# Patient Record
Sex: FEMALE | Race: WHITE | NOT HISPANIC OR LATINO | URBAN - METROPOLITAN AREA
[De-identification: names, ages, dates, MRNs, and addresses within clinical notes are randomized per-mention and may not be internally consistent; named-entity substitution may affect disease eponyms.]

---

## 2017-11-09 PROBLEM — Z00.00 ENCOUNTER FOR PREVENTIVE HEALTH EXAMINATION: Status: ACTIVE | Noted: 2017-11-09

## 2020-02-12 ENCOUNTER — INPATIENT (INPATIENT)
Facility: HOSPITAL | Age: 65
LOS: 5 days | Discharge: NURSING, REHAB OR LONG-TERM CARE | DRG: 871 | End: 2020-02-18
Attending: INTERNAL MEDICINE | Admitting: INTERNAL MEDICINE
Payer: MEDICARE

## 2020-02-12 VITALS
HEIGHT: 65 IN | TEMPERATURE: 100 F | SYSTOLIC BLOOD PRESSURE: 150 MMHG | DIASTOLIC BLOOD PRESSURE: 100 MMHG | HEART RATE: 125 BPM | RESPIRATION RATE: 22 BRPM | WEIGHT: 149.91 LBS | OXYGEN SATURATION: 100 %

## 2020-02-12 DIAGNOSIS — L03.90 CELLULITIS, UNSPECIFIED: ICD-10-CM

## 2020-02-12 LAB
ALBUMIN SERPL ELPH-MCNC: 3.1 G/DL — LOW (ref 3.3–5)
ALP SERPL-CCNC: 82 U/L — SIGNIFICANT CHANGE UP (ref 40–120)
ALT FLD-CCNC: 45 U/L — SIGNIFICANT CHANGE UP (ref 12–78)
ANION GAP SERPL CALC-SCNC: 9 MMOL/L — SIGNIFICANT CHANGE UP (ref 5–17)
APPEARANCE UR: CLEAR — SIGNIFICANT CHANGE UP
APTT BLD: 28.3 SEC — SIGNIFICANT CHANGE UP (ref 27.5–36.3)
AST SERPL-CCNC: 50 U/L — HIGH (ref 15–37)
BASOPHILS # BLD AUTO: 0.03 K/UL — SIGNIFICANT CHANGE UP (ref 0–0.2)
BASOPHILS NFR BLD AUTO: 0.3 % — SIGNIFICANT CHANGE UP (ref 0–2)
BILIRUB SERPL-MCNC: 0.4 MG/DL — SIGNIFICANT CHANGE UP (ref 0.2–1.2)
BILIRUB UR-MCNC: NEGATIVE — SIGNIFICANT CHANGE UP
BUN SERPL-MCNC: 12 MG/DL — SIGNIFICANT CHANGE UP (ref 7–23)
CALCIUM SERPL-MCNC: 8.9 MG/DL — SIGNIFICANT CHANGE UP (ref 8.5–10.1)
CHLORIDE SERPL-SCNC: 105 MMOL/L — SIGNIFICANT CHANGE UP (ref 96–108)
CO2 SERPL-SCNC: 27 MMOL/L — SIGNIFICANT CHANGE UP (ref 22–31)
COLOR SPEC: YELLOW — SIGNIFICANT CHANGE UP
CREAT SERPL-MCNC: 0.86 MG/DL — SIGNIFICANT CHANGE UP (ref 0.5–1.3)
DIFF PNL FLD: ABNORMAL
EOSINOPHIL # BLD AUTO: 0.01 K/UL — SIGNIFICANT CHANGE UP (ref 0–0.5)
EOSINOPHIL NFR BLD AUTO: 0.1 % — SIGNIFICANT CHANGE UP (ref 0–6)
GLUCOSE SERPL-MCNC: 204 MG/DL — HIGH (ref 70–99)
GLUCOSE UR QL: NEGATIVE MG/DL — SIGNIFICANT CHANGE UP
HCT VFR BLD CALC: 43.8 % — SIGNIFICANT CHANGE UP (ref 34.5–45)
HGB BLD-MCNC: 13.9 G/DL — SIGNIFICANT CHANGE UP (ref 11.5–15.5)
IMM GRANULOCYTES NFR BLD AUTO: 0.8 % — SIGNIFICANT CHANGE UP (ref 0–1.5)
INR BLD: 1.12 RATIO — SIGNIFICANT CHANGE UP (ref 0.88–1.16)
KETONES UR-MCNC: ABNORMAL
LACTATE SERPL-SCNC: 4.4 MMOL/L — CRITICAL HIGH (ref 0.7–2)
LEUKOCYTE ESTERASE UR-ACNC: ABNORMAL
LYMPHOCYTES # BLD AUTO: 0.56 K/UL — LOW (ref 1–3.3)
LYMPHOCYTES # BLD AUTO: 4.8 % — LOW (ref 13–44)
MCHC RBC-ENTMCNC: 31.4 PG — SIGNIFICANT CHANGE UP (ref 27–34)
MCHC RBC-ENTMCNC: 31.7 GM/DL — LOW (ref 32–36)
MCV RBC AUTO: 99.1 FL — SIGNIFICANT CHANGE UP (ref 80–100)
MONOCYTES # BLD AUTO: 1.06 K/UL — HIGH (ref 0–0.9)
MONOCYTES NFR BLD AUTO: 9 % — SIGNIFICANT CHANGE UP (ref 2–14)
NEUTROPHILS # BLD AUTO: 9.98 K/UL — HIGH (ref 1.8–7.4)
NEUTROPHILS NFR BLD AUTO: 85 % — HIGH (ref 43–77)
NITRITE UR-MCNC: NEGATIVE — SIGNIFICANT CHANGE UP
PH UR: 5 — SIGNIFICANT CHANGE UP (ref 5–8)
PLATELET # BLD AUTO: 161 K/UL — SIGNIFICANT CHANGE UP (ref 150–400)
POTASSIUM SERPL-MCNC: 3.9 MMOL/L — SIGNIFICANT CHANGE UP (ref 3.5–5.3)
POTASSIUM SERPL-SCNC: 3.9 MMOL/L — SIGNIFICANT CHANGE UP (ref 3.5–5.3)
PROT SERPL-MCNC: 7.1 GM/DL — SIGNIFICANT CHANGE UP (ref 6–8.3)
PROT UR-MCNC: 15 MG/DL
PROTHROM AB SERPL-ACNC: 12.5 SEC — SIGNIFICANT CHANGE UP (ref 10–12.9)
RAPID RVP RESULT: SIGNIFICANT CHANGE UP
RBC # BLD: 4.42 M/UL — SIGNIFICANT CHANGE UP (ref 3.8–5.2)
RBC # FLD: 13.7 % — SIGNIFICANT CHANGE UP (ref 10.3–14.5)
SODIUM SERPL-SCNC: 141 MMOL/L — SIGNIFICANT CHANGE UP (ref 135–145)
SP GR SPEC: 1.02 — SIGNIFICANT CHANGE UP (ref 1.01–1.02)
UROBILINOGEN FLD QL: NEGATIVE MG/DL — SIGNIFICANT CHANGE UP
WBC # BLD: 11.73 K/UL — HIGH (ref 3.8–10.5)
WBC # FLD AUTO: 11.73 K/UL — HIGH (ref 3.8–10.5)

## 2020-02-12 PROCEDURE — 73590 X-RAY EXAM OF LOWER LEG: CPT | Mod: LT

## 2020-02-12 PROCEDURE — 82962 GLUCOSE BLOOD TEST: CPT

## 2020-02-12 PROCEDURE — 70450 CT HEAD/BRAIN W/O DYE: CPT

## 2020-02-12 PROCEDURE — 82803 BLOOD GASES ANY COMBINATION: CPT

## 2020-02-12 PROCEDURE — 74018 RADEX ABDOMEN 1 VIEW: CPT | Mod: 26

## 2020-02-12 PROCEDURE — 80053 COMPREHEN METABOLIC PANEL: CPT

## 2020-02-12 PROCEDURE — 70250 X-RAY EXAM OF SKULL: CPT | Mod: 26

## 2020-02-12 PROCEDURE — 83036 HEMOGLOBIN GLYCOSYLATED A1C: CPT

## 2020-02-12 PROCEDURE — 82550 ASSAY OF CK (CPK): CPT

## 2020-02-12 PROCEDURE — 70250 X-RAY EXAM OF SKULL: CPT

## 2020-02-12 PROCEDURE — 93971 EXTREMITY STUDY: CPT | Mod: LT

## 2020-02-12 PROCEDURE — 36415 COLL VENOUS BLD VENIPUNCTURE: CPT

## 2020-02-12 PROCEDURE — 89051 BODY FLUID CELL COUNT: CPT

## 2020-02-12 PROCEDURE — 99223 1ST HOSP IP/OBS HIGH 75: CPT | Mod: GC

## 2020-02-12 PROCEDURE — 85027 COMPLETE CBC AUTOMATED: CPT

## 2020-02-12 PROCEDURE — 83605 ASSAY OF LACTIC ACID: CPT

## 2020-02-12 PROCEDURE — 85025 COMPLETE CBC W/AUTO DIFF WBC: CPT

## 2020-02-12 PROCEDURE — 76700 US EXAM ABDOM COMPLETE: CPT

## 2020-02-12 PROCEDURE — 82140 ASSAY OF AMMONIA: CPT

## 2020-02-12 PROCEDURE — 80164 ASSAY DIPROPYLACETIC ACD TOT: CPT

## 2020-02-12 PROCEDURE — 71045 X-RAY EXAM CHEST 1 VIEW: CPT | Mod: 26,77

## 2020-02-12 PROCEDURE — 93926 LOWER EXTREMITY STUDY: CPT | Mod: LT

## 2020-02-12 PROCEDURE — 73700 CT LOWER EXTREMITY W/O DYE: CPT | Mod: LT

## 2020-02-12 PROCEDURE — 83735 ASSAY OF MAGNESIUM: CPT

## 2020-02-12 PROCEDURE — 80074 ACUTE HEPATITIS PANEL: CPT

## 2020-02-12 PROCEDURE — 80048 BASIC METABOLIC PNL TOTAL CA: CPT

## 2020-02-12 PROCEDURE — 93010 ELECTROCARDIOGRAM REPORT: CPT

## 2020-02-12 PROCEDURE — 71045 X-RAY EXAM CHEST 1 VIEW: CPT | Mod: 26

## 2020-02-12 PROCEDURE — 84157 ASSAY OF PROTEIN OTHER: CPT

## 2020-02-12 PROCEDURE — 71045 X-RAY EXAM CHEST 1 VIEW: CPT

## 2020-02-12 PROCEDURE — 87070 CULTURE OTHR SPECIMN AEROBIC: CPT

## 2020-02-12 PROCEDURE — 36600 WITHDRAWAL OF ARTERIAL BLOOD: CPT

## 2020-02-12 PROCEDURE — 86403 PARTICLE AGGLUT ANTBDY SCRN: CPT

## 2020-02-12 PROCEDURE — 82945 GLUCOSE OTHER FLUID: CPT

## 2020-02-12 PROCEDURE — 87483 CNS DNA AMP PROBE TYPE 12-25: CPT

## 2020-02-12 PROCEDURE — ZZZZZ: CPT

## 2020-02-12 PROCEDURE — 74018 RADEX ABDOMEN 1 VIEW: CPT

## 2020-02-12 PROCEDURE — 80202 ASSAY OF VANCOMYCIN: CPT

## 2020-02-12 PROCEDURE — 84100 ASSAY OF PHOSPHORUS: CPT

## 2020-02-12 PROCEDURE — 86803 HEPATITIS C AB TEST: CPT

## 2020-02-12 RX ORDER — VANCOMYCIN HCL 1 G
1000 VIAL (EA) INTRAVENOUS ONCE
Refills: 0 | Status: COMPLETED | OUTPATIENT
Start: 2020-02-12 | End: 2020-02-12

## 2020-02-12 RX ORDER — ACETAMINOPHEN 500 MG
650 TABLET ORAL ONCE
Refills: 0 | Status: COMPLETED | OUTPATIENT
Start: 2020-02-12 | End: 2020-02-12

## 2020-02-12 RX ORDER — CEFTRIAXONE 500 MG/1
1000 INJECTION, POWDER, FOR SOLUTION INTRAMUSCULAR; INTRAVENOUS ONCE
Refills: 0 | Status: COMPLETED | OUTPATIENT
Start: 2020-02-12 | End: 2020-02-12

## 2020-02-12 RX ORDER — SODIUM CHLORIDE 9 MG/ML
2100 INJECTION, SOLUTION INTRAVENOUS ONCE
Refills: 0 | Status: COMPLETED | OUTPATIENT
Start: 2020-02-12 | End: 2020-02-12

## 2020-02-12 RX ADMIN — CEFTRIAXONE 1000 MILLIGRAM(S): 500 INJECTION, POWDER, FOR SOLUTION INTRAMUSCULAR; INTRAVENOUS at 21:05

## 2020-02-12 RX ADMIN — Medication 650 MILLIGRAM(S): at 21:05

## 2020-02-12 RX ADMIN — Medication 250 MILLIGRAM(S): at 21:05

## 2020-02-12 RX ADMIN — SODIUM CHLORIDE 2100 MILLILITER(S): 9 INJECTION, SOLUTION INTRAVENOUS at 19:45

## 2020-02-12 RX ADMIN — Medication 650 MILLIGRAM(S): at 19:49

## 2020-02-12 RX ADMIN — Medication 1000 MILLIGRAM(S): at 22:05

## 2020-02-12 RX ADMIN — SODIUM CHLORIDE 2100 MILLILITER(S): 9 INJECTION, SOLUTION INTRAVENOUS at 20:30

## 2020-02-12 NOTE — ED PROVIDER NOTE - OBJECTIVE STATEMENT
63 y/o female with a PMHx of TBI, Epilepsy, HTN presents to the ED c/o fevers. Pt denies pain, N/V. Pt on Tamiflu. Per note, pt shaking uncontrollably. PCP: Pop. Pt is a poor historian due to hx of TBI, all hx taken from NH note.

## 2020-02-12 NOTE — H&P ADULT - NSICDXPASTMEDICALHX_GEN_ALL_CORE_FT
PAST MEDICAL HISTORY:  Dementia     Diabetes mellitus     Epilepsy     Hypertension     Normal pressure hydrocephalus     TBI (traumatic brain injury)

## 2020-02-12 NOTE — ED PROVIDER NOTE - NS_ ATTENDINGSCRIBEDETAILS _ED_A_ED_FT
I, Steve Jorge MD,  performed the initial face to face bedside interview with this patient regarding history of present illness, review of symptoms and relevant past medical, social and family history.  I completed an independent physical examination.    The history, relevant review of systems, past medical and surgical history, medical decision making, and physical examination was documented by the scribe in my presence and I attest to the accuracy of the documentation.

## 2020-02-12 NOTE — H&P ADULT - ATTENDING COMMENTS
65 y/o F PMHx as noted above admitted for further evaluation and management of sepsis secondary to complicated left lower extremity cellulitis.     #Sepsis secondary to Left Lower extremity Cellulitis  ~Admit to SICU  ~as noted above patient met sepsis criteria on admission  ~patient's LA as noted above; cont. aggressive IV hydration; will repeat LA accordingly  ~CCM consultation pending  ~will escalate abx therapy as above with Meropenem 1000mg IV stat then q8hrs, Clindamycin 600mg IVPB stat then q8hrs, Vancomycin 1000mg IVPB q24hrs   ~f/u PAN C+S  ~f/u w/ ID consultation in the am  ~cont. anti-pyretics  ~f/u LLE XR   ~f/u w/ Surgery  ~f/u X-ray  Shunt series   ~f/u B/L LE arterial/venous duplex    #Influenza Post Exposure Prophylaxis  ~cont. management as outlined above    #Diabetes Mellitus type 2  ~cont. ISS Protocol  ~cont. BGM qAC/HS    #Hypertension   ~cont. to monitor as outlined above    #Mood Disorder/ RODY  ~cont. Valproic Acid as outlined above  ~cont. Clonazepam 0.25mg po q12hrs    #Major Depressive Disorder  ~cont. Paroxetine     #VTE ppx  ~cont. LMWH sq

## 2020-02-12 NOTE — H&P ADULT - NSHPREVIEWOFSYSTEMS_GEN_ALL_CORE
Unable to adequately assess due to baseline mental status, however denies dizziness, nausea, vomiting, abdominal pain, leg pain or weakness.

## 2020-02-12 NOTE — ED ADULT NURSE NOTE - NSIMPLEMENTINTERV_GEN_ALL_ED
Implemented All Fall Risk Interventions:  [place] to call system. Call bell, personal items and telephone within reach. Instruct patient to call for assistance. Room bathroom lighting operational. Non-slip footwear when patient is off stretcher. Physically safe environment: no spills, clutter or unnecessary equipment. Stretcher in lowest position, wheels locked, appropriate side rails in place. Provide visual cue, wrist band, yellow gown, etc. Monitor gait and stability. Monitor for mental status changes and reorient to person, place, and time. Review medications for side effects contributing to fall risk. Reinforce activity limits and safety measures with patient and family.

## 2020-02-12 NOTE — H&P ADULT - NSHPPHYSICALEXAM_GEN_ALL_CORE
Vital Signs Last 24 Hrs  T(C): 38.5 (12 Feb 2020 21:30), Max: 39.4 (12 Feb 2020 19:11)  T(F): 101.3 (12 Feb 2020 21:30), Max: 103 (12 Feb 2020 19:11)  HR: 97 (12 Feb 2020 23:26) (97 - 125)  BP: 103/56 (12 Feb 2020 23:26) (97/50 - 150/100)  RR: 18 (12 Feb 2020 23:26) (18 - 22)  SpO2: 95% (12 Feb 2020 23:26) (94% - 100%)    PHYSICAL EXAM:  GENERAL: NAD, lying in bed comfortably  HEAD:  Atraumatic, Normocephalic  EYES: PERRLA, conjunctiva and sclera clear  ENT: Moist mucous membranes  NECK: Supple, No JVD  CHEST/LUNG: Clear to auscultation bilaterally; No rales, rhonchi, wheezing, or rubs. Unlabored respirations  HEART: Regular rate and rhythm; No murmurs, rubs, or gallops  ABDOMEN: Bowel sounds present; Soft, Nontender, Nondistended  EXTREMITIES:  2+ Peripheral Pulses, b/l pedal edema, LLE: erythema to anterior led extending above mid 1/3, open circular wound to anterior Left leg, no oozing noted, mild erythema to posterior leg, non tender  NERVOUS SYSTEM:  Alert, awake, answers yes and no to questions, follows commands readily  MSK: FROM all 4 extremities, full and equal strength Vital Signs Last 24 Hrs  T(C): 38.5 (12 Feb 2020 21:30), Max: 39.4 (12 Feb 2020 19:11)  T(F): 101.3 (12 Feb 2020 21:30), Max: 103 (12 Feb 2020 19:11)  HR: 97 (12 Feb 2020 23:26) (97 - 125)  BP: 103/56 (12 Feb 2020 23:26) (97/50 - 150/100)  RR: 18 (12 Feb 2020 23:26) (18 - 22)  SpO2: 95% (12 Feb 2020 23:26) (94% - 100%)    PHYSICAL EXAM:  GENERAL: NAD, lying in bed comfortably  HEAD:  Atraumatic, Normocephalic  EYES: PERRLA, conjunctiva and sclera clear  ENT: Moist mucous membranes  NECK: Supple, No JVD  CHEST/LUNG: Clear to auscultation bilaterally; No rales, rhonchi, wheezing, or rubs. Unlabored respirations  HEART: Regular rate and rhythm; No murmurs, rubs, or gallops, capillary refill < 2seconds   ABDOMEN: Bowel sounds present; Soft, Nontender, Nondistended  EXTREMITIES:  2+ Peripheral Pulses, b/l pedal edema, LLE: erythema to anterior led extending above mid 1/3, open circular wound to anterior Left leg, no oozing noted, mild erythema to posterior leg, non tender  NERVOUS SYSTEM:  Alert, awake, answers yes and no to questions, follows commands readily  MSK: FROM all 4 extremities, full and equal strength Vital Signs Last 24 Hrs  T(C): 38.5 (12 Feb 2020 21:30), Max: 39.4 (12 Feb 2020 19:11)  T(F): 101.3 (12 Feb 2020 21:30), Max: 103 (12 Feb 2020 19:11)  HR: 97 (12 Feb 2020 23:26) (97 - 125)  BP: 103/56 (12 Feb 2020 23:26) (97/50 - 150/100)  RR: 18 (12 Feb 2020 23:26) (18 - 22)  SpO2: 95% (12 Feb 2020 23:26) (94% - 100%)    PHYSICAL EXAM:  GENERAL: NAD, lying in bed comfortably  HEAD:  Atraumatic, Normocephalic  EYES: R pupil > Left, R pupil slow reacting to light, L pupil reactive, conjunctiva and sclera clear  ENT: Moist mucous membranes  NECK: Supple, No JVD  CHEST/LUNG: Clear to auscultation bilaterally; No rales, rhonchi, wheezing, or rubs. Unlabored respirations  HEART: Regular rate and rhythm; No murmurs, rubs, or gallops, capillary refill < 2seconds   ABDOMEN: Bowel sounds present; Soft, Nontender, Nondistended  EXTREMITIES:  2+ Peripheral Pulses, b/l pedal edema, LLE: erythema, warmth to anterior leg - mid 1/3, open circular wound to anterior Left leg, no oozing noted, mild erythema to posterior leg, non tender, erythema to RLE - distal 1/3, warm to touch   NERVOUS SYSTEM:  Alert, awake, answers yes and no to questions, follows commands readily  MSK: FROM all 4 extremities, full and equal strength

## 2020-02-12 NOTE — H&P ADULT - HISTORY OF PRESENT ILLNESS
63 y/o female, resident of [facility], with PMHx significant for Dementia with behavioral disturbance, Mood disorder, RODY, Normal pressure Hydrocephalus with  shunt, Hypertension, Diabetes Mellitus and dementia presented to the ED today from Nursing facility with fever, "shaking" and noted changes in baseline mental status. Patient presently on tamiful, which was started on 2/11/2020. Patient denies pain, headache, dizziness, nausea, vomiting, abdominal pain, leg pain or weakness. Patient is a poor historian.

## 2020-02-12 NOTE — H&P ADULT - ASSESSMENT
65 y/o female, resident of [facility], with PMHx significant for Dementia with behavioral disturbance, Mood disorder, RODY, Normal pressure Hydrocephalus with  shunt, Hypertension, Diabetes Mellitus and dementia 65 y/o female, resident of [facility], with PMHx significant for Dementia with behavioral disturbance, depression, Mood disorder, RODY, Normal pressure Hydrocephalus with  shunt, Hypertension, Diabetes Mellitus and dementia admitted for Sepsis secondary to Left lower extremity cellulitis.     Sepsis secondary to Left Lower extremity Cellulitis  -3/4 SIRS criteria met on admission to ED, Tmax 103, HR: 125/Min, RR: 22/min   -Lactate 4.4 -> 5.1 after Bolus 30cc/kg (2100mls)   -WBC: 11.73  -s/p Ceftriaxone 1g, Vancomycin 1g x 1 in ED   -Additional IV LR Bolus 500mls Administered  -Start Meropenem 1000mg IV stat then q8hrs, clindamycin 600mg IV stat then q8hrs, Continue Vancomycin 1000mg q24hrs   -Tylenol 650mg po q6hrs PRN fever   -F/u blood and urine Cultures  -F/u LLE X-ray - r/o Osteomyelitis   -F/u X-ray Shunt series   -LLE Arterial Duplex US Reviewed  -CXR reviewed, RVP (-)   -Trend lactate, CBC, BMP  -F/u ID Consult     Influenza Post Exposure Prophylaxis  -Continue Tamiflu 75mg po daily x 12 days  -Started on 2/11/2020  -Pt lives in Nursing facility    Diabetes Mellitus  -Hypoglycemia Protocol  -Low dose ISS,   -BGM before meals and bedtime   -Diet: Consistent Carbs w/ Evening Snack.  -Follow up HbA1c    Hypertension   -BP goal of < 150/90 (age > 60)  -monitor BP    Mood Disorder/ RODY  -Continue Valproic Acid 500mg po daily and 750mg po qhs   -Continue Clonazepam 0.25mg po q12hrs, 9:00am & 7:00pm    Major Depressive Disorder  -Continue Paroxetine 30mg po daily     VTE Prophylaxis  -Lovenox 40mg SubQ q24hrs 63 y/o female, resident of [facility], with PMHx significant for Dementia with behavioral disturbance, depression, Mood disorder, RODY, Normal pressure Hydrocephalus with  shunt, Hypertension, Diabetes Mellitus and dementia admitted for Sepsis secondary to Left lower extremity cellulitis.     Sepsis secondary to Left Lower extremity Cellulitis  -3/4 SIRS criteria met on admission to ED, Tmax 103, HR: 125/Min, RR: 22/min   -Lactate 4.4 -> 5.1 after Bolus 30cc/kg (2100mls)   -WBC: 11.73  -s/p Ceftriaxone 1g, Vancomycin 1g x 1 in ED   -Additional IV LR Bolus 500mls Administered  -Start Meropenem 1000mg IV stat then q8hrs, clindamycin 600mg IV stat then q8hrs, Continue Vancomycin 1000mg q24hrs   -Tylenol 650mg po q6hrs PRN fever   -F/u blood and urine Cultures  -F/u LLE X-ray - r/o Osteomyelitis   -F/u X-ray Shunt series   -LLE Arterial Duplex US Reviewed  -CXR reviewed, RVP (-)   -Trend lactate, CBC, BMP  -F/u ID Consult     Influenza Post Exposure Prophylaxis  -Continue Tamiflu 75mg po daily x 12 days  -Started on 2/11/2020  -Pt lives in Nursing facility  -RVP negative     Diabetes Mellitus  -Hypoglycemia Protocol  -Low dose ISS,   -BGM before meals and bedtime   -Diet: Consistent Carbs w/ Evening Snack.  -Follow up HbA1c    Hypertension   -BP goal of < 150/90 (age > 60)  -monitor BP    Mood Disorder/ RODY  -Continue Valproic Acid 500mg po daily and 750mg po qhs   -Continue Clonazepam 0.25mg po q12hrs, 9:00am & 7:00pm    Major Depressive Disorder  -Continue Paroxetine 30mg po daily     VTE Prophylaxis  -Lovenox 40mg SubQ q24hrs 63 y/o female, resident of [facility], with PMHx significant for Dementia with behavioral disturbance, depression, Mood disorder, RODY, Normal pressure Hydrocephalus with  shunt, Hypertension, Diabetes Mellitus and dementia admitted for Sepsis secondary to Left lower extremity cellulitis.     Sepsis secondary to Left Lower extremity Cellulitis  -Admit to SICU  -3/4 SIRS criteria met on admission to ED, Tmax 103, HR: 125/Min, RR: 22/min   -Lactate 4.4 -> 5.1 after Bolus 30cc/kg (2100mls)   -WBC: 11.73  -s/p Ceftriaxone 1g, Vancomycin 1g x 1 in ED   -Additional IV LR Bolus 500mls Administered  -Start Meropenem 1000mg IV stat then q8hrs, clindamycin 600mg IV stat then q8hrs, Continue Vancomycin 1000mg q24hrs   -Tylenol 650mg po q6hrs PRN fever   -F/u blood and urine Cultures  -F/u LLE X-ray - r/o Osteomyelitis   -F/u X-ray Shunt series   -LLE Arterial Duplex US Reviewed  -CXR reviewed, RVP (-)   -Trend lactate, CBC, BMP  -F/u ID Consult     Influenza Post Exposure Prophylaxis  -Continue Tamiflu 75mg po daily x 12 days  -Started on 2/11/2020  -Pt lives in Nursing facility  -RVP negative     Diabetes Mellitus  -Hypoglycemia Protocol  -Low dose ISS,   -BGM before meals and bedtime   -Diet: Consistent Carbs w/ Evening Snack.  -Follow up HbA1c    Hypertension   -BP goal of < 150/90 (age > 60)  -monitor BP    Mood Disorder/ RODY  -Continue Valproic Acid 500mg po daily and 750mg po qhs   -Continue Clonazepam 0.25mg po q12hrs, 9:00am & 7:00pm    Major Depressive Disorder  -Continue Paroxetine 30mg po daily     VTE Prophylaxis  -Lovenox 40mg SubQ q24hrs 65 y/o female, resident of [facility], with PMHx significant for Dementia with behavioral disturbance, depression, Mood disorder, RODY, Normal pressure Hydrocephalus with  shunt, Hypertension, Diabetes Mellitus and dementia admitted for Sepsis secondary to Left lower extremity cellulitis.     Sepsis secondary to B/L Lower extremity Cellulitis  -Admit to SICU  -3/4 SIRS criteria met on admission to ED, Tmax 103, HR: 125/Min, RR: 22/min   -Lactate 4.4 -> 5.1 after Bolus 30cc/kg (2100mls)   -WBC: 11.73  -s/p Ceftriaxone 1g, Vancomycin 1g x 1 in ED   -Additional IV LR Bolus 500mls Administered  -Start Meropenem 1000mg IV stat then q8hrs, clindamycin 600mg IV stat then q8hrs, Continue Vancomycin 1000mg q24hrs   -Tylenol 650mg po q6hrs PRN fever   -F/u blood and urine Cultures  -F/u LLE X-ray - r/o Osteomyelitis   -F/u X-ray Shunt series   -LLE Arterial Duplex US Reviewed  -F/U CPK  -CXR reviewed, RVP (-)   -Trend lactate, CBC, BMP  -F/u ID Consult     Influenza Post Exposure Prophylaxis  -Continue Tamiflu 75mg po daily x 12 days  -Started on 2/11/2020  -Pt lives in Nursing facility  -RVP negative     Diabetes Mellitus  -Hypoglycemia Protocol  -Low dose ISS,   -BGM before meals and bedtime   -Diet: Consistent Carbs w/ Evening Snack.  -Follow up HbA1c    Hypertension   -BP goal of < 150/90 (age > 60)  -monitor BP    Mood Disorder/ RODY  -Continue Valproic Acid 500mg po daily and 750mg po qhs   -Continue Clonazepam 0.25mg po q12hrs, 9:00am & 7:00pm    Major Depressive Disorder  -Continue Paroxetine 30mg po daily     VTE Prophylaxis  -Lovenox 40mg SubQ q24hrs 65 y/o female, resident of [facility], with PMHx significant for Dementia with behavioral disturbance, depression, Mood disorder, RODY, Normal pressure Hydrocephalus with  shunt, Hypertension, Diabetes Mellitus and dementia admitted for Sepsis secondary to lower extremity cellulitis.     Sepsis secondary to B/L Lower extremity Cellulitis  -Admit to SICU  -3/4 SIRS criteria met on admission to ED, Tmax 103, HR: 125/Min, RR: 22/min   -Lactate 4.4 -> 5.1 after Bolus 30cc/kg (2100mls)   -WBC: 11.73  -s/p Ceftriaxone 1g, Vancomycin 1g x 1 in ED   -Additional IV LR Bolus 500mls Administered  -Start Meropenem 1000mg IV stat then q8hrs, clindamycin 600mg IV stat then q8hrs, Continue Vancomycin 1000mg q24hrs   -Tylenol 650mg po q6hrs PRN fever   -F/u blood and urine Cultures  -F/u LLE X-ray - r/o Osteomyelitis   -F/u X-ray Shunt series   -LLE Arterial Duplex US Reviewed  -F/U CPK  -CXR reviewed, RVP (-)   -Trend lactate, CBC, BMP  -F/u ID Consult     Influenza Post Exposure Prophylaxis  -Continue Tamiflu 75mg po daily x 12 days  -Started on 2/11/2020  -Pt lives in Nursing facility  -RVP negative     Diabetes Mellitus  -Hypoglycemia Protocol  -Low dose ISS,   -BGM before meals and bedtime   -Diet: Consistent Carbs w/ Evening Snack.  -Follow up HbA1c    Hypertension   -BP goal of < 150/90 (age > 60)  -monitor BP    Mood Disorder/ RODY  -Continue Valproic Acid 500mg po daily and 750mg po qhs   -Continue Clonazepam 0.25mg po q12hrs, 9:00am & 7:00pm    Major Depressive Disorder  -Continue Paroxetine 30mg po daily     VTE Prophylaxis  -Lovenox 40mg SubQ q24hrs

## 2020-02-12 NOTE — PHARMACOTHERAPY INTERVENTION NOTE - COMMENTS
Med rec is complete. Checked [facility]. Patient is from [facility], [facility]. Medication list was updated based on the list provided by NH.

## 2020-02-13 LAB
ADD ON TEST-SPECIMEN IN LAB: SIGNIFICANT CHANGE UP
ADD ON TEST-SPECIMEN IN LAB: SIGNIFICANT CHANGE UP
ALBUMIN SERPL ELPH-MCNC: 2.3 G/DL — LOW (ref 3.3–5)
ALBUMIN SERPL ELPH-MCNC: 2.6 G/DL — LOW (ref 3.3–5)
ALP SERPL-CCNC: 63 U/L — SIGNIFICANT CHANGE UP (ref 40–120)
ALP SERPL-CCNC: 69 U/L — SIGNIFICANT CHANGE UP (ref 40–120)
ALT FLD-CCNC: 40 U/L — SIGNIFICANT CHANGE UP (ref 12–78)
ALT FLD-CCNC: 66 U/L — SIGNIFICANT CHANGE UP (ref 12–78)
ANION GAP SERPL CALC-SCNC: 8 MMOL/L — SIGNIFICANT CHANGE UP (ref 5–17)
ANION GAP SERPL CALC-SCNC: 8 MMOL/L — SIGNIFICANT CHANGE UP (ref 5–17)
AST SERPL-CCNC: 172 U/L — HIGH (ref 15–37)
AST SERPL-CCNC: 60 U/L — HIGH (ref 15–37)
BASE EXCESS BLDA CALC-SCNC: -1.1 MMOL/L — SIGNIFICANT CHANGE UP (ref -2–2)
BASOPHILS # BLD AUTO: 0.02 K/UL — SIGNIFICANT CHANGE UP (ref 0–0.2)
BASOPHILS NFR BLD AUTO: 0.2 % — SIGNIFICANT CHANGE UP (ref 0–2)
BILIRUB SERPL-MCNC: 0.4 MG/DL — SIGNIFICANT CHANGE UP (ref 0.2–1.2)
BILIRUB SERPL-MCNC: 0.4 MG/DL — SIGNIFICANT CHANGE UP (ref 0.2–1.2)
BLOOD GAS COMMENTS ARTERIAL: SIGNIFICANT CHANGE UP
BUN SERPL-MCNC: 10 MG/DL — SIGNIFICANT CHANGE UP (ref 7–23)
BUN SERPL-MCNC: 8 MG/DL — SIGNIFICANT CHANGE UP (ref 7–23)
CALCIUM SERPL-MCNC: 8.1 MG/DL — LOW (ref 8.5–10.1)
CALCIUM SERPL-MCNC: 8.2 MG/DL — LOW (ref 8.5–10.1)
CHLORIDE SERPL-SCNC: 107 MMOL/L — SIGNIFICANT CHANGE UP (ref 96–108)
CHLORIDE SERPL-SCNC: 109 MMOL/L — HIGH (ref 96–108)
CK SERPL-CCNC: 2046 U/L — HIGH (ref 26–192)
CO2 SERPL-SCNC: 22 MMOL/L — SIGNIFICANT CHANGE UP (ref 22–31)
CO2 SERPL-SCNC: 24 MMOL/L — SIGNIFICANT CHANGE UP (ref 22–31)
CREAT SERPL-MCNC: 0.71 MG/DL — SIGNIFICANT CHANGE UP (ref 0.5–1.3)
CREAT SERPL-MCNC: 0.75 MG/DL — SIGNIFICANT CHANGE UP (ref 0.5–1.3)
CRYPTOC AG CSF-ACNC: NEGATIVE — SIGNIFICANT CHANGE UP
CSF PCR RESULT: SIGNIFICANT CHANGE UP
EOSINOPHIL # BLD AUTO: 0 K/UL — SIGNIFICANT CHANGE UP (ref 0–0.5)
EOSINOPHIL NFR BLD AUTO: 0 % — SIGNIFICANT CHANGE UP (ref 0–6)
GAS PNL BLDA: SIGNIFICANT CHANGE UP
GLUCOSE CSF-MCNC: 83 MG/DL — HIGH (ref 40–70)
GLUCOSE SERPL-MCNC: 140 MG/DL — HIGH (ref 70–99)
GLUCOSE SERPL-MCNC: 194 MG/DL — HIGH (ref 70–99)
GRAM STN FLD: SIGNIFICANT CHANGE UP
HCO3 BLDA-SCNC: 21 MMOL/L — SIGNIFICANT CHANGE UP (ref 21–29)
HCT VFR BLD CALC: 40 % — SIGNIFICANT CHANGE UP (ref 34.5–45)
HCT VFR BLD CALC: 40 % — SIGNIFICANT CHANGE UP (ref 34.5–45)
HCV AB S/CO SERPL IA: 0.28 S/CO — SIGNIFICANT CHANGE UP (ref 0–0.99)
HCV AB SERPL-IMP: SIGNIFICANT CHANGE UP
HGB BLD-MCNC: 12.5 G/DL — SIGNIFICANT CHANGE UP (ref 11.5–15.5)
HGB BLD-MCNC: 12.9 G/DL — SIGNIFICANT CHANGE UP (ref 11.5–15.5)
IMM GRANULOCYTES NFR BLD AUTO: 0.9 % — SIGNIFICANT CHANGE UP (ref 0–1.5)
LACTATE SERPL-SCNC: 2.7 MMOL/L — HIGH (ref 0.7–2)
LACTATE SERPL-SCNC: 2.9 MMOL/L — HIGH (ref 0.7–2)
LACTATE SERPL-SCNC: 4.9 MMOL/L — CRITICAL HIGH (ref 0.7–2)
LYMPHOCYTES # BLD AUTO: 0.56 K/UL — LOW (ref 1–3.3)
LYMPHOCYTES # BLD AUTO: 5.5 % — LOW (ref 13–44)
MCHC RBC-ENTMCNC: 30.9 PG — SIGNIFICANT CHANGE UP (ref 27–34)
MCHC RBC-ENTMCNC: 31.3 GM/DL — LOW (ref 32–36)
MCHC RBC-ENTMCNC: 31.3 PG — SIGNIFICANT CHANGE UP (ref 27–34)
MCHC RBC-ENTMCNC: 32.3 GM/DL — SIGNIFICANT CHANGE UP (ref 32–36)
MCV RBC AUTO: 97.1 FL — SIGNIFICANT CHANGE UP (ref 80–100)
MCV RBC AUTO: 99 FL — SIGNIFICANT CHANGE UP (ref 80–100)
MONOCYTES # BLD AUTO: 0.59 K/UL — SIGNIFICANT CHANGE UP (ref 0–0.9)
MONOCYTES NFR BLD AUTO: 5.8 % — SIGNIFICANT CHANGE UP (ref 2–14)
NEUTROPHILS # BLD AUTO: 8.95 K/UL — HIGH (ref 1.8–7.4)
NEUTROPHILS NFR BLD AUTO: 87.6 % — HIGH (ref 43–77)
PCO2 BLDA: 30 MMHG — LOW (ref 32–46)
PH BLDA: 7.47 — HIGH (ref 7.35–7.45)
PLATELET # BLD AUTO: 142 K/UL — LOW (ref 150–400)
PLATELET # BLD AUTO: 143 K/UL — LOW (ref 150–400)
PO2 BLDA: 76 MMHG — SIGNIFICANT CHANGE UP (ref 74–108)
POTASSIUM SERPL-MCNC: 3.5 MMOL/L — SIGNIFICANT CHANGE UP (ref 3.5–5.3)
POTASSIUM SERPL-MCNC: 3.6 MMOL/L — SIGNIFICANT CHANGE UP (ref 3.5–5.3)
POTASSIUM SERPL-SCNC: 3.5 MMOL/L — SIGNIFICANT CHANGE UP (ref 3.5–5.3)
POTASSIUM SERPL-SCNC: 3.6 MMOL/L — SIGNIFICANT CHANGE UP (ref 3.5–5.3)
PROT CSF-MCNC: 189 MG/DL — HIGH (ref 15–45)
PROT SERPL-MCNC: 5.9 GM/DL — LOW (ref 6–8.3)
PROT SERPL-MCNC: 6.1 GM/DL — SIGNIFICANT CHANGE UP (ref 6–8.3)
RBC # BLD: 4.04 M/UL — SIGNIFICANT CHANGE UP (ref 3.8–5.2)
RBC # BLD: 4.12 M/UL — SIGNIFICANT CHANGE UP (ref 3.8–5.2)
RBC # FLD: 14 % — SIGNIFICANT CHANGE UP (ref 10.3–14.5)
RBC # FLD: 14.1 % — SIGNIFICANT CHANGE UP (ref 10.3–14.5)
SAO2 % BLDA: 96 % — SIGNIFICANT CHANGE UP (ref 92–96)
SODIUM SERPL-SCNC: 137 MMOL/L — SIGNIFICANT CHANGE UP (ref 135–145)
SODIUM SERPL-SCNC: 141 MMOL/L — SIGNIFICANT CHANGE UP (ref 135–145)
SPECIMEN SOURCE: SIGNIFICANT CHANGE UP
WBC # BLD: 10.21 K/UL — SIGNIFICANT CHANGE UP (ref 3.8–10.5)
WBC # BLD: 8.25 K/UL — SIGNIFICANT CHANGE UP (ref 3.8–10.5)
WBC # FLD AUTO: 10.21 K/UL — SIGNIFICANT CHANGE UP (ref 3.8–10.5)
WBC # FLD AUTO: 8.25 K/UL — SIGNIFICANT CHANGE UP (ref 3.8–10.5)

## 2020-02-13 PROCEDURE — 73700 CT LOWER EXTREMITY W/O DYE: CPT | Mod: 26,LT

## 2020-02-13 PROCEDURE — 70450 CT HEAD/BRAIN W/O DYE: CPT | Mod: 26

## 2020-02-13 PROCEDURE — 99253 IP/OBS CNSLTJ NEW/EST LOW 45: CPT

## 2020-02-13 PROCEDURE — 99221 1ST HOSP IP/OBS SF/LOW 40: CPT

## 2020-02-13 PROCEDURE — 93926 LOWER EXTREMITY STUDY: CPT | Mod: 26,LT

## 2020-02-13 PROCEDURE — 73590 X-RAY EXAM OF LOWER LEG: CPT | Mod: 26,LT

## 2020-02-13 PROCEDURE — 93971 EXTREMITY STUDY: CPT | Mod: 26,LT

## 2020-02-13 PROCEDURE — 99232 SBSQ HOSP IP/OBS MODERATE 35: CPT

## 2020-02-13 RX ORDER — MEROPENEM 1 G/30ML
1000 INJECTION INTRAVENOUS ONCE
Refills: 0 | Status: COMPLETED | OUTPATIENT
Start: 2020-02-13 | End: 2020-02-13

## 2020-02-13 RX ORDER — SODIUM CHLORIDE 9 MG/ML
1000 INJECTION, SOLUTION INTRAVENOUS ONCE
Refills: 0 | Status: COMPLETED | OUTPATIENT
Start: 2020-02-13 | End: 2020-02-13

## 2020-02-13 RX ORDER — ENOXAPARIN SODIUM 100 MG/ML
40 INJECTION SUBCUTANEOUS DAILY
Refills: 0 | Status: DISCONTINUED | OUTPATIENT
Start: 2020-02-13 | End: 2020-02-18

## 2020-02-13 RX ORDER — SODIUM CHLORIDE 9 MG/ML
1000 INJECTION, SOLUTION INTRAVENOUS
Refills: 0 | Status: DISCONTINUED | OUTPATIENT
Start: 2020-02-13 | End: 2020-02-18

## 2020-02-13 RX ORDER — MEROPENEM 1 G/30ML
1000 INJECTION INTRAVENOUS EVERY 8 HOURS
Refills: 0 | Status: DISCONTINUED | OUTPATIENT
Start: 2020-02-13 | End: 2020-02-13

## 2020-02-13 RX ORDER — VANCOMYCIN HCL 1 G
1000 VIAL (EA) INTRAVENOUS ONCE
Refills: 0 | Status: COMPLETED | OUTPATIENT
Start: 2020-02-13 | End: 2020-02-13

## 2020-02-13 RX ORDER — SODIUM CHLORIDE 9 MG/ML
1000 INJECTION, SOLUTION INTRAVENOUS ONCE
Refills: 0 | Status: DISCONTINUED | OUTPATIENT
Start: 2020-02-13 | End: 2020-02-13

## 2020-02-13 RX ORDER — VALPROIC ACID (AS SODIUM SALT) 250 MG/5ML
500 SOLUTION, ORAL ORAL
Refills: 0 | Status: DISCONTINUED | OUTPATIENT
Start: 2020-02-13 | End: 2020-02-18

## 2020-02-13 RX ORDER — VANCOMYCIN HCL 1 G
1000 VIAL (EA) INTRAVENOUS EVERY 12 HOURS
Refills: 0 | Status: DISCONTINUED | OUTPATIENT
Start: 2020-02-13 | End: 2020-02-18

## 2020-02-13 RX ORDER — VALPROIC ACID (AS SODIUM SALT) 250 MG/5ML
750 SOLUTION, ORAL ORAL
Refills: 0 | Status: DISCONTINUED | OUTPATIENT
Start: 2020-02-13 | End: 2020-02-18

## 2020-02-13 RX ORDER — DEXTROSE 50 % IN WATER 50 %
12.5 SYRINGE (ML) INTRAVENOUS ONCE
Refills: 0 | Status: DISCONTINUED | OUTPATIENT
Start: 2020-02-13 | End: 2020-02-18

## 2020-02-13 RX ORDER — DEXTROSE 50 % IN WATER 50 %
25 SYRINGE (ML) INTRAVENOUS ONCE
Refills: 0 | Status: DISCONTINUED | OUTPATIENT
Start: 2020-02-13 | End: 2020-02-18

## 2020-02-13 RX ORDER — CEFTRIAXONE 500 MG/1
2000 INJECTION, POWDER, FOR SOLUTION INTRAMUSCULAR; INTRAVENOUS EVERY 12 HOURS
Refills: 0 | Status: DISCONTINUED | OUTPATIENT
Start: 2020-02-13 | End: 2020-02-14

## 2020-02-13 RX ORDER — DEXTROSE 50 % IN WATER 50 %
15 SYRINGE (ML) INTRAVENOUS ONCE
Refills: 0 | Status: DISCONTINUED | OUTPATIENT
Start: 2020-02-13 | End: 2020-02-18

## 2020-02-13 RX ORDER — ACETAMINOPHEN 500 MG
650 TABLET ORAL EVERY 6 HOURS
Refills: 0 | Status: DISCONTINUED | OUTPATIENT
Start: 2020-02-13 | End: 2020-02-18

## 2020-02-13 RX ORDER — CLONAZEPAM 1 MG
0.25 TABLET ORAL
Refills: 0 | Status: DISCONTINUED | OUTPATIENT
Start: 2020-02-13 | End: 2020-02-18

## 2020-02-13 RX ORDER — IBUPROFEN 200 MG
600 TABLET ORAL ONCE
Refills: 0 | Status: COMPLETED | OUTPATIENT
Start: 2020-02-13 | End: 2020-02-13

## 2020-02-13 RX ORDER — SODIUM CHLORIDE 9 MG/ML
1000 INJECTION INTRAMUSCULAR; INTRAVENOUS; SUBCUTANEOUS
Refills: 0 | Status: DISCONTINUED | OUTPATIENT
Start: 2020-02-13 | End: 2020-02-15

## 2020-02-13 RX ORDER — SODIUM CHLORIDE 9 MG/ML
500 INJECTION, SOLUTION INTRAVENOUS ONCE
Refills: 0 | Status: COMPLETED | OUTPATIENT
Start: 2020-02-13 | End: 2020-02-13

## 2020-02-13 RX ORDER — PANTOPRAZOLE SODIUM 20 MG/1
40 TABLET, DELAYED RELEASE ORAL
Refills: 0 | Status: DISCONTINUED | OUTPATIENT
Start: 2020-02-13 | End: 2020-02-18

## 2020-02-13 RX ORDER — MEROPENEM 1 G/30ML
INJECTION INTRAVENOUS
Refills: 0 | Status: DISCONTINUED | OUTPATIENT
Start: 2020-02-13 | End: 2020-02-13

## 2020-02-13 RX ORDER — INSULIN LISPRO 100/ML
VIAL (ML) SUBCUTANEOUS
Refills: 0 | Status: DISCONTINUED | OUTPATIENT
Start: 2020-02-13 | End: 2020-02-18

## 2020-02-13 RX ORDER — GLUCAGON INJECTION, SOLUTION 0.5 MG/.1ML
1 INJECTION, SOLUTION SUBCUTANEOUS ONCE
Refills: 0 | Status: DISCONTINUED | OUTPATIENT
Start: 2020-02-13 | End: 2020-02-18

## 2020-02-13 RX ORDER — INSULIN LISPRO 100/ML
VIAL (ML) SUBCUTANEOUS AT BEDTIME
Refills: 0 | Status: DISCONTINUED | OUTPATIENT
Start: 2020-02-13 | End: 2020-02-18

## 2020-02-13 RX ADMIN — Medication 0.25 MILLIGRAM(S): at 08:48

## 2020-02-13 RX ADMIN — Medication 750 MILLIGRAM(S): at 18:15

## 2020-02-13 RX ADMIN — Medication 250 MILLIGRAM(S): at 18:15

## 2020-02-13 RX ADMIN — Medication 650 MILLIGRAM(S): at 20:39

## 2020-02-13 RX ADMIN — Medication 600 MILLIGRAM(S): at 18:15

## 2020-02-13 RX ADMIN — SODIUM CHLORIDE 75 MILLILITER(S): 9 INJECTION INTRAMUSCULAR; INTRAVENOUS; SUBCUTANEOUS at 11:14

## 2020-02-13 RX ADMIN — Medication 500 MILLIGRAM(S): at 06:30

## 2020-02-13 RX ADMIN — SODIUM CHLORIDE 500 MILLILITER(S): 9 INJECTION, SOLUTION INTRAVENOUS at 00:56

## 2020-02-13 RX ADMIN — Medication 600 MILLIGRAM(S): at 19:06

## 2020-02-13 RX ADMIN — SODIUM CHLORIDE 500 MILLILITER(S): 9 INJECTION, SOLUTION INTRAVENOUS at 05:31

## 2020-02-13 RX ADMIN — Medication 0.25 MILLIGRAM(S): at 18:15

## 2020-02-13 RX ADMIN — Medication 650 MILLIGRAM(S): at 14:42

## 2020-02-13 RX ADMIN — Medication 30 MILLIGRAM(S): at 11:13

## 2020-02-13 RX ADMIN — Medication 650 MILLIGRAM(S): at 19:52

## 2020-02-13 RX ADMIN — Medication 650 MILLIGRAM(S): at 05:30

## 2020-02-13 RX ADMIN — Medication 650 MILLIGRAM(S): at 04:37

## 2020-02-13 RX ADMIN — Medication 100 MILLIGRAM(S): at 08:40

## 2020-02-13 RX ADMIN — Medication 75 MILLIGRAM(S): at 11:13

## 2020-02-13 RX ADMIN — Medication 100 MILLIGRAM(S): at 02:58

## 2020-02-13 RX ADMIN — Medication 650 MILLIGRAM(S): at 16:00

## 2020-02-13 RX ADMIN — Medication 650 MILLIGRAM(S): at 09:00

## 2020-02-13 RX ADMIN — Medication 650 MILLIGRAM(S): at 10:00

## 2020-02-13 RX ADMIN — ENOXAPARIN SODIUM 40 MILLIGRAM(S): 100 INJECTION SUBCUTANEOUS at 11:13

## 2020-02-13 RX ADMIN — MEROPENEM 100 MILLIGRAM(S): 1 INJECTION INTRAVENOUS at 09:03

## 2020-02-13 RX ADMIN — MEROPENEM 100 MILLIGRAM(S): 1 INJECTION INTRAVENOUS at 03:46

## 2020-02-13 RX ADMIN — Medication 1: at 18:25

## 2020-02-13 RX ADMIN — CEFTRIAXONE 2000 MILLIGRAM(S): 500 INJECTION, POWDER, FOR SOLUTION INTRAMUSCULAR; INTRAVENOUS at 11:15

## 2020-02-13 NOTE — CONSULT NOTE ADULT - SUBJECTIVE AND OBJECTIVE BOX
65 yo female pmhx normal pressure hydrocephalus s/p  shunt, TBI, Dementia with behavioral disturbances, mood disorder, RODY, HTN, DM, Epilepsy transferred from [facility] with fever, shaking and change in baseline mental status.  Patient diagnosed with sepsis in setting of LLE cellulitis and found to have lactic acidosis.  Patient initially under-weighed at ~65kg and was given 30cc/kg crystalloid resuscitation with lactate uptrend 4.6 -->5.1.  Patient's new weight of ~90 kg entered and patient given additional 500cc and repeat lactate pulled ~30 minutes later with lactate 5.1 --> 4.9 prompting ICU consult for lactemia.  Patient seen and examined, repeat labs returned which now reveals uptrend in ck from ~83 to >800.  Patient remains febrile.  All other labs grossly unremarkable. Xray of lower extremity appears without air in soft tissue or surrounding bone. Patient denies any pain, discomfort.       PAST MEDICAL & SURGICAL HISTORY:  Normal pressure hydrocephalus  Hypertension  Diabetes mellitus  Dementia  Epilepsy  TBI (traumatic brain injury)  No significant past surgical history      Allergies  latex (Unknown)  Originally Entered as [Severe Hives] reaction to [Pineapple] (Unknown)  penicillins (Unknown)      FAMILY HISTORY:  No pertinent family history in first degree relatives      Social History:   From facility       Review of Systems:  Patient without complaints, unreliable       Vitals During Exam:   HR: 80s  BP: 130s/80s  RR: 15  sPO2: 99% on RA    Physical Examination:    General: Pleasant female, lying in bed, appears comfortable     HEENT: Right temporal sunken in, Right pupil 5mm minimally reactive, Left pupil 5mm brisk and reactive, several teeth missing. Oral mucosa dry    PULM: Symmetrical thorax expansion upon respiration. Clear to auscultation bilaterally, no significant sputum production appreciated    CVS: Regular rate and rhythm, no murmurs, rubs, or gallops appreciated    ABD: Soft, nondistended, nontender, normoactive bowel sounds, no masses appreciated    EXT: +edema over bilateral lower extremities, swelling/erythema/warmth LLE>RLE. +1mm x 1mm puncture wound with what appears to be pus over LLE mid shin, unable to express anything from site.  Both legs marked with black pen. Both lower extremities nontender with full range of motion.     SKIN: Warm and well perfused    NEURO: Alert, confused (?baseline), interactive, follows commands      Medications:  clindamycin IVPB      clindamycin IVPB 600 milliGRAM(s) IV Intermittent every 8 hours  meropenem  IVPB      meropenem  IVPB 1000 milliGRAM(s) IV Intermittent every 8 hours  oseltamivir 75 milliGRAM(s) Oral daily  vancomycin  IVPB 1000 milliGRAM(s) IV Intermittent once  acetaminophen   Tablet .. 650 milliGRAM(s) Oral every 6 hours PRN  clonazePAM  Tablet 0.25 milliGRAM(s) Oral <User Schedule>  PARoxetine 30 milliGRAM(s) Oral daily  valproic acid 500 milliGRAM(s) Oral <User Schedule>  valproic acid 750 milliGRAM(s) Oral <User Schedule>  enoxaparin Injectable 40 milliGRAM(s) SubCutaneous daily  dextrose 40% Gel 15 Gram(s) Oral once PRN  dextrose 50% Injectable 12.5 Gram(s) IV Push once  dextrose 50% Injectable 25 Gram(s) IV Push once  dextrose 50% Injectable 25 Gram(s) IV Push once  glucagon  Injectable 1 milliGRAM(s) IntraMuscular once PRN  insulin lispro (HumaLOG) corrective regimen sliding scale   SubCutaneous three times a day before meals  insulin lispro (HumaLOG) corrective regimen sliding scale   SubCutaneous at bedtime  dextrose 5%. 1000 milliLiter(s) IV Continuous <Continuous>  lactated ringers Bolus 1000 milliLiter(s) IV Bolus once      ICU Vital Signs Last 24 Hrs  T(C): 40.2 (2020 02:39), Max: 40.2 (2020 02:39)  T(F): 104.4 (2020 02:39), Max: 104.4 (2020 02:39)  HR: 100 (2020 04:00) (95 - 125)  BP: 109/70 (2020 04:00) (97/50 - 150/100)  BP(mean): 73 (2020 04:00) (73 - 88)  ABP: --  ABP(mean): --  RR: 26 (2020 04:00) (16 - 28)  SpO2: 92% (2020 04:00) (92% - 100%)    Vital Signs Last 24 Hrs  T(C): 40.2 (2020 02:39), Max: 40.2 (2020 02:39)  T(F): 104.4 (2020 02:39), Max: 104.4 (2020 02:39)  HR: 100 (2020 04:00) (95 - 125)  BP: 109/70 (2020 04:00) (97/50 - 150/100)  BP(mean): 73 (2020 04:00) (73 - 88)  RR: 26 (2020 04:00) (16 - 28)  SpO2: 92% (2020 04:00) (92% - 100%)      I&O's Detail    2020 07:01  -  2020 05:15  --------------------------------------------------------  IN:    IV PiggyBack: 100 mL    Lactated Ringers IV Bolus: 500 mL  Total IN: 600 mL    OUT:  Total OUT: 0 mL  Total NET: 600 mL      LABS:                        12.5   10.21 )-----------( 142      ( 2020 03:27 )             40.0     02-13    141  |  109<H>  |  10  ----------------------------<  140<H>  3.5   |  24  |  0.71    Ca    8.2<L>      2020 03:27    TPro  6.1  /  Alb  2.6<L>  /  TBili  0.4  /  DBili  x   /  AST  60<H>  /  ALT  40  /  AlkPhos  63  02-13      CARDIAC MARKERS ( 2020 03:27 )  x     / x     / 954 U/L / x     / x      CARDIAC MARKERS ( 2020 19:35 )  x     / x     / 83 U/L / x     / x          PT/INR - ( 2020 19:35 )   PT: 12.5 sec;   INR: 1.12 ratio    PTT - ( 2020 19:35 )  PTT:28.3 sec      Urinalysis Basic - ( 2020 19:35 )  Color: Yellow / Appearance: Clear / S.020 / pH: x  Gluc: x / Ketone: Moderate  / Bili: Negative / Urobili: Negative mg/dL   Blood: x / Protein: 15 mg/dL / Nitrite: Negative   Leuk Esterase: Trace / RBC: 3-5 /HPF / WBC 6-10   Sq Epi: x / Non Sq Epi: Moderate / Bacteria: Few      CULTURES:  Pending. RVP negative      RADIOLOGY:   < from: US Duplex Arterial Lower Ext Ltd, Left (20 @ 00:43) >  ******PRELIMINARY REPORT******    ******PRELIMINARY REPORT******          EXAM:  US DPLX LWR EXT ARTS LTD LT                            PROCEDURE DATE:  2020    ******PRELIMINARY REPORT******    ******PRELIMINARY REPORT******          INTERPRETATION:  [facility] RADIOLOGIST PRELIMINARY REPORT    PROCEDURE INFORMATION:   Exam: US Duplex Left Lower extremity arteries or arterial bypass grafts   Exam date and time: 2020 12:06 AM   Age: 64 years old   Clinical indication: Edema, localized;Lower extremity, left     TECHNIQUE:   Imaging protocol: Left Real-time duplex scan of the arteries or arterial bypass   grafts of the left lower extremity with 2-D gray scale, color Doppler flow and   spectral waveform analysis.     COMPARISON:   [facility] prior studies available.     FINDINGS:   Peak systolic velocity measurements provided in cm/s.  Left common femoral artery: 204 cm/s, triphasic waveform.  Left superficial femoral artery: Proximal 162 cm/s, triphasic waveform. Mid 89   cm/s,triphasic waveform. Distal 123 cm/s, triphasic waveform.  Left popliteal artery: 105 cm/s, triphasic waveform.  Left calf/foot arteries: Posterior tibial artery 66 cm/s, high resistance   monophasic waveform. Peroneal artery is not identified and maybe occluded.   Anterior tibial artery 42 cm/s, high resistance monophasic waveform. Dorsalis   pedis artery 62 cm/s, biphasic waveform.    IMPRESSION:   Peroneal artery is not identified and may be occluded.  No other arterial occlusion is identified.  No tardus parvus monophasic waveforms to indicate significant inflow disease.    ******PRELIMINARY REPORT******    ******PRELIMINARY REPORT******          JEREMY LOAIZA M.D.;[facility] RADIOLOGIST    < end of copied text >    < from: US Duplex Venous Lower Ext Ltd, Left (20 @ 00:37) >  EXAM:  US DPLX LWR EXT VEINS LTD LT                          PROCEDURE DATE:  2020      INTERPRETATION:  CLINICAL INFORMATION: Left lower extremity swelling/cellulitis.    COMPARISON: None available.    TECHNIQUE: Duplex sonography of the LEFT LOWER extremity veins with color and spectral Doppler, with and without compression.      FINDINGS:    There is normal compressibility of the left common femoral, femoral and popliteal veins.     Doppler examination shows normal spontaneous and phasic flow.    No calf vein thrombosis is detected.    IMPRESSION:     No evidence of left lower extremity deep venous thrombosis.    FANG YANES M.D., ATTENDING RADIOLOGIST  This document has been electronically signed. 2020  3:59AM    < end of copied text >      SUPPLEMENTAL O2: RA  LINES: Peripheral   IVF: LR  ALEMAN: N  PPx: Lovenox  CONTACT: N

## 2020-02-13 NOTE — CONSULT NOTE ADULT - REASON FOR ADMISSION
Fever, change from baseline mental status

## 2020-02-13 NOTE — CONSULT NOTE ADULT - ASSESSMENT
63 yo female presenting with bilateral lower extremity cellulitis , vascular surgery consulted to rule out compartment syndrome.     - no clinical signs suggestive of compartment syndrome  - ct / mri of the left lower extremity to rule out nec fasc   - continue abx  - active resuscitation   - monitor urine output   - monitor bilateral lower extremity cellulitis   - follow up cx  - abx per id recs.   - vascular surgery to follow     discussed with

## 2020-02-13 NOTE — PROGRESS NOTE ADULT - SUBJECTIVE AND OBJECTIVE BOX
65 y/o female, resident of [facility], with PMHx significant for Dementia with behavioral disturbance, Mood disorder, RODY, Normal pressure Hydrocephalus with  shunt, Hypertension, Diabetes Mellitus and dementia presented to the ED today from Nursing facility with fever, "shaking" and noted changes in baseline mental status. Patient presently on Tamiflu which was started on 2/11/2020. Patient denies pain, headache, dizziness, nausea, vomiting, abdominal pain, leg pain or weakness. Patient is a poor historian.     2/13: Patient evaluated at baseline. Transferred to ICU for severe sepsis likely 2/2 to LLE cellulitis in the setting of elevated lactate. At baseline patient is a poor historian. But appears comfortable and pleasant w/ no complaints.     REVIEW OF SYSTEMS:  unable to obtain due to underlying dementia and behavioral disorder    MEDICATIONS  (STANDING):  clindamycin IVPB      clindamycin IVPB 600 milliGRAM(s) IV Intermittent every 8 hours  clonazePAM  Tablet 0.25 milliGRAM(s) Oral <User Schedule>  dextrose 5%. 1000 milliLiter(s) (50 mL/Hr) IV Continuous <Continuous>  dextrose 50% Injectable 12.5 Gram(s) IV Push once  dextrose 50% Injectable 25 Gram(s) IV Push once  dextrose 50% Injectable 25 Gram(s) IV Push once  enoxaparin Injectable 40 milliGRAM(s) SubCutaneous daily  insulin lispro (HumaLOG) corrective regimen sliding scale   SubCutaneous three times a day before meals  insulin lispro (HumaLOG) corrective regimen sliding scale   SubCutaneous at bedtime  meropenem  IVPB      meropenem  IVPB 1000 milliGRAM(s) IV Intermittent every 8 hours  oseltamivir 75 milliGRAM(s) Oral daily  PARoxetine 30 milliGRAM(s) Oral daily  sodium chloride 0.9%. 1000 milliLiter(s) (75 mL/Hr) IV Continuous <Continuous>  valproic acid 500 milliGRAM(s) Oral <User Schedule>  valproic acid 750 milliGRAM(s) Oral <User Schedule>    MEDICATIONS  (PRN):  acetaminophen   Tablet .. 650 milliGRAM(s) Oral every 6 hours PRN Temp greater or equal to 38C (100.4F)  dextrose 40% Gel 15 Gram(s) Oral once PRN Blood Glucose LESS THAN 70 milliGRAM(s)/deciliter  glucagon  Injectable 1 milliGRAM(s) IntraMuscular once PRN Glucose LESS THAN 70 milligrams/deciliter    Vital Signs (24 Hrs):  T(C): 38.8 (02-13-20 @ 10:00), Max: 40.2 (02-13-20 @ 02:39)  HR: 94 (02-13-20 @ 10:00) (89 - 125)  BP: 97/51 (02-13-20 @ 10:00) (97/50 - 150/100)  RR: 28 (02-13-20 @ 10:00) (13 - 38)  SpO2: 94% (02-13-20 @ 10:00) (92% - 100%)  Wt(kg): --  Daily Height in cm: 165.1 (12 Feb 2020 18:40)    Daily     I&O's Summary    12 Feb 2020 07:01  -  13 Feb 2020 07:00  --------------------------------------------------------  IN: 1600 mL / OUT: 0 mL / NET: 1600 mL    13 Feb 2020 07:01  -  13 Feb 2020 10:13  --------------------------------------------------------  IN: 100 mL / OUT: 0 mL / NET: 100 mL    PHYSICAL EXAM:  GENERAL: NAD, lying in bed comfortably  HEAD:  Atraumatic, Normocephalic  EYES: EOMI, conjunctiva and sclera clear  ENT: Moist mucous membranes  NECK: Supple, No JVD  CHEST/LUNG: Clear to auscultation bilaterally; No rales, rhonchi, wheezing, or rubs. Unlabored respirations  HEART: S1/S2+; Regular rate and rhythm; No murmurs, rubs, or gallops  ABDOMEN: Bowel sounds present; Soft, Nontender  NERVOUS SYSTEM:  Alert & Oriented, speech clear. No deficits; + tremor  MSK: FROM all 4 extremities, full and equal strength  SKIN: b/l LE redness and warmth L >R    LABS:                        12.5   10.21 )-----------( 142      ( 13 Feb 2020 03:27 )             40.0     13 Feb 2020 03:27    141    |  109    |  10     ----------------------------<  140    3.5     |  24     |  0.71     Ca    8.2        13 Feb 2020 03:27    TPro  6.1    /  Alb  2.6    /  TBili  0.4    /  DBili  x      /  AST  60     /  ALT  40     /  AlkPhos  63     13 Feb 2020 03:27    PT/INR - ( 12 Feb 2020 19:35 )   PT: 12.5 sec;   INR: 1.12 ratio         PTT - ( 12 Feb 2020 19:35 )  PTT:28.3 sec    Lactate, Blood: 2.7: Elevated lactate. Consider ordering follow-up lactate to trend. mmol/L (02.13.20 @ 08:29)    Creatine Kinase, Serum: 954 U/L (02.13.20 @ 03:27)    Urinalysis (02.12.20 @ 19:35)    Glucose Qualitative, Urine: Negative mg/dL    Blood, Urine: Trace    pH Urine: 5.0    Color: Yellow    Urine Appearance: Clear    Bilirubin: Negative    Ketone - Urine: Moderate    Specific Gravity: 1.020    Protein, Urine: 15 mg/dL    Urobilinogen: Negative mg/dL    Nitrite: Negative    Leukocyte Esterase Concentration: Trace    < from: US Duplex Arterial Lower Ext Ltd, Left (02.13.20 @ 00:43) >  IMPRESSION:   Peroneal artery is not identified and may be occluded.  No other arterial occlusion is identified.  No tardus parvus monophasic waveforms to indicate significant inflow disease.  Probably moderate degree of peripheral arterial disease involving the anterior and posterior tibialarteries. If needed CT angiography can be obtained for further evaluation.    < end of copied text >    < from: US Duplex Venous Lower Ext Ltd, Left (02.13.20 @ 00:37) >    IMPRESSION:     No evidence of left lower extremity deep venous thrombosis.    < end of copied text >    < from: Xray Chest 1 View-PORTABLE IMMEDIATE (02.12.20 @ 20:12) >    IMPRESSION: Clear lungs.    < end of copied text > 65 y/o female, resident of [facility], with PMHx significant for Dementia with behavioral disturbance, Mood disorder, RODY, Normal pressure Hydrocephalus with  shunt, Hypertension, Diabetes Mellitus and dementia presented to the ED today from Nursing facility with fever, "shaking" and noted changes in baseline mental status. Patient presently on Tamiflu which was started on 2/11/2020. Patient denies pain, headache, dizziness, nausea, vomiting, abdominal pain, leg pain or weakness. Patient is a poor historian.     2/13: Patient evaluated at baseline. Transferred to ICU for severe sepsis likely 2/2 b/l LE cellulitis in the setting of elevated lactate. At baseline patient is a poor historian. But appears comfortable and pleasant w/ no complaints.     REVIEW OF SYSTEMS:  unable to obtain due to underlying dementia and behavioral disorder    MEDICATIONS  (STANDING):  cefTRIAXone Injectable. 2000 milliGRAM(s) IV Push every 12 hours  clonazePAM  Tablet 0.25 milliGRAM(s) Oral <User Schedule>  dextrose 5%. 1000 milliLiter(s) (50 mL/Hr) IV Continuous <Continuous>  dextrose 50% Injectable 12.5 Gram(s) IV Push once  dextrose 50% Injectable 25 Gram(s) IV Push once  dextrose 50% Injectable 25 Gram(s) IV Push once  enoxaparin Injectable 40 milliGRAM(s) SubCutaneous daily  insulin lispro (HumaLOG) corrective regimen sliding scale   SubCutaneous three times a day before meals  insulin lispro (HumaLOG) corrective regimen sliding scale   SubCutaneous at bedtime  oseltamivir 75 milliGRAM(s) Oral daily  PARoxetine 30 milliGRAM(s) Oral daily  sodium chloride 0.9%. 1000 milliLiter(s) (75 mL/Hr) IV Continuous <Continuous>  valproic acid 500 milliGRAM(s) Oral <User Schedule>  valproic acid 750 milliGRAM(s) Oral <User Schedule>  vancomycin  IVPB 1000 milliGRAM(s) IV Intermittent every 12 hours    MEDICATIONS  (PRN):  acetaminophen   Tablet .. 650 milliGRAM(s) Oral every 6 hours PRN Temp greater or equal to 38C (100.4F)  dextrose 40% Gel 15 Gram(s) Oral once PRN Blood Glucose LESS THAN 70 milliGRAM(s)/deciliter  glucagon  Injectable 1 milliGRAM(s) IntraMuscular once PRN Glucose LESS THAN 70 milligrams/deciliter      Vital Signs (24 Hrs):  T(C): 38.8 (02-13-20 @ 10:00), Max: 40.2 (02-13-20 @ 02:39)  HR: 94 (02-13-20 @ 10:00) (89 - 125)  BP: 97/51 (02-13-20 @ 10:00) (97/50 - 150/100)  RR: 28 (02-13-20 @ 10:00) (13 - 38)  SpO2: 94% (02-13-20 @ 10:00) (92% - 100%)  Wt(kg): --  Daily Height in cm: 165.1 (12 Feb 2020 18:40)    Daily     I&O's Summary    12 Feb 2020 07:01  -  13 Feb 2020 07:00  --------------------------------------------------------  IN: 1600 mL / OUT: 0 mL / NET: 1600 mL    13 Feb 2020 07:01  -  13 Feb 2020 10:13  --------------------------------------------------------  IN: 100 mL / OUT: 0 mL / NET: 100 mL    PHYSICAL EXAM:  GENERAL: NAD, lying in bed comfortably  HEAD:  Atraumatic, Normocephalic  EYES: EOMI, conjunctiva and sclera clear  ENT: Moist mucous membranes  NECK: Supple, No JVD  CHEST/LUNG: Clear to auscultation bilaterally; No rales, rhonchi, wheezing, or rubs. Unlabored respirations  HEART: S1/S2+; Regular rate and rhythm; No murmurs, rubs, or gallops  ABDOMEN: Bowel sounds present; Soft, Nontender  NERVOUS SYSTEM:  Alert & Oriented, speech clear. No deficits; + tremor  MSK: FROM all 4 extremities, full and equal strength  SKIN: b/l LE redness and warmth L >R    LABS:                        12.5   10.21 )-----------( 142      ( 13 Feb 2020 03:27 )             40.0     13 Feb 2020 03:27    141    |  109    |  10     ----------------------------<  140    3.5     |  24     |  0.71     Ca    8.2        13 Feb 2020 03:27    TPro  6.1    /  Alb  2.6    /  TBili  0.4    /  DBili  x      /  AST  60     /  ALT  40     /  AlkPhos  63     13 Feb 2020 03:27    PT/INR - ( 12 Feb 2020 19:35 )   PT: 12.5 sec;   INR: 1.12 ratio         PTT - ( 12 Feb 2020 19:35 )  PTT:28.3 sec    Lactate, Blood: 2.7: Elevated lactate. Consider ordering follow-up lactate to trend. mmol/L (02.13.20 @ 08:29)    Creatine Kinase, Serum: 954 U/L (02.13.20 @ 03:27)    Urinalysis (02.12.20 @ 19:35)    Glucose Qualitative, Urine: Negative mg/dL    Blood, Urine: Trace    pH Urine: 5.0    Color: Yellow    Urine Appearance: Clear    Bilirubin: Negative    Ketone - Urine: Moderate    Specific Gravity: 1.020    Protein, Urine: 15 mg/dL    Urobilinogen: Negative mg/dL    Nitrite: Negative    Leukocyte Esterase Concentration: Trace    < from: US Duplex Arterial Lower Ext Ltd, Left (02.13.20 @ 00:43) >  IMPRESSION:   Peroneal artery is not identified and may be occluded.  No other arterial occlusion is identified.  No tardus parvus monophasic waveforms to indicate significant inflow disease.  Probably moderate degree of peripheral arterial disease involving the anterior and posterior tibialarteries. If needed CT angiography can be obtained for further evaluation.    < end of copied text >    < from: US Duplex Venous Lower Ext Ltd, Left (02.13.20 @ 00:37) >    IMPRESSION:     No evidence of left lower extremity deep venous thrombosis.    < end of copied text >    < from: Xray Chest 1 View-PORTABLE IMMEDIATE (02.12.20 @ 20:12) >    IMPRESSION: Clear lungs.    < end of copied text > 65 y/o female, resident of [facility], with PMHx significant for Dementia with behavioral disturbance, Mood disorder, RODY, Normal pressure Hydrocephalus with  shunt, Hypertension, Diabetes Mellitus and dementia presented to the ED today from Nursing facility with fever, "shaking" and noted changes in baseline mental status. Patient presently on Tamiflu which was started on 2/11/2020. Patient denies pain, headache, dizziness, nausea, vomiting, abdominal pain, leg pain or weakness. Patient is a poor historian.     2/13: Patient evaluated at bedside. Overnight admitted to ICU for severe sepsis likely 2/2 b/l LE cellulitis in the setting of elevated lactate. At baseline patient is a poor historian. But appears comfortable and pleasant w/ no complaints.     REVIEW OF SYSTEMS:  unable to obtain due to underlying dementia and behavioral disorder    MEDICATIONS  (STANDING):  cefTRIAXone Injectable. 2000 milliGRAM(s) IV Push every 12 hours  clonazePAM  Tablet 0.25 milliGRAM(s) Oral <User Schedule>  dextrose 5%. 1000 milliLiter(s) (50 mL/Hr) IV Continuous <Continuous>  dextrose 50% Injectable 12.5 Gram(s) IV Push once  dextrose 50% Injectable 25 Gram(s) IV Push once  dextrose 50% Injectable 25 Gram(s) IV Push once  enoxaparin Injectable 40 milliGRAM(s) SubCutaneous daily  insulin lispro (HumaLOG) corrective regimen sliding scale   SubCutaneous three times a day before meals  insulin lispro (HumaLOG) corrective regimen sliding scale   SubCutaneous at bedtime  oseltamivir 75 milliGRAM(s) Oral daily  PARoxetine 30 milliGRAM(s) Oral daily  sodium chloride 0.9%. 1000 milliLiter(s) (75 mL/Hr) IV Continuous <Continuous>  valproic acid 500 milliGRAM(s) Oral <User Schedule>  valproic acid 750 milliGRAM(s) Oral <User Schedule>  vancomycin  IVPB 1000 milliGRAM(s) IV Intermittent every 12 hours    MEDICATIONS  (PRN):  acetaminophen   Tablet .. 650 milliGRAM(s) Oral every 6 hours PRN Temp greater or equal to 38C (100.4F)  dextrose 40% Gel 15 Gram(s) Oral once PRN Blood Glucose LESS THAN 70 milliGRAM(s)/deciliter  glucagon  Injectable 1 milliGRAM(s) IntraMuscular once PRN Glucose LESS THAN 70 milligrams/deciliter      Vital Signs (24 Hrs):  T(C): 38.8 (02-13-20 @ 10:00), Max: 40.2 (02-13-20 @ 02:39)  HR: 94 (02-13-20 @ 10:00) (89 - 125)  BP: 97/51 (02-13-20 @ 10:00) (97/50 - 150/100)  RR: 28 (02-13-20 @ 10:00) (13 - 38)  SpO2: 94% (02-13-20 @ 10:00) (92% - 100%)  Wt(kg): --  Daily Height in cm: 165.1 (12 Feb 2020 18:40)    Daily     I&O's Summary    12 Feb 2020 07:01  -  13 Feb 2020 07:00  --------------------------------------------------------  IN: 1600 mL / OUT: 0 mL / NET: 1600 mL    13 Feb 2020 07:01  -  13 Feb 2020 10:13  --------------------------------------------------------  IN: 100 mL / OUT: 0 mL / NET: 100 mL    PHYSICAL EXAM:  GENERAL: NAD, lying in bed comfortably  HEAD:  Atraumatic, Normocephalic  EYES: EOMI, conjunctiva and sclera clear  ENT: Moist mucous membranes  NECK: Supple, No JVD  CHEST/LUNG: Clear to auscultation bilaterally; No rales, rhonchi, wheezing, or rubs. Unlabored respirations  HEART: S1/S2+; Regular rate and rhythm; No murmurs, rubs, or gallops  ABDOMEN: Bowel sounds present; Soft, Nontender  NERVOUS SYSTEM:  Alert & Oriented, speech clear. No deficits; + tremor  MSK: FROM all 4 extremities, full and equal strength  SKIN: b/l LE redness and warmth L >R w/ background chronic venous stasis     LABS:                        12.5   10.21 )-----------( 142      ( 13 Feb 2020 03:27 )             40.0     13 Feb 2020 03:27    141    |  109    |  10     ----------------------------<  140    3.5     |  24     |  0.71     Ca    8.2        13 Feb 2020 03:27    TPro  6.1    /  Alb  2.6    /  TBili  0.4    /  DBili  x      /  AST  60     /  ALT  40     /  AlkPhos  63     13 Feb 2020 03:27    PT/INR - ( 12 Feb 2020 19:35 )   PT: 12.5 sec;   INR: 1.12 ratio         PTT - ( 12 Feb 2020 19:35 )  PTT:28.3 sec    Lactate, Blood: 2.7: Elevated lactate. Consider ordering follow-up lactate to trend. mmol/L (02.13.20 @ 08:29)    Creatine Kinase, Serum: 954 U/L (02.13.20 @ 03:27)    Urinalysis (02.12.20 @ 19:35)    Glucose Qualitative, Urine: Negative mg/dL    Blood, Urine: Trace    pH Urine: 5.0    Color: Yellow    Urine Appearance: Clear    Bilirubin: Negative    Ketone - Urine: Moderate    Specific Gravity: 1.020    Protein, Urine: 15 mg/dL    Urobilinogen: Negative mg/dL    Nitrite: Negative    Leukocyte Esterase Concentration: Trace    < from: US Duplex Arterial Lower Ext Ltd, Left (02.13.20 @ 00:43) >  IMPRESSION:   Peroneal artery is not identified and may be occluded.  No other arterial occlusion is identified.  No tardus parvus monophasic waveforms to indicate significant inflow disease.  Probably moderate degree of peripheral arterial disease involving the anterior and posterior tibialarteries. If needed CT angiography can be obtained for further evaluation.    < end of copied text >    < from: US Duplex Venous Lower Ext Ltd, Left (02.13.20 @ 00:37) >    IMPRESSION:     No evidence of left lower extremity deep venous thrombosis.    < end of copied text >    < from: Xray Chest 1 View-PORTABLE IMMEDIATE (02.12.20 @ 20:12) >    IMPRESSION: Clear lungs.    < end of copied text > 65 y/o female, resident of [facility], with PMHx significant for Dementia with behavioral disturbance, Mood disorder, RODY, Normal pressure Hydrocephalus with  shunt, Hypertension, Diabetes Mellitus and dementia presented to the ED today from Nursing facility with fever, "shaking" and noted changes in baseline mental status. Patient presently on Tamiflu which was started on 2/11/2020. Patient denies pain, headache, dizziness, nausea, vomiting, abdominal pain, leg pain or weakness. Patient is a poor historian.     2/13: Patient evaluated at bedside. Overnight admitted to ICU for severe sepsis likely 2/2 b/l LE cellulitis in the setting of elevated lactate. At baseline patient is a poor historian. But appears comfortable and pleasant w/ no complaints.     REVIEW OF SYSTEMS:  unable to obtain due to underlying dementia and behavioral disorder    MEDICATIONS  (STANDING):  cefTRIAXone Injectable. 2000 milliGRAM(s) IV Push every 12 hours  clonazePAM  Tablet 0.25 milliGRAM(s) Oral <User Schedule>  dextrose 5%. 1000 milliLiter(s) (50 mL/Hr) IV Continuous <Continuous>  dextrose 50% Injectable 12.5 Gram(s) IV Push once  dextrose 50% Injectable 25 Gram(s) IV Push once  dextrose 50% Injectable 25 Gram(s) IV Push once  enoxaparin Injectable 40 milliGRAM(s) SubCutaneous daily  insulin lispro (HumaLOG) corrective regimen sliding scale   SubCutaneous three times a day before meals  insulin lispro (HumaLOG) corrective regimen sliding scale   SubCutaneous at bedtime  oseltamivir 75 milliGRAM(s) Oral daily  PARoxetine 30 milliGRAM(s) Oral daily  sodium chloride 0.9%. 1000 milliLiter(s) (75 mL/Hr) IV Continuous <Continuous>  valproic acid 500 milliGRAM(s) Oral <User Schedule>  valproic acid 750 milliGRAM(s) Oral <User Schedule>  vancomycin  IVPB 1000 milliGRAM(s) IV Intermittent every 12 hours    MEDICATIONS  (PRN):  acetaminophen   Tablet .. 650 milliGRAM(s) Oral every 6 hours PRN Temp greater or equal to 38C (100.4F)  dextrose 40% Gel 15 Gram(s) Oral once PRN Blood Glucose LESS THAN 70 milliGRAM(s)/deciliter  glucagon  Injectable 1 milliGRAM(s) IntraMuscular once PRN Glucose LESS THAN 70 milligrams/deciliter      Vital Signs (24 Hrs):  T(C): 38.8 (02-13-20 @ 10:00), Max: 40.2 (02-13-20 @ 02:39)  HR: 94 (02-13-20 @ 10:00) (89 - 125)  BP: 97/51 (02-13-20 @ 10:00) (97/50 - 150/100)  RR: 28 (02-13-20 @ 10:00) (13 - 38)  SpO2: 94% (02-13-20 @ 10:00) (92% - 100%)  Wt(kg): --  Daily Height in cm: 165.1 (12 Feb 2020 18:40)    Daily     I&O's Summary    12 Feb 2020 07:01  -  13 Feb 2020 07:00  --------------------------------------------------------  IN: 1600 mL / OUT: 0 mL / NET: 1600 mL    13 Feb 2020 07:01  -  13 Feb 2020 10:13  --------------------------------------------------------  IN: 100 mL / OUT: 0 mL / NET: 100 mL    PHYSICAL EXAM:  GENERAL: NAD, lying in bed comfortably  HEAD:  Atraumatic, Normocephalic  EYES: EOMI, conjunctiva and sclera clear  ENT: Moist mucous membranes  NECK: Supple, No JVD  CHEST/LUNG: Clear to auscultation bilaterally; No rales, rhonchi, wheezing, or rubs. Unlabored respirations  HEART: S1/S2+; Regular rate and rhythm; No murmurs, rubs, or gallops  ABDOMEN: Bowel sounds present; Soft, Nontender  NERVOUS SYSTEM:  Alert & Oriented, speech clear. No deficits; + tremor  MSK: FROM all 4 extremities, full and equal strength  SKIN: b/l LE redness and warmth L >R w/ background chronic venous stasis     LABS:                        12.5   10.21 )-----------( 142      ( 13 Feb 2020 03:27 )             40.0     13 Feb 2020 03:27    141    |  109    |  10     ----------------------------<  140    3.5     |  24     |  0.71     Ca    8.2        13 Feb 2020 03:27    TPro  6.1    /  Alb  2.6    /  TBili  0.4    /  DBili  x      /  AST  60     /  ALT  40     /  AlkPhos  63     13 Feb 2020 03:27    PT/INR - ( 12 Feb 2020 19:35 )   PT: 12.5 sec;   INR: 1.12 ratio         PTT - ( 12 Feb 2020 19:35 )  PTT:28.3 sec    Lactate, Blood: 2.7: Elevated lactate. Consider ordering follow-up lactate to trend. mmol/L (02.13.20 @ 08:29)    Creatine Kinase, Serum: 954 U/L (02.13.20 @ 03:27)    Urinalysis (02.12.20 @ 19:35)    Glucose Qualitative, Urine: Negative mg/dL    Blood, Urine: Trace    pH Urine: 5.0    Color: Yellow    Urine Appearance: Clear    Bilirubin: Negative    Ketone - Urine: Moderate    Specific Gravity: 1.020    Protein, Urine: 15 mg/dL    Urobilinogen: Negative mg/dL    Nitrite: Negative    Leukocyte Esterase Concentration: Trace    IMAGING:    < from: US Duplex Arterial Lower Ext Ltd, Left (02.13.20 @ 00:43) >  IMPRESSION:   Peroneal artery is not identified and may be occluded.  No other arterial occlusion is identified.  No tardus parvus monophasic waveforms to indicate significant inflow disease.  Probably moderate degree of peripheral arterial disease involving the anterior and posterior tibialarteries. If needed CT angiography can be obtained for further evaluation.    < end of copied text >    < from: US Duplex Venous Lower Ext Ltd, Left (02.13.20 @ 00:37) >    IMPRESSION:     No evidence of left lower extremity deep venous thrombosis.    < end of copied text >    < from: Xray Chest 1 View-PORTABLE IMMEDIATE (02.12.20 @ 20:12) >    IMPRESSION: Clear lungs.    < end of copied text >

## 2020-02-13 NOTE — PROCEDURE NOTE - SUPERVISORY STATEMENT
Pt identified with 2 patient identifies, Left sided shunt cleaned with betadine and surrounds with sterile towels, a butterfly 24 ifeanyi needle was used to removed 15cc of clear CSF fluid, needle removed, small amount of pressure placed on the skin, no bleeding, valved depressed easily and refilled quickly both before and after procedure. Pt tolerated the procedure well, denied headache, nausea, or photophobia after the procedure

## 2020-02-13 NOTE — CHART NOTE - NSCHARTNOTEFT_GEN_A_CORE
Vital Signs Last 24 Hrs  T(C): 38.1 (12 Feb 2020 23:51), Max: 39.4 (12 Feb 2020 19:11)  T(F): 100.6 (12 Feb 2020 23:51), Max: 103 (12 Feb 2020 19:11)  HR: 98 (12 Feb 2020 23:51) (97 - 125)  BP: 116/58 (12 Feb 2020 23:51) (97/50 - 150/100)  RR: 18 (12 Feb 2020 23:51) (18 - 22)  SpO2: 92% (12 Feb 2020 23:51) (92% - 100%)    Physical Exam  GENERAL: NAD, lying in bed comfortably  CHEST/LUNG: Clear to auscultation bilaterally; No rales, rhonchi, wheezing, or rubs. Unlabored respirations  HEART: Regular rate and rhythm; No murmurs, Capillary refill 3 seconds   ABDOMEN: Bowel sounds present; Soft, Nontender, Nondistended  EXTREMITIES:  2+ Peripheral Pulses, b/l pedal edema, LLE: erythema to anterior led extending above mid 1/3, open circular wound to anterior Left leg,   NERVOUS SYSTEM:  Alert, awake, follows commands readily      A/P  Sepsis 2/2 Left Lower Extremity Cellulitis  -Repeat Lactate 3hrs after 30,l/cc bolus increased to 5.1 from 4.4  -additional Bolus Lactated Ringers 500mls administered  -Admit to SICU  -V/s q2hrs   -F/u Repeat Lactate   -Monitor closely 65 y/o female, resident of [facility], with PMHx significant for Dementia with behavioral disturbance, depression, Mood disorder, RODY, Normal pressure Hydrocephalus with  shunt, Hypertension, Diabetes Mellitus and dementia admitted for Sepsis secondary to Left lower extremity cellulitis.     Vital Signs Last 24 Hrs  T(C): 38.1 (12 Feb 2020 23:51), Max: 39.4 (12 Feb 2020 19:11)  T(F): 100.6 (12 Feb 2020 23:51), Max: 103 (12 Feb 2020 19:11)  HR: 98 (12 Feb 2020 23:51) (97 - 125)  BP: 116/58 (12 Feb 2020 23:51) (97/50 - 150/100)  RR: 18 (12 Feb 2020 23:51) (18 - 22)  SpO2: 92% (12 Feb 2020 23:51) (92% - 100%)    Physical Exam  GENERAL: NAD, lying in bed comfortably  CHEST/LUNG: Clear to auscultation bilaterally; No rales, rhonchi, wheezing, or rubs. Unlabored respirations  HEART: Regular rate and rhythm; No murmurs, Capillary refill 3 seconds   ABDOMEN: Bowel sounds present; Soft, Nontender, Nondistended  EXTREMITIES:  2+ Peripheral Pulses, b/l pedal edema, LLE: erythema to anterior led extending above mid 1/3, open circular wound to anterior Left leg,   NERVOUS SYSTEM:  Alert, awake, follows commands readily      A/P  Sepsis 2/2 Left Lower Extremity Cellulitis  -Repeat Lactate 3hrs after 30,l/cc bolus increased to 5.1 from 4.4  -additional Bolus Lactated Ringers 500mls administered  -Admit to SICU  -V/s q2hrs   -F/u Repeat Lactate   -Monitor closely

## 2020-02-13 NOTE — CONSULT NOTE ADULT - ASSESSMENT
63 yo female pmhx normal pressure hydrocephalus s/p  shunt, TBI, Dementia with behavioral disturbances, mood disorder, RODY, HTN, DM, Epilepsy with     1. Lactemia   2. Elevated CK  3. Sepsis 2/2 LLE Cellulitis   4. R/o osteomyelitis  5. R/o compartment syndrome    Persistent lactemia with elevated CK  -unsure of etiology, concern for LLE cellulitis with possible underlying osteomyelitis  -xray of LLE appears free of air in subcutaneous tissue or destruction of bone cortex, suggest further imaging.  -site demarcated with black pen  -?penetrating wound: appears without drainage  -with elevated CK and lactate in setting of puncture wound and LLE cellulitis and swelling there could be concern for possible compartment syndrome.  Patient denies pain or discomfort and has full range of motion. LLE dopplers grossly negative. Suggest further imaging such as ct or mri for further evaluation  -Type A lactic acidosis unlikely cause of lactemia in setting of patient persistently normotensive; suggest TTE and trending cardiac enzymes  -No indications of Type B or D lactic acidosis causes, not on Biguanide therapy, no indication of DKA, patient not an alcoholic, no iv epi, not on nebs unsure of underlying malignancy, hiv infection or mitochondrial dysfunction.   -suggest aggressive crystalloid resuscitation trending lactate level, ck, renal function and suggest further imaging.   -can consider surgical consult for possible compartment syndrome      Cellulitis & UTI   -On broad spectrum abx  -suggest ID consult   -cultures pending     Above discussed with Dr. Olson and Dr. Israel. 65 yo female pmhx normal pressure hydrocephalus s/p  shunt, TBI, Dementia with behavioral disturbances, mood disorder, RODY, HTN, DM, Epilepsy with     1. Lactemia   2. Elevated CK  3. Sepsis 2/2 LLE Cellulitis   4. R/o osteomyelitis  5. R/o compartment syndrome    Persistent lactemia with elevated CK  -unsure of etiology, concern for LLE cellulitis with possible underlying osteomyelitis  -xray of LLE appears free of air in subcutaneous tissue or destruction of bone cortex, suggest further imaging.  -site demarcated with black pen  -?penetrating wound: appears without drainage  -with elevated CK and lactate in setting of puncture wound and LLE cellulitis and swelling there could be concern for possible compartment syndrome.  Patient denies pain or discomfort and has full range of motion. LLE dopplers grossly negative. Suggest further imaging such as ct or mri for further evaluation  -Type A lactic acidosis unlikely cause of lactemia in setting of patient persistently normotensive; suggest TTE and trending cardiac enzymes  -No indications of Type B or D lactic acidosis causes, not on Biguanide therapy, no indication of DKA, patient not an alcoholic, no iv epi, not on nebs unsure of underlying malignancy, hiv infection or mitochondrial dysfunction.   -suggest aggressive crystalloid resuscitation trending lactate level, ck, renal function and suggest further imaging.   -can consider surgical consult for possible compartment syndrome    -shaking/tremor/rhythmic movement of upper extremities RUE >LUE, ?focal seizures.  Consider neuro consult, head ct and eeg.    Cellulitis & UTI   -On broad spectrum abx  -suggest ID consult   -cultures pending     Above discussed with Dr. Olson and Dr. Israel.

## 2020-02-13 NOTE — PROVIDER CONTACT NOTE (CRITICAL VALUE NOTIFICATION) - ACTION/TREATMENT ORDERED:
MD is aware of lactate level. Continue mantainence fluids. No bolus at this time
See MD note
Patient receiving fluids
no further orders

## 2020-02-13 NOTE — PROGRESS NOTE ADULT - ASSESSMENT
63 y/o female, resident of [facility], with PMHx significant for Dementia with behavioral disturbance, depression, Mood disorder, RODY, Normal pressure Hydrocephalus with  shunt, Hypertension, Diabetes Mellitus and dementia admitted for severe sepsis in the setting of elevated lactate likely 2/2 lower extremity cellulitis.     Severe sepsis in the setting of elevated lactate  - currently under ICU care  - lactate 4.4 -> 5.1 -> 2.7   - likely 2/2 to B/L Lower extremity Cellulitis  - s/p 3.6 L of IVF  - will start maintenance fluids at 75cc/hr  - will monitor BP for now currently no need for pressors  - leukocytosis resolved  - s/p ceftriaxone, vancomycin, meropenum and clindamycin  - will continue ceftriaxone and vancomycin per ID reccomendations  - f/u blood and urine cultures  - f/u LLE X-ray - r/o Osteomyelitis   - f/u X-ray Shunt series   - LLE arterial Duplex US Reviewed  - LLE venous duplex negative  - vascular surgery eval appreciated: CT/MRI of LLE to r/o necrotizing fasciitis; no signs of compartment syndrome  - CXR reviewed, RVP (-)   - ID consult appreciated   - neurosurgery consulted of  shunt tap    Influenza Post Exposure Prophylaxis  - continue tamiflu x 12 days  - started on 2/11/2020    Diabetes Mellitus  - hypoglycemia Protocol  - low dose ISS,   - f/u HbA1c    Mood Disorder/ RODY / Depression  - continue valproic acid   - valproic acid level  - continue clonazepam   - continue paroxetine     DVT ppx  - lovenox     D/w Dr. Thurman 65 y/o female, resident of [facility], with PMHx significant for Dementia with behavioral disturbance, depression, Mood disorder, RODY, Normal pressure Hydrocephalus with  shunt, Hypertension, Diabetes Mellitus and dementia admitted for severe sepsis in the setting of elevated lactate likely 2/2 lower extremity cellulitis.     Severe sepsis in the setting of elevated lactate  - currently under ICU care  - lactate 4.4 -> 5.1 -> 2.7   - likely 2/2 to B/L Lower extremity Cellulitis  - s/p 3.6 L of IVF  - will start maintenance fluids at 75cc/hr  - will monitor BP for now currently no need for pressors  - leukocytosis resolved  - s/p ceftriaxone, vancomycin, meropenum and clindamycin  - will continue ceftriaxone and vancomycin per ID reccomendations  - f/u blood and urine cultures  - f/u LLE X-ray - r/o Osteomyelitis   - f/u X-ray Shunt series   - LLE arterial Duplex US Reviewed  - LLE venous duplex negative  -    - vascular surgery eval appreciated: CT/MRI of LLE to r/o necrotizing fasciitis; no signs of compartment syndrome  - CXR reviewed, RVP (-)   - ID consult appreciated   - neurosurgery consulted of  shunt tap    Influenza Post Exposure Prophylaxis  - continue tamiflu x 12 days  - started on 2/11/2020    Diabetes Mellitus  - hypoglycemia Protocol  - low dose ISS,   - f/u HbA1c    Mood Disorder/ RODY / Depression  - continue valproic acid   - valproic acid level  - continue clonazepam   - continue paroxetine     DVT ppx  - lovenox     D/w Dr. Thurman 65 y/o female, resident of [facility], with PMHx significant for Dementia with behavioral disturbance, depression, Mood disorder, RODY, Normal pressure Hydrocephalus with  shunt, Hypertension, Diabetes Mellitus and dementia admitted for severe sepsis in the setting of elevated lactate likely 2/2 lower extremity cellulitis.     Severe sepsis in the setting of elevated lactate  - currently under ICU care  - lactate 4.4 -> 5.1 -> 2.7   - likely 2/2 to B/L Lower extremity Cellulitis  - s/p 3.6 L of IVF  - will start maintenance fluids at 75cc/hr  - will monitor BP for now currently no need for pressors  - leukocytosis resolved  - s/p ceftriaxone, vancomycin, meropenum and clindamycin  - will continue ceftriaxone and vancomycin per ID reccomendations  - f/u blood and urine cultures  - f/u LLE X-ray - r/o Osteomyelitis   - f/u X-ray Shunt series   - LLE arterial Duplex US Reviewed  - LLE venous duplex negative  -    - vascular surgery eval appreciated: CT/MRI of LLE to r/o necrotizing fasciitis; no signs of compartment syndrome  - CXR reviewed, RVP (-)   - ID consult appreciated   - neurosurgery consulted of  shunt tap    Influenza Post Exposure Prophylaxis  - continue tamiflu x 12 days  - started on 2/11/2020    Diabetes Mellitus  - hypoglycemia Protocol  - low dose ISS  - f/u HbA1c    Mood Disorder/ RODY / Depression  - continue valproic acid   - valproic acid level  - continue clonazepam   - continue paroxetine     DVT ppx  - lovenox     D/w Dr. Thurman 63 y/o female, resident of [facility], with PMHx significant for Dementia with behavioral disturbance, depression, Mood disorder, RODY, Normal pressure Hydrocephalus with  shunt, Hypertension, Diabetes Mellitus and dementia admitted for severe sepsis in the setting of elevated lactate likely 2/2 lower extremity cellulitis.     Severe sepsis in the setting of elevated lactate  - currently under ICU care  - lactate 4.4 -> 5.1 -> 2.7   - likely 2/2 to B/L Lower extremity Cellulitis  - s/p 3.6 L of IVF  - currently hypotensive, will start maintenance fluids at 75cc/hr  - will monitor BP, currently no need for pressors  - leukocytosis resolved  - s/p ceftriaxone, vancomycin, meropenum and clindamycin  - will continue ceftriaxone and vancomycin per ID recommendations  - f/u blood and urine cultures  - f/u LLE X-ray - r/o Osteomyelitis   - f/u X-ray Shunt series   - LLE arterial Duplex US Reviewed  - LLE venous duplex negative  -    - vascular surgery eval appreciated: CT/MRI of LLE to r/o necrotizing fasciitis; no signs of compartment syndrome  - CXR reviewed, RVP (-)   - ID consult appreciated   - neurosurgery consulted of  shunt tap    Metabolic encephalopathy   - 2/2 severe sepsis as above  - improving   - continue current management     Influenza Post Exposure Prophylaxis  - continue tamiflu x 12 days  - started on 2/11/2020    Diabetes Mellitus  - hypoglycemia Protocol  - low dose ISS  - f/u HbA1c    Mood Disorder/ RODY / Depression  - continue valproic acid   - valproic acid level  - continue clonazepam   - continue paroxetine     DVT ppx  - lovenox     D/w Dr. Thurman 63 y/o female, resident of [facility], with PMHx significant for Dementia with behavioral disturbance, depression, Mood disorder, RODY, Normal pressure Hydrocephalus with  shunt, Hypertension, Diabetes Mellitus and dementia admitted for severe sepsis in the setting of elevated lactate likely 2/2 lower extremity cellulitis.     Severe sepsis in the setting of elevated lactate  - currently under ICU care  - lactate 4.4 -> 5.1 -> 2.7   - likely 2/2 to B/L Lower extremity Cellulitis  - s/p 3.6 L of IVF  - currently hypotensive, will start maintenance fluids at 75cc/hr  - will monitor BP, currently no need for pressors  - leukocytosis resolved  - s/p ceftriaxone, vancomycin, meropenum and clindamycin  - will continue ceftriaxone and vancomycin per ID recommendations  - f/u blood and urine cultures  - f/u LLE X-ray - r/o Osteomyelitis   - f/u X-ray Shunt series   - LLE arterial Duplex US Reviewed  - LLE venous duplex negative  -    - vascular surgery eval appreciated: CT/MRI of LLE to r/o necrotizing fasciitis; no signs of compartment syndrome  - CXR reviewed, RVP (-)   - ID consult appreciated   - neurosurgery consulted for possible  shunt tap    Metabolic encephalopathy   - 2/2 severe sepsis as above  - improving   - continue current management     Influenza Post Exposure Prophylaxis  - continue tamiflu x 12 days  - started on 2/11/2020    Diabetes Mellitus  - hypoglycemia Protocol  - low dose ISS  - f/u HbA1c    Mood Disorder/ RODY / Depression  - continue valproic acid   - valproic acid level  - continue clonazepam   - continue paroxetine     DVT ppx  - lovenox     D/w Dr. Thurman 63 y/o female, resident of [facility], with PMHx significant for Dementia with behavioral disturbance, depression, Mood disorder, RODY, Normal pressure Hydrocephalus with  shunt, Hypertension, Diabetes Mellitus and dementia admitted for severe sepsis in the setting of elevated lactate likely 2/2 lower extremity cellulitis.     Severe sepsis in the setting of elevated lactate  - currently under ICU care  - lactate 4.4 -> 5.1 -> 2.7   - likely 2/2 B/L Lower extremity Cellulitis  - s/p 3.6 L of IVF  - currently hypotensive, will start maintenance fluids at 75cc/hr  - will monitor BP, currently no need for pressors  - leukocytosis resolved  - s/p ceftriaxone, vancomycin, meropenum and clindamycin  - will continue ceftriaxone and vancomycin per ID recommendations  - f/u blood and urine cultures  - f/u LLE X-ray - r/o Osteomyelitis   - f/u X-ray Shunt series   - LLE arterial Duplex US Reviewed  - LLE venous duplex negative  -    - vascular surgery eval appreciated: CT/MRI of LLE to r/o necrotizing fasciitis; no signs of compartment syndrome  - CXR reviewed, RVP (-)   - ID consult appreciated   - neurosurgery consulted for possible  shunt tap    Metabolic encephalopathy   - 2/2 severe sepsis as above  - improving   - continue current management     Influenza Post Exposure Prophylaxis  - continue tamiflu x 12 days  - started on 2/11/2020    Diabetes Mellitus  - hypoglycemia Protocol  - low dose ISS  - f/u HbA1c    Mood Disorder/ RODY / Depression  - continue valproic acid   - valproic acid level  - continue clonazepam   - continue paroxetine     DVT ppx  - lovenox     D/w Dr. Thurman 63 y/o female, resident of [facility], with PMHx significant for Dementia with behavioral disturbance, depression, Mood disorder, RODY, Normal pressure Hydrocephalus with  shunt, Hypertension, Diabetes Mellitus and dementia admitted for severe sepsis in the setting of elevated lactate likely 2/2 lower extremity cellulitis.     Severe sepsis in the setting of elevated lactate  - currently under ICU care  - lactate 4.4 -> 5.1 -> 2.7   - likely 2/2 B/L Lower extremity Cellulitis  - s/p 3.6 L of IVF  - currently hypotensive, will start maintenance fluids at 75cc/hr  - will monitor BP, currently no need for pressors  - leukocytosis resolved  - s/p ceftriaxone, vancomycin, meropenum and clindamycin  - will continue ceftriaxone and vancomycin per ID recommendations  - f/u blood and urine cultures  - f/u LLE X-ray - r/o Osteomyelitis   - f/u X-ray Shunt series   - LLE arterial Duplex US Reviewed  - LLE venous duplex negative  -    - vascular surgery eval appreciated: CT/MRI of LLE to r/o necrotizing fasciitis; no signs of compartment syndrome  - CXR reviewed, RVP (-)   - ID consult appreciated   - neurosurgery consulted for possible  shunt tap    Metabolic encephalopathy   - 2/2 severe sepsis as above  - improving   - continue current management     Influenza Post Exposure Prophylaxis  - continue tamiflu x 12 days  - started on 2/11/2020    Diabetes Mellitus  - hypoglycemia Protocol  - low dose ISS  - f/u HbA1c    Mood Disorder/ RODY / Depression  - continue valproic acid   - valproic acid level  - continue clonazepam   - continue paroxetine     DVT ppx  - lovenox     Advance directives  - Spoke with  who HCP Wil Santamaria   - FULL CODE    D/w Dr. Thurman 65 y/o female, resident of [facility], with PMHx significant for Dementia with behavioral disturbance, depression, Mood disorder, RODY, Normal pressure Hydrocephalus with  shunt, Hypertension, Diabetes Mellitus and dementia admitted for severe sepsis in the setting of elevated lactate likely 2/2 lower extremity cellulitis.     Severe sepsis in the setting of elevated lactate  - currently under ICU care  - lactate 4.4 -> 5.1 -> 2.7   - likely 2/2 B/L Lower extremity Cellulitis  - s/p 3.6 L of IVF  - currently hypotensive, will start maintenance fluids at 75cc/hr  - will monitor BP, currently no need for pressors  - leukocytosis resolved  - s/p ceftriaxone, vancomycin, meropenum and clindamycin  - will continue ceftriaxone and vancomycin per ID recommendations  - f/u blood and urine cultures  - f/u LLE X-ray - r/o Osteomyelitis   - f/u X-ray Shunt series   - LLE arterial Duplex US Reviewed  - LLE venous duplex negative  -    - vascular surgery eval appreciated: CT/MRI of LLE to r/o necrotizing fasciitis; no signs of compartment syndrome  - CXR reviewed, RVP (-)   - ID consult appreciated   - neurosurgery consulted for possible  shunt tap    Metabolic encephalopathy   - 2/2 severe sepsis as above  - improving   - continue current management     Influenza Post Exposure Prophylaxis  - continue tamiflu x 12 days  - started on 2/11/2020    Diabetes Mellitus  - hypoglycemia Protocol  - low dose ISS  - f/u HbA1c    Mood Disorder/ RODY / Depression  - continue valproic acid   - valproic acid level  - continue clonazepam   - continue paroxetine     DVT ppx  - lovenox     Advance directives  - Spoke with  who is HCP Wil Santamaria   - FULL CODE    D/w Dr. Thurman 63 y/o female, resident of [facility], with PMHx significant for Dementia with behavioral disturbance, depression, Mood disorder, RODY, Normal pressure Hydrocephalus with  shunt, Hypertension, Diabetes Mellitus and dementia admitted for severe sepsis in the setting of elevated lactate likely 2/2 lower extremity cellulitis.     Severe sepsis in the setting of elevated lactate  - currently under ICU care  - lactate 4.4 -> 5.1 -> 2.7   - likely 2/2 B/L Lower extremity Cellulitis  - s/p 3.6 L of IVF  - currently hypotensive, will start maintenance fluids at 75cc/hr  - will monitor BP, currently no need for pressors  - leukocytosis resolved  - s/p ceftriaxone, vancomycin, meropenum and clindamycin  - will continue ceftriaxone and vancomycin per ID recommendations  - f/u blood and urine cultures  - f/u LLE X-ray - r/o Osteomyelitis   - f/u X-ray Shunt series   - LLE arterial Duplex US Reviewed  - LLE venous duplex negative  -    - vascular surgery eval appreciated: CT/MRI of LLE to r/o necrotizing fasciitis; no signs of compartment syndrome  - CXR reviewed, RVP (-)   - ID consult appreciated   - neurosurgery consulted for possible  shunt tap    Metabolic encephalopathy   - 2/2 severe sepsis as above  - improving   - continue current management     Influenza Post Exposure Prophylaxis  - continue tamiflu x 12 days  - started on 2/11/2020    Diabetes Mellitus  - hypoglycemia Protocol  - low dose ISS  - f/u HbA1c    Mood Disorder/ RODY / Depression  - continue valproic acid   - valproic acid level  - continue clonazepam   - continue paroxetine     DVT ppx  - lovenox     Advance directives  - DNR/DNI    D/w Dr. Thurman 63 y/o female, resident of [facility], with PMHx significant for Dementia with behavioral disturbance, depression, Mood disorder, RODY, Normal pressure Hydrocephalus with  shunt, Hypertension, Diabetes Mellitus and dementia admitted for severe sepsis in the setting of elevated lactate likely 2/2 lower extremity cellulitis.     Severe sepsis in the setting of elevated lactate  - currently under ICU care  - lactate 4.4 -> 5.1 -> 2.7   - likely 2/2 B/L Lower extremity Cellulitis  - s/p 3.6 L of IVF  - currently hypotensive, will start maintenance fluids at 75cc/hr  - will monitor BP, currently no need for pressors  - leukocytosis resolved  - s/p ceftriaxone, vancomycin, meropenum and clindamycin  - will continue ceftriaxone and vancomycin per ID recommendations  - f/u blood and urine cultures  - f/u LLE X-ray - r/o Osteomyelitis   - f/u X-ray Shunt series   - LLE arterial Duplex US Reviewed  - LLE venous duplex negative  -    - vascular surgery eval appreciated: CT/MRI of LLE to r/o necrotizing fasciitis; no signs of compartment syndrome  - CXR reviewed, RVP (-)   - ID consult appreciated   - neurosurgery consulted for possible  shunt tap    Metabolic encephalopathy   - 2/2 severe sepsis as above  - improving   - continue current management     Influenza Post Exposure Prophylaxis  - continue tamiflu x 12 days  - started on 2/11/2020    Diabetes Mellitus  - hypoglycemia Protocol  - low dose ISS  - f/u HbA1c    Mood Disorder/ RODY / Depression  - continue valproic acid   - valproic acid level  - continue clonazepam   - continue paroxetine     DVT ppx  - lovenox     Code status  - DNR/DNI    D/w Dr. Thurman

## 2020-02-13 NOTE — PROVIDER CONTACT NOTE (OTHER) - SITUATION
64 F, admitted with sepsis 2/2 LE cellulitis, found to have LE discomfort, elevated CPK, and lactate concerning for posisble compartment syndrome

## 2020-02-13 NOTE — CONSULT NOTE ADULT - ATTENDING COMMENTS
Patient known to , evaluated with Neurosurgery PA, past history of craniotomy for head trauma, shunt placement, Shunt infection (2011), Revision of wound dehiscence (2012), Now admitted for fever, possible cellulitis left leg. Shunt tap performed to rule out meningitis and possible shunt infection-results pending, CT Brain pending. On exam confused, alert, converses and otherwise at baseline performance.

## 2020-02-13 NOTE — CONSULT NOTE ADULT - SUBJECTIVE AND OBJECTIVE BOX
Patient is a 64y old  Female who presents with a chief complaint of Fever, change from baseline mental status    HPI:  63 y/o female with h/o NPH s/p  shunt, prior  shunt infection with GNR, dementia with behavioral disturbance, obesity, Hypertension, Diabetes Mellitus, pedal edema with chronic stasis LE dermatitis was admitted on  for fever and chills. She is a resident at [facility] [place] where he was noted with high fever and shaking chills for 1-2 hours associated with increased confusion and probable HA. Patient is a poor historian due to her baseline confusion. In ER she received vancomycin IV, meropenem and clindamycin.     PMH: as above  PSH: as above  Meds: per reconciliation sheet, noted below  MEDICATIONS  (STANDING):  cefTRIAXone Injectable. 2000 milliGRAM(s) IV Push every 12 hours  clonazePAM  Tablet 0.25 milliGRAM(s) Oral <User Schedule>  dextrose 5%. 1000 milliLiter(s) (50 mL/Hr) IV Continuous <Continuous>  dextrose 50% Injectable 12.5 Gram(s) IV Push once  dextrose 50% Injectable 25 Gram(s) IV Push once  dextrose 50% Injectable 25 Gram(s) IV Push once  enoxaparin Injectable 40 milliGRAM(s) SubCutaneous daily  insulin lispro (HumaLOG) corrective regimen sliding scale   SubCutaneous three times a day before meals  insulin lispro (HumaLOG) corrective regimen sliding scale   SubCutaneous at bedtime  oseltamivir 75 milliGRAM(s) Oral daily  PARoxetine 30 milliGRAM(s) Oral daily  sodium chloride 0.9%. 1000 milliLiter(s) (75 mL/Hr) IV Continuous <Continuous>  valproic acid 500 milliGRAM(s) Oral <User Schedule>  valproic acid 750 milliGRAM(s) Oral <User Schedule>  vancomycin  IVPB 1000 milliGRAM(s) IV Intermittent every 12 hours    MEDICATIONS  (PRN):  acetaminophen   Tablet .. 650 milliGRAM(s) Oral every 6 hours PRN Temp greater or equal to 38C (100.4F)  dextrose 40% Gel 15 Gram(s) Oral once PRN Blood Glucose LESS THAN 70 milliGRAM(s)/deciliter  glucagon  Injectable 1 milliGRAM(s) IntraMuscular once PRN Glucose LESS THAN 70 milligrams/deciliter    Allergies    latex (Unknown)  Originally Entered as [Severe Hives] reaction to [Pineapple] (Unknown)  penicillins (Unknown)    Intolerances      Social: no smoking, no alcohol, no illegal drugs; no recent travel, no exposure to TB  FAMILY HISTORY:  No pertinent family history in first degree relatives    no history of premature cardiovascular disease in first degree relatives    ROS: the patient denies is confused; denies pain     Vital Signs Last 24 Hrs  T(C): 38.8 (2020 10:00), Max: 40.2 (2020 02:39)  T(F): 101.9 (2020 10:00), Max: 104.4 (2020 02:39)  HR: 94 (2020 10:00) (89 - 125)  BP: 97/51 (2020 10:00) (97/50 - 150/100)  BP(mean): 62 (2020 10:00) (59 - 90)  RR: 28 (2020 10:00) (13 - 38)  SpO2: 94% (2020 10:00) (92% - 100%)  Daily Height in cm: 165.1 (2020 18:40)    Daily     PE:    Constitutional:  No acute distress  HEENT: NC/AT, EOMI, PERRLA, conjunctivae clear; ears and nose atraumatic; pharynx benign  Neck: supple; thyroid not palpable  Back: no tenderness  Respiratory: respiratory effort normal; decreased BS at bases  Cardiovascular: S1S2 regular, no murmurs  Abdomen: soft, not tender, not distended, positive BS; no liver or spleen organomegaly  Genitourinary: no suprapubic tenderness  Lymphatic: no LN palpable  Musculoskeletal: no muscle tenderness, no joint swelling or tenderness  Extremities: 2+ pedal edema  LLE mild erythema, edema and warmth; small anterior shin dry ulcer  Neurological/ Psychiatric: confused, judgement and insight impaired; moving all extremities  Skin: no rashes; no palpable lesions    Labs: all available labs reviewed                        .5   10.21 )-----------( 142      ( 2020 03:27 )             40.0     02-13    141  |  109<H>  |  10  ----------------------------<  140<H>  3.5   |  24  |  0.71    Ca    8.2<L>      2020 03:27    TPro  6.1  /  Alb  2.6<L>  /  TBili  0.4  /  DBili  x   /  AST  60<H>  /  ALT  40  /  AlkPhos  63  02-13     LIVER FUNCTIONS - ( 2020 03:27 )  Alb: 2.6 g/dL / Pro: 6.1 gm/dL / ALK PHOS: 63 U/L / ALT: 40 U/L / AST: 60 U/L / GGT: x           Urinalysis Basic - ( 2020 19:35 )    Color: Yellow / Appearance: Clear / S.020 / pH: x  Gluc: x / Ketone: Moderate  / Bili: Negative / Urobili: Negative mg/dL   Blood: x / Protein: 15 mg/dL / Nitrite: Negative   Leuk Esterase: Trace / RBC: 3-5 /HPF / WBC 6-10   Sq Epi: x / Non Sq Epi: Moderate / Bacteria: Few      Radiology: all available radiological tests reviewed    < from: Xray Chest 1 View-PORTABLE IMMEDIATE (20 @ 20:12) >  Clear lungs.    < end of copied text >      Advanced directives addressed: full resuscitation

## 2020-02-13 NOTE — PROGRESS NOTE ADULT - SUBJECTIVE AND OBJECTIVE BOX
Patient seen in the ICu for a rising Lactate    HPI:  65 y/o female, resident of [facility], with PMHx significant for Dementia with behavioral disturbance, Mood disorder, RODY, Normal pressure Hydrocephalus with  shunt, Hypertension, Diabetes Mellitus and dementia presented to the ED today from Nursing facility with fever, "shaking" and noted changes in baseline mental status. She was seen by Dr. Tenorio in the NH and he sent her in for a work up.  After admission she developed a rising Lactate.      : Patient lactate is improving and is more awake.  According to Dr. Tenorio she is approaching her baseline.        PAST MEDICAL & SURGICAL HISTORY:  Normal pressure hydrocephalus  Hypertension  Diabetes mellitus  Dementia  Epilepsy  TBI (traumatic brain injury)  No significant past surgical history      FAMILY HISTORY:  No pertinent family history in first degree relatives      Social Hx:    Allergies    latex (Unknown)  Originally Entered as [Severe Hives] reaction to [Pineapple] (Unknown)  penicillins (Unknown)    Intolerances        Height (cm): 165.1 ( @ 18:40)  Weight (kg): 90.2 ( @ 02:39)  BMI (kg/m2): 33.1 ( @ 02:39)    ICU Vital Signs Last 24 Hrs  T(C): 38.3 (2020 12:00), Max: 40.2 (2020 02:39)  T(F): 101 (2020 12:00), Max: 104.4 (2020 02:39)  HR: 93 (2020 12:00) (89 - 125)  BP: 90/43 (2020 12:00) (90/43 - 150/100)  BP(mean): 56 (2020 12:00) (56 - 90)  ABP: --  ABP(mean): --  RR: 23 (2020 12:00) (13 - 38)  SpO2: 92% (2020 12:00) (92% - 100%)          I&O's Summary    2020 07:01  -  2020 07:00  --------------------------------------------------------  IN: 1600 mL / OUT: 0 mL / NET: 1600 mL    2020 07:01  -  2020 12:24  --------------------------------------------------------  IN: 100 mL / OUT: 300 mL / NET: -200 mL                              12.5   10.21 )-----------( 142      ( 2020 03:27 )             40.0       02-13    141  |  109<H>  |  10  ----------------------------<  140<H>  3.5   |  24  |  0.71    Ca    8.2<L>      2020 03:27    TPro  6.1  /  Alb  2.6<L>  /  TBili  0.4  /  DBili  x   /  AST  60<H>  /  ALT  40  /  AlkPhos  63  02-13      CARDIAC MARKERS ( 2020 11:16 )  x     / x     / 2046 U/L / x     / x      CARDIAC MARKERS ( 2020 03:27 )  x     / x     / 954 U/L / x     / x      CARDIAC MARKERS ( 2020 19:35 )  x     / x     / 83 U/L / x     / x            ABG - ( 2020 05:46 )  pH, Arterial: 7.47  pH, Blood: x     /  pCO2: 30    /  pO2: 76    / HCO3: 21    / Base Excess: -1.1  /  SaO2: 96                  Urinalysis Basic - ( 2020 19:35 )    Color: Yellow / Appearance: Clear / S.020 / pH: x  Gluc: x / Ketone: Moderate  / Bili: Negative / Urobili: Negative mg/dL   Blood: x / Protein: 15 mg/dL / Nitrite: Negative   Leuk Esterase: Trace / RBC: 3-5 /HPF / WBC 6-10   Sq Epi: x / Non Sq Epi: Moderate / Bacteria: Few        MEDICATIONS  (STANDING):  cefTRIAXone Injectable. 2000 milliGRAM(s) IV Push every 12 hours  clonazePAM  Tablet 0.25 milliGRAM(s) Oral <User Schedule>  dextrose 5%. 1000 milliLiter(s) (50 mL/Hr) IV Continuous <Continuous>  dextrose 50% Injectable 12.5 Gram(s) IV Push once  dextrose 50% Injectable 25 Gram(s) IV Push once  dextrose 50% Injectable 25 Gram(s) IV Push once  enoxaparin Injectable 40 milliGRAM(s) SubCutaneous daily  insulin lispro (HumaLOG) corrective regimen sliding scale   SubCutaneous three times a day before meals  insulin lispro (HumaLOG) corrective regimen sliding scale   SubCutaneous at bedtime  oseltamivir 75 milliGRAM(s) Oral daily  PARoxetine 30 milliGRAM(s) Oral daily  sodium chloride 0.9%. 1000 milliLiter(s) (75 mL/Hr) IV Continuous <Continuous>  valproic acid 500 milliGRAM(s) Oral <User Schedule>  valproic acid 750 milliGRAM(s) Oral <User Schedule>  vancomycin  IVPB 1000 milliGRAM(s) IV Intermittent every 12 hours    MEDICATIONS  (PRN):  acetaminophen   Tablet .. 650 milliGRAM(s) Oral every 6 hours PRN Temp greater or equal to 38C (100.4F)  dextrose 40% Gel 15 Gram(s) Oral once PRN Blood Glucose LESS THAN 70 milliGRAM(s)/deciliter  glucagon  Injectable 1 milliGRAM(s) IntraMuscular once PRN Glucose LESS THAN 70 milligrams/deciliter      DVT Prophylaxis: Lovenox     Advanced Directives:  Discussed with:    Visit Information:    ** Time is exclusive of billed procedures and/or teaching and/or routine family updates.

## 2020-02-13 NOTE — CONSULT NOTE ADULT - SUBJECTIVE AND OBJECTIVE BOX
Vascular surgery was consulted to evaluate 67F for rule compartment syndrome.       63 yo female pmhx normal pressure hydrocephalus s/p  shunt, TBI, Dementia with behavioral disturbances, mood disorder, RODY, HTN, DM, Epilepsy transferred from [facility] with fever, shaking and change in baseline mental status.  Patient diagnosed with sepsis in setting of LLE cellulitis and found to have lactic acidosis.  Patient initially under-weighed at ~65kg and was given 30cc/kg crystalloid resuscitation with lactate uptrend 4.6 -->5.1.  Patient's new weight of ~90 kg entered and patient given additional 500cc and repeat lactate pulled ~30 minutes later with lactate 5.1 --> 4.9 prompting ICU consult for lactemia.  Patient seen and examined, repeat labs returned which now reveals uptrend in ck from ~83 to >800.  Patient remains febrile.  All other labs grossly unremarkable. Patient denies any pain, discomfort.       PAST MEDICAL & SURGICAL HISTORY:  Normal pressure hydrocephalus  Hypertension  Diabetes mellitus  Dementia  Epilepsy  TBI (traumatic brain injury)  No significant past surgical history      Allergies  latex (Unknown)  Originally Entered as [Severe Hives] reaction to [Pineapple] (Unknown)  penicillins (Unknown)      FAMILY HISTORY:  No pertinent family history in first degree relatives      Social History:   From facility       Review of Systems:  Patient without complaints, unreliable       Vitals During Exam:   HR: 80s  BP: 130s/80s  RR: 15  sPO2: 99% on RA    Physical Examination:    General: Pleasant female, lying in bed, appears comfortable     HEENT: Right temporal sunken in, Right pupil 5mm minimally reactive, Left pupil 5mm brisk and reactive, several teeth missing. Oral mucosa dry    PULM: Symmetrical thorax expansion upon respiration. Clear to auscultation bilaterally, no significant sputum production appreciated    CVS: Regular rate and rhythm, no murmurs, rubs, or gallops appreciated    ABD: Soft, nondistended, nontender, normoactive bowel sounds, no masses appreciated    EXT: +edema over bilateral lower extremities, swelling/erythema/warmth LLE>RLE. +1mm x 1mm puncture wound with what appears to be pus over LLE mid shin, unable to express anything from site. cellulitis marked with black pen. Both lower extremities nontender with full range of motion.     SKIN: Warm and well perfused    NEURO: Alert, confused (?baseline), interactive, follows commands      Medications:  clindamycin IVPB      clindamycin IVPB 600 milliGRAM(s) IV Intermittent every 8 hours  meropenem  IVPB      meropenem  IVPB 1000 milliGRAM(s) IV Intermittent every 8 hours  oseltamivir 75 milliGRAM(s) Oral daily  vancomycin  IVPB 1000 milliGRAM(s) IV Intermittent once  acetaminophen   Tablet .. 650 milliGRAM(s) Oral every 6 hours PRN  clonazePAM  Tablet 0.25 milliGRAM(s) Oral <User Schedule>  PARoxetine 30 milliGRAM(s) Oral daily  valproic acid 500 milliGRAM(s) Oral <User Schedule>  valproic acid 750 milliGRAM(s) Oral <User Schedule>  enoxaparin Injectable 40 milliGRAM(s) SubCutaneous daily  dextrose 40% Gel 15 Gram(s) Oral once PRN  dextrose 50% Injectable 12.5 Gram(s) IV Push once  dextrose 50% Injectable 25 Gram(s) IV Push once  dextrose 50% Injectable 25 Gram(s) IV Push once  glucagon  Injectable 1 milliGRAM(s) IntraMuscular once PRN  insulin lispro (HumaLOG) corrective regimen sliding scale   SubCutaneous three times a day before meals  insulin lispro (HumaLOG) corrective regimen sliding scale   SubCutaneous at bedtime  dextrose 5%. 1000 milliLiter(s) IV Continuous <Continuous>  lactated ringers Bolus 1000 milliLiter(s) IV Bolus once                            12.5   10.21 )-----------( 142      ( 13 Feb 2020 03:27 )             40.0     02-13    141  |  109<H>  |  10  ----------------------------<  140<H>  3.5   |  24  |  0.71    Ca    8.2<L>      13 Feb 2020 03:27    TPro  6.1  /  Alb  2.6<L>  /  TBili  0.4  /  DBili  x   /  AST  60<H>  /  ALT  40  /  AlkPhos  63  02-13

## 2020-02-13 NOTE — CONSULT NOTE ADULT - ASSESSMENT
Patient is a 65 y/o female, resident of [facility], with PMHx significant for Dementia with behavioral disturbance, Mood disorder, RODY, Normal pressure Hydrocephalus with  shunt, Hypertension, Diabetes Mellitus and dementia presented to the ED today from Nursing facility with fever, "shaking" and noted changes in baseline mental status. Patient presently on Tamiflu which was started on 2/11/2020. Patient denies pain, headache, dizziness, nausea, vomiting, abdominal pain, leg pain or weakness.   Patient is a poor historian. (12 Feb 2020 23:14)    Neurosurgery called for consult as pt has a  Shunt and presents with mental status changes. She has  LLE cellulitis superimposed on B/l chronic LE stasis dermatitis. Pt has a history of CSF infection in the past.  Shunt was placed by Dr. Solitario, Stratta set at 1.0    No overt signs of meningitis, pt continues to have fever and elevated lactate and has a history of CSF infection in the past.      Shunt tapped in a sterile manner, 15cc sent to the lab in 4 separate tubes.   Cell count, Culture, glucose, protein, PCR, and crypto     Would recommend CT of the head     Discussed with Dr. Dumont before knowing it was Dr. Solitario's patient, also discussed with Dr. Solitario

## 2020-02-13 NOTE — PROGRESS NOTE ADULT - SUBJECTIVE AND OBJECTIVE BOX
HPI: 65 y/o female, resident of [facility], with PMHx significant for Dementia with behavioral disturbance, Mood disorder, RODY, Normal pressure Hydrocephalus with  shunt, Hypertension, Diabetes Mellitus and dementia presented to the ED today from Nursing facility with fever, "shaking" and noted changes in baseline mental status. Patient presently on tamiful, which was started on 2/11/2020. Patient denies pain, headache, dizziness, nausea, vomiting, abdominal pain, leg pain or weakness. Patient is a poor historian.     SUBJECTIVE: Pt seen and evaluated at bedside. Currently under ICU care for sepsis, currently receiving IVF.     MEDICATIONS  (STANDING):  cefTRIAXone Injectable. 2000 milliGRAM(s) IV Push every 12 hours  clonazePAM  Tablet 0.25 milliGRAM(s) Oral <User Schedule>  dextrose 5%. 1000 milliLiter(s) (50 mL/Hr) IV Continuous <Continuous>  dextrose 50% Injectable 12.5 Gram(s) IV Push once  dextrose 50% Injectable 25 Gram(s) IV Push once  dextrose 50% Injectable 25 Gram(s) IV Push once  enoxaparin Injectable 40 milliGRAM(s) SubCutaneous daily  insulin lispro (HumaLOG) corrective regimen sliding scale   SubCutaneous three times a day before meals  insulin lispro (HumaLOG) corrective regimen sliding scale   SubCutaneous at bedtime  oseltamivir 75 milliGRAM(s) Oral daily  PARoxetine 30 milliGRAM(s) Oral daily  sodium chloride 0.9%. 1000 milliLiter(s) (75 mL/Hr) IV Continuous <Continuous>  valproic acid 500 milliGRAM(s) Oral <User Schedule>  valproic acid 750 milliGRAM(s) Oral <User Schedule>  vancomycin  IVPB 1000 milliGRAM(s) IV Intermittent every 12 hours    MEDICATIONS  (PRN):  acetaminophen   Tablet .. 650 milliGRAM(s) Oral every 6 hours PRN Temp greater or equal to 38C (100.4F)  dextrose 40% Gel 15 Gram(s) Oral once PRN Blood Glucose LESS THAN 70 milliGRAM(s)/deciliter  glucagon  Injectable 1 milliGRAM(s) IntraMuscular once PRN Glucose LESS THAN 70 milligrams/deciliter      Vital Signs (24 Hrs):  T(C): 38.8 (02-13-20 @ 10:00), Max: 40.2 (02-13-20 @ 02:39)  HR: 94 (02-13-20 @ 10:00) (89 - 125)  BP: 97/51 (02-13-20 @ 10:00) (97/50 - 150/100)  RR: 28 (02-13-20 @ 10:00) (13 - 38)  SpO2: 94% (02-13-20 @ 10:00) (92% - 100%)  Daily Height in cm: 165.1 (12 Feb 2020 18:40)    Daily     I&O's Summary    12 Feb 2020 07:01  -  13 Feb 2020 07:00  --------------------------------------------------------  IN: 1600 mL / OUT: 0 mL / NET: 1600 mL    13 Feb 2020 07:01  -  13 Feb 2020 10:13  --------------------------------------------------------  IN: 100 mL / OUT: 0 mL / NET: 100 mL      Vitals  T(F): 99.8 (02-13-20 @ 21:54), Max: 104.4 (02-13-20 @ 02:39)  HR: 88 (02-13-20 @ 21:54) (88 - 118)  BP: 109/61 (02-13-20 @ 21:54) (90/43 - 141/91)  RR: 18 (02-13-20 @ 21:54) (13 - 38)  SpO2: 95% (02-13-20 @ 21:54) (92% - 100%)    Physical Exam   Gen: NAD, comfortable  HENT: atraumatic head and ears, no gross abnormalities of ears, mucous membranes moist, no oral lesions, neck supple without masses/goiter/lymphadenopathy  CV: s1/s2, no M/R/G  Pulm: nl respiratory effort, CTAB, no wheezes/crackles/rhonchi  Abd: normoactive bowel sounds in all 4 quadrants, soft, nontender, nondistended, no rebound, no guarding, no masses  Extremities: no pedal edema, pedal pulses palpable, hypertropic skin changes noted over the anterior aspect of b/l lower extremities, likely due to venous stasis, +erythema and induration noted over the left lower extremity with ?ulceration over the anterior aspect, +faint distal pulses, +no discharge noted from the wound, no skin breaks noted   Neuro: A&Ox1, +resting tremor noted of the RUE, negative Brudzinski and Kernig's           LABS: All Labs Reviewed:               CHIEF COMPLAINT:    SUBJECTIVE:     REVIEW OF SYSTEMS:  CONSTITUTIONAL: No weakness, fevers or chills  EYES/ENT: No visual changes;  No vertigo or throat pain   NECK: No pain or stiffness  RESPIRATORY: No cough, wheezing, hemoptysis; No shortness of breath  CARDIOVASCULAR: No chest pain or palpitations  GASTROINTESTINAL: No abdominal or epigastric pain. No nausea, vomiting, or hematemesis; No diarrhea or constipation. No melena or hematochezia.  GENITOURINARY: No dysuria, frequency or hematuria  NEUROLOGICAL: No numbness or weakness  SKIN: No itching, burning, rashes, or lesions   All other review of systems is negative unless indicated above    Vital Signs Last 24 Hrs  T(C): 37.7 (13 Feb 2020 21:54), Max: 40.2 (13 Feb 2020 02:39)  T(F): 99.8 (13 Feb 2020 21:54), Max: 104.4 (13 Feb 2020 02:39)  HR: 88 (13 Feb 2020 21:54) (88 - 118)  BP: 109/61 (13 Feb 2020 21:54) (90/43 - 141/91)  BP(mean): 100 (13 Feb 2020 16:50) (56 - 100)  RR: 18 (13 Feb 2020 21:54) (13 - 38)  SpO2: 95% (13 Feb 2020 21:54) (92% - 100%)    I&O's Summary    12 Feb 2020 07:01  -  13 Feb 2020 07:00  --------------------------------------------------------  IN: 1600 mL / OUT: 0 mL / NET: 1600 mL    13 Feb 2020 07:01  -  13 Feb 2020 23:19  --------------------------------------------------------  IN: 350 mL / OUT: 850 mL / NET: -500 mL        CAPILLARY BLOOD GLUCOSE      POCT Blood Glucose.: 145 mg/dL (13 Feb 2020 21:57)  POCT Blood Glucose.: 179 mg/dL (13 Feb 2020 18:21)  POCT Blood Glucose.: 159 mg/dL (13 Feb 2020 16:59)  POCT Blood Glucose.: 134 mg/dL (13 Feb 2020 11:18)  POCT Blood Glucose.: 120 mg/dL (13 Feb 2020 07:49)      PHYSICAL EXAM:  Constitutional: NAD, awake and alert, well-developed  HEENT: PERR, EOMI, Normal Hearing, MMM  Neck: Soft and supple, No LAD, No JVD  Respiratory: Breath sounds are clear bilaterally, No wheezing, rales or rhonchi  Cardiovascular: S1 and S2, regular rate and rhythm, no Murmurs, gallops or rubs  Gastrointestinal: Bowel Sounds present, soft, nontender, nondistended, no guarding, no rebound  Extremities: No peripheral edema  Vascular: 2+ peripheral pulses  Neurological: A/O x 3, no focal deficits  Musculoskeletal: 5/5 strength b/l upper and lower extremities  Skin: No rashes    MEDICATIONS:  MEDICATIONS  (STANDING):  cefTRIAXone Injectable. 2000 milliGRAM(s) IV Push every 12 hours  clonazePAM  Tablet 0.25 milliGRAM(s) Oral <User Schedule>  dextrose 5%. 1000 milliLiter(s) (50 mL/Hr) IV Continuous <Continuous>  dextrose 50% Injectable 12.5 Gram(s) IV Push once  dextrose 50% Injectable 25 Gram(s) IV Push once  dextrose 50% Injectable 25 Gram(s) IV Push once  enoxaparin Injectable 40 milliGRAM(s) SubCutaneous daily  insulin lispro (HumaLOG) corrective regimen sliding scale   SubCutaneous three times a day before meals  insulin lispro (HumaLOG) corrective regimen sliding scale   SubCutaneous at bedtime  oseltamivir 75 milliGRAM(s) Oral daily  PARoxetine 30 milliGRAM(s) Oral daily  sodium chloride 0.9%. 1000 milliLiter(s) (75 mL/Hr) IV Continuous <Continuous>  valproic acid 500 milliGRAM(s) Oral <User Schedule>  valproic acid 750 milliGRAM(s) Oral <User Schedule>  vancomycin  IVPB 1000 milliGRAM(s) IV Intermittent every 12 hours        LABS: All Labs Reviewed:             12.5   10.21 )-----------( 142      ( 13 Feb 2020 03:27 )             40.0     13 Feb 2020 03:27    141    |  109    |  10     ----------------------------<  140    3.5     |  24     |  0.71     Ca    8.2        13 Feb 2020 03:27    TPro  6.1    /  Alb  2.6    /  TBili  0.4    /  DBili  x      /  AST  60     /  ALT  40     /  AlkPhos  63     13 Feb 2020 03:27    PT/INR - ( 12 Feb 2020 19:35 )   PT: 12.5 sec;   INR: 1.12 ratio         PTT - ( 12 Feb 2020 19:35 )  PTT:28.3 sec    Lactate, Blood: 2.7: Elevated lactate. Consider ordering follow-up lactate to trend. mmol/L (02.13.20 @ 08:29)    Creatine Kinase, Serum: 954 U/L (02.13.20 @ 03:27)    Urinalysis (02.12.20 @ 19:35)    Glucose Qualitative, Urine: Negative mg/dL    Blood, Urine: Trace    pH Urine: 5.0    Color: Yellow    Urine Appearance: Clear    Bilirubin: Negative    Ketone - Urine: Moderate    Specific Gravity: 1.020    Protein, Urine: 15 mg/dL    Urobilinogen: Negative mg/dL    Nitrite: Negative    Leukocyte Esterase Concentration: Trace    IMAGING:    < from: US Duplex Arterial Lower Ext Ltd, Left (02.13.20 @ 00:43) >  IMPRESSION:   Peroneal artery is not identified and may be occluded.  No other arterial occlusion is identified.  No tardus parvus monophasic waveforms to indicate significant inflow disease.  Probably moderate degree of peripheral arterial disease involving the anterior and posterior tibialarteries. If needed CT angiography can be obtained for further evaluation.    < end of copied text >    < from: US Duplex Venous Lower Ext Ltd, Left (02.13.20 @ 00:37) >    IMPRESSION:     No evidence of left lower extremity deep venous thrombosis.    < end of copied text >    < from: Xray Chest 1 View-PORTABLE IMMEDIATE (02.12.20 @ 20:12) >    IMPRESSION: Clear lungs.    < end of copied text >            DVT PPX:    ADVANCED DIRECTIVE:    DISPOSITION:

## 2020-02-13 NOTE — CONSULT NOTE ADULT - ASSESSMENT
63 y/o female with h/o NPH s/p  shunt, prior  shunt infection with GNR, dementia with behavioral disturbance, obesity, Hypertension, Diabetes Mellitus, pedal edema with chronic stasis LE dermatitis was admitted on 2/12 for fever and chills. [place] [facility] [place] with high fever and shaking chills for 1-2 hours associated with increased confusion and probable HA. Patient is a poor historian due to her baseline confusion. In ER she received vancomycin IV, meropenem and clindamycin.     1. Febrile syndrome. Chills. Hypotension. Probable sepsis. Encephalopathy. Possible  shunt infection. LLE cellulitis superimposed on B/l chronic LE stasis dermatitis.   -obtain BC x 2   -neurosurgical evaluation for  shunt aspiration and CSF fluid analysis  -start vancomycin 1 gm IV q12h and ceftriaxone 2 gm IV q12h  -reason for abx use and side effects reviewed with patient's ; monitor BMP and vancomycin trough levels   -old chart reviewed to assess prior cultures  -ICU care  -monitor temps  -f/u CBC  -supportive care  2. Other issues:   -care per medicine 65 y/o female with h/o NPH s/p  shunt, prior  shunt infection with GNR, dementia with behavioral disturbance, obesity, Hypertension, Diabetes Mellitus, pedal edema with chronic stasis LE dermatitis was admitted on 2/12 for fever and chills. She is a resident at [facility] [place] where he was noted with high fever and shaking chills for 1-2 hours associated with increased confusion and probable HA. Patient is a poor historian due to her baseline confusion. In ER she received vancomycin IV, meropenem and clindamycin.     1. Febrile syndrome. Chills. Hypotension. Probable sepsis. Encephalopathy. Possible  shunt infection. LLE cellulitis superimposed on B/l chronic LE stasis dermatitis.   -obtain BC x 2   -neurosurgical evaluation for  shunt aspiration and CSF fluid analysis  -start vancomycin 1 gm IV q12h and ceftriaxone 2 gm IV q12h  -reason for abx use and side effects reviewed with patient's ; monitor BMP and vancomycin trough levels   -old chart reviewed to assess prior cultures  -ICU care  -elevate legs  -local wound care  -droplet isolation for now  -monitor temps  -f/u CBC  -supportive care  2. Other issues:   -care per medicine

## 2020-02-13 NOTE — PROGRESS NOTE ADULT - ASSESSMENT
A/P: 81 female admitted with encephalopathy and cellulitis  Lactate acidosis    Plan:  Patient lactate is improving.  Patient had not been properly fluid resuscitated and the repeat lactate was drawn too soon thats why there was not a significant improvement.  The third lactate is clearly reflective of fluid ressucitation  Always hemodynamically stable  S/P VPS tap--CSF clear  MS improving according to dr. betancourt  patents encephalopathy was from sepsis and has improved  CTH PNS as per NS  For CT LLE to assess the cellulitis    Confirmed with the patients  that she is a DNR/DNI as her out of hospital documents indicated    Patient can go to a reg floor  Called into the Hospitalist at 12:15PM

## 2020-02-13 NOTE — CONSULT NOTE ADULT - SUBJECTIVE AND OBJECTIVE BOX
Patient is a 64y old  Female who presents with a chief complaint of Fever, change from baseline mental status (13 Feb 2020 10:31)    HPI: Patient is a 65 y/o female, resident of [facility], with PMHx significant for Dementia with behavioral disturbance, Mood disorder, RODY, Normal pressure Hydrocephalus with  shunt, Hypertension, Diabetes Mellitus and dementia presented to the ED today from Nursing facility with fever, "shaking" and noted changes in baseline mental status. Patient presently on Tamiflu which was started on 2/11/2020. Patient denies pain, headache, dizziness, nausea, vomiting, abdominal pain, leg pain or weakness.   Patient is a poor historian. (12 Feb 2020 23:14)    Neurosurgery called for consult as pt has a  Shunt and presents with mental status changes. She has  LLE cellulitis superimposed on B/l chronic LE stasis dermatitis. Pt has a history of CSF infection in the past.  Shunt was placed by Dr. Solitario, [place] set at 1.0.  Pt seen and examined, awake, alert, oriented to self only, as per nursing she has improved since admission and appears to be closer to her mental baseline.  Pt denies neck pain and is able to freely move her head and neck, she denies photophobia and does not complain about the lights in the room being on, she denies nausea and vomiting and was asking for her lunch. Pt is able to move all extremities antigravity.  She was started on antibiotics upon admission in the ED.     PAST MEDICAL & SURGICAL HISTORY:  Normal pressure hydrocephalus  Hypertension  Diabetes mellitus  Dementia  Epilepsy  TBI (traumatic brain injury)  s/p LEFT  SHUNT     FAMILY HISTORY:  No pertinent family history in first degree relatives     Social Hx:  Nonsmoker, no drug or alcohol use    MEDICATIONS  (STANDING):  cefTRIAXone Injectable. 2000 milliGRAM(s) IV Push every 12 hours  clonazePAM  Tablet 0.25 milliGRAM(s) Oral <User Schedule>  enoxaparin Injectable 40 milliGRAM(s) SubCutaneous daily  insulin lispro (HumaLOG) corrective regimen sliding scale   SubCutaneous three times a day before meals  insulin lispro (HumaLOG) corrective regimen sliding scale   SubCutaneous at bedtime  oseltamivir 75 milliGRAM(s) Oral daily  PARoxetine 30 milliGRAM(s) Oral daily  sodium chloride 0.9%. 1000 milliLiter(s) (75 mL/Hr) IV Continuous <Continuous>  valproic acid 500 milliGRAM(s) Oral <User Schedule>  valproic acid 750 milliGRAM(s) Oral <User Schedule>  vancomycin  IVPB 1000 milliGRAM(s) IV Intermittent every 12 hours     Allergies  latex (Unknown)  Originally Entered as [Severe Hives] reaction to [Pineapple] (Unknown)  penicillins (Unknown)    ROS: Pertinent positives in HPI, all other ROS were reviewed and are negative.      Vital Signs Last 24 Hrs  T(C): 38.8 (13 Feb 2020 10:00), Max: 40.2 (13 Feb 2020 02:39)  T(F): 101.9 (13 Feb 2020 10:00), Max: 104.4 (13 Feb 2020 02:39)  HR: 94 (13 Feb 2020 10:00) (89 - 125)  BP: 97/51 (13 Feb 2020 10:00) (97/50 - 150/100)  BP(mean): 62 (13 Feb 2020 10:00) (59 - 90)  RR: 28 (13 Feb 2020 10:00) (13 - 38)  SpO2: 94% (13 Feb 2020 10:00) (92% - 100%)    PHYSICAL EXAM:  Constitutional: awake and alert, oriented to self only   HEENT: PERRLA, EOMI, right pupil 4mm left pupil 2mm, right temporal sunken, left  Shunt depresses and fills easily   Neck: Supple, no pain with movement, no limited range of motion   Respiratory: Breath sounds are clear bilaterally  Cardiovascular: S1 and S2, regular rhythm  Gastrointestinal: soft, nontender  Extremities: +edema over bilateral lower extremities, swelling/erythema/warmth LLE>RLE. +1mm x 1mm puncture wound with what appears to be pus over LLE mid shin  Musculoskeletal: no joint swelling/tenderness, no abnormal movements  Skin: warm and dry     Neurological exam:  HF: awake, alert, oriented to self only, pleasant demeanor, follows commands, speech intact, no dysarthria or aphasia   CN: PEARRL, R>L, EOMI, VFF, facial sensation normal, no NLFD, tongue midline, Palate moves equally, SCM equal bilaterally  Motor: moves all extremities antigravity   Sens: Intact to light touch  Reflexes: Symmetric and normal  Coord:  No FNFA, dysmetria, RAGINI intact   Gait/Balance: not tested     Labs:                        12.5   10.21 )-----------( 142      ( 13 Feb 2020 03:27 )             40.0     02-13    141  |  109<H>  |  10  ----------------------------<  140<H>  3.5   |  24  |  0.71    Ca    8.2<L>      13 Feb 2020 03:27    TPro  6.1  /  Alb  2.6<L>  /  TBili  0.4  /  DBili  x   /  AST  60<H>  /  ALT  40  /  AlkPhos  63  02-13    02-13 [id number] 7.7    PT/INR - ( 12 Feb 2020 19:35 )   PT: 12.5 sec;   INR: 1.12 ratio       PTT - ( 12 Feb 2020 19:35 )  PTT:28.3 sec

## 2020-02-13 NOTE — PROGRESS NOTE ADULT - ASSESSMENT
65 y/o female, resident of [facility], with PMHx significant for Dementia with behavioral disturbance, depression, Mood disorder, RODY, Normal pressure Hydrocephalus with  shunt, Hypertension, Diabetes Mellitus and dementia admitted for severe sepsis in the setting of elevated lactate likely 2/2 lower extremity cellulitis.     Severe sepsis in the setting of elevated lactate  -s/p IVF  -trend lactate   -currently on IV abx: Ceftriaxone and Vancomycin   -hemodynamically stable   -urine, blood cultures pending   -f/u LLE X-ray - r/o Osteomyelitis   -f/u X-ray Shunt series   -LLE arterial Duplex US Reviewed  -LLE venous duplex negative  -elevated CPK  -vascular surgery eval appreciated: CT/MRI of LLE to r/o necrotizing fasciitis; no signs of compartment syndrome  -CXR reviewed, RVP (-)   -ID consult appreciated   -neurosurgery consult  -currently under ICU management      Influenza Post Exposure Prophylaxis  - continue tamiflu x 12 days  - started on 2/11/2020  -currently under ICU management     Diabetes Mellitus  -hypoglycemia Protocol  -low dose ISS  -f/u HbA1c    Mood Disorder/ RODY / Depression  -continue valproic acid   -valproic acid level  -continue clonazepam   -continue paroxetine     DVT ppx  -lovenox     Code status  -DNR/DNI

## 2020-02-13 NOTE — PATIENT PROFILE ADULT - NSPRESCRALCSIXMORE_GEN_A_NUR
hx as per pt and PA, 32yo female with non traumatic hand pain, no tingling or weakness  exam:+tenderness to 1st MCP, neurovasc intact  plan: XR, ortho follow up  agree with assessment and plan of PA see above

## 2020-02-14 LAB
AMMONIA BLD-MCNC: <10 UMOL/L — LOW (ref 11–32)
ANION GAP SERPL CALC-SCNC: 5 MMOL/L — SIGNIFICANT CHANGE UP (ref 5–17)
BUN SERPL-MCNC: 6 MG/DL — LOW (ref 7–23)
CALCIUM SERPL-MCNC: 8.3 MG/DL — LOW (ref 8.5–10.1)
CHLORIDE SERPL-SCNC: 112 MMOL/L — HIGH (ref 96–108)
CK SERPL-CCNC: 1562 U/L — HIGH (ref 26–192)
CK SERPL-CCNC: 1982 U/L — HIGH (ref 26–192)
CK SERPL-CCNC: 2006 U/L — HIGH (ref 26–192)
CO2 SERPL-SCNC: 26 MMOL/L — SIGNIFICANT CHANGE UP (ref 22–31)
CREAT SERPL-MCNC: 0.61 MG/DL — SIGNIFICANT CHANGE UP (ref 0.5–1.3)
CULTURE RESULTS: NO GROWTH — SIGNIFICANT CHANGE UP
GLUCOSE SERPL-MCNC: 190 MG/DL — HIGH (ref 70–99)
HAV IGM SER-ACNC: SIGNIFICANT CHANGE UP
HBV CORE IGM SER-ACNC: SIGNIFICANT CHANGE UP
HBV SURFACE AG SER-ACNC: SIGNIFICANT CHANGE UP
HCT VFR BLD CALC: 39.8 % — SIGNIFICANT CHANGE UP (ref 34.5–45)
HCV AB S/CO SERPL IA: 0.27 S/CO — SIGNIFICANT CHANGE UP (ref 0–0.99)
HCV AB SERPL-IMP: SIGNIFICANT CHANGE UP
HGB BLD-MCNC: 13 G/DL — SIGNIFICANT CHANGE UP (ref 11.5–15.5)
MAGNESIUM SERPL-MCNC: 2 MG/DL — SIGNIFICANT CHANGE UP (ref 1.6–2.6)
MCHC RBC-ENTMCNC: 31.8 PG — SIGNIFICANT CHANGE UP (ref 27–34)
MCHC RBC-ENTMCNC: 32.7 GM/DL — SIGNIFICANT CHANGE UP (ref 32–36)
MCV RBC AUTO: 97.3 FL — SIGNIFICANT CHANGE UP (ref 80–100)
PHOSPHATE SERPL-MCNC: 2.2 MG/DL — LOW (ref 2.5–4.5)
PLATELET # BLD AUTO: 121 K/UL — LOW (ref 150–400)
POTASSIUM SERPL-MCNC: 3.6 MMOL/L — SIGNIFICANT CHANGE UP (ref 3.5–5.3)
POTASSIUM SERPL-SCNC: 3.6 MMOL/L — SIGNIFICANT CHANGE UP (ref 3.5–5.3)
RBC # BLD: 4.09 M/UL — SIGNIFICANT CHANGE UP (ref 3.8–5.2)
RBC # FLD: 14.4 % — SIGNIFICANT CHANGE UP (ref 10.3–14.5)
SODIUM SERPL-SCNC: 143 MMOL/L — SIGNIFICANT CHANGE UP (ref 135–145)
SPECIMEN SOURCE: SIGNIFICANT CHANGE UP
VALPROATE SERPL-MCNC: 40 UG/ML — LOW (ref 50–100)
WBC # BLD: 5.32 K/UL — SIGNIFICANT CHANGE UP (ref 3.8–10.5)
WBC # FLD AUTO: 5.32 K/UL — SIGNIFICANT CHANGE UP (ref 3.8–10.5)

## 2020-02-14 PROCEDURE — 99232 SBSQ HOSP IP/OBS MODERATE 35: CPT

## 2020-02-14 PROCEDURE — 99233 SBSQ HOSP IP/OBS HIGH 50: CPT | Mod: GC

## 2020-02-14 PROCEDURE — 76700 US EXAM ABDOM COMPLETE: CPT | Mod: 26

## 2020-02-14 RX ORDER — CEFTRIAXONE 500 MG/1
2000 INJECTION, POWDER, FOR SOLUTION INTRAMUSCULAR; INTRAVENOUS EVERY 24 HOURS
Refills: 0 | Status: DISCONTINUED | OUTPATIENT
Start: 2020-02-14 | End: 2020-02-17

## 2020-02-14 RX ADMIN — Medication 0.25 MILLIGRAM(S): at 09:07

## 2020-02-14 RX ADMIN — Medication 650 MILLIGRAM(S): at 09:30

## 2020-02-14 RX ADMIN — SODIUM CHLORIDE 75 MILLILITER(S): 9 INJECTION INTRAMUSCULAR; INTRAVENOUS; SUBCUTANEOUS at 03:00

## 2020-02-14 RX ADMIN — Medication 650 MILLIGRAM(S): at 23:43

## 2020-02-14 RX ADMIN — ENOXAPARIN SODIUM 40 MILLIGRAM(S): 100 INJECTION SUBCUTANEOUS at 12:29

## 2020-02-14 RX ADMIN — PANTOPRAZOLE SODIUM 40 MILLIGRAM(S): 20 TABLET, DELAYED RELEASE ORAL at 06:53

## 2020-02-14 RX ADMIN — SODIUM CHLORIDE 75 MILLILITER(S): 9 INJECTION INTRAMUSCULAR; INTRAVENOUS; SUBCUTANEOUS at 21:47

## 2020-02-14 RX ADMIN — Medication 750 MILLIGRAM(S): at 18:52

## 2020-02-14 RX ADMIN — Medication 500 MILLIGRAM(S): at 06:53

## 2020-02-14 RX ADMIN — Medication 650 MILLIGRAM(S): at 22:20

## 2020-02-14 RX ADMIN — Medication 250 MILLIGRAM(S): at 16:58

## 2020-02-14 RX ADMIN — Medication 650 MILLIGRAM(S): at 09:08

## 2020-02-14 RX ADMIN — Medication 1: at 08:05

## 2020-02-14 RX ADMIN — Medication 250 MILLIGRAM(S): at 05:28

## 2020-02-14 RX ADMIN — Medication 0.25 MILLIGRAM(S): at 18:52

## 2020-02-14 RX ADMIN — Medication 30 MILLIGRAM(S): at 12:22

## 2020-02-14 RX ADMIN — CEFTRIAXONE 2000 MILLIGRAM(S): 500 INJECTION, POWDER, FOR SOLUTION INTRAMUSCULAR; INTRAVENOUS at 00:25

## 2020-02-14 RX ADMIN — Medication 75 MILLIGRAM(S): at 12:22

## 2020-02-14 NOTE — PROGRESS NOTE ADULT - ASSESSMENT
Patient is a 63 y/o female, resident of [facility], with PMHx significant for Dementia with behavioral disturbance, Mood disorder, RODY, Normal pressure Hydrocephalus with  shunt, Hypertension, Diabetes Mellitus and dementia presented to the ED today from Nursing facility with fever, "shaking" and noted changes in baseline mental status. Patient presently on Tamiflu which was started on 2/11/2020. Patient denies pain, headache, dizziness, nausea, vomiting, abdominal pain, leg pain or weakness. Patient is a poor historian.     - Neurosurgery called for consult as pt has a  Shunt and presents with mental status changes. She has  LLE cellulitis superimposed on B/l chronic LE stasis dermatitis. Pt has a history of CSF infection in the past.  Shunt was placed by Roc Hopper set at 1.0  - Continue IV Rocephin and Vanco as per ID  - CSF culture no growth to date  - CSF PCR negative  - Doubt meningitis, patient appears to be back to her baseline. Afebrile  - Will follow up CSF culture tomorrow    Discussed with Dr. Solitario

## 2020-02-14 NOTE — PROGRESS NOTE ADULT - SUBJECTIVE AND OBJECTIVE BOX
Date of service: 20 @ 09:41    Lying in bed in NAD  Weak looking  Fever improving    ROS: limited; denies pain    MEDICATIONS  (STANDING):  cefTRIAXone Injectable. 2000 milliGRAM(s) IV Push every 24 hours  clonazePAM  Tablet 0.25 milliGRAM(s) Oral <User Schedule>  dextrose 5%. 1000 milliLiter(s) (50 mL/Hr) IV Continuous <Continuous>  dextrose 50% Injectable 12.5 Gram(s) IV Push once  dextrose 50% Injectable 25 Gram(s) IV Push once  dextrose 50% Injectable 25 Gram(s) IV Push once  enoxaparin Injectable 40 milliGRAM(s) SubCutaneous daily  insulin lispro (HumaLOG) corrective regimen sliding scale   SubCutaneous three times a day before meals  insulin lispro (HumaLOG) corrective regimen sliding scale   SubCutaneous at bedtime  oseltamivir 75 milliGRAM(s) Oral daily  pantoprazole    Tablet 40 milliGRAM(s) Oral before breakfast  PARoxetine 30 milliGRAM(s) Oral daily  sodium chloride 0.9%. 1000 milliLiter(s) (75 mL/Hr) IV Continuous <Continuous>  valproic acid 500 milliGRAM(s) Oral <User Schedule>  valproic acid 750 milliGRAM(s) Oral <User Schedule>  vancomycin  IVPB 1000 milliGRAM(s) IV Intermittent every 12 hours      Vital Signs Last 24 Hrs  T(C): 37.3 (2020 09:10), Max: 40.1 (2020 16:50)  T(F): 99.1 (2020 09:10), Max: 104.2 (2020 16:50)  HR: 73 (2020 04:45) (68 - 110)  BP: 104/62 (2020 04:45) (90/43 - 141/91)  BP(mean): 100 (2020 16:50) (56 - 100)  RR: 16 (2020 04:45) (16 - 28)  SpO2: 96% (2020 04:45) (92% - 100%)    Physical Exam:      Constitutional:  No acute distress  HEENT: NC/AT, EOMI, PERRLA, conjunctivae clear  Neck: supple; thyroid not palpable  Back: no tenderness  Respiratory: respiratory effort normal; decreased BS at bases  Cardiovascular: S1S2 regular, no murmurs  Abdomen: soft, not tender, not distended, positive BS  Genitourinary: no suprapubic tenderness  Lymphatic: no LN palpable  Musculoskeletal: no muscle tenderness, no joint swelling or tenderness  Extremities: 2+ pedal edema  LLE mild erythema, edema and warmth; small anterior shin dry ulcer  Neurological/ Psychiatric: confused, moving all extremities  Skin: no rashes; no palpable lesions    Labs: reviewed                        13.0   5.32  )-----------( 121      ( 2020 08:31 )             39.8     02-14    143  |  112<H>  |  6<L>  ----------------------------<  190<H>  3.6   |  26  |  0.61    Ca    8.3<L>      2020 08:31  Phos  2.2     02-14  Mg     2.0     02-14    TPro  5.9<L>  /  Alb  2.3<L>  /  TBili  0.4  /  DBili  x   /  AST  172<H>  /  ALT  66  /  AlkPhos  69  02-13                          12.5   10.21 )-----------( 142      ( 2020 03:27 )             40.0     02-13    141  |  109<H>  |  10  ----------------------------<  140<H>  3.5   |  24  |  0.71    Ca    8.2<L>      2020 03:27    TPro  6.1  /  Alb  2.6<L>  /  TBili  0.4  /  DBili  x   /  AST  60<H>  /  ALT  40  /  AlkPhos  63  02-13     LIVER FUNCTIONS - ( 2020 03:27 )  Alb: 2.6 g/dL / Pro: 6.1 gm/dL / ALK PHOS: 63 U/L / ALT: 40 U/L / AST: 60 U/L / GGT: x           Urinalysis Basic - ( 2020 19:35 )    Color: Yellow / Appearance: Clear / S.020 / pH: x  Gluc: x / Ketone: Moderate  / Bili: Negative / Urobili: Negative mg/dL   Blood: x / Protein: 15 mg/dL / Nitrite: Negative   Leuk Esterase: Trace / RBC: 3-5 /HPF / WBC 6-10   Sq Epi: x / Non Sq Epi: Moderate / Bacteria: Few      Culture - CSF with Gram Stain (collected 2020 09:30)  Source: .CSF CSF  Gram Stain (2020 17:04):    polymorphonuclear leukocytes seen    No organisms seen    by cytocentrifuge    Culture - Urine (collected 2020 19:35)  Source: .Urine None  Final Report (2020 04:32):    No growth    Culture - Blood (collected 2020 19:35)  Source: .Blood None  Preliminary Report (2020 03:02):    No growth to date.    Culture - Blood (collected 2020 19:35)  Source: .Blood None  Preliminary Report (2020 03:02):    No growth to date.    CSF PCR Panel (20 @ 09:30)    CSF PCR Result: NotDetec: The meningitis/encephalitis (ME) panel (CSFPCR) is a PCR based assay that  screens for: Escherichia coli; Haemophilus influenzae; Listeria  monocytogenes; Neisseria meningitidis; Streptococcus agalactiae;  Streptococcus pneumoniae; CMV; Enterovirus; HSV-1; HSV-2; Human  herpesvirus 6; Parechovirus; VZV and Cryptococcus. Result should be  interpreted in context of clinical presentation, imaging and other lab  tests. Positive predictive value may be lower in patients with normal CSF  chemistry and cell count.    Radiology: all available radiological tests reviewed    < from: Xray Chest 1 View-PORTABLE IMMEDIATE (20 @ 20:12) >  Clear lungs.    < end of copied text >      Advanced directives addressed: full resuscitation

## 2020-02-14 NOTE — PROGRESS NOTE ADULT - SUBJECTIVE AND OBJECTIVE BOX
HPI:  Patient is a 63 y/o female, resident of [facility], with PMHx significant for Dementia with behavioral disturbance, Mood disorder, RODY, Normal pressure Hydrocephalus with  shunt, Hypertension, Diabetes Mellitus and dementia presented to the ED today from Nursing facility with fever, "shaking" and noted changes in baseline mental status. Patient presently on Tamiflu which was started on 2/11/2020. Patient denies pain, headache, dizziness, nausea, vomiting, abdominal pain, leg pain or weakness.   Patient is a poor historian. (12 Feb 2020 23:14)    Neurosurgery called for consult as pt has a  Shunt and presents with mental status changes. She has  LLE cellulitis superimposed on B/l chronic LE stasis dermatitis. Pt has a history of CSF infection in the past.  Shunt was placed by Roc Hopper set at 1.0.  Pt seen and examined, awake, alert, oriented to self only, as per nursing she has improved since admission and appears to be closer to her mental baseline.  Pt denies neck pain and is able to freely move her head and neck, she denies photophobia and does not complain about the lights in the room being on, she denies nausea and vomiting and was asking for her lunch. Pt is able to move all extremities antigravity.  She was started on antibiotics upon admission in the ED.     2/14- Patient seen and examined on floor, on 1:1. She offers no complaints, denies headache, neck stiffness, nausea/ vomiting. Afebrile today.    Vital Signs Last 24 Hrs  T(C): 37.3 (14 Feb 2020 12:00), Max: 40.1 (13 Feb 2020 16:50)  T(F): 99.1 (14 Feb 2020 12:00), Max: 104.2 (13 Feb 2020 16:50)  HR: 85 (14 Feb 2020 11:18) (68 - 110)  BP: 131/54 (14 Feb 2020 11:18) (93/63 - 141/91)  BP(mean): 100 (13 Feb 2020 16:50) (100 - 100)  RR: 17 (14 Feb 2020 11:18) (16 - 24)  SpO2: 97% (14 Feb 2020 11:18) (95% - 100%)    MEDICATIONS  (STANDING):  cefTRIAXone Injectable. 2000 milliGRAM(s) IV Push every 24 hours  clonazePAM  Tablet 0.25 milliGRAM(s) Oral <User Schedule>  dextrose 5%. 1000 milliLiter(s) (50 mL/Hr) IV Continuous <Continuous>  dextrose 50% Injectable 12.5 Gram(s) IV Push once  dextrose 50% Injectable 25 Gram(s) IV Push once  dextrose 50% Injectable 25 Gram(s) IV Push once  enoxaparin Injectable 40 milliGRAM(s) SubCutaneous daily  insulin lispro (HumaLOG) corrective regimen sliding scale   SubCutaneous three times a day before meals  insulin lispro (HumaLOG) corrective regimen sliding scale   SubCutaneous at bedtime  oseltamivir 75 milliGRAM(s) Oral daily  pantoprazole    Tablet 40 milliGRAM(s) Oral before breakfast  PARoxetine 30 milliGRAM(s) Oral daily  sodium chloride 0.9%. 1000 milliLiter(s) (75 mL/Hr) IV Continuous <Continuous>  valproic acid 500 milliGRAM(s) Oral <User Schedule>  valproic acid 750 milliGRAM(s) Oral <User Schedule>  vancomycin  IVPB 1000 milliGRAM(s) IV Intermittent every 12 hours    MEDICATIONS  (PRN):  acetaminophen   Tablet .. 650 milliGRAM(s) Oral every 6 hours PRN Temp greater or equal to 38C (100.4F)  dextrose 40% Gel 15 Gram(s) Oral once PRN Blood Glucose LESS THAN 70 milliGRAM(s)/deciliter  glucagon  Injectable 1 milliGRAM(s) IntraMuscular once PRN Glucose LESS THAN 70 milligrams/deciliter      PHYSICAL EXAM:  Constitutional: awake and alert, oriented to self only   HEENT: PERRLA, EOMI, right pupil 4mm left pupil 2mm, right temporal sunken, left  Shunt depresses and fills easily   Neck: Supple, no pain with movement, no limited range of motion   Respiratory: Breath sounds are clear bilaterally  Cardiovascular: S1 and S2, regular rhythm  Gastrointestinal: soft, nontender  Extremities: +edema over bilateral lower extremities, swelling/erythema/warmth LLE>RLE  Musculoskeletal: no joint swelling/tenderness, no abnormal movements  Skin: warm and dry     Neurological exam:  HF: awake, alert, oriented to self only, pleasant demeanor, follows commands, speech intact, no dysarthria or aphasia   CN: PEARRL, R>L, EOMI, VFF, facial sensation normal, no NLFD, tongue midline, Palate moves equally, SCM equal bilaterally  Motor: moves all extremities 5/5, tremor noted left upper extremity with movement  Sens: Intact to light touch  Reflexes: Symmetric and normal  Coord:  No FNFA, dysmetria, RAGINI intact   Gait/Balance: not tested           LABS:                         13.0   5.32  )-----------( 121      ( 14 Feb 2020 08:31 )             39.8     02-14    143  |  112<H>  |  6<L>  ----------------------------<  190<H>  3.6   |  26  |  0.61    Ca    8.3<L>      14 Feb 2020 08:31  Phos  2.2     02-14  Mg     2.0     02-14    TPro  5.9<L>  /  Alb  2.3<L>  /  TBili  0.4  /  DBili  x   /  AST  172<H>  /  ALT  66  /  AlkPhos  69  02-13    LIVER FUNCTIONS - ( 13 Feb 2020 20:13 )  Alb: 2.3 g/dL / Pro: 5.9 gm/dL / ALK PHOS: 69 U/L / ALT: 66 U/L / AST: 172 U/L / GGT: x             02-13 [id number] 7.7      Cerebrospinal Fluid Cell Count-1 (02.13.20 @ 09:30)    Total Nucleated Cell Count, CSF: <1    CSF Color: No Color    CSF Appearance: Clear    CSF Segmented Neutrophils: Not Done    Culture - CSF with Gram Stain . (02.13.20 @ 09:30)  polymorphonuclear leukocytes seen  No organisms seen  by cytocentrifuge    Specimen Source: .CSF CSF    Culture Results: No growth    Protein, CSF: 189 mg/dL (02.13.20 @ 09:30)    Glucose, CSF: 83 mg/dL (02.13.20 @ 09:30)

## 2020-02-14 NOTE — PROGRESS NOTE ADULT - SUBJECTIVE AND OBJECTIVE BOX
Patient transferred to floor bed.  Neurologically stable.  0 WBC in CSF, cultures pending.  CSF infection unlikely.

## 2020-02-14 NOTE — PROGRESS NOTE ADULT - ASSESSMENT
63 y/o female, resident of [facility], with PMHx significant for Dementia with behavioral disturbance, depression, Mood disorder, RODY, Normal pressure Hydrocephalus with  shunt, Hypertension, Diabetes Mellitus and dementia admitted for severe sepsis in the setting of elevated lactate likely 2/2 lower extremity cellulitis.     Severe sepsis in the setting of elevated lactate  -s/p IVF  -trend lactate   -currently on IV abx: Ceftriaxone and Vancomycin   -hemodynamically stable   -urine, blood cultures: NGTD  -CSF findings reviewed  -CSF culture: NGTD   -f/u LLE X-ray - r/o Osteomyelitis   -f/u X-ray Shunt series: reviewed  -LLE arterial Duplex US Reviewed  -LLE venous duplex negative  -CT LE: reviewed   -elevated CPK, trending down  -Lactate trending down  -vascular surgery eval appreciated: CT/MRI of LLE to r/o necrotizing fasciitis; no signs of compartment syndrome  -CXR reviewed, RVP (-)   -ID consult appreciated   -neurosurgery consult and recommendations noted   -currently under ICU management      Influenza Post Exposure Prophylaxis  - continue tamiflu x 11 days  - started on 2/11/2020  -currently under ICU management     Diabetes Mellitus  -hypoglycemia Protocol  -low dose ISS  -A1c: 7..7    Mood Disorder/ RODY / Depression  -continue valproic acid   -continue clonazepam   -continue paroxetine     #Elevated LFTs  -acute hepatic panel negative   -US abdomen: negative     #Thrombocytosis   -trend     DVT ppx  -lovenox     Code status  -DNR/DNI

## 2020-02-14 NOTE — PROGRESS NOTE ADULT - SUBJECTIVE AND OBJECTIVE BOX
HPI: 63 y/o female, resident of [facility], with PMHx significant for Dementia with behavioral disturbance, Mood disorder, RODY, Normal pressure Hydrocephalus with  shunt, Hypertension, Diabetes Mellitus and dementia presented to the ED today from Nursing facility with fever, "shaking" and noted changes in baseline mental status. Patient presently on tamiful, which was started on 2/11/2020. Patient denies pain, headache, dizziness, nausea, vomiting, abdominal pain, leg pain or weakness. Patient is a poor historian.     SUBJECTIVE: Pt seen and evaluated at bedside. Downgraded from ICU yesterday afternoon. Febrile overnight, overnight labs reviewed, CPK trending down. No other events.      MEDICATIONS  (STANDING):  cefTRIAXone Injectable. 2000 milliGRAM(s) IV Push every 12 hours  clonazePAM  Tablet 0.25 milliGRAM(s) Oral <User Schedule>  dextrose 5%. 1000 milliLiter(s) (50 mL/Hr) IV Continuous <Continuous>  dextrose 50% Injectable 12.5 Gram(s) IV Push once  dextrose 50% Injectable 25 Gram(s) IV Push once  dextrose 50% Injectable 25 Gram(s) IV Push once  enoxaparin Injectable 40 milliGRAM(s) SubCutaneous daily  insulin lispro (HumaLOG) corrective regimen sliding scale   SubCutaneous three times a day before meals  insulin lispro (HumaLOG) corrective regimen sliding scale   SubCutaneous at bedtime  oseltamivir 75 milliGRAM(s) Oral daily  PARoxetine 30 milliGRAM(s) Oral daily  sodium chloride 0.9%. 1000 milliLiter(s) (75 mL/Hr) IV Continuous <Continuous>  valproic acid 500 milliGRAM(s) Oral <User Schedule>  valproic acid 750 milliGRAM(s) Oral <User Schedule>  vancomycin  IVPB 1000 milliGRAM(s) IV Intermittent every 12 hours    MEDICATIONS  (PRN):  acetaminophen   Tablet .. 650 milliGRAM(s) Oral every 6 hours PRN Temp greater or equal to 38C (100.4F)  dextrose 40% Gel 15 Gram(s) Oral once PRN Blood Glucose LESS THAN 70 milliGRAM(s)/deciliter  glucagon  Injectable 1 milliGRAM(s) IntraMuscular once PRN Glucose LESS THAN 70 milligrams/deciliter      Vital Signs (24 Hrs):  T(C): 38.8 (02-13-20 @ 10:00), Max: 40.2 (02-13-20 @ 02:39)  HR: 94 (02-13-20 @ 10:00) (89 - 125)  BP: 97/51 (02-13-20 @ 10:00) (97/50 - 150/100)  RR: 28 (02-13-20 @ 10:00) (13 - 38)  SpO2: 94% (02-13-20 @ 10:00) (92% - 100%)  Daily Height in cm: 165.1 (12 Feb 2020 18:40)    Daily     I&O's Summary    12 Feb 2020 07:01  -  13 Feb 2020 07:00  --------------------------------------------------------  IN: 1600 mL / OUT: 0 mL / NET: 1600 mL    13 Feb 2020 07:01  -  13 Feb 2020 10:13  --------------------------------------------------------  IN: 100 mL / OUT: 0 mL / NET: 100 mL      Vitals  T(F): 99.8 (02-13-20 @ 21:54), Max: 104.4 (02-13-20 @ 02:39)  HR: 88 (02-13-20 @ 21:54) (88 - 118)  BP: 109/61 (02-13-20 @ 21:54) (90/43 - 141/91)  RR: 18 (02-13-20 @ 21:54) (13 - 38)  SpO2: 95% (02-13-20 @ 21:54) (92% - 100%)    Physical Exam   Gen: NAD, comfortable  HENT: atraumatic head and ears, no gross abnormalities of ears, mucous membranes moist, no oral lesions, neck supple without masses/goiter/lymphadenopathy  CV: s1/s2, no M/R/G  Pulm: nl respiratory effort, CTAB, no wheezes/crackles/rhonchi  Abd: normoactive bowel sounds in all 4 quadrants, soft, nontender, nondistended, no rebound, no guarding, no masses  Extremities: no pedal edema, pedal pulses palpable, hypertropic skin changes noted over the anterior aspect of b/l lower extremities, likely due to venous stasis, +erythema and induration noted over the left lower extremity with ?ulceration over the anterior aspect, +faint distal pulses, +no discharge noted from the wound, no skin breaks noted (iimproving)   Neuro: A&Ox1, +resting tremor noted of the RUE, negative Brudzinski and Lata's           LABS: All Labs Reviewed:                        13.0   5.32  )-----------( 121      ( 14 Feb 2020 08:31 )             39.8   02-14    143  |  112<H>  |  6<L>  ----------------------------<  190<H>  3.6   |  26  |  0.61    Ca    8.3<L>      14 Feb 2020 08:31  Phos  2.2     02-14  Mg     2.0     02-14    TPro  5.9<L>  /  Alb  2.3<L>  /  TBili  0.4  /  DBili  x   /  AST  172<H>  /  ALT  66  /  AlkPhos  69  02-13                       I&O's Summary    12 Feb 2020 07:01  -  13 Feb 2020 07:00  --------------------------------------------------------  IN: 1600 mL / OUT: 0 mL / NET: 1600 mL    13 Feb 2020 07:01  -  13 Feb 2020 23:19  --------------------------------------------------------  IN: 350 mL / OUT: 850 mL / NET: -500 mL        CAPILLARY BLOOD GLUCOSE      POCT Blood Glucose.: 145 mg/dL (13 Feb 2020 21:57)  POCT Blood Glucose.: 179 mg/dL (13 Feb 2020 18:21)  POCT Blood Glucose.: 159 mg/dL (13 Feb 2020 16:59)  POCT Blood Glucose.: 134 mg/dL (13 Feb 2020 11:18)  POCT Blood Glucose.: 120 mg/dL (13 Feb 2020 07:49)      PHYSICAL EXAM:  Constitutional: NAD, awake and alert, well-developed  HEENT: PERR, EOMI, Normal Hearing, MMM  Neck: Soft and supple, No LAD, No JVD  Respiratory: Breath sounds are clear bilaterally, No wheezing, rales or rhonchi  Cardiovascular: S1 and S2, regular rate and rhythm, no Murmurs, gallops or rubs  Gastrointestinal: Bowel Sounds present, soft, nontender, nondistended, no guarding, no rebound  Extremities: No peripheral edema  Vascular: 2+ peripheral pulses  Neurological: A/O x 3, no focal deficits  Musculoskeletal: 5/5 strength b/l upper and lower extremities  Skin: No rashes    MEDICATIONS:  MEDICATIONS  (STANDING):  cefTRIAXone Injectable. 2000 milliGRAM(s) IV Push every 12 hours  clonazePAM  Tablet 0.25 milliGRAM(s) Oral <User Schedule>  dextrose 5%. 1000 milliLiter(s) (50 mL/Hr) IV Continuous <Continuous>  dextrose 50% Injectable 12.5 Gram(s) IV Push once  dextrose 50% Injectable 25 Gram(s) IV Push once  dextrose 50% Injectable 25 Gram(s) IV Push once  enoxaparin Injectable 40 milliGRAM(s) SubCutaneous daily  insulin lispro (HumaLOG) corrective regimen sliding scale   SubCutaneous three times a day before meals  insulin lispro (HumaLOG) corrective regimen sliding scale   SubCutaneous at bedtime  oseltamivir 75 milliGRAM(s) Oral daily  PARoxetine 30 milliGRAM(s) Oral daily  sodium chloride 0.9%. 1000 milliLiter(s) (75 mL/Hr) IV Continuous <Continuous>  valproic acid 500 milliGRAM(s) Oral <User Schedule>  valproic acid 750 milliGRAM(s) Oral <User Schedule>  vancomycin  IVPB 1000 milliGRAM(s) IV Intermittent every 12 hours        LABS: All Labs Reviewed:             12.5   10.21 )-----------( 142      ( 13 Feb 2020 03:27 )             40.0     13 Feb 2020 03:27    141    |  109    |  10     ----------------------------<  140    3.5     |  24     |  0.71     Ca    8.2        13 Feb 2020 03:27    TPro  6.1    /  Alb  2.6    /  TBili  0.4    /  DBili  x      /  AST  60     /  ALT  40     /  AlkPhos  63     13 Feb 2020 03:27    PT/INR - ( 12 Feb 2020 19:35 )   PT: 12.5 sec;   INR: 1.12 ratio         PTT - ( 12 Feb 2020 19:35 )  PTT:28.3 sec    Lactate, Blood: 2.7: Elevated lactate. Consider ordering follow-up lactate to trend. mmol/L (02.13.20 @ 08:29)    Creatine Kinase, Serum: 954 U/L (02.13.20 @ 03:27)    Urinalysis (02.12.20 @ 19:35)    Glucose Qualitative, Urine: Negative mg/dL    Blood, Urine: Trace    pH Urine: 5.0    Color: Yellow    Urine Appearance: Clear    Bilirubin: Negative    Ketone - Urine: Moderate    Specific Gravity: 1.020    Protein, Urine: 15 mg/dL    Urobilinogen: Negative mg/dL    Nitrite: Negative    Leukocyte Esterase Concentration: Trace    IMAGING:    < from: US Duplex Arterial Lower Ext Ltd, Left (02.13.20 @ 00:43) >  IMPRESSION:   Peroneal artery is not identified and may be occluded.  No other arterial occlusion is identified.  No tardus parvus monophasic waveforms to indicate significant inflow disease.  Probably moderate degree of peripheral arterial disease involving the anterior and posterior tibialarteries. If needed CT angiography can be obtained for further evaluation.    < end of copied text >    < from: US Duplex Venous Lower Ext Ltd, Left (02.13.20 @ 00:37) >    IMPRESSION:     No evidence of left lower extremity deep venous thrombosis.    < end of copied text >    < from: Xray Chest 1 View-PORTABLE IMMEDIATE (02.12.20 @ 20:12) >    IMPRESSION: Clear lungs.    < from: CT Lower Extremity No Cont, Left (02.13.20 @ 12:43) >  EXAM:  CT LWR EXT LT                            PROCEDURE DATE:  02/13/2020          INTERPRETATION:  CT LOWER EXTREMITY LEFT dated 2/13/2020 12:43 PM     INDICATION: Left lower extremity pain and swelling    COMPARISON: Tibia and fibula radiographs of earlier the same date    TECHNIQUE: CT imaging of the left lower extremity was performed. The data was reformatted in the axial, coronal, and sagittal planes.     FINDINGS:    OSSEOUS STRUCTURES: There is no fracture. A moderate osteochondral defect is appreciated at the medial talar dome measuring 0.8 x 0.8 cm. There is moderate medial knee joint space narrowing.  SYNOVIUM/ JOINT FLUID: No knee joint effusion. No ankle joint effusion.  TENDONS: The tendons about the ankle are preserved.  MUSCLES: Normal muscle bulk. No intramuscular hematoma.  NEUROVASCULAR STRUCTURES: Preserved  SUBCUTANEOUS SOFT TISSUES: Mild soft tissue edema about the calf. Focal skin defect along the anterior margin the mid calf measuring 0.8 cm (series 4, image 114). There is skin thickening adjacent to the skin wound. Confluent edema deep to the skin wound. Mild edema tracking along the anterior tibial myofascial plane. No drainable fluid collection.      IMPRESSION:    1.  Anterior mid calf skin defect with adjacent edema and skin thickening concerning for cellulitis. No drainable fluid collection.  2.  No CT evidence for osteomyelitis.  3.  Medial talar dome osteochondral defect. Degenerative changes of the knee.    < end of copied text >              DVT PPX:    ADVANCED DIRECTIVE:    DISPOSITION:

## 2020-02-14 NOTE — PROVIDER CONTACT NOTE (OTHER) - ACTION/TREATMENT ORDERED:
Sent email to [facility]
Called office, spoke with Barbara.
No warming blanket at this time. Continue to monitor rectal temp, call if temp is 95's
Spoke with Nyasia VILLAFANA.

## 2020-02-14 NOTE — PROGRESS NOTE ADULT - ASSESSMENT
63 y/o female with h/o NPH s/p  shunt, prior  shunt infection with GNR, dementia with behavioral disturbance, obesity, Hypertension, Diabetes Mellitus, pedal edema with chronic stasis LE dermatitis was admitted on 2/12 for fever and chills. She is a resident at [facility] where he was noted with high fever and shaking chills for 1-2 hours associated with increased confusion and probable HA. Patient is a poor historian due to her baseline confusion. In ER she received vancomycin IV, meropenem and clindamycin.     1. Febrile syndrome improving. Probable sepsis. Doubt  shunt infection. LLE cellulitis superimposed on B/l chronic LE stasis dermatitis.   -encephalopath yis improving  -BC x 2 are negative to date   -CSF fluid analysis shows no cells; doubt meningitis  -on vancomycin 1 gm IV q12h and ceftriaxone 2 gm IV q12h # 2  -tolerating abx well so far; no side effects noted  -obtain vancomycin trough level   -continue abx coverage  -elevate legs  -local wound care  -monitor temps  -f/u CBC  -supportive care  2. Other issues:   -care per medicine

## 2020-02-15 LAB
ANION GAP SERPL CALC-SCNC: 5 MMOL/L — SIGNIFICANT CHANGE UP (ref 5–17)
BUN SERPL-MCNC: 4 MG/DL — LOW (ref 7–23)
CALCIUM SERPL-MCNC: 8.2 MG/DL — LOW (ref 8.5–10.1)
CHLORIDE SERPL-SCNC: 112 MMOL/L — HIGH (ref 96–108)
CK SERPL-CCNC: 911 U/L — HIGH (ref 26–192)
CO2 SERPL-SCNC: 25 MMOL/L — SIGNIFICANT CHANGE UP (ref 22–31)
CREAT SERPL-MCNC: 0.55 MG/DL — SIGNIFICANT CHANGE UP (ref 0.5–1.3)
GLUCOSE SERPL-MCNC: 142 MG/DL — HIGH (ref 70–99)
HCT VFR BLD CALC: 36.5 % — SIGNIFICANT CHANGE UP (ref 34.5–45)
HGB BLD-MCNC: 11.9 G/DL — SIGNIFICANT CHANGE UP (ref 11.5–15.5)
MCHC RBC-ENTMCNC: 31.6 PG — SIGNIFICANT CHANGE UP (ref 27–34)
MCHC RBC-ENTMCNC: 32.6 GM/DL — SIGNIFICANT CHANGE UP (ref 32–36)
MCV RBC AUTO: 97.1 FL — SIGNIFICANT CHANGE UP (ref 80–100)
PLATELET # BLD AUTO: 123 K/UL — LOW (ref 150–400)
POTASSIUM SERPL-MCNC: 3.4 MMOL/L — LOW (ref 3.5–5.3)
POTASSIUM SERPL-SCNC: 3.4 MMOL/L — LOW (ref 3.5–5.3)
RBC # BLD: 3.76 M/UL — LOW (ref 3.8–5.2)
RBC # FLD: 14.5 % — SIGNIFICANT CHANGE UP (ref 10.3–14.5)
SODIUM SERPL-SCNC: 142 MMOL/L — SIGNIFICANT CHANGE UP (ref 135–145)
VANCOMYCIN TROUGH SERPL-MCNC: 7.9 UG/ML — LOW (ref 10–20)
WBC # BLD: 6.09 K/UL — SIGNIFICANT CHANGE UP (ref 3.8–10.5)
WBC # FLD AUTO: 6.09 K/UL — SIGNIFICANT CHANGE UP (ref 3.8–10.5)

## 2020-02-15 PROCEDURE — 99232 SBSQ HOSP IP/OBS MODERATE 35: CPT | Mod: GC

## 2020-02-15 PROCEDURE — 99232 SBSQ HOSP IP/OBS MODERATE 35: CPT

## 2020-02-15 RX ADMIN — CEFTRIAXONE 2000 MILLIGRAM(S): 500 INJECTION, POWDER, FOR SOLUTION INTRAMUSCULAR; INTRAVENOUS at 00:04

## 2020-02-15 RX ADMIN — Medication 750 MILLIGRAM(S): at 17:39

## 2020-02-15 RX ADMIN — PANTOPRAZOLE SODIUM 40 MILLIGRAM(S): 20 TABLET, DELAYED RELEASE ORAL at 05:49

## 2020-02-15 RX ADMIN — Medication 250 MILLIGRAM(S): at 05:49

## 2020-02-15 RX ADMIN — Medication 0.25 MILLIGRAM(S): at 08:44

## 2020-02-15 RX ADMIN — Medication 75 MILLIGRAM(S): at 11:58

## 2020-02-15 RX ADMIN — Medication 1: at 16:32

## 2020-02-15 RX ADMIN — Medication 0.25 MILLIGRAM(S): at 17:38

## 2020-02-15 RX ADMIN — ENOXAPARIN SODIUM 40 MILLIGRAM(S): 100 INJECTION SUBCUTANEOUS at 11:58

## 2020-02-15 RX ADMIN — Medication 30 MILLIGRAM(S): at 11:58

## 2020-02-15 RX ADMIN — Medication 1: at 11:58

## 2020-02-15 RX ADMIN — Medication 250 MILLIGRAM(S): at 17:39

## 2020-02-15 RX ADMIN — Medication 500 MILLIGRAM(S): at 05:48

## 2020-02-15 NOTE — PROGRESS NOTE ADULT - ASSESSMENT
63 y/o female, resident of [facility], with PMHx significant for Dementia with behavioral disturbance, depression, Mood disorder, RODY, Normal pressure Hydrocephalus with  shunt, Hypertension, Diabetes Mellitus and dementia admitted for severe sepsis in the setting of elevated lactate likely 2/2 lower extremity cellulitis.     Severe sepsis in the setting of elevated lactate  -s/p IVF  -trend lactate   -currently on IV abx: Ceftriaxone and Vancomycin   -hemodynamically stable   -urine, blood cultures: NGTD  -CSF findings reviewed  -CSF culture: NGTD   -f/u LLE X-ray - r/o Osteomyelitis   -f/u X-ray Shunt series: reviewed  -LLE arterial Duplex US Reviewed  -LLE venous duplex negative  -CT LE: reviewed   -elevated CPK, trending down  -Lactate trending down  -vascular surgery eval appreciated: CT/MRI of LLE to r/o necrotizing fasciitis; no signs of compartment syndrome  -CXR reviewed, RVP (-)   -ID consult appreciated   -neurosurgery consult and recommendations noted   -currently under ICU management      Influenza Post Exposure Prophylaxis  - continue tamiflu x 11 days  - started on 2/11/2020  -currently under ICU management     Diabetes Mellitus  -hypoglycemia Protocol  -low dose ISS  -A1c: 7..7    Mood Disorder/ RODY / Depression  -continue valproic acid   -continue clonazepam   -continue paroxetine     #Elevated LFTs  -acute hepatic panel negative   -US abdomen: negative     #Thrombocytosis   -trend     DVT ppx  -lovenox     Code status  -DNR/DNI 63 y/o female, resident of [facility], with PMHx significant for Dementia with behavioral disturbance, depression, Mood disorder, RODY, Normal pressure Hydrocephalus with  shunt, Hypertension, Diabetes Mellitus and dementia admitted for severe sepsis in the setting of elevated lactate likely 2/2 lower extremity cellulitis.     Severe sepsis in the setting of elevated lactate  -s/p IVF  -trend lactate   -currently on IV abx: Ceftriaxone and Vancomycin   -hemodynamically stable   -urine, blood cultures: NGTD  -CSF findings reviewed  -CSF culture: NGTD   -f/u LLE X-ray - r/o Osteomyelitis   -f/u X-ray Shunt series: reviewed  -LLE arterial Duplex US Reviewed  -LLE venous duplex negative  -CT LE: reviewed   -elevated CPK, trending down  -Lactate trending down  -vascular surgery eval appreciated: CT/MRI of LLE to r/o necrotizing fasciitis; no signs of compartment syndrome  -CXR reviewed, RVP (-)   -ID consult appreciated   -neurosurgery consult and recommendations noted   t      Influenza Post Exposure Prophylaxis  - continue tamiflu x 10 days  - started on 2/11/2020      Diabetes Mellitus  -hypoglycemia Protocol  -low dose ISS  -A1c: 7..7    Mood Disorder/ RODY / Depression  -continue valproic acid   -continue clonazepam   -continue paroxetine     #Elevated LFTs  -acute hepatic panel negative   -US abdomen: negative     #Thrombocytosis   -trend     DVT ppx  -lovenox     Code status  -DNR/DNI

## 2020-02-15 NOTE — PROGRESS NOTE ADULT - ASSESSMENT
Patient is a 63 y/o female, resident of [facility], with PMHx significant for Dementia with behavioral disturbance, Mood disorder, RODY, Normal pressure Hydrocephalus with  shunt, Hypertension, Diabetes Mellitus and dementia presented to the ED today from Nursing facility with fever, "shaking" and noted changes in baseline mental status. Patient presently on Tamiflu which was started on 2/11/2020. Patient denies pain, headache, dizziness, nausea, vomiting, abdominal pain, leg pain or weakness. Patient is a poor historian.     - Neurosurgery called for consult as pt has a  Shunt and presents with mental status changes. She has  LLE cellulitis superimposed on B/l chronic LE stasis dermatitis. Pt has a history of CSF infection in the past.  Shunt was placed by Roc Hopper set at 1.0  - Continue IV Rocephin and Vanco as per ID  - CSF culture no growth to date  - CSF PCR negative  - Doubt meningitis, patient appears to be back to her baseline. Afebrile  - Neurosurg sign-off, please reconsult if any neurological change.     Discussed with Dr. Solitario

## 2020-02-15 NOTE — PROGRESS NOTE ADULT - SUBJECTIVE AND OBJECTIVE BOX
HPI: 65 y/o female, resident of [facility], with PMHx significant for Dementia with behavioral disturbance, Mood disorder, RODY, Normal pressure Hydrocephalus with  shunt, Hypertension, Diabetes Mellitus and dementia presented to the ED today from Nursing facility with fever, "shaking" and noted changes in baseline mental status. Patient presently on tamiful, which was started on 2/11/2020. Patient denies pain, headache, dizziness, nausea, vomiting, abdominal pain, leg pain or weakness. Patient is a poor historian.     SUBJECTIVE: Pt seen and evaluated at bedside. Downgraded from ICU yesterday afternoon. Febrile overnight, overnight labs reviewed, CPK trending down. No other events.      MEDICATIONS  (STANDING):  cefTRIAXone Injectable. 2000 milliGRAM(s) IV Push every 12 hours  clonazePAM  Tablet 0.25 milliGRAM(s) Oral <User Schedule>  dextrose 5%. 1000 milliLiter(s) (50 mL/Hr) IV Continuous <Continuous>  dextrose 50% Injectable 12.5 Gram(s) IV Push once  dextrose 50% Injectable 25 Gram(s) IV Push once  dextrose 50% Injectable 25 Gram(s) IV Push once  enoxaparin Injectable 40 milliGRAM(s) SubCutaneous daily  insulin lispro (HumaLOG) corrective regimen sliding scale   SubCutaneous three times a day before meals  insulin lispro (HumaLOG) corrective regimen sliding scale   SubCutaneous at bedtime  oseltamivir 75 milliGRAM(s) Oral daily  PARoxetine 30 milliGRAM(s) Oral daily  sodium chloride 0.9%. 1000 milliLiter(s) (75 mL/Hr) IV Continuous <Continuous>  valproic acid 500 milliGRAM(s) Oral <User Schedule>  valproic acid 750 milliGRAM(s) Oral <User Schedule>  vancomycin  IVPB 1000 milliGRAM(s) IV Intermittent every 12 hours    MEDICATIONS  (PRN):  acetaminophen   Tablet .. 650 milliGRAM(s) Oral every 6 hours PRN Temp greater or equal to 38C (100.4F)  dextrose 40% Gel 15 Gram(s) Oral once PRN Blood Glucose LESS THAN 70 milliGRAM(s)/deciliter  glucagon  Injectable 1 milliGRAM(s) IntraMuscular once PRN Glucose LESS THAN 70 milligrams/deciliter      Vital Signs (24 Hrs):  T(C): 38.8 (02-13-20 @ 10:00), Max: 40.2 (02-13-20 @ 02:39)  HR: 94 (02-13-20 @ 10:00) (89 - 125)  BP: 97/51 (02-13-20 @ 10:00) (97/50 - 150/100)  RR: 28 (02-13-20 @ 10:00) (13 - 38)  SpO2: 94% (02-13-20 @ 10:00) (92% - 100%)  Daily Height in cm: 165.1 (12 Feb 2020 18:40)    Daily     I&O's Summary    12 Feb 2020 07:01  -  13 Feb 2020 07:00  --------------------------------------------------------  IN: 1600 mL / OUT: 0 mL / NET: 1600 mL    13 Feb 2020 07:01  -  13 Feb 2020 10:13  --------------------------------------------------------  IN: 100 mL / OUT: 0 mL / NET: 100 mL      Vitals  T(F): 99.8 (02-13-20 @ 21:54), Max: 104.4 (02-13-20 @ 02:39)  HR: 88 (02-13-20 @ 21:54) (88 - 118)  BP: 109/61 (02-13-20 @ 21:54) (90/43 - 141/91)  RR: 18 (02-13-20 @ 21:54) (13 - 38)  SpO2: 95% (02-13-20 @ 21:54) (92% - 100%)    Physical Exam   Gen: NAD, comfortable  HENT: atraumatic head and ears, no gross abnormalities of ears, mucous membranes moist, no oral lesions, neck supple without masses/goiter/lymphadenopathy  CV: s1/s2, no M/R/G  Pulm: nl respiratory effort, CTAB, no wheezes/crackles/rhonchi  Abd: normoactive bowel sounds in all 4 quadrants, soft, nontender, nondistended, no rebound, no guarding, no masses  Extremities: no pedal edema, pedal pulses palpable, hypertropic skin changes noted over the anterior aspect of b/l lower extremities, likely due to venous stasis, +erythema and induration noted over the left lower extremity with ?ulceration over the anterior aspect, +faint distal pulses, +no discharge noted from the wound, no skin breaks noted (iimproving)   Neuro: A&Ox1, +resting tremor noted of the RUE, negative Brudzinski and Lata's           LABS: All Labs Reviewed:                        13.0   5.32  )-----------( 121      ( 14 Feb 2020 08:31 )             39.8   02-14    143  |  112<H>  |  6<L>  ----------------------------<  190<H>  3.6   |  26  |  0.61    Ca    8.3<L>      14 Feb 2020 08:31  Phos  2.2     02-14  Mg     2.0     02-14    TPro  5.9<L>  /  Alb  2.3<L>  /  TBili  0.4  /  DBili  x   /  AST  172<H>  /  ALT  66  /  AlkPhos  69  02-13                       I&O's Summary    12 Feb 2020 07:01  -  13 Feb 2020 07:00  --------------------------------------------------------  IN: 1600 mL / OUT: 0 mL / NET: 1600 mL    13 Feb 2020 07:01  -  13 Feb 2020 23:19  --------------------------------------------------------  IN: 350 mL / OUT: 850 mL / NET: -500 mL        CAPILLARY BLOOD GLUCOSE      POCT Blood Glucose.: 145 mg/dL (13 Feb 2020 21:57)  POCT Blood Glucose.: 179 mg/dL (13 Feb 2020 18:21)  POCT Blood Glucose.: 159 mg/dL (13 Feb 2020 16:59)  POCT Blood Glucose.: 134 mg/dL (13 Feb 2020 11:18)  POCT Blood Glucose.: 120 mg/dL (13 Feb 2020 07:49)      PHYSICAL EXAM:  Constitutional: NAD, awake and alert, well-developed  HEENT: PERR, EOMI, Normal Hearing, MMM  Neck: Soft and supple, No LAD, No JVD  Respiratory: Breath sounds are clear bilaterally, No wheezing, rales or rhonchi  Cardiovascular: S1 and S2, regular rate and rhythm, no Murmurs, gallops or rubs  Gastrointestinal: Bowel Sounds present, soft, nontender, nondistended, no guarding, no rebound  Extremities: No peripheral edema  Vascular: 2+ peripheral pulses  Neurological: A/O x 3, no focal deficits  Musculoskeletal: 5/5 strength b/l upper and lower extremities  Skin: No rashes    MEDICATIONS:  MEDICATIONS  (STANDING):  cefTRIAXone Injectable. 2000 milliGRAM(s) IV Push every 12 hours  clonazePAM  Tablet 0.25 milliGRAM(s) Oral <User Schedule>  dextrose 5%. 1000 milliLiter(s) (50 mL/Hr) IV Continuous <Continuous>  dextrose 50% Injectable 12.5 Gram(s) IV Push once  dextrose 50% Injectable 25 Gram(s) IV Push once  dextrose 50% Injectable 25 Gram(s) IV Push once  enoxaparin Injectable 40 milliGRAM(s) SubCutaneous daily  insulin lispro (HumaLOG) corrective regimen sliding scale   SubCutaneous three times a day before meals  insulin lispro (HumaLOG) corrective regimen sliding scale   SubCutaneous at bedtime  oseltamivir 75 milliGRAM(s) Oral daily  PARoxetine 30 milliGRAM(s) Oral daily  sodium chloride 0.9%. 1000 milliLiter(s) (75 mL/Hr) IV Continuous <Continuous>  valproic acid 500 milliGRAM(s) Oral <User Schedule>  valproic acid 750 milliGRAM(s) Oral <User Schedule>  vancomycin  IVPB 1000 milliGRAM(s) IV Intermittent every 12 hours        LABS: All Labs Reviewed:             12.5   10.21 )-----------( 142      ( 13 Feb 2020 03:27 )             40.0     13 Feb 2020 03:27    141    |  109    |  10     ----------------------------<  140    3.5     |  24     |  0.71     Ca    8.2        13 Feb 2020 03:27    TPro  6.1    /  Alb  2.6    /  TBili  0.4    /  DBili  x      /  AST  60     /  ALT  40     /  AlkPhos  63     13 Feb 2020 03:27    PT/INR - ( 12 Feb 2020 19:35 )   PT: 12.5 sec;   INR: 1.12 ratio         PTT - ( 12 Feb 2020 19:35 )  PTT:28.3 sec    Lactate, Blood: 2.7: Elevated lactate. Consider ordering follow-up lactate to trend. mmol/L (02.13.20 @ 08:29)    Creatine Kinase, Serum: 954 U/L (02.13.20 @ 03:27)    Urinalysis (02.12.20 @ 19:35)    Glucose Qualitative, Urine: Negative mg/dL    Blood, Urine: Trace    pH Urine: 5.0    Color: Yellow    Urine Appearance: Clear    Bilirubin: Negative    Ketone - Urine: Moderate    Specific Gravity: 1.020    Protein, Urine: 15 mg/dL    Urobilinogen: Negative mg/dL    Nitrite: Negative    Leukocyte Esterase Concentration: Trace    IMAGING:    < from: US Duplex Arterial Lower Ext Ltd, Left (02.13.20 @ 00:43) >  IMPRESSION:   Peroneal artery is not identified and may be occluded.  No other arterial occlusion is identified.  No tardus parvus monophasic waveforms to indicate significant inflow disease.  Probably moderate degree of peripheral arterial disease involving the anterior and posterior tibialarteries. If needed CT angiography can be obtained for further evaluation.    < end of copied text >    < from: US Duplex Venous Lower Ext Ltd, Left (02.13.20 @ 00:37) >    IMPRESSION:     No evidence of left lower extremity deep venous thrombosis.    < end of copied text >    < from: Xray Chest 1 View-PORTABLE IMMEDIATE (02.12.20 @ 20:12) >    IMPRESSION: Clear lungs.    < from: CT Lower Extremity No Cont, Left (02.13.20 @ 12:43) >  EXAM:  CT LWR EXT LT                            PROCEDURE DATE:  02/13/2020          INTERPRETATION:  CT LOWER EXTREMITY LEFT dated 2/13/2020 12:43 PM     INDICATION: Left lower extremity pain and swelling    COMPARISON: Tibia and fibula radiographs of earlier the same date    TECHNIQUE: CT imaging of the left lower extremity was performed. The data was reformatted in the axial, coronal, and sagittal planes.     FINDINGS:    OSSEOUS STRUCTURES: There is no fracture. A moderate osteochondral defect is appreciated at the medial talar dome measuring 0.8 x 0.8 cm. There is moderate medial knee joint space narrowing.  SYNOVIUM/ JOINT FLUID: No knee joint effusion. No ankle joint effusion.  TENDONS: The tendons about the ankle are preserved.  MUSCLES: Normal muscle bulk. No intramuscular hematoma.  NEUROVASCULAR STRUCTURES: Preserved  SUBCUTANEOUS SOFT TISSUES: Mild soft tissue edema about the calf. Focal skin defect along the anterior margin the mid calf measuring 0.8 cm (series 4, image 114). There is skin thickening adjacent to the skin wound. Confluent edema deep to the skin wound. Mild edema tracking along the anterior tibial myofascial plane. No drainable fluid collection.      IMPRESSION:    1.  Anterior mid calf skin defect with adjacent edema and skin thickening concerning for cellulitis. No drainable fluid collection.  2.  No CT evidence for osteomyelitis.  3.  Medial talar dome osteochondral defect. Degenerative changes of the knee.    < end of copied text >              DVT PPX:    ADVANCED DIRECTIVE:    DISPOSITION: HPI: 65 y/o female, resident of [facility], with PMHx significant for Dementia with behavioral disturbance, Mood disorder, RODY, Normal pressure Hydrocephalus with  shunt, Hypertension, Diabetes Mellitus and dementia presented to the ED today from Nursing facility with fever, "shaking" and noted changes in baseline mental status. Patient presently on tamiful, which was started on 2/11/2020. Patient denies pain, headache, dizziness, nausea, vomiting, abdominal pain, leg pain or weakness. Patient is a poor historian.     SUBJECTIVE: Pt seen and evaluated at bedside.  Mild fever overnight otherwise CPK trending down. No other events.      MEDICATIONS  (STANDING):  cefTRIAXone Injectable. 2000 milliGRAM(s) IV Push every 12 hours  clonazePAM  Tablet 0.25 milliGRAM(s) Oral <User Schedule>  dextrose 5%. 1000 milliLiter(s) (50 mL/Hr) IV Continuous <Continuous>  dextrose 50% Injectable 12.5 Gram(s) IV Push once  dextrose 50% Injectable 25 Gram(s) IV Push once  dextrose 50% Injectable 25 Gram(s) IV Push once  enoxaparin Injectable 40 milliGRAM(s) SubCutaneous daily  insulin lispro (HumaLOG) corrective regimen sliding scale   SubCutaneous three times a day before meals  insulin lispro (HumaLOG) corrective regimen sliding scale   SubCutaneous at bedtime  oseltamivir 75 milliGRAM(s) Oral daily  PARoxetine 30 milliGRAM(s) Oral daily  sodium chloride 0.9%. 1000 milliLiter(s) (75 mL/Hr) IV Continuous <Continuous>  valproic acid 500 milliGRAM(s) Oral <User Schedule>  valproic acid 750 milliGRAM(s) Oral <User Schedule>  vancomycin  IVPB 1000 milliGRAM(s) IV Intermittent every 12 hours    MEDICATIONS  (PRN):  acetaminophen   Tablet .. 650 milliGRAM(s) Oral every 6 hours PRN Temp greater or equal to 38C (100.4F)  dextrose 40% Gel 15 Gram(s) Oral once PRN Blood Glucose LESS THAN 70 milliGRAM(s)/deciliter  glucagon  Injectable 1 milliGRAM(s) IntraMuscular once PRN Glucose LESS THAN 70 milligrams/deciliter      Vital Signs Last 24 Hrs  T(C): 37.3 (15 Feb 2020 17:16), Max: 37.4 (14 Feb 2020 23:59)  T(F): 99.2 (15 Feb 2020 17:16), Max: 99.3 (14 Feb 2020 23:59)  HR: 86 (15 Feb 2020 17:16) (78 - 86)  BP: 116/69 (15 Feb 2020 17:16) (109/54 - 119/54  RR: 18 (15 Feb 2020 17:16) (16 - 18)  SpO2: 96% (15 Feb 2020 17:16) (95% - 96%)    I&O's Summary    12 Feb 2020 07:01  -  13 Feb 2020 07:00  --------------------------------------------------------  IN: 1600 mL / OUT: 0 mL / NET: 1600 mL    13 Feb 2020 07:01  -  13 Feb 2020 10:13  --------------------------------------------------------  IN: 100 mL / OUT: 0 mL / NET: 100 mL      Vitals  T(F): 99.8 (02-13-20 @ 21:54), Max: 104.4 (02-13-20 @ 02:39)  HR: 88 (02-13-20 @ 21:54) (88 - 118)  BP: 109/61 (02-13-20 @ 21:54) (90/43 - 141/91)  RR: 18 (02-13-20 @ 21:54) (13 - 38)  SpO2: 95% (02-13-20 @ 21:54) (92% - 100%)    Physical Exam   Gen: NAD, comfortable  HENT: atraumatic head and ears, no gross abnormalities of ears, mucous membranes moist, no oral lesions, neck supple without masses/goiter/lymphadenopathy  CV: s1/s2, no M/R/G  Pulm: nl respiratory effort, CTAB, no wheezes/crackles/rhonchi  Abd: normoactive bowel sounds in all 4 quadrants, soft, nontender, nondistended, no rebound, no guarding, no masses  Extremities: no pedal edema, pedal pulses palpable, hypertropic skin changes noted over the anterior aspect of b/l lower extremities, likely due to venous stasis, +erythema and induration noted over the left lower extremity with ?ulceration over the anterior aspect, +faint distal pulses, +no discharge noted from the wound, no skin breaks noted (iimproving)   Neuro: A&Ox1, +resting tremor noted of the RUE, negative Brudzinski and Lata's           LABS: All Labs Reviewed:                        13.0   5.32  )-----------( 121      ( 14 Feb 2020 08:31 )             39.8   02-14    143  |  112<H>  |  6<L>  ----------------------------<  190<H>  3.6   |  26  |  0.61    Ca    8.3<L>      14 Feb 2020 08:31  Phos  2.2     02-14  Mg     2.0     02-14    TPro  5.9<L>  /  Alb  2.3<L>  /  TBili  0.4  /  DBili  x   /  AST  172<H>  /  ALT  66  /  AlkPhos  69  02-13                       I&O's Summary    12 Feb 2020 07:01  -  13 Feb 2020 07:00  --------------------------------------------------------  IN: 1600 mL / OUT: 0 mL / NET: 1600 mL    13 Feb 2020 07:01  -  13 Feb 2020 23:19  --------------------------------------------------------  IN: 350 mL / OUT: 850 mL / NET: -500 mL        CAPILLARY BLOOD GLUCOSE      POCT Blood Glucose.: 145 mg/dL (13 Feb 2020 21:57)  POCT Blood Glucose.: 179 mg/dL (13 Feb 2020 18:21)  POCT Blood Glucose.: 159 mg/dL (13 Feb 2020 16:59)  POCT Blood Glucose.: 134 mg/dL (13 Feb 2020 11:18)  POCT Blood Glucose.: 120 mg/dL (13 Feb 2020 07:49)      PHYSICAL EXAM:  Constitutional: NAD, awake and alert, well-developed  HEENT: PERR, EOMI, Normal Hearing, MMM  Neck: Soft and supple, No LAD, No JVD  Respiratory: Breath sounds are clear bilaterally, No wheezing, rales or rhonchi  Cardiovascular: S1 and S2, regular rate and rhythm, no Murmurs, gallops or rubs  Gastrointestinal: Bowel Sounds present, soft, nontender, nondistended, no guarding, no rebound  Extremities: No peripheral edema  Vascular: 2+ peripheral pulses  Neurological: A/O x 3, no focal deficits  Musculoskeletal: 5/5 strength b/l upper and lower extremities  Skin: No rashes    MEDICATIONS:  MEDICATIONS  (STANDING):  cefTRIAXone Injectable. 2000 milliGRAM(s) IV Push every 12 hours  clonazePAM  Tablet 0.25 milliGRAM(s) Oral <User Schedule>  dextrose 5%. 1000 milliLiter(s) (50 mL/Hr) IV Continuous <Continuous>  dextrose 50% Injectable 12.5 Gram(s) IV Push once  dextrose 50% Injectable 25 Gram(s) IV Push once  dextrose 50% Injectable 25 Gram(s) IV Push once  enoxaparin Injectable 40 milliGRAM(s) SubCutaneous daily  insulin lispro (HumaLOG) corrective regimen sliding scale   SubCutaneous three times a day before meals  insulin lispro (HumaLOG) corrective regimen sliding scale   SubCutaneous at bedtime  oseltamivir 75 milliGRAM(s) Oral daily  PARoxetine 30 milliGRAM(s) Oral daily  sodium chloride 0.9%. 1000 milliLiter(s) (75 mL/Hr) IV Continuous <Continuous>  valproic acid 500 milliGRAM(s) Oral <User Schedule>  valproic acid 750 milliGRAM(s) Oral <User Schedule>  vancomycin  IVPB 1000 milliGRAM(s) IV Intermittent every 12 hours        LABS: All Labs Reviewed:             12.5   10.21 )-----------( 142      ( 13 Feb 2020 03:27 )             40.0     13 Feb 2020 03:27    141    |  109    |  10     ----------------------------<  140    3.5     |  24     |  0.71     Ca    8.2        13 Feb 2020 03:27    TPro  6.1    /  Alb  2.6    /  TBili  0.4    /  DBili  x      /  AST  60     /  ALT  40     /  AlkPhos  63     13 Feb 2020 03:27    PT/INR - ( 12 Feb 2020 19:35 )   PT: 12.5 sec;   INR: 1.12 ratio         PTT - ( 12 Feb 2020 19:35 )  PTT:28.3 sec    Lactate, Blood: 2.7: Elevated lactate. Consider ordering follow-up lactate to trend. mmol/L (02.13.20 @ 08:29)    Creatine Kinase, Serum: 954 U/L (02.13.20 @ 03:27)    Urinalysis (02.12.20 @ 19:35)    Glucose Qualitative, Urine: Negative mg/dL    Blood, Urine: Trace    pH Urine: 5.0    Color: Yellow    Urine Appearance: Clear    Bilirubin: Negative    Ketone - Urine: Moderate    Specific Gravity: 1.020    Protein, Urine: 15 mg/dL    Urobilinogen: Negative mg/dL    Nitrite: Negative    Leukocyte Esterase Concentration: Trace    IMAGING:    < from: US Duplex Arterial Lower Ext Ltd, Left (02.13.20 @ 00:43) >  IMPRESSION:   Peroneal artery is not identified and may be occluded.  No other arterial occlusion is identified.  No tardus parvus monophasic waveforms to indicate significant inflow disease.  Probably moderate degree of peripheral arterial disease involving the anterior and posterior tibialarteries. If needed CT angiography can be obtained for further evaluation.    < end of copied text >    < from: US Duplex Venous Lower Ext Ltd, Left (02.13.20 @ 00:37) >    IMPRESSION:     No evidence of left lower extremity deep venous thrombosis.    < end of copied text >    < from: Xray Chest 1 View-PORTABLE IMMEDIATE (02.12.20 @ 20:12) >    IMPRESSION: Clear lungs.    < from: CT Lower Extremity No Cont, Left (02.13.20 @ 12:43) >  EXAM:  CT LWR EXT LT                            PROCEDURE DATE:  02/13/2020          INTERPRETATION:  CT LOWER EXTREMITY LEFT dated 2/13/2020 12:43 PM     INDICATION: Left lower extremity pain and swelling    COMPARISON: Tibia and fibula radiographs of earlier the same date    TECHNIQUE: CT imaging of the left lower extremity was performed. The data was reformatted in the axial, coronal, and sagittal planes.     FINDINGS:    OSSEOUS STRUCTURES: There is no fracture. A moderate osteochondral defect is appreciated at the medial talar dome measuring 0.8 x 0.8 cm. There is moderate medial knee joint space narrowing.  SYNOVIUM/ JOINT FLUID: No knee joint effusion. No ankle joint effusion.  TENDONS: The tendons about the ankle are preserved.  MUSCLES: Normal muscle bulk. No intramuscular hematoma.  NEUROVASCULAR STRUCTURES: Preserved  SUBCUTANEOUS SOFT TISSUES: Mild soft tissue edema about the calf. Focal skin defect along the anterior margin the mid calf measuring 0.8 cm (series 4, image 114). There is skin thickening adjacent to the skin wound. Confluent edema deep to the skin wound. Mild edema tracking along the anterior tibial myofascial plane. No drainable fluid collection.      IMPRESSION:    1.  Anterior mid calf skin defect with adjacent edema and skin thickening concerning for cellulitis. No drainable fluid collection.  2.  No CT evidence for osteomyelitis.  3.  Medial talar dome osteochondral defect. Degenerative changes of the knee.    < end of copied text >              DVT PPX:    ADVANCED DIRECTIVE:    DISPOSITION:

## 2020-02-15 NOTE — PROGRESS NOTE ADULT - SUBJECTIVE AND OBJECTIVE BOX
63 y/o female, resident of [facility], with PMHx significant for Dementia with behavioral disturbance, Mood disorder, RODY, Normal pressure Hydrocephalus with  shunt, Hypertension, Diabetes Mellitus and dementia presented to the ED today from Nursing facility with fever, "shaking" and noted changes in baseline mental status. Patient presented on tamiful, which was started on 2/11/2020. Patient denies pain, headache, dizziness, nausea, vomiting, abdominal pain, leg pain or weakness. Patient is a poor historian    Neurosurgery called for consult as pt has a  Shunt and presents with mental status changes. She has  LLE cellulitis superimposed on B/l chronic LE stasis dermatitis. Pt has a history of CSF infection in the past.  Shunt was placed by Roc Hopper set at 1.0.  Pt seen and examined, awake, alert, oriented to self only, as per nursing she has improved since admission and appears to be closer to her mental baseline.  Pt denies neck pain and is able to freely move her head and neck, she denies photophobia and does not complain about the lights in the room being on, she denies nausea and vomiting and was asking for her lunch. Pt is able to move all extremities antigravity.  She was started on antibiotics upon admission in the ED.     2/14- Patient seen and examined on floor, on 1:1. She offers no complaints, denies headache, neck stiffness, nausea/ vomiting. Afebrile today.  2/15 - Patient seen and examined, afebrile, vitals stable. baseline mentation. Denies headache nausea, CP, SOB    acetaminophen   Tablet .. 650 milliGRAM(s) Oral every 6 hours PRN  cefTRIAXone Injectable. 2000 milliGRAM(s) IV Push every 24 hours  clonazePAM  Tablet 0.25 milliGRAM(s) Oral <User Schedule>  dextrose 40% Gel 15 Gram(s) Oral once PRN  dextrose 5%. 1000 milliLiter(s) IV Continuous <Continuous>  dextrose 50% Injectable 12.5 Gram(s) IV Push once  dextrose 50% Injectable 25 Gram(s) IV Push once  dextrose 50% Injectable 25 Gram(s) IV Push once  enoxaparin Injectable 40 milliGRAM(s) SubCutaneous daily  glucagon  Injectable 1 milliGRAM(s) IntraMuscular once PRN  insulin lispro (HumaLOG) corrective regimen sliding scale   SubCutaneous three times a day before meals  insulin lispro (HumaLOG) corrective regimen sliding scale   SubCutaneous at bedtime  oseltamivir 75 milliGRAM(s) Oral daily  pantoprazole    Tablet 40 milliGRAM(s) Oral before breakfast  PARoxetine 30 milliGRAM(s) Oral daily  valproic acid 500 milliGRAM(s) Oral <User Schedule>  valproic acid 750 milliGRAM(s) Oral <User Schedule>  vancomycin  IVPB 1000 milliGRAM(s) IV Intermittent every 12 hours      Vital Signs Last 24 Hrs  T(C): 37.3 (15 Feb 2020 11:35), Max: 38 (14 Feb 2020 21:32)  T(F): 99.2 (15 Feb 2020 11:35), Max: 100.4 (14 Feb 2020 21:32)  HR: 78 (15 Feb 2020 11:35) (78 - 101)  BP: 109/54 (15 Feb 2020 11:35) (109/54 - 143/78)  BP(mean): --  RR: 18 (15 Feb 2020 11:35) (16 - 18)  SpO2: 96% (15 Feb 2020 11:35) (95% - 97%)                          11.9   6.09  )-----------( 123      ( 15 Feb 2020 08:37 )             36.5       02-15    142  |  112<H>  |  4<L>  ----------------------------<  142<H>  3.4<L>   |  25  |  0.55    Ca    8.2<L>      15 Feb 2020 08:37  Phos  2.2     02-14  Mg     2.0     02-14    TPro  5.9<L>  /  Alb  2.3<L>  /  TBili  0.4  /  DBili  x   /  AST  172<H>  /  ALT  66  /  AlkPhos  69  02-13    Cerebrospinal Fluid Cell Count-1 (02.13.20 @ 09:30)    Total Nucleated Cell Count, CSF: <1    CSF Color: No Color    CSF Appearance: Clear    CSF Segmented Neutrophils: Not Done    Culture - CSF with Gram Stain . (02.13.20 @ 09:30)    Gram Stain:   polymorphonuclear leukocytes seen  No organisms seen  by cytocentrifuge    Specimen Source: .CSF CSF    Culture Results:   No growth    CSF PCR Panel (02.13.20 @ 09:30)    CSF PCR Result: NotDetec: The meningitis/encephalitis (ME) panel (CSFPCR) is a PCR based assay that  screens for: Escherichia coli; Haemophilus influenzae; Listeria  monocytogenes; Neisseria meningitidis; Streptococcus agalactiae;  Streptococcus pneumoniae; CMV; Enterovirus; HSV-1; HSV-2; Human  herpesvirus 6; Parechovirus; VZV and Cryptococcus. Result should be  interpreted in context of clinical presentation, imaging and other lab  tests. Positive predictive value may be lower in patients with normal CSF  chemistry and cell count.      PHYSICAL EXAM:  Constitutional: awake and alert, oriented to self only   HEENT: PERRLA, EOMI, right pupil 4mm left pupil 2mm, right temporal sunken, left  Shunt depresses and fills easily   Neck: Supple, no pain with movement, no limited range of motion   Respiratory: Breath sounds are clear bilaterally  Cardiovascular: S1 and S2, regular rhythm  Gastrointestinal: soft, nontender  Extremities: +edema over bilateral lower extremities, swelling/erythema/warmth LLE>RLE  Musculoskeletal: no joint swelling/tenderness, no abnormal movements  Skin: warm and dry     Neurological exam:  HF: AOx1 (self) follows commands, speech intact, no dysarthria or aphasia   CN: PEARRL, R>L, EOMI, VFF, facial sensation normal, no NLFD, tongue midline, Palate moves equally, SCM equal bilaterally  Motor: moves all extremities 5/5, tremor noted left upper extremity with movement  Sens: Intact to light touch  Coord: No dysmetria  Gait/Balance: not tested

## 2020-02-16 LAB
ANION GAP SERPL CALC-SCNC: 7 MMOL/L — SIGNIFICANT CHANGE UP (ref 5–17)
BUN SERPL-MCNC: 5 MG/DL — LOW (ref 7–23)
CALCIUM SERPL-MCNC: 8.3 MG/DL — LOW (ref 8.5–10.1)
CHLORIDE SERPL-SCNC: 111 MMOL/L — HIGH (ref 96–108)
CK SERPL-CCNC: 311 U/L — HIGH (ref 26–192)
CO2 SERPL-SCNC: 24 MMOL/L — SIGNIFICANT CHANGE UP (ref 22–31)
CREAT SERPL-MCNC: 0.58 MG/DL — SIGNIFICANT CHANGE UP (ref 0.5–1.3)
GLUCOSE SERPL-MCNC: 152 MG/DL — HIGH (ref 70–99)
HCT VFR BLD CALC: 37 % — SIGNIFICANT CHANGE UP (ref 34.5–45)
HGB BLD-MCNC: 12.2 G/DL — SIGNIFICANT CHANGE UP (ref 11.5–15.5)
MCHC RBC-ENTMCNC: 32 PG — SIGNIFICANT CHANGE UP (ref 27–34)
MCHC RBC-ENTMCNC: 33 GM/DL — SIGNIFICANT CHANGE UP (ref 32–36)
MCV RBC AUTO: 97.1 FL — SIGNIFICANT CHANGE UP (ref 80–100)
PLATELET # BLD AUTO: 152 K/UL — SIGNIFICANT CHANGE UP (ref 150–400)
POTASSIUM SERPL-MCNC: 3.7 MMOL/L — SIGNIFICANT CHANGE UP (ref 3.5–5.3)
POTASSIUM SERPL-SCNC: 3.7 MMOL/L — SIGNIFICANT CHANGE UP (ref 3.5–5.3)
RBC # BLD: 3.81 M/UL — SIGNIFICANT CHANGE UP (ref 3.8–5.2)
RBC # FLD: 14.5 % — SIGNIFICANT CHANGE UP (ref 10.3–14.5)
SODIUM SERPL-SCNC: 142 MMOL/L — SIGNIFICANT CHANGE UP (ref 135–145)
WBC # BLD: 6.1 K/UL — SIGNIFICANT CHANGE UP (ref 3.8–10.5)
WBC # FLD AUTO: 6.1 K/UL — SIGNIFICANT CHANGE UP (ref 3.8–10.5)

## 2020-02-16 PROCEDURE — 99232 SBSQ HOSP IP/OBS MODERATE 35: CPT | Mod: GC

## 2020-02-16 RX ORDER — POTASSIUM CHLORIDE 20 MEQ
40 PACKET (EA) ORAL ONCE
Refills: 0 | Status: COMPLETED | OUTPATIENT
Start: 2020-02-16 | End: 2020-02-16

## 2020-02-16 RX ADMIN — Medication 250 MILLIGRAM(S): at 05:20

## 2020-02-16 RX ADMIN — Medication 40 MILLIEQUIVALENT(S): at 05:24

## 2020-02-16 RX ADMIN — Medication 500 MILLIGRAM(S): at 05:20

## 2020-02-16 RX ADMIN — Medication 750 MILLIGRAM(S): at 17:00

## 2020-02-16 RX ADMIN — Medication 0.25 MILLIGRAM(S): at 17:01

## 2020-02-16 RX ADMIN — Medication 1: at 11:23

## 2020-02-16 RX ADMIN — PANTOPRAZOLE SODIUM 40 MILLIGRAM(S): 20 TABLET, DELAYED RELEASE ORAL at 05:20

## 2020-02-16 RX ADMIN — Medication 30 MILLIGRAM(S): at 11:05

## 2020-02-16 RX ADMIN — Medication 75 MILLIGRAM(S): at 11:06

## 2020-02-16 RX ADMIN — ENOXAPARIN SODIUM 40 MILLIGRAM(S): 100 INJECTION SUBCUTANEOUS at 11:05

## 2020-02-16 RX ADMIN — CEFTRIAXONE 2000 MILLIGRAM(S): 500 INJECTION, POWDER, FOR SOLUTION INTRAMUSCULAR; INTRAVENOUS at 00:30

## 2020-02-16 RX ADMIN — Medication 0.25 MILLIGRAM(S): at 08:32

## 2020-02-16 RX ADMIN — Medication 250 MILLIGRAM(S): at 17:01

## 2020-02-16 NOTE — PROGRESS NOTE ADULT - SUBJECTIVE AND OBJECTIVE BOX
HPI: 65 y/o female, resident of [facility], with PMHx significant for Dementia with behavioral disturbance, Mood disorder, RODY, Normal pressure Hydrocephalus with  shunt, Hypertension, Diabetes Mellitus and dementia presented to the ED today from Nursing facility with fever, "shaking" and noted changes in baseline mental status. Patient presently on tamiful, which was started on 2/11/2020. Patient denies pain, headache, dizziness, nausea, vomiting, abdominal pain, leg pain or weakness. Patient is a poor historian.     SUBJECTIVE: Pt seen and evaluated at bedside.  Afebrile, no acute events overnight, patient with erythematous LLE resolving, patient without any pain.    MEDICATIONS  (STANDING):  cefTRIAXone Injectable. 2000 milliGRAM(s) IV Push every 24 hours  clonazePAM  Tablet 0.25 milliGRAM(s) Oral <User Schedule>  dextrose 5%. 1000 milliLiter(s) (50 mL/Hr) IV Continuous <Continuous>  dextrose 50% Injectable 12.5 Gram(s) IV Push once  dextrose 50% Injectable 25 Gram(s) IV Push once  dextrose 50% Injectable 25 Gram(s) IV Push once  enoxaparin Injectable 40 milliGRAM(s) SubCutaneous daily  insulin lispro (HumaLOG) corrective regimen sliding scale   SubCutaneous three times a day before meals  insulin lispro (HumaLOG) corrective regimen sliding scale   SubCutaneous at bedtime  oseltamivir 75 milliGRAM(s) Oral daily  pantoprazole    Tablet 40 milliGRAM(s) Oral before breakfast  PARoxetine 30 milliGRAM(s) Oral daily  valproic acid 500 milliGRAM(s) Oral <User Schedule>  valproic acid 750 milliGRAM(s) Oral <User Schedule>  vancomycin  IVPB 1000 milliGRAM(s) IV Intermittent every 12 hours    MEDICATIONS  (PRN):  acetaminophen   Tablet .. 650 milliGRAM(s) Oral every 6 hours PRN Temp greater or equal to 38C (100.4F)  dextrose 40% Gel 15 Gram(s) Oral once PRN Blood Glucose LESS THAN 70 milliGRAM(s)/deciliter  glucagon  Injectable 1 milliGRAM(s) IntraMuscular once PRN Glucose LESS THAN 70 milligrams/deciliter    Vital Signs Last 24 Hrs  T(C): 36.7 (16 Feb 2020 11:19), Max: 37.3 (15 Feb 2020 17:16)  T(F): 98.1 (16 Feb 2020 11:19), Max: 99.2 (15 Feb 2020 17:16)  HR: 76 (16 Feb 2020 11:19) (76 - 86)  BP: 121/62 (16 Feb 2020 11:19) (116/69 - 123/60)  BP(mean): --  RR: 18 (16 Feb 2020 11:19) (18 - 18)  SpO2: 97% (16 Feb 2020 11:19) (95% - 97%)    Physical Exam   Gen: NAD, comfortable  HENT: atraumatic head and ears, no gross abnormalities of ears, mucous membranes moist, no oral lesions, neck supple without masses/goiter/lymphadenopathy  CV: s1/s2, no M/R/G  Pulm: nl respiratory effort, CTAB, no wheezes/crackles/rhonchi  Abd: normoactive bowel sounds in all 4 quadrants, soft, nontender, nondistended, no rebound, no guarding, no masses  Extremities: no pedal edema, pedal pulses palpable, hypertropic skin changes noted over the anterior aspect of b/l lower extremities, likely due to venous stasis, +erythema and induration noted over the left lower extremity (improving) with ulceration over the anterior aspect granulation and scabbing indicating healing, +faint distal pulses, +no discharge noted from the wound,  Neuro: A&Ox1, +resting tremor noted of the RUE, negative Brudzinski and Kernig's       MEDICATIONS  (STANDING):  cefTRIAXone Injectable. 2000 milliGRAM(s) IV Push every 24 hours  clonazePAM  Tablet 0.25 milliGRAM(s) Oral <User Schedule>  dextrose 5%. 1000 milliLiter(s) (50 mL/Hr) IV Continuous <Continuous>  dextrose 50% Injectable 12.5 Gram(s) IV Push once  dextrose 50% Injectable 25 Gram(s) IV Push once  dextrose 50% Injectable 25 Gram(s) IV Push once  enoxaparin Injectable 40 milliGRAM(s) SubCutaneous daily  insulin lispro (HumaLOG) corrective regimen sliding scale   SubCutaneous three times a day before meals  insulin lispro (HumaLOG) corrective regimen sliding scale   SubCutaneous at bedtime  oseltamivir 75 milliGRAM(s) Oral daily  pantoprazole    Tablet 40 milliGRAM(s) Oral before breakfast  PARoxetine 30 milliGRAM(s) Oral daily  valproic acid 500 milliGRAM(s) Oral <User Schedule>  valproic acid 750 milliGRAM(s) Oral <User Schedule>  vancomycin  IVPB 1000 milliGRAM(s) IV Intermittent every 12 hours    MEDICATIONS  (PRN):  acetaminophen   Tablet .. 650 milliGRAM(s) Oral every 6 hours PRN Temp greater or equal to 38C (100.4F)  dextrose 40% Gel 15 Gram(s) Oral once PRN Blood Glucose LESS THAN 70 milliGRAM(s)/deciliter  glucagon  Injectable 1 milliGRAM(s) IntraMuscular once PRN Glucose LESS THAN 70 milligrams/deciliter    LABS: All Labs Reviewed:                                12.2   6.10  )-----------( 152      ( 16 Feb 2020 08:09 )             37.0   02-16    142  |  111<H>  |  5<L>  ----------------------------<  152<H>  3.7   |  24  |  0.58    Ca    8.3<L>      16 Feb 2020 08:09      CAPILLARY BLOOD GLUCOSE  POCT Blood Glucose.: 162 mg/dL (02.16.20 @ 11:21)  POCT Blood Glucose.: 143 mg/dL (02.16.20 @ 08:08)  POCT Blood Glucose.: 193 mg/dL (02.15.20 @ 21:28)  POCT Blood Glucose.: 156 mg/dL (02.15.20 @ 16:17)  POCT Blood Glucose.: 174: Notified RN mg/dL (02.15.20 @ 11:53)  POCT Blood Glucose.: 133 mg/dL (02.15.20 @ 08:35)  POCT Blood Glucose.: 145 mg/dL (13 Feb 2020 21:57)  POCT Blood Glucose.: 179 mg/dL (13 Feb 2020 18:21)  POCT Blood Glucose.: 159 mg/dL (13 Feb 2020 16:59)  POCT Blood Glucose.: 134 mg/dL (13 Feb 2020 11:18)  POCT Blood Glucose.: 120 mg/dL (13 Feb 2020 07:49)      PT/INR - ( 12 Feb 2020 19:35 )   PT: 12.5 sec;   INR: 1.12 ratio       PTT - ( 12 Feb 2020 19:35 )  PTT:28.3 sec    Lactate, Blood: 2.7: Elevated lactate. Consider ordering follow-up lactate to trend. mmol/L (02.13.20 @ 08:29)    Creatine Kinase, Serum: 954 U/L (02.13.20 @ 03:27)    Urinalysis (02.12.20 @ 19:35)    Glucose Qualitative, Urine: Negative mg/dL    Blood, Urine: Trace    pH Urine: 5.0    Color: Yellow    Urine Appearance: Clear    Bilirubin: Negative    Ketone - Urine: Moderate    Specific Gravity: 1.020    Protein, Urine: 15 mg/dL    Urobilinogen: Negative mg/dL    Nitrite: Negative    Leukocyte Esterase Concentration: Trace    IMAGING:    US Duplex Arterial Lower Ext Ltd, Left (02.13.20 @ 00:43) >  IMPRESSION:   Peroneal artery is not identified and may be occluded.  No other arterial occlusion is identified.  No tardus parvus monophasic waveforms to indicate significant inflow disease.  Probably moderate degree of peripheral arterial disease involving the anterior and posterior tibialarteries. If needed CT angiography can be obtained for further evaluation.      US Duplex Venous Lower Ext Ltd, Left (02.13.20 @ 00:37) >  IMPRESSION:   No evidence of left lower extremity deep venous thrombosis.        Xray Chest  IMPRESSION: Clear lungs.        CT Lower Extremity No Cont, Left (02.13.20 @ 12:43) >  IMPRESSION:  1.  Anterior mid calf skin defect with adjacent edema and skin thickening concerning for cellulitis. No drainable fluid collection.  2.  No CT evidence for osteomyelitis.  3.  Medial talar dome osteochondral defect. Degenerative changes of the knee.

## 2020-02-16 NOTE — PROGRESS NOTE ADULT - ASSESSMENT
65 y/o female, resident of [facility], with PMHx significant for Dementia with behavioral disturbance, depression, Mood disorder, RODY, Normal pressure Hydrocephalus with  shunt, Hypertension, Diabetes Mellitus and dementia admitted for severe sepsis in the setting of elevated lactate likely 2/2 lower extremity cellulitis.     Severe sepsis in the setting of elevated lactate and LLE Cellulitis  RESOLVED  - Ceftriaxone 2g IV QD day 3 (consider lowering to 1g?)  - Vancomycin 1g IV QD day 4  -hemodynamically stable   -urine, blood cultures: NGTD  -CSF culture: NGTD   -f/u LLE X-ray - as above, no evidence of Osteomyelitis   -vascular surgery eval appreciated: CT/MRI of LLE to r/o necrotizing fasciitis; no signs of compartment syndrome  -CXR neg, RVP (-)   -ID consult appreciated, continue abx  -neurosurgery consult and recommendations noted       Influenza Post Exposure Prophylaxis  - continue tamiflu x 10 days  - started on 2/11/2020      Diabetes Mellitus  -hypoglycemia Protocol  -low dose ISS  -A1c: 7..7    Mood Disorder/ RODY / Depression  -continue valproic acid   -continue clonazepam   -continue paroxetine     #Elevated LFTs  -acute hepatic panel negative   -US abdomen: negative     #Thrombocytosis   -trend     DVT ppx  -lovenox     Code status  -DNR/DNI

## 2020-02-17 PROCEDURE — 99233 SBSQ HOSP IP/OBS HIGH 50: CPT | Mod: GC

## 2020-02-17 RX ORDER — CEFTRIAXONE 500 MG/1
1000 INJECTION, POWDER, FOR SOLUTION INTRAMUSCULAR; INTRAVENOUS EVERY 24 HOURS
Refills: 0 | Status: DISCONTINUED | OUTPATIENT
Start: 2020-02-17 | End: 2020-02-18

## 2020-02-17 RX ADMIN — Medication 750 MILLIGRAM(S): at 19:06

## 2020-02-17 RX ADMIN — Medication 250 MILLIGRAM(S): at 05:54

## 2020-02-17 RX ADMIN — Medication 500 MILLIGRAM(S): at 05:55

## 2020-02-17 RX ADMIN — PANTOPRAZOLE SODIUM 40 MILLIGRAM(S): 20 TABLET, DELAYED RELEASE ORAL at 05:55

## 2020-02-17 RX ADMIN — CEFTRIAXONE 1000 MILLIGRAM(S): 500 INJECTION, POWDER, FOR SOLUTION INTRAMUSCULAR; INTRAVENOUS at 20:45

## 2020-02-17 RX ADMIN — ENOXAPARIN SODIUM 40 MILLIGRAM(S): 100 INJECTION SUBCUTANEOUS at 11:43

## 2020-02-17 RX ADMIN — Medication 75 MILLIGRAM(S): at 11:43

## 2020-02-17 RX ADMIN — CEFTRIAXONE 2000 MILLIGRAM(S): 500 INJECTION, POWDER, FOR SOLUTION INTRAMUSCULAR; INTRAVENOUS at 00:17

## 2020-02-17 RX ADMIN — Medication 0.25 MILLIGRAM(S): at 09:23

## 2020-02-17 RX ADMIN — Medication 30 MILLIGRAM(S): at 11:43

## 2020-02-17 RX ADMIN — Medication 250 MILLIGRAM(S): at 17:10

## 2020-02-17 RX ADMIN — Medication 0.25 MILLIGRAM(S): at 19:08

## 2020-02-17 NOTE — PROGRESS NOTE ADULT - SUBJECTIVE AND OBJECTIVE BOX
HPI: 63 y/o female, resident of [facility], with PMHx significant for Dementia with behavioral disturbance, Mood disorder, RODY, Normal pressure Hydrocephalus with  shunt, Hypertension, Diabetes Mellitus and dementia presented to the ED today from Nursing facility with fever, "shaking" and noted changes in baseline mental status. Patient presently on tamiful, which was started on 2/11/2020. Patient denies pain, headache, dizziness, nausea, vomiting, abdominal pain, leg pain or weakness. Patient is a poor historian.     SUBJECTIVE: Pt seen and evaluated at bedside.  Afebrile, no acute events overnight, patient with erythematous LLE resolving, patient without any pain.    Review of Symptoms  unable to obtain due to patients Hx of dementia     Vital Signs Last 24 Hrs  T(C): 37.3 (17 Feb 2020 05:34), Max: 37.3 (16 Feb 2020 18:03)  T(F): 99.2 (17 Feb 2020 05:34), Max: 99.2 (17 Feb 2020 05:34)  HR: 63 (17 Feb 2020 05:34) (62 - 76)  BP: 137/69 (17 Feb 2020 05:34) (121/62 - 153/68)  RR: 18 (17 Feb 2020 05:34) (18 - 18)  SpO2: 94% (17 Feb 2020 05:34) (94% - 98%)    Physical Exam   Gen: NAD, comfortable  HENT: atraumatic head and ears, no gross abnormalities of ears, mucous membranes moist, no oral lesions, neck supple without masses/goiter/lymphadenopathy  CV: s1/s2, no M/R/G  Pulm: nl respiratory effort, CTAB, no wheezes/crackles/rhonchi  Abd: normoactive bowel sounds in all 4 quadrants, soft, nontender, nondistended, no rebound, no guarding, no masses  Extremities: no pedal edema, pedal pulses palpable, hypertropic skin changes noted over the anterior aspect of b/l lower extremities, likely due to venous stasis, +erythema and induration noted over the left lower extremity (improving) with ulceration over the anterior aspect granulation and scabbing indicating healing, +faint distal pulses, +no discharge noted from the wound,  Neuro: A&Ox1, +resting tremor noted of the RUE, negative Brudzinski and Kernig's     MEDICATIONS  (STANDING):  cefTRIAXone Injectable. 2000 milliGRAM(s) IV Push every 24 hours  clonazePAM  Tablet 0.25 milliGRAM(s) Oral <User Schedule>  dextrose 5%. 1000 milliLiter(s) (50 mL/Hr) IV Continuous <Continuous>  dextrose 50% Injectable 12.5 Gram(s) IV Push once  dextrose 50% Injectable 25 Gram(s) IV Push once  dextrose 50% Injectable 25 Gram(s) IV Push once  enoxaparin Injectable 40 milliGRAM(s) SubCutaneous daily  insulin lispro (HumaLOG) corrective regimen sliding scale   SubCutaneous three times a day before meals  insulin lispro (HumaLOG) corrective regimen sliding scale   SubCutaneous at bedtime  oseltamivir 75 milliGRAM(s) Oral daily  pantoprazole    Tablet 40 milliGRAM(s) Oral before breakfast  PARoxetine 30 milliGRAM(s) Oral daily  valproic acid 500 milliGRAM(s) Oral <User Schedule>  valproic acid 750 milliGRAM(s) Oral <User Schedule>  vancomycin  IVPB 1000 milliGRAM(s) IV Intermittent every 12 hours    MEDICATIONS  (PRN):  acetaminophen   Tablet .. 650 milliGRAM(s) Oral every 6 hours PRN Temp greater or equal to 38C (100.4F)  dextrose 40% Gel 15 Gram(s) Oral once PRN Blood Glucose LESS THAN 70 milliGRAM(s)/deciliter  glucagon  Injectable 1 milliGRAM(s) IntraMuscular once PRN Glucose LESS THAN 70 milligrams/deciliter      LABS: All Labs Reviewed:                        12.2   6.10  )-----------( 152      ( 16 Feb 2020 08:09 )             37.0   02-16    142  |  111<H>  |  5<L>  ----------------------------<  152<H>  3.7   |  24  |  0.58    Ca    8.3<L>      16 Feb 2020 08:09        PT/INR - ( 12 Feb 2020 19:35 )   PT: 12.5 sec;   INR: 1.12 ratio       PTT - ( 12 Feb 2020 19:35 )  PTT:28.3 sec    Lactate, Blood: 2.7: Elevated lactate. Consider ordering follow-up lactate to trend. mmol/L (02.13.20 @ 08:29)    Creatine Kinase, Serum: 954 U/L (02.13.20 @ 03:27)    CAPILLARY BLOOD GLUCOSE      POCT Blood Glucose.: 147 mg/dL (17 Feb 2020 08:12)  POCT Blood Glucose.: 149 mg/dL (16 Feb 2020 20:50)  POCT Blood Glucose.: 133 mg/dL (16 Feb 2020 16:39)  POCT Blood Glucose.: 162 mg/dL (16 Feb 2020 11:21)        Urinalysis (02.12.20 @ 19:35)    Glucose Qualitative, Urine: Negative mg/dL    Blood, Urine: Trace    pH Urine: 5.0    Color: Yellow    Urine Appearance: Clear    Bilirubin: Negative    Ketone - Urine: Moderate    Specific Gravity: 1.020    Protein, Urine: 15 mg/dL    Urobilinogen: Negative mg/dL    Nitrite: Negative    Leukocyte Esterase Concentration: Trace    IMAGING:    US Duplex Arterial Lower Ext Ltd, Left (02.13.20 @ 00:43) >  IMPRESSION:   Peroneal artery is not identified and may be occluded.  No other arterial occlusion is identified.  No tardus parvus monophasic waveforms to indicate significant inflow disease.  Probably moderate degree of peripheral arterial disease involving the anterior and posterior tibialarteries. If needed CT angiography can be obtained for further evaluation.      US Duplex Venous Lower Ext Ltd, Left (02.13.20 @ 00:37) >  IMPRESSION:   No evidence of left lower extremity deep venous thrombosis.        Xray Chest  IMPRESSION: Clear lungs.        CT Lower Extremity No Cont, Left (02.13.20 @ 12:43) >  IMPRESSION:  1.  Anterior mid calf skin defect with adjacent edema and skin thickening concerning for cellulitis. No drainable fluid collection.  2.  No CT evidence for osteomyelitis.  3.  Medial talar dome osteochondral defect. Degenerative changes of the knee.

## 2020-02-17 NOTE — PROGRESS NOTE ADULT - ASSESSMENT
63 y/o female with h/o NPH s/p  shunt, prior  shunt infection with GNR, dementia with behavioral disturbance, obesity, Hypertension, Diabetes Mellitus, pedal edema with chronic stasis LE dermatitis was admitted on 2/12 for fever and chills. She is a resident at [facility] where he was noted with high fever and shaking chills for 1-2 hours associated with increased confusion and probable HA. Patient is a poor historian due to her baseline confusion. In ER she received vancomycin IV, meropenem and clindamycin.     1. Febrile syndrome improving. Probable sepsis. Doubt  shunt infection. LLE cellulitis superimposed on B/l chronic LE stasis dermatitis.   -encephalopathy is improving  -BC x 2 are negative to date   -CSF fluid analysis shows no cells; doubt meningitis  -on vancomycin 1 gm IV q12h and ceftriaxone change to [id number] # 5  -tolerating abx well so far; no side effects noted  -obtain vancomycin trough level   -continue abx coverage  -switch to po doxy complete 7-10 day course  -elevate legs  -local wound care  -monitor temps  -f/u CBC  -supportive care  2. Other issues:   -care per medicine

## 2020-02-17 NOTE — PROGRESS NOTE ADULT - SUBJECTIVE AND OBJECTIVE BOX
Date of service: 02-17-20 @ 12:54    Pt seen and examined  Lying in bed in NAD  Temps down, feeling better    ROS: limited; denies pain    MEDICATIONS  (STANDING):  cefTRIAXone Injectable. 1000 milliGRAM(s) IV Push every 24 hours  clonazePAM  Tablet 0.25 milliGRAM(s) Oral <User Schedule>  dextrose 5%. 1000 milliLiter(s) (50 mL/Hr) IV Continuous <Continuous>  dextrose 50% Injectable 12.5 Gram(s) IV Push once  dextrose 50% Injectable 25 Gram(s) IV Push once  dextrose 50% Injectable 25 Gram(s) IV Push once  enoxaparin Injectable 40 milliGRAM(s) SubCutaneous daily  insulin lispro (HumaLOG) corrective regimen sliding scale   SubCutaneous three times a day before meals  insulin lispro (HumaLOG) corrective regimen sliding scale   SubCutaneous at bedtime  oseltamivir 75 milliGRAM(s) Oral daily  pantoprazole    Tablet 40 milliGRAM(s) Oral before breakfast  PARoxetine 30 milliGRAM(s) Oral daily  valproic acid 500 milliGRAM(s) Oral <User Schedule>  valproic acid 750 milliGRAM(s) Oral <User Schedule>  vancomycin  IVPB 1000 milliGRAM(s) IV Intermittent every 12 hours    Vital Signs Last 24 Hrs  T(C): 37.1 (17 Feb 2020 11:19), Max: 37.3 (16 Feb 2020 18:03)  T(F): 98.8 (17 Feb 2020 11:19), Max: 99.2 (17 Feb 2020 05:34)  HR: 75 (17 Feb 2020 11:19) (62 - 75)  BP: 156/71 (17 Feb 2020 11:19) (137/69 - 156/71)  BP(mean): --  RR: 18 (17 Feb 2020 11:19) (18 - 18)  SpO2: 98% (17 Feb 2020 11:19) (94% - 98%)      Physical Exam:  Constitutional:  No acute distress  HEENT: NC/AT, EOMI, PERRLA, conjunctivae clear  Neck: supple; thyroid not palpable  Back: no tenderness  Respiratory: respiratory effort normal; decreased BS at bases  Cardiovascular: S1S2 regular, no murmurs  Abdomen: soft, not tender, not distended, positive BS  Genitourinary: no suprapubic tenderness  Lymphatic: no LN palpable  Musculoskeletal: no muscle tenderness, no joint swelling or tenderness  Extremities: 2+ pedal edema  LLE mild erythema, edema and warmth; small anterior shin dry ulcer  Neurological/ Psychiatric: confused, moving all extremities  Skin: no rashes; no palpable lesions    Labs: reviewed                                   12.2   6.10  )-----------( 152      ( 16 Feb 2020 08:09 )             37.0     02-16    142  |  111<H>  |  5<L>  ----------------------------<  152<H>  3.7   |  24  |  0.58    Ca    8.3<L>      16 Feb 2020 08:09      Culture - CSF with Gram Stain (collected 13 Feb 2020 09:30)  Source: .CSF CSF  Gram Stain (13 Feb 2020 17:04):    polymorphonuclear leukocytes seen    No organisms seen    by cytocentrifuge    Culture - Urine (collected 12 Feb 2020 19:35)  Source: .Urine None  Final Report (14 Feb 2020 04:32):    No growth    Culture - Blood (collected 12 Feb 2020 19:35)  Source: .Blood None  Preliminary Report (14 Feb 2020 03:02):    No growth to date.    Culture - Blood (collected 12 Feb 2020 19:35)  Source: .Blood None  Preliminary Report (14 Feb 2020 03:02):    No growth to date.    CSF PCR Panel (02.13.20 @ 09:30)    CSF PCR Result: NotDetec: The meningitis/encephalitis (ME) panel (CSFPCR) is a PCR based assay that  screens for: Escherichia coli; Haemophilus influenzae; Listeria  monocytogenes; Neisseria meningitidis; Streptococcus agalactiae;  Streptococcus pneumoniae; CMV; Enterovirus; HSV-1; HSV-2; Human  herpesvirus 6; Parechovirus; VZV and Cryptococcus. Result should be  interpreted in context of clinical presentation, imaging and other lab  tests. Positive predictive value may be lower in patients with normal CSF  chemistry and cell count.    Radiology: all available radiological tests reviewed    < from: Xray Chest 1 View-PORTABLE IMMEDIATE (02.12.20 @ 20:12) >  Clear lungs.    < end of copied text >      Advanced directives addressed: full resuscitation

## 2020-02-17 NOTE — PROGRESS NOTE ADULT - ASSESSMENT
63 y/o female, resident of [facility], with PMHx significant for Dementia with behavioral disturbance, depression, Mood disorder, RODY, Normal pressure Hydrocephalus with  shunt, Hypertension, Diabetes Mellitus and dementia admitted for severe sepsis in the setting of elevated lactate likely 2/2 lower extremity cellulitis.     Severe sepsis in the setting of elevated lactate and LLE Cellulitis  RESOLVED  - Ceftriaxone 2g IV QD day 3 (consider lowering to 1g?)  - Vancomycin 1g IV QD day 5  -hemodynamically stable   -urine, blood cultures: NGTD  -CSF culture: NGTD   -f/u LLE X-ray - as above, no evidence of Osteomyelitis   -vascular surgery eval appreciated: CT/MRI of LLE to r/o necrotizing fasciitis; no signs of compartment syndrome  -CXR neg, RVP (-)   -ID consult appreciated, continue abx  -neurosurgery consult and recommendations noted       Influenza Post Exposure Prophylaxis  - continue tamiflu x 9 days  - started on 2/11/2020      Diabetes Mellitus  -hypoglycemia Protocol  -low dose ISS  -A1c: 7..7    Mood Disorder/ RODY / Depression  -continue valproic acid   -continue clonazepam   -continue paroxetine     #Elevated LFTs  -acute hepatic panel negative   -US abdomen: negative     #Thrombocytosis   -trend     DVT ppx  -lovenox     Code status  -DNR/DNI 63 y/o female, resident of [facility], with PMHx significant for Dementia with behavioral disturbance, depression, Mood disorder, RODY, Normal pressure Hydrocephalus with  shunt, Hypertension, Diabetes Mellitus and dementia admitted for severe sepsis in the setting of elevated lactate likely 2/2 lower extremity cellulitis.     Severe sepsis in the setting of elevated lactate and LLE Cellulitis  RESOLVED  -Ceftriaxone 2g IV QD day 5 (consider lowering to 1g?)  -Vancomycin 1g IV QD day 5  -will discharge on Doxycycline   -hemodynamically stable   -urine, blood cultures: NGTD  -CSF culture: NGTD   -f/u LLE X-ray - as above, no evidence of Osteomyelitis   -vascular surgery eval appreciated: CT/MRI of LLE to r/o necrotizing fasciitis; no signs of compartment syndrome  -CXR neg, RVP (-)   -ID consult appreciated, continue abx  -neurosurgery consult and recommendations noted       Influenza Post Exposure Prophylaxis  - continue tamiflu x 9 days  - started on 2/11/2020      Diabetes Mellitus  -hypoglycemia Protocol  -low dose ISS  -A1c: 7..7    Mood Disorder/ RODY / Depression  -continue valproic acid   -continue clonazepam   -continue paroxetine     #Elevated LFTs  -acute hepatic panel negative   -US abdomen: negative     #Thrombocytosis   -trend     DVT ppx  -lovenox     Code status  -DNR/DNI    #Dispo planning tomorrow, discussed with CAMILO. Plan to discharged on Doxy to complete 10 day course

## 2020-02-17 NOTE — PROGRESS NOTE ADULT - ATTENDING COMMENTS
Patient seen and examined with Family Medicine Residents Drs. Edward Kayserian and Diamante Gee the Family Medicine Teaching Service.  Agree with history, physical, labs and plan which were reviewed in detail after a face to face encounter with the patient.
Patient seen and examined with Family Medicine Residents Drs. Edward Kayserian, Jesus Gomez and Diamante Gee the Family Medicine Teaching Service.  Agree with history, physical, labs and plan which were reviewed in detail after a face to face encounter with the patient.
Patient seen and examined with Family Medicine Residents Pastora Vickers and Jesus Gomez on the Family Medicine Teaching Service.  Agree with history, physical, labs and plan which were reviewed in detail after a face to face encounter with the patient.

## 2020-02-18 ENCOUNTER — TRANSCRIPTION ENCOUNTER (OUTPATIENT)
Age: 65
End: 2020-02-18

## 2020-02-18 VITALS
TEMPERATURE: 97 F | OXYGEN SATURATION: 97 % | HEART RATE: 70 BPM | SYSTOLIC BLOOD PRESSURE: 141 MMHG | DIASTOLIC BLOOD PRESSURE: 63 MMHG | RESPIRATION RATE: 18 BRPM

## 2020-02-18 LAB
CULTURE RESULTS: SIGNIFICANT CHANGE UP
CULTURE RESULTS: SIGNIFICANT CHANGE UP
SPECIMEN SOURCE: SIGNIFICANT CHANGE UP
SPECIMEN SOURCE: SIGNIFICANT CHANGE UP

## 2020-02-18 PROCEDURE — 99239 HOSP IP/OBS DSCHRG MGMT >30: CPT

## 2020-02-18 RX ADMIN — ENOXAPARIN SODIUM 40 MILLIGRAM(S): 100 INJECTION SUBCUTANEOUS at 11:30

## 2020-02-18 RX ADMIN — Medication 30 MILLIGRAM(S): at 11:30

## 2020-02-18 RX ADMIN — Medication 0.25 MILLIGRAM(S): at 08:55

## 2020-02-18 RX ADMIN — PANTOPRAZOLE SODIUM 40 MILLIGRAM(S): 20 TABLET, DELAYED RELEASE ORAL at 06:15

## 2020-02-18 RX ADMIN — Medication 250 MILLIGRAM(S): at 06:15

## 2020-02-18 RX ADMIN — Medication 75 MILLIGRAM(S): at 11:29

## 2020-02-18 RX ADMIN — Medication 500 MILLIGRAM(S): at 06:15

## 2020-02-18 NOTE — DISCHARGE NOTE PROVIDER - NSDCCPCAREPLAN_GEN_ALL_CORE_FT
PRINCIPAL DISCHARGE DIAGNOSIS  Diagnosis: Cellulitis  Assessment and Plan of Treatment: You were diagnosed with infection of the skin of the left lower leg. You were treated with with IV antiobiotics during your hospital stay and now given prescription for oral antibiotics Doxycycline which you will take as precribed.   You should avoid going on in sun. You should further follow-up with your PCP soon after discharge.   If you develop any high fever, chills, notice redness or discharge from your site of infection, you should seek inmediate medical attention.

## 2020-02-18 NOTE — PROGRESS NOTE ADULT - REASON FOR ADMISSION
Fever, change from baseline mental status

## 2020-02-18 NOTE — DISCHARGE NOTE PROVIDER - CARE PROVIDER_API CALL
Kaylyn Orozco  [address] [place] [address] Edita Robertson, [place] [address]  Phone: [phone]  Fax: (   )    -  Follow Up Time:

## 2020-02-18 NOTE — DISCHARGE NOTE PROVIDER - HOSPITAL COURSE
65 y/o female, resident of [facility], with PMHx significant for Dementia with behavioral disturbance, Mood disorder, RODY, Normal pressure Hydrocephalus with  shunt, Hypertension, Diabetes Mellitus and dementia presented to the ED today from Nursing facility with fever, "shaking" and noted changes in baseline mental status. Patient was admitted to [facility] for left lower extremity cellulitis. Patient was initially monitored in ICU as patient presented with high fever , elevated CPK and elevated lactate levels but was never initiated on vasopressors. Patient was started on broad spectrum antibiotics. Patient symptoms improved and was downgraded. Patient has Hx of contamination of  shunt. CSF fluid was obtained from the  shunt and was analyzed and was negative for any pathology. During the course of hospitalization, patient's syptoms improved. At this time, patient is medically optimized to be discharged on doxycycline.         Vital Signs Last 24 Hrs    T(C): 36.9 (18 Feb 2020 05:30), Max: 37.4 (17 Feb 2020 16:44)    T(F): 98.4 (18 Feb 2020 05:30), Max: 99.4 (17 Feb 2020 16:44)    HR: 61 (18 Feb 2020 05:30) (61 - 75)    BP: 132/57 (18 Feb 2020 05:30) (132/57 - 156/71)    RR: 18 (18 Feb 2020 05:30) (18 - 19)    SpO2: 96% (18 Feb 2020 05:30) (96% - 98%)            Physical Exam     Gen: NAD, comfortable    HEENT: atraumatic head and ears, no gross abnormalities of ears, mucous membranes moist, no oral lesions, neck supple without masses/goiter/lymphadenopathy    CV: s1/s2, no M/R/G    Pulm: nl respiratory effort, CTAB, no wheezes/crackles/rhonchi    Abd: normoactive bowel sounds in all 4 quadrants, soft, nontender, nondistended, no rebound, no guarding, no masses    Extremities: no pedal edema, pedal pulses palpable, hypertropic skin changes noted over the anterior aspect of b/l lower extremities, likely due to venous stasis, +erythema and induration noted over the left lower extremity (improving) with ulceration over the anterior aspect granulation and scabbing indicating healing, +faint distal pulses, +no discharge noted from the wound,    Neuro: A&Ox1, +resting tremor noted of the RUE, negative Esauki and Lata's 63 y/o female, resident of [facility], with PMHx significant for Dementia with behavioral disturbance, Mood disorder, RODY, Normal pressure Hydrocephalus with  shunt, Hypertension, Diabetes Mellitus and dementia presented to the ED today from Nursing facility with fever, "shaking" and noted changes in baseline mental status. Patient was admitted to [facility] for left lower extremity cellulitis. Patient was initially monitored in ICU as patient presented with high fever , elevated CPK and elevated lactate levels but was never initiated on vasopressors. Patient was started on broad spectrum antibiotics and was downgraded the same day. CT of the leg was suggestive of cellulitis, there was no evidence of necrotizing fascitis or CT evidence of osteomyelitis. Patient has  had history of contamination of  shunt. CSF fluid was obtained from the  shunt and was analyzed and was negative for any pathology. During the course of hospitalization, patient's syptoms improved. At this time, patient is medically optimized to be discharged on doxycycline.         Vital Signs Last 24 Hrs    T(C): 36.9 (18 Feb 2020 05:30), Max: 37.4 (17 Feb 2020 16:44)    T(F): 98.4 (18 Feb 2020 05:30), Max: 99.4 (17 Feb 2020 16:44)    HR: 61 (18 Feb 2020 05:30) (61 - 75)    BP: 132/57 (18 Feb 2020 05:30) (132/57 - 156/71)    RR: 18 (18 Feb 2020 05:30) (18 - 19)    SpO2: 96% (18 Feb 2020 05:30) (96% - 98%)            Physical Exam     Gen: NAD, comfortable    HEENT: atraumatic head and ears, no gross abnormalities of ears, mucous membranes moist, no oral lesions, neck supple without masses/goiter/lymphadenopathy    CV: s1/s2, no M/R/G    Pulm: nl respiratory effort, CTAB, no wheezes/crackles/rhonchi    Abd: normoactive bowel sounds in all 4 quadrants, soft, nontender, nondistended, no rebound, no guarding, no masses    Extremities: no pedal edema, pedal pulses palpable, hypertropic skin changes noted over the anterior aspect of b/l lower extremities, likely due to venous stasis, +erythema and induration noted over the left lower extremity (improving) with ulceration over the anterior aspect granulation and scabbing indicating healing, +faint distal pulses, +no discharge noted from the wound,    Neuro: A&Ox1, +resting tremor noted of the RUE, negative Brudzinski and Kernig's         < from: CT Head No Cont (02.13.20 @ 12:43) >        IMPRESSION:   unchanged LEFT frontal shunt catheter with tip in body of LEFT lateral ventricle. Moderate periventricular white matter ischemia is noted with gliosis and encephalomalacia in the BILATERAL frontal and RIGHT temporal lobes as well as the BILATERAL parietal lobes. Slight decrease in the size of the BILATERAL ventricles.        < from: CT Lower Extremity No Cont, Left (02.13.20 @ 12:43) >        IMPRESSION:        1.  Anterior mid calf skin defect with adjacent edema and skin thickening concerning for cellulitis. No drainable fluid collection.    2.  No CT evidence for osteomyelitis.    3.  Medial talar dome osteochondral defect. Degenerative changes of the knee.        < from: US Abdomen Complete (02.14.20 @ 14:12) >        IMPRESSION:         Heterogeneous, fatty liver.        < end of copied text >        < from: US Duplex Arterial Lower Ext Ltd, Left (02.13.20 @ 00:43) >        IMPRESSION:     Peroneal artery is not identified and may be occluded.    No other arterial occlusion is identified.    No tardus parvus monophasic waveforms to indicate significant inflow disease.    Probably moderate degree of peripheral arterial disease involving the anterior and posterior tibialarteries. If needed CT angiography can be obtained for further evaluation.        < end of copied text >        < from: US Duplex Venous Lower Ext Ltd, Left (02.13.20 @ 00:37) >        IMPRESSION:         No evidence of left lower extremity deep venous thrombosis.        < end of copied text >

## 2020-02-18 NOTE — DISCHARGE NOTE PROVIDER - NSDCCAREPROVSEEN_GEN_ALL_CORE_FT
Lynn, Topher Gomez, Jesus Thurman, Shaquille Martinez, Oumar Lynn, Topher Gomez, Pulkit Kayserian, Edward

## 2020-02-18 NOTE — PROGRESS NOTE ADULT - ASSESSMENT
63 y/o female with h/o NPH s/p  shunt, prior  shunt infection with GNR, dementia with behavioral disturbance, obesity, Hypertension, Diabetes Mellitus, pedal edema with chronic stasis LE dermatitis was admitted on 2/12 for fever and chills. She is a [place] [facility] [place] was noted with high fever and shaking chills for 1-2 hours associated with increased confusion and probable HA. Patient is a poor historian due to her baseline confusion. In ER she received vancomycin IV, meropenem and clindamycin.     1. Febrile syndrome improving. Probable sepsis. Doubt  shunt infection. LLE cellulitis superimposed on B/l chronic LE stasis dermatitis.   -encephalopathy is improving, cellulitis resolving   -BC x 2 are negative to date   -CSF fluid analysis shows no cells; doubt meningitis  -s/p vancomycin 1 gm IV q12h and ceftriaxone 6atn80l # 5  -tolerating abx well so far; no side effects noted  -continue abx coverage  -switch to po doxy complete 7-10 day course  -elevate legs  -local wound care  -monitor temps  -f/u CBC  -supportive care  2. Other issues:   -care per medicine

## 2020-02-18 NOTE — PROGRESS NOTE ADULT - SUBJECTIVE AND OBJECTIVE BOX
HPI: 63 y/o female, resident of [facility], with PMHx significant for Dementia with behavioral disturbance, Mood disorder, RODY, Normal pressure Hydrocephalus with  shunt, Hypertension, Diabetes Mellitus and dementia presented to the ED today from Nursing facility with fever, "shaking" and noted changes in baseline mental status. Patient presently on tamiful, which was started on 2/11/2020. Patient denies pain, headache, dizziness, nausea, vomiting, abdominal pain, leg pain or weakness. Patient is a poor historian.     SUBJECTIVE: Pt seen and evaluated at bedside.  Afebrile, no acute events overnight, patient with erythematous LLE resolving, patient without any pain. DC to [facility] today.    Review of Symptoms  unable to obtain due to patients Hx of dementia     Vital Signs Last 24 Hrs  T(C): 36.9 (18 Feb 2020 05:30), Max: 37.4 (17 Feb 2020 16:44)  T(F): 98.4 (18 Feb 2020 05:30), Max: 99.4 (17 Feb 2020 16:44)  HR: 61 (18 Feb 2020 05:30) (61 - 75)  BP: 132/57 (18 Feb 2020 05:30) (132/57 - 156/71)  RR: 18 (18 Feb 2020 05:30) (18 - 19)  SpO2: 96% (18 Feb 2020 05:30) (96% - 98%)      Physical Exam   Gen: NAD, comfortable  HEENT: atraumatic head and ears, no gross abnormalities of ears, mucous membranes moist, no oral lesions, neck supple without masses/goiter/lymphadenopathy  CV: s1/s2, no M/R/G  Pulm: nl respiratory effort, CTAB, no wheezes/crackles/rhonchi  Abd: normoactive bowel sounds in all 4 quadrants, soft, nontender, nondistended, no rebound, no guarding, no masses  Extremities: no pedal edema, pedal pulses palpable, hypertropic skin changes noted over the anterior aspect of b/l lower extremities, likely due to venous stasis, +erythema and induration noted over the left lower extremity (improving) with ulceration over the anterior aspect granulation and scabbing indicating healing, +faint distal pulses, +no discharge noted from the wound,  Neuro: A&Ox1, +resting tremor noted of the RUE, negative Brudzinski and Lata's     MEDICATIONS  (STANDING):  clonazePAM  Tablet 0.25 milliGRAM(s) Oral <User Schedule>  dextrose 5%. 1000 milliLiter(s) (50 mL/Hr) IV Continuous <Continuous>  dextrose 50% Injectable 12.5 Gram(s) IV Push once  dextrose 50% Injectable 25 Gram(s) IV Push once  dextrose 50% Injectable 25 Gram(s) IV Push once  doxycycline hyclate Capsule 100 milliGRAM(s) Oral every 12 hours  enoxaparin Injectable 40 milliGRAM(s) SubCutaneous daily  insulin lispro (HumaLOG) corrective regimen sliding scale   SubCutaneous three times a day before meals  insulin lispro (HumaLOG) corrective regimen sliding scale   SubCutaneous at bedtime  oseltamivir 75 milliGRAM(s) Oral daily  pantoprazole    Tablet 40 milliGRAM(s) Oral before breakfast  PARoxetine 30 milliGRAM(s) Oral daily  valproic acid 500 milliGRAM(s) Oral <User Schedule>  valproic acid 750 milliGRAM(s) Oral <User Schedule>  vancomycin  IVPB 1000 milliGRAM(s) IV Intermittent every 12 hours    MEDICATIONS  (PRN):  acetaminophen   Tablet .. 650 milliGRAM(s) Oral every 6 hours PRN Temp greater or equal to 38C (100.4F)  dextrose 40% Gel 15 Gram(s) Oral once PRN Blood Glucose LESS THAN 70 milliGRAM(s)/deciliter  glucagon  Injectable 1 milliGRAM(s) IntraMuscular once PRN Glucose LESS THAN 70 milligrams/deciliter      LABS: All Labs Reviewed:                        12.2   6.10  )-----------( 152      ( 16 Feb 2020 08:09 )             37.0   02-16    142  |  111<H>  |  5<L>  ----------------------------<  152<H>  3.7   |  24  |  0.58    Ca    8.3<L>      16 Feb 2020 08:09        PT/INR - ( 12 Feb 2020 19:35 )   PT: 12.5 sec;   INR: 1.12 ratio       PTT - ( 12 Feb 2020 19:35 )  PTT:28.3 sec    Lactate, Blood: 2.7: Elevated lactate. Consider ordering follow-up lactate to trend. mmol/L (02.13.20 @ 08:29)    Creatine Kinase, Serum: 954 U/L (02.13.20 @ 03:27)    CAPILLARY BLOOD GLUCOSE      POCT Blood Glucose.: 147 mg/dL (17 Feb 2020 08:12)  POCT Blood Glucose.: 149 mg/dL (16 Feb 2020 20:50)  POCT Blood Glucose.: 133 mg/dL (16 Feb 2020 16:39)  POCT Blood Glucose.: 162 mg/dL (16 Feb 2020 11:21)        Urinalysis (02.12.20 @ 19:35)    Glucose Qualitative, Urine: Negative mg/dL    Blood, Urine: Trace    pH Urine: 5.0    Color: Yellow    Urine Appearance: Clear    Bilirubin: Negative    Ketone - Urine: Moderate    Specific Gravity: 1.020    Protein, Urine: 15 mg/dL    Urobilinogen: Negative mg/dL    Nitrite: Negative    Leukocyte Esterase Concentration: Trace    IMAGING:    US Duplex Arterial Lower Ext Ltd, Left (02.13.20 @ 00:43) >  IMPRESSION:   Peroneal artery is not identified and may be occluded.  No other arterial occlusion is identified.  No tardus parvus monophasic waveforms to indicate significant inflow disease.  Probably moderate degree of peripheral arterial disease involving the anterior and posterior tibialarteries. If needed CT angiography can be obtained for further evaluation.      US Duplex Venous Lower Ext Ltd, Left (02.13.20 @ 00:37) >  IMPRESSION:   No evidence of left lower extremity deep venous thrombosis.        Xray Chest  IMPRESSION: Clear lungs.        CT Lower Extremity No Cont, Left (02.13.20 @ 12:43) >  IMPRESSION:  1.  Anterior mid calf skin defect with adjacent edema and skin thickening concerning for cellulitis. No drainable fluid collection.  2.  No CT evidence for osteomyelitis.  3.  Medial talar dome osteochondral defect. Degenerative changes of the knee.

## 2020-02-18 NOTE — PROGRESS NOTE ADULT - ASSESSMENT
65 y/o female, resident of [facility], with PMHx significant for Dementia with behavioral disturbance, depression, Mood disorder, RODY, Normal pressure Hydrocephalus with  shunt, Hypertension, Diabetes Mellitus and dementia admitted for severe sepsis in the setting of elevated lactate likely 2/2 lower extremity cellulitis.     Severe sepsis in the setting of elevated lactate and LLE Cellulitis  RESOLVED  -Ceftriaxone 2g IV QD d/c'd  -Vancomycin 1g IV QD d/c'd  -will discharge on Doxycycline today for 7-10 day course   -hemodynamically stable   -urine, blood cultures: NGTD  -CSF culture: NGTD   -f/u LLE X-ray - as above, no evidence of Osteomyelitis   -vascular surgery eval appreciated: CT/MRI of LLE to r/o necrotizing fasciitis; no signs of compartment syndrome  -CXR neg, RVP (-)   -ID consult appreciated, switch to PO doxy  -neurosurgery consult and recommendations noted       Influenza Post Exposure Prophylaxis  - continue tamiflu x 9 days  - started on 2/11/2020      Diabetes Mellitus  -hypoglycemia Protocol  -low dose ISS  -A1c: 7..7    Mood Disorder/ RODY / Depression  -continue valproic acid   -continue clonazepam   -continue paroxetine     #Elevated LFTs  -acute hepatic panel negative   -US abdomen: negative     #Thrombocytosis   -trend     DVT ppx  -lovenox     Code status  -DNR/DNI    #Dispo planning tomorrow, discussed with CAMILO. Plan to discharged on Doxy to complete 10 day course

## 2020-02-18 NOTE — DISCHARGE NOTE NURSING/CASE MANAGEMENT/SOCIAL WORK - PATIENT PORTAL LINK FT
You can access the FollowMyHealth Patient Portal offered by [facility] by registering at the following website: [email or web address]. By joining [email or web address]’s FollowMyHealth portal, you will also be able to view your health information using other applications (apps) compatible with our system.

## 2020-02-18 NOTE — DISCHARGE NOTE PROVIDER - NSDCMRMEDTOKEN_GEN_ALL_CORE_FT
Anbesol 10% mucous membrane liquid: 1 application mucous membrane 2 times a day, As Needed for tooth pain  Anti-Dandruff 0.5% topical shampoo: Apply topically to hair when showering once a day on Mondays and Thursday at 15:30-23:00  Aquaphor topical ointment: Apply topically to affected area once a day (at bedtime) BUE &amp; BLE  bisacodyl 10 mg rectal suppository: 1 suppository(ies) rectal once a day, As Needed for constipation  clonazePAM 0.25 mg oral tablet: 1 tab(s) orally 2 times a day at 9 am and 7pm  docusate sodium 100 mg oral capsule: 1 cap(s) orally 2 times a day  doxycycline monohydrate 100 mg oral capsule: 1 cap(s) orally every 12 hours  Fleet Enema 7 g-19 g rectal enema: 133 milliliter(s) rectal once, As Needed  **administer 07:30 am-15:30 if BM post MOM/Dulcolax  furosemide 20 mg oral tablet: 1 tab(s) orally once a day  Milk of Magnesia 8% oral suspension: 30 milliliter(s) orally once a day (at bedtime), As Needed if no BM in 3 days  NovoLIN R 100 units/mL injectable solution: unit(s) subcutaneous 4 times a day; sliding scale before meals and at bedtime  oseltamivir 75 mg oral capsule: 1 cap(s) orally once a day  PARoxetine 30 mg oral tablet: 1 tab(s) orally once a day  potassium chloride 20 mEq oral tablet, extended release: 1 tab(s) orally 2 times a day  Senna 8.6 mg oral tablet: 2 tab(s) orally once a day (at bedtime)  Tab-A-Bebeto oral tablet: 1 tab(s) orally once a day  Tylenol 325 mg oral tablet: 2 tab(s) orally every 6 hours, As Needed  valproic acid 250 mg oral capsule: 2 cap(s) orally once a day  valproic acid 250 mg oral capsule: 3 cap(s) [750 mg] orally once a day in the evening

## 2020-02-18 NOTE — DISCHARGE NOTE PROVIDER - PROVIDER TOKENS
FREE:[LAST:[Ernesto],FIRST:[Kaylyn],PHONE:[phone]],FAX:[(   [phone]    -],ADDRESS:[[address] [place] [address] [place] [id number]]]

## 2020-02-18 NOTE — PROGRESS NOTE ADULT - SUBJECTIVE AND OBJECTIVE BOX
Date of service: 02-18-20 @ 08:47    Pt seen and examined  Feels better  Temps down  No o/n events     ROS: limited; denies pain    MEDICATIONS  (STANDING):  clonazePAM  Tablet 0.25 milliGRAM(s) Oral <User Schedule>  dextrose 5%. 1000 milliLiter(s) (50 mL/Hr) IV Continuous <Continuous>  dextrose 50% Injectable 12.5 Gram(s) IV Push once  dextrose 50% Injectable 25 Gram(s) IV Push once  dextrose 50% Injectable 25 Gram(s) IV Push once  doxycycline hyclate Capsule 100 milliGRAM(s) Oral every 12 hours  enoxaparin Injectable 40 milliGRAM(s) SubCutaneous daily  insulin lispro (HumaLOG) corrective regimen sliding scale   SubCutaneous three times a day before meals  insulin lispro (HumaLOG) corrective regimen sliding scale   SubCutaneous at bedtime  oseltamivir 75 milliGRAM(s) Oral daily  pantoprazole    Tablet 40 milliGRAM(s) Oral before breakfast  PARoxetine 30 milliGRAM(s) Oral daily  valproic acid 500 milliGRAM(s) Oral <User Schedule>  valproic acid 750 milliGRAM(s) Oral <User Schedule>  vancomycin  IVPB 1000 milliGRAM(s) IV Intermittent every 12 hours    Vital Signs Last 24 Hrs  T(C): 36.9 (18 Feb 2020 05:30), Max: 37.4 (17 Feb 2020 16:44)  T(F): 98.4 (18 Feb 2020 05:30), Max: 99.4 (17 Feb 2020 16:44)  HR: 61 (18 Feb 2020 05:30) (61 - 75)  BP: 132/57 (18 Feb 2020 05:30) (132/57 - 156/71)  BP(mean): --  RR: 18 (18 Feb 2020 05:30) (18 - 19)  SpO2: 96% (18 Feb 2020 05:30) (96% - 98%)      Physical Exam:  Constitutional:  No acute distress  HEENT: NC/AT, EOMI, PERRLA, conjunctivae clear  Neck: supple; thyroid not palpable  Back: no tenderness  Respiratory: respiratory effort normal; decreased BS at bases  Cardiovascular: S1S2 regular, no murmurs  Abdomen: soft, not tender, not distended, positive BS  Genitourinary: no suprapubic tenderness  Lymphatic: no LN palpable  Musculoskeletal: no muscle tenderness, no joint swelling or tenderness  Extremities: 2+ pedal edema  LLE resolving erythema, edema and warmth; small anterior shin dry ulcer  Neurological/ Psychiatric: confused, moving all extremities  Skin: no rashes; no palpable lesions    Labs: reviewed             Culture - CSF with Gram Stain (collected 13 Feb 2020 09:30)  Source: .CSF CSF  Gram Stain (13 Feb 2020 17:04):    polymorphonuclear leukocytes seen    No organisms seen    by cytocentrifuge    Culture - Urine (collected 12 Feb 2020 19:35)  Source: .Urine None  Final Report (14 Feb 2020 04:32):    No growth    Culture - Blood (collected 12 Feb 2020 19:35)  Source: .Blood None  Preliminary Report (14 Feb 2020 03:02):    No growth to date.    Culture - Blood (collected 12 Feb 2020 19:35)  Source: .Blood None  Preliminary Report (14 Feb 2020 03:02):    No growth to date.    CSF PCR Panel (02.13.20 @ 09:30)    CSF PCR Result: NotDetec: The meningitis/encephalitis (ME) panel (CSFPCR) is a PCR based assay that  screens for: Escherichia coli; Haemophilus influenzae; Listeria  monocytogenes; Neisseria meningitidis; Streptococcus agalactiae;  Streptococcus pneumoniae; CMV; Enterovirus; HSV-1; HSV-2; Human  herpesvirus 6; Parechovirus; VZV and Cryptococcus. Result should be  interpreted in context of clinical presentation, imaging and other lab  tests. Positive predictive value may be lower in patients with normal CSF  chemistry and cell count.    Radiology: all available radiological tests reviewed    < from: Xray Chest 1 View-PORTABLE IMMEDIATE (02.12.20 @ 20:12) >  Clear lungs.    < end of copied text >      Advanced directives addressed: full resuscitation

## 2020-02-22 DIAGNOSIS — G93.41 METABOLIC ENCEPHALOPATHY: ICD-10-CM

## 2020-02-22 DIAGNOSIS — R65.20 SEVERE SEPSIS WITHOUT SEPTIC SHOCK: ICD-10-CM

## 2020-02-22 DIAGNOSIS — Z91.040 LATEX ALLERGY STATUS: ICD-10-CM

## 2020-02-22 DIAGNOSIS — L03.115 CELLULITIS OF RIGHT LOWER LIMB: ICD-10-CM

## 2020-02-22 DIAGNOSIS — N39.0 URINARY TRACT INFECTION, SITE NOT SPECIFIED: ICD-10-CM

## 2020-02-22 DIAGNOSIS — E11.9 TYPE 2 DIABETES MELLITUS WITHOUT COMPLICATIONS: ICD-10-CM

## 2020-02-22 DIAGNOSIS — F39 UNSPECIFIED MOOD [AFFECTIVE] DISORDER: ICD-10-CM

## 2020-02-22 DIAGNOSIS — D47.3 ESSENTIAL (HEMORRHAGIC) THROMBOCYTHEMIA: ICD-10-CM

## 2020-02-22 DIAGNOSIS — Z66 DO NOT RESUSCITATE: ICD-10-CM

## 2020-02-22 DIAGNOSIS — J11.1 INFLUENZA DUE TO UNIDENTIFIED INFLUENZA VIRUS WITH OTHER RESPIRATORY MANIFESTATIONS: ICD-10-CM

## 2020-02-22 DIAGNOSIS — I87.2 VENOUS INSUFFICIENCY (CHRONIC) (PERIPHERAL): ICD-10-CM

## 2020-02-22 DIAGNOSIS — Z87.820 PERSONAL HISTORY OF TRAUMATIC BRAIN INJURY: ICD-10-CM

## 2020-02-22 DIAGNOSIS — F03.91 UNSPECIFIED DEMENTIA WITH BEHAVIORAL DISTURBANCE: ICD-10-CM

## 2020-02-22 DIAGNOSIS — E87.2 ACIDOSIS: ICD-10-CM

## 2020-02-22 DIAGNOSIS — G40.909 EPILEPSY, UNSPECIFIED, NOT INTRACTABLE, WITHOUT STATUS EPILEPTICUS: ICD-10-CM

## 2020-02-22 DIAGNOSIS — I10 ESSENTIAL (PRIMARY) HYPERTENSION: ICD-10-CM

## 2020-02-22 DIAGNOSIS — A41.9 SEPSIS, UNSPECIFIED ORGANISM: ICD-10-CM

## 2020-02-22 DIAGNOSIS — L03.116 CELLULITIS OF LEFT LOWER LIMB: ICD-10-CM

## 2020-02-22 DIAGNOSIS — E66.9 OBESITY, UNSPECIFIED: ICD-10-CM

## 2020-02-22 DIAGNOSIS — Z88.0 ALLERGY STATUS TO PENICILLIN: ICD-10-CM

## 2020-02-28 LAB
CULTURE RESULTS: NO GROWTH — SIGNIFICANT CHANGE UP
SPECIMEN SOURCE: SIGNIFICANT CHANGE UP

## 2020-05-20 ENCOUNTER — EMERGENCY (EMERGENCY)
Facility: HOSPITAL | Age: 65
LOS: 0 days | Discharge: HOME | End: 2020-05-20
Attending: STUDENT IN AN ORGANIZED HEALTH CARE EDUCATION/TRAINING PROGRAM
Payer: MEDICARE

## 2020-05-20 VITALS
HEART RATE: 81 BPM | RESPIRATION RATE: 16 BRPM | HEIGHT: 62 IN | SYSTOLIC BLOOD PRESSURE: 104 MMHG | DIASTOLIC BLOOD PRESSURE: 54 MMHG | TEMPERATURE: 98 F | WEIGHT: 134.92 LBS | OXYGEN SATURATION: 97 %

## 2020-05-20 VITALS
HEART RATE: 72 BPM | RESPIRATION RATE: 16 BRPM | OXYGEN SATURATION: 95 % | SYSTOLIC BLOOD PRESSURE: 109 MMHG | DIASTOLIC BLOOD PRESSURE: 62 MMHG

## 2020-05-20 DIAGNOSIS — Z86.19 PERSONAL HISTORY OF OTHER INFECTIOUS AND PARASITIC DISEASES: ICD-10-CM

## 2020-05-20 DIAGNOSIS — Z11.59 ENCOUNTER FOR SCREENING FOR OTHER VIRAL DISEASES: ICD-10-CM
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
## 2020-05-20 DIAGNOSIS — I10 ESSENTIAL (PRIMARY) HYPERTENSION: ICD-10-CM

## 2020-05-20 DIAGNOSIS — Z79.4 LONG TERM (CURRENT) USE OF INSULIN: ICD-10-CM

## 2020-05-20 DIAGNOSIS — Z98.2 PRESENCE OF CEREBROSPINAL FLUID DRAINAGE DEVICE: ICD-10-CM

## 2020-05-20 DIAGNOSIS — D64.9 ANEMIA, UNSPECIFIED: ICD-10-CM

## 2020-05-20 DIAGNOSIS — Z91.040 LATEX ALLERGY STATUS: ICD-10-CM

## 2020-05-20 DIAGNOSIS — Z23 ENCOUNTER FOR IMMUNIZATION: ICD-10-CM

## 2020-05-20 DIAGNOSIS — G91.2 (IDIOPATHIC) NORMAL PRESSURE HYDROCEPHALUS: ICD-10-CM

## 2020-05-20 DIAGNOSIS — E11.9 TYPE 2 DIABETES MELLITUS WITHOUT COMPLICATIONS: ICD-10-CM

## 2020-05-20 DIAGNOSIS — S09.93XA UNSPECIFIED INJURY OF FACE, INITIAL ENCOUNTER: ICD-10-CM

## 2020-05-20 DIAGNOSIS — Z87.820 PERSONAL HISTORY OF TRAUMATIC BRAIN INJURY: ICD-10-CM

## 2020-05-20 DIAGNOSIS — F03.90 UNSPECIFIED DEMENTIA WITHOUT BEHAVIORAL DISTURBANCE: ICD-10-CM

## 2020-05-20 DIAGNOSIS — Z88.0 ALLERGY STATUS TO PENICILLIN: ICD-10-CM

## 2020-05-20 DIAGNOSIS — S01.111A LACERATION WITHOUT FOREIGN BODY OF RIGHT EYELID AND PERIOCULAR AREA, INITIAL ENCOUNTER: ICD-10-CM

## 2020-05-20 DIAGNOSIS — G40.909 EPILEPSY, UNSPECIFIED, NOT INTRACTABLE, WITHOUT STATUS EPILEPTICUS: ICD-10-CM

## 2020-05-20 PROBLEM — S06.9X9A UNSPECIFIED INTRACRANIAL INJURY WITH LOSS OF CONSCIOUSNESS OF UNSPECIFIED DURATION, INITIAL ENCOUNTER: Chronic | Status: ACTIVE | Noted: 2020-02-12

## 2020-05-20 LAB
ANION GAP SERPL CALC-SCNC: 5 MMOL/L — SIGNIFICANT CHANGE UP (ref 5–17)
BASOPHILS # BLD AUTO: 0.03 K/UL — SIGNIFICANT CHANGE UP (ref 0–0.2)
BASOPHILS NFR BLD AUTO: 0.3 % — SIGNIFICANT CHANGE UP (ref 0–2)
BUN SERPL-MCNC: 13 MG/DL — SIGNIFICANT CHANGE UP (ref 7–23)
CALCIUM SERPL-MCNC: 8.5 MG/DL — SIGNIFICANT CHANGE UP (ref 8.5–10.1)
CHLORIDE SERPL-SCNC: 106 MMOL/L — SIGNIFICANT CHANGE UP (ref 96–108)
CO2 SERPL-SCNC: 31 MMOL/L — SIGNIFICANT CHANGE UP (ref 22–31)
CREAT SERPL-MCNC: 0.59 MG/DL — SIGNIFICANT CHANGE UP (ref 0.5–1.3)
EOSINOPHIL # BLD AUTO: 0.1 K/UL — SIGNIFICANT CHANGE UP (ref 0–0.5)
EOSINOPHIL NFR BLD AUTO: 0.9 % — SIGNIFICANT CHANGE UP (ref 0–6)
GLUCOSE SERPL-MCNC: 126 MG/DL — HIGH (ref 70–99)
HCT VFR BLD CALC: 35.5 % — SIGNIFICANT CHANGE UP (ref 34.5–45)
HGB BLD-MCNC: 10.8 G/DL — LOW (ref 11.5–15.5)
IMM GRANULOCYTES NFR BLD AUTO: 1.6 % — HIGH (ref 0–1.5)
LYMPHOCYTES # BLD AUTO: 1.93 K/UL — SIGNIFICANT CHANGE UP (ref 1–3.3)
LYMPHOCYTES # BLD AUTO: 17.2 % — SIGNIFICANT CHANGE UP (ref 13–44)
MCHC RBC-ENTMCNC: 29.8 PG — SIGNIFICANT CHANGE UP (ref 27–34)
MCHC RBC-ENTMCNC: 30.4 GM/DL — LOW (ref 32–36)
MCV RBC AUTO: 97.8 FL — SIGNIFICANT CHANGE UP (ref 80–100)
MONOCYTES # BLD AUTO: 1.26 K/UL — HIGH (ref 0–0.9)
MONOCYTES NFR BLD AUTO: 11.2 % — SIGNIFICANT CHANGE UP (ref 2–14)
NEUTROPHILS # BLD AUTO: 7.71 K/UL — HIGH (ref 1.8–7.4)
NEUTROPHILS NFR BLD AUTO: 68.8 % — SIGNIFICANT CHANGE UP (ref 43–77)
PLATELET # BLD AUTO: 353 K/UL — SIGNIFICANT CHANGE UP (ref 150–400)
POTASSIUM SERPL-MCNC: 3.6 MMOL/L — SIGNIFICANT CHANGE UP (ref 3.5–5.3)
POTASSIUM SERPL-SCNC: 3.6 MMOL/L — SIGNIFICANT CHANGE UP (ref 3.5–5.3)
RBC # BLD: 3.63 M/UL — LOW (ref 3.8–5.2)
RBC # FLD: 14.6 % — HIGH (ref 10.3–14.5)
SARS-COV-2 RNA SPEC QL NAA+PROBE: SIGNIFICANT CHANGE UP
SODIUM SERPL-SCNC: 142 MMOL/L — SIGNIFICANT CHANGE UP (ref 135–145)
WBC # BLD: 11.21 K/UL — HIGH (ref 3.8–10.5)
WBC # FLD AUTO: 11.21 K/UL — HIGH (ref 3.8–10.5)

## 2020-05-20 PROCEDURE — G0378: CPT

## 2020-05-20 PROCEDURE — 12011 RPR F/E/E/N/L/M 2.5 CM/<: CPT

## 2020-05-20 PROCEDURE — 90471 IMMUNIZATION ADMIN: CPT

## 2020-05-20 PROCEDURE — 71045 X-RAY EXAM CHEST 1 VIEW: CPT

## 2020-05-20 PROCEDURE — 70450 CT HEAD/BRAIN W/O DYE: CPT

## 2020-05-20 PROCEDURE — 93005 ELECTROCARDIOGRAM TRACING: CPT | Mod: XU

## 2020-05-20 PROCEDURE — 90715 TDAP VACCINE 7 YRS/> IM: CPT

## 2020-05-20 PROCEDURE — 71045 X-RAY EXAM CHEST 1 VIEW: CPT | Mod: 26

## 2020-05-20 PROCEDURE — 99213 OFFICE O/P EST LOW 20 MIN: CPT

## 2020-05-20 PROCEDURE — 99285 EMERGENCY DEPT VISIT HI MDM: CPT | Mod: 25

## 2020-05-20 PROCEDURE — 36415 COLL VENOUS BLD VENIPUNCTURE: CPT

## 2020-05-20 PROCEDURE — 87635 SARS-COV-2 COVID-19 AMP PRB: CPT

## 2020-05-20 PROCEDURE — 93010 ELECTROCARDIOGRAM REPORT: CPT

## 2020-05-20 PROCEDURE — 99285 EMERGENCY DEPT VISIT HI MDM: CPT

## 2020-05-20 PROCEDURE — 96372 THER/PROPH/DIAG INJ SC/IM: CPT

## 2020-05-20 PROCEDURE — 85025 COMPLETE CBC W/AUTO DIFF WBC: CPT

## 2020-05-20 PROCEDURE — 70450 CT HEAD/BRAIN W/O DYE: CPT | Mod: 26

## 2020-05-20 PROCEDURE — 80048 BASIC METABOLIC PNL TOTAL CA: CPT

## 2020-05-20 RX ORDER — SODIUM CHLORIDE 9 MG/ML
3 INJECTION INTRAMUSCULAR; INTRAVENOUS; SUBCUTANEOUS ONCE
Refills: 0 | Status: COMPLETED | OUTPATIENT
Start: 2020-05-20 | End: 2020-05-20

## 2020-05-20 RX ORDER — TETANUS TOXOID, REDUCED DIPHTHERIA TOXOID AND ACELLULAR PERTUSSIS VACCINE, ADSORBED 5; 2.5; 8; 8; 2.5 [IU]/.5ML; [IU]/.5ML; UG/.5ML; UG/.5ML; UG/.5ML
0.5 SUSPENSION INTRAMUSCULAR ONCE
Refills: 0 | Status: COMPLETED | OUTPATIENT
Start: 2020-05-20 | End: 2020-05-20

## 2020-05-20 RX ADMIN — SODIUM CHLORIDE 3 MILLILITER(S): 9 INJECTION INTRAMUSCULAR; INTRAVENOUS; SUBCUTANEOUS at 13:05

## 2020-05-20 RX ADMIN — TETANUS TOXOID, REDUCED DIPHTHERIA TOXOID AND ACELLULAR PERTUSSIS VACCINE, ADSORBED 0.5 MILLILITER(S): 5; 2.5; 8; 8; 2.5 SUSPENSION INTRAMUSCULAR at 12:35

## 2020-05-20 NOTE — ED PROVIDER NOTE - PROGRESS NOTE DETAILS
Kwan DO: 66 yo female s/p fall at nursing home; hx of dementia; unwitnessed; patient offers no complaints; found to have right eyelid laceration; HEENT: head: NCAT, eyes: PERRLA, eomi; nose: clear; Mouth: clear; heart: RRR, lungs: CTA, Abdomen: s/nt/nd; MSK: moves all extremities; Neuro: CN: II- XII grossly intact; Skin: 2cm horizontal laceration above right eyebrow; A/P: 66 yo female s/p fall; ekg, labs, ct ; wound care; re-eval CT head with no acute findings, CXR with NAD, labs with mild anemia which appear to be baseline, will admit to obs for further evaluation before return to nursing facility  Rosemary Domínguez PA-C CT head with no acute findings, CXR with NAD, labs with mild anemia which appear to be baseline, place in obs pending covid swab at this time. As pt's COVID test is negative pt is stable for d/c back to nursing facility per Dr. Kwan Domínguez PA-C

## 2020-05-20 NOTE — ED PROVIDER NOTE - PMH
Dementia    Diabetes mellitus    Epilepsy    Hypertension    Normal pressure hydrocephalus    TBI (traumatic brain injury)

## 2020-05-20 NOTE — ED PROVIDER NOTE - SKIN, MLM
Skin normal color for race, warm, dry and intact. No evidence of rash. +2cm laceration above R eyelid, no active bleeding

## 2020-05-20 NOTE — ED PROVIDER NOTE - ATTENDING CONTRIBUTION TO CARE
I, Sveta Bowens DO,  performed the initial face to face bedside interview with this patient regarding history of present illness, review of symptoms and relevant past medical, social and family history.  I completed an independent physical examination.  I was the initial provider who evaluated this patient. I have signed out the follow up of any pending tests (i.e. labs, radiological studies) to the ACP.  I have communicated the patient’s plan of care and disposition with the ACP.

## 2020-05-20 NOTE — ED ADULT TRIAGE NOTE - CHIEF COMPLAINT QUOTE
Pt. to the ED BIBA from NH C/O Right Eyelid Laceration S/P Unwitnessed fall- Unknown LOC - Negative aspirin and blood thinners - GSC 14 Hx. of Dementia, Diabetes and HTN

## 2020-05-20 NOTE — ED PROVIDER NOTE - CARE PLAN
Principal Discharge DX:	Fall, initial encounter  Secondary Diagnosis:	Injury of head, initial encounter  Secondary Diagnosis:	Facial laceration, initial encounter Principal Discharge DX:	Injury of head, initial encounter  Secondary Diagnosis:	Facial laceration, initial encounter  Secondary Diagnosis:	Fall, initial encounter

## 2020-05-20 NOTE — ED PROVIDER NOTE - CLINICAL SUMMARY MEDICAL DECISION MAKING FREE TEXT BOX
Pt s/p unwitnessed fall at nursing home, will obtain CT head, labs, lac repair and admit to obs for COVID swab Pt s/p unwitnessed fall at nursing home, will obtain CT head, labs, lac repair and place in obs for COVID swab

## 2020-05-20 NOTE — ED PROVIDER NOTE - PATIENT PORTAL LINK FT
You can access the FollowMyHealth Patient Portal offered by [facility] by registering at the following website: [email or web address]. By joining [facility]’s FollowMyHealth portal, you will also be able to view your health information using other applications (apps) compatible with our system.

## 2020-05-20 NOTE — CONSULT NOTE ADULT - SUBJECTIVE AND OBJECTIVE BOX
Patient is a 65y old  Female who presents with a chief complaint of fall    HPI: 66 yo F with PMHx HTN, DM, dementia, BIBA from nursing home c/o R eyelid laceration s/p unwitnessed fall, unknown LOC, per med list pt is not on ASA or blood thinners.  Pt known to Dr. Solitario, she has an extensive neurosurgical history. Has  shunt with h/o CSF infection in the past.  Family called Dr. Solitario directly when th patient was transferred to  from nursing home.   Pt seen and examined in the ED, lethargic but arousable,  shunt depresses and fills easily, CT head negative for any acute blood and ventricle size is stable   Pt is awaiting COVID test results before she can be transferred back to nursing home     PAST MEDICAL & SURGICAL HISTORY:  Normal pressure hydrocephalus  Hypertension  Diabetes mellitus  Dementia  Epilepsy  TBI (traumatic brain injury)  s/p LEFT  SHUNT     FAMILY HISTORY:  No pertinent family history in first degree relatives    Social Hx:  Nonsmoker, no drug or alcohol use    MEDICATIONS  (STANDING):  sodium chloride 0.9% lock flush 3 milliLiter(s) IV Push once    Allergies  latex (Unknown)  Originally Entered as [Severe Hives] reaction to [Pineapple] (Unknown)  penicillins (Unknown)    ROS: unable to assess     Vital Signs Last 24 Hrs  T(C): 36.7 (20 May 2020 10:49), Max: 36.7 (20 May 2020 10:49)  T(F): 98.1 (20 May 2020 10:49), Max: 98.1 (20 May 2020 10:49)  HR: 81 (20 May 2020 10:49) (81 - 81)  BP: 104/54 (20 May 2020 10:49) (104/54 - 104/54)  RR: 16 (20 May 2020 10:49) (16 - 16)  SpO2: 97% (20 May 2020 10:49) (97% - 97%)    Neurological exam:  HF: lethargic but arousable, oriented to self only, minimal verbal output, followed simple commands   CN: PEARRL, R>L, EOMI, VFF, facial sensation normal, no NLFD, tongue midline, Palate moves equally, SCM equal bilaterally  Motor: moves all extremities antigravity, 4+ pitting edema b/l lower ext up to the knee   Right sided shunt depresses and fills easily  laceration above right eye     Labs:                        10.8   11.21 )-----------( 353      ( 20 May 2020 11:44 )             35.5     05-20    142  |  106  |  13  ----------------------------<  126<H>  3.6   |  31  |  0.59    Ca    8.5      20 May 2020 11:44    RADIOLOGY:  < from: CT Head No Cont (05.20.20 @ 11:20) >  FINDINGS:     Patient is status post right frontotemporal craniotomy. There is a left frontal approach ventricular catheter with tip in the left frontal horn, unchanged. Ventricular size is unchanged. Focal areas of encephalomalacia/gliosis within the bilateral anterior frontal lobes, right lateral frontoparietal and temporal lobes, and left posterior parietal lobe is unchanged.   There is no CT evidence for acute cerebral cortical infarct. There is no evidence of hemorrhage. There is periventricular and subcortical white matter hypoattenuation,  most compatible with chronic microvascular ischemic changes.   No mass effect is found in the brain.  There is no midline shift or herniation pattern.    The visualized portions of the paranasal sinuses and mastoid air cells are clear.      IMPRESSION:     No evidence of acute intracranial abnormality.  No evidence of hemorrhage.    Patient is status post right frontotemporal craniotomy. There is a left frontal approach ventricular catheter with tip in the left frontal horn, unchanged. Ventricular size is unchanged. Focal areas of encephalomalacia/gliosis within the bilateral anterior frontal lobes, right lateral frontoparietal andtemporal lobes, and left posterior parietal lobe is unchanged.

## 2020-05-20 NOTE — ED PROVIDER NOTE - OBJECTIVE STATEMENT
66 yo F with PMHx HTN, DM, dementia, BIBA from nursing home c/o R eyelid laceration s/p unwitnessed fall, unknown LOC, per med list pt is not on ASA or blood thinners.

## 2020-05-20 NOTE — CONSULT NOTE ADULT - ASSESSMENT
64 yo F with PMHx HTN, DM, dementia, BIBA from nursing home c/o R eyelid laceration s/p unwitnessed fall, unknown LOC, per med list pt is not on ASA or blood thinners.  Pt known to Dr. Solitario, she has an extensive neurosurgical history. Has  shunt with h/o CSF infection in the past.  Family called Dr. Solitario directly when th patient was transferred to  from nursing home.   Pt seen and examined in the ED, lethargic but arousable,  shunt depresses and fills easily, CT head negative for any acute blood and ventricle size is stable   Pt is awaiting COVID test results before she can be transferred back to nursing home     No acute neurosurgical intervention  CT head reviewed with Dr. Solitario   Pt is awaiting COVID test results before she can be transferred back to nursing [place]

## 2021-10-22 ENCOUNTER — RESULT REVIEW (OUTPATIENT)
Age: 66
End: 2021-10-22

## 2021-11-03 ENCOUNTER — INPATIENT (INPATIENT)
Facility: HOSPITAL | Age: 66
LOS: 15 days | Discharge: ANOTHER HOSPITAL | End: 2021-11-19
Attending: INTERNAL MEDICINE | Admitting: INTERNAL MEDICINE
Payer: MEDICARE

## 2021-11-03 VITALS
TEMPERATURE: 98 F | RESPIRATION RATE: 20 BRPM | SYSTOLIC BLOOD PRESSURE: 122 MMHG | DIASTOLIC BLOOD PRESSURE: 66 MMHG | OXYGEN SATURATION: 100 % | HEART RATE: 88 BPM

## 2021-11-03 DIAGNOSIS — F03.91 UNSPECIFIED DEMENTIA WITH BEHAVIORAL DISTURBANCE: ICD-10-CM

## 2021-11-03 LAB
ALBUMIN SERPL ELPH-MCNC: 4.1 G/DL — SIGNIFICANT CHANGE UP (ref 3.3–5)
ALP SERPL-CCNC: 63 U/L — SIGNIFICANT CHANGE UP (ref 40–120)
ALT FLD-CCNC: 14 U/L — SIGNIFICANT CHANGE UP (ref 4–33)
ANION GAP SERPL CALC-SCNC: 12 MMOL/L — SIGNIFICANT CHANGE UP (ref 7–14)
APPEARANCE UR: CLEAR — SIGNIFICANT CHANGE UP
AST SERPL-CCNC: 33 U/L — HIGH (ref 4–32)
BACTERIA # UR AUTO: NEGATIVE — SIGNIFICANT CHANGE UP
BASOPHILS # BLD AUTO: 0.05 K/UL — SIGNIFICANT CHANGE UP (ref 0–0.2)
BASOPHILS NFR BLD AUTO: 0.8 % — SIGNIFICANT CHANGE UP (ref 0–2)
BILIRUB SERPL-MCNC: 0.2 MG/DL — SIGNIFICANT CHANGE UP (ref 0.2–1.2)
BILIRUB UR-MCNC: NEGATIVE — SIGNIFICANT CHANGE UP
BUN SERPL-MCNC: 17 MG/DL — SIGNIFICANT CHANGE UP (ref 7–23)
CALCIUM SERPL-MCNC: 9.8 MG/DL — SIGNIFICANT CHANGE UP (ref 8.4–10.5)
CHLORIDE SERPL-SCNC: 109 MMOL/L — HIGH (ref 98–107)
CO2 SERPL-SCNC: 25 MMOL/L — SIGNIFICANT CHANGE UP (ref 22–31)
COLOR SPEC: YELLOW — SIGNIFICANT CHANGE UP
CREAT SERPL-MCNC: 0.54 MG/DL — SIGNIFICANT CHANGE UP (ref 0.5–1.3)
DIFF PNL FLD: NEGATIVE — SIGNIFICANT CHANGE UP
EOSINOPHIL # BLD AUTO: 0.12 K/UL — SIGNIFICANT CHANGE UP (ref 0–0.5)
EOSINOPHIL NFR BLD AUTO: 1.9 % — SIGNIFICANT CHANGE UP (ref 0–6)
EPI CELLS # UR: 1 /HPF — SIGNIFICANT CHANGE UP (ref 0–5)
GLUCOSE SERPL-MCNC: 178 MG/DL — HIGH (ref 70–99)
GLUCOSE UR QL: NEGATIVE — SIGNIFICANT CHANGE UP
HCT VFR BLD CALC: 43.2 % — SIGNIFICANT CHANGE UP (ref 34.5–45)
HGB BLD-MCNC: 13.7 G/DL — SIGNIFICANT CHANGE UP (ref 11.5–15.5)
HYALINE CASTS # UR AUTO: 1 /LPF — SIGNIFICANT CHANGE UP (ref 0–7)
IANC: 3.85 K/UL — SIGNIFICANT CHANGE UP (ref 1.5–8.5)
IMM GRANULOCYTES NFR BLD AUTO: 0.8 % — SIGNIFICANT CHANGE UP (ref 0–1.5)
KETONES UR-MCNC: ABNORMAL
LEUKOCYTE ESTERASE UR-ACNC: NEGATIVE — SIGNIFICANT CHANGE UP
LYMPHOCYTES # BLD AUTO: 1.54 K/UL — SIGNIFICANT CHANGE UP (ref 1–3.3)
LYMPHOCYTES # BLD AUTO: 24.7 % — SIGNIFICANT CHANGE UP (ref 13–44)
MCHC RBC-ENTMCNC: 30.7 PG — SIGNIFICANT CHANGE UP (ref 27–34)
MCHC RBC-ENTMCNC: 31.7 GM/DL — LOW (ref 32–36)
MCV RBC AUTO: 96.9 FL — SIGNIFICANT CHANGE UP (ref 80–100)
MONOCYTES # BLD AUTO: 0.63 K/UL — SIGNIFICANT CHANGE UP (ref 0–0.9)
MONOCYTES NFR BLD AUTO: 10.1 % — SIGNIFICANT CHANGE UP (ref 2–14)
NEUTROPHILS # BLD AUTO: 3.85 K/UL — SIGNIFICANT CHANGE UP (ref 1.8–7.4)
NEUTROPHILS NFR BLD AUTO: 61.7 % — SIGNIFICANT CHANGE UP (ref 43–77)
NITRITE UR-MCNC: NEGATIVE — SIGNIFICANT CHANGE UP
NRBC # BLD: 0 /100 WBCS — SIGNIFICANT CHANGE UP
NRBC # FLD: 0 K/UL — SIGNIFICANT CHANGE UP
PCP SPEC-MCNC: SIGNIFICANT CHANGE UP
PH UR: 7 — SIGNIFICANT CHANGE UP (ref 5–8)
PLATELET # BLD AUTO: 243 K/UL — SIGNIFICANT CHANGE UP (ref 150–400)
POTASSIUM SERPL-MCNC: 4.4 MMOL/L — SIGNIFICANT CHANGE UP (ref 3.5–5.3)
POTASSIUM SERPL-SCNC: 4.4 MMOL/L — SIGNIFICANT CHANGE UP (ref 3.5–5.3)
PROT SERPL-MCNC: 7.4 G/DL — SIGNIFICANT CHANGE UP (ref 6–8.3)
PROT UR-MCNC: ABNORMAL
RBC # BLD: 4.46 M/UL — SIGNIFICANT CHANGE UP (ref 3.8–5.2)
RBC # FLD: 15.9 % — HIGH (ref 10.3–14.5)
RBC CASTS # UR COMP ASSIST: 2 /HPF — SIGNIFICANT CHANGE UP (ref 0–4)
SARS-COV-2 RNA SPEC QL NAA+PROBE: SIGNIFICANT CHANGE UP
SODIUM SERPL-SCNC: 146 MMOL/L — HIGH (ref 135–145)
SP GR SPEC: 1.03 — SIGNIFICANT CHANGE UP (ref 1–1.05)
TOXICOLOGY SCREEN, DRUGS OF ABUSE, SERUM RESULT: SIGNIFICANT CHANGE UP
UROBILINOGEN FLD QL: SIGNIFICANT CHANGE UP
WBC # BLD: 6.24 K/UL — SIGNIFICANT CHANGE UP (ref 3.8–10.5)
WBC # FLD AUTO: 6.24 K/UL — SIGNIFICANT CHANGE UP (ref 3.8–10.5)
WBC UR QL: 4 /HPF — SIGNIFICANT CHANGE UP (ref 0–5)

## 2021-11-03 PROCEDURE — 70450 CT HEAD/BRAIN W/O DYE: CPT | Mod: 26,MA

## 2021-11-03 PROCEDURE — 93010 ELECTROCARDIOGRAM REPORT: CPT

## 2021-11-03 PROCEDURE — 99285 EMERGENCY DEPT VISIT HI MDM: CPT | Mod: 25,GC

## 2021-11-03 RX ORDER — HALOPERIDOL DECANOATE 100 MG/ML
2.5 INJECTION INTRAMUSCULAR ONCE
Refills: 0 | Status: COMPLETED | OUTPATIENT
Start: 2021-11-03 | End: 2021-11-03

## 2021-11-03 RX ADMIN — HALOPERIDOL DECANOATE 2.5 MILLIGRAM(S): 100 INJECTION INTRAMUSCULAR at 12:13

## 2021-11-03 RX ADMIN — Medication 2 MILLIGRAM(S): at 14:42

## 2021-11-03 NOTE — ED PROVIDER NOTE - PHYSICAL EXAMINATION
Gen: NAD, AOx1, trying to climb out of bed, talkative   Head: NCAT  HEENT: Pupils unequal 4mm right dn 3mm left, oral mucosa moist, normal conjunctiva, deformity of right cranium with surgical scars.   shunt on left.    Lung: CTAB, no respiratory distress  CV: rrr, no murmurs, Normal perfusion  Abd: soft, NTND  MSK: No edema, no visible deformities  Neuro: No focal neurologic deficits  Skin: scattered excoriations  Psych: agitated, talkative
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
## 2021-11-03 NOTE — ED PROVIDER NOTE - NSICDXPASTMEDICALHX_GEN_ALL_CORE_FT
PAST MEDICAL HISTORY:  Dementia without behavioral disturbance     Hypertension     Mood disorder     Normal pressure hydrocephalus     Type 2 diabetes mellitus

## 2021-11-03 NOTE — ED ADULT TRIAGE NOTE - HEART RATE (BEATS/MIN)
Detail Level: Zone Initiate Treatment: Trianex 0.05% ointment up to qid \\nHydroxyzine 25mg  1-2 pills every 6hrs prn 88 Continue Regimen: Clob 0.05% Cr qd-bid to thickened lesions

## 2021-11-03 NOTE — ED PROVIDER NOTE - NSICDXFAMILYHX_GEN_ALL_CORE_FT
FAMILY HISTORY:  Father  Still living? Unknown  No pertinent family history, Age at diagnosis: Age Unknown    Mother  Still living? Unknown  No pertinent family history, Age at diagnosis: Age Unknown    Sibling  Still living? Unknown  No pertinent family history, Age at diagnosis: Age Unknown    Child  Still living? Unknown  No pertinent family history, Age at diagnosis: Age Unknown    Grandparent  Still living? Unknown  No pertinent family history, Age at diagnosis: Age Unknown    Aunt  Still living? Unknown  No pertinent family history, Age at diagnosis: Age Unknown
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
## 2021-11-03 NOTE — ED ADULT TRIAGE NOTE - CHIEF COMPLAINT QUOTE
Pt sent from NH for psych evaluation. Pt has hx Dementia and TBI. Pt has been increasingly verbally and physically aggressive.

## 2021-11-04 DIAGNOSIS — E11.9 TYPE 2 DIABETES MELLITUS WITHOUT COMPLICATIONS: ICD-10-CM

## 2021-11-04 DIAGNOSIS — Z29.9 ENCOUNTER FOR PROPHYLACTIC MEASURES, UNSPECIFIED: ICD-10-CM

## 2021-11-04 DIAGNOSIS — F03.91 UNSPECIFIED DEMENTIA WITH BEHAVIORAL DISTURBANCE: ICD-10-CM

## 2021-11-04 DIAGNOSIS — R60.0 LOCALIZED EDEMA: ICD-10-CM

## 2021-11-04 DIAGNOSIS — R21 RASH AND OTHER NONSPECIFIC SKIN ERUPTION: ICD-10-CM

## 2021-11-04 DIAGNOSIS — F39 UNSPECIFIED MOOD [AFFECTIVE] DISORDER: ICD-10-CM

## 2021-11-04 DIAGNOSIS — I10 ESSENTIAL (PRIMARY) HYPERTENSION: ICD-10-CM

## 2021-11-04 DIAGNOSIS — Z98.2 PRESENCE OF CEREBROSPINAL FLUID DRAINAGE DEVICE: Chronic | ICD-10-CM

## 2021-11-04 DIAGNOSIS — G91.2 (IDIOPATHIC) NORMAL PRESSURE HYDROCEPHALUS: ICD-10-CM

## 2021-11-04 DIAGNOSIS — F41.1 GENERALIZED ANXIETY DISORDER: ICD-10-CM

## 2021-11-04 LAB
A1C WITH ESTIMATED AVERAGE GLUCOSE RESULT: 5.4 % — SIGNIFICANT CHANGE UP (ref 4–5.6)
ALBUMIN SERPL ELPH-MCNC: 4 G/DL — SIGNIFICANT CHANGE UP (ref 3.3–5)
ALP SERPL-CCNC: 58 U/L — SIGNIFICANT CHANGE UP (ref 40–120)
ALT FLD-CCNC: 10 U/L — SIGNIFICANT CHANGE UP (ref 4–33)
ANION GAP SERPL CALC-SCNC: 12 MMOL/L — SIGNIFICANT CHANGE UP (ref 7–14)
AST SERPL-CCNC: 17 U/L — SIGNIFICANT CHANGE UP (ref 4–32)
BASOPHILS # BLD AUTO: 0.05 K/UL — SIGNIFICANT CHANGE UP (ref 0–0.2)
BASOPHILS NFR BLD AUTO: 0.7 % — SIGNIFICANT CHANGE UP (ref 0–2)
BILIRUB SERPL-MCNC: <0.2 MG/DL — SIGNIFICANT CHANGE UP (ref 0.2–1.2)
BUN SERPL-MCNC: 18 MG/DL — SIGNIFICANT CHANGE UP (ref 7–23)
CALCIUM SERPL-MCNC: 9.5 MG/DL — SIGNIFICANT CHANGE UP (ref 8.4–10.5)
CHLORIDE SERPL-SCNC: 111 MMOL/L — HIGH (ref 98–107)
CO2 SERPL-SCNC: 24 MMOL/L — SIGNIFICANT CHANGE UP (ref 22–31)
COVID-19 SPIKE DOMAIN AB INTERP: POSITIVE
COVID-19 SPIKE DOMAIN ANTIBODY RESULT: >250 U/ML — HIGH
CREAT SERPL-MCNC: 0.57 MG/DL — SIGNIFICANT CHANGE UP (ref 0.5–1.3)
CULTURE RESULTS: NO GROWTH — SIGNIFICANT CHANGE UP
EOSINOPHIL # BLD AUTO: 0.1 K/UL — SIGNIFICANT CHANGE UP (ref 0–0.5)
EOSINOPHIL NFR BLD AUTO: 1.5 % — SIGNIFICANT CHANGE UP (ref 0–6)
ESTIMATED AVERAGE GLUCOSE: 108 — SIGNIFICANT CHANGE UP
GLUCOSE BLDC GLUCOMTR-MCNC: 116 MG/DL — HIGH (ref 70–99)
GLUCOSE BLDC GLUCOMTR-MCNC: 116 MG/DL — HIGH (ref 70–99)
GLUCOSE BLDC GLUCOMTR-MCNC: 126 MG/DL — HIGH (ref 70–99)
GLUCOSE BLDC GLUCOMTR-MCNC: 156 MG/DL — HIGH (ref 70–99)
GLUCOSE BLDC GLUCOMTR-MCNC: 92 MG/DL — SIGNIFICANT CHANGE UP (ref 70–99)
GLUCOSE SERPL-MCNC: 108 MG/DL — HIGH (ref 70–99)
HCT VFR BLD CALC: 41.8 % — SIGNIFICANT CHANGE UP (ref 34.5–45)
HGB BLD-MCNC: 12.8 G/DL — SIGNIFICANT CHANGE UP (ref 11.5–15.5)
IANC: 3.69 K/UL — SIGNIFICANT CHANGE UP (ref 1.5–8.5)
IMM GRANULOCYTES NFR BLD AUTO: 0.4 % — SIGNIFICANT CHANGE UP (ref 0–1.5)
LYMPHOCYTES # BLD AUTO: 2.11 K/UL — SIGNIFICANT CHANGE UP (ref 1–3.3)
LYMPHOCYTES # BLD AUTO: 31.5 % — SIGNIFICANT CHANGE UP (ref 13–44)
MAGNESIUM SERPL-MCNC: 2.3 MG/DL — SIGNIFICANT CHANGE UP (ref 1.6–2.6)
MCHC RBC-ENTMCNC: 30.3 PG — SIGNIFICANT CHANGE UP (ref 27–34)
MCHC RBC-ENTMCNC: 30.6 GM/DL — LOW (ref 32–36)
MCV RBC AUTO: 98.8 FL — SIGNIFICANT CHANGE UP (ref 80–100)
MONOCYTES # BLD AUTO: 0.71 K/UL — SIGNIFICANT CHANGE UP (ref 0–0.9)
MONOCYTES NFR BLD AUTO: 10.6 % — SIGNIFICANT CHANGE UP (ref 2–14)
NEUTROPHILS # BLD AUTO: 3.69 K/UL — SIGNIFICANT CHANGE UP (ref 1.8–7.4)
NEUTROPHILS NFR BLD AUTO: 55.3 % — SIGNIFICANT CHANGE UP (ref 43–77)
NRBC # BLD: 0 /100 WBCS — SIGNIFICANT CHANGE UP
NRBC # FLD: 0 K/UL — SIGNIFICANT CHANGE UP
PHOSPHATE SERPL-MCNC: 3.6 MG/DL — SIGNIFICANT CHANGE UP (ref 2.5–4.5)
PLATELET # BLD AUTO: 224 K/UL — SIGNIFICANT CHANGE UP (ref 150–400)
POTASSIUM SERPL-MCNC: 3.8 MMOL/L — SIGNIFICANT CHANGE UP (ref 3.5–5.3)
POTASSIUM SERPL-SCNC: 3.8 MMOL/L — SIGNIFICANT CHANGE UP (ref 3.5–5.3)
PROT SERPL-MCNC: 7.1 G/DL — SIGNIFICANT CHANGE UP (ref 6–8.3)
RBC # BLD: 4.23 M/UL — SIGNIFICANT CHANGE UP (ref 3.8–5.2)
RBC # FLD: 15.8 % — HIGH (ref 10.3–14.5)
SARS-COV-2 IGG+IGM SERPL QL IA: >250 U/ML — HIGH
SARS-COV-2 IGG+IGM SERPL QL IA: POSITIVE
SODIUM SERPL-SCNC: 147 MMOL/L — HIGH (ref 135–145)
SPECIMEN SOURCE: SIGNIFICANT CHANGE UP
WBC # BLD: 6.69 K/UL — SIGNIFICANT CHANGE UP (ref 3.8–10.5)
WBC # FLD AUTO: 6.69 K/UL — SIGNIFICANT CHANGE UP (ref 3.8–10.5)

## 2021-11-04 PROCEDURE — 12345: CPT | Mod: NC

## 2021-11-04 PROCEDURE — 99223 1ST HOSP IP/OBS HIGH 75: CPT

## 2021-11-04 RX ORDER — OLANZAPINE 15 MG/1
2.5 TABLET, FILM COATED ORAL EVERY 6 HOURS
Refills: 0 | Status: DISCONTINUED | OUTPATIENT
Start: 2021-11-04 | End: 2021-11-17

## 2021-11-04 RX ORDER — INSULIN LISPRO 100/ML
VIAL (ML) SUBCUTANEOUS
Refills: 0 | Status: DISCONTINUED | OUTPATIENT
Start: 2021-11-04 | End: 2021-11-05

## 2021-11-04 RX ORDER — DEXTROSE 50 % IN WATER 50 %
15 SYRINGE (ML) INTRAVENOUS ONCE
Refills: 0 | Status: DISCONTINUED | OUTPATIENT
Start: 2021-11-04 | End: 2021-11-05

## 2021-11-04 RX ORDER — DEXTROSE 50 % IN WATER 50 %
12.5 SYRINGE (ML) INTRAVENOUS ONCE
Refills: 0 | Status: DISCONTINUED | OUTPATIENT
Start: 2021-11-04 | End: 2021-11-05

## 2021-11-04 RX ORDER — CLONAZEPAM 1 MG
0.5 TABLET ORAL
Refills: 0 | Status: DISCONTINUED | OUTPATIENT
Start: 2021-11-04 | End: 2021-11-11

## 2021-11-04 RX ORDER — DIVALPROEX SODIUM 500 MG/1
500 TABLET, DELAYED RELEASE ORAL DAILY
Refills: 0 | Status: DISCONTINUED | OUTPATIENT
Start: 2021-11-04 | End: 2021-11-05

## 2021-11-04 RX ORDER — LANOLIN ALCOHOL/MO/W.PET/CERES
3 CREAM (GRAM) TOPICAL
Refills: 0 | Status: DISCONTINUED | OUTPATIENT
Start: 2021-11-04 | End: 2021-11-19

## 2021-11-04 RX ORDER — DEXTROSE 50 % IN WATER 50 %
25 SYRINGE (ML) INTRAVENOUS ONCE
Refills: 0 | Status: DISCONTINUED | OUTPATIENT
Start: 2021-11-04 | End: 2021-11-05

## 2021-11-04 RX ORDER — VALPROIC ACID (AS SODIUM SALT) 250 MG/5ML
625 SOLUTION, ORAL ORAL
Qty: 0 | Refills: 0 | DISCHARGE

## 2021-11-04 RX ORDER — SODIUM CHLORIDE 9 MG/ML
1000 INJECTION, SOLUTION INTRAVENOUS ONCE
Refills: 0 | Status: COMPLETED | OUTPATIENT
Start: 2021-11-04 | End: 2021-11-04

## 2021-11-04 RX ORDER — VALPROIC ACID (AS SODIUM SALT) 250 MG/5ML
1 SOLUTION, ORAL ORAL
Qty: 0 | Refills: 0 | DISCHARGE

## 2021-11-04 RX ORDER — ACETAMINOPHEN 500 MG
650 TABLET ORAL EVERY 6 HOURS
Refills: 0 | Status: DISCONTINUED | OUTPATIENT
Start: 2021-11-04 | End: 2021-11-19

## 2021-11-04 RX ORDER — LANOLIN ALCOHOL/MO/W.PET/CERES
3 CREAM (GRAM) TOPICAL AT BEDTIME
Refills: 0 | Status: DISCONTINUED | OUTPATIENT
Start: 2021-11-04 | End: 2021-11-04

## 2021-11-04 RX ORDER — INSULIN LISPRO 100/ML
VIAL (ML) SUBCUTANEOUS AT BEDTIME
Refills: 0 | Status: DISCONTINUED | OUTPATIENT
Start: 2021-11-04 | End: 2021-11-05

## 2021-11-04 RX ORDER — DIVALPROEX SODIUM 500 MG/1
500 TABLET, DELAYED RELEASE ORAL AT BEDTIME
Refills: 0 | Status: DISCONTINUED | OUTPATIENT
Start: 2021-11-04 | End: 2021-11-05

## 2021-11-04 RX ORDER — ENOXAPARIN SODIUM 100 MG/ML
40 INJECTION SUBCUTANEOUS DAILY
Refills: 0 | Status: DISCONTINUED | OUTPATIENT
Start: 2021-11-04 | End: 2021-11-19

## 2021-11-04 RX ORDER — DIVALPROEX SODIUM 500 MG/1
125 TABLET, DELAYED RELEASE ORAL AT BEDTIME
Refills: 0 | Status: DISCONTINUED | OUTPATIENT
Start: 2021-11-04 | End: 2021-11-05

## 2021-11-04 RX ORDER — ONDANSETRON 8 MG/1
4 TABLET, FILM COATED ORAL EVERY 8 HOURS
Refills: 0 | Status: DISCONTINUED | OUTPATIENT
Start: 2021-11-04 | End: 2021-11-19

## 2021-11-04 RX ORDER — METOPROLOL TARTRATE 50 MG
25 TABLET ORAL
Refills: 0 | Status: DISCONTINUED | OUTPATIENT
Start: 2021-11-04 | End: 2021-11-19

## 2021-11-04 RX ORDER — HALOPERIDOL DECANOATE 100 MG/ML
5 INJECTION INTRAMUSCULAR EVERY 12 HOURS
Refills: 0 | Status: DISCONTINUED | OUTPATIENT
Start: 2021-11-04 | End: 2021-11-04

## 2021-11-04 RX ORDER — GLUCAGON INJECTION, SOLUTION 0.5 MG/.1ML
1 INJECTION, SOLUTION SUBCUTANEOUS ONCE
Refills: 0 | Status: DISCONTINUED | OUTPATIENT
Start: 2021-11-04 | End: 2021-11-05

## 2021-11-04 RX ORDER — FUROSEMIDE 40 MG
20 TABLET ORAL DAILY
Refills: 0 | Status: DISCONTINUED | OUTPATIENT
Start: 2021-11-04 | End: 2021-11-19

## 2021-11-04 RX ADMIN — Medication 25 MILLIGRAM(S): at 06:05

## 2021-11-04 RX ADMIN — DIVALPROEX SODIUM 500 MILLIGRAM(S): 500 TABLET, DELAYED RELEASE ORAL at 22:46

## 2021-11-04 RX ADMIN — DIVALPROEX SODIUM 125 MILLIGRAM(S): 500 TABLET, DELAYED RELEASE ORAL at 22:46

## 2021-11-04 RX ADMIN — SODIUM CHLORIDE 1000 MILLILITER(S): 9 INJECTION, SOLUTION INTRAVENOUS at 08:40

## 2021-11-04 RX ADMIN — Medication 1 APPLICATION(S): at 22:46

## 2021-11-04 RX ADMIN — Medication 20 MILLIGRAM(S): at 06:05

## 2021-11-04 RX ADMIN — DIVALPROEX SODIUM 500 MILLIGRAM(S): 500 TABLET, DELAYED RELEASE ORAL at 12:26

## 2021-11-04 RX ADMIN — ENOXAPARIN SODIUM 40 MILLIGRAM(S): 100 INJECTION SUBCUTANEOUS at 12:26

## 2021-11-04 RX ADMIN — Medication 3 MILLIGRAM(S): at 22:46

## 2021-11-04 RX ADMIN — Medication 0.5 MILLIGRAM(S): at 06:05

## 2021-11-04 RX ADMIN — Medication 1: at 16:48

## 2021-11-04 RX ADMIN — Medication 1 APPLICATION(S): at 06:34

## 2021-11-04 RX ADMIN — Medication 25 MILLIGRAM(S): at 17:48

## 2021-11-04 RX ADMIN — OLANZAPINE 2.5 MILLIGRAM(S): 15 TABLET, FILM COATED ORAL at 17:24

## 2021-11-04 NOTE — H&P ADULT - NSHPLABSRESULTS_GEN_ALL_CORE
LABS:                         13.7   6.24  )-----------( 243      ( 2021 12:03 )             43.2     11    146<H>  |  109<H>  |  17  ----------------------------<  178<H>  4.4   |  25  |  0.54    Ca    9.8      2021 12:03    TPro  7.4  /  Alb  4.1  /  TBili  0.2  /  DBili  x   /  AST  33<H>  /  ALT  14  /  AlkPhos  63        Urinalysis Basic - ( 2021 18:47 )    Color: Yellow / Appearance: Clear / S.029 / pH: x  Gluc: x / Ketone: Trace  / Bili: Negative / Urobili: <2 mg/dL   Blood: x / Protein: Trace / Nitrite: Negative   Leuk Esterase: Negative / RBC: 2 /HPF / WBC 4 /HPF   Sq Epi: x / Non Sq Epi: 1 /HPF / Bacteria: Negative            MICROBIOLOGY    IMAGING    < from: CT Head No Cont (21 @ 13:10) >    IMPRESSION:    No acute intracranial hemorrhage or brain edema.    Encephalomalacia and gliosis involving the bilateral anterior frontal and left temporal lobes. Gliotic right parietal shunt tract.    Ex vacuo dilatation of the bilateral frontal horns. Otherwise, no hydrocephalus.    < end of copied text >    < from: Xray Shunt Series (21 @ 13:33) >    FINDINGS:  There is a  shunt coursing out of a left-sided gianna hole.    The  shunt tip is identified in the region of the left lateral ventricle and courses within the soft tissues of the  neck, left hemithorax, and finally into the abdomen. The distal end is seen in the lower right hemiabdomen. There is no discontinuity or kinks.    IMPRESSION: Normal shunt series.    < end of copied text >        CARDIOLOGY  EKG: NSR, rate 85, QTc LABS:                         13.7   6.24  )-----------( 243      ( 2021 12:03 )             43.2     11    146<H>  |  109<H>  |  17  ----------------------------<  178<H>  4.4   |  25  |  0.54    Ca    9.8      2021 12:03    TPro  7.4  /  Alb  4.1  /  TBili  0.2  /  DBili  x   /  AST  33<H>  /  ALT  14  /  AlkPhos  63        Urinalysis Basic - ( 2021 18:47 )    Color: Yellow / Appearance: Clear / S.029 / pH: x  Gluc: x / Ketone: Trace  / Bili: Negative / Urobili: <2 mg/dL   Blood: x / Protein: Trace / Nitrite: Negative   Leuk Esterase: Negative / RBC: 2 /HPF / WBC 4 /HPF   Sq Epi: x / Non Sq Epi: 1 /HPF / Bacteria: Negative            MICROBIOLOGY    IMAGING    < from: CT Head No Cont (21 @ 13:10) >    IMPRESSION:    No acute intracranial hemorrhage or brain edema.    Encephalomalacia and gliosis involving the bilateral anterior frontal and left temporal lobes. Gliotic right parietal shunt tract.    Ex vacuo dilatation of the bilateral frontal horns. Otherwise, no hydrocephalus.    < end of copied text >    < from: Xray Shunt Series (21 @ 13:33) >    FINDINGS:  There is a  shunt coursing out of a left-sided gianna hole.    The  shunt tip is identified in the region of the left lateral ventricle and courses within the soft tissues of the  neck, left hemithorax, and finally into the abdomen. The distal end is seen in the lower right hemiabdomen. There is no discontinuity or kinks.    IMPRESSION: Normal shunt series.    < end of copied text >        CARDIOLOGY  EKG: NSR, rate 85, QTc  449

## 2021-11-04 NOTE — PROGRESS NOTE ADULT - SUBJECTIVE AND OBJECTIVE BOX
[facility] Division of Hospital Medicine  Guillermina Gomez MD  Pager: [phone]      Patient is a 66y old  Female who presents with a chief complaint of Dementia with behavioral disturbance (2021 02:14)      SUBJECTIVE / OVERNIGHT EVENTS:    MEDICATIONS  (STANDING):  clonazePAM  Tablet 0.5 milliGRAM(s) Oral two times a day  dextrose 40% Gel 15 Gram(s) Oral once  dextrose 50% Injectable 25 Gram(s) IV Push once  dextrose 50% Injectable 12.5 Gram(s) IV Push once  dextrose 50% Injectable 25 Gram(s) IV Push once  diVALproex  milliGRAM(s) Oral at bedtime  diVALproex  milliGRAM(s) Oral at bedtime  diVALproex  milliGRAM(s) Oral daily  enoxaparin Injectable 40 milliGRAM(s) SubCutaneous daily  furosemide    Tablet 20 milliGRAM(s) Oral daily  glucagon  Injectable 1 milliGRAM(s) IntraMuscular once  insulin lispro (ADMELOG) corrective regimen sliding scale   SubCutaneous three times a day before meals  insulin lispro (ADMELOG) corrective regimen sliding scale   SubCutaneous at bedtime  metoprolol tartrate 25 milliGRAM(s) Oral two times a day  triamcinolone 0.1% Cream 1 Application(s) Topical every 12 hours    MEDICATIONS  (PRN):  acetaminophen     Tablet .. 650 milliGRAM(s) Oral every 6 hours PRN Temp greater or equal to 38C (100.4F), Mild Pain (1 - 3)  aluminum hydroxide/magnesium hydroxide/simethicone Suspension 30 milliLiter(s) Oral every 4 hours PRN Dyspepsia  haloperidol    Injectable 5 milliGRAM(s) IntraMuscular every 12 hours PRN agitation  melatonin 3 milliGRAM(s) Oral at bedtime PRN Insomnia  ondansetron Injectable 4 milliGRAM(s) IV Push every 8 hours PRN Nausea and/or Vomiting      CAPILLARY BLOOD GLUCOSE      POCT Blood Glucose.: 126 mg/dL (2021 08:25)  POCT Blood Glucose.: 116 mg/dL (2021 02:27)    I&O's Summary      PHYSICAL EXAM:  Vital Signs Last 24 Hrs  T(C): 36.7 (2021 06:04), Max: 36.8 (2021 15:01)  T(F): 98 (2021 06:04), Max: 98.3 (2021 01:50)  HR: 71 (2021 06:04) (66 - 88)  BP: 136/71 (2021 06:04) (106/54 - 140/77)  BP(mean): --  RR: 18 (2021 06:04) (16 - 20)  SpO2: 100% (2021 06:04) (98% - 100%)      LABS:                        12.8   6.69  )-----------( 224      ( 2021 06:58 )             41.8     11-04    147<H>  |  111<H>  |  18  ----------------------------<  108<H>  3.8   |  24  |  0.57    Ca    9.5      2021 06:58  Phos  3.6     11-04  Mg     2.30     11-04    TPro  7.1  /  Alb  4.0  /  TBili  <0.2  /  DBili  x   /  AST  17  /  ALT  10  /  AlkPhos  58  11-04          Urinalysis Basic - ( 2021 18:47 )    Color: Yellow / Appearance: Clear / S.029 / pH: x  Gluc: x / Ketone: Trace  / Bili: Negative / Urobili: <2 mg/dL   Blood: x / Protein: Trace / Nitrite: Negative   Leuk Esterase: Negative / RBC: 2 /HPF / WBC 4 /HPF   Sq Epi: x / Non Sq Epi: 1 /HPF / Bacteria: Negative          RADIOLOGY & ADDITIONAL TESTS:  Results Reviewed:   Imaging Personally Reviewed:  Electrocardiogram Personally Reviewed:    COORDINATION OF CARE:  Care Discussed with Consultants/Other Providers [Y/N]:  Prior or Outpatient Records Reviewed [Y/N]:   [facility] Division of Hospital Medicine  Guillermina Gomez MD  Pager: [phone]      Patient is a 66y old  Female who presents with a chief complaint of Dementia with behavioral disturbance (2021 02:14)      SUBJECTIVE / OVERNIGHT EVENTS: patient extremely confused, able to tell me her name but aside from that confused. States she is in [employer], will not respond when asked about date. States her feet are fish.     MEDICATIONS  (STANDING):  clonazePAM  Tablet 0.5 milliGRAM(s) Oral two times a day  dextrose 40% Gel 15 Gram(s) Oral once  dextrose 50% Injectable 25 Gram(s) IV Push once  dextrose 50% Injectable 12.5 Gram(s) IV Push once  dextrose 50% Injectable 25 Gram(s) IV Push once  diVALproex  milliGRAM(s) Oral at bedtime  diVALproex  milliGRAM(s) Oral at bedtime  diVALproex  milliGRAM(s) Oral daily  enoxaparin Injectable 40 milliGRAM(s) SubCutaneous daily  furosemide    Tablet 20 milliGRAM(s) Oral daily  glucagon  Injectable 1 milliGRAM(s) IntraMuscular once  insulin lispro (ADMELOG) corrective regimen sliding scale   SubCutaneous three times a day before meals  insulin lispro (ADMELOG) corrective regimen sliding scale   SubCutaneous at bedtime  metoprolol tartrate 25 milliGRAM(s) Oral two times a day  triamcinolone 0.1% Cream 1 Application(s) Topical every 12 hours    MEDICATIONS  (PRN):  acetaminophen     Tablet .. 650 milliGRAM(s) Oral every 6 hours PRN Temp greater or equal to 38C (100.4F), Mild Pain (1 - 3)  aluminum hydroxide/magnesium hydroxide/simethicone Suspension 30 milliLiter(s) Oral every 4 hours PRN Dyspepsia  haloperidol    Injectable 5 milliGRAM(s) IntraMuscular every 12 hours PRN agitation  melatonin 3 milliGRAM(s) Oral at bedtime PRN Insomnia  ondansetron Injectable 4 milliGRAM(s) IV Push every 8 hours PRN Nausea and/or Vomiting      CAPILLARY BLOOD GLUCOSE      POCT Blood Glucose.: 126 mg/dL (2021 08:25)  POCT Blood Glucose.: 116 mg/dL (2021 02:27)    I&O's Summary      PHYSICAL EXAM:  Vital Signs Last 24 Hrs  T(C): 36.7 (2021 06:04), Max: 36.8 (2021 15:01)  T(F): 98 (2021 06:04), Max: 98.3 (2021 01:50)  HR: 71 (2021 06:04) (66 - 88)  BP: 136/71 (2021 06:04) (106/54 - 140/77)  BP(mean): --  RR: 18 (2021 06:04) (16 - 20)  SpO2: 100% (2021 06:04) (98% - 100%)    CONSTITUTIONAL: NAD   ENMT: Moist oral mucosa  RESPIRATORY: Normal respiratory effort; lungs are clear to auscultation bilaterally  CARDIOVASCULAR:  S1/S2; No lower extremity edema  ABDOMEN: Nontender to palpation, normoactive bowel sounds, no rebound/guarding  MUSCULOSKELETAL: moving all ext   PSYCH: A&Ox1; childish affect; not responding to questions appropriately    SKIN: maculopapular rash with excoriations over bilateral arms, back and upper thighs, appears in healing stages w/ scabbing     LABS:                        12.8   6.69  )-----------( 224      ( 2021 06:58 )             41.8     11-04    147<H>  |  111<H>  |  18  ----------------------------<  108<H>  3.8   |  24  |  0.57    Ca    9.5      2021 06:58  Phos  3.6     11-04  Mg     2.30     11-04    TPro  7.1  /  Alb  4.0  /  TBili  <0.2  /  DBili  x   /  AST  17  /  ALT  10  /  AlkPhos  58  11-04          Urinalysis Basic - ( 2021 18:47 )    Color: Yellow / Appearance: Clear / S.029 / pH: x  Gluc: x / Ketone: Trace  / Bili: Negative / Urobili: <2 mg/dL   Blood: x / Protein: Trace / Nitrite: Negative   Leuk Esterase: Negative / RBC: 2 /HPF / WBC 4 /HPF   Sq Epi: x / Non Sq Epi: 1 /HPF / Bacteria: Negative          RADIOLOGY & ADDITIONAL TESTS:  Results Reviewed:   Imaging Personally Reviewed:  Electrocardiogram Personally Reviewed:    COORDINATION OF CARE:  Care Discussed with Consultants/Other Providers [Y/N]:  Prior or Outpatient Records Reviewed [Y/N]:

## 2021-11-04 NOTE — BH CONSULTATION LIAISON ASSESSMENT NOTE - NSBHCHARTREVIEWVS_PSY_A_CORE FT
Vital Signs Last 24 Hrs  T(C): 36.7 (04 Nov 2021 06:04), Max: 36.8 (03 Nov 2021 15:01)  T(F): 98 (04 Nov 2021 06:04), Max: 98.3 (04 Nov 2021 01:50)  HR: 71 (04 Nov 2021 06:04) (66 - 88)  BP: 136/71 (04 Nov 2021 06:04) (106/54 - 140/77)  BP(mean): --  RR: 18 (04 Nov 2021 06:04) (16 - 20)  SpO2: 100% (04 Nov 2021 06:04) (98% - 100%)
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
## 2021-11-04 NOTE — H&P ADULT - PROBLEM SELECTOR PLAN 5
DVT:   Diet: CC w/ evening snack  Dispo: Nursing Home, pending PT -Hypoglycemia Protocol  -Low dose ISS,  -BGM before meals and bedtime  -Diet: Consistent Carbs w/ Evening Snack.  -Follow up HbA1c - Low dose ISS  - BGM before meals and bedtime  - Follow up HbA1c  - Will order dysphagia screen prior to restarting diet

## 2021-11-04 NOTE — H&P ADULT - PROBLEM SELECTOR PLAN 8
DVT: Lovenox  Diet: CC w/ evening snack  Dispo: Nursing Home, pending PT DVT: Lovenox  Diet: Bedside dysphagia Screen, then CC w/ evening snack  Dispo: Nursing Home, pending PT

## 2021-11-04 NOTE — PROGRESS NOTE ADULT - PROBLEM SELECTOR PLAN 2
- Patient with history of NPH, s/p  shunt placement. Per EMR, remote history of  shunt infection.  - ED: Xray Shunt series w/ normal shunt, CTH: Ex vacuo dilatation of the bilateral frontal horns. Otherwise, no hydrocephalus. Gliotic R. parietal shunt tract  - continue to monitor - Patient with history of NPH, s/p  shunt placement. Per EMR, remote history of  shunt infection.  - ED: Xray Shunt series w/ normal shunt, CTH: Ex vacuo dilatation of the bilateral frontal horns. Otherwise, no hydrocephalus. Gliotic R. parietal shunt tract

## 2021-11-04 NOTE — PROGRESS NOTE ADULT - PROBLEM SELECTOR PLAN 6
- Patient with hx of HTN, in ED /66  - BP goal of < 150/90 (age > 60)  - monitor BP  - c/w metoprolol tartrate 25mg BID BP acceptable   - monitor BP  - c/w metoprolol tartrate 25mg BID

## 2021-11-04 NOTE — BH CONSULTATION LIAISON ASSESSMENT NOTE - RISK ASSESSMENT
chronic risks== tbi, impulsive, dementia, requires supervision  has few acute risks== lability in mood, impulsivity, agitation, frequent ed visits  protective factors=== lives in a locked dementia unit where she gets supervision

## 2021-11-04 NOTE — BH CONSULTATION LIAISON ASSESSMENT NOTE - OTHER PAST PSYCHIATRIC HISTORY (INCLUDE DETAILS REGARDING ONSET, COURSE OF ILLNESS, INPATIENT/OUTPATIENT TREATMENT)
1. Have you been to the ER, urgent care clinic since your last visit? Hospitalized since your last visit? Yes Reason for visit: [address] UTI/ feeling strange    2. Have you seen or consulted any other health care providers outside of the [address] [place] [address] since your last visit? Include any pap smears or colon screening.  No    Results for orders placed or performed in visit on 07/20/18   AMB POC URINALYSIS DIP STICK MANUAL W/O MICRO   Result Value Ref Range    Color (UA POC) Yellow     Clarity (UA POC) Clear     Glucose (UA POC) Negative Negative    Bilirubin (UA POC) Negative Negative    Ketones (UA POC) Negative Negative    Specific gravity (UA POC) 1.025 1.001 - 1.035    Blood (UA POC) Trace Negative    pH (UA POC) 6.5 4.6 - 8.0    Protein (UA POC) Trace Negative    Urobilinogen (UA POC) normal 0.2 - 1    Nitrites (UA POC) Negative Negative    Leukocyte esterase (UA POC) 4+ Negative Unable to asses   see hpi
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
## 2021-11-04 NOTE — H&P ADULT - NSHPPHYSICALEXAM_GEN_ALL_CORE
ICU Vital Signs Last 24 Hrs  T(C): 36.8 (04 Nov 2021 01:50), Max: 36.8 (03 Nov 2021 15:01)  T(F): 98.3 (04 Nov 2021 01:50), Max: 98.3 (04 Nov 2021 01:50)  HR: 76 (04 Nov 2021 01:50) (66 - 88)  BP: 106/54 (04 Nov 2021 01:50) (106/54 - 140/77)  BP(mean): --  ABP: --  ABP(mean): --  RR: 16 (04 Nov 2021 01:50) (16 - 20)  SpO2: 100% (04 Nov 2021 01:50) (98% - 100%)    PHYSICAL EXAM:  GENERAL: NAD, well-groomed, well-developed, AOx1  HEAD:  Atraumatic, Normocephalic  EYES: R pupil > Left, R pupil slow reacting to light, L pupil reactive, EOMI, conjunctiva and sclera clear  ENMT: No tonsillar erythema, exudates, or enlargement; Moist mucous membranes  NECK: Supple, No JVD, Normal thyroid  HEART: Regular rate and rhythm; No murmurs, rubs, or gallops  RESPIRATORY: CTA B/L, No W/R/R  ABDOMEN: Soft, Nontender, Nondistended; Bowel sounds present  NEUROLOGY: A&Ox3, nonfocal, moving all extremities  EXTREMITIES:  2+ Peripheral Pulses, No clubbing, cyanosis, or edema  SKIN: warm, dry, normal color, no rash or abnormal lesions  Psych: pleasantly demented, not currently aggressive, stable mood ICU Vital Signs Last 24 Hrs  T(C): 36.8 (04 Nov 2021 01:50), Max: 36.8 (03 Nov 2021 15:01)  T(F): 98.3 (04 Nov 2021 01:50), Max: 98.3 (04 Nov 2021 01:50)  HR: 76 (04 Nov 2021 01:50) (66 - 88)  BP: 106/54 (04 Nov 2021 01:50) (106/54 - 140/77)  BP(mean): --  ABP: --  ABP(mean): --  RR: 16 (04 Nov 2021 01:50) (16 - 20)  SpO2: 100% (04 Nov 2021 01:50) (98% - 100%)    PHYSICAL EXAM:  GENERAL: NAD, well-groomed, well-developed, AOx1  HEAD:  Atraumatic, Normocephalic  EYES: R pupil > Left, R pupil slow reacting to light, L pupil reactive, EOMI, conjunctiva and sclera clear  ENMT: No tonsillar erythema, exudates, or enlargement; Moist mucous membranes  NECK: Supple, No JVD, Normal thyroid  HEART: Regular rate and rhythm; No murmurs, rubs, or gallops  RESPIRATORY: CTA B/L, No W/R/R  ABDOMEN: Soft, Nontender, Nondistended; Bowel sounds present  NEUROLOGY: A&Ox3, nonfocal, moving all extremities  EXTREMITIES:  2+ Peripheral Pulses, No clubbing, cyanosis, or edema  SKIN: pruritic, macular rash coalescing into dark red patches on trunk, bilateral proximal extremities, blanchable, poorly demarcated, non-painful, +scattered papules on upper chest, dry,   Psych: pleasantly demented, not currently aggressive, stable mood ICU Vital Signs Last 24 Hrs  T(C): 36.8 (04 Nov 2021 01:50), Max: 36.8 (03 Nov 2021 15:01)  T(F): 98.3 (04 Nov 2021 01:50), Max: 98.3 (04 Nov 2021 01:50)  HR: 76 (04 Nov 2021 01:50) (66 - 88)  BP: 106/54 (04 Nov 2021 01:50) (106/54 - 140/77)  BP(mean): --  ABP: --  ABP(mean): --  RR: 16 (04 Nov 2021 01:50) (16 - 20)  SpO2: 100% (04 Nov 2021 01:50) (98% - 100%)    PHYSICAL EXAM:  GENERAL: NAD, well-groomed, well-developed, AOx1  HEAD:  Atraumatic, Normocephalic  EYES: R pupil > Left, R pupil slow reacting to light, L pupil reactive, EOMI, conjunctiva and sclera clear  ENMT: No tonsillar erythema, exudates, or enlargement; Moist mucous membranes  NECK: Supple, No JVD, Normal thyroid  HEART: Regular rate and rhythm; No murmurs, rubs, or gallops  RESPIRATORY: CTA B/L, No W/R/R  ABDOMEN: Soft, Nontender, Nondistended; Bowel sounds present  NEUROLOGY: A&Ox3, nonfocal, moving all extremities, mild resting tremor b/l upper extremity  EXTREMITIES:  +1 pitting edema, No clubbing, cyanosis, or edema  SKIN: pruritic, macular rash coalescing into dark red patches on trunk, bilateral proximal extremities, blanchable, poorly demarcated, non-painful, +scattered papules on upper chest, dry,   Psych: pleasantly demented, not currently aggressive, stable mood

## 2021-11-04 NOTE — PROGRESS NOTE ADULT - PROBLEM SELECTOR PLAN 5
- Low dose ISS  - BGM before meals and bedtime  - Follow up HbA1c  - Will order dysphagia screen prior to restarting diet - Low dose ISS  - BGM before meals and bedtime  - HbA1c 5.4

## 2021-11-04 NOTE — BH CONSULTATION LIAISON ASSESSMENT NOTE - CASE SUMMARY
Met with the patient along with fellow Dr Dickerson, impression and plan discussed and agreed upon. Above note was reviewed and edited as needed. Agree with plan as above

## 2021-11-04 NOTE — PHYSICAL THERAPY INITIAL EVALUATION ADULT - PATIENT PROFILE REVIEW, REHAB EVAL
PT orders received: ambulate with assistance. Consult with RN Gris AUGUST, pt may participate in PT evaluation./yes

## 2021-11-04 NOTE — PROGRESS NOTE ADULT - PROBLEM SELECTOR PLAN 8
DVT: Lovenox  Diet: Bedside dysphagia Screen, then CC w/ evening snack  Dispo: Nursing Home, pending PT    Discussed with ACP Buffy DVT: Lovenox  Dispo: TBD     Discussed with ACP Buffy

## 2021-11-04 NOTE — PROGRESS NOTE ADULT - PROBLEM SELECTOR PLAN 3
- Per nursing home, patient recently discharged from OSH 2-3 days ago with new pruritic, macular rash coalescing into dark red patches on trunk + bilateral extremities, in addition to scattered papules on upper chest. Patient was discharged on benadryl without improvement.  - Of note, patient with allergies to latex, rubber, penicillin per EMR, however unknown reactions  - Differential includes drug reaction (recommend confirming if medications at OSH recent hospitalization), allergic reaction  - start triamcinolone 0.1 % cream  - d/c Benadryl 50mg QID  - Recommend Dermatology evaluation in AM - Per nursing home, patient recently discharged from OSH 2-3 days ago with new pruritic, macular rash coalescing into dark red patches on trunk + bilateral extremities, in addition to scattered papules on upper chest. Patient was discharged on benadryl without improvement.  - Of note, patient with allergies to latex, rubber, penicillin per EMR, however unknown reactions  - Differential includes drug reaction vs. allergic reaction  - start triamcinolone 0.1 % cream  - d/c Benadryl 50mg QID  - Will consider Dermatology evaluation if no improvement, however appears to be in healing stages currently

## 2021-11-04 NOTE — H&P ADULT - PROBLEM SELECTOR PLAN 6
- Patient with hx of HTN, in ED /66  - BP goal of < 150/90 (age > 60)  - monitor BP  - c/w metoprolol tartrate 25mg BID

## 2021-11-04 NOTE — PHYSICAL THERAPY INITIAL EVALUATION ADULT - ADDITIONAL COMMENTS
Patient is a poor historian. Please refer to Care Coordination Assessment for history/PLOF.    Patient was left sitting in chair, all lines/tubes intact and call bell within reach, RN aware

## 2021-11-04 NOTE — H&P ADULT - PROBLEM SELECTOR PLAN 3
- Per nursing home, patient recently discharged from OSH 2-3 days ago with new pruritic, macular rash colescing into dark red patches on trunk + bilateral extremities, in addition to scattered papules on upper chest. Patient was discharged on benadryl without improvement.  - Of note, patient with allergies to latex, rubber, penicillin per EMR, however unknown reactions  - Differential includes drug reaction (recommend confirming if medications at OSH recent hospitalization), allergic reaction  - start triamcinolone 0.1 % cream  - d/c Benadryl 50mg QID - Per nursing home, patient recently discharged from OSH 2-3 days ago with new pruritic, macular rash coalescing into dark red patches on trunk + bilateral extremities, in addition to scattered papules on upper chest. Patient was discharged on benadryl without improvement.  - Of note, patient with allergies to latex, rubber, penicillin per EMR, however unknown reactions  - Differential includes drug reaction (recommend confirming if medications at OSH recent hospitalization), allergic reaction  - start triamcinolone 0.1 % cream  - d/c Benadryl 50mg QID  - Recommend Dermatology evaluation in AM

## 2021-11-04 NOTE — PROGRESS NOTE ADULT - PROBLEM SELECTOR PLAN 4
- Patient with unspecified mood disorder, possibly Bipolar per EMR  - c/w depakote 500 qam + 625 qhs  - c/w Clonazepam 0.5mg po BID  - patient previously on Seroquel 50 qd for MDD, however discontinued likely 2/2 to QTc 09/21. Lack of anti-depressant may be contributing to mood changes - Patient with unspecified mood disorder  - c/w depakote 500 qam + 625 qhs  - c/w Clonazepam 0.5mg po BID  - patient previously on Seroquel 50 qd for MDD, however discontinued likely 2/2 to QTc 09/21.  - Psych following
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
## 2021-11-04 NOTE — H&P ADULT - PROBLEM SELECTOR PLAN 2
- Patient with history of NPH, s/p  shunt placement. Per EMR, remote history of  shunt infection.  - ED: Xray Shunt series w/ normal shunt, CTH: Ex vacuo dilatation of the bilateral frontal horns. Otherwise, no hydrocephalus. Gliotic R. parietal shunt tract  - continue to monitor
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
## 2021-11-04 NOTE — H&P ADULT - NSHPSOCIALHISTORY_GEN_ALL_CORE
Alcohol Use: denies  Tobacco Use: denies  Illicit Drug Use: denies  Recent Travel: denies  Sick Contacts: denies  Occupation: retired  Living situation: nursing home
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
## 2021-11-04 NOTE — H&P ADULT - PROBLEM SELECTOR PLAN 4
- Patient with hx of HTN, in ED /66  - BP goal of < 150/90 (age > 60)  - monitor BP  - c/w - Patient with unspecified mood disorder, possibly Bipolar per EMR  - c/w Valproic Acid 500 qam + 625 qhs  - c/w Clonazepam 0.5mg po BID  - patient previously on Seroquel 50 qd for MDD, however discontinued 09/21 - Patient with unspecified mood disorder, possibly Bipolar per EMR  - c/w Valproic Acid 500 qam + 625 qhs  - c/w Clonazepam 0.5mg po BID  - patient previously on Seroquel 50 qd for MDD, however discontinued likely 2/2 to QTc 09/21. Lack of anti-depressant may be contributing to mood changes - Patient with unspecified mood disorder, possibly Bipolar per EMR  - c/w depakote 500 qam + 625 qhs  - c/w Clonazepam 0.5mg po BID  - patient previously on Seroquel 50 qd for MDD, however discontinued likely 2/2 to QTc 09/21. Lack of anti-depressant may be contributing to mood changes

## 2021-11-04 NOTE — BH CONSULTATION LIAISON ASSESSMENT NOTE - NSBHADMITCOUNSEL_PSY_A_CORE
instructions for management, treatment and follow up/risk factor reduction/client/family/caregiver education
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
## 2021-11-04 NOTE — BH CONSULTATION LIAISON ASSESSMENT NOTE - NSBHCHARTREVIEWLAB_PSY_A_CORE FT
.  LABS:                         12.8   6.69  )-----------( 224      ( 2021 06:58 )             41.8     11-04    147<H>  |  111<H>  |  18  ----------------------------<  108<H>  3.8   |  24  |  0.57    Ca    9.5      2021 06:58  Phos  3.6     11-04  Mg     2.30     11-04    TPro  7.1  /  Alb  4.0  /  TBili  <0.2  /  DBili  x   /  AST  17  /  ALT  10  /  AlkPhos  58  11-04      Urinalysis Basic - ( 2021 18:47 )    Color: Yellow / Appearance: Clear / S.029 / pH: x  Gluc: x / Ketone: Trace  / Bili: Negative / Urobili: <2 mg/dL   Blood: x / Protein: Trace / Nitrite: Negative   Leuk Esterase: Negative / RBC: 2 /HPF / WBC 4 /HPF   Sq Epi: x / Non Sq Epi: 1 /HPF / Bacteria: Negative

## 2021-11-04 NOTE — BH CONSULTATION LIAISON ASSESSMENT NOTE - CURRENT MEDICATION
MEDICATIONS  (STANDING):  clonazePAM  Tablet 0.5 milliGRAM(s) Oral two times a day  dextrose 40% Gel 15 Gram(s) Oral once  dextrose 50% Injectable 25 Gram(s) IV Push once  dextrose 50% Injectable 12.5 Gram(s) IV Push once  dextrose 50% Injectable 25 Gram(s) IV Push once  diVALproex  milliGRAM(s) Oral at bedtime  diVALproex  milliGRAM(s) Oral at bedtime  diVALproex  milliGRAM(s) Oral daily  enoxaparin Injectable 40 milliGRAM(s) SubCutaneous daily  furosemide    Tablet 20 milliGRAM(s) Oral daily  glucagon  Injectable 1 milliGRAM(s) IntraMuscular once  insulin lispro (ADMELOG) corrective regimen sliding scale   SubCutaneous three times a day before meals  insulin lispro (ADMELOG) corrective regimen sliding scale   SubCutaneous at bedtime  metoprolol tartrate 25 milliGRAM(s) Oral two times a day  triamcinolone 0.1% Cream 1 Application(s) Topical every 12 hours    MEDICATIONS  (PRN):  acetaminophen     Tablet .. 650 milliGRAM(s) Oral every 6 hours PRN Temp greater or equal to 38C (100.4F), Mild Pain (1 - 3)  aluminum hydroxide/magnesium hydroxide/simethicone Suspension 30 milliLiter(s) Oral every 4 hours PRN Dyspepsia  haloperidol    Injectable 5 milliGRAM(s) IntraMuscular every 12 hours PRN agitation  melatonin 3 milliGRAM(s) Oral at bedtime PRN Insomnia  ondansetron Injectable 4 milliGRAM(s) IV Push every 8 hours PRN Nausea and/or Vomiting

## 2021-11-05 ENCOUNTER — TRANSCRIPTION ENCOUNTER (OUTPATIENT)
Age: 66
End: 2021-11-05

## 2021-11-05 DIAGNOSIS — S06.9X9A UNSPECIFIED INTRACRANIAL INJURY WITH LOSS OF CONSCIOUSNESS OF UNSPECIFIED DURATION, INITIAL ENCOUNTER: ICD-10-CM

## 2021-11-05 LAB
ALBUMIN SERPL ELPH-MCNC: 4.1 G/DL — SIGNIFICANT CHANGE UP (ref 3.3–5)
ALP SERPL-CCNC: 61 U/L — SIGNIFICANT CHANGE UP (ref 40–120)
ALT FLD-CCNC: 12 U/L — SIGNIFICANT CHANGE UP (ref 4–33)
ANION GAP SERPL CALC-SCNC: 11 MMOL/L — SIGNIFICANT CHANGE UP (ref 7–14)
AST SERPL-CCNC: 19 U/L — SIGNIFICANT CHANGE UP (ref 4–32)
BASOPHILS # BLD AUTO: 0.05 K/UL — SIGNIFICANT CHANGE UP (ref 0–0.2)
BASOPHILS NFR BLD AUTO: 0.8 % — SIGNIFICANT CHANGE UP (ref 0–2)
BILIRUB SERPL-MCNC: 0.2 MG/DL — SIGNIFICANT CHANGE UP (ref 0.2–1.2)
BUN SERPL-MCNC: 18 MG/DL — SIGNIFICANT CHANGE UP (ref 7–23)
CALCIUM SERPL-MCNC: 9.9 MG/DL — SIGNIFICANT CHANGE UP (ref 8.4–10.5)
CHLORIDE SERPL-SCNC: 107 MMOL/L — SIGNIFICANT CHANGE UP (ref 98–107)
CO2 SERPL-SCNC: 26 MMOL/L — SIGNIFICANT CHANGE UP (ref 22–31)
COVID-19 NUCLEOCAPSID GAM AB INTERP: POSITIVE
COVID-19 NUCLEOCAPSID TOTAL GAM ANTIBODY RESULT: 38.3 INDEX — HIGH
CREAT SERPL-MCNC: 0.63 MG/DL — SIGNIFICANT CHANGE UP (ref 0.5–1.3)
EOSINOPHIL # BLD AUTO: 0.07 K/UL — SIGNIFICANT CHANGE UP (ref 0–0.5)
EOSINOPHIL NFR BLD AUTO: 1.1 % — SIGNIFICANT CHANGE UP (ref 0–6)
FOLATE SERPL-MCNC: 18.2 NG/ML — HIGH (ref 3.1–17.5)
GLUCOSE BLDC GLUCOMTR-MCNC: 106 MG/DL — HIGH (ref 70–99)
GLUCOSE BLDC GLUCOMTR-MCNC: 107 MG/DL — HIGH (ref 70–99)
GLUCOSE BLDC GLUCOMTR-MCNC: 120 MG/DL — HIGH (ref 70–99)
GLUCOSE BLDC GLUCOMTR-MCNC: 85 MG/DL — SIGNIFICANT CHANGE UP (ref 70–99)
GLUCOSE SERPL-MCNC: 105 MG/DL — HIGH (ref 70–99)
HCT VFR BLD CALC: 44.6 % — SIGNIFICANT CHANGE UP (ref 34.5–45)
HCV AB S/CO SERPL IA: 0.18 S/CO — SIGNIFICANT CHANGE UP (ref 0–0.99)
HCV AB SERPL-IMP: SIGNIFICANT CHANGE UP
HGB BLD-MCNC: 13.6 G/DL — SIGNIFICANT CHANGE UP (ref 11.5–15.5)
IANC: 3.19 K/UL — SIGNIFICANT CHANGE UP (ref 1.5–8.5)
IMM GRANULOCYTES NFR BLD AUTO: 0.5 % — SIGNIFICANT CHANGE UP (ref 0–1.5)
LYMPHOCYTES # BLD AUTO: 2.32 K/UL — SIGNIFICANT CHANGE UP (ref 1–3.3)
LYMPHOCYTES # BLD AUTO: 36.5 % — SIGNIFICANT CHANGE UP (ref 13–44)
MAGNESIUM SERPL-MCNC: 2.4 MG/DL — SIGNIFICANT CHANGE UP (ref 1.6–2.6)
MCHC RBC-ENTMCNC: 30.2 PG — SIGNIFICANT CHANGE UP (ref 27–34)
MCHC RBC-ENTMCNC: 30.5 GM/DL — LOW (ref 32–36)
MCV RBC AUTO: 98.9 FL — SIGNIFICANT CHANGE UP (ref 80–100)
MONOCYTES # BLD AUTO: 0.69 K/UL — SIGNIFICANT CHANGE UP (ref 0–0.9)
MONOCYTES NFR BLD AUTO: 10.9 % — SIGNIFICANT CHANGE UP (ref 2–14)
NEUTROPHILS # BLD AUTO: 3.19 K/UL — SIGNIFICANT CHANGE UP (ref 1.8–7.4)
NEUTROPHILS NFR BLD AUTO: 50.2 % — SIGNIFICANT CHANGE UP (ref 43–77)
NRBC # BLD: 0 /100 WBCS — SIGNIFICANT CHANGE UP
NRBC # FLD: 0 K/UL — SIGNIFICANT CHANGE UP
PHOSPHATE SERPL-MCNC: 3.4 MG/DL — SIGNIFICANT CHANGE UP (ref 2.5–4.5)
PLATELET # BLD AUTO: 228 K/UL — SIGNIFICANT CHANGE UP (ref 150–400)
POTASSIUM SERPL-MCNC: 4.1 MMOL/L — SIGNIFICANT CHANGE UP (ref 3.5–5.3)
POTASSIUM SERPL-SCNC: 4.1 MMOL/L — SIGNIFICANT CHANGE UP (ref 3.5–5.3)
PROT SERPL-MCNC: 7.7 G/DL — SIGNIFICANT CHANGE UP (ref 6–8.3)
RBC # BLD: 4.51 M/UL — SIGNIFICANT CHANGE UP (ref 3.8–5.2)
RBC # FLD: 15.6 % — HIGH (ref 10.3–14.5)
SARS-COV-2 IGG+IGM SERPL QL IA: 38.3 INDEX — HIGH
SARS-COV-2 IGG+IGM SERPL QL IA: POSITIVE
SODIUM SERPL-SCNC: 144 MMOL/L — SIGNIFICANT CHANGE UP (ref 135–145)
VALPROATE SERPL-MCNC: 41 UG/ML — LOW (ref 50–100)
VIT B12 SERPL-MCNC: 850 PG/ML — SIGNIFICANT CHANGE UP (ref 200–900)
WBC # BLD: 6.35 K/UL — SIGNIFICANT CHANGE UP (ref 3.8–10.5)
WBC # FLD AUTO: 6.35 K/UL — SIGNIFICANT CHANGE UP (ref 3.8–10.5)

## 2021-11-05 PROCEDURE — 99233 SBSQ HOSP IP/OBS HIGH 50: CPT

## 2021-11-05 PROCEDURE — 99232 SBSQ HOSP IP/OBS MODERATE 35: CPT

## 2021-11-05 RX ORDER — INFLUENZA VIRUS VACCINE 15; 15; 15; 15 UG/.5ML; UG/.5ML; UG/.5ML; UG/.5ML
0.7 SUSPENSION INTRAMUSCULAR ONCE
Refills: 0 | Status: COMPLETED | OUTPATIENT
Start: 2021-11-05 | End: 2021-11-05

## 2021-11-05 RX ORDER — DIVALPROEX SODIUM 500 MG/1
750 TABLET, DELAYED RELEASE ORAL ONCE
Refills: 0 | Status: COMPLETED | OUTPATIENT
Start: 2021-11-05 | End: 2021-11-05

## 2021-11-05 RX ORDER — DIVALPROEX SODIUM 500 MG/1
750 TABLET, DELAYED RELEASE ORAL
Refills: 0 | Status: DISCONTINUED | OUTPATIENT
Start: 2021-11-05 | End: 2021-11-08

## 2021-11-05 RX ADMIN — Medication 25 MILLIGRAM(S): at 17:46

## 2021-11-05 RX ADMIN — Medication 0.5 MILLIGRAM(S): at 17:46

## 2021-11-05 RX ADMIN — Medication 20 MILLIGRAM(S): at 05:57

## 2021-11-05 RX ADMIN — Medication 3 MILLIGRAM(S): at 21:47

## 2021-11-05 RX ADMIN — Medication 1 APPLICATION(S): at 17:46

## 2021-11-05 RX ADMIN — DIVALPROEX SODIUM 750 MILLIGRAM(S): 500 TABLET, DELAYED RELEASE ORAL at 17:43

## 2021-11-05 RX ADMIN — DIVALPROEX SODIUM 750 MILLIGRAM(S): 500 TABLET, DELAYED RELEASE ORAL at 11:52

## 2021-11-05 RX ADMIN — Medication 1 APPLICATION(S): at 05:57

## 2021-11-05 RX ADMIN — Medication 25 MILLIGRAM(S): at 05:57

## 2021-11-05 RX ADMIN — Medication 0.5 MILLIGRAM(S): at 05:57

## 2021-11-05 RX ADMIN — ENOXAPARIN SODIUM 40 MILLIGRAM(S): 100 INJECTION SUBCUTANEOUS at 11:52

## 2021-11-05 NOTE — PROGRESS NOTE ADULT - PROBLEM SELECTOR PLAN 3
- Per nursing home, patient recently discharged from OSH 2-3 days ago with new pruritic, macular rash coalescing into dark red patches on trunk + bilateral extremities, in addition to scattered papules on upper chest. Patient was discharged on benadryl without improvement.  - Of note, patient with allergies to latex, rubber, penicillin per EMR, however unknown reactions  - Differential includes drug reaction vs. allergic reaction  - C/w triamcinolone 0.1 % cream  - d/c Benadryl 50mg QID  - Will consider Dermatology evaluation if no improvement, however appears to be in healing stages currently

## 2021-11-05 NOTE — BH CONSULTATION LIAISON PROGRESS NOTE - NSBHFUPINTERVALCCFT_PSY_A_CORE
Admitted to [facility] in the setting of dementia with behavioral disturbance, mood disorder, RODY.  Started on Depakote PO Bid. Depakote level obtained 41. B12 Level 850 and Folate 18.2. Mg 2.4. Phosphorous 3.4   Admitted to [facility] in the setting of dementia with behavioral disturbance, mood disorder, RODY.  Started on Depakote PO Bid. Depakote level obtained 41. B12 Level 850 and Folate 18.2. Mg 2.4. Phosphorous 3.4  On prn Zyprexa yesterday for agitation   Admitted to [facility] in the setting of dementia with behavioral disturbance, mood disorder, RODY.  Started on Depakote PO Bid. Depakote level obtained 41. B12 Level 850 and Folate 18.2. Mg 2.4. Phosphorous 3.4  On prn Zyprexa yesterday for agitation  Discussed with medicine team this morning=== discussed behaviors, discussed below recommendations, discussed likely need for inpatient psychiatry
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
## 2021-11-05 NOTE — PROGRESS NOTE ADULT - PROBLEM SELECTOR PLAN 4
- Patient with unspecified mood disorder  - Increased depakote to 750mg BID per psych recs   - c/w Clonazepam 0.5mg po BID  - patient previously on Seroquel 50 qd for MDD, however discontinued likely 2/2 to QTc 09/21.

## 2021-11-05 NOTE — PROGRESS NOTE ADULT - PROBLEM SELECTOR PLAN 2
- Patient with history of NPH, s/p  shunt placement. Per EMR, remote history of  shunt infection.  - ED: Xray Shunt series w/ normal shunt, CTH: Ex vacuo dilatation of the bilateral frontal horns. Otherwise, no hydrocephalus. Gliotic R. parietal shunt tract
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
## 2021-11-05 NOTE — DISCHARGE NOTE PROVIDER - NSDCCPCAREPLAN_GEN_ALL_CORE_FT
PRINCIPAL DISCHARGE DIAGNOSIS  Diagnosis: Agitation due to dementia  Assessment and Plan of Treatment: Please continue your medications as prescribed and allow help with daily acitivities of living. Maintain a safe environment and make movements in a careful manner to prevent falls. Ensure that you are eating and drinking adequately and maintaining a healthy sleep cycle.   If you are in need of assistance with medication adjustment you can follow-up with your outpatient provider or refer to the [facility] Geriatric Psychiatry clinic by calling [phone].       PRINCIPAL DISCHARGE DIAGNOSIS  Diagnosis: Agitation due to dementia  Assessment and Plan of Treatment: Please continue your medications as prescribed and allow help with daily acitivities of living. Maintain a safe environment and make movements in a careful manner to prevent falls. Ensure that you are eating and drinking adequately and maintaining a healthy sleep cycle.   If you are in need of assistance with medication adjustment you can follow-up with your outpatient provider or refer to the [facility] Geriatric Psychiatry clinic by calling [phone].      SECONDARY DISCHARGE DIAGNOSES  Diagnosis: Hypertension  Assessment and Plan of Treatment: Low sodium and fat diet, continue anti-hypertensive medications, and follow up with primary care physician.       PRINCIPAL DISCHARGE DIAGNOSIS  Diagnosis: Agitation due to dementia  Assessment and Plan of Treatment: Please continue your medications as prescribed and allow help with daily acitivities of living. Maintain a safe environment and make movements in a careful manner to prevent falls. Ensure that you are eating and drinking adequately and maintaining a healthy sleep cycle.   If you are in need of assistance with medication adjustment you can follow-up with your outpatient provider or refer to the [facility] Geriatric Psychiatry clinic by calling [phone].      SECONDARY DISCHARGE DIAGNOSES  Diagnosis: Hypertension  Assessment and Plan of Treatment: Low sodium and fat diet, continue anti-hypertensive medications, and follow up with primary care physician.      Diagnosis: Edema leg  Assessment and Plan of Treatment: continue with lasix as prescribed.    Diagnosis: Mood disorder  Assessment and Plan of Treatment: continue with depakote to 750mg 2x/day &  Clonazepam 0.5mg 2x/day.    Diagnosis: Rash  Assessment and Plan of Treatment: Per nursing home, patient recently discharged from OSH 2-3 days ago with new pruritic, macular rash coalescing into dark red patches on trunk + bilateral extremities, in addition to scattered papules on upper chest. Patient was discharged on benadryl without improvement.  Continue with triamcinolone 0.1 % cream    Diagnosis: Type 2 diabetes mellitus  Assessment and Plan of Treatment: A1c 5.4

## 2021-11-05 NOTE — PROGRESS NOTE ADULT - SUBJECTIVE AND OBJECTIVE BOX
[facility] Division of Hospital Medicine  Guillermina Gomez MD  Pager: [phone]      Patient is a 66y old  Female who presents with a chief complaint of Dementia with behavioral disturbance (2021 08:06)      SUBJECTIVE / OVERNIGHT EVENTS: patient agitated, yelling at nurses, walking down the hallways.     MEDICATIONS  (STANDING):  clonazePAM  Tablet 0.5 milliGRAM(s) Oral two times a day  dextrose 40% Gel 15 Gram(s) Oral once  dextrose 50% Injectable 25 Gram(s) IV Push once  dextrose 50% Injectable 12.5 Gram(s) IV Push once  dextrose 50% Injectable 25 Gram(s) IV Push once  diVALproex  milliGRAM(s) Oral two times a day  enoxaparin Injectable 40 milliGRAM(s) SubCutaneous daily  furosemide    Tablet 20 milliGRAM(s) Oral daily  glucagon  Injectable 1 milliGRAM(s) IntraMuscular once  insulin lispro (ADMELOG) corrective regimen sliding scale   SubCutaneous three times a day before meals  insulin lispro (ADMELOG) corrective regimen sliding scale   SubCutaneous at bedtime  melatonin 3 milliGRAM(s) Oral <User Schedule>  metoprolol tartrate 25 milliGRAM(s) Oral two times a day  triamcinolone 0.1% Cream 1 Application(s) Topical every 12 hours    MEDICATIONS  (PRN):  acetaminophen     Tablet .. 650 milliGRAM(s) Oral every 6 hours PRN Temp greater or equal to 38C (100.4F), Mild Pain (1 - 3)  aluminum hydroxide/magnesium hydroxide/simethicone Suspension 30 milliLiter(s) Oral every 4 hours PRN Dyspepsia  OLANZapine 2.5 milliGRAM(s) Oral every 6 hours PRN agitation  OLANZapine Injectable 2.5 milliGRAM(s) IntraMuscular every 6 hours PRN COMBATIVE BEHAVIORS NOT RESPONDING TO VERBAL REDIRECTION  ondansetron Injectable 4 milliGRAM(s) IV Push every 8 hours PRN Nausea and/or Vomiting      CAPILLARY BLOOD GLUCOSE      POCT Blood Glucose.: 106 mg/dL (2021 11:14)  POCT Blood Glucose.: 85 mg/dL (2021 07:23)  POCT Blood Glucose.: 92 mg/dL (2021 22:27)  POCT Blood Glucose.: 156 mg/dL (2021 16:30)    I&O's Summary      PHYSICAL EXAM:  Vital Signs Last 24 Hrs  T(C): 36.7 (2021 05:49), Max: 36.7 (2021 05:49)  T(F): 98 (2021 05:49), Max: 98 (2021 05:49)  HR: 73 (2021 05:49) (67 - 82)  BP: 134/72 (2021 05:49) (102/48 - 134/72)  BP(mean): --  RR: 18 (2021 05:49) (17 - 18)  SpO2: 100% (2021 05:49) (98% - 100%)    CONSTITUTIONAL: NAD   ENMT: Moist oral mucosa  RESPIRATORY: Normal respiratory effort; lungs are clear to auscultation bilaterally  CARDIOVASCULAR:  S1/S2; No lower extremity edema  ABDOMEN: Nontender to palpation, normoactive bowel sounds, no rebound/guarding  MUSCULOSKELETAL: moving all ext   PSYCH: A&Ox1; agitated, yelling at hospital staff   SKIN: maculopapular rash with excoriations over bilateral arms, back and upper thighs, appears in healing stages w/ scabbing     LABS:                        13.6   6.35  )-----------( 228      ( 2021 07:03 )             44.6     11-05    144  |  107  |  18  ----------------------------<  105<H>  4.1   |  26  |  0.63    Ca    9.9      2021 07:03  Phos  3.4     11-05  Mg     2.40     11-05    TPro  7.7  /  Alb  4.1  /  TBili  0.2  /  DBili  x   /  AST  19  /  ALT  12  /  AlkPhos  61  11-05          Urinalysis Basic - ( 2021 18:47 )    Color: Yellow / Appearance: Clear / S.029 / pH: x  Gluc: x / Ketone: Trace  / Bili: Negative / Urobili: <2 mg/dL   Blood: x / Protein: Trace / Nitrite: Negative   Leuk Esterase: Negative / RBC: 2 /HPF / WBC 4 /HPF   Sq Epi: x / Non Sq Epi: 1 /HPF / Bacteria: Negative        Culture - Urine (collected 2021 20:50)  Source: Clean Catch Clean Catch (Midstream)  Final Report (2021 22:12):    No growth        RADIOLOGY & ADDITIONAL TESTS:  Results Reviewed:   Imaging Personally Reviewed:  Electrocardiogram Personally Reviewed:    COORDINATION OF CARE:  Care Discussed with Consultants/Other Providers [Y/N]:  Prior or Outpatient Records Reviewed [Y/N]:
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
## 2021-11-05 NOTE — DISCHARGE NOTE PROVIDER - NSDCMRMEDTOKEN_GEN_ALL_CORE_FT
Anbesol 10% mucous membrane liquid: 1 application mucous membrane 2 times a day, As Needed for tooth pain  Anti-Dandruff 0.5% topical shampoo: Apply topically to hair when showering once a day on Mondays and Thursday at 15:30-23:00  Aquaphor topical ointment: Apply topically to affected area once a day (at bedtime) BUE &amp; BLE  Benadryl 50 mg oral capsule: 50 milligram(s) orally 4 times a day  bisacodyl 10 mg rectal suppository: 1 suppository(ies) rectal once a day, As Needed for constipation  clonazePAM 0.25 mg oral tablet: 1 tab(s) orally 2 times a day at 9 am and 7pm  clonazePAM 0.5 mg oral tablet: 0.5 milligram(s) orally 2 times a day  Depakote 125 mg oral delayed release tablet: 1 tab(s) orally once a day (at bedtime)  Depakote 500 mg oral delayed release tablet: 1 tab(s) orally once a day (at bedtime)  Depakote 500 mg oral delayed release tablet: 1 tab(s) orally once a day (in the morning)  docusate sodium 100 mg oral capsule: 1 cap(s) orally 2 times a day  doxycycline monohydrate 100 mg oral capsule: 1 cap(s) orally every 12 hours  Fleet Enema 7 g-19 g rectal enema: 133 milliliter(s) rectal once, As Needed  **administer 07:30 am-15:30 if BM post MOM/Dulcolax  furosemide 20 mg oral tablet: 1 tab(s) orally once a day  Lasix 20 mg oral tablet: 1 tab(s) orally once a day  metFORMIN 500 mg oral tablet: 1 tab(s) orally 2 times a day  Metoprolol Tartrate 25 mg oral tablet: 1 tab(s) orally 2 times a day  Milk of Magnesia 8% oral suspension: 30 milliliter(s) orally once a day (at bedtime), As Needed if no BM in 3 days  NovoLIN R 100 units/mL injectable solution: unit(s) subcutaneous 4 times a day; sliding scale before meals and at bedtime  oseltamivir 75 mg oral capsule: 1 cap(s) orally once a day  PARoxetine 30 mg oral tablet: 1 tab(s) orally once a day  potassium chloride 20 mEq oral tablet, extended release: 1 tab(s) orally 2 times a day  Senna 8.6 mg oral tablet: 2 tab(s) orally once a day (at bedtime)  Tab-A-Bebeto oral tablet: 1 tab(s) orally once a day  Tylenol 325 mg oral tablet: 2 tab(s) orally every 6 hours, As Needed  valproic acid 250 mg oral capsule: 2 cap(s) orally once a day  valproic acid 250 mg oral capsule: 3 cap(s) [750 mg] orally once a day in the evening   acetaminophen 325 mg oral tablet: 2 tab(s) orally every 6 hours, As needed, Temp greater or equal to 38C (100.4F), Mild Pain (1 - 3)  aluminum hydroxide-magnesium hydroxide 200 mg-200 mg/5 mL oral suspension: 30 milliliter(s) orally every 4 hours, As needed, Dyspepsia  clonazePAM 0.5 mg oral tablet: 1 tab(s) orally 2 times a day  divalproex sodium 250 mg oral delayed release tablet: 3 tab(s) orally   divalproex sodium 500 mg oral delayed release tablet: 2 tab(s) orally once a day (at bedtime)  docusate sodium 100 mg oral capsule: 1 cap(s) orally 2 times a day  furosemide 20 mg oral tablet: 1 tab(s) orally once a day  melatonin 3 mg oral tablet: 1 tab(s) orally   metFORMIN 500 mg oral tablet: 1 tab(s) orally 2 times a day  metoprolol tartrate 25 mg oral tablet: 1 tab(s) orally 2 times a day  Milk of Magnesia 8% oral suspension: 30 milliliter(s) orally once a day (at bedtime), As Needed if no BM in 3 days  OLANZapine 2.5 mg oral tablet: 1 tab(s) orally every 6 hours, As needed, agitation  Senna 8.6 mg oral tablet: 2 tab(s) orally once a day (at bedtime)  triamcinolone 0.1% topical cream: 1 application topically every 12 hours   acetaminophen 325 mg oral tablet: 2 tab(s) orally every 6 hours, As needed, Temp greater or equal to 38C (100.4F), Mild Pain (1 - 3)  aluminum hydroxide-magnesium hydroxide 200 mg-200 mg/5 mL oral suspension: 30 milliliter(s) orally every 4 hours, As needed, Dyspepsia  clonazePAM: 0.25 milligram(s) orally once a day (at bedtime)  clonazePAM 0.5 mg oral tablet: 1 tab(s) orally 2 times a day 9am &amp; 7pm  divalproex sodium 250 mg oral delayed release tablet: 3 tab(s) orally once a day 7am  divalproex sodium 500 mg oral delayed release tablet: 2 tab(s) orally once a day (at bedtime)  docusate sodium 100 mg oral capsule: 1 cap(s) orally 2 times a day  furosemide 20 mg oral tablet: 1 tab(s) orally once a day  melatonin 3 mg oral tablet: 1 tab(s) orally once a day (at bedtime)  metFORMIN 500 mg oral tablet: 1 tab(s) orally 2 times a day  metoprolol tartrate 25 mg oral tablet: 1 tab(s) orally 2 times a day  Milk of Magnesia 8% oral suspension: 30 milliliter(s) orally once a day (at bedtime), As Needed if no BM in 3 days  OLANZapine 2.5 mg oral tablet: 1 tab(s) orally 2 times a day at 12pm &amp; 6pm  Senna 8.6 mg oral tablet: 2 tab(s) orally once a day (at bedtime)  triamcinolone 0.1% topical cream: 1 application topically every 12 hours   acetaminophen 325 mg oral tablet: 2 tab(s) orally every 6 hours, As needed, Temp greater or equal to 38C (100.4F), Mild Pain (1 - 3)  aluminum hydroxide-magnesium hydroxide 200 mg-200 mg/5 mL oral suspension: 30 milliliter(s) orally every 4 hours, As needed, Dyspepsia  clonazePAM: 0.25 milligram(s) orally once a day (at bedtime)  clonazePAM 0.5 mg oral tablet: 1 tab(s) orally 2 times a day 9am &amp; 7pm  divalproex sodium 250 mg oral delayed release tablet: 3 tab(s) orally once a day 7am  divalproex sodium 250 mg oral delayed release tablet: 3 tab(s) orally once a day (at bedtime)  docusate sodium 100 mg oral capsule: 1 cap(s) orally 2 times a day  furosemide 20 mg oral tablet: 1 tab(s) orally once a day  melatonin 3 mg oral tablet: 1 tab(s) orally once a day (at bedtime)  metFORMIN 500 mg oral tablet: 1 tab(s) orally 2 times a day  metoprolol tartrate 25 mg oral tablet: 1 tab(s) orally 2 times a day  Milk of Magnesia 8% oral suspension: 30 milliliter(s) orally once a day (at bedtime), As Needed if no BM in 3 days  OLANZapine 2.5 mg oral tablet: 1 tab(s) orally 2 times a day at 12pm &amp; 6pm  Senna 8.6 mg oral tablet: 2 tab(s) orally once a day (at bedtime)  triamcinolone 0.1% topical cream: 1 application topically every 12 hours

## 2021-11-05 NOTE — BH CONSULTATION LIAISON PROGRESS NOTE - CASE SUMMARY
Met with the patient along with fellow Dr Dickerson, impression and plan discussed and agreed upon. I have coordinated with the team.
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
## 2021-11-06 LAB
ALBUMIN SERPL ELPH-MCNC: 3.8 G/DL — SIGNIFICANT CHANGE UP (ref 3.3–5)
ALP SERPL-CCNC: 51 U/L — SIGNIFICANT CHANGE UP (ref 40–120)
ALT FLD-CCNC: 12 U/L — SIGNIFICANT CHANGE UP (ref 4–33)
AMMONIA BLD-MCNC: 24 UMOL/L — SIGNIFICANT CHANGE UP (ref 11–55)
ANION GAP SERPL CALC-SCNC: 12 MMOL/L — SIGNIFICANT CHANGE UP (ref 7–14)
AST SERPL-CCNC: 14 U/L — SIGNIFICANT CHANGE UP (ref 4–32)
BILIRUB SERPL-MCNC: <0.2 MG/DL — SIGNIFICANT CHANGE UP (ref 0.2–1.2)
BUN SERPL-MCNC: 18 MG/DL — SIGNIFICANT CHANGE UP (ref 7–23)
CALCIUM SERPL-MCNC: 9.4 MG/DL — SIGNIFICANT CHANGE UP (ref 8.4–10.5)
CHLORIDE SERPL-SCNC: 110 MMOL/L — HIGH (ref 98–107)
CO2 SERPL-SCNC: 23 MMOL/L — SIGNIFICANT CHANGE UP (ref 22–31)
CREAT SERPL-MCNC: 0.56 MG/DL — SIGNIFICANT CHANGE UP (ref 0.5–1.3)
GLUCOSE SERPL-MCNC: 85 MG/DL — SIGNIFICANT CHANGE UP (ref 70–99)
POTASSIUM SERPL-MCNC: 4.1 MMOL/L — SIGNIFICANT CHANGE UP (ref 3.5–5.3)
POTASSIUM SERPL-SCNC: 4.1 MMOL/L — SIGNIFICANT CHANGE UP (ref 3.5–5.3)
PROT SERPL-MCNC: 6.7 G/DL — SIGNIFICANT CHANGE UP (ref 6–8.3)
SODIUM SERPL-SCNC: 145 MMOL/L — SIGNIFICANT CHANGE UP (ref 135–145)

## 2021-11-06 PROCEDURE — 99232 SBSQ HOSP IP/OBS MODERATE 35: CPT

## 2021-11-06 RX ADMIN — Medication 3 MILLIGRAM(S): at 20:41

## 2021-11-06 RX ADMIN — ENOXAPARIN SODIUM 40 MILLIGRAM(S): 100 INJECTION SUBCUTANEOUS at 17:33

## 2021-11-06 RX ADMIN — DIVALPROEX SODIUM 750 MILLIGRAM(S): 500 TABLET, DELAYED RELEASE ORAL at 05:40

## 2021-11-06 RX ADMIN — Medication 25 MILLIGRAM(S): at 05:40

## 2021-11-06 RX ADMIN — Medication 20 MILLIGRAM(S): at 05:39

## 2021-11-06 RX ADMIN — Medication 1 APPLICATION(S): at 05:39

## 2021-11-06 RX ADMIN — Medication 0.5 MILLIGRAM(S): at 17:33

## 2021-11-06 RX ADMIN — Medication 1 APPLICATION(S): at 17:33

## 2021-11-06 RX ADMIN — Medication 0.5 MILLIGRAM(S): at 05:39

## 2021-11-06 RX ADMIN — DIVALPROEX SODIUM 750 MILLIGRAM(S): 500 TABLET, DELAYED RELEASE ORAL at 17:33

## 2021-11-06 RX ADMIN — Medication 25 MILLIGRAM(S): at 17:32

## 2021-11-06 NOTE — PROGRESS NOTE ADULT - SUBJECTIVE AND OBJECTIVE BOX
[facility] Division of Hospital Medicine  Guillermina Gomez MD  Pager: [phone]      Patient is a 66y old  Female who presents with a chief complaint of Dementia with behavioral disturbance (05 Nov 2021 12:17)      SUBJECTIVE / OVERNIGHT EVENTS:    MEDICATIONS  (STANDING):  clonazePAM  Tablet 0.5 milliGRAM(s) Oral two times a day  diVALproex  milliGRAM(s) Oral two times a day  enoxaparin Injectable 40 milliGRAM(s) SubCutaneous daily  furosemide    Tablet 20 milliGRAM(s) Oral daily  influenza  Vaccine (HIGH DOSE) 0.7 milliLiter(s) IntraMuscular once  melatonin 3 milliGRAM(s) Oral <User Schedule>  metoprolol tartrate 25 milliGRAM(s) Oral two times a day  triamcinolone 0.1% Cream 1 Application(s) Topical every 12 hours    MEDICATIONS  (PRN):  acetaminophen     Tablet .. 650 milliGRAM(s) Oral every 6 hours PRN Temp greater or equal to 38C (100.4F), Mild Pain (1 - 3)  aluminum hydroxide/magnesium hydroxide/simethicone Suspension 30 milliLiter(s) Oral every 4 hours PRN Dyspepsia  OLANZapine 2.5 milliGRAM(s) Oral every 6 hours PRN agitation  OLANZapine Injectable 2.5 milliGRAM(s) IntraMuscular every 6 hours PRN COMBATIVE BEHAVIORS NOT RESPONDING TO VERBAL REDIRECTION  ondansetron Injectable 4 milliGRAM(s) IV Push every 8 hours PRN Nausea and/or Vomiting      CAPILLARY BLOOD GLUCOSE      POCT Blood Glucose.: 120 mg/dL (05 Nov 2021 21:42)  POCT Blood Glucose.: 107 mg/dL (05 Nov 2021 16:43)    I&O's Summary      PHYSICAL EXAM:  Vital Signs Last 24 Hrs  T(C): 36.7 (06 Nov 2021 11:40), Max: 36.7 (06 Nov 2021 05:37)  T(F): 98 (06 Nov 2021 11:40), Max: 98 (06 Nov 2021 05:37)  HR: 73 (06 Nov 2021 11:40) (61 - 89)  BP: 135/61 (06 Nov 2021 11:40) (127/79 - 140/89)  BP(mean): --  RR: 18 (06 Nov 2021 11:40) (18 - 18)  SpO2: 100% (06 Nov 2021 11:40) (98% - 100%)    CONSTITUTIONAL: NAD, well-developed, well-groomed  EYES: PERRLA; conjunctiva and sclera clear  ENMT: Moist oral mucosa, no pharyngeal injection or exudates; normal dentition  NECK: Supple, no palpable masses; no thyromegaly  RESPIRATORY: Normal respiratory effort; lungs are clear to auscultation bilaterally  CARDIOVASCULAR:  S1/S2; No lower extremity edema  ABDOMEN: Nontender to palpation, normoactive bowel sounds, no rebound/guarding  MUSCULOSKELETAL:  no clubbing or cyanosis of digits; no joint swelling or tenderness to palpation  PSYCH: A+O to person, place, and time; affect appropriate  NEUROLOGY: no focal deficits  SKIN: No rashes; no palpable lesions    LABS:                        13.6   6.35  )-----------( 228      ( 05 Nov 2021 07:03 )             44.6     11-06    145  |  110<H>  |  18  ----------------------------<  85  4.1   |  23  |  0.56    Ca    9.4      06 Nov 2021 07:19  Phos  3.4     11-05  Mg     2.40     11-05    TPro  6.7  /  Alb  3.8  /  TBili  <0.2  /  DBili  x   /  AST  14  /  ALT  12  /  AlkPhos  51  11-06              Culture - Urine (collected 03 Nov 2021 20:50)  Source: Clean Catch Clean Catch (Midstream)  Final Report (04 Nov 2021 22:12):    No growth        RADIOLOGY & ADDITIONAL TESTS:  Results Reviewed:   Imaging Personally Reviewed:  Electrocardiogram Personally Reviewed:    COORDINATION OF CARE:  Care Discussed with Consultants/Other Providers [Y/N]:  Prior or Outpatient Records Reviewed [Y/N]:   [facility] Division of Hospital Medicine  Guillermina Gomez MD  Pager: [phone]      Patient is a 66y old  Female who presents with a chief complaint of Dementia with behavioral disturbance (05 Nov 2021 12:17)      SUBJECTIVE / OVERNIGHT EVENTS: patient seen and examined. She remains confused and unable to participate in HPI.     MEDICATIONS  (STANDING):  clonazePAM  Tablet 0.5 milliGRAM(s) Oral two times a day  diVALproex  milliGRAM(s) Oral two times a day  enoxaparin Injectable 40 milliGRAM(s) SubCutaneous daily  furosemide    Tablet 20 milliGRAM(s) Oral daily  influenza  Vaccine (HIGH DOSE) 0.7 milliLiter(s) IntraMuscular once  melatonin 3 milliGRAM(s) Oral <User Schedule>  metoprolol tartrate 25 milliGRAM(s) Oral two times a day  triamcinolone 0.1% Cream 1 Application(s) Topical every 12 hours    MEDICATIONS  (PRN):  acetaminophen     Tablet .. 650 milliGRAM(s) Oral every 6 hours PRN Temp greater or equal to 38C (100.4F), Mild Pain (1 - 3)  aluminum hydroxide/magnesium hydroxide/simethicone Suspension 30 milliLiter(s) Oral every 4 hours PRN Dyspepsia  OLANZapine 2.5 milliGRAM(s) Oral every 6 hours PRN agitation  OLANZapine Injectable 2.5 milliGRAM(s) IntraMuscular every 6 hours PRN COMBATIVE BEHAVIORS NOT RESPONDING TO VERBAL REDIRECTION  ondansetron Injectable 4 milliGRAM(s) IV Push every 8 hours PRN Nausea and/or Vomiting      CAPILLARY BLOOD GLUCOSE      POCT Blood Glucose.: 120 mg/dL (05 Nov 2021 21:42)  POCT Blood Glucose.: 107 mg/dL (05 Nov 2021 16:43)    I&O's Summary      PHYSICAL EXAM:  Vital Signs Last 24 Hrs  T(C): 36.7 (06 Nov 2021 11:40), Max: 36.7 (06 Nov 2021 05:37)  T(F): 98 (06 Nov 2021 11:40), Max: 98 (06 Nov 2021 05:37)  HR: 73 (06 Nov 2021 11:40) (61 - 89)  BP: 135/61 (06 Nov 2021 11:40) (127/79 - 140/89)  BP(mean): --  RR: 18 (06 Nov 2021 11:40) (18 - 18)  SpO2: 100% (06 Nov 2021 11:40) (98% - 100%)    CONSTITUTIONAL: NAD   ENMT: Moist oral mucosa  RESPIRATORY: Normal respiratory effort; lungs are clear to auscultation bilaterally  CARDIOVASCULAR:  S1/S2; No lower extremity edema  ABDOMEN: Nontender to palpation, normoactive bowel sounds, no rebound/guarding  MUSCULOSKELETAL: moving all ext   PSYCH: A&Ox1; able to tell me her name but has childish affect; easily distracted by TV; not participating in exam  SKIN: maculopapular rash with excoriations over bilateral arms, back and upper thighs, appears in healing stages w/ scabbing and improved since admission     LABS:                        13.6   6.35  )-----------( 228      ( 05 Nov 2021 07:03 )             44.6     11-06    145  |  110<H>  |  18  ----------------------------<  85  4.1   |  23  |  0.56    Ca    9.4      06 Nov 2021 07:19  Phos  3.4     11-05  Mg     2.40     11-05    TPro  6.7  /  Alb  3.8  /  TBili  <0.2  /  DBili  x   /  AST  14  /  ALT  12  /  AlkPhos  51  11-06              Culture - Urine (collected 03 Nov 2021 20:50)  Source: Clean Catch Clean Catch (Midstream)  Final Report (04 Nov 2021 22:12):    No growth        RADIOLOGY & ADDITIONAL TESTS:  Results Reviewed:   Imaging Personally Reviewed:  Electrocardiogram Personally Reviewed:    COORDINATION OF CARE:  Care Discussed with Consultants/Other Providers [Y/N]:  Prior or Outpatient Records Reviewed [Y/N]:

## 2021-11-06 NOTE — PROGRESS NOTE ADULT - PROBLEM SELECTOR PLAN 4
- Patient with unspecified mood disorder  - Increased depakote to 750mg BID per psych recs   - c/w Clonazepam 0.5mg po BID  - patient previously on Seroquel 50 qd for MDD, however discontinued likely 2/2 to QTc 09/21. - Patient with unspecified mood disorder  - C/w depakote to 750mg BID    - c/w Clonazepam 0.5mg po BID  - patient previously on Seroquel 50 qd for MDD, however discontinued likely 2/2 to QTc 09/21.
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
## 2021-11-06 NOTE — PROGRESS NOTE ADULT - PROBLEM SELECTOR PLAN 3
- Per nursing home, patient recently discharged from OSH 2-3 days ago with new pruritic, macular rash coalescing into dark red patches on trunk + bilateral extremities, in addition to scattered papules on upper chest. Patient was discharged on benadryl without improvement.  - Of note, patient with allergies to latex, rubber, penicillin per EMR, however unknown reactions  - Differential includes drug reaction vs. allergic reaction  - C/w triamcinolone 0.1 % cream  - d/c Benadryl 50mg QID  - Will consider Dermatology evaluation if no improvement, however appears to be in healing stages currently - Per nursing home, patient recently discharged from OSH 2-3 days ago with new pruritic, macular rash coalescing into dark red patches on trunk + bilateral extremities, in addition to scattered papules on upper chest. Patient was discharged on benadryl without improvement.  - Of note, patient with allergies to latex, rubber, penicillin per EMR, however unknown reactions  - Differential includes drug reaction vs. allergic reaction  - C/w triamcinolone 0.1 % cream  - Rash appears to be improving significantly, hold off Derm eval

## 2021-11-07 PROCEDURE — 99232 SBSQ HOSP IP/OBS MODERATE 35: CPT

## 2021-11-07 RX ORDER — DIPHENHYDRAMINE HCL 50 MG
50 CAPSULE ORAL ONCE
Refills: 0 | Status: DISCONTINUED | OUTPATIENT
Start: 2021-11-07 | End: 2021-11-07

## 2021-11-07 RX ORDER — OLANZAPINE 15 MG/1
2.5 TABLET, FILM COATED ORAL ONCE
Refills: 0 | Status: COMPLETED | OUTPATIENT
Start: 2021-11-07 | End: 2021-11-07

## 2021-11-07 RX ADMIN — OLANZAPINE 2.5 MILLIGRAM(S): 15 TABLET, FILM COATED ORAL at 16:38

## 2021-11-07 RX ADMIN — Medication 25 MILLIGRAM(S): at 06:11

## 2021-11-07 RX ADMIN — Medication 20 MILLIGRAM(S): at 06:11

## 2021-11-07 RX ADMIN — OLANZAPINE 2.5 MILLIGRAM(S): 15 TABLET, FILM COATED ORAL at 16:22

## 2021-11-07 RX ADMIN — DIVALPROEX SODIUM 750 MILLIGRAM(S): 500 TABLET, DELAYED RELEASE ORAL at 06:11

## 2021-11-07 RX ADMIN — DIVALPROEX SODIUM 750 MILLIGRAM(S): 500 TABLET, DELAYED RELEASE ORAL at 17:55

## 2021-11-07 RX ADMIN — Medication 3 MILLIGRAM(S): at 20:01

## 2021-11-07 RX ADMIN — Medication 0.5 MILLIGRAM(S): at 06:11

## 2021-11-07 RX ADMIN — Medication 1 APPLICATION(S): at 06:10

## 2021-11-08 LAB
ALBUMIN SERPL ELPH-MCNC: 3.7 G/DL — SIGNIFICANT CHANGE UP (ref 3.3–5)
ALP SERPL-CCNC: 55 U/L — SIGNIFICANT CHANGE UP (ref 40–120)
ALT FLD-CCNC: 13 U/L — SIGNIFICANT CHANGE UP (ref 4–33)
ANION GAP SERPL CALC-SCNC: 10 MMOL/L — SIGNIFICANT CHANGE UP (ref 7–14)
AST SERPL-CCNC: 13 U/L — SIGNIFICANT CHANGE UP (ref 4–32)
BILIRUB SERPL-MCNC: <0.2 MG/DL — SIGNIFICANT CHANGE UP (ref 0.2–1.2)
BUN SERPL-MCNC: 21 MG/DL — SIGNIFICANT CHANGE UP (ref 7–23)
CALCIUM SERPL-MCNC: 9.4 MG/DL — SIGNIFICANT CHANGE UP (ref 8.4–10.5)
CHLORIDE SERPL-SCNC: 109 MMOL/L — HIGH (ref 98–107)
CO2 SERPL-SCNC: 25 MMOL/L — SIGNIFICANT CHANGE UP (ref 22–31)
CREAT SERPL-MCNC: 0.68 MG/DL — SIGNIFICANT CHANGE UP (ref 0.5–1.3)
GLUCOSE SERPL-MCNC: 82 MG/DL — SIGNIFICANT CHANGE UP (ref 70–99)
POTASSIUM SERPL-MCNC: 3.8 MMOL/L — SIGNIFICANT CHANGE UP (ref 3.5–5.3)
POTASSIUM SERPL-SCNC: 3.8 MMOL/L — SIGNIFICANT CHANGE UP (ref 3.5–5.3)
PROT SERPL-MCNC: 6.9 G/DL — SIGNIFICANT CHANGE UP (ref 6–8.3)
SARS-COV-2 RNA SPEC QL NAA+PROBE: SIGNIFICANT CHANGE UP
SODIUM SERPL-SCNC: 144 MMOL/L — SIGNIFICANT CHANGE UP (ref 135–145)
VALPROATE SERPL-MCNC: 53.7 UG/ML — SIGNIFICANT CHANGE UP (ref 50–100)

## 2021-11-08 PROCEDURE — 99232 SBSQ HOSP IP/OBS MODERATE 35: CPT

## 2021-11-08 RX ORDER — DIVALPROEX SODIUM 500 MG/1
1000 TABLET, DELAYED RELEASE ORAL AT BEDTIME
Refills: 0 | Status: DISCONTINUED | OUTPATIENT
Start: 2021-11-08 | End: 2021-11-12

## 2021-11-08 RX ORDER — DIVALPROEX SODIUM 500 MG/1
750 TABLET, DELAYED RELEASE ORAL
Refills: 0 | Status: DISCONTINUED | OUTPATIENT
Start: 2021-11-09 | End: 2021-11-12

## 2021-11-08 RX ADMIN — OLANZAPINE 2.5 MILLIGRAM(S): 15 TABLET, FILM COATED ORAL at 17:06

## 2021-11-08 RX ADMIN — ENOXAPARIN SODIUM 40 MILLIGRAM(S): 100 INJECTION SUBCUTANEOUS at 11:15

## 2021-11-08 RX ADMIN — Medication 1 APPLICATION(S): at 05:01

## 2021-11-08 RX ADMIN — Medication 3 MILLIGRAM(S): at 21:09

## 2021-11-08 RX ADMIN — DIVALPROEX SODIUM 1000 MILLIGRAM(S): 500 TABLET, DELAYED RELEASE ORAL at 21:09

## 2021-11-08 RX ADMIN — Medication 25 MILLIGRAM(S): at 05:01

## 2021-11-08 RX ADMIN — DIVALPROEX SODIUM 750 MILLIGRAM(S): 500 TABLET, DELAYED RELEASE ORAL at 05:01

## 2021-11-08 RX ADMIN — Medication 0.5 MILLIGRAM(S): at 05:00

## 2021-11-08 RX ADMIN — Medication 20 MILLIGRAM(S): at 05:01

## 2021-11-08 NOTE — BH CONSULTATION LIAISON PROGRESS NOTE - NSBHASSESSMENTFT_PSY_ALL_CORE
66 y.o. F, as per records is , has been residing at [facility] ([facility]) since 2010, with past psychiatric history of dementia with behavioral disturbance, mood disorder, RODY, possible inpatient psychiatric admission to [facility] this year, has a chronic medical history notable for NPH (w/  shunt), HTN, DMT2, TBI (unspecified), pedal edema presenting from [facility] for physically aggressive behavior and diffuse rash. Records also indicate that [facility] was seeking a [facility] admission and hence this [facility] admission. Psychiatry has been consulted for agitation management.     11/5 - Patient used 1 PRN of Zyprexa, remains intermittently agitated, with delusions.   11/8-- agitated, labile, impulsive, unpredictable.     Plan  - Depakote increased to 750 mg q AM + 1000mg q HS PO. Will repeat Depakote level in the AM of 11/11/21. Check LFT, Ammonia  - Will continue to monitor. Might need to start standing Zyprexa in conjunction with Depakote for better mood control.   - C/w Clonazepam 0.5 mg PO BID.   - Use Zyprexa 2.5mg q 6hrs prn IM/PO  - Collateral information gathered.  - Don Dreyer.  - DISPOSITION===[facility] admission. 2pc is being completed

## 2021-11-08 NOTE — PROGRESS NOTE ADULT - PROBLEM SELECTOR PLAN 3
- Per nursing home, patient recently discharged from OSH 2-3 days ago with new pruritic, macular rash coalescing into dark red patches on trunk + bilateral extremities, in addition to scattered papules on upper chest. Patient was discharged on benadryl without improvement.  - Of note, patient with allergies to latex, rubber, penicillin per EMR, however unknown reactions  - Differential includes drug reaction vs. allergic reaction  - C/w triamcinolone 0.1 % cream  - Rash appears to be improving significantly, hold off Derm eval

## 2021-11-08 NOTE — PROGRESS NOTE ADULT - PROBLEM SELECTOR PLAN 4
- Patient with unspecified mood disorder  - C/w depakote to 750mg BID    - c/w Clonazepam 0.5mg po BID  - patient previously on Seroquel 50 qd for MDD, however discontinued likely 2/2 to QTc 09/21.
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
## 2021-11-08 NOTE — PROGRESS NOTE ADULT - SUBJECTIVE AND OBJECTIVE BOX
Patient is a 66y old  Female who presents with a chief complaint of Dementia with behavioral disturbance (06 Nov 2021 13:16)      SUBJECTIVE / OVERNIGHT EVENTS: Pt seen and examined, chart reviewed. No complaints today, ambulating.     MEDICATIONS  (STANDING):  clonazePAM  Tablet 0.5 milliGRAM(s) Oral two times a day  diVALproex  milliGRAM(s) Oral two times a day  enoxaparin Injectable 40 milliGRAM(s) SubCutaneous daily  furosemide    Tablet 20 milliGRAM(s) Oral daily  influenza  Vaccine (HIGH DOSE) 0.7 milliLiter(s) IntraMuscular once  melatonin 3 milliGRAM(s) Oral <User Schedule>  metoprolol tartrate 25 milliGRAM(s) Oral two times a day  triamcinolone 0.1% Cream 1 Application(s) Topical every 12 hours    MEDICATIONS  (PRN):  acetaminophen     Tablet .. 650 milliGRAM(s) Oral every 6 hours PRN Temp greater or equal to 38C (100.4F), Mild Pain (1 - 3)  aluminum hydroxide/magnesium hydroxide/simethicone Suspension 30 milliLiter(s) Oral every 4 hours PRN Dyspepsia  OLANZapine 2.5 milliGRAM(s) Oral every 6 hours PRN agitation  OLANZapine Injectable 2.5 milliGRAM(s) IntraMuscular every 6 hours PRN COMBATIVE BEHAVIORS NOT RESPONDING TO VERBAL REDIRECTION  ondansetron Injectable 4 milliGRAM(s) IV Push every 8 hours PRN Nausea and/or Vomiting      Vital Signs Last 24 Hrs  T(C): 36.9 (08 Nov 2021 04:58), Max: 37.1 (07 Nov 2021 19:59)  T(F): 98.5 (08 Nov 2021 04:58), Max: 98.7 (07 Nov 2021 19:59)  HR: 64 (08 Nov 2021 04:58) (64 - 74)  BP: 137/75 (08 Nov 2021 04:58) (104/63 - 137/75)  BP(mean): --  RR: 18 (08 Nov 2021 04:58) (17 - 18)  SpO2: 100% (08 Nov 2021 04:58) (92% - 100%)  CAPILLARY BLOOD GLUCOSE        I&O's Summary      PHYSICAL EXAM:  GENERAL: NAD, well-developed  HEAD:  Atraumatic, Normocephalic  EYES: EOMI, PERRLA, conjunctiva and sclera clear  NECK: Supple, No JVD  CHEST/LUNG: Clear to auscultation bilaterally; No wheeze  HEART: Regular rate and rhythm; No murmurs, rubs, or gallops  ABDOMEN: Soft, Nontender, Nondistended; Bowel sounds present  EXTREMITIES:  2+ Peripheral Pulses, No clubbing, cyanosis, or edema  PSYCH: AAOx1  NEUROLOGY: non-focal    LABS:    11-08    144  |  109<H>  |  21  ----------------------------<  82  3.8   |  25  |  0.68    Ca    9.4      08 Nov 2021 07:29    TPro  6.9  /  Alb  3.7  /  TBili  <0.2  /  DBili  x   /  AST  13  /  ALT  13  /  AlkPhos  55  11-08              RADIOLOGY & ADDITIONAL TESTS:    Imaging Personally Reviewed:    Consultant(s) Notes Reviewed:      Care Discussed with Consultants/Other Providers:

## 2021-11-09 LAB
ALBUMIN SERPL ELPH-MCNC: 4.1 G/DL — SIGNIFICANT CHANGE UP (ref 3.3–5)
ALP SERPL-CCNC: 58 U/L — SIGNIFICANT CHANGE UP (ref 40–120)
ALT FLD-CCNC: 15 U/L — SIGNIFICANT CHANGE UP (ref 4–33)
ANION GAP SERPL CALC-SCNC: 11 MMOL/L — SIGNIFICANT CHANGE UP (ref 7–14)
AST SERPL-CCNC: 16 U/L — SIGNIFICANT CHANGE UP (ref 4–32)
BASOPHILS # BLD AUTO: 0.03 K/UL — SIGNIFICANT CHANGE UP (ref 0–0.2)
BASOPHILS NFR BLD AUTO: 0.5 % — SIGNIFICANT CHANGE UP (ref 0–2)
BILIRUB SERPL-MCNC: 0.2 MG/DL — SIGNIFICANT CHANGE UP (ref 0.2–1.2)
BUN SERPL-MCNC: 22 MG/DL — SIGNIFICANT CHANGE UP (ref 7–23)
CALCIUM SERPL-MCNC: 9.7 MG/DL — SIGNIFICANT CHANGE UP (ref 8.4–10.5)
CHLORIDE SERPL-SCNC: 108 MMOL/L — HIGH (ref 98–107)
CO2 SERPL-SCNC: 26 MMOL/L — SIGNIFICANT CHANGE UP (ref 22–31)
CREAT SERPL-MCNC: 0.58 MG/DL — SIGNIFICANT CHANGE UP (ref 0.5–1.3)
EOSINOPHIL # BLD AUTO: 0.1 K/UL — SIGNIFICANT CHANGE UP (ref 0–0.5)
EOSINOPHIL NFR BLD AUTO: 1.5 % — SIGNIFICANT CHANGE UP (ref 0–6)
GLUCOSE SERPL-MCNC: 77 MG/DL — SIGNIFICANT CHANGE UP (ref 70–99)
HCT VFR BLD CALC: 51.7 % — HIGH (ref 34.5–45)
HGB BLD-MCNC: 15.5 G/DL — SIGNIFICANT CHANGE UP (ref 11.5–15.5)
IANC: 2.96 K/UL — SIGNIFICANT CHANGE UP (ref 1.5–8.5)
IMM GRANULOCYTES NFR BLD AUTO: 0.3 % — SIGNIFICANT CHANGE UP (ref 0–1.5)
LYMPHOCYTES # BLD AUTO: 2.81 K/UL — SIGNIFICANT CHANGE UP (ref 1–3.3)
LYMPHOCYTES # BLD AUTO: 42.8 % — SIGNIFICANT CHANGE UP (ref 13–44)
MAGNESIUM SERPL-MCNC: 2.3 MG/DL — SIGNIFICANT CHANGE UP (ref 1.6–2.6)
MCHC RBC-ENTMCNC: 30 GM/DL — LOW (ref 32–36)
MCHC RBC-ENTMCNC: 30.2 PG — SIGNIFICANT CHANGE UP (ref 27–34)
MCV RBC AUTO: 100.6 FL — HIGH (ref 80–100)
MONOCYTES # BLD AUTO: 0.64 K/UL — SIGNIFICANT CHANGE UP (ref 0–0.9)
MONOCYTES NFR BLD AUTO: 9.8 % — SIGNIFICANT CHANGE UP (ref 2–14)
NEUTROPHILS # BLD AUTO: 2.96 K/UL — SIGNIFICANT CHANGE UP (ref 1.8–7.4)
NEUTROPHILS NFR BLD AUTO: 45.1 % — SIGNIFICANT CHANGE UP (ref 43–77)
NRBC # BLD: 0 /100 WBCS — SIGNIFICANT CHANGE UP
NRBC # FLD: 0 K/UL — SIGNIFICANT CHANGE UP
PHOSPHATE SERPL-MCNC: 3.6 MG/DL — SIGNIFICANT CHANGE UP (ref 2.5–4.5)
PLATELET # BLD AUTO: 205 K/UL — SIGNIFICANT CHANGE UP (ref 150–400)
POTASSIUM SERPL-MCNC: 3.9 MMOL/L — SIGNIFICANT CHANGE UP (ref 3.5–5.3)
POTASSIUM SERPL-SCNC: 3.9 MMOL/L — SIGNIFICANT CHANGE UP (ref 3.5–5.3)
PROT SERPL-MCNC: 7.5 G/DL — SIGNIFICANT CHANGE UP (ref 6–8.3)
RBC # BLD: 5.14 M/UL — SIGNIFICANT CHANGE UP (ref 3.8–5.2)
RBC # FLD: 15.1 % — HIGH (ref 10.3–14.5)
SODIUM SERPL-SCNC: 145 MMOL/L — SIGNIFICANT CHANGE UP (ref 135–145)
WBC # BLD: 6.56 K/UL — SIGNIFICANT CHANGE UP (ref 3.8–10.5)
WBC # FLD AUTO: 6.56 K/UL — SIGNIFICANT CHANGE UP (ref 3.8–10.5)

## 2021-11-09 PROCEDURE — 99232 SBSQ HOSP IP/OBS MODERATE 35: CPT

## 2021-11-09 RX ADMIN — Medication 1 APPLICATION(S): at 05:58

## 2021-11-09 RX ADMIN — OLANZAPINE 2.5 MILLIGRAM(S): 15 TABLET, FILM COATED ORAL at 18:33

## 2021-11-09 RX ADMIN — Medication 25 MILLIGRAM(S): at 05:57

## 2021-11-09 RX ADMIN — Medication 25 MILLIGRAM(S): at 17:26

## 2021-11-09 RX ADMIN — DIVALPROEX SODIUM 750 MILLIGRAM(S): 500 TABLET, DELAYED RELEASE ORAL at 05:58

## 2021-11-09 RX ADMIN — DIVALPROEX SODIUM 1000 MILLIGRAM(S): 500 TABLET, DELAYED RELEASE ORAL at 22:47

## 2021-11-09 RX ADMIN — Medication 1 APPLICATION(S): at 17:26

## 2021-11-09 RX ADMIN — Medication 0.5 MILLIGRAM(S): at 17:33

## 2021-11-09 RX ADMIN — ENOXAPARIN SODIUM 40 MILLIGRAM(S): 100 INJECTION SUBCUTANEOUS at 12:23

## 2021-11-09 RX ADMIN — Medication 20 MILLIGRAM(S): at 05:57

## 2021-11-09 RX ADMIN — Medication 3 MILLIGRAM(S): at 22:52

## 2021-11-09 RX ADMIN — Medication 0.5 MILLIGRAM(S): at 05:57

## 2021-11-09 NOTE — BH CONSULTATION LIAISON PROGRESS NOTE - NSBHFUPINTERVALCCFT_PSY_A_CORE
One dose of Zyprexa 2.5mg IM at 5pm yesterday for agitation.   Discussed at medicine rounds/hospitalist Dr Parisi this morning---discussed need for better behavioral control before d/c back to [facility]TC. Coordinated with medicine RN this morning-- sleepy this morning.
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
## 2021-11-09 NOTE — BH CONSULTATION LIAISON PROGRESS NOTE - NSBHFUPINTERVALHXFT_PSY_A_CORE
Met with patient this afternoon. Eating lunch. Remains confused, disoriented, confabulation. Nonsensical in her conversation still. Not aggressive or loud as yesterday.   Has coarse tremors, which has been noted at baseline as well. Denies any si or hi when asked.
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
## 2021-11-09 NOTE — BH CONSULTATION LIAISON PROGRESS NOTE - NSBHASSESSMENTFT_PSY_ALL_CORE
66 y.o. F, as per records is , has been residing at [facility] ([facility]) since 2010, with past psychiatric history of dementia with behavioral disturbance, mood disorder, RODY, possible inpatient psychiatric admission to [facility] this year, has a chronic medical history notable for NPH (w/  shunt), HTN, DMT2, TBI (unspecified), pedal edema presenting from [facility] for physically aggressive behavior and diffuse rash. Records also indicate that [facility] was seeking a [facility] admission and hence this [facility] admission. Psychiatry has been consulted for agitation management.     11/5 - Patient used 1 PRN of Zyprexa, remains intermittently agitated, with delusions.   11/8-- agitated, labile, impulsive, unpredictable.   11/9-- less agitated today.     Plan  - CONTINUE Depakote 750 mg q AM + 1000mg q HS PO. Will repeat Depakote level in the AM of 11/11/21. Check LFT, Ammonia  - Will continue to monitor. Might need to start standing Zyprexa in conjunction with Depakote for better mood control.   - C/w Clonazepam 0.5 mg PO BID.   - Use Zyprexa 2.5mg q 6hrs prn IM/PO  - Collateral information gathered.  - Don Dreyer.  - DISPOSITION===[facility] admission. 2pc completed

## 2021-11-09 NOTE — PROGRESS NOTE ADULT - SUBJECTIVE AND OBJECTIVE BOX
Patient is a 66y old  Female who presents with a chief complaint of Dementia with behavioral disturbance (08 Nov 2021 10:44)      SUBJECTIVE / OVERNIGHT EVENTS: No complaints today.     MEDICATIONS  (STANDING):  clonazePAM  Tablet 0.5 milliGRAM(s) Oral two times a day  diVALproex  milliGRAM(s) Oral <User Schedule>  diVALproex DR 1000 milliGRAM(s) Oral at bedtime  enoxaparin Injectable 40 milliGRAM(s) SubCutaneous daily  furosemide    Tablet 20 milliGRAM(s) Oral daily  influenza  Vaccine (HIGH DOSE) 0.7 milliLiter(s) IntraMuscular once  melatonin 3 milliGRAM(s) Oral <User Schedule>  metoprolol tartrate 25 milliGRAM(s) Oral two times a day  triamcinolone 0.1% Cream 1 Application(s) Topical every 12 hours    MEDICATIONS  (PRN):  acetaminophen     Tablet .. 650 milliGRAM(s) Oral every 6 hours PRN Temp greater or equal to 38C (100.4F), Mild Pain (1 - 3)  aluminum hydroxide/magnesium hydroxide/simethicone Suspension 30 milliLiter(s) Oral every 4 hours PRN Dyspepsia  OLANZapine 2.5 milliGRAM(s) Oral every 6 hours PRN agitation  OLANZapine Injectable 2.5 milliGRAM(s) IntraMuscular every 6 hours PRN COMBATIVE BEHAVIORS NOT RESPONDING TO VERBAL REDIRECTION  ondansetron Injectable 4 milliGRAM(s) IV Push every 8 hours PRN Nausea and/or Vomiting      Vital Signs Last 24 Hrs  T(C): 36.4 (09 Nov 2021 05:43), Max: 36.8 (08 Nov 2021 21:00)  T(F): 97.6 (09 Nov 2021 05:43), Max: 98.3 (08 Nov 2021 21:00)  HR: 67 (09 Nov 2021 05:43) (67 - 79)  BP: 110/61 (09 Nov 2021 05:43) (110/61 - 126/69)  BP(mean): --  RR: 16 (09 Nov 2021 05:43) (16 - 17)  SpO2: 98% (09 Nov 2021 05:43) (98% - 100%)  CAPILLARY BLOOD GLUCOSE        I&O's Summary      PHYSICAL EXAM:  GENERAL: NAD, well-developed  HEAD:  Atraumatic, Normocephalic  EYES: EOMI, PERRLA, conjunctiva and sclera clear  NECK: Supple, No JVD  CHEST/LUNG: Clear to auscultation bilaterally; No wheeze  HEART: Regular rate and rhythm; No murmurs, rubs, or gallops  ABDOMEN: Soft, Nontender, Nondistended; Bowel sounds present  EXTREMITIES:  2+ Peripheral Pulses, No clubbing, cyanosis, or edema  PSYCH: AAOx1  NEUROLOGY: non-focal  SKIN: No rashes or lesions    LABS:                        15.5   6.56  )-----------( 205      ( 09 Nov 2021 07:20 )             51.7     11-09    145  |  108<H>  |  22  ----------------------------<  77  3.9   |  26  |  0.58    Ca    9.7      09 Nov 2021 07:20  Phos  3.6     11-09  Mg     2.30     11-09    TPro  7.5  /  Alb  4.1  /  TBili  0.2  /  DBili  x   /  AST  16  /  ALT  15  /  AlkPhos  58  11-09              RADIOLOGY & ADDITIONAL TESTS:    Imaging Personally Reviewed:    Consultant(s) Notes Reviewed:      Care Discussed with Consultants/Other Providers:

## 2021-11-10 DIAGNOSIS — R31.9 HEMATURIA, UNSPECIFIED: ICD-10-CM

## 2021-11-10 PROBLEM — F03.90 UNSPECIFIED DEMENTIA WITHOUT BEHAVIORAL DISTURBANCE: Chronic | Status: ACTIVE | Noted: 2021-11-04

## 2021-11-10 PROBLEM — F39 UNSPECIFIED MOOD [AFFECTIVE] DISORDER: Chronic | Status: ACTIVE | Noted: 2021-11-04

## 2021-11-10 PROBLEM — E11.9 TYPE 2 DIABETES MELLITUS WITHOUT COMPLICATIONS: Chronic | Status: ACTIVE | Noted: 2021-11-04

## 2021-11-10 PROBLEM — G91.2 (IDIOPATHIC) NORMAL PRESSURE HYDROCEPHALUS: Chronic | Status: ACTIVE | Noted: 2021-11-04

## 2021-11-10 PROBLEM — I10 ESSENTIAL (PRIMARY) HYPERTENSION: Chronic | Status: ACTIVE | Noted: 2021-11-04

## 2021-11-10 LAB
ALBUMIN SERPL ELPH-MCNC: 3.9 G/DL — SIGNIFICANT CHANGE UP (ref 3.3–5)
ALP SERPL-CCNC: 54 U/L — SIGNIFICANT CHANGE UP (ref 40–120)
ALT FLD-CCNC: 13 U/L — SIGNIFICANT CHANGE UP (ref 4–33)
ANION GAP SERPL CALC-SCNC: 11 MMOL/L — SIGNIFICANT CHANGE UP (ref 7–14)
APPEARANCE UR: CLEAR — SIGNIFICANT CHANGE UP
AST SERPL-CCNC: 18 U/L — SIGNIFICANT CHANGE UP (ref 4–32)
BASOPHILS # BLD AUTO: 0.04 K/UL — SIGNIFICANT CHANGE UP (ref 0–0.2)
BASOPHILS NFR BLD AUTO: 0.5 % — SIGNIFICANT CHANGE UP (ref 0–2)
BILIRUB SERPL-MCNC: <0.2 MG/DL — SIGNIFICANT CHANGE UP (ref 0.2–1.2)
BILIRUB UR-MCNC: NEGATIVE — SIGNIFICANT CHANGE UP
BUN SERPL-MCNC: 21 MG/DL — SIGNIFICANT CHANGE UP (ref 7–23)
CALCIUM SERPL-MCNC: 9.7 MG/DL — SIGNIFICANT CHANGE UP (ref 8.4–10.5)
CHLORIDE SERPL-SCNC: 108 MMOL/L — HIGH (ref 98–107)
CK SERPL-CCNC: 68 U/L — SIGNIFICANT CHANGE UP (ref 25–170)
CO2 SERPL-SCNC: 24 MMOL/L — SIGNIFICANT CHANGE UP (ref 22–31)
COLOR SPEC: YELLOW — SIGNIFICANT CHANGE UP
CREAT SERPL-MCNC: 0.55 MG/DL — SIGNIFICANT CHANGE UP (ref 0.5–1.3)
DIFF PNL FLD: ABNORMAL
EOSINOPHIL # BLD AUTO: 0.12 K/UL — SIGNIFICANT CHANGE UP (ref 0–0.5)
EOSINOPHIL NFR BLD AUTO: 1.6 % — SIGNIFICANT CHANGE UP (ref 0–6)
GLUCOSE SERPL-MCNC: 106 MG/DL — HIGH (ref 70–99)
GLUCOSE UR QL: NEGATIVE — SIGNIFICANT CHANGE UP
HCT VFR BLD CALC: 44.9 % — SIGNIFICANT CHANGE UP (ref 34.5–45)
HGB BLD-MCNC: 13.8 G/DL — SIGNIFICANT CHANGE UP (ref 11.5–15.5)
IANC: 3.66 K/UL — SIGNIFICANT CHANGE UP (ref 1.5–8.5)
IMM GRANULOCYTES NFR BLD AUTO: 0.7 % — SIGNIFICANT CHANGE UP (ref 0–1.5)
KETONES UR-MCNC: ABNORMAL
LEUKOCYTE ESTERASE UR-ACNC: NEGATIVE — SIGNIFICANT CHANGE UP
LYMPHOCYTES # BLD AUTO: 2.76 K/UL — SIGNIFICANT CHANGE UP (ref 1–3.3)
LYMPHOCYTES # BLD AUTO: 37.1 % — SIGNIFICANT CHANGE UP (ref 13–44)
MAGNESIUM SERPL-MCNC: 2.2 MG/DL — SIGNIFICANT CHANGE UP (ref 1.6–2.6)
MCHC RBC-ENTMCNC: 29.9 PG — SIGNIFICANT CHANGE UP (ref 27–34)
MCHC RBC-ENTMCNC: 30.7 GM/DL — LOW (ref 32–36)
MCV RBC AUTO: 97.2 FL — SIGNIFICANT CHANGE UP (ref 80–100)
MONOCYTES # BLD AUTO: 0.8 K/UL — SIGNIFICANT CHANGE UP (ref 0–0.9)
MONOCYTES NFR BLD AUTO: 10.8 % — SIGNIFICANT CHANGE UP (ref 2–14)
NEUTROPHILS # BLD AUTO: 3.66 K/UL — SIGNIFICANT CHANGE UP (ref 1.8–7.4)
NEUTROPHILS NFR BLD AUTO: 49.3 % — SIGNIFICANT CHANGE UP (ref 43–77)
NITRITE UR-MCNC: NEGATIVE — SIGNIFICANT CHANGE UP
NRBC # BLD: 0 /100 WBCS — SIGNIFICANT CHANGE UP
NRBC # FLD: 0 K/UL — SIGNIFICANT CHANGE UP
PH UR: 5.5 — SIGNIFICANT CHANGE UP (ref 5–8)
PLATELET # BLD AUTO: 212 K/UL — SIGNIFICANT CHANGE UP (ref 150–400)
POTASSIUM SERPL-MCNC: 4.6 MMOL/L — SIGNIFICANT CHANGE UP (ref 3.5–5.3)
POTASSIUM SERPL-SCNC: 4.6 MMOL/L — SIGNIFICANT CHANGE UP (ref 3.5–5.3)
PROT SERPL-MCNC: 6.8 G/DL — SIGNIFICANT CHANGE UP (ref 6–8.3)
PROT UR-MCNC: ABNORMAL
RBC # BLD: 4.62 M/UL — SIGNIFICANT CHANGE UP (ref 3.8–5.2)
RBC # FLD: 15 % — HIGH (ref 10.3–14.5)
SARS-COV-2 RNA SPEC QL NAA+PROBE: SIGNIFICANT CHANGE UP
SODIUM SERPL-SCNC: 143 MMOL/L — SIGNIFICANT CHANGE UP (ref 135–145)
SP GR SPEC: 1.03 — SIGNIFICANT CHANGE UP (ref 1–1.05)
UROBILINOGEN FLD QL: SIGNIFICANT CHANGE UP
WBC # BLD: 7.43 K/UL — SIGNIFICANT CHANGE UP (ref 3.8–10.5)
WBC # FLD AUTO: 7.43 K/UL — SIGNIFICANT CHANGE UP (ref 3.8–10.5)

## 2021-11-10 PROCEDURE — 99232 SBSQ HOSP IP/OBS MODERATE 35: CPT

## 2021-11-10 PROCEDURE — 99233 SBSQ HOSP IP/OBS HIGH 50: CPT

## 2021-11-10 PROCEDURE — 76856 US EXAM PELVIC COMPLETE: CPT | Mod: 26

## 2021-11-10 RX ADMIN — DIVALPROEX SODIUM 1000 MILLIGRAM(S): 500 TABLET, DELAYED RELEASE ORAL at 23:16

## 2021-11-10 RX ADMIN — Medication 25 MILLIGRAM(S): at 17:00

## 2021-11-10 RX ADMIN — Medication 0.5 MILLIGRAM(S): at 17:00

## 2021-11-10 RX ADMIN — Medication 25 MILLIGRAM(S): at 07:05

## 2021-11-10 RX ADMIN — DIVALPROEX SODIUM 750 MILLIGRAM(S): 500 TABLET, DELAYED RELEASE ORAL at 07:05

## 2021-11-10 RX ADMIN — Medication 0.5 MILLIGRAM(S): at 07:05

## 2021-11-10 RX ADMIN — ENOXAPARIN SODIUM 40 MILLIGRAM(S): 100 INJECTION SUBCUTANEOUS at 17:00

## 2021-11-10 RX ADMIN — Medication 20 MILLIGRAM(S): at 07:06

## 2021-11-10 RX ADMIN — Medication 1 APPLICATION(S): at 17:00

## 2021-11-10 RX ADMIN — Medication 1 APPLICATION(S): at 07:06

## 2021-11-10 RX ADMIN — Medication 3 MILLIGRAM(S): at 23:20

## 2021-11-10 RX ADMIN — OLANZAPINE 2.5 MILLIGRAM(S): 15 TABLET, FILM COATED ORAL at 15:29

## 2021-11-10 NOTE — DIETITIAN INITIAL EVALUATION ADULT. - ADD RECOMMEND
1. Encourage & assist Pt with meals; Monitor PO diet tolerance; Honor food preferences;       2. Monitor labs, hydration status;

## 2021-11-10 NOTE — PROGRESS NOTE ADULT - SUBJECTIVE AND OBJECTIVE BOX
Patient is a 66y old  Female who presents with a chief complaint of Dementia with behavioral disturbance (2021 12:27)      SUBJECTIVE / OVERNIGHT EVENTS: Pt has no complaints today. However, RN reported a small amount of bright red blood on the anterior part of  dipper pat ( 2CM in diameter), unclear from urinary track or vagina.      MEDICATIONS  (STANDING):  clonazePAM  Tablet 0.5 milliGRAM(s) Oral two times a day  diVALproex  milliGRAM(s) Oral <User Schedule>  diVALproex DR 1000 milliGRAM(s) Oral at bedtime  enoxaparin Injectable 40 milliGRAM(s) SubCutaneous daily  furosemide    Tablet 20 milliGRAM(s) Oral daily  influenza  Vaccine (HIGH DOSE) 0.7 milliLiter(s) IntraMuscular once  melatonin 3 milliGRAM(s) Oral <User Schedule>  metoprolol tartrate 25 milliGRAM(s) Oral two times a day  triamcinolone 0.1% Cream 1 Application(s) Topical every 12 hours    MEDICATIONS  (PRN):  acetaminophen     Tablet .. 650 milliGRAM(s) Oral every 6 hours PRN Temp greater or equal to 38C (100.4F), Mild Pain (1 - 3)  aluminum hydroxide/magnesium hydroxide/simethicone Suspension 30 milliLiter(s) Oral every 4 hours PRN Dyspepsia  OLANZapine 2.5 milliGRAM(s) Oral every 6 hours PRN agitation  OLANZapine Injectable 2.5 milliGRAM(s) IntraMuscular every 6 hours PRN COMBATIVE BEHAVIORS NOT RESPONDING TO VERBAL REDIRECTION  ondansetron Injectable 4 milliGRAM(s) IV Push every 8 hours PRN Nausea and/or Vomiting      Vital Signs Last 24 Hrs  T(C): 36.7 (10 Nov 2021 12:14), Max: 36.7 (10 Nov 2021 07:16)  T(F): 98 (10 Nov 2021 12:14), Max: 98.1 (10 Nov 2021 07:16)  HR: 61 (10 Nov 2021 12:14) (56 - 66)  BP: 116/60 (10 Nov 2021 12:14) (101/52 - 116/60)  BP(mean): --  RR: 18 (10 Nov 2021 12:14) (18 - 18)  SpO2: 99% (10 Nov 2021 12:14) (99% - 100%)  CAPILLARY BLOOD GLUCOSE        I&O's Summary      PHYSICAL EXAM:  GENERAL: NAD, well-developed  HEAD:  Atraumatic, Normocephalic  EYES: EOMI, PERRLA, conjunctiva and sclera clear  NECK: Supple, No JVD  CHEST/LUNG: Clear to auscultation bilaterally; No wheeze  HEART: Regular rate and rhythm; No murmurs, rubs, or gallops  ABDOMEN: Soft, Nontender, Nondistended; Bowel sounds present  EXTREMITIES:  2+ Peripheral Pulses, No clubbing, cyanosis, or edema  PSYCH: AAOx3  NEUROLOGY: non-focal  SKIN: No rashes or lesions    LABS:                        15.5   6.56  )-----------( 205      ( 2021 07:20 )             51.7     -    145  |  108<H>  |  22  ----------------------------<  77  3.9   |  26  |  0.58    Ca    9.7      2021 07:20  Phos  3.6       Mg     2.30         TPro  7.5  /  Alb  4.1  /  TBili  0.2  /  DBili  x   /  AST  16  /  ALT  15  /  AlkPhos  58            Urinalysis Basic - ( 10 Nov 2021 12:09 )    Color: Yellow / Appearance: Clear / S.028 / pH: x  Gluc: x / Ketone: Trace  / Bili: Negative / Urobili: <2 mg/dL   Blood: x / Protein: Trace / Nitrite: Negative   Leuk Esterase: Negative / RBC: 19 /HPF / WBC 1 /HPF   Sq Epi: x / Non Sq Epi: 2 /HPF / Bacteria: Negative        RADIOLOGY & ADDITIONAL TESTS:    Imaging Personally Reviewed:    Consultant(s) Notes Reviewed:      Care Discussed with Consultants/Other Providers:

## 2021-11-10 NOTE — BH CONSULTATION LIAISON PROGRESS NOTE - NSBHFUPINTERVALHXFT_PSY_A_CORE
Met with patient-- rather nonsensical, illogical, easily agitated, loud, labile, redirects especially when male RN intervenes. Requires frequent redirections during the day.   Coordinated with medicine --- bed obtained at [address]. Will give a hand off to 2 south team. Attempted to reach out to  to inform him of the admission-- unable to reach
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
## 2021-11-10 NOTE — BH CONSULTATION LIAISON PROGRESS NOTE - NSBHFUPINTERVALCCFT_PSY_A_CORE
JERRY informed by ED Director, Genia Parry RN, that the patient's mother has left the ED while the patient was in CT. Patient's mother stated she needed to go take care of her other children. Call placed to [facility]. Report taken by Fouzia Carrillo. 1620: JERRY informed by the patient's nurse that the person with the patient was not her mother but her adult sister. Apparently someone is supposed to be coming to sit with the patient. Unclear who is consenting for treatment. JERRY updated CPS who stated to call back if nobody shows to assume care of the patient. 1627: Spoke with Brigette [place] who stated that the patient's sister, Sindi Felix, immediately spoke to the mother, Johnathan Delgado, upon arrival who consented for treatment. JERRY spoke with patient who is unsure who is coming to the hospital to sit with her. JERRY called and spoke with the patient's mother, Johnathan Delgado [phone]. Brooke Patel, a good family friend, is on the way to be with the patient per the patient's mother. Permission obtained by mother for Mr. Loly Mims to transport the patient home upon discharge. Received one dose of Zyprexa 2.5mg IM last evening for agitation. Discussed at medicine rounds/medicine team this morning- discussed behaviors, and the continued need for an inpatient psychiatric admission. 2PC is in chart.

## 2021-11-10 NOTE — PROGRESS NOTE ADULT - PROBLEM SELECTOR PLAN 6
Unclear source of bleeding. Urinary vs vaginal. No signs of rectal bleed at this time.   -Monitor for signs of bleeding   -Check UA---> 19RBC's ? contaminant   -Check vaginal US  -Check CBC  -Consider further w/u
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
## 2021-11-10 NOTE — PROGRESS NOTE ADULT - PROBLEM SELECTOR PLAN 9
DVT: Lovenox  Dispo: likely [facility] when stable.      Discussed with ACP and Psych     Total time: 35min
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
## 2021-11-11 LAB
ALBUMIN SERPL ELPH-MCNC: 3.9 G/DL — SIGNIFICANT CHANGE UP (ref 3.3–5)
ALP SERPL-CCNC: 54 U/L — SIGNIFICANT CHANGE UP (ref 40–120)
ALT FLD-CCNC: 12 U/L — SIGNIFICANT CHANGE UP (ref 4–33)
AMMONIA BLD-MCNC: 26 UMOL/L — SIGNIFICANT CHANGE UP (ref 11–55)
ANION GAP SERPL CALC-SCNC: 10 MMOL/L — SIGNIFICANT CHANGE UP (ref 7–14)
AST SERPL-CCNC: 17 U/L — SIGNIFICANT CHANGE UP (ref 4–32)
BASOPHILS # BLD AUTO: 0.04 K/UL — SIGNIFICANT CHANGE UP (ref 0–0.2)
BASOPHILS NFR BLD AUTO: 0.6 % — SIGNIFICANT CHANGE UP (ref 0–2)
BILIRUB SERPL-MCNC: <0.2 MG/DL — SIGNIFICANT CHANGE UP (ref 0.2–1.2)
BUN SERPL-MCNC: 18 MG/DL — SIGNIFICANT CHANGE UP (ref 7–23)
CALCIUM SERPL-MCNC: 9.7 MG/DL — SIGNIFICANT CHANGE UP (ref 8.4–10.5)
CHLORIDE SERPL-SCNC: 109 MMOL/L — HIGH (ref 98–107)
CO2 SERPL-SCNC: 25 MMOL/L — SIGNIFICANT CHANGE UP (ref 22–31)
CREAT SERPL-MCNC: 0.58 MG/DL — SIGNIFICANT CHANGE UP (ref 0.5–1.3)
EOSINOPHIL # BLD AUTO: 0.12 K/UL — SIGNIFICANT CHANGE UP (ref 0–0.5)
EOSINOPHIL NFR BLD AUTO: 1.7 % — SIGNIFICANT CHANGE UP (ref 0–6)
GLUCOSE SERPL-MCNC: 93 MG/DL — SIGNIFICANT CHANGE UP (ref 70–99)
HCT VFR BLD CALC: 43.4 % — SIGNIFICANT CHANGE UP (ref 34.5–45)
HGB BLD-MCNC: 13.6 G/DL — SIGNIFICANT CHANGE UP (ref 11.5–15.5)
IANC: 3.05 K/UL — SIGNIFICANT CHANGE UP (ref 1.5–8.5)
IMM GRANULOCYTES NFR BLD AUTO: 0.3 % — SIGNIFICANT CHANGE UP (ref 0–1.5)
LYMPHOCYTES # BLD AUTO: 3.15 K/UL — SIGNIFICANT CHANGE UP (ref 1–3.3)
LYMPHOCYTES # BLD AUTO: 43.4 % — SIGNIFICANT CHANGE UP (ref 13–44)
MAGNESIUM SERPL-MCNC: 2.3 MG/DL — SIGNIFICANT CHANGE UP (ref 1.6–2.6)
MCHC RBC-ENTMCNC: 30.4 PG — SIGNIFICANT CHANGE UP (ref 27–34)
MCHC RBC-ENTMCNC: 31.3 GM/DL — LOW (ref 32–36)
MCV RBC AUTO: 97.1 FL — SIGNIFICANT CHANGE UP (ref 80–100)
MONOCYTES # BLD AUTO: 0.88 K/UL — SIGNIFICANT CHANGE UP (ref 0–0.9)
MONOCYTES NFR BLD AUTO: 12.1 % — SIGNIFICANT CHANGE UP (ref 2–14)
NEUTROPHILS # BLD AUTO: 3.05 K/UL — SIGNIFICANT CHANGE UP (ref 1.8–7.4)
NEUTROPHILS NFR BLD AUTO: 41.9 % — LOW (ref 43–77)
NRBC # BLD: 0 /100 WBCS — SIGNIFICANT CHANGE UP
NRBC # FLD: 0 K/UL — SIGNIFICANT CHANGE UP
PHOSPHATE SERPL-MCNC: 3.3 MG/DL — SIGNIFICANT CHANGE UP (ref 2.5–4.5)
PLATELET # BLD AUTO: 200 K/UL — SIGNIFICANT CHANGE UP (ref 150–400)
POTASSIUM SERPL-MCNC: 4.5 MMOL/L — SIGNIFICANT CHANGE UP (ref 3.5–5.3)
POTASSIUM SERPL-SCNC: 4.5 MMOL/L — SIGNIFICANT CHANGE UP (ref 3.5–5.3)
PROT SERPL-MCNC: 7.2 G/DL — SIGNIFICANT CHANGE UP (ref 6–8.3)
RBC # BLD: 4.47 M/UL — SIGNIFICANT CHANGE UP (ref 3.8–5.2)
RBC # FLD: 15.1 % — HIGH (ref 10.3–14.5)
SODIUM SERPL-SCNC: 144 MMOL/L — SIGNIFICANT CHANGE UP (ref 135–145)
VALPROATE SERPL-MCNC: 107 UG/ML — HIGH (ref 50–100)
WBC # BLD: 7.26 K/UL — SIGNIFICANT CHANGE UP (ref 3.8–10.5)
WBC # FLD AUTO: 7.26 K/UL — SIGNIFICANT CHANGE UP (ref 3.8–10.5)

## 2021-11-11 PROCEDURE — 99232 SBSQ HOSP IP/OBS MODERATE 35: CPT

## 2021-11-11 PROCEDURE — 99233 SBSQ HOSP IP/OBS HIGH 50: CPT

## 2021-11-11 RX ORDER — CLONAZEPAM 1 MG
0.5 TABLET ORAL
Refills: 0 | Status: DISCONTINUED | OUTPATIENT
Start: 2021-11-11 | End: 2021-11-16

## 2021-11-11 RX ADMIN — OLANZAPINE 2.5 MILLIGRAM(S): 15 TABLET, FILM COATED ORAL at 20:02

## 2021-11-11 RX ADMIN — Medication 25 MILLIGRAM(S): at 06:31

## 2021-11-11 RX ADMIN — DIVALPROEX SODIUM 750 MILLIGRAM(S): 500 TABLET, DELAYED RELEASE ORAL at 06:32

## 2021-11-11 RX ADMIN — Medication 1 APPLICATION(S): at 18:47

## 2021-11-11 RX ADMIN — Medication 0.5 MILLIGRAM(S): at 18:47

## 2021-11-11 RX ADMIN — Medication 3 MILLIGRAM(S): at 20:01

## 2021-11-11 RX ADMIN — DIVALPROEX SODIUM 1000 MILLIGRAM(S): 500 TABLET, DELAYED RELEASE ORAL at 21:10

## 2021-11-11 RX ADMIN — Medication 0.5 MILLIGRAM(S): at 06:31

## 2021-11-11 RX ADMIN — Medication 25 MILLIGRAM(S): at 18:47

## 2021-11-11 RX ADMIN — Medication 1 APPLICATION(S): at 06:32

## 2021-11-11 RX ADMIN — ENOXAPARIN SODIUM 40 MILLIGRAM(S): 100 INJECTION SUBCUTANEOUS at 11:53

## 2021-11-11 RX ADMIN — Medication 20 MILLIGRAM(S): at 06:32

## 2021-11-11 NOTE — BH CONSULTATION LIAISON PROGRESS NOTE - NSBHFUPINTERVALCCFT_PSY_A_CORE
VA level: 103 this morning. Noted that blood work was done after dose of Depakote this morning. Discussed with medicine team this morning that level is incorrect because of this and so it needs to be repeated tomorrow AM again.   UA noted to be with blood. US abdomen- thickening of endometrium-- 7mm noted. Plan is to consult GYN for next steps. Not medically cleared

## 2021-11-11 NOTE — BH CONSULTATION LIAISON PROGRESS NOTE - NSBHFUPINTERVALHXFT_PSY_A_CORE
Met with the patient. Sleeping soundly. Wakes up when name is called. Remains confused, disoriented, nonsensical in conversation. Poor attention. Examined--No tremors at rest today. Very mild cogwheeling on exam-- baseline.

## 2021-11-11 NOTE — BH CONSULTATION LIAISON PROGRESS NOTE - NSBHASSESSMENTFT_PSY_ALL_CORE
66 y.o. F, as per records is , has been residing at [facility] ([facility]) since 2010, with past psychiatric history of dementia with behavioral disturbance, mood disorder, RODY, possible inpatient psychiatric admission to [facility] this year, has a chronic medical history notable for NPH (w/  shunt), HTN, DMT2, TBI (unspecified), pedal edema presenting from [facility] for physically aggressive behavior and diffuse rash. Records also indicate that [facility] was seeking a [facility] admission and hence this [facility] admission. Psychiatry has been consulted for agitation management.     11/5 - Patient used 1 PRN of Zyprexa, remains intermittently agitated, with delusions.   11/8-- agitated, labile, impulsive, unpredictable.   11/9-- less agitated today.   11/10- still labile, impulsive, requires inpatient psychiatry admission for continued adjustment of medications  11/11-- sleeping this morning. Can be loud, impulsive.     Plan  - CONTINUE Depakote 750 mg q AM + 1000mg q HS PO. Will repeat Depakote level in the AM of 11/12/21 BEFORE AM dose of Depakote please.   - Will continue to monitor. Might need to start standing Zyprexa in conjunction with Depakote for better mood control.   - C/w Clonazepam 0.5 mg PO BID.   - Use Zyprexa 2.5mg q 6hrs prn IM/PO  - Collateral information gathered.  - Don Dreyer.  - DISPOSITION===[facility] admission. 2pc completed.

## 2021-11-11 NOTE — PROGRESS NOTE ADULT - SUBJECTIVE AND OBJECTIVE BOX
Patient is a 66y old  Female who presents with a chief complaint of Dementia with behavioral disturbance (10 Nov 2021 15:23)      SUBJECTIVE / OVERNIGHT EVENTS: Pt has no complaints today, denies any signs of bleeding.     MEDICATIONS  (STANDING):  clonazePAM  Tablet 0.5 milliGRAM(s) Oral two times a day  diVALproex  milliGRAM(s) Oral <User Schedule>  diVALproex DR 1000 milliGRAM(s) Oral at bedtime  enoxaparin Injectable 40 milliGRAM(s) SubCutaneous daily  furosemide    Tablet 20 milliGRAM(s) Oral daily  influenza  Vaccine (HIGH DOSE) 0.7 milliLiter(s) IntraMuscular once  melatonin 3 milliGRAM(s) Oral <User Schedule>  metoprolol tartrate 25 milliGRAM(s) Oral two times a day  triamcinolone 0.1% Cream 1 Application(s) Topical every 12 hours    MEDICATIONS  (PRN):  acetaminophen     Tablet .. 650 milliGRAM(s) Oral every 6 hours PRN Temp greater or equal to 38C (100.4F), Mild Pain (1 - 3)  aluminum hydroxide/magnesium hydroxide/simethicone Suspension 30 milliLiter(s) Oral every 4 hours PRN Dyspepsia  OLANZapine 2.5 milliGRAM(s) Oral every 6 hours PRN agitation  OLANZapine Injectable 2.5 milliGRAM(s) IntraMuscular every 6 hours PRN COMBATIVE BEHAVIORS NOT RESPONDING TO VERBAL REDIRECTION  ondansetron Injectable 4 milliGRAM(s) IV Push every 8 hours PRN Nausea and/or Vomiting      Vital Signs Last 24 Hrs  T(C): 36.5 (2021 06:26), Max: 36.7 (10 Nov 2021 12:14)  T(F): 97.7 (2021 06:26), Max: 98 (10 Nov 2021 12:14)  HR: 56 (2021 06:26) (56 - 71)  BP: 110/53 (2021 06:26) (110/53 - 137/72)  BP(mean): --  RR: 18 (2021 06:26) (18 - 18)  SpO2: 99% (2021 06:26) (99% - 100%)  CAPILLARY BLOOD GLUCOSE        I&O's Summary      PHYSICAL EXAM:  GENERAL: NAD, well-developed  HEAD:  Atraumatic, Normocephalic  EYES: EOMI, PERRLA, conjunctiva and sclera clear  NECK: Supple, No JVD  CHEST/LUNG: Clear to auscultation bilaterally; No wheeze  HEART: Regular rate and rhythm; No murmurs, rubs, or gallops  ABDOMEN: Soft, Nontender, Nondistended; Bowel sounds present  EXTREMITIES:  2+ Peripheral Pulses, No clubbing, cyanosis, or edema  PSYCH: AAOx1  NEUROLOGY: non-focal  SKIN: No rashes or lesions    LABS:                        13.6   7.26  )-----------( 200      ( 2021 07:11 )             43.4         144  |  109<H>  |  18  ----------------------------<  93  4.5   |  25  |  0.58    Ca    9.7      2021 07:11  Phos  3.3       Mg     2.30         TPro  7.2  /  Alb  3.9  /  TBili  <0.2  /  DBili  x   /  AST  17  /  ALT  12  /  AlkPhos  54  11-11      CARDIAC MARKERS ( 10 Nov 2021 15:38 )  x     / x     / 68 U/L / x     / x          Urinalysis Basic - ( 10 Nov 2021 12:09 )    Color: Yellow / Appearance: Clear / S.028 / pH: x  Gluc: x / Ketone: Trace  / Bili: Negative / Urobili: <2 mg/dL   Blood: x / Protein: Trace / Nitrite: Negative   Leuk Esterase: Negative / RBC: 19 /HPF / WBC 1 /HPF   Sq Epi: x / Non Sq Epi: 2 /HPF / Bacteria: Negative        RADIOLOGY & ADDITIONAL TESTS:  < from: US Pelvis Complete (11.10.21 @ 16:30) >  IMPRESSION:  Thickening of the endometrial complex, which measures 7mm. Further evaluation with endometrial biopsy is recommended.        --- End of Report ---    < end of copied text >    Imaging Personally Reviewed:    Consultant(s) Notes Reviewed:      Care Discussed with Consultants/Other Providers:

## 2021-11-11 NOTE — PROGRESS NOTE ADULT - PROBLEM SELECTOR PLAN 6
Unclear source of bleeding. Urinary vs vaginal. No signs of rectal bleed at this time.   -Monitor for signs of bleeding   -Check UA---> 19RBC's ? contaminant   -Check vaginal US----> thickened endometrium, will obtain GYN consult.    -H/H stable
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
## 2021-11-11 NOTE — BH CONSULTATION LIAISON PROGRESS NOTE - OTHER
can be seen conversing to self-- ah. Patient denies mild cogwheeling confusion, disorientation, confabulation
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
## 2021-11-12 LAB
ANION GAP SERPL CALC-SCNC: 13 MMOL/L — SIGNIFICANT CHANGE UP (ref 7–14)
BASOPHILS # BLD AUTO: 0.02 K/UL — SIGNIFICANT CHANGE UP (ref 0–0.2)
BASOPHILS NFR BLD AUTO: 0.3 % — SIGNIFICANT CHANGE UP (ref 0–2)
BUN SERPL-MCNC: 20 MG/DL — SIGNIFICANT CHANGE UP (ref 7–23)
CALCIUM SERPL-MCNC: 10 MG/DL — SIGNIFICANT CHANGE UP (ref 8.4–10.5)
CHLORIDE SERPL-SCNC: 104 MMOL/L — SIGNIFICANT CHANGE UP (ref 98–107)
CO2 SERPL-SCNC: 27 MMOL/L — SIGNIFICANT CHANGE UP (ref 22–31)
CREAT SERPL-MCNC: 0.58 MG/DL — SIGNIFICANT CHANGE UP (ref 0.5–1.3)
EOSINOPHIL # BLD AUTO: 0.11 K/UL — SIGNIFICANT CHANGE UP (ref 0–0.5)
EOSINOPHIL NFR BLD AUTO: 1.7 % — SIGNIFICANT CHANGE UP (ref 0–6)
GLUCOSE BLDC GLUCOMTR-MCNC: 109 MG/DL — HIGH (ref 70–99)
GLUCOSE SERPL-MCNC: 69 MG/DL — LOW (ref 70–99)
HCT VFR BLD CALC: 43 % — SIGNIFICANT CHANGE UP (ref 34.5–45)
HGB BLD-MCNC: 13.4 G/DL — SIGNIFICANT CHANGE UP (ref 11.5–15.5)
IANC: 2.54 K/UL — SIGNIFICANT CHANGE UP (ref 1.5–8.5)
IMM GRANULOCYTES NFR BLD AUTO: 0.3 % — SIGNIFICANT CHANGE UP (ref 0–1.5)
LYMPHOCYTES # BLD AUTO: 3.14 K/UL — SIGNIFICANT CHANGE UP (ref 1–3.3)
LYMPHOCYTES # BLD AUTO: 49.4 % — HIGH (ref 13–44)
MAGNESIUM SERPL-MCNC: 2.4 MG/DL — SIGNIFICANT CHANGE UP (ref 1.6–2.6)
MCHC RBC-ENTMCNC: 30 PG — SIGNIFICANT CHANGE UP (ref 27–34)
MCHC RBC-ENTMCNC: 31.2 GM/DL — LOW (ref 32–36)
MCV RBC AUTO: 96.2 FL — SIGNIFICANT CHANGE UP (ref 80–100)
MONOCYTES # BLD AUTO: 0.52 K/UL — SIGNIFICANT CHANGE UP (ref 0–0.9)
MONOCYTES NFR BLD AUTO: 8.2 % — SIGNIFICANT CHANGE UP (ref 2–14)
NEUTROPHILS # BLD AUTO: 2.54 K/UL — SIGNIFICANT CHANGE UP (ref 1.8–7.4)
NEUTROPHILS NFR BLD AUTO: 40.1 % — LOW (ref 43–77)
NRBC # BLD: 0 /100 WBCS — SIGNIFICANT CHANGE UP
NRBC # FLD: 0 K/UL — SIGNIFICANT CHANGE UP
PHOSPHATE SERPL-MCNC: 3.7 MG/DL — SIGNIFICANT CHANGE UP (ref 2.5–4.5)
PLATELET # BLD AUTO: 198 K/UL — SIGNIFICANT CHANGE UP (ref 150–400)
POTASSIUM SERPL-MCNC: 3.5 MMOL/L — SIGNIFICANT CHANGE UP (ref 3.5–5.3)
POTASSIUM SERPL-SCNC: 3.5 MMOL/L — SIGNIFICANT CHANGE UP (ref 3.5–5.3)
RBC # BLD: 4.47 M/UL — SIGNIFICANT CHANGE UP (ref 3.8–5.2)
RBC # FLD: 14.8 % — HIGH (ref 10.3–14.5)
SODIUM SERPL-SCNC: 144 MMOL/L — SIGNIFICANT CHANGE UP (ref 135–145)
VALPROATE SERPL-MCNC: 102 UG/ML — HIGH (ref 50–100)
WBC # BLD: 6.35 K/UL — SIGNIFICANT CHANGE UP (ref 3.8–10.5)
WBC # FLD AUTO: 6.35 K/UL — SIGNIFICANT CHANGE UP (ref 3.8–10.5)

## 2021-11-12 PROCEDURE — 99232 SBSQ HOSP IP/OBS MODERATE 35: CPT

## 2021-11-12 RX ORDER — DIVALPROEX SODIUM 500 MG/1
750 TABLET, DELAYED RELEASE ORAL
Refills: 0 | Status: DISCONTINUED | OUTPATIENT
Start: 2021-11-13 | End: 2021-11-19

## 2021-11-12 RX ORDER — DIVALPROEX SODIUM 500 MG/1
750 TABLET, DELAYED RELEASE ORAL AT BEDTIME
Refills: 0 | Status: DISCONTINUED | OUTPATIENT
Start: 2021-11-12 | End: 2021-11-12

## 2021-11-12 RX ORDER — DIVALPROEX SODIUM 500 MG/1
1000 TABLET, DELAYED RELEASE ORAL AT BEDTIME
Refills: 0 | Status: DISCONTINUED | OUTPATIENT
Start: 2021-11-12 | End: 2021-11-19

## 2021-11-12 RX ADMIN — DIVALPROEX SODIUM 1000 MILLIGRAM(S): 500 TABLET, DELAYED RELEASE ORAL at 21:12

## 2021-11-12 RX ADMIN — Medication 0.5 MILLIGRAM(S): at 05:17

## 2021-11-12 RX ADMIN — Medication 20 MILLIGRAM(S): at 05:18

## 2021-11-12 RX ADMIN — ENOXAPARIN SODIUM 40 MILLIGRAM(S): 100 INJECTION SUBCUTANEOUS at 12:01

## 2021-11-12 RX ADMIN — Medication 25 MILLIGRAM(S): at 05:18

## 2021-11-12 RX ADMIN — Medication 1 APPLICATION(S): at 16:58

## 2021-11-12 RX ADMIN — Medication 1 APPLICATION(S): at 05:19

## 2021-11-12 RX ADMIN — DIVALPROEX SODIUM 750 MILLIGRAM(S): 500 TABLET, DELAYED RELEASE ORAL at 05:18

## 2021-11-12 RX ADMIN — Medication 0.5 MILLIGRAM(S): at 17:08

## 2021-11-12 RX ADMIN — Medication 3 MILLIGRAM(S): at 19:49

## 2021-11-12 RX ADMIN — Medication 25 MILLIGRAM(S): at 17:07
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
## 2021-11-12 NOTE — PROGRESS NOTE ADULT - PROBLEM SELECTOR PLAN 6
Likely  vaginal spotting.  No signs of rectal bleed at this time. (+) thickened endometrium on US. Seen by GYN consult, rec to have outpatient GYN f/u after [facility] admission for active mental illness.   -Monitor for signs of bleeding   -H/H stable  -Outpatient f/u with GYN after [facility] admission

## 2021-11-12 NOTE — BH CONSULTATION LIAISON PROGRESS NOTE - NSBHFUPINTERVALCCFT_PSY_A_CORE
one prn of zyprexa 2.5mg again yesterday evening for agitation. Discussed at medicine rounds this morning--- remains agitated still, impulsive, unpredictable. Still requiring inpatient psychiatry admission.   sleep and appetite fair  VA level= 102. Called [facility] lab and confirmed that the blood was drawn after the dose of Depakote in the morning. Discussed with medicine team that level is not accurate

## 2021-11-12 NOTE — BH CONSULTATION LIAISON PROGRESS NOTE - NSBHASSESSMENTFT_PSY_ALL_CORE
66 y.o. F, as per records is , has been residing at [facility] ([facility]) since 2010, with past psychiatric history of dementia with behavioral disturbance, mood disorder, RODY, possible inpatient psychiatric admission to [facility] this year, has a chronic medical history notable for NPH (w/  shunt), HTN, DMT2, TBI (unspecified), [place] [facility] [place] and diffuse rash. Records also indicate that [facility] was seeking a [facility] admission and hence this [facility] admission. Psychiatry has been consulted for agitation management.     11/5 - Patient used 1 PRN of Zyprexa, remains intermittently agitated, with delusions.   11/8-- agitated, labile, impulsive, unpredictable.   11/9-- less agitated today.   11/10- still labile, impulsive, requires inpatient psychiatry admission for continued adjustment of medications  11/11-- sleeping this morning. Can be loud, impulsive.   11/12--- awake, can be agitated     Plan  - CONTINUE Depakote 750 mg q AM + 1000mg q HS PO. Will repeat Depakote level again in the AM of 11/13/21 BEFORE AM dose of Depakote please.   - Will continue to monitor. Might need to start standing Zyprexa in conjunction with Depakote for better mood control.   - C/w Clonazepam 0.5 mg PO BID.   - Use Zyprexa 2.5mg q 6hrs prn IM/PO  - Collateral information gathered.  - Don Dreyer.  - DISPOSITION===[facility] admission. 2pc completed in chart

## 2021-11-12 NOTE — PROGRESS NOTE ADULT - SUBJECTIVE AND OBJECTIVE BOX
Patient is a 66y old  Female who presents with a chief complaint of Dementia with behavioral disturbance (2021 11:33)      SUBJECTIVE / OVERNIGHT EVENTS: Pt has no complaints  at this time.     MEDICATIONS  (STANDING):  clonazePAM  Tablet 0.5 milliGRAM(s) Oral two times a day  diVALproex  milliGRAM(s) Oral <User Schedule>  diVALproex  milliGRAM(s) Oral at bedtime  enoxaparin Injectable 40 milliGRAM(s) SubCutaneous daily  furosemide    Tablet 20 milliGRAM(s) Oral daily  influenza  Vaccine (HIGH DOSE) 0.7 milliLiter(s) IntraMuscular once  melatonin 3 milliGRAM(s) Oral <User Schedule>  metoprolol tartrate 25 milliGRAM(s) Oral two times a day  triamcinolone 0.1% Cream 1 Application(s) Topical every 12 hours    MEDICATIONS  (PRN):  acetaminophen     Tablet .. 650 milliGRAM(s) Oral every 6 hours PRN Temp greater or equal to 38C (100.4F), Mild Pain (1 - 3)  aluminum hydroxide/magnesium hydroxide/simethicone Suspension 30 milliLiter(s) Oral every 4 hours PRN Dyspepsia  OLANZapine 2.5 milliGRAM(s) Oral every 6 hours PRN agitation  OLANZapine Injectable 2.5 milliGRAM(s) IntraMuscular every 6 hours PRN COMBATIVE BEHAVIORS NOT RESPONDING TO VERBAL REDIRECTION  ondansetron Injectable 4 milliGRAM(s) IV Push every 8 hours PRN Nausea and/or Vomiting      Vital Signs Last 24 Hrs  T(C): 36.6 (2021 05:19), Max: 36.8 (2021 21:10)  T(F): 97.9 (2021 05:19), Max: 98.3 (2021 21:10)  HR: 62 (2021 05:19) (62 - 67)  BP: 102/53 (2021 05:19) (102/53 - 154/91)  BP(mean): --  RR: 18 (2021 05:19) (18 - 18)  SpO2: 100% (2021 05:19) (100% - 100%)  CAPILLARY BLOOD GLUCOSE        I&O's Summary      PHYSICAL EXAM:  GENERAL: NAD, well-developed  HEAD:  Atraumatic, Normocephalic  EYES: EOMI, PERRLA, conjunctiva and sclera clear  NECK: Supple, No JVD  CHEST/LUNG: Clear to auscultation bilaterally; No wheeze  HEART: Regular rate and rhythm; No murmurs, rubs, or gallops  ABDOMEN: Soft, Nontender, Nondistended; Bowel sounds present  EXTREMITIES:  2+ Peripheral Pulses, No clubbing, cyanosis, or edema  PSYCH: AAOx1  NEUROLOGY: non-focal  SKIN: No rashes or lesions    LABS:                        13.4   6.35  )-----------( 198      ( 2021 06:28 )             43.0     11-12    144  |  104  |  20  ----------------------------<  69<L>  3.5   |  27  |  0.58    Ca    10.0      2021 06:28  Phos  3.7     11-12  Mg     2.40     11-12    TPro  7.2  /  Alb  3.9  /  TBili  <0.2  /  DBili  x   /  AST  17  /  ALT  12  /  AlkPhos  54  11-11      CARDIAC MARKERS ( 10 Nov 2021 15:38 )  x     / x     / 68 U/L / x     / x          Urinalysis Basic - ( 10 Nov 2021 12:09 )    Color: Yellow / Appearance: Clear / S.028 / pH: x  Gluc: x / Ketone: Trace  / Bili: Negative / Urobili: <2 mg/dL   Blood: x / Protein: Trace / Nitrite: Negative   Leuk Esterase: Negative / RBC: 19 /HPF / WBC 1 /HPF   Sq Epi: x / Non Sq Epi: 2 /HPF / Bacteria: Negative        RADIOLOGY & ADDITIONAL TESTS:    Imaging Personally Reviewed:    Consultant(s) Notes Reviewed:  GYN consult---> rec outpatient f/u for her vaginal spotting     Care Discussed with Consultants/Other Providers:

## 2021-11-12 NOTE — BH CONSULTATION LIAISON PROGRESS NOTE - NSBHFUPINTERVALHXFT_PSY_A_CORE
Met with patient. Ambulating, attempted to awol. Remains labile, cursing at staff. Did not become aggressive but can be unpredictable in terms of her behaviors which can be baseline. Tolerating Depakote. Is nonsensical in her conversation. Unreliable in her reporting of ah or paranoia. Not evidently psychotic today-- will monitor. Denies any si or hi when asked.   No beds available at [facility] today. Discussed with team that we will monitor behaviors over weekend, and will decide [facility] vs back to LTC on Monday.
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
## 2021-11-13 LAB — VALPROATE SERPL-MCNC: 79.3 UG/ML — SIGNIFICANT CHANGE UP (ref 50–100)

## 2021-11-13 PROCEDURE — 93010 ELECTROCARDIOGRAM REPORT: CPT

## 2021-11-13 PROCEDURE — 99232 SBSQ HOSP IP/OBS MODERATE 35: CPT

## 2021-11-13 RX ADMIN — Medication 0.5 MILLIGRAM(S): at 17:37

## 2021-11-13 RX ADMIN — Medication 25 MILLIGRAM(S): at 17:37

## 2021-11-13 RX ADMIN — Medication 1 APPLICATION(S): at 17:41

## 2021-11-13 RX ADMIN — Medication 0.5 MILLIGRAM(S): at 06:33

## 2021-11-13 RX ADMIN — Medication 3 MILLIGRAM(S): at 21:01

## 2021-11-13 RX ADMIN — Medication 25 MILLIGRAM(S): at 06:34

## 2021-11-13 RX ADMIN — ENOXAPARIN SODIUM 40 MILLIGRAM(S): 100 INJECTION SUBCUTANEOUS at 12:11

## 2021-11-13 RX ADMIN — Medication 20 MILLIGRAM(S): at 06:36

## 2021-11-13 RX ADMIN — DIVALPROEX SODIUM 750 MILLIGRAM(S): 500 TABLET, DELAYED RELEASE ORAL at 06:33

## 2021-11-13 RX ADMIN — Medication 1 APPLICATION(S): at 06:34

## 2021-11-13 RX ADMIN — DIVALPROEX SODIUM 1000 MILLIGRAM(S): 500 TABLET, DELAYED RELEASE ORAL at 21:01
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
## 2021-11-13 NOTE — PROGRESS NOTE ADULT - PROBLEM SELECTOR PLAN 6
Likely  vaginal spotting.  No signs of rectal bleed at this time. (+) thickened endometrium on US. Seen by GYN consult, rec to have outpatient GYN f/u after [facility] admission for active mental illness.   -Monitor for signs of bleeding   -H/H stable  -Outpatient f/u with GYN after [facility] admission

## 2021-11-13 NOTE — PROGRESS NOTE ADULT - SUBJECTIVE AND OBJECTIVE BOX
Patient is a 66y old  Female who presents with a chief complaint of Dementia with behavioral disturbance (12 Nov 2021 11:38)      SUBJECTIVE / OVERNIGHT EVENTS: No complaints today     MEDICATIONS  (STANDING):  clonazePAM  Tablet 0.5 milliGRAM(s) Oral two times a day  diVALproex DR 1000 milliGRAM(s) Oral at bedtime  diVALproex  milliGRAM(s) Oral <User Schedule>  enoxaparin Injectable 40 milliGRAM(s) SubCutaneous daily  furosemide    Tablet 20 milliGRAM(s) Oral daily  influenza  Vaccine (HIGH DOSE) 0.7 milliLiter(s) IntraMuscular once  melatonin 3 milliGRAM(s) Oral <User Schedule>  metoprolol tartrate 25 milliGRAM(s) Oral two times a day  triamcinolone 0.1% Cream 1 Application(s) Topical every 12 hours    MEDICATIONS  (PRN):  acetaminophen     Tablet .. 650 milliGRAM(s) Oral every 6 hours PRN Temp greater or equal to 38C (100.4F), Mild Pain (1 - 3)  aluminum hydroxide/magnesium hydroxide/simethicone Suspension 30 milliLiter(s) Oral every 4 hours PRN Dyspepsia  OLANZapine 2.5 milliGRAM(s) Oral every 6 hours PRN agitation  OLANZapine Injectable 2.5 milliGRAM(s) IntraMuscular every 6 hours PRN COMBATIVE BEHAVIORS NOT RESPONDING TO VERBAL REDIRECTION  ondansetron Injectable 4 milliGRAM(s) IV Push every 8 hours PRN Nausea and/or Vomiting      Vital Signs Last 24 Hrs  T(C): 36.7 (13 Nov 2021 13:07), Max: 36.7 (13 Nov 2021 13:07)  T(F): 98 (13 Nov 2021 13:07), Max: 98 (13 Nov 2021 13:07)  HR: 68 (13 Nov 2021 13:07) (66 - 73)  BP: 118/64 (13 Nov 2021 13:07) (113/74 - 142/75)  BP(mean): --  RR: 18 (13 Nov 2021 13:07) (17 - 18)  SpO2: 100% (13 Nov 2021 13:07) (98% - 100%)  CAPILLARY BLOOD GLUCOSE      POCT Blood Glucose.: 109 mg/dL (12 Nov 2021 16:13)    I&O's Summary      PHYSICAL EXAM:  GENERAL: NAD, well-developed, ambulating   HEAD:  Atraumatic, Normocephalic  EYES: EOMI, PERRLA, conjunctiva and sclera clear  NECK: Supple, No JVD  CHEST/LUNG: Clear to auscultation bilaterally; No wheeze  HEART: Regular rate and rhythm; No murmurs, rubs, or gallops  ABDOMEN: Soft, Nontender, Nondistended; Bowel sounds present  EXTREMITIES:  2+ Peripheral Pulses, No clubbing, cyanosis, or edema  PSYCH: AAOx1  NEUROLOGY: non-focal  SKIN: No rashes or lesions    LABS:                        13.4   6.35  )-----------( 198      ( 12 Nov 2021 06:28 )             43.0     11-12    144  |  104  |  20  ----------------------------<  69<L>  3.5   |  27  |  0.58    Ca    10.0      12 Nov 2021 06:28  Phos  3.7     11-12  Mg     2.40     11-12                RADIOLOGY & ADDITIONAL TESTS:    Imaging Personally Reviewed:    Consultant(s) Notes Reviewed:      Care Discussed with Consultants/Other Providers:
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
## 2021-11-14 LAB — SARS-COV-2 RNA SPEC QL NAA+PROBE: SIGNIFICANT CHANGE UP

## 2021-11-14 PROCEDURE — 99232 SBSQ HOSP IP/OBS MODERATE 35: CPT

## 2021-11-14 RX ADMIN — DIVALPROEX SODIUM 1000 MILLIGRAM(S): 500 TABLET, DELAYED RELEASE ORAL at 21:17

## 2021-11-14 RX ADMIN — Medication 0.5 MILLIGRAM(S): at 17:32

## 2021-11-14 RX ADMIN — ENOXAPARIN SODIUM 40 MILLIGRAM(S): 100 INJECTION SUBCUTANEOUS at 11:38

## 2021-11-14 RX ADMIN — Medication 25 MILLIGRAM(S): at 17:32

## 2021-11-14 RX ADMIN — Medication 1 APPLICATION(S): at 17:36

## 2021-11-14 RX ADMIN — Medication 1 APPLICATION(S): at 06:28

## 2021-11-14 RX ADMIN — Medication 3 MILLIGRAM(S): at 21:17

## 2021-11-14 RX ADMIN — Medication 0.5 MILLIGRAM(S): at 06:27

## 2021-11-14 RX ADMIN — DIVALPROEX SODIUM 750 MILLIGRAM(S): 500 TABLET, DELAYED RELEASE ORAL at 06:29

## 2021-11-14 RX ADMIN — Medication 25 MILLIGRAM(S): at 06:29

## 2021-11-14 RX ADMIN — Medication 20 MILLIGRAM(S): at 06:29
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
## 2021-11-14 NOTE — PROGRESS NOTE ADULT - PROBLEM SELECTOR PLAN 6
Likely  vaginal spotting.  No signs of rectal bleed at this time. (+) thickened endometrium on US. Seen by GYN consult, rec to have outpatient GYN f/u after [facility] admission for active mental illness.   -Monitor for signs of bleeding   -H/H stable  -Outpatient f/u with GYN after [facility] admission

## 2021-11-14 NOTE — PROGRESS NOTE ADULT - SUBJECTIVE AND OBJECTIVE BOX
Patient is a 66y old  Female who presents with a chief complaint of Dementia with behavioral disturbance (13 Nov 2021 13:21)      SUBJECTIVE / OVERNIGHT EVENTS: No complaints today     MEDICATIONS  (STANDING):  clonazePAM  Tablet 0.5 milliGRAM(s) Oral two times a day  diVALproex DR 1000 milliGRAM(s) Oral at bedtime  diVALproex  milliGRAM(s) Oral <User Schedule>  enoxaparin Injectable 40 milliGRAM(s) SubCutaneous daily  furosemide    Tablet 20 milliGRAM(s) Oral daily  influenza  Vaccine (HIGH DOSE) 0.7 milliLiter(s) IntraMuscular once  melatonin 3 milliGRAM(s) Oral <User Schedule>  metoprolol tartrate 25 milliGRAM(s) Oral two times a day  triamcinolone 0.1% Cream 1 Application(s) Topical every 12 hours    MEDICATIONS  (PRN):  acetaminophen     Tablet .. 650 milliGRAM(s) Oral every 6 hours PRN Temp greater or equal to 38C (100.4F), Mild Pain (1 - 3)  aluminum hydroxide/magnesium hydroxide/simethicone Suspension 30 milliLiter(s) Oral every 4 hours PRN Dyspepsia  OLANZapine 2.5 milliGRAM(s) Oral every 6 hours PRN agitation  OLANZapine Injectable 2.5 milliGRAM(s) IntraMuscular every 6 hours PRN COMBATIVE BEHAVIORS NOT RESPONDING TO VERBAL REDIRECTION  ondansetron Injectable 4 milliGRAM(s) IV Push every 8 hours PRN Nausea and/or Vomiting      Vital Signs Last 24 Hrs  T(C): 36.6 (14 Nov 2021 06:18), Max: 36.7 (13 Nov 2021 13:07)  T(F): 97.8 (14 Nov 2021 06:18), Max: 98 (13 Nov 2021 13:07)  HR: 74 (14 Nov 2021 06:18) (54 - 78)  BP: 109/51 (14 Nov 2021 06:18) (109/51 - 131/78)  BP(mean): --  RR: 18 (14 Nov 2021 06:18) (17 - 18)  SpO2: 99% (14 Nov 2021 06:18) (97% - 100%)  CAPILLARY BLOOD GLUCOSE        I&O's Summary      PHYSICAL EXAM:  GENERAL: NAD, well-developed  HEAD:  Atraumatic, Normocephalic  EYES: EOMI, PERRLA, conjunctiva and sclera clear  NECK: Supple, No JVD  CHEST/LUNG: Clear to auscultation bilaterally; No wheeze  HEART: Regular rate and rhythm; No murmurs, rubs, or gallops  ABDOMEN: Soft, Nontender, Nondistended; Bowel sounds present  EXTREMITIES:  2+ Peripheral Pulses, No clubbing, cyanosis, or edema  PSYCH: AAOx1   NEUROLOGY: non-focal  SKIN: No rashes or lesions    LABS:                    RADIOLOGY & ADDITIONAL TESTS:    Imaging Personally Reviewed:    Consultant(s) Notes Reviewed:      Care Discussed with Consultants/Other Providers:
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
## 2021-11-15 PROCEDURE — 99239 HOSP IP/OBS DSCHRG MGMT >30: CPT

## 2021-11-15 RX ORDER — DIVALPROEX SODIUM 500 MG/1
1 TABLET, DELAYED RELEASE ORAL
Qty: 0 | Refills: 0 | DISCHARGE

## 2021-11-15 RX ORDER — DIVALPROEX SODIUM 500 MG/1
2 TABLET, DELAYED RELEASE ORAL
Qty: 0 | Refills: 0 | DISCHARGE
Start: 2021-11-15

## 2021-11-15 RX ORDER — FUROSEMIDE 40 MG
1 TABLET ORAL
Qty: 0 | Refills: 0 | DISCHARGE
Start: 2021-11-15

## 2021-11-15 RX ORDER — BENZOCAINE 10 %
1 GEL (GRAM) MUCOUS MEMBRANE
Qty: 0 | Refills: 0 | DISCHARGE

## 2021-11-15 RX ORDER — FUROSEMIDE 40 MG
1 TABLET ORAL
Qty: 0 | Refills: 0 | DISCHARGE

## 2021-11-15 RX ORDER — COAL TAR 0.5 %
1 SHAMPOO TOPICAL
Qty: 0 | Refills: 0 | DISCHARGE

## 2021-11-15 RX ORDER — OLANZAPINE 15 MG/1
1 TABLET, FILM COATED ORAL
Qty: 0 | Refills: 0 | DISCHARGE
Start: 2021-11-15

## 2021-11-15 RX ORDER — DIVALPROEX SODIUM 500 MG/1
3 TABLET, DELAYED RELEASE ORAL
Qty: 0 | Refills: 0 | DISCHARGE
Start: 2021-11-15

## 2021-11-15 RX ORDER — METOPROLOL TARTRATE 50 MG
1 TABLET ORAL
Qty: 0 | Refills: 0 | DISCHARGE

## 2021-11-15 RX ORDER — INSULIN HUMAN 100 [IU]/ML
0 INJECTION, SOLUTION SUBCUTANEOUS
Qty: 0 | Refills: 0 | DISCHARGE

## 2021-11-15 RX ORDER — LANOLIN ALCOHOL/MO/W.PET/CERES
1 CREAM (GRAM) TOPICAL
Qty: 0 | Refills: 0 | DISCHARGE
Start: 2021-11-15

## 2021-11-15 RX ORDER — CLONAZEPAM 1 MG
1 TABLET ORAL
Qty: 0 | Refills: 0 | DISCHARGE
Start: 2021-11-15

## 2021-11-15 RX ORDER — CLONAZEPAM 1 MG
1 TABLET ORAL
Qty: 0 | Refills: 0 | DISCHARGE

## 2021-11-15 RX ORDER — VALPROIC ACID (AS SODIUM SALT) 250 MG/5ML
2 SOLUTION, ORAL ORAL
Qty: 0 | Refills: 0 | DISCHARGE

## 2021-11-15 RX ORDER — POTASSIUM CHLORIDE 20 MEQ
1 PACKET (EA) ORAL
Qty: 0 | Refills: 0 | DISCHARGE

## 2021-11-15 RX ORDER — METOPROLOL TARTRATE 50 MG
1 TABLET ORAL
Qty: 0 | Refills: 0 | DISCHARGE
Start: 2021-11-15

## 2021-11-15 RX ORDER — LANOLIN/MINERAL OIL
1 LOTION (ML) TOPICAL
Qty: 0 | Refills: 0 | DISCHARGE

## 2021-11-15 RX ORDER — DIPHENHYDRAMINE HCL 50 MG
50 CAPSULE ORAL
Qty: 0 | Refills: 0 | DISCHARGE

## 2021-11-15 RX ORDER — VALPROIC ACID (AS SODIUM SALT) 250 MG/5ML
3 SOLUTION, ORAL ORAL
Qty: 0 | Refills: 0 | DISCHARGE

## 2021-11-15 RX ORDER — CLONAZEPAM 1 MG
0.5 TABLET ORAL
Qty: 0 | Refills: 0 | DISCHARGE

## 2021-11-15 RX ORDER — ACETAMINOPHEN 500 MG
2 TABLET ORAL
Qty: 0 | Refills: 0 | DISCHARGE
Start: 2021-11-15

## 2021-11-15 RX ORDER — ACETAMINOPHEN 500 MG
2 TABLET ORAL
Qty: 0 | Refills: 0 | DISCHARGE

## 2021-11-15 RX ADMIN — DIVALPROEX SODIUM 750 MILLIGRAM(S): 500 TABLET, DELAYED RELEASE ORAL at 06:07

## 2021-11-15 RX ADMIN — Medication 3 MILLIGRAM(S): at 21:27

## 2021-11-15 RX ADMIN — Medication 20 MILLIGRAM(S): at 06:07

## 2021-11-15 RX ADMIN — Medication 25 MILLIGRAM(S): at 06:07

## 2021-11-15 RX ADMIN — Medication 1 APPLICATION(S): at 06:07

## 2021-11-15 RX ADMIN — Medication 0.5 MILLIGRAM(S): at 06:09

## 2021-11-15 RX ADMIN — DIVALPROEX SODIUM 1000 MILLIGRAM(S): 500 TABLET, DELAYED RELEASE ORAL at 21:27

## 2021-11-15 RX ADMIN — Medication 25 MILLIGRAM(S): at 18:35

## 2021-11-15 RX ADMIN — Medication 1 APPLICATION(S): at 18:35

## 2021-11-15 RX ADMIN — Medication 0.5 MILLIGRAM(S): at 18:35

## 2021-11-15 RX ADMIN — ENOXAPARIN SODIUM 40 MILLIGRAM(S): 100 INJECTION SUBCUTANEOUS at 12:43

## 2021-11-15 NOTE — PROGRESS NOTE ADULT - SUBJECTIVE AND OBJECTIVE BOX
[facility] Division of Hospital Medicine  Mat Conrad MD  Pager [phone]      Patient is a 66y old  Female who presents with a chief complaint of Dementia with behavioral disturbance (14 Nov 2021 12:21)      SUBJECTIVE / OVERNIGHT EVENTS:    No acute event o/n. Pt offers no new complaint     ADDITIONAL REVIEW OF SYSTEMS:    RESPIRATORY: No cough, wheezing, chills or hemoptysis; No shortness of breath  CARDIOVASCULAR: No chest pain, palpitations, dizziness, or leg swelling  GASTROINTESTINAL: No abdominal or epigastric pain. No nausea, vomiting, or hematemesis; No diarrhea or constipation. No melena or hematochezia.      MEDICATIONS  (STANDING):  clonazePAM  Tablet 0.5 milliGRAM(s) Oral two times a day  diVALproex DR 1000 milliGRAM(s) Oral at bedtime  diVALproex  milliGRAM(s) Oral <User Schedule>  enoxaparin Injectable 40 milliGRAM(s) SubCutaneous daily  furosemide    Tablet 20 milliGRAM(s) Oral daily  influenza  Vaccine (HIGH DOSE) 0.7 milliLiter(s) IntraMuscular once  melatonin 3 milliGRAM(s) Oral <User Schedule>  metoprolol tartrate 25 milliGRAM(s) Oral two times a day  triamcinolone 0.1% Cream 1 Application(s) Topical every 12 hours    MEDICATIONS  (PRN):  acetaminophen     Tablet .. 650 milliGRAM(s) Oral every 6 hours PRN Temp greater or equal to 38C (100.4F), Mild Pain (1 - 3)  aluminum hydroxide/magnesium hydroxide/simethicone Suspension 30 milliLiter(s) Oral every 4 hours PRN Dyspepsia  OLANZapine 2.5 milliGRAM(s) Oral every 6 hours PRN agitation  OLANZapine Injectable 2.5 milliGRAM(s) IntraMuscular every 6 hours PRN COMBATIVE BEHAVIORS NOT RESPONDING TO VERBAL REDIRECTION  ondansetron Injectable 4 milliGRAM(s) IV Push every 8 hours PRN Nausea and/or Vomiting      CAPILLARY BLOOD GLUCOSE        I&O's Summary      PHYSICAL EXAM:  Vital Signs Last 24 Hrs  T(C): 36.3 (15 Nov 2021 12:40), Max: 36.7 (14 Nov 2021 21:16)  T(F): 97.3 (15 Nov 2021 12:40), Max: 98.1 (14 Nov 2021 21:16)  HR: 79 (15 Nov 2021 12:40) (60 - 79)  BP: 126/61 (15 Nov 2021 12:40) (114/60 - 147/70)  BP(mean): --  RR: 16 (15 Nov 2021 12:40) (16 - 18)  SpO2: 100% (15 Nov 2021 12:40) (98% - 100%)    CONSTITUTIONAL: NAD,  EYES: PERRLA; conjunctiva and sclera clear  ENMT: Moist oral mucosa, no pharyngeal injection or exudates;   NECK: Supple, no palpable masses;  RESPIRATORY: Normal respiratory effort; lungs are clear to auscultation bilaterally  CARDIOVASCULAR: Regular rate and rhythm, normal S1 and S2, no murmur/rub/gallop; No lower extremity edema; Peripheral pulses are 2+ bilaterally  ABDOMEN: Nontender to palpation, normoactive bowel sounds, no rebound/guarding;   MUSCLOSKELETAL:   no clubbing or cyanosis of digits; no joint swelling or tenderness to palpation  PSYCH: A+O to person  NEUROLOGY: CN 2-12 are intact and symmetric; no gross sensory deficits;   SKIN: No rashes;     LABS:                      RADIOLOGY & ADDITIONAL TESTS:  Results Reviewed:   Imaging Personally Reviewed:  Electrocardiogram Personally Reviewed:    COORDINATION OF CARE:  Care Discussed with Consultants/Other Providers [Y/N]:  Prior or Outpatient Records Reviewed [Y/N]:
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
## 2021-11-15 NOTE — PROGRESS NOTE ADULT - PROBLEM SELECTOR PLAN 6
Likely  vaginal spotting.  No signs of rectal bleed at this time. (+) thickened endometrium on US. Seen by GYN consult, rec to have outpatient GYN f/u after [facility] admission for active mental illness.   -Monitor for signs of bleeding   -H/H stable  -Outpatient f/u with GYN after [facility] admission
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
## 2021-11-16 PROCEDURE — 99232 SBSQ HOSP IP/OBS MODERATE 35: CPT

## 2021-11-16 RX ORDER — OLANZAPINE 15 MG/1
2.5 TABLET, FILM COATED ORAL
Refills: 0 | Status: DISCONTINUED | OUTPATIENT
Start: 2021-11-16 | End: 2021-11-18

## 2021-11-16 RX ORDER — CLONAZEPAM 1 MG
0.25 TABLET ORAL
Refills: 0 | Status: DISCONTINUED | OUTPATIENT
Start: 2021-11-16 | End: 2021-11-18

## 2021-11-16 RX ADMIN — Medication 25 MILLIGRAM(S): at 17:43

## 2021-11-16 RX ADMIN — Medication 3 MILLIGRAM(S): at 21:59

## 2021-11-16 RX ADMIN — Medication 0.25 MILLIGRAM(S): at 17:42

## 2021-11-16 RX ADMIN — Medication 25 MILLIGRAM(S): at 06:22

## 2021-11-16 RX ADMIN — Medication 1 APPLICATION(S): at 17:43

## 2021-11-16 RX ADMIN — Medication 0.5 MILLIGRAM(S): at 06:23

## 2021-11-16 RX ADMIN — ENOXAPARIN SODIUM 40 MILLIGRAM(S): 100 INJECTION SUBCUTANEOUS at 17:42

## 2021-11-16 RX ADMIN — Medication 1 APPLICATION(S): at 06:22

## 2021-11-16 RX ADMIN — OLANZAPINE 2.5 MILLIGRAM(S): 15 TABLET, FILM COATED ORAL at 21:59

## 2021-11-16 RX ADMIN — DIVALPROEX SODIUM 750 MILLIGRAM(S): 500 TABLET, DELAYED RELEASE ORAL at 06:22

## 2021-11-16 RX ADMIN — Medication 20 MILLIGRAM(S): at 06:22

## 2021-11-16 RX ADMIN — OLANZAPINE 2.5 MILLIGRAM(S): 15 TABLET, FILM COATED ORAL at 17:43

## 2021-11-16 RX ADMIN — DIVALPROEX SODIUM 1000 MILLIGRAM(S): 500 TABLET, DELAYED RELEASE ORAL at 21:59

## 2021-11-16 NOTE — PROGRESS NOTE ADULT - SUBJECTIVE AND OBJECTIVE BOX
[facility] Division of Hospital Medicine  Mat Conrad MD  Pager [phone]      Patient is a 66y old  Female who presents with a chief complaint of Dementia with behavioral disturbance (15 Nov 2021 14:55)      SUBJECTIVE / OVERNIGHT EVENTS:    pt is calm during rounds. offers no new complaint.     ADDITIONAL REVIEW OF SYSTEMS:    RESPIRATORY: No cough, wheezing, chills or hemoptysis; No shortness of breath  CARDIOVASCULAR: No chest pain, palpitations, dizziness, or leg swelling  GASTROINTESTINAL: No abdominal or epigastric pain. No nausea, vomiting, or hematemesis; No diarrhea or constipation. No melena or hematochezia.      MEDICATIONS  (STANDING):  clonazePAM  Tablet 0.25 milliGRAM(s) Oral two times a day  diVALproex DR 1000 milliGRAM(s) Oral at bedtime  diVALproex  milliGRAM(s) Oral <User Schedule>  enoxaparin Injectable 40 milliGRAM(s) SubCutaneous daily  furosemide    Tablet 20 milliGRAM(s) Oral daily  influenza  Vaccine (HIGH DOSE) 0.7 milliLiter(s) IntraMuscular once  melatonin 3 milliGRAM(s) Oral <User Schedule>  metoprolol tartrate 25 milliGRAM(s) Oral two times a day  OLANZapine 2.5 milliGRAM(s) Oral <User Schedule>  triamcinolone 0.1% Cream 1 Application(s) Topical every 12 hours    MEDICATIONS  (PRN):  acetaminophen     Tablet .. 650 milliGRAM(s) Oral every 6 hours PRN Temp greater or equal to 38C (100.4F), Mild Pain (1 - 3)  aluminum hydroxide/magnesium hydroxide/simethicone Suspension 30 milliLiter(s) Oral every 4 hours PRN Dyspepsia  OLANZapine 2.5 milliGRAM(s) Oral every 6 hours PRN agitation  OLANZapine Injectable 2.5 milliGRAM(s) IntraMuscular every 6 hours PRN COMBATIVE BEHAVIORS NOT RESPONDING TO VERBAL REDIRECTION  ondansetron Injectable 4 milliGRAM(s) IV Push every 8 hours PRN Nausea and/or Vomiting      CAPILLARY BLOOD GLUCOSE        I&O's Summary      PHYSICAL EXAM:  Vital Signs Last 24 Hrs  T(C): 36.7 (16 Nov 2021 12:30), Max: 36.7 (16 Nov 2021 12:30)  T(F): 98 (16 Nov 2021 12:30), Max: 98 (16 Nov 2021 12:30)  HR: 69 (16 Nov 2021 12:30) (60 - 98)  BP: 115/83 (16 Nov 2021 12:30) (110/82 - 138/77)  BP(mean): --  RR: 16 (16 Nov 2021 12:30) (16 - 18)  SpO2: 100% (16 Nov 2021 12:30) (97% - 100%)    CONSTITUTIONAL: NAD,  EYES: PERRLA; conjunctiva and sclera clear  ENMT: Moist oral mucosa, no pharyngeal injection or exudates;   NECK: Supple, no palpable masses;  RESPIRATORY: Normal respiratory effort; lungs are clear to auscultation bilaterally  CARDIOVASCULAR: Regular rate and rhythm, normal S1 and S2, no murmur/rub/gallop; No lower extremity edema; Peripheral pulses are 2+ bilaterally  ABDOMEN: Nontender to palpation, normoactive bowel sounds, no rebound/guarding;   MUSCLOSKELETAL:   no clubbing or cyanosis of digits; no joint swelling or tenderness to palpation  PSYCH: A+O to person  NEUROLOGY: CN 2-12 are intact and symmetric; no gross sensory deficits;   SKIN: No rashes;     LABS:                      RADIOLOGY & ADDITIONAL TESTS:  Results Reviewed:   Imaging Personally Reviewed:  Electrocardiogram Personally Reviewed:    COORDINATION OF CARE:  Care Discussed with Consultants/Other Providers [Y/N]:  Prior or Outpatient Records Reviewed [Y/N]:

## 2021-11-16 NOTE — BH CONSULTATION LIAISON PROGRESS NOTE - NSBHFUPINTERVALCCFT_PSY_A_CORE
Remains loud, easily agitated, labile. While depakote has been helpful- patient is more redirectable, there is a need for better psychiatric stabilization before planning for transfer back to LTC.   Coordinated above with medicine team this morning at rounds. Discussed above behaviors with unit NM, and RN.
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
## 2021-11-16 NOTE — BH CONSULTATION LIAISON PROGRESS NOTE - NSBHFUPINTERVALHXFT_PSY_A_CORE
Met with patient. Child like in her mannerisms, illogical, silly in her behaviors. Yells at MD intermittently especially when numerous questions are asked. Does not answer questions logically when asked. Can be heard walking the hallway, getting angry at staff, and at times verbally abusive. Confused, disoriented  Coarse resting tremors can be noted, gait steady. Appetite fair, sleep improving  No bed available at [facility] today

## 2021-11-16 NOTE — BH CONSULTATION LIAISON PROGRESS NOTE - NSBHASSESSMENTFT_PSY_ALL_CORE
66 y.o. F, as per records is , has been residing at [facility] ([facility]) since 2010, with past psychiatric history of dementia with behavioral disturbance, mood disorder, RODY, possible inpatient psychiatric admission to [facility] this year, has a chronic medical history notable for NPH (w/  shunt), HTN, DMT2, TBI (unspecified), pedal edema presenting from [facility] for physically aggressive behavior and diffuse rash. Records also indicate that [facility] was seeking a [facility] admission and hence this [facility] admission. Psychiatry has been consulted for agitation management.     11/5 - Patient used 1 PRN of Zyprexa, remains intermittently agitated, with delusions.   11/8-- agitated, labile, impulsive, unpredictable.   11/9-- less agitated today.   11/10- still labile, impulsive, requires inpatient psychiatry admission for continued adjustment of medications  11/11-- sleeping this morning. Can be loud, impulsive.   11/12--- awake, can be agitated   11/16--- still with intermittent agitation, lability    Plan  - CONTINUE Depakote 750 mg q AM + 1000mg q HS PO. VA level therapeutic  - START Zyprexa 2.5mg q 6pm PO today--- to target agitation, impulsivity.   - DECREASE dose of  Clonazepam to 0.25 mg PO BID.   - Use Zyprexa 2.5mg q 6hrs prn IM/PO  - Collateral information gathered.  - Don Dreyer.  - DISPOSITION===[facility] admission. 2pc completed in chart. No beds at [facility] today
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
## 2021-11-16 NOTE — BH CONSULTATION LIAISON PROGRESS NOTE - OTHER
can be heard engaging loudly in a conversation with self and at times with inanimate objects disorganized content

## 2021-11-16 NOTE — PROGRESS NOTE ADULT - PROBLEM SELECTOR PLAN 6
Likely  vaginal spotting.  No signs of rectal bleed at this time. (+) thickened endometrium on US. Seen by GYN consult, rec to have outpatient GYN f/u after [facility] admission for active mental illness.   -Monitor for signs of bleeding   -H/H stable  -Outpatient f/u with GYN after [facility] admission

## 2021-11-17 PROCEDURE — 99232 SBSQ HOSP IP/OBS MODERATE 35: CPT

## 2021-11-17 RX ADMIN — Medication 25 MILLIGRAM(S): at 06:00

## 2021-11-17 RX ADMIN — Medication 3 MILLIGRAM(S): at 20:59

## 2021-11-17 RX ADMIN — Medication 20 MILLIGRAM(S): at 06:00

## 2021-11-17 RX ADMIN — Medication 0.25 MILLIGRAM(S): at 06:00

## 2021-11-17 RX ADMIN — Medication 25 MILLIGRAM(S): at 17:39

## 2021-11-17 RX ADMIN — Medication 0.25 MILLIGRAM(S): at 17:38

## 2021-11-17 RX ADMIN — OLANZAPINE 2.5 MILLIGRAM(S): 15 TABLET, FILM COATED ORAL at 17:38

## 2021-11-17 RX ADMIN — DIVALPROEX SODIUM 1000 MILLIGRAM(S): 500 TABLET, DELAYED RELEASE ORAL at 21:16

## 2021-11-17 RX ADMIN — Medication 1 APPLICATION(S): at 06:01

## 2021-11-17 RX ADMIN — DIVALPROEX SODIUM 750 MILLIGRAM(S): 500 TABLET, DELAYED RELEASE ORAL at 06:00

## 2021-11-17 RX ADMIN — ENOXAPARIN SODIUM 40 MILLIGRAM(S): 100 INJECTION SUBCUTANEOUS at 12:48

## 2021-11-17 RX ADMIN — Medication 1 APPLICATION(S): at 17:43
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
## 2021-11-17 NOTE — BH CONSULTATION LIAISON PROGRESS NOTE - NSBHASSESSMENTFT_PSY_ALL_CORE
66 y.o. F, as per records is , has been residing at [facility] ([facility]) since 2010, with past psychiatric history of dementia with behavioral disturbance, mood disorder, RODY, possible inpatient psychiatric admission to [facility] this year, has a chronic medical history notable for NPH (w/  shunt), HTN, DMT2, TBI (unspecified), pedal edema presenting from [facility] for physically aggressive behavior and diffuse rash. Records also indicate that [facility] was seeking a [facility] admission and hence this [facility] admission. Psychiatry has been consulted for agitation management.     11/5 - Patient used 1 PRN of Zyprexa, remains intermittently agitated, with delusions.   11/8-- agitated, labile, impulsive, unpredictable.   11/9-- less agitated today.   11/10- still labile, impulsive, requires inpatient psychiatry admission for continued adjustment of medications  11/11-- sleeping this morning. Can be loud, impulsive.   11/12--- awake, can be agitated   11/16--- still with intermittent agitation, lability  11/17--- received one prn zyprexa yesterday     Plan  - CONTINUE Depakote 750 mg q AM + 1000mg q HS PO. VA level therapeutic  - CONTINUE Zyprexa 2.5mg q 6pm PO--- to target agitation, impulsivity.   - CONTINUE Clonazepam 0.25 mg PO BID.   - Use Zyprexa 2.5mg q 6hrs prn IM/PO  - Collateral information gathered.  - Don Dreyer.  - DISPOSITION=== [facility] for now. Anticipating that in a few days if behaviors stabilize, we will be able to d/c to [facility] rather than [facility]

## 2021-11-17 NOTE — PROGRESS NOTE ADULT - PROBLEM SELECTOR PLAN 6
Likely  vaginal spotting.  No signs of rectal bleed at this time. (+) thickened endometrium on US. Seen by GYN consult, rec to have outpatient GYN f/u after [facility] admission for active mental illness.   -Monitor for signs of bleeding   -H/H stable  -Outpatient f/u with GYN after [facility] admission

## 2021-11-17 NOTE — PROGRESS NOTE ADULT - SUBJECTIVE AND OBJECTIVE BOX
[facility] Division of Hospital Medicine  Mat Conrad MD  Pager [phone]      Patient is a 66y old  Female who presents with a chief complaint of Dementia with behavioral disturbance (16 Nov 2021 14:59)      SUBJECTIVE / OVERNIGHT EVENTS:    No acute event o/n.     ADDITIONAL REVIEW OF SYSTEMS:    CONSTITUTIONAL: No fever, weight loss, or fatigue  EYES: No eye pain, visual disturbances, or discharge  ENMT:  No difficulty hearing, tinnitus, vertigo; No sinus or throat pain  NECK: No pain or stiffness  RESPIRATORY: No cough, wheezing, chills or hemoptysis; No shortness of breath  CARDIOVASCULAR: No chest pain, palpitations, dizziness, or leg swelling  GASTROINTESTINAL: No abdominal or epigastric pain. No nausea, vomiting, or hematemesis; No diarrhea or constipation. No melena or hematochezia.  GENITOURINARY: No dysuria, frequency, hematuria, or incontinence  NEUROLOGICAL: No headaches, memory loss, loss of strength, numbness, or tremors  SKIN: No itching, burning, rashes, or lesions   MUSCULOSKELETAL: No joint pain or swelling; No muscle, back, or extremity pain  PSYCHIATRIC: No depression, anxiety, mood swings, or difficulty sleeping    MEDICATIONS  (STANDING):  clonazePAM  Tablet 0.25 milliGRAM(s) Oral two times a day  diVALproex DR 1000 milliGRAM(s) Oral at bedtime  diVALproex  milliGRAM(s) Oral <User Schedule>  enoxaparin Injectable 40 milliGRAM(s) SubCutaneous daily  furosemide    Tablet 20 milliGRAM(s) Oral daily  influenza  Vaccine (HIGH DOSE) 0.7 milliLiter(s) IntraMuscular once  melatonin 3 milliGRAM(s) Oral <User Schedule>  metoprolol tartrate 25 milliGRAM(s) Oral two times a day  OLANZapine 2.5 milliGRAM(s) Oral <User Schedule>  triamcinolone 0.1% Cream 1 Application(s) Topical every 12 hours    MEDICATIONS  (PRN):  acetaminophen     Tablet .. 650 milliGRAM(s) Oral every 6 hours PRN Temp greater or equal to 38C (100.4F), Mild Pain (1 - 3)  aluminum hydroxide/magnesium hydroxide/simethicone Suspension 30 milliLiter(s) Oral every 4 hours PRN Dyspepsia  ondansetron Injectable 4 milliGRAM(s) IV Push every 8 hours PRN Nausea and/or Vomiting      CAPILLARY BLOOD GLUCOSE        I&O's Summary      PHYSICAL EXAM:  Vital Signs Last 24 Hrs  T(C): 36.6 (17 Nov 2021 05:55), Max: 36.8 (16 Nov 2021 17:30)  T(F): 97.9 (17 Nov 2021 05:55), Max: 98.2 (16 Nov 2021 17:30)  HR: 55 (17 Nov 2021 05:55) (55 - 90)  BP: 120/60 (17 Nov 2021 05:55) (120/60 - 143/73)  BP(mean): --  RR: 18 (17 Nov 2021 05:55) (16 - 18)  SpO2: 100% (17 Nov 2021 05:55) (99% - 100%)    CONSTITUTIONAL: NAD,  EYES: PERRLA; conjunctiva and sclera clear  ENMT: Moist oral mucosa, no pharyngeal injection or exudates;   NECK: Supple, no palpable masses;  RESPIRATORY: Normal respiratory effort; lungs are clear to auscultation bilaterally  CARDIOVASCULAR: Regular rate and rhythm, normal S1 and S2, no murmur/rub/gallop; No lower extremity edema; Peripheral pulses are 2+ bilaterally  ABDOMEN: Nontender to palpation, normoactive bowel sounds, no rebound/guarding;   MUSCLOSKELETAL:   no clubbing or cyanosis of digits; no joint swelling or tenderness to palpation  PSYCH: A+O to person  NEUROLOGY: CN 2-12 are intact and symmetric; no gross sensory deficits;   SKIN: No rashes;     LABS:                      RADIOLOGY & ADDITIONAL TESTS:  Results Reviewed:   Imaging Personally Reviewed:  Electrocardiogram Personally Reviewed:    COORDINATION OF CARE:  Care Discussed with Consultants/Other Providers [Y/N]:  Prior or Outpatient Records Reviewed [Y/N]:

## 2021-11-17 NOTE — BH CONSULTATION LIAISON PROGRESS NOTE - NSBHFUPINTERVALHXFT_PSY_A_CORE
Met with patient. Still sleeping- 11am. Opens eyes, and wakes up when name is called but answers remain illogical. Examined- No resting tremors, no cogwheeling or rigidity  Appetite fair.   Sleep fair.

## 2021-11-17 NOTE — BH CONSULTATION LIAISON PROGRESS NOTE - NSBHFUPINTERVALCCFT_PSY_A_CORE
One prn of Zyprexa 2.5mg PO yesterday for being loud. No aggression per se. Better re-directable  Discussed at medicine rounds this morning. Discussed that yesterday a new medication was started= Zyprexa which will be an adjunct for mood stabilization and agitation management.

## 2021-11-18 LAB — SARS-COV-2 RNA SPEC QL NAA+PROBE: SIGNIFICANT CHANGE UP

## 2021-11-18 PROCEDURE — 99232 SBSQ HOSP IP/OBS MODERATE 35: CPT

## 2021-11-18 RX ORDER — CLONAZEPAM 1 MG
0.25 TABLET ORAL AT BEDTIME
Refills: 0 | Status: DISCONTINUED | OUTPATIENT
Start: 2021-11-19 | End: 2021-11-19

## 2021-11-18 RX ORDER — CLONAZEPAM 1 MG
0.25 TABLET ORAL AT BEDTIME
Refills: 0 | Status: DISCONTINUED | OUTPATIENT
Start: 2021-11-18 | End: 2021-11-18

## 2021-11-18 RX ORDER — OLANZAPINE 15 MG/1
2.5 TABLET, FILM COATED ORAL
Refills: 0 | Status: DISCONTINUED | OUTPATIENT
Start: 2021-11-18 | End: 2021-11-19

## 2021-11-18 RX ADMIN — DIVALPROEX SODIUM 750 MILLIGRAM(S): 500 TABLET, DELAYED RELEASE ORAL at 06:48

## 2021-11-18 RX ADMIN — ENOXAPARIN SODIUM 40 MILLIGRAM(S): 100 INJECTION SUBCUTANEOUS at 12:38

## 2021-11-18 RX ADMIN — OLANZAPINE 2.5 MILLIGRAM(S): 15 TABLET, FILM COATED ORAL at 12:37

## 2021-11-18 RX ADMIN — Medication 25 MILLIGRAM(S): at 06:31

## 2021-11-18 RX ADMIN — Medication 20 MILLIGRAM(S): at 06:31

## 2021-11-18 RX ADMIN — Medication 1 APPLICATION(S): at 06:31

## 2021-11-18 RX ADMIN — OLANZAPINE 2.5 MILLIGRAM(S): 15 TABLET, FILM COATED ORAL at 17:18

## 2021-11-18 RX ADMIN — Medication 0.25 MILLIGRAM(S): at 06:30

## 2021-11-18 RX ADMIN — Medication 25 MILLIGRAM(S): at 17:18

## 2021-11-18 RX ADMIN — Medication 1 APPLICATION(S): at 17:20

## 2021-11-18 RX ADMIN — DIVALPROEX SODIUM 1000 MILLIGRAM(S): 500 TABLET, DELAYED RELEASE ORAL at 21:32

## 2021-11-18 RX ADMIN — Medication 3 MILLIGRAM(S): at 21:32

## 2021-11-18 NOTE — BH CONSULTATION LIAISON PROGRESS NOTE - NSBHASSESSMENTFT_PSY_ALL_CORE
66 y.o. F, as per records is , has been residing at [facility] ([facility]) since 2010, with past psychiatric history of dementia with behavioral disturbance, mood disorder, RODY, possible inpatient psychiatric admission to [facility] this year, has a chronic medical history notable for NPH (w/  shunt), HTN, DMT2, TBI (unspecified), pedal edema presenting from [facility] for physically aggressive behavior and diffuse rash. Records also indicate that [facility] was seeking a [facility] admission and hence this [facility] admission. Psychiatry has been consulted for agitation management.     11/18-- much improved in terms of impulsivity. Redirectable. Can be loud at times. No aggression or agitation. No increased observation needs either.     Plan  - CONTINUE Depakote 750 mg q AM + 1000mg q HS PO. VA level therapeutic  - INCREASE dose of Zyprexa to 2.5mg BID -- 12NOON + 6PM PO--- to target agitation. Will hold steady at this dose with no change for now. EKG- 11/13= QTC: 433  - CONTINUE Clonazepam 0.25 mg PO BID.   - Use Zyprexa 2.5mg q 6hrs prn IM/PO  - Collateral information gathered.  - Don Dreyer.  - Coordinated with [facility] director today.  - DISPOSITION=== Plan is for d/c to [facility] facility.    66 y.o. F, as per records is , has been residing at [facility] ([facility]) since 2010, with past psychiatric history of dementia with behavioral disturbance, mood disorder, RODY, possible inpatient psychiatric admission to [facility] this year, has a chronic medical history notable for NPH (w/  shunt), HTN, DMT2, TBI (unspecified), pedal edema presenting from [facility] for physically aggressive behavior and diffuse rash. Records also indicate that [facility] was seeking a [facility] admission and hence this [facility] admission. Psychiatry has been consulted for agitation management.     11/18-- much improved in terms of impulsivity. Redirectable. Can be loud at times. No aggression or agitation. No increased observation needs either.     Plan  - CONTINUE Depakote 750 mg q AM + 1000mg q HS PO. VA level therapeutic  - INCREASE dose of Zyprexa to 2.5mg BID -- 12NOON + 6PM PO--- to target agitation. Will hold steady at this dose with no change for now. EKG- 11/13= QTC: 433  - DECREASE dose of Clonazepam to 0.25 mg PO q HS PO (ONCE A DAY DOSING)   - Use Zyprexa 2.5mg q 6hrs prn IM/PO  - Collateral information gathered.  - Don Dreyer.  - Coordinated with [facility] director today.  - DISPOSITION=== Plan is for d/c to [facility] facility.
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
## 2021-11-18 NOTE — PROGRESS NOTE ADULT - SUBJECTIVE AND OBJECTIVE BOX
[facility] Division of [facility] Medicine  Mat Conrad MD  Pager [phone]      Patient is a 66y old  Female who presents with a chief complaint of Dementia with behavioral disturbance (17 Nov 2021 17:24)      SUBJECTIVE / OVERNIGHT EVENTS:    No acute event.     ADDITIONAL REVIEW OF SYSTEMS:    RESPIRATORY: No cough, wheezing, chills or hemoptysis; No shortness of breath  CARDIOVASCULAR: No chest pain, palpitations, dizziness, or leg swelling  GASTROINTESTINAL: No abdominal or epigastric pain. No nausea, vomiting, or hematemesis; No diarrhea or constipation. No melena or hematochezia.      MEDICATIONS  (STANDING):  diVALproex DR 1000 milliGRAM(s) Oral at bedtime  diVALproex  milliGRAM(s) Oral <User Schedule>  enoxaparin Injectable 40 milliGRAM(s) SubCutaneous daily  furosemide    Tablet 20 milliGRAM(s) Oral daily  influenza  Vaccine (HIGH DOSE) 0.7 milliLiter(s) IntraMuscular once  melatonin 3 milliGRAM(s) Oral <User Schedule>  metoprolol tartrate 25 milliGRAM(s) Oral two times a day  OLANZapine 2.5 milliGRAM(s) Oral <User Schedule>  triamcinolone 0.1% Cream 1 Application(s) Topical every 12 hours    MEDICATIONS  (PRN):  acetaminophen     Tablet .. 650 milliGRAM(s) Oral every 6 hours PRN Temp greater or equal to 38C (100.4F), Mild Pain (1 - 3)  aluminum hydroxide/magnesium hydroxide/simethicone Suspension 30 milliLiter(s) Oral every 4 hours PRN Dyspepsia  ondansetron Injectable 4 milliGRAM(s) IV Push every 8 hours PRN Nausea and/or Vomiting      CAPILLARY BLOOD GLUCOSE        I&O's Summary      PHYSICAL EXAM:  Vital Signs Last 24 Hrs  T(C): 36.5 (18 Nov 2021 12:08), Max: 36.7 (17 Nov 2021 19:36)  T(F): 97.7 (18 Nov 2021 12:08), Max: 98.1 (18 Nov 2021 06:23)  HR: 63 (18 Nov 2021 12:08) (63 - 68)  BP: 102/59 (18 Nov 2021 12:08) (102/59 - 131/68)  BP(mean): --  RR: 17 (18 Nov 2021 12:08) (16 - 17)  SpO2: 100% (18 Nov 2021 12:08) (99% - 100%)    CONSTITUTIONAL: NAD,  EYES: PERRLA; conjunctiva and sclera clear  ENMT: Moist oral mucosa, no pharyngeal injection or exudates;   NECK: Supple, no palpable masses;  RESPIRATORY: Normal respiratory effort; lungs are clear to auscultation bilaterally  CARDIOVASCULAR: Regular rate and rhythm, normal S1 and S2, no murmur/rub/gallop; No lower extremity edema; Peripheral pulses are 2+ bilaterally  ABDOMEN: Nontender to palpation, normoactive bowel sounds, no rebound/guarding;   MUSCLOSKELETAL:   no clubbing or cyanosis of digits; no joint swelling or tenderness to palpation  PSYCH: A+O to person  NEUROLOGY: CN 2-12 are intact and symmetric; no gross sensory deficits;   SKIN: No rashes;     LABS:                      RADIOLOGY & ADDITIONAL TESTS:  Results Reviewed:   Imaging Personally Reviewed:  Electrocardiogram Personally Reviewed:    COORDINATION OF CARE:  Care Discussed with Consultants/Other Providers [Y/N]:  Prior or Outpatient Records Reviewed [Y/N]:

## 2021-11-18 NOTE — BH CONSULTATION LIAISON PROGRESS NOTE - NSBHMSEPERCEPT_PSY_A_CORE
Other
Auditory hallucinations/Other
Auditory hallucinations
No abnormalities
Auditory hallucinations/Other
Unable to assess

## 2021-11-18 NOTE — BH CONSULTATION LIAISON PROGRESS NOTE - NSBHFUPINTERVALCCFT_PSY_A_CORE
Patient has not been aggressive or agitated for the last several days. No prn needs. Eats and sleeps well. Can be loud at times, remains confused-disoriented at baseline, is re-directable compared to admission. Can be childlike as well.

## 2021-11-18 NOTE — BH CONSULTATION LIAISON PROGRESS NOTE - NSBHFUPINTERVALHXFT_PSY_A_CORE
Met with the patient. Sitting very calmly in bed, eating her breakfast. States in a childlike manner-- ' Hi darling'. Remains completely confused, disoriented. Denies any si or hi when asked. Denies any ah or vh when asked, and is not paranoid. Not noticed her talking to self like before either.   Examined--- gait is slow and steady. Resting tremors noted, very mild cogwheeling.     Coordinated care with her LTC facility director Dr Andre [phone]. Updated her about patient being managed on the medical-psychiatry unit at [facility]. Discussed that psychiatric medications have been adjusted with much improvement in her behaviors. Dr KAYE was very grateful, and stated that they had tried to send the patient to numerous hospitals like [place], but was unable to adjust her medications. Dr KAYE is aware that the plan is for a transfer back to LT. She is in agreement. Educated Dr KAYE that patient has dementia, will continue to require supervision-redirection and needs follow up by their consultant psychiatrist. Dr KAYE in agreement.  I coordinated care with medicine team, medicine sw, and cm. Discussed with medicine RN as well

## 2021-11-18 NOTE — PROGRESS NOTE ADULT - PROBLEM SELECTOR PLAN 9
DVT: Lovenox  Dispo:  [facility] when a bed is available.      Discussed with ACP and Psych DVT: Lovenox  Dispo:  LTC    Discussed with ACP and Psych
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
## 2021-11-18 NOTE — PROGRESS NOTE ADULT - PROBLEM SELECTOR PLAN 6
Likely  vaginal spotting.  No signs of rectal bleed at this time. (+) thickened endometrium on US. Seen by GYN consult, rec to have outpatient GYN f/u after [facility] admission for active mental illness.   -Monitor for signs of bleeding   -H/H stable  -Outpatient f/u with GYN after [facility] admission Likely  vaginal spotting.  No signs of rectal bleed at this time. (+) thickened endometrium on US. Seen by GYN consult, rec to have outpatient GYN f/u   -Monitor for signs of bleeding   -H/H stable
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
## 2021-11-18 NOTE — BH CONSULTATION LIAISON PROGRESS NOTE - NSBHMSEBEHAV_PSY_A_CORE
Cooperative
Cooperative
Uncooperative/Hostile
Cooperative
Cooperative/Uncooperative
Cooperative

## 2021-11-19 ENCOUNTER — TRANSCRIPTION ENCOUNTER (OUTPATIENT)
Age: 66
End: 2021-11-19

## 2021-11-19 ENCOUNTER — INPATIENT (INPATIENT)
Facility: HOSPITAL | Age: 66
LOS: 179 days | Discharge: ANOTHER HOSPITAL | End: 2022-05-18
Attending: PSYCHIATRY & NEUROLOGY | Admitting: PSYCHIATRY & NEUROLOGY
Payer: MEDICARE

## 2021-11-19 VITALS
OXYGEN SATURATION: 100 % | HEART RATE: 66 BPM | SYSTOLIC BLOOD PRESSURE: 138 MMHG | TEMPERATURE: 98 F | RESPIRATION RATE: 18 BRPM | DIASTOLIC BLOOD PRESSURE: 68 MMHG

## 2021-11-19 VITALS — TEMPERATURE: 98 F | HEIGHT: 60 IN | WEIGHT: 162.04 LBS | RESPIRATION RATE: 18 BRPM | OXYGEN SATURATION: 97 %

## 2021-11-19 DIAGNOSIS — F29 UNSPECIFIED PSYCHOSIS NOT DUE TO A SUBSTANCE OR KNOWN PHYSIOLOGICAL CONDITION: ICD-10-CM

## 2021-11-19 DIAGNOSIS — Z98.2 PRESENCE OF CEREBROSPINAL FLUID DRAINAGE DEVICE: Chronic | ICD-10-CM

## 2021-11-19 LAB
ANION GAP SERPL CALC-SCNC: 11 MMOL/L — SIGNIFICANT CHANGE UP (ref 7–14)
ANION GAP SERPL CALC-SCNC: 13 MMOL/L — SIGNIFICANT CHANGE UP (ref 7–14)
BUN SERPL-MCNC: 22 MG/DL — SIGNIFICANT CHANGE UP (ref 7–23)
BUN SERPL-MCNC: 31 MG/DL — HIGH (ref 7–23)
CALCIUM SERPL-MCNC: 8.9 MG/DL — SIGNIFICANT CHANGE UP (ref 8.4–10.5)
CALCIUM SERPL-MCNC: 9.9 MG/DL — SIGNIFICANT CHANGE UP (ref 8.4–10.5)
CHLORIDE SERPL-SCNC: 105 MMOL/L — SIGNIFICANT CHANGE UP (ref 98–107)
CHLORIDE SERPL-SCNC: 107 MMOL/L — SIGNIFICANT CHANGE UP (ref 98–107)
CO2 SERPL-SCNC: 24 MMOL/L — SIGNIFICANT CHANGE UP (ref 22–31)
CO2 SERPL-SCNC: 26 MMOL/L — SIGNIFICANT CHANGE UP (ref 22–31)
CREAT SERPL-MCNC: 0.62 MG/DL — SIGNIFICANT CHANGE UP (ref 0.5–1.3)
CREAT SERPL-MCNC: 0.8 MG/DL — SIGNIFICANT CHANGE UP (ref 0.5–1.3)
GLUCOSE SERPL-MCNC: 116 MG/DL — HIGH (ref 70–99)
GLUCOSE SERPL-MCNC: 128 MG/DL — HIGH (ref 70–99)
HCT VFR BLD CALC: 43.3 % — SIGNIFICANT CHANGE UP (ref 34.5–45)
HCT VFR BLD CALC: 44.5 % — SIGNIFICANT CHANGE UP (ref 34.5–45)
HGB BLD-MCNC: 13.3 G/DL — SIGNIFICANT CHANGE UP (ref 11.5–15.5)
HGB BLD-MCNC: 13.9 G/DL — SIGNIFICANT CHANGE UP (ref 11.5–15.5)
MAGNESIUM SERPL-MCNC: 2 MG/DL — SIGNIFICANT CHANGE UP (ref 1.6–2.6)
MAGNESIUM SERPL-MCNC: 2.2 MG/DL — SIGNIFICANT CHANGE UP (ref 1.6–2.6)
MCHC RBC-ENTMCNC: 29.8 PG — SIGNIFICANT CHANGE UP (ref 27–34)
MCHC RBC-ENTMCNC: 30.4 PG — SIGNIFICANT CHANGE UP (ref 27–34)
MCHC RBC-ENTMCNC: 30.7 GM/DL — LOW (ref 32–36)
MCHC RBC-ENTMCNC: 31.2 GM/DL — LOW (ref 32–36)
MCV RBC AUTO: 97.1 FL — SIGNIFICANT CHANGE UP (ref 80–100)
MCV RBC AUTO: 97.4 FL — SIGNIFICANT CHANGE UP (ref 80–100)
NRBC # BLD: 0 /100 WBCS — SIGNIFICANT CHANGE UP
NRBC # BLD: 0 /100 WBCS — SIGNIFICANT CHANGE UP
NRBC # FLD: 0 K/UL — SIGNIFICANT CHANGE UP
NRBC # FLD: 0 K/UL — SIGNIFICANT CHANGE UP
PHOSPHATE SERPL-MCNC: 3 MG/DL — SIGNIFICANT CHANGE UP (ref 2.5–4.5)
PHOSPHATE SERPL-MCNC: 4.2 MG/DL — SIGNIFICANT CHANGE UP (ref 2.5–4.5)
PLATELET # BLD AUTO: 132 K/UL — LOW (ref 150–400)
PLATELET # BLD AUTO: 134 K/UL — LOW (ref 150–400)
POTASSIUM SERPL-MCNC: 3.6 MMOL/L — SIGNIFICANT CHANGE UP (ref 3.5–5.3)
POTASSIUM SERPL-MCNC: 3.9 MMOL/L — SIGNIFICANT CHANGE UP (ref 3.5–5.3)
POTASSIUM SERPL-SCNC: 3.6 MMOL/L — SIGNIFICANT CHANGE UP (ref 3.5–5.3)
POTASSIUM SERPL-SCNC: 3.9 MMOL/L — SIGNIFICANT CHANGE UP (ref 3.5–5.3)
RBC # BLD: 4.46 M/UL — SIGNIFICANT CHANGE UP (ref 3.8–5.2)
RBC # BLD: 4.57 M/UL — SIGNIFICANT CHANGE UP (ref 3.8–5.2)
RBC # FLD: 14.6 % — HIGH (ref 10.3–14.5)
RBC # FLD: 14.6 % — HIGH (ref 10.3–14.5)
SODIUM SERPL-SCNC: 142 MMOL/L — SIGNIFICANT CHANGE UP (ref 135–145)
SODIUM SERPL-SCNC: 144 MMOL/L — SIGNIFICANT CHANGE UP (ref 135–145)
WBC # BLD: 5.35 K/UL — SIGNIFICANT CHANGE UP (ref 3.8–10.5)
WBC # BLD: 9.52 K/UL — SIGNIFICANT CHANGE UP (ref 3.8–10.5)
WBC # FLD AUTO: 5.35 K/UL — SIGNIFICANT CHANGE UP (ref 3.8–10.5)
WBC # FLD AUTO: 9.52 K/UL — SIGNIFICANT CHANGE UP (ref 3.8–10.5)

## 2021-11-19 PROCEDURE — 99232 SBSQ HOSP IP/OBS MODERATE 35: CPT

## 2021-11-19 PROCEDURE — 99222 1ST HOSP IP/OBS MODERATE 55: CPT

## 2021-11-19 RX ORDER — LANOLIN ALCOHOL/MO/W.PET/CERES
3 CREAM (GRAM) TOPICAL AT BEDTIME
Refills: 0 | Status: DISCONTINUED | OUTPATIENT
Start: 2021-11-19 | End: 2022-01-03

## 2021-11-19 RX ORDER — SENNA PLUS 8.6 MG/1
2 TABLET ORAL AT BEDTIME
Refills: 0 | Status: DISCONTINUED | OUTPATIENT
Start: 2021-11-19 | End: 2021-12-19

## 2021-11-19 RX ORDER — FUROSEMIDE 40 MG
20 TABLET ORAL DAILY
Refills: 0 | Status: DISCONTINUED | OUTPATIENT
Start: 2021-11-19 | End: 2022-05-18

## 2021-11-19 RX ORDER — DIVALPROEX SODIUM 500 MG/1
750 TABLET, DELAYED RELEASE ORAL
Refills: 0 | Status: DISCONTINUED | OUTPATIENT
Start: 2021-11-19 | End: 2021-11-22

## 2021-11-19 RX ORDER — ACETAMINOPHEN 500 MG
650 TABLET ORAL EVERY 6 HOURS
Refills: 0 | Status: DISCONTINUED | OUTPATIENT
Start: 2021-11-19 | End: 2021-11-19

## 2021-11-19 RX ORDER — CLONAZEPAM 1 MG
0.25 TABLET ORAL AT BEDTIME
Refills: 0 | Status: DISCONTINUED | OUTPATIENT
Start: 2021-11-19 | End: 2021-11-26

## 2021-11-19 RX ORDER — OLANZAPINE 15 MG/1
1 TABLET, FILM COATED ORAL
Qty: 0 | Refills: 0 | DISCHARGE
Start: 2021-11-19

## 2021-11-19 RX ORDER — DIVALPROEX SODIUM 500 MG/1
3 TABLET, DELAYED RELEASE ORAL
Qty: 0 | Refills: 0 | DISCHARGE
Start: 2021-11-19

## 2021-11-19 RX ORDER — MAGNESIUM HYDROXIDE 400 MG/1
30 TABLET, CHEWABLE ORAL AT BEDTIME
Refills: 0 | Status: DISCONTINUED | OUTPATIENT
Start: 2021-11-19 | End: 2022-05-18

## 2021-11-19 RX ORDER — ACETAMINOPHEN 500 MG
650 TABLET ORAL EVERY 6 HOURS
Refills: 0 | Status: DISCONTINUED | OUTPATIENT
Start: 2021-11-19 | End: 2022-05-18

## 2021-11-19 RX ORDER — OLANZAPINE 15 MG/1
2.5 TABLET, FILM COATED ORAL EVERY 6 HOURS
Refills: 0 | Status: DISCONTINUED | OUTPATIENT
Start: 2021-11-19 | End: 2022-01-05

## 2021-11-19 RX ORDER — OLANZAPINE 15 MG/1
2.5 TABLET, FILM COATED ORAL
Refills: 0 | Status: DISCONTINUED | OUTPATIENT
Start: 2021-11-20 | End: 2021-11-22

## 2021-11-19 RX ORDER — CLONAZEPAM 1 MG
0.25 TABLET ORAL
Qty: 0 | Refills: 0 | DISCHARGE
Start: 2021-11-19

## 2021-11-19 RX ORDER — DIVALPROEX SODIUM 500 MG/1
750 TABLET, DELAYED RELEASE ORAL AT BEDTIME
Refills: 0 | Status: DISCONTINUED | OUTPATIENT
Start: 2021-11-19 | End: 2021-11-19

## 2021-11-19 RX ORDER — METOPROLOL TARTRATE 50 MG
25 TABLET ORAL
Refills: 0 | Status: DISCONTINUED | OUTPATIENT
Start: 2021-11-19 | End: 2021-12-16

## 2021-11-19 RX ORDER — SODIUM CHLORIDE 9 MG/ML
250 INJECTION INTRAMUSCULAR; INTRAVENOUS; SUBCUTANEOUS
Refills: 0 | Status: DISCONTINUED | OUTPATIENT
Start: 2021-11-19 | End: 2021-11-19

## 2021-11-19 RX ADMIN — Medication 1 APPLICATION(S): at 05:15

## 2021-11-19 RX ADMIN — Medication 0.25 MILLIGRAM(S): at 21:14

## 2021-11-19 RX ADMIN — ENOXAPARIN SODIUM 40 MILLIGRAM(S): 100 INJECTION SUBCUTANEOUS at 12:55

## 2021-11-19 RX ADMIN — Medication 3 MILLIGRAM(S): at 21:15

## 2021-11-19 RX ADMIN — SENNA PLUS 2 TABLET(S): 8.6 TABLET ORAL at 21:15

## 2021-11-19 RX ADMIN — Medication 25 MILLIGRAM(S): at 21:15

## 2021-11-19 RX ADMIN — Medication 25 MILLIGRAM(S): at 05:16

## 2021-11-19 RX ADMIN — DIVALPROEX SODIUM 750 MILLIGRAM(S): 500 TABLET, DELAYED RELEASE ORAL at 05:16

## 2021-11-19 RX ADMIN — DIVALPROEX SODIUM 750 MILLIGRAM(S): 500 TABLET, DELAYED RELEASE ORAL at 21:15

## 2021-11-19 RX ADMIN — Medication 20 MILLIGRAM(S): at 05:16

## 2021-11-19 RX ADMIN — OLANZAPINE 2.5 MILLIGRAM(S): 15 TABLET, FILM COATED ORAL at 12:55

## 2021-11-19 NOTE — BH PATIENT PROFILE - HOME MEDICATIONS
divalproex sodium 250 mg oral delayed release tablet , 3 tab(s) orally once a day (at bedtime)  clonazePAM 0.5 mg oral tablet , 1 tab(s) orally 2 times a day 9am & 7pm  melatonin 3 mg oral tablet , 1 tab(s) orally once a day (at bedtime)  clonazePAM , 0.25 milligram(s) orally once a day (at bedtime)  OLANZapine 2.5 mg oral tablet , 1 tab(s) orally 2 times a day at 12pm & 6pm  divalproex sodium 250 mg oral delayed release tablet , 3 tab(s) orally once a day 7am  furosemide 20 mg oral tablet , 1 tab(s) orally once a day  triamcinolone 0.1% topical cream , 1 application topically every 12 hours  metoprolol tartrate 25 mg oral tablet , 1 tab(s) orally 2 times a day  acetaminophen 325 mg oral tablet , 2 tab(s) orally every 6 hours, As needed, Temp greater or equal to 38C (100.4F), Mild Pain (1 - 3)  aluminum hydroxide-magnesium hydroxide 200 mg-200 mg/5 mL oral suspension , 30 milliliter(s) orally every 4 hours, As needed, Dyspepsia  metFORMIN 500 mg oral tablet , 1 tab(s) orally 2 times a day  Milk of Magnesia 8% oral suspension , 30 milliliter(s) orally once a day (at bedtime), As Needed if no BM in 3 days  Senna 8.6 mg oral tablet , 2 tab(s) orally once a day (at bedtime)  docusate sodium 100 mg oral capsule , 1 cap(s) orally 2 times a day

## 2021-11-19 NOTE — BH CONSULTATION LIAISON PROGRESS NOTE - NSBHCHARTREVIEWVS_PSY_A_CORE FT
Vital Signs Last 24 Hrs  T(C): 36.9 (08 Nov 2021 04:58), Max: 37.1 (07 Nov 2021 19:59)  T(F): 98.5 (08 Nov 2021 04:58), Max: 98.7 (07 Nov 2021 19:59)  HR: 64 (08 Nov 2021 04:58) (64 - 74)  BP: 137/75 (08 Nov 2021 04:58) (104/63 - 137/75)  BP(mean): --  RR: 18 (08 Nov 2021 04:58) (17 - 18)  SpO2: 100% (08 Nov 2021 04:58) (92% - 100%)
Vital Signs Last 24 Hrs  T(C): 36.7 (18 Nov 2021 06:23), Max: 36.7 (17 Nov 2021 19:36)  T(F): 98.1 (18 Nov 2021 06:23), Max: 98.1 (18 Nov 2021 06:23)  HR: 68 (18 Nov 2021 06:23) (64 - 68)  BP: 131/68 (18 Nov 2021 06:23) (126/69 - 131/68)  BP(mean): --  RR: 16 (18 Nov 2021 06:23) (16 - 17)  SpO2: 100% (18 Nov 2021 06:23) (99% - 100%)
Vital Signs Last 24 Hrs  T(C): 36.4 (16 Nov 2021 06:12), Max: 36.4 (16 Nov 2021 06:12)  T(F): 97.5 (16 Nov 2021 06:12), Max: 97.5 (16 Nov 2021 06:12)  HR: 60 (16 Nov 2021 06:12) (60 - 98)  BP: 110/82 (16 Nov 2021 06:12) (110/82 - 138/77)  BP(mean): --  RR: 16 (16 Nov 2021 06:12) (16 - 18)  SpO2: 100% (16 Nov 2021 06:12) (97% - 100%)
Vital Signs Last 24 Hrs  T(C): 36.6 (12 Nov 2021 05:19), Max: 36.8 (11 Nov 2021 21:10)  T(F): 97.9 (12 Nov 2021 05:19), Max: 98.3 (11 Nov 2021 21:10)  HR: 62 (12 Nov 2021 05:19) (62 - 67)  BP: 102/53 (12 Nov 2021 05:19) (102/53 - 154/91)  BP(mean): --  RR: 18 (12 Nov 2021 05:19) (18 - 18)  SpO2: 100% (12 Nov 2021 05:19) (100% - 100%)
Vital Signs Last 24 Hrs  T(C): 36.6 (17 Nov 2021 05:55), Max: 36.8 (16 Nov 2021 17:30)  T(F): 97.9 (17 Nov 2021 05:55), Max: 98.2 (16 Nov 2021 17:30)  HR: 55 (17 Nov 2021 05:55) (55 - 90)  BP: 120/60 (17 Nov 2021 05:55) (115/83 - 143/73)  BP(mean): --  RR: 18 (17 Nov 2021 05:55) (16 - 18)  SpO2: 100% (17 Nov 2021 05:55) (99% - 100%)
Vital Signs Last 24 Hrs  T(C): 36.7 (10 Nov 2021 07:16), Max: 36.7 (09 Nov 2021 13:59)  T(F): 98.1 (10 Nov 2021 07:16), Max: 98.1 (10 Nov 2021 07:16)  HR: 56 (10 Nov 2021 07:16) (56 - 85)  BP: 114/51 (10 Nov 2021 07:16) (101/52 - 130/65)  BP(mean): --  RR: 18 (10 Nov 2021 07:16) (17 - 18)  SpO2: 99% (10 Nov 2021 07:16) (99% - 100%)
Vital Signs Last 24 Hrs  T(C): 36.5 (11 Nov 2021 06:26), Max: 36.7 (10 Nov 2021 12:14)  T(F): 97.7 (11 Nov 2021 06:26), Max: 98 (10 Nov 2021 12:14)  HR: 56 (11 Nov 2021 06:26) (56 - 71)  BP: 110/53 (11 Nov 2021 06:26) (110/53 - 137/72)  BP(mean): --  RR: 18 (11 Nov 2021 06:26) (18 - 18)  SpO2: 99% (11 Nov 2021 06:26) (99% - 100%)
Vital Signs Last 24 Hrs  T(C): 36.4 (09 Nov 2021 05:43), Max: 36.8 (08 Nov 2021 21:00)  T(F): 97.6 (09 Nov 2021 05:43), Max: 98.3 (08 Nov 2021 21:00)  HR: 67 (09 Nov 2021 05:43) (67 - 79)  BP: 110/61 (09 Nov 2021 05:43) (110/61 - 126/69)  BP(mean): --  RR: 16 (09 Nov 2021 05:43) (16 - 17)  SpO2: 98% (09 Nov 2021 05:43) (98% - 100%)
Vital Signs Last 24 Hrs  T(C): 36.6 (19 Nov 2021 05:13), Max: 36.6 (18 Nov 2021 21:27)  T(F): 97.8 (19 Nov 2021 05:13), Max: 97.8 (18 Nov 2021 21:27)  HR: 68 (19 Nov 2021 05:13) (63 - 75)  BP: 140/74 (19 Nov 2021 05:13) (102/59 - 142/66)  BP(mean): --  RR: 18 (19 Nov 2021 05:13) (17 - 18)  SpO2: 100% (19 Nov 2021 05:13) (100% - 100%)
Vital Signs Last 24 Hrs  T(C): 36.7 (05 Nov 2021 05:49), Max: 36.7 (05 Nov 2021 05:49)  T(F): 98 (05 Nov 2021 05:49), Max: 98 (05 Nov 2021 05:49)  HR: 73 (05 Nov 2021 05:49) (67 - 82)  BP: 134/72 (05 Nov 2021 05:49) (102/48 - 134/72)  BP(mean): --  RR: 18 (05 Nov 2021 05:49) (17 - 18)  SpO2: 100% (05 Nov 2021 05:49) (98% - 100%)
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
## 2021-11-19 NOTE — BH CONSULTATION LIAISON PROGRESS NOTE - CURRENT MEDICATION
MEDICATIONS  (STANDING):  clonazePAM  Tablet 0.25 milliGRAM(s) Oral two times a day  diVALproex DR 1000 milliGRAM(s) Oral at bedtime  diVALproex  milliGRAM(s) Oral <User Schedule>  enoxaparin Injectable 40 milliGRAM(s) SubCutaneous daily  furosemide    Tablet 20 milliGRAM(s) Oral daily  influenza  Vaccine (HIGH DOSE) 0.7 milliLiter(s) IntraMuscular once  melatonin 3 milliGRAM(s) Oral <User Schedule>  metoprolol tartrate 25 milliGRAM(s) Oral two times a day  OLANZapine 2.5 milliGRAM(s) Oral <User Schedule>  triamcinolone 0.1% Cream 1 Application(s) Topical every 12 hours    MEDICATIONS  (PRN):  acetaminophen     Tablet .. 650 milliGRAM(s) Oral every 6 hours PRN Temp greater or equal to 38C (100.4F), Mild Pain (1 - 3)  aluminum hydroxide/magnesium hydroxide/simethicone Suspension 30 milliLiter(s) Oral every 4 hours PRN Dyspepsia  OLANZapine 2.5 milliGRAM(s) Oral every 6 hours PRN agitation  OLANZapine Injectable 2.5 milliGRAM(s) IntraMuscular every 6 hours PRN COMBATIVE BEHAVIORS NOT RESPONDING TO VERBAL REDIRECTION  ondansetron Injectable 4 milliGRAM(s) IV Push every 8 hours PRN Nausea and/or Vomiting  
MEDICATIONS  (STANDING):  clonazePAM  Tablet 0.5 milliGRAM(s) Oral two times a day  diVALproex  milliGRAM(s) Oral <User Schedule>  diVALproex DR 1000 milliGRAM(s) Oral at bedtime  enoxaparin Injectable 40 milliGRAM(s) SubCutaneous daily  furosemide    Tablet 20 milliGRAM(s) Oral daily  influenza  Vaccine (HIGH DOSE) 0.7 milliLiter(s) IntraMuscular once  melatonin 3 milliGRAM(s) Oral <User Schedule>  metoprolol tartrate 25 milliGRAM(s) Oral two times a day  triamcinolone 0.1% Cream 1 Application(s) Topical every 12 hours    MEDICATIONS  (PRN):  acetaminophen     Tablet .. 650 milliGRAM(s) Oral every 6 hours PRN Temp greater or equal to 38C (100.4F), Mild Pain (1 - 3)  aluminum hydroxide/magnesium hydroxide/simethicone Suspension 30 milliLiter(s) Oral every 4 hours PRN Dyspepsia  OLANZapine 2.5 milliGRAM(s) Oral every 6 hours PRN agitation  OLANZapine Injectable 2.5 milliGRAM(s) IntraMuscular every 6 hours PRN COMBATIVE BEHAVIORS NOT RESPONDING TO VERBAL REDIRECTION  ondansetron Injectable 4 milliGRAM(s) IV Push every 8 hours PRN Nausea and/or Vomiting  
MEDICATIONS  (STANDING):  clonazePAM  Tablet 0.25 milliGRAM(s) Oral at bedtime  diVALproex DR 1000 milliGRAM(s) Oral at bedtime  diVALproex  milliGRAM(s) Oral <User Schedule>  enoxaparin Injectable 40 milliGRAM(s) SubCutaneous daily  furosemide    Tablet 20 milliGRAM(s) Oral daily  influenza  Vaccine (HIGH DOSE) 0.7 milliLiter(s) IntraMuscular once  melatonin 3 milliGRAM(s) Oral <User Schedule>  metoprolol tartrate 25 milliGRAM(s) Oral two times a day  OLANZapine 2.5 milliGRAM(s) Oral <User Schedule>  triamcinolone 0.1% Cream 1 Application(s) Topical every 12 hours    MEDICATIONS  (PRN):  acetaminophen     Tablet .. 650 milliGRAM(s) Oral every 6 hours PRN Temp greater or equal to 38C (100.4F), Mild Pain (1 - 3)  aluminum hydroxide/magnesium hydroxide/simethicone Suspension 30 milliLiter(s) Oral every 4 hours PRN Dyspepsia  ondansetron Injectable 4 milliGRAM(s) IV Push every 8 hours PRN Nausea and/or Vomiting  
MEDICATIONS  (STANDING):  clonazePAM  Tablet 0.5 milliGRAM(s) Oral two times a day  dextrose 40% Gel 15 Gram(s) Oral once  dextrose 50% Injectable 25 Gram(s) IV Push once  dextrose 50% Injectable 12.5 Gram(s) IV Push once  dextrose 50% Injectable 25 Gram(s) IV Push once  diVALproex  milliGRAM(s) Oral two times a day  enoxaparin Injectable 40 milliGRAM(s) SubCutaneous daily  furosemide    Tablet 20 milliGRAM(s) Oral daily  glucagon  Injectable 1 milliGRAM(s) IntraMuscular once  insulin lispro (ADMELOG) corrective regimen sliding scale   SubCutaneous three times a day before meals  insulin lispro (ADMELOG) corrective regimen sliding scale   SubCutaneous at bedtime  melatonin 3 milliGRAM(s) Oral <User Schedule>  metoprolol tartrate 25 milliGRAM(s) Oral two times a day  triamcinolone 0.1% Cream 1 Application(s) Topical every 12 hours    MEDICATIONS  (PRN):  acetaminophen     Tablet .. 650 milliGRAM(s) Oral every 6 hours PRN Temp greater or equal to 38C (100.4F), Mild Pain (1 - 3)  aluminum hydroxide/magnesium hydroxide/simethicone Suspension 30 milliLiter(s) Oral every 4 hours PRN Dyspepsia  OLANZapine 2.5 milliGRAM(s) Oral every 6 hours PRN agitation  OLANZapine Injectable 2.5 milliGRAM(s) IntraMuscular every 6 hours PRN COMBATIVE BEHAVIORS NOT RESPONDING TO VERBAL REDIRECTION  ondansetron Injectable 4 milliGRAM(s) IV Push every 8 hours PRN Nausea and/or Vomiting  
MEDICATIONS  (STANDING):  clonazePAM  Tablet 0.25 milliGRAM(s) Oral two times a day  diVALproex DR 1000 milliGRAM(s) Oral at bedtime  diVALproex  milliGRAM(s) Oral <User Schedule>  enoxaparin Injectable 40 milliGRAM(s) SubCutaneous daily  furosemide    Tablet 20 milliGRAM(s) Oral daily  influenza  Vaccine (HIGH DOSE) 0.7 milliLiter(s) IntraMuscular once  melatonin 3 milliGRAM(s) Oral <User Schedule>  metoprolol tartrate 25 milliGRAM(s) Oral two times a day  OLANZapine 2.5 milliGRAM(s) Oral <User Schedule>  triamcinolone 0.1% Cream 1 Application(s) Topical every 12 hours    MEDICATIONS  (PRN):  acetaminophen     Tablet .. 650 milliGRAM(s) Oral every 6 hours PRN Temp greater or equal to 38C (100.4F), Mild Pain (1 - 3)  aluminum hydroxide/magnesium hydroxide/simethicone Suspension 30 milliLiter(s) Oral every 4 hours PRN Dyspepsia  OLANZapine 2.5 milliGRAM(s) Oral every 6 hours PRN agitation  OLANZapine Injectable 2.5 milliGRAM(s) IntraMuscular every 6 hours PRN COMBATIVE BEHAVIORS NOT RESPONDING TO VERBAL REDIRECTION  ondansetron Injectable 4 milliGRAM(s) IV Push every 8 hours PRN Nausea and/or Vomiting  
MEDICATIONS  (STANDING):  clonazePAM  Tablet 0.5 milliGRAM(s) Oral two times a day  diVALproex  milliGRAM(s) Oral <User Schedule>  diVALproex DR 1000 milliGRAM(s) Oral at bedtime  enoxaparin Injectable 40 milliGRAM(s) SubCutaneous daily  furosemide    Tablet 20 milliGRAM(s) Oral daily  influenza  Vaccine (HIGH DOSE) 0.7 milliLiter(s) IntraMuscular once  melatonin 3 milliGRAM(s) Oral <User Schedule>  metoprolol tartrate 25 milliGRAM(s) Oral two times a day  triamcinolone 0.1% Cream 1 Application(s) Topical every 12 hours    MEDICATIONS  (PRN):  acetaminophen     Tablet .. 650 milliGRAM(s) Oral every 6 hours PRN Temp greater or equal to 38C (100.4F), Mild Pain (1 - 3)  aluminum hydroxide/magnesium hydroxide/simethicone Suspension 30 milliLiter(s) Oral every 4 hours PRN Dyspepsia  OLANZapine 2.5 milliGRAM(s) Oral every 6 hours PRN agitation  OLANZapine Injectable 2.5 milliGRAM(s) IntraMuscular every 6 hours PRN COMBATIVE BEHAVIORS NOT RESPONDING TO VERBAL REDIRECTION  ondansetron Injectable 4 milliGRAM(s) IV Push every 8 hours PRN Nausea and/or Vomiting  
MEDICATIONS  (STANDING):  clonazePAM  Tablet 0.5 milliGRAM(s) Oral two times a day  diVALproex  milliGRAM(s) Oral <User Schedule>  diVALproex DR 1000 milliGRAM(s) Oral at bedtime  enoxaparin Injectable 40 milliGRAM(s) SubCutaneous daily  furosemide    Tablet 20 milliGRAM(s) Oral daily  influenza  Vaccine (HIGH DOSE) 0.7 milliLiter(s) IntraMuscular once  melatonin 3 milliGRAM(s) Oral <User Schedule>  metoprolol tartrate 25 milliGRAM(s) Oral two times a day  triamcinolone 0.1% Cream 1 Application(s) Topical every 12 hours    MEDICATIONS  (PRN):  acetaminophen     Tablet .. 650 milliGRAM(s) Oral every 6 hours PRN Temp greater or equal to 38C (100.4F), Mild Pain (1 - 3)  aluminum hydroxide/magnesium hydroxide/simethicone Suspension 30 milliLiter(s) Oral every 4 hours PRN Dyspepsia  OLANZapine 2.5 milliGRAM(s) Oral every 6 hours PRN agitation  OLANZapine Injectable 2.5 milliGRAM(s) IntraMuscular every 6 hours PRN COMBATIVE BEHAVIORS NOT RESPONDING TO VERBAL REDIRECTION  ondansetron Injectable 4 milliGRAM(s) IV Push every 8 hours PRN Nausea and/or Vomiting  
MEDICATIONS  (STANDING):  clonazePAM  Tablet 0.5 milliGRAM(s) Oral two times a day  diVALproex DR 1000 milliGRAM(s) Oral at bedtime  enoxaparin Injectable 40 milliGRAM(s) SubCutaneous daily  furosemide    Tablet 20 milliGRAM(s) Oral daily  influenza  Vaccine (HIGH DOSE) 0.7 milliLiter(s) IntraMuscular once  melatonin 3 milliGRAM(s) Oral <User Schedule>  metoprolol tartrate 25 milliGRAM(s) Oral two times a day  triamcinolone 0.1% Cream 1 Application(s) Topical every 12 hours    MEDICATIONS  (PRN):  acetaminophen     Tablet .. 650 milliGRAM(s) Oral every 6 hours PRN Temp greater or equal to 38C (100.4F), Mild Pain (1 - 3)  aluminum hydroxide/magnesium hydroxide/simethicone Suspension 30 milliLiter(s) Oral every 4 hours PRN Dyspepsia  OLANZapine 2.5 milliGRAM(s) Oral every 6 hours PRN agitation  OLANZapine Injectable 2.5 milliGRAM(s) IntraMuscular every 6 hours PRN COMBATIVE BEHAVIORS NOT RESPONDING TO VERBAL REDIRECTION  ondansetron Injectable 4 milliGRAM(s) IV Push every 8 hours PRN Nausea and/or Vomiting  
MEDICATIONS  (STANDING):  clonazePAM  Tablet 0.5 milliGRAM(s) Oral two times a day  diVALproex DR 1000 milliGRAM(s) Oral at bedtime  enoxaparin Injectable 40 milliGRAM(s) SubCutaneous daily  furosemide    Tablet 20 milliGRAM(s) Oral daily  influenza  Vaccine (HIGH DOSE) 0.7 milliLiter(s) IntraMuscular once  melatonin 3 milliGRAM(s) Oral <User Schedule>  metoprolol tartrate 25 milliGRAM(s) Oral two times a day  triamcinolone 0.1% Cream 1 Application(s) Topical every 12 hours    MEDICATIONS  (PRN):  acetaminophen     Tablet .. 650 milliGRAM(s) Oral every 6 hours PRN Temp greater or equal to 38C (100.4F), Mild Pain (1 - 3)  aluminum hydroxide/magnesium hydroxide/simethicone Suspension 30 milliLiter(s) Oral every 4 hours PRN Dyspepsia  OLANZapine 2.5 milliGRAM(s) Oral every 6 hours PRN agitation  OLANZapine Injectable 2.5 milliGRAM(s) IntraMuscular every 6 hours PRN COMBATIVE BEHAVIORS NOT RESPONDING TO VERBAL REDIRECTION  ondansetron Injectable 4 milliGRAM(s) IV Push every 8 hours PRN Nausea and/or Vomiting  
MEDICATIONS  (STANDING):  clonazePAM  Tablet 0.25 milliGRAM(s) Oral two times a day  diVALproex DR 1000 milliGRAM(s) Oral at bedtime  diVALproex  milliGRAM(s) Oral <User Schedule>  enoxaparin Injectable 40 milliGRAM(s) SubCutaneous daily  furosemide    Tablet 20 milliGRAM(s) Oral daily  influenza  Vaccine (HIGH DOSE) 0.7 milliLiter(s) IntraMuscular once  melatonin 3 milliGRAM(s) Oral <User Schedule>  metoprolol tartrate 25 milliGRAM(s) Oral two times a day  OLANZapine 2.5 milliGRAM(s) Oral <User Schedule>  triamcinolone 0.1% Cream 1 Application(s) Topical every 12 hours    MEDICATIONS  (PRN):  acetaminophen     Tablet .. 650 milliGRAM(s) Oral every 6 hours PRN Temp greater or equal to 38C (100.4F), Mild Pain (1 - 3)  aluminum hydroxide/magnesium hydroxide/simethicone Suspension 30 milliLiter(s) Oral every 4 hours PRN Dyspepsia  ondansetron Injectable 4 milliGRAM(s) IV Push every 8 hours PRN Nausea and/or Vomiting

## 2021-11-19 NOTE — BH INPATIENT PSYCHIATRY ASSESSMENT NOTE - NSBHCHARTREVIEWVS_PSY_A_CORE FT
Vital Signs Last 24 Hrs  T(C): 36.6 (11-19-21 @ 05:13), Max: 36.6 (11-18-21 @ 21:27)  T(F): 97.8 (11-19-21 @ 05:13), Max: 97.8 (11-18-21 @ 21:27)  HR: 68 (11-19-21 @ 05:13) (68 - 75)  BP: 140/74 (11-19-21 @ 05:13) (140/74 - 142/66)  BP(mean): --  RR: 18 (11-19-21 @ 05:13) (18 - 18)  SpO2: 100% (11-19-21 @ 05:13) (100% - 100%)     Vital Signs Last 24 Hrs  T(C): 36.4 (11-19-21 @ 17:50), Max: 36.7 (11-19-21 @ 12:21)  T(F): 97.6 (11-19-21 @ 17:50), Max: 98 (11-19-21 @ 12:21)  HR: 66 (11-19-21 @ 12:21) (66 - 75)  BP: 138/68 (11-19-21 @ 12:21) (138/68 - 142/66)  BP(mean): --  RR: 18 (11-19-21 @ 17:50) (18 - 18)  SpO2: 97% (11-19-21 @ 17:50) (97% - 100%)    Orthostatic VS  11-19-21 @ 17:50  Lying BP: --/-- HR: --  Sitting BP: 124/60 HR: 77  Standing BP: --/-- HR: --  Site: --  Mode: --

## 2021-11-19 NOTE — PROGRESS NOTE ADULT - SUBJECTIVE AND OBJECTIVE BOX
[facility] Division of [facility] Medicine  Mat Conrad MD  Pager [phone]      Patient is a 66y old  Female who presents with a chief complaint of Dementia with behavioral disturbance (18 Nov 2021 16:23)      SUBJECTIVE / OVERNIGHT EVENTS:    no acute event o/n. No new complaint.     ADDITIONAL REVIEW OF SYSTEMS:    RESPIRATORY: No cough, wheezing, chills or hemoptysis; No shortness of breath  CARDIOVASCULAR: No chest pain, palpitations, dizziness, or leg swelling  GASTROINTESTINAL: No abdominal or epigastric pain. No nausea, vomiting, or hematemesis; No diarrhea or constipation. No melena or hematochezia.      MEDICATIONS  (STANDING):  clonazePAM  Tablet 0.25 milliGRAM(s) Oral at bedtime  diVALproex DR 1000 milliGRAM(s) Oral at bedtime  diVALproex  milliGRAM(s) Oral <User Schedule>  enoxaparin Injectable 40 milliGRAM(s) SubCutaneous daily  furosemide    Tablet 20 milliGRAM(s) Oral daily  influenza  Vaccine (HIGH DOSE) 0.7 milliLiter(s) IntraMuscular once  melatonin 3 milliGRAM(s) Oral <User Schedule>  metoprolol tartrate 25 milliGRAM(s) Oral two times a day  OLANZapine 2.5 milliGRAM(s) Oral <User Schedule>  sodium chloride 0.9%. 250 milliLiter(s) (250 mL/Hr) IV Continuous <Continuous>  triamcinolone 0.1% Cream 1 Application(s) Topical every 12 hours    MEDICATIONS  (PRN):  acetaminophen     Tablet .. 650 milliGRAM(s) Oral every 6 hours PRN Temp greater or equal to 38C (100.4F), Mild Pain (1 - 3)  aluminum hydroxide/magnesium hydroxide/simethicone Suspension 30 milliLiter(s) Oral every 4 hours PRN Dyspepsia  ondansetron Injectable 4 milliGRAM(s) IV Push every 8 hours PRN Nausea and/or Vomiting      CAPILLARY BLOOD GLUCOSE        I&O's Summary      PHYSICAL EXAM:  Vital Signs Last 24 Hrs  T(C): 36.6 (19 Nov 2021 05:13), Max: 36.6 (18 Nov 2021 21:27)  T(F): 97.8 (19 Nov 2021 05:13), Max: 97.8 (18 Nov 2021 21:27)  HR: 68 (19 Nov 2021 05:13) (68 - 75)  BP: 140/74 (19 Nov 2021 05:13) (140/74 - 142/66)  BP(mean): --  RR: 18 (19 Nov 2021 05:13) (18 - 18)  SpO2: 100% (19 Nov 2021 05:13) (100% - 100%)    CONSTITUTIONAL: NAD,  EYES: PERRLA; conjunctiva and sclera clear  ENMT: Moist oral mucosa, no pharyngeal injection or exudates;   NECK: Supple, no palpable masses;  RESPIRATORY: Normal respiratory effort; lungs are clear to auscultation bilaterally  CARDIOVASCULAR: Regular rate and rhythm, normal S1 and S2, no murmur/rub/gallop; No lower extremity edema; Peripheral pulses are 2+ bilaterally  ABDOMEN: Nontender to palpation, normoactive bowel sounds, no rebound/guarding;   MUSCLOSKELETAL:   no clubbing or cyanosis of digits; no joint swelling or tenderness to palpation  PSYCH: A+O to person  NEUROLOGY: CN 2-12 are intact and symmetric; no gross sensory deficits;   SKIN: No rashes;     LABS:                        13.9   9.52  )-----------( 134      ( 19 Nov 2021 06:53 )             44.5     11-19    142  |  105  |  31<H>  ----------------------------<  116<H>  3.9   |  24  |  0.80    Ca    9.9      19 Nov 2021 06:53  Phos  4.2     11-19  Mg     2.20     11-19                  RADIOLOGY & ADDITIONAL TESTS:  Results Reviewed:   Imaging Personally Reviewed:  Electrocardiogram Personally Reviewed:    COORDINATION OF CARE:  Care Discussed with Consultants/Other Providers [Y/N]:  Prior or Outpatient Records Reviewed [Y/N]:

## 2021-11-19 NOTE — PROGRESS NOTE ADULT - PROBLEM SELECTOR PROBLEM 6
Hypertension
Genitourinary bleeding
Hypertension
Hypertension
Genitourinary bleeding
Genitourinary bleeding
Hypertension
Genitourinary bleeding
Hypertension
Genitourinary bleeding

## 2021-11-19 NOTE — BH CONSULTATION LIAISON PROGRESS NOTE - NSBHMSESPEECH_PSY_A_CORE
Abnormal as indicated, otherwise normal...

## 2021-11-19 NOTE — BH CONSULTATION LIAISON PROGRESS NOTE - NSBHMSEMOVE_PSY_A_CORE
Tremors
Tremors
No abnormal movements/Tremors
Tremors
Other
No abnormal movements/Tremors
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
## 2021-11-19 NOTE — BH INPATIENT PSYCHIATRY ASSESSMENT NOTE - NSBHMETABOLIC_PSY_ALL_CORE_FT
BMI: BMI (kg/m2): 33.5 (11-04-21 @ 02:15)  HbA1c: A1C with Estimated Average Glucose Result: 5.4 % (11-04-21 @ 06:58)    Glucose: POCT Blood Glucose.: 109 mg/dL (11-12-21 @ 16:13)    BP: --  Lipid Panel:  BMI: BMI (kg/m2): 31.6 (11-19-21 @ 17:50)  HbA1c: A1C with Estimated Average Glucose Result: 5.4 % (11-04-21 @ 06:58)    Glucose: POCT Blood Glucose.: 109 mg/dL (11-12-21 @ 16:13)    BP: --  Lipid Panel:

## 2021-11-19 NOTE — PROGRESS NOTE ADULT - PROBLEM SELECTOR PROBLEM 1
Chronic dementia, with behavioral disturbance

## 2021-11-19 NOTE — BH PATIENT PROFILE - NSDASAIMPULSIVITY_PSY_ALL_CORE
[Endometrial Cancer] : endometrial cancer [Consideration for Non-Curative Therapy] : consideration for non-curative therapy for Yes

## 2021-11-19 NOTE — BH CONSULTATION LIAISON PROGRESS NOTE - NSBHCONSFOLLOWNEEDS_PSY_ALL_CORE
Needs further psych safety assessment prior to discharge
No psychiatric contraindications to discharge
Needs further psych safety assessment prior to discharge

## 2021-11-19 NOTE — BH CONSULTATION LIAISON PROGRESS NOTE - GENERAL APPEARANCE
No deformities present
Deformities present

## 2021-11-19 NOTE — BH CONSULTATION LIAISON PROGRESS NOTE - NSBHATTESTSEENBY_PSY_A_CORE
attending Psychiatrist without NP/Trainee
Attending Psychiatrist supervising NP/Trainee, meeting pt...

## 2021-11-19 NOTE — PROGRESS NOTE ADULT - PROBLEM SELECTOR PLAN 9
DVT: Lovenox  Dispo:  LTC    Discussed with ACP and Psych DVT: scd   Dispo:  [facility]    Discussed with ACP and Psych

## 2021-11-19 NOTE — BH CONSULTATION LIAISON PROGRESS NOTE - NSBHMSEMUSCLE_PSY_A_CORE
Normal muscle tone/strength
Unknown
Normal muscle tone/strength

## 2021-11-19 NOTE — BH CONSULTATION LIAISON PROGRESS NOTE - NSBHCHARTREVIEWLAB_PSY_A_CORE FT
no recent labs
11-08    144  |  109<H>  |  21  ----------------------------<  82  3.8   |  25  |  0.68    Ca    9.4      08 Nov 2021 07:29    TPro  6.9  /  Alb  3.7  /  TBili  <0.2  /  DBili  x   /  AST  13  /  ALT  13  /  AlkPhos  55  11-08  
CBC Full  -  ( 09 Nov 2021 07:20 )  WBC Count : 6.56 K/uL  RBC Count : 5.14 M/uL  Hemoglobin : 15.5 g/dL  Hematocrit : 51.7 %  Platelet Count - Automated : 205 K/uL  Mean Cell Volume : 100.6 fL  Mean Cell Hemoglobin : 30.2 pg  Mean Cell Hemoglobin Concentration : 30.0 gm/dL  Auto Neutrophil # : 2.96 K/uL  Auto Lymphocyte # : 2.81 K/uL  Auto Monocyte # : 0.64 K/uL  Auto Eosinophil # : 0.10 K/uL  Auto Basophil # : 0.03 K/uL  Auto Neutrophil % : 45.1 %  Auto Lymphocyte % : 42.8 %  Auto Monocyte % : 9.8 %  Auto Eosinophil % : 1.5 %  Auto Basophil % : 0.5 %  11-09    145  |  108<H>  |  22  ----------------------------<  77  3.9   |  26  |  0.58    Ca    9.7      09 Nov 2021 07:20  Phos  3.6     11-09  Mg     2.30     11-09    TPro  7.5  /  Alb  4.1  /  TBili  0.2  /  DBili  x   /  AST  16  /  ALT  15  /  AlkPhos  58  11-09  
no new labs today
CBC Full  -  ( 09 Nov 2021 07:20 )  WBC Count : 6.56 K/uL  RBC Count : 5.14 M/uL  Hemoglobin : 15.5 g/dL  Hematocrit : 51.7 %  Platelet Count - Automated : 205 K/uL  Mean Cell Volume : 100.6 fL  Mean Cell Hemoglobin : 30.2 pg  Mean Cell Hemoglobin Concentration : 30.0 gm/dL  Auto Neutrophil # : 2.96 K/uL  Auto Lymphocyte # : 2.81 K/uL  Auto Monocyte # : 0.64 K/uL  Auto Eosinophil # : 0.10 K/uL  Auto Basophil # : 0.03 K/uL  Auto Neutrophil % : 45.1 %  Auto Lymphocyte % : 42.8 %  Auto Monocyte % : 9.8 %  Auto Eosinophil % : 1.5 %  Auto Basophil % : 0.5 %  11-09    145  |  108<H>  |  22  ----------------------------<  77  3.9   |  26  |  0.58    Ca    9.7      09 Nov 2021 07:20  Phos  3.6     11-09  Mg     2.30     11-09    TPro  7.5  /  Alb  4.1  /  TBili  0.2  /  DBili  x   /  AST  16  /  ALT  15  /  AlkPhos  58  11-09  
CBC Full  -  ( 12 Nov 2021 06:28 )  WBC Count : 6.35 K/uL  RBC Count : 4.47 M/uL  Hemoglobin : 13.4 g/dL  Hematocrit : 43.0 %  Platelet Count - Automated : 198 K/uL  Mean Cell Volume : 96.2 fL  Mean Cell Hemoglobin : 30.0 pg  Mean Cell Hemoglobin Concentration : 31.2 gm/dL  Auto Neutrophil # : 2.54 K/uL  Auto Lymphocyte # : 3.14 K/uL  Auto Monocyte # : 0.52 K/uL  Auto Eosinophil # : 0.11 K/uL  Auto Basophil # : 0.02 K/uL  Auto Neutrophil % : 40.1 %  Auto Lymphocyte % : 49.4 %  Auto Monocyte % : 8.2 %  Auto Eosinophil % : 1.7 %  Auto Basophil % : 0.3 %  11-12    144  |  104  |  20  ----------------------------<  69<L>  3.5   |  27  |  0.58    Ca    10.0      12 Nov 2021 06:28  Phos  3.7     11-12  Mg     2.40     11-12    TPro  7.2  /  Alb  3.9  /  TBili  <0.2  /  DBili  x   /  AST  17  /  ALT  12  /  AlkPhos  54  11-11  
CBC Full  -  ( 19 Nov 2021 06:53 )  WBC Count : 9.52 K/uL  RBC Count : 4.57 M/uL  Hemoglobin : 13.9 g/dL  Hematocrit : 44.5 %  Platelet Count - Automated : 134 K/uL  Mean Cell Volume : 97.4 fL  Mean Cell Hemoglobin : 30.4 pg  Mean Cell Hemoglobin Concentration : 31.2 gm/dL  Auto Neutrophil # : x  Auto Lymphocyte # : x  Auto Monocyte # : x  Auto Eosinophil # : x  Auto Basophil # : x  Auto Neutrophil % : x  Auto Lymphocyte % : x  Auto Monocyte % : x  Auto Eosinophil % : x  Auto Basophil % : x  11-19    142  |  105  |  31<H>  ----------------------------<  116<H>  3.9   |  24  |  0.80    Ca    9.9      19 Nov 2021 06:53  Phos  4.2     11-19  Mg     2.20     11-19    
CBC Full  -  ( 11 Nov 2021 07:11 )  WBC Count : 7.26 K/uL  RBC Count : 4.47 M/uL  Hemoglobin : 13.6 g/dL  Hematocrit : 43.4 %  Platelet Count - Automated : 200 K/uL  Mean Cell Volume : 97.1 fL  Mean Cell Hemoglobin : 30.4 pg  Mean Cell Hemoglobin Concentration : 31.3 gm/dL  Auto Neutrophil # : 3.05 K/uL  Auto Lymphocyte # : 3.15 K/uL  Auto Monocyte # : 0.88 K/uL  Auto Eosinophil # : 0.12 K/uL  Auto Basophil # : 0.04 K/uL  Auto Neutrophil % : 41.9 %  Auto Lymphocyte % : 43.4 %  Auto Monocyte % : 12.1 %  Auto Eosinophil % : 1.7 %  Auto Basophil % : 0.6 %  11-11    144  |  109<H>  |  18  ----------------------------<  93  4.5   |  25  |  0.58    Ca    9.7      11 Nov 2021 07:11  Phos  3.3     11-11  Mg     2.30     11-11    TPro  7.2  /  Alb  3.9  /  TBili  <0.2  /  DBili  x   /  AST  17  /  ALT  12  /  AlkPhos  54  11-11  
.  LABS:                         13.6   6.35  )-----------( 228      ( 2021 07:03 )             44.6     11-    144  |  107  |  18  ----------------------------<  105<H>  4.1   |  26  |  0.63    Ca    9.9      2021 07:03  Phos  3.4     -  Mg     2.40         TPro  7.7  /  Alb  4.1  /  TBili  0.2  /  DBili  x   /  AST  19  /  ALT  12  /  AlkPhos  61  11-05      Urinalysis Basic - ( 2021 18:47 )    Color: Yellow / Appearance: Clear / S.029 / pH: x  Gluc: x / Ketone: Trace  / Bili: Negative / Urobili: <2 mg/dL   Blood: x / Protein: Trace / Nitrite: Negative   Leuk Esterase: Negative / RBC: 2 /HPF / WBC 4 /HPF   Sq Epi: x / Non Sq Epi: 1 /HPF / Bacteria: Negative

## 2021-11-19 NOTE — PROGRESS NOTE ADULT - PROBLEM SELECTOR PROBLEM 8
Need for prophylactic measure
Edema leg
Edema leg
Need for prophylactic measure
Edema leg
Edema leg
Need for prophylactic measure
Edema leg
Need for prophylactic measure
Need for prophylactic measure
Edema leg

## 2021-11-19 NOTE — BH CONSULTATION LIAISON PROGRESS NOTE - NSBHASSESSMENTFT_PSY_ALL_CORE
66 y.o. F, as per records is , has been residing at [facility] ([facility]) since 2010, with past psychiatric history of dementia with behavioral disturbance, mood disorder, RODY, possible inpatient psychiatric admission to [facility] this year, has a chronic medical history notable for NPH (w/  shunt), HTN, DMT2, TBI (unspecified), pedal edema presenting from [facility] for physically aggressive behavior and diffuse rash. Records also indicate that [facility] Psychiatry has been consulted for agitation management.     11/5 - Patient used 1 PRN of Zyprexa, remains intermittently agitated, with delusions.   11/8-- agitated, labile, impulsive, unpredictable.   11/9-- less agitated today.   11/10- still labile, impulsive, requires inpatient psychiatry admission for continued adjustment of medications  11/11-- sleeping this morning. Can be loud, impulsive.   11/12--- awake, can be agitated   11/16--- still with intermittent agitation, lability    11/18-- much improved in terms of impulsivity. Redirectable. Can be loud at times. No aggression or agitation. No increased observation needs either.     11/19-- can be intermittently agitated, aggressive when affects her ability to be safely transitioned back to [facility].     Plan  - CONTINUE Depakote 750 mg q AM + 1000mg q HS PO. VA level therapeutic. Keeping an eye on the platelet count  - CONTINUE increased dose of Zyprexa 2.5mg BID -- 12NOON + 6PM PO--- to target sundowning pattern of agitation. Will hold steady at this dose with no change for now. EKG- 11/13= QTC: 433  - CONTINUE RECENTLY DECREASED dose of Clonazepam 0.25 mg PO q HS PO (ONCE A DAY DOSING)   - Use Zyprexa 2.5mg q 6hrs prn IM/PO  - Collateral information gathered.  - Don Dreyer.  - Coordinated with [facility]   - DISPOSITION=== [facility] admission. 2pc signed

## 2021-11-19 NOTE — PROGRESS NOTE ADULT - PROBLEM SELECTOR PLAN 7
BP acceptable   - monitor BP  - c/w metoprolol tartrate 25mg BID
- c/w Lasix 20mg for localized edema
BP acceptable   - monitor BP  - c/w metoprolol tartrate 25mg BID
- c/w Lasix 20mg for localized edema
- c/w Lasix 20mg for localized edema
BP acceptable   - monitor BP  - c/w metoprolol tartrate 25mg BID
BP acceptable   - monitor BP  - c/w metoprolol tartrate 25mg BID
- c/w Lasix 20mg for localized edema
BP acceptable   - monitor BP  - c/w metoprolol tartrate 25mg BID
BP acceptable   - monitor BP  - c/w metoprolol tartrate 25mg BID
- c/w Lasix 20mg for localized edema
BP acceptable   - monitor BP  - c/w metoprolol tartrate 25mg BID
BP acceptable   - monitor BP  - c/w metoprolol tartrate 25mg BID

## 2021-11-19 NOTE — BH CONSULTATION LIAISON PROGRESS NOTE - NSBHMSESPABN_PSY_A_CORE
Loud volume/Increased productivity
Loud volume/Increased productivity
Loud volume
Loud volume/Increased productivity
Loud volume
Impaired articulation
Loud volume
Loud volume/Impaired articulation
Loud volume

## 2021-11-19 NOTE — BH CONSULTATION LIAISON PROGRESS NOTE - NSBHMSETHTPROC_PSY_A_CORE
Illogical
Illogical/Impaired reasoning
Disorganized/Illogical
Illogical
Disorganized/Perseverative/Illogical/Impaired reasoning
Disorganized/Illogical
Illogical/Impaired reasoning
Illogical
Illogical/Impaired reasoning
Disorganized/Illogical

## 2021-11-19 NOTE — DISCHARGE NOTE NURSING/CASE MANAGEMENT/SOCIAL WORK - NSDCVIVACCINE_GEN_ALL_CORE_FT
Tdap; 20-May-2020 12:35; Hussein Haywodo (RN); [employer]; [id number] (Exp. Date: 19-Mar-2022); IntraMuscular; Deltoid Left.; 0.5 milliLiter(s); VIS (VIS Published: 09-May-2013, VIS Presented: 20-May-2020);

## 2021-11-19 NOTE — BH INPATIENT PSYCHIATRY ASSESSMENT NOTE - CASE SUMMARY
66 y.o. F,  per records is , has been residing at [facility] ([facility]) since 2010, with past psychiatric history of dementia with behavioral disturbance, mood disorder, RODY, possible inpatient psychiatric admission to [facility] this year, has a chronic medical history notable for NPH (w/  shunt), HTN, DMT2, TBI (unspecified), pedal edema presenting from [facility] for physically aggressive behavior initially admitted to [facility] due to bed unavailability now transferred to [facility] due to continued agitation.   Interview limited, patient oriented only to person, states she feels fine and safe on the unit. She is superficially cooperative with reactive affect, gross thought disorganization with loose associations, word salad, poor insight and judgement. No SI or HI. Does not appear internally preoccupied.   Assessment / plan:   67 yo female, dementia with behavioral disturbance, unspecified mood, admitted due to progressive worsening agitation at nursing home. On exam, notable for gross disorganization with word salad and poor insight. Oriented only to person.    Impression: Dementia with behavioral disturbance. Will continue with medications below. Of note, patient with DM but not on diabetic medications.     Plan:   No CO indicated at this time   c/w Zyprexa 2.5mg @ 12pm + 2.5mg @ 6pm   c/w Depakote 750mg BID  c/w Clonazepam 0.25mg qHS (consider d/c)   PRN Zyprexa 2.5mg q6h PO or IM for non-redirectable agitation   Melatonin 3mg qHS   Metoprolol tartrate 25mg BID   Furosemide 20mg daily   Lovenox 40mg daily subq   f/u hospitalist recs   legal status [facility]

## 2021-11-19 NOTE — PROGRESS NOTE ADULT - NSPROGADDITIONALINFOA_GEN_ALL_CORE
Will need outpatient GYN f/u for her vaginal spotting and thickened endometrium after discharge clarence NAVA     D/W KEZIA
D/W ACP
stable for transfer to [facility] today. total time spent on dc 37min
D/W ACP
D/W ACP
Attempted to call patient's  Wil Tysonyer [phone], no answer and phone goes straight to voicemail box, which was full and unable to leave message.
D/W ACP
Attempted to call patient's  Wil Tysonyer [phone], no answer and phone goes straight to voicemail box, which was full and unable to leave message.
slight drop in plt count today noted- pt is not actively bleeding. low suspicion for HIT. pt is stable for transfer to [facility]. could repeat CBC in 2-3 days to ensure stability     total time spent on dc 37min
stable for transfer to [facility] today. total time spent on dc 37min
Attempted to call patient's  Wil Tysonyer [phone], no answer and phone goes straight to voicemail box, which was full and unable to leave message.

## 2021-11-19 NOTE — PROGRESS NOTE ADULT - PROBLEM SELECTOR PROBLEM 7
Hypertension
Hypertension
Edema leg
Hypertension
Edema leg
Edema leg
Hypertension
Edema leg
Edema leg
Hypertension

## 2021-11-19 NOTE — PROGRESS NOTE ADULT - PROBLEM/PLAN-5
DISPLAY PLAN FREE TEXT
Mom was treated with PCN G X 2 during labor/maternal positive group b strep

## 2021-11-19 NOTE — DISCHARGE NOTE NURSING/CASE MANAGEMENT/SOCIAL WORK - PATIENT PORTAL LINK FT
You can access the FollowMyHealth Patient Portal offered by [facility] by registering at the following website: [email or web address]. By joining [employer]’s FollowMyHealth portal, you will also be able to view your health information using other applications (apps) compatible with our system.

## 2021-11-19 NOTE — BH CONSULTATION LIAISON PROGRESS NOTE - NSBHFUPINTERVALHXFT_PSY_A_CORE
Met with patient. Sleeping. Wakes up when name is called. Childishly asks- ' what can I do to help you'. Remains illogical, confused, disoriented. Coarse tremors at rest when she wakes up--- has been present since admission.

## 2021-11-19 NOTE — PROGRESS NOTE ADULT - REASON FOR ADMISSION
Dementia with behavioral disturbance

## 2021-11-19 NOTE — PROGRESS NOTE ADULT - PROBLEM SELECTOR PROBLEM 4
Mood disorder

## 2021-11-19 NOTE — BH INPATIENT PSYCHIATRY ASSESSMENT NOTE - NSBHPSYCHOLCOGABN_PSY_A_CORE
disoriented to time/disoriented to place/disoriented to person/disoriented to situation disoriented to time/disoriented to place/disoriented to situation

## 2021-11-19 NOTE — BH CONSULTATION LIAISON PROGRESS NOTE - NSICDXBHSECONDARYDX_PSY_ALL_CORE
TBI (traumatic brain injury)   S06.9X9A  

## 2021-11-19 NOTE — PROGRESS NOTE ADULT - PROBLEM SELECTOR PROBLEM 2
Normal pressure hydrocephalus

## 2021-11-19 NOTE — PROGRESS NOTE ADULT - PROBLEM SELECTOR PLAN 6
Likely  vaginal spotting.  No signs of rectal bleed at this time. (+) thickened endometrium on US. Seen by GYN consult, rec to have outpatient GYN f/u   -Monitor for signs of bleeding   -H/H stable

## 2021-11-19 NOTE — BH CONSULTATION LIAISON PROGRESS NOTE - NSBHPTASSESSDT_PSY_A_CORE
12-Nov-2021 12:51
05-Nov-2021 12:16
19-Nov-2021 10:32
17-Nov-2021 11:10
08-Nov-2021 12:12
09-Nov-2021 12:36
16-Nov-2021 12:34
10-Nov-2021 11:27
11-Nov-2021 11:13
18-Nov-2021 11:07

## 2021-11-19 NOTE — BH CONSULTATION LIAISON PROGRESS NOTE - NSBHFUPINTERVALCCFT_PSY_A_CORE
Patient has been at [facility] (admitted for unremitting aggression at LTC) since 11/3 pending [facility] admission. Over the course of this time, psychiatric medications have been actively adjusted. While patient has relatively improved compared to admission, she still remains with intermittent agitation, impulsivity which affects her ability to be successfully transitioned back to her LTC facility where she has been residing for 10 years (since 2010). Yesterday LTC facility expressed concerns about residual agitation- as per RN notes overnight for the last few days.   Discussed above with medicine team- Dr Conrad, medicine SW+CM+RN this morning at rounds.   Reviewed labs this morning-- and discussed results and need for repeat CBC, BMP.

## 2021-11-19 NOTE — PROGRESS NOTE ADULT - PROBLEM SELECTOR PROBLEM 5
Type 2 diabetes mellitus

## 2021-11-19 NOTE — BH CONSULTATION LIAISON PROGRESS NOTE - NSICDXBHPRIMARYDX_PSY_ALL_CORE
Dementia with behavioral disturbance   F03.91  

## 2021-11-19 NOTE — BH CONSULTATION LIAISON PROGRESS NOTE - NSBHPSYCHOLCOGABN_PSY_A_CORE
disoriented to time/disoriented to place/disoriented to situation
disoriented to time/disoriented to place/disoriented to person/disoriented to situation
disoriented to time/disoriented to place/disoriented to situation
disoriented to time/disoriented to place/disoriented to situation

## 2021-11-19 NOTE — BH INPATIENT PSYCHIATRY ASSESSMENT NOTE - NSBHASSESSSUMMFT_PSY_ALL_CORE
Assessment/Plan:  66 y.o. F, as per records is , has been residing at [facility] ([facility]) since 2010, with past psychiatric history of dementia with behavioral disturbance, mood disorder, RODY, possible inpatient psychiatric admission to [facility] this year, has a chronic medical history notable for NPH (w/  shunt), HTN, DMT2, TBI (unspecified), pedal edema presenting from [facility] for physically aggressive behavior and diffuse rash. Records also indicate that [facility] was seeking a [facility] admission and hence this J admission. Psychiatry has been consulted for agitation management.     Pt is disoriented x 1 which per chart appears to be patient's baseline. Pt was doing well on previous medication regimen but having increase episodes of agitation. Pt most likely having increase in aggressive behavior 2/2 to TBI vs dementia. Patient requires inpatient admission for medication optimization and stabilization of agitation. Continuing medications per telepsychiatry handoff - Depakote dosage decreased to 500 mg BID due to concern for downtrending plts.     Plan:  1. Legals: admit on 9.27  2. Safety: routine observation. PRNs: Zyprexa 2.5 mg IM/oral q6h   3. Psychiatry: Depakote 750 mg BID, Klonopin 0.25 mg PM, Zyprexa 2.5 mg at 12pm, 6pm  4. Group, milieu, individual therapy as appropriate.  5. Medical: no acute medical issues. PMH HTN and DM. Per handoff, pt not on any medication for diabetes. Admission labs WNL.  6. Dispo: pending clinical improvement.  Patient continues to require inpatient hospitalization for stabilization and safety.   Assessment/Plan:  66 y.o. F, as per records is , has been residing at [facility] ([facility]) since 2010, with past psychiatric history of dementia with behavioral disturbance, mood disorder, RODY, possible inpatient psychiatric admission to [facility] this year, has a chronic medical history notable for NPH (w/  shunt), HTN, DMT2, TBI (unspecified), pedal edema presenting from [facility] for physically aggressive behavior and diffuse rash. Records also indicate that [facility] was seeking a [facility] admission and hence this J admission. Psychiatry has been consulted for agitation management.     Pt is disoriented x 1 which per chart appears to be patient's baseline. Pt was doing well on previous medication regimen but having increase episodes of agitation. Pt most likely having increase in aggressive behavior 2/2 to TBI vs dementia. Patient requires inpatient admission for medication optimization and stabilization of agitation. Continuing medications per telepsychiatry handoff - Depakote dosage decreased to 750 mg BID due to concern for downtrending plts.     Plan:  1. Legals: admit on 9.27  2. Safety: routine observation. PRNs: Zyprexa 2.5 mg IM/oral q6h   3. Psychiatry: Depakote 750 mg BID, Klonopin 0.25 mg PM, Zyprexa 2.5 mg at 12pm, 6pm  4. Group, milieu, individual therapy as appropriate.  5. Medical: no acute medical issues. PMH HTN and DM. Per handoff, pt not on any medication for diabetes. Admission labs WNL.  6. Dispo: pending clinical improvement.  Patient continues to require inpatient hospitalization for stabilization and safety.

## 2021-11-19 NOTE — BH CONSULTATION LIAISON PROGRESS NOTE - NSBHROSSYSTEMS_PSY_ALL_CORE
Psychiatric
Musculoskeletal.../Psychiatric
Psychiatric

## 2021-11-19 NOTE — BH INPATIENT PSYCHIATRY ASSESSMENT NOTE - NSBHCRANIAL_PSY_ALL_CORE
Recognizes 2 fingers or can read (II)/Normal speech (IX, X, XII)/EOMI (III, IV, VI)/Hearing intact (VIII) Normal speech (IX, X, XII)/Hearing intact (VIII)

## 2021-11-19 NOTE — BH CONSULTATION LIAISON PROGRESS NOTE - NSBHMSEGAIT_PSY_A_CORE
Basal cell carcinoma    Cerebrovascular accident (CVA), unspecified mechanism    Dyslipidemia    Edema, unspecified type    Essential hypertension    Hyponatremia  h/o Hypokelema  Mitral valve insufficiency, unspecified etiology    Renal artery stenosis  possible on doppler  UTI (urinary tract infection)
Normal gait / station

## 2021-11-19 NOTE — PROGRESS NOTE ADULT - PROVIDER SPECIALTY LIST ADULT
Hospitalist
Colorectal Surgery
Hospitalist

## 2021-11-19 NOTE — BH CONSULTATION LIAISON PROGRESS NOTE - NSBHPSYCHOLCOGORIENT_PSY_A_CORE
Not fully oriented...

## 2021-11-19 NOTE — BH CONSULTATION LIAISON PROGRESS NOTE - NSBHCONSULTPRIMARYDISCUSSYES_PSY_A_CORE FT
Dr Parisi-- discussed above increasing dose, discussed plan for [facility] admission. Discussed weekend behaviors. Dr Parisi completed one part of the 2pc. Coordinated care with medicine SW who has initiated bed search at [facility]
DR MARTINS
medicine team this morning, including rn, nm and sw
Dr Conrad
Dr Parisi
medicine team this morning- dr villatoro
medicine team
dr narvaez
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
## 2021-11-20 LAB
COVID-19 NUCLEOCAPSID GAM AB INTERP: POSITIVE
COVID-19 NUCLEOCAPSID TOTAL GAM ANTIBODY RESULT: 36.4 INDEX — HIGH
COVID-19 SPIKE DOMAIN AB INTERP: POSITIVE
COVID-19 SPIKE DOMAIN ANTIBODY RESULT: >250 U/ML — HIGH
SARS-COV-2 IGG+IGM SERPL QL IA: 36.4 INDEX — HIGH
SARS-COV-2 IGG+IGM SERPL QL IA: >250 U/ML — HIGH
SARS-COV-2 IGG+IGM SERPL QL IA: POSITIVE
SARS-COV-2 IGG+IGM SERPL QL IA: POSITIVE

## 2021-11-20 PROCEDURE — 99231 SBSQ HOSP IP/OBS SF/LOW 25: CPT

## 2021-11-20 PROCEDURE — 99222 1ST HOSP IP/OBS MODERATE 55: CPT

## 2021-11-20 RX ORDER — PETROLATUM,WHITE
1 JELLY (GRAM) TOPICAL
Refills: 0 | Status: DISCONTINUED | OUTPATIENT
Start: 2021-11-20 | End: 2021-11-24

## 2021-11-20 RX ADMIN — Medication 25 MILLIGRAM(S): at 08:28

## 2021-11-20 RX ADMIN — Medication 0.25 MILLIGRAM(S): at 20:39

## 2021-11-20 RX ADMIN — OLANZAPINE 2.5 MILLIGRAM(S): 15 TABLET, FILM COATED ORAL at 14:44

## 2021-11-20 RX ADMIN — Medication 3 MILLIGRAM(S): at 20:39

## 2021-11-20 RX ADMIN — OLANZAPINE 2.5 MILLIGRAM(S): 15 TABLET, FILM COATED ORAL at 12:09

## 2021-11-20 RX ADMIN — Medication 25 MILLIGRAM(S): at 20:39

## 2021-11-20 RX ADMIN — DIVALPROEX SODIUM 750 MILLIGRAM(S): 500 TABLET, DELAYED RELEASE ORAL at 20:39

## 2021-11-20 RX ADMIN — OLANZAPINE 2.5 MILLIGRAM(S): 15 TABLET, FILM COATED ORAL at 17:01

## 2021-11-20 RX ADMIN — OLANZAPINE 2.5 MILLIGRAM(S): 15 TABLET, FILM COATED ORAL at 23:04

## 2021-11-20 RX ADMIN — Medication 20 MILLIGRAM(S): at 08:28

## 2021-11-20 RX ADMIN — DIVALPROEX SODIUM 750 MILLIGRAM(S): 500 TABLET, DELAYED RELEASE ORAL at 08:28

## 2021-11-20 NOTE — BH INPATIENT PSYCHIATRY PROGRESS NOTE - CURRENT MEDICATION
MEDICATIONS  (STANDING):  clonazePAM  Tablet 0.25 milliGRAM(s) Oral at bedtime  diVALproex  milliGRAM(s) Oral two times a day  furosemide    Tablet 20 milliGRAM(s) Oral daily  melatonin. 3 milliGRAM(s) Oral at bedtime  metoprolol tartrate 25 milliGRAM(s) Oral two times a day  OLANZapine 2.5 milliGRAM(s) Oral <User Schedule>  senna 2 Tablet(s) Oral at bedtime    MEDICATIONS  (PRN):  acetaminophen     Tablet .. 650 milliGRAM(s) Oral every 6 hours PRN Temp greater or equal to 38C (100.4F), Mild Pain (1 - 3), Moderate Pain (4 - 6)  aluminum hydroxide/magnesium hydroxide/simethicone Suspension 30 milliLiter(s) Oral every 4 hours PRN Dyspepsia  magnesium hydroxide Suspension 30 milliLiter(s) Oral at bedtime PRN constipation  OLANZapine 2.5 milliGRAM(s) Oral every 6 hours PRN severe agitation

## 2021-11-20 NOTE — BH INPATIENT PSYCHIATRY PROGRESS NOTE - NSBHMETABOLIC_PSY_ALL_CORE_FT
BMI: BMI (kg/m2): 31.6 (11-19-21 @ 17:50)  HbA1c: A1C with Estimated Average Glucose Result: 5.4 % (11-04-21 @ 06:58)    Glucose: POCT Blood Glucose.: 109 mg/dL (11-12-21 @ 16:13)    BP: --  Lipid Panel:

## 2021-11-20 NOTE — PSYCHIATRIC REHAB INITIAL EVALUATION - NSBHSIGPRIORMED_PSY_ALL_CORE
Pt's medical history is significant for NPH (w/  shunt), HTN, DMT2, TBI (unspecified), pedal edema/Yes (Specify)
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
## 2021-11-20 NOTE — BH INPATIENT PSYCHIATRY PROGRESS NOTE - NSBHCHARTREVIEWVS_PSY_A_CORE FT
Vital Signs Last 24 Hrs  T(C): 36.4 (11-19-21 @ 17:50), Max: 36.7 (11-19-21 @ 12:21)  T(F): 97.6 (11-19-21 @ 17:50), Max: 98 (11-19-21 @ 12:21)  HR: 66 (11-19-21 @ 12:21) (66 - 66)  BP: 138/68 (11-19-21 @ 12:21) (138/68 - 138/68)  BP(mean): --  RR: 18 (11-19-21 @ 17:50) (18 - 18)  SpO2: 97% (11-19-21 @ 17:50) (97% - 100%)    Orthostatic VS  11-20-21 @ 06:43  Lying BP: --/-- HR: --  Sitting BP: 106/69 HR: 87  Standing BP: --/-- HR: --  Site: --  Mode: --  Orthostatic VS  11-19-21 @ 20:32  Lying BP: --/-- HR: --  Sitting BP: 113/96 HR: 97  Standing BP: --/-- HR: --  Site: upper left arm  Mode: electronic  Orthostatic VS  11-19-21 @ 17:50  Lying BP: --/-- HR: --  Sitting BP: 124/60 HR: 77  Standing BP: --/-- HR: --  Site: --  Mode: --

## 2021-11-21 PROCEDURE — 99231 SBSQ HOSP IP/OBS SF/LOW 25: CPT

## 2021-11-21 RX ADMIN — Medication 25 MILLIGRAM(S): at 20:16

## 2021-11-21 RX ADMIN — Medication 3 MILLIGRAM(S): at 20:16

## 2021-11-21 RX ADMIN — OLANZAPINE 2.5 MILLIGRAM(S): 15 TABLET, FILM COATED ORAL at 16:31

## 2021-11-21 RX ADMIN — OLANZAPINE 2.5 MILLIGRAM(S): 15 TABLET, FILM COATED ORAL at 12:42

## 2021-11-21 RX ADMIN — DIVALPROEX SODIUM 750 MILLIGRAM(S): 500 TABLET, DELAYED RELEASE ORAL at 20:16

## 2021-11-21 RX ADMIN — Medication 1 APPLICATION(S): at 12:51

## 2021-11-21 RX ADMIN — DIVALPROEX SODIUM 750 MILLIGRAM(S): 500 TABLET, DELAYED RELEASE ORAL at 09:19

## 2021-11-21 RX ADMIN — SENNA PLUS 2 TABLET(S): 8.6 TABLET ORAL at 20:16

## 2021-11-21 RX ADMIN — Medication 0.25 MILLIGRAM(S): at 20:16

## 2021-11-21 NOTE — BH INPATIENT PSYCHIATRY PROGRESS NOTE - NSBHCHARTREVIEWVS_PSY_A_CORE FT
Vital Signs Last 24 Hrs  T(C): 36.3 (11-21-21 @ 07:47), Max: 36.3 (11-21-21 @ 07:47)  T(F): 97.4 (11-21-21 @ 07:47), Max: 97.4 (11-21-21 @ 07:47)  HR: 104 (11-21-21 @ 07:47) (104 - 104)  BP: 90/60 (11-21-21 @ 07:47) (90/60 - 90/60)  BP(mean): --  RR: --  SpO2: --    Orthostatic VS  11-20-21 @ 06:43  Lying BP: --/-- HR: --  Sitting BP: 106/69 HR: 87  Standing BP: --/-- HR: --  Site: --  Mode: --  Orthostatic VS  11-19-21 @ 20:32  Lying BP: --/-- HR: --  Sitting BP: 113/96 HR: 97  Standing BP: --/-- HR: --  Site: upper left arm  Mode: electronic  Orthostatic VS  11-19-21 @ 17:50  Lying BP: --/-- HR: --  Sitting BP: 124/60 HR: 77  Standing BP: --/-- HR: --  Site: --  Mode: --

## 2021-11-21 NOTE — BH INPATIENT PSYCHIATRY PROGRESS NOTE - CURRENT MEDICATION
MEDICATIONS  (STANDING):  AQUAPHOR (petrolatum Ointment) 1 Application(s) Topical two times a day  clonazePAM  Tablet 0.25 milliGRAM(s) Oral at bedtime  diVALproex  milliGRAM(s) Oral two times a day  furosemide    Tablet 20 milliGRAM(s) Oral daily  melatonin. 3 milliGRAM(s) Oral at bedtime  metoprolol tartrate 25 milliGRAM(s) Oral two times a day  OLANZapine 2.5 milliGRAM(s) Oral <User Schedule>  senna 2 Tablet(s) Oral at bedtime    MEDICATIONS  (PRN):  acetaminophen     Tablet .. 650 milliGRAM(s) Oral every 6 hours PRN Temp greater or equal to 38C (100.4F), Mild Pain (1 - 3), Moderate Pain (4 - 6)  aluminum hydroxide/magnesium hydroxide/simethicone Suspension 30 milliLiter(s) Oral every 4 hours PRN Dyspepsia  magnesium hydroxide Suspension 30 milliLiter(s) Oral at bedtime PRN constipation  OLANZapine 2.5 milliGRAM(s) Oral every 6 hours PRN severe agitation

## 2021-11-21 NOTE — BH INPATIENT PSYCHIATRY PROGRESS NOTE - NSBHMETABOLIC_PSY_ALL_CORE_FT
BMI: BMI (kg/m2): 31.6 (11-19-21 @ 17:50)  HbA1c: A1C with Estimated Average Glucose Result: 5.4 % (11-04-21 @ 06:58)    Glucose: POCT Blood Glucose.: 109 mg/dL (11-12-21 @ 16:13)    BP: 90/60 (11-21-21 @ 07:47) (90/60 - 90/60)  Lipid Panel:

## 2021-11-21 NOTE — BH INPATIENT PSYCHIATRY PROGRESS NOTE - PRN MEDS
MEDICATIONS  (PRN):  acetaminophen     Tablet .. 650 milliGRAM(s) Oral every 6 hours PRN Temp greater or equal to 38C (100.4F), Mild Pain (1 - 3), Moderate Pain (4 - 6)  aluminum hydroxide/magnesium hydroxide/simethicone Suspension 30 milliLiter(s) Oral every 4 hours PRN Dyspepsia  magnesium hydroxide Suspension 30 milliLiter(s) Oral at bedtime PRN constipation  OLANZapine 2.5 milliGRAM(s) Oral every 6 hours PRN severe agitation

## 2021-11-22 PROCEDURE — 99232 SBSQ HOSP IP/OBS MODERATE 35: CPT

## 2021-11-22 RX ORDER — DIVALPROEX SODIUM 500 MG/1
750 TABLET, DELAYED RELEASE ORAL ONCE
Refills: 0 | Status: COMPLETED | OUTPATIENT
Start: 2021-11-22 | End: 2021-11-22

## 2021-11-22 RX ORDER — OLANZAPINE 15 MG/1
2.5 TABLET, FILM COATED ORAL ONCE
Refills: 0 | Status: DISCONTINUED | OUTPATIENT
Start: 2021-11-22 | End: 2022-01-05

## 2021-11-22 RX ORDER — OLANZAPINE 15 MG/1
2.5 TABLET, FILM COATED ORAL
Refills: 0 | Status: DISCONTINUED | OUTPATIENT
Start: 2021-11-22 | End: 2021-11-24

## 2021-11-22 RX ORDER — DIVALPROEX SODIUM 500 MG/1
750 TABLET, DELAYED RELEASE ORAL
Refills: 0 | Status: DISCONTINUED | OUTPATIENT
Start: 2021-11-22 | End: 2021-11-22

## 2021-11-22 RX ORDER — DIVALPROEX SODIUM 500 MG/1
750 TABLET, DELAYED RELEASE ORAL
Refills: 0 | Status: DISCONTINUED | OUTPATIENT
Start: 2021-11-22 | End: 2021-11-23

## 2021-11-22 RX ADMIN — OLANZAPINE 2.5 MILLIGRAM(S): 15 TABLET, FILM COATED ORAL at 13:09

## 2021-11-22 RX ADMIN — Medication 3 MILLIGRAM(S): at 19:43

## 2021-11-22 RX ADMIN — Medication 25 MILLIGRAM(S): at 10:14

## 2021-11-22 RX ADMIN — Medication 20 MILLIGRAM(S): at 10:13

## 2021-11-22 RX ADMIN — Medication 0.25 MILLIGRAM(S): at 19:42

## 2021-11-22 RX ADMIN — SENNA PLUS 2 TABLET(S): 8.6 TABLET ORAL at 19:41

## 2021-11-22 RX ADMIN — OLANZAPINE 2.5 MILLIGRAM(S): 15 TABLET, FILM COATED ORAL at 16:26

## 2021-11-22 RX ADMIN — Medication 1 APPLICATION(S): at 10:44

## 2021-11-22 RX ADMIN — DIVALPROEX SODIUM 750 MILLIGRAM(S): 500 TABLET, DELAYED RELEASE ORAL at 19:42

## 2021-11-22 RX ADMIN — DIVALPROEX SODIUM 750 MILLIGRAM(S): 500 TABLET, DELAYED RELEASE ORAL at 10:46

## 2021-11-22 NOTE — BH SOCIAL WORK INITIAL PSYCHOSOCIAL EVALUATION - NSBHFINANCE_PSY_ALL_CORE
The pt has Medicare primary, [facility] secondary, and Medicaid [id number] tertiary./Social Security Income (SSI)

## 2021-11-22 NOTE — BH INPATIENT PSYCHIATRY PROGRESS NOTE - PRN MEDS
MEDICATIONS  (PRN):  acetaminophen     Tablet .. 650 milliGRAM(s) Oral every 6 hours PRN Temp greater or equal to 38C (100.4F), Mild Pain (1 - 3), Moderate Pain (4 - 6)  aluminum hydroxide/magnesium hydroxide/simethicone Suspension 30 milliLiter(s) Oral every 4 hours PRN Dyspepsia  magnesium hydroxide Suspension 30 milliLiter(s) Oral at bedtime PRN constipation  OLANZapine 2.5 milliGRAM(s) Oral every 6 hours PRN severe agitation  OLANZapine Injectable 2.5 milliGRAM(s) IntraMuscular once PRN severe agitation

## 2021-11-22 NOTE — BH INPATIENT PSYCHIATRY PROGRESS NOTE - NSBHASSESSSUMMFT_PSY_ALL_CORE
66 y.o. F, as per records is , has been residing at [facility] ([facility]) since 2010, with past psychiatric history of dementia with behavioral disturbance, mood disorder, ROYD, possible inpatient psychiatric admission to [facility] this year, has a chronic medical history notable for NPH (w/  shunt), HTN, DMT2, TBI (unspecified), pedal edema presenting from [facility] for physically aggressive behavior and diffuse rash. Records also indicate that [facility] was seeking a [facility] admission. Pt is AOx1 which per chart appears to be patient's baseline. Pt was doing well on previous medication regimen but having increase episodes of agitation. Pt most likely having increase in aggressive behavior 2/2 to TBI vs dementia. Patient requires inpatient admission for medication optimization and stabilization of agitation.     11/22: No acute events or PRNs required, VSS, med adherent. Endorses AH of mother's voice telling her to take good care of her baby. Denies any safety concerns.    Plan:  1. Legals: admit on 9.27  2. Safety: routine observation. PRNs: Zyprexa 2.5 mg PO/IM q6h   3. Psychiatry: Depakote 750 mg BID, Klonopin 0.25 mg PM, Zyprexa 2.5 mg at 12pm, 6pm  4. Group, milieu, individual therapy as appropriate.  5. Medical: no acute medical issues. Lopressor 25mg daily for HTN, Lasix 20mg daily for edema, Senna 2mg QHS for constipation/ppx  6. Dispo: pending clinical improvement.  Patient continues to require inpatient hospitalization for stabilization and safety. 66 y.o. F, as per records is , has been residing at [facility] ([facility]) since 2010, with past psychiatric history of dementia with behavioral disturbance, mood disorder, RODY, possible inpatient psychiatric admission to [facility] this year, has a chronic medical history notable for NPH (w/  shunt), HTN, DMT2, TBI (unspecified), pedal edema [place] [facility] [place] behavior and diffuse rash. Records also indicate that [facility] was seeking a [facility] admission. Pt is AOx1 which per chart appears to be patient's baseline. Pt was doing well on previous medication regimen but having increase episodes of agitation. Pt most likely having increase in aggressive behavior 2/2 to TBI vs dementia. Patient requires inpatient admission for medication optimization and stabilization of agitation.     11/22: No acute events or PRNs required, VSS, med adherent. Endorses AH of mother's voice telling her to take good care of her baby. Denies any safety concerns.    Plan:  1. Legals: 9.27 status  2. Safety: routine observation. PRNs: Zyprexa 2.5 mg PO/IM q6h   3. Psychiatry: Depakote 750 mg BID, Klonopin 0.25 mg PM, Zyprexa 2.5 mg at 12pm, 6pm  4. Group, milieu, individual therapy as appropriate.  5. Medical: no acute medical issues. Lopressor 25mg daily for HTN, Lasix 20mg daily for edema, Senna 2mg QHS for constipation/ppx  6. Dispo: pending clinical improvement.  Patient continues to require inpatient hospitalization for stabilization and safety. 66 y.o. F, as per records is , has been residing at [facility] ([facility]) since 2010, with past psychiatric history of dementia with behavioral disturbance, mood disorder, RODY, possible inpatient psychiatric admission to [facility] this year, has a chronic medical history notable for NPH (w/  shunt), HTN, DMT2, TBI (unspecified), pedal edema presenting from [facility] for physically aggressive behavior and diffuse rash. Records also indicate that [facility] was seeking a [facility] admission. Pt is AOx1 which per chart appears to be patient's baseline. Pt was doing well on previous medication regimen but having increase episodes of agitation. Pt most likely having increase in aggressive behavior 2/2 to TBI vs dementia. Patient requires inpatient admission for medication optimization and stabilization of agitation.     11/22: No acute events overnight or PRNs required, VSS, med adherent. Endorses AH of mother's voice telling her to take good care of her baby. Denies any safety concerns. Patient became agitated at 1pm after trying to follow repair men off unit, denies wanting to leave the unit. Incident likely related to disinhibition rather than psychosis, but further monitoring and collateral necessary to better understand the patient and hopefully delineate a pattern.     Plan:  1. Legals: 9.27 status  2. Safety: routine observation. PRNs: Zyprexa 2.5 mg PO/IM q6h   3. Psychiatry: Depakote 750 mg BID, Klonopin 0.25 mg PM, Zyprexa 2.5 mg BID (will reschedule to 9am + 5pm in light of today's incident mid-day)  4. Group, milieu, individual therapy as appropriate.  5. Medical: no acute medical issues. Lopressor 25mg daily for HTN, Lasix 20mg daily for edema, Senna 2mg QHS for constipation/ppx  6. Dispo: pending clinical improvement.  Patient continues to require inpatient hospitalization for stabilization and safety. 66 y.o. F, as per records is , has been residing at [facility] ([facility]) since 2010, with past psychiatric history of dementia with behavioral disturbance, mood disorder, RODY, possible inpatient psychiatric admission to [facility] this year, has a chronic medical history notable for NPH (w/  shunt), HTN, DMT2, TBI (unspecified), pedal edema presenting from [facility] for physically aggressive behavior and diffuse rash. Records also indicate that [facility] was seeking a [facility] admission. Pt is AOx1 which per chart appears to be patient's baseline. Pt was doing well on previous medication regimen but having increase episodes of agitation. Pt most likely having increase in aggressive behavior 2/2 to TBI vs dementia. Patient requires inpatient admission for medication optimization and stabilization of agitation.     11/22: No acute events overnight or PRNs required, VSS, med adherent. Endorses AH of mother's voice telling her to take good care of her baby. Denies any safety concerns. Patient became agitated at 1pm after trying to follow repair men off unit, denies wanting to leave the unit. Incident likely related to disinhibition rather than psychosis, but further monitoring and collateral necessary to better understand the patient and hopefully delineate a pattern.     Plan:  1. Legals: 9.27 status  2. Safety: routine observation. PRNs: Zyprexa 2.5 mg PO/IM q6h   3. Psychiatry: Depakote 750 mg BID (will check level and ammonia tomorrow given tremor), Klonopin 0.25 mg PM, Zyprexa 2.5 mg BID (will reschedule to 9am + 5pm in light of today's incident mid-day)  4. Group, milieu, individual therapy as appropriate.  5. Medical: no acute medical issues. Lopressor 25mg daily for HTN, Lasix 20mg daily for edema, Senna 2mg QHS for constipation/ppx  6. Dispo: pending clinical improvement.  Patient continues to require inpatient hospitalization for stabilization and safety.
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
## 2021-11-22 NOTE — PHYSICAL THERAPY INITIAL EVALUATION ADULT - DISCHARGE DISPOSITION, PT EVAL
Recommend patient ambulate on unit without assistive device with CLOSE supervision/extended care facility/rehabilitation facility

## 2021-11-22 NOTE — BH SOCIAL WORK INITIAL PSYCHOSOCIAL EVALUATION - NSBHHOUSERETURN_PSY_ALL_CORE
Pt's  hopes the pt will stabilize and be able to be return to [facility] for her long term care./Unknown
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
## 2021-11-22 NOTE — BH INPATIENT PSYCHIATRY PROGRESS NOTE - NSBHCHARTREVIEWVS_PSY_A_CORE FT
Vital Signs Last 24 Hrs  T(C): --  T(F): --  HR: --  BP: --  BP(mean): --  RR: 17 (11-21-21 @ 20:31) (17 - 17)  SpO2: --    Orthostatic VS  11-22-21 @ 08:29  Sitting BP: 116/60 HR: 65    Orthostatic VS  11-21-21 @ 20:31  Sitting BP: 141/97 HR: 99     Vital Signs Last 24 Hrs  T(C): --  T(F): --  HR: --  BP: --  BP(mean): --  RR: 17 (11-21-21 @ 20:31) (17 - 17)  SpO2: --    Orthostatic VS  11-22-21 @ 08:29  Lying BP: --/-- HR: --  Sitting BP: 116/60 HR: 65  Standing BP: --/-- HR: --  Site: --  Mode: --  Orthostatic VS  11-21-21 @ 20:31  Lying BP: --/-- HR: --  Sitting BP: 141/97 HR: 99  Standing BP: --/-- HR: --  Site: --  Mode: --

## 2021-11-22 NOTE — BH INPATIENT PSYCHIATRY PROGRESS NOTE - CURRENT MEDICATION
MEDICATIONS  (STANDING):  AQUAPHOR (petrolatum Ointment) 1 Application(s) Topical two times a day  clonazePAM  Tablet 0.25 milliGRAM(s) Oral at bedtime  diVALproex  milliGRAM(s) Oral two times a day  furosemide    Tablet 20 milliGRAM(s) Oral daily  melatonin. 3 milliGRAM(s) Oral at bedtime  metoprolol tartrate 25 milliGRAM(s) Oral two times a day  OLANZapine 2.5 milliGRAM(s) Oral <User Schedule>  senna 2 Tablet(s) Oral at bedtime    MEDICATIONS  (PRN):  acetaminophen     Tablet .. 650 milliGRAM(s) Oral every 6 hours PRN Temp greater or equal to 38C (100.4F), Mild Pain (1 - 3), Moderate Pain (4 - 6)  aluminum hydroxide/magnesium hydroxide/simethicone Suspension 30 milliLiter(s) Oral every 4 hours PRN Dyspepsia  magnesium hydroxide Suspension 30 milliLiter(s) Oral at bedtime PRN constipation  OLANZapine 2.5 milliGRAM(s) Oral every 6 hours PRN severe agitation  OLANZapine Injectable 2.5 milliGRAM(s) IntraMuscular once PRN severe agitation

## 2021-11-22 NOTE — BH INPATIENT PSYCHIATRY PROGRESS NOTE - NSBHFUPINTERVALHXFT_PSY_A_CORE
The patient was seen for follow up. There were no acute events overnight and no PRNs required. The patient slept well in the day room per sleep logs. This morning, the patient is AOx1 and mostly unintelligible (c/w baseline) however calm and cooperative with team. She reports feeling safe on the unit, denies paranoia or receiving messages from other patients/staff. She denies SI/HI but endorses hearing her mother's voice telling her to "care for the baby." She denies any dizziness, stiffness, or changes in bowel/bladder function. She denies any pain or pruritus.  The patient was seen for follow up. There were no acute events overnight and no PRNs required. The patient slept well in the day room per sleep logs. This morning, the patient is AOx1 and mostly unintelligible (c/w baseline) however calm and cooperative with team. She reports feeling safe on the unit, denies paranoia or receiving messages from other patients/staff. She denies SI/HI but endorses hearing her mother's voice telling her to "care for the baby." She denies any dizziness, stiffness, or changes in bowel/bladder function. She denies any pain or pruritus.     At 1pm, the patient became agitated after trying to follow two repair men off the unit and being redirected. She accepted her standing Zyprexa and was eventually able to deescalate. On interview, the patient is pleasantly sitting in the day room eating a sandwich, acknowledges incident but unable to elaborate further. The patient was seen for follow up. There were no acute events overnight and no PRNs required. The patient slept well in the day room per sleep logs. This morning, the patient is AOx1 and mostly unintelligible (c/w baseline) however calm and cooperative with team. She reports feeling safe on the unit, denies paranoia or receiving messages from other patients/staff. She denies SI/HI but endorses hearing her mother's voice telling her to "care for the baby." She denies any dizziness, stiffness, or changes in bowel/bladder function. She denies any pain or pruritus.     At 1pm, the patient became agitated after trying to follow two repair men off the unit and being redirected. She accepted her standing Zyprexa and was eventually able to deescalate. On interview, the patient is pleasantly sitting in the day room eating a sandwich, acknowledges incident but unable to elaborate further.    Spoke with the patient's  Don Dreyer ([phone]) who confirmed the story about the patient's TBI in 2008 for which she underwent extensive neurosurgery and rehab, various nursing homes, ending up at current placement which has been by far the best set up. The patient's  wants her to return there when she is ready for discharge. He isn't fully aware of her behavioral pattern but explains that there have been more frequent incidents over the past month. He also wanted to make the team aware that the patient responds particularly well to Paul characters (especially Claudia Mouse) and the [place].     Called the nursing home at [phone] for professional collateral. The patient's attending is Shanti Jc. Was given extension [phone] although there is no response and unable to leave voicemail. Will continue calling in order to understand behavioral pattern and prior med trials. The patient was seen for follow up. There were no acute events overnight and no PRNs required. The patient slept well in the day room per sleep logs. This morning, the patient is AOx1 and mostly unintelligible (c/w baseline) however calm and cooperative with team. She reports feeling safe on the unit, denies paranoia or receiving messages from other patients/staff. She denies SI/HI but endorses hearing her mother's voice telling her to "care for the baby." She denies any dizziness, stiffness, or changes in bowel/bladder function. She denies any pain or pruritus.     At 1pm, the patient became agitated after trying to follow two repair men off the unit and being redirected. She accepted her standing Zyprexa and was eventually able to deescalate. On interview, the patient is pleasantly sitting in the day room eating a sandwich, acknowledges incident but unable to elaborate further.    Spoke with the patient's  Don Dreyer ([phone]) who confirmed the story about the patient's TBI in 2008 for which she underwent extensive neurosurgery and rehab, various nursing homes, ending up at current placement which has been by far the best set up. The patient's  wants her to return there when she is ready for discharge. He isn't fully aware of her behavioral pattern but explains that there have been more frequent incidents over the past month. He also wanted to make the team aware that the patient responds particularly well to Paul characters (especially Claudia Mouse) and the [place].     Called the nursing home at [phone] for professional collateral. The patient's attending is Shanti Jc. Was told the best way to reach her is by calling the  and asking them to page her. Called several times, each time she was unavailable. Was told she will be around tomorrow all day until 3:30pm and, will try again accordingly.

## 2021-11-23 PROCEDURE — 99232 SBSQ HOSP IP/OBS MODERATE 35: CPT

## 2021-11-23 RX ORDER — DIVALPROEX SODIUM 500 MG/1
750 TABLET, DELAYED RELEASE ORAL
Refills: 0 | Status: DISCONTINUED | OUTPATIENT
Start: 2021-11-23 | End: 2022-05-18

## 2021-11-23 RX ORDER — DIVALPROEX SODIUM 500 MG/1
750 TABLET, DELAYED RELEASE ORAL ONCE
Refills: 0 | Status: COMPLETED | OUTPATIENT
Start: 2021-11-23 | End: 2021-11-23

## 2021-11-23 RX ADMIN — Medication 20 MILLIGRAM(S): at 09:10

## 2021-11-23 RX ADMIN — Medication 0.25 MILLIGRAM(S): at 20:16

## 2021-11-23 RX ADMIN — Medication 3 MILLIGRAM(S): at 20:16

## 2021-11-23 RX ADMIN — SENNA PLUS 2 TABLET(S): 8.6 TABLET ORAL at 20:16

## 2021-11-23 RX ADMIN — OLANZAPINE 2.5 MILLIGRAM(S): 15 TABLET, FILM COATED ORAL at 18:39

## 2021-11-23 RX ADMIN — DIVALPROEX SODIUM 750 MILLIGRAM(S): 500 TABLET, DELAYED RELEASE ORAL at 09:42

## 2021-11-23 RX ADMIN — DIVALPROEX SODIUM 750 MILLIGRAM(S): 500 TABLET, DELAYED RELEASE ORAL at 20:16

## 2021-11-23 RX ADMIN — OLANZAPINE 2.5 MILLIGRAM(S): 15 TABLET, FILM COATED ORAL at 09:10

## 2021-11-23 RX ADMIN — OLANZAPINE 2.5 MILLIGRAM(S): 15 TABLET, FILM COATED ORAL at 17:26

## 2021-11-23 RX ADMIN — Medication 25 MILLIGRAM(S): at 20:17

## 2021-11-23 RX ADMIN — Medication 25 MILLIGRAM(S): at 09:11

## 2021-11-23 NOTE — BH INPATIENT PSYCHIATRY PROGRESS NOTE - NSBHCHARTREVIEWVS_PSY_A_CORE FT
Vital Signs Last 24 Hrs  T(C): 36.4 (11-23-21 @ 08:06), Max: 36.4 (11-23-21 @ 08:06)  T(F): 97.5 (11-23-21 @ 08:06), Max: 97.5 (11-23-21 @ 08:06)  HR: --  BP: --  BP(mean): --  RR: --  SpO2: --    Orthostatic VS  11-23-21 @ 08:06  Lying BP: --/-- HR: --  Sitting BP: 135/83 HR: 82  Standing BP: 122/89 HR: 112  Site: --  Mode: --  Orthostatic VS  11-22-21 @ 20:49  Lying BP: --/-- HR: --  Sitting BP: 119/61 HR: 88  Standing BP: --/-- HR: --  Site: --  Mode: --  Orthostatic VS  11-22-21 @ 08:29  Lying BP: --/-- HR: --  Sitting BP: 116/60 HR: 65  Standing BP: --/-- HR: --  Site: --  Mode: --  Orthostatic VS  11-21-21 @ 20:31  Lying BP: --/-- HR: --  Sitting BP: 141/97 HR: 99  Standing BP: --/-- HR: --  Site: --  Mode: --   Vital Signs Last 24 Hrs  T(C): 36.4 (11-23-21 @ 08:06), Max: 36.4 (11-23-21 @ 08:06)  T(F): 97.5 (11-23-21 @ 08:06), Max: 97.5 (11-23-21 @ 08:06)  HR: --  BP: --  BP(mean): --  RR: --  SpO2: --    Orthostatic VS  11-23-21 @ 20:18  Lying BP: --/-- HR: --  Sitting BP: 138/69 HR: 86  Standing BP: --/-- HR: --  Site: --  Mode: --  Orthostatic VS  11-23-21 @ 08:06  Lying BP: --/-- HR: --  Sitting BP: 135/83 HR: 82  Standing BP: 122/89 HR: 112  Site: --  Mode: --  Orthostatic VS  11-22-21 @ 20:49  Lying BP: --/-- HR: --  Sitting BP: 119/61 HR: 88  Standing BP: --/-- HR: --  Site: --  Mode: --  Orthostatic VS  11-22-21 @ 08:29  Lying BP: --/-- HR: --  Sitting BP: 116/60 HR: 65  Standing BP: --/-- HR: --  Site: --  Mode: --
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
## 2021-11-23 NOTE — BH INPATIENT PSYCHIATRY PROGRESS NOTE - CURRENT MEDICATION
MEDICATIONS  (STANDING):  AQUAPHOR (petrolatum Ointment) 1 Application(s) Topical two times a day  clonazePAM  Tablet 0.25 milliGRAM(s) Oral at bedtime  diVALproex Sprinkle 750 milliGRAM(s) Oral two times a day  furosemide    Tablet 20 milliGRAM(s) Oral daily  melatonin. 3 milliGRAM(s) Oral at bedtime  metoprolol tartrate 25 milliGRAM(s) Oral two times a day  OLANZapine 2.5 milliGRAM(s) Oral <User Schedule>  senna 2 Tablet(s) Oral at bedtime    MEDICATIONS  (PRN):  acetaminophen     Tablet .. 650 milliGRAM(s) Oral every 6 hours PRN Temp greater or equal to 38C (100.4F), Mild Pain (1 - 3), Moderate Pain (4 - 6)  aluminum hydroxide/magnesium hydroxide/simethicone Suspension 30 milliLiter(s) Oral every 4 hours PRN Dyspepsia  magnesium hydroxide Suspension 30 milliLiter(s) Oral at bedtime PRN constipation  OLANZapine 2.5 milliGRAM(s) Oral every 6 hours PRN severe agitation  OLANZapine Injectable 2.5 milliGRAM(s) IntraMuscular once PRN severe agitation

## 2021-11-23 NOTE — DIETITIAN INITIAL EVALUATION ADULT. - OTHER INFO
Pt resides in LTC. Admitted to [facility] for medication optimization and stabilization of agitation. PMH includes NPH (w/ shunt), HTN, Type 2 diabetes, TBI, Pedal edema. Pt reports good appetite/po intake at present. No GI distress noted. Unable to obtain further information 2/2 Pt disorganized. Per [facility], Pt is eating well.

## 2021-11-23 NOTE — BH INPATIENT PSYCHIATRY PROGRESS NOTE - NSBHASSESSSUMMFT_PSY_ALL_CORE
66 y.o. F, as per records is , has been residing at [facility] ([facility]) since 2010, with past psychiatric history of dementia with behavioral disturbance, mood disorder, RODY, possible inpatient psychiatric admission to [facility] this year, has a chronic medical history notable for NPH (w/  shunt), HTN, DMT2, TBI (unspecified), pedal edema presenting from [facility] for physically aggressive behavior and diffuse rash. Records also indicate that [facility] was seeking a [facility] admission. Pt is AOx1 which per chart appears to be patient's baseline. Pt was doing well on previous medication regimen but having increase episodes of agitation. Pt most likely having increase in aggressive behavior 2/2 to TBI vs dementia. Patient requires inpatient admission for medication optimization and stabilization of agitation.     11/22: No acute events overnight or PRNs required, VSS, med adherent. Endorses AH of mother's voice telling her to take good care of her baby. Denies any safety concerns. Patient became agitated at 1pm after trying to follow repair men off unit, denies wanting to leave the unit. Incident likely related to disinhibition rather than psychosis, but further monitoring and collateral necessary to better understand the patient and hopefully delineate a pattern.   11/23: No acute events overnight or PRNs required, VSS, med adherent. Has been in more appropriate behavioral control following change in Zyprexa schedule. Was unable to take Depakote overnight due to difficulty swallowing the pill. Switched to sprinkles this morning which patient took without issue. Will continue monitoring on current regimen given change in formulation. Ammonia pending given patient's tremor. Will check VPA level when patient consistently taking Depakote sprinkles.     Plan:  1. Legals: 9.27 status, *DNR/DNI* [MOLST in chart]  2. Safety: routine observation. PRNs: Zyprexa 2.5 mg PO/IM q6h   3. Psychiatry: Depakote sprinkles 750 mg BID, Klonopin 0.25 mg PM, Zyprexa 2.5 mg BID (9am + 5pm)  4. Group, milieu, individual therapy as appropriate.  5. Medical: no acute medical issues. Lopressor 25mg daily for HTN, Lasix 20mg daily for edema, Senna 2mg QHS for constipation/ppx  6. Dispo: pending clinical improvement.  Patient continues to require inpatient hospitalization for stabilization and safety.

## 2021-11-23 NOTE — DIETITIAN INITIAL EVALUATION ADULT. - PERTINENT MEDS FT
MEDICATIONS  (STANDING):  furosemide    Tablet 20 milliGRAM(s) Oral daily  metoprolol tartrate 25 milliGRAM(s) Oral two times a day  OLANZapine 2.5 milliGRAM(s) Oral <User Schedule>  senna 2 Tablet(s) Oral at bedtime

## 2021-11-24 PROCEDURE — 93010 ELECTROCARDIOGRAM REPORT: CPT

## 2021-11-24 PROCEDURE — 99232 SBSQ HOSP IP/OBS MODERATE 35: CPT

## 2021-11-24 RX ORDER — OLANZAPINE 15 MG/1
2.5 TABLET, FILM COATED ORAL
Refills: 0 | Status: DISCONTINUED | OUTPATIENT
Start: 2021-11-24 | End: 2021-11-24

## 2021-11-24 RX ORDER — OLANZAPINE 15 MG/1
2.5 TABLET, FILM COATED ORAL
Refills: 0 | Status: COMPLETED | OUTPATIENT
Start: 2021-11-24 | End: 2021-11-24

## 2021-11-24 RX ORDER — OLANZAPINE 15 MG/1
5 TABLET, FILM COATED ORAL
Refills: 0 | Status: DISCONTINUED | OUTPATIENT
Start: 2021-11-24 | End: 2021-11-24

## 2021-11-24 RX ORDER — OLANZAPINE 15 MG/1
2.5 TABLET, FILM COATED ORAL
Refills: 0 | Status: DISCONTINUED | OUTPATIENT
Start: 2021-11-24 | End: 2021-12-06

## 2021-11-24 RX ORDER — POLYETHYLENE GLYCOL 3350 17 G/17G
8.5 POWDER, FOR SOLUTION ORAL DAILY
Refills: 0 | Status: DISCONTINUED | OUTPATIENT
Start: 2021-11-24 | End: 2021-12-02

## 2021-11-24 RX ADMIN — SENNA PLUS 2 TABLET(S): 8.6 TABLET ORAL at 21:36

## 2021-11-24 RX ADMIN — OLANZAPINE 2.5 MILLIGRAM(S): 15 TABLET, FILM COATED ORAL at 09:50

## 2021-11-24 RX ADMIN — DIVALPROEX SODIUM 750 MILLIGRAM(S): 500 TABLET, DELAYED RELEASE ORAL at 09:50

## 2021-11-24 RX ADMIN — OLANZAPINE 2.5 MILLIGRAM(S): 15 TABLET, FILM COATED ORAL at 13:36

## 2021-11-24 RX ADMIN — OLANZAPINE 2.5 MILLIGRAM(S): 15 TABLET, FILM COATED ORAL at 15:59

## 2021-11-24 RX ADMIN — Medication 3 MILLIGRAM(S): at 21:36

## 2021-11-24 RX ADMIN — OLANZAPINE 2.5 MILLIGRAM(S): 15 TABLET, FILM COATED ORAL at 14:00

## 2021-11-24 RX ADMIN — Medication 0.25 MILLIGRAM(S): at 21:35

## 2021-11-24 RX ADMIN — OLANZAPINE 2.5 MILLIGRAM(S): 15 TABLET, FILM COATED ORAL at 22:39

## 2021-11-24 RX ADMIN — Medication 25 MILLIGRAM(S): at 21:35

## 2021-11-24 RX ADMIN — DIVALPROEX SODIUM 750 MILLIGRAM(S): 500 TABLET, DELAYED RELEASE ORAL at 21:35

## 2021-11-24 RX ADMIN — Medication 1 APPLICATION(S): at 21:36

## 2021-11-24 NOTE — PROGRESS NOTE ADULT - ASSESSMENT
#Dementia w/ behavioral disturbances  -No agitation.  -Further optimization as per psych.    #Rash  -Truncal and extremities.   -On follow up exam -- rashes seem to have recurred since DC-ing steroid.  -Would restart steroid-- clotrimazole BID.  -Also suggested to staff to avoid tight fitting clothes - pants were noted to be particularly tight. Should wear loose fitting clothes to keep skin clean and dry.   -Monitor response to therapy.    #Pedal edema  -Could be 2' venous insufficiency. Foot is warm and distal pulses palpable.  -Keep leg elevated at this time.     #NPH s/p  shunt  -Stable. Imaging showed no hydro. Recent shunt series was normal.  -Cont supportive measures.    #DM2  -A1c normal.   -Change diet to carb control.  -No need for FS/insulin coverage  while at [facility]. Cont same management.     #Vaginal bleed  -Seen by GYN @ [facility]. Further follow up as OP.   -Monitor for now. Hb stable. If any worsening bleeding, please notify us.      We will continue to follow. please call with any questions.

## 2021-11-24 NOTE — BH INPATIENT PSYCHIATRY PROGRESS NOTE - CURRENT MEDICATION
MEDICATIONS  (STANDING):  AQUAPHOR (petrolatum Ointment) 1 Application(s) Topical two times a day  clonazePAM  Tablet 0.25 milliGRAM(s) Oral at bedtime  diVALproex Sprinkle 750 milliGRAM(s) Oral two times a day  furosemide    Tablet 20 milliGRAM(s) Oral daily  melatonin. 3 milliGRAM(s) Oral at bedtime  metoprolol tartrate 25 milliGRAM(s) Oral two times a day  OLANZapine 2.5 milliGRAM(s) Oral <User Schedule>  senna 2 Tablet(s) Oral at bedtime    MEDICATIONS  (PRN):  acetaminophen     Tablet .. 650 milliGRAM(s) Oral every 6 hours PRN Temp greater or equal to 38C (100.4F), Mild Pain (1 - 3), Moderate Pain (4 - 6)  aluminum hydroxide/magnesium hydroxide/simethicone Suspension 30 milliLiter(s) Oral every 4 hours PRN Dyspepsia  magnesium hydroxide Suspension 30 milliLiter(s) Oral at bedtime PRN constipation  OLANZapine 2.5 milliGRAM(s) Oral every 6 hours PRN severe agitation  OLANZapine Injectable 2.5 milliGRAM(s) IntraMuscular once PRN severe agitation   MEDICATIONS  (STANDING):  AQUAPHOR (petrolatum Ointment) 1 Application(s) Topical two times a day  clonazePAM  Tablet 0.25 milliGRAM(s) Oral at bedtime  diVALproex Sprinkle 750 milliGRAM(s) Oral two times a day  furosemide    Tablet 20 milliGRAM(s) Oral daily  melatonin. 3 milliGRAM(s) Oral at bedtime  metoprolol tartrate 25 milliGRAM(s) Oral two times a day  OLANZapine 2.5 milliGRAM(s) Oral <User Schedule>  OLANZapine Disintegrating Tablet 5 milliGRAM(s) Oral <User Schedule>  polyethylene glycol 3350 8.5 Gram(s) Oral daily  senna 2 Tablet(s) Oral at bedtime    MEDICATIONS  (PRN):  acetaminophen     Tablet .. 650 milliGRAM(s) Oral every 6 hours PRN Temp greater or equal to 38C (100.4F), Mild Pain (1 - 3), Moderate Pain (4 - 6)  aluminum hydroxide/magnesium hydroxide/simethicone Suspension 30 milliLiter(s) Oral every 4 hours PRN Dyspepsia  magnesium hydroxide Suspension 30 milliLiter(s) Oral at bedtime PRN constipation  OLANZapine 2.5 milliGRAM(s) Oral every 6 hours PRN severe agitation  OLANZapine Injectable 2.5 milliGRAM(s) IntraMuscular once PRN severe agitation   MEDICATIONS  (STANDING):  clonazePAM  Tablet 0.25 milliGRAM(s) Oral at bedtime  clotrimazole 1% Cream 1 Application(s) Topical two times a day  diVALproex Sprinkle 750 milliGRAM(s) Oral two times a day  furosemide    Tablet 20 milliGRAM(s) Oral daily  melatonin. 3 milliGRAM(s) Oral at bedtime  metoprolol tartrate 25 milliGRAM(s) Oral two times a day  OLANZapine 2.5 milliGRAM(s) Oral <User Schedule>  polyethylene glycol 3350 8.5 Gram(s) Oral daily  senna 2 Tablet(s) Oral at bedtime    MEDICATIONS  (PRN):  acetaminophen     Tablet .. 650 milliGRAM(s) Oral every 6 hours PRN Temp greater or equal to 38C (100.4F), Mild Pain (1 - 3), Moderate Pain (4 - 6)  aluminum hydroxide/magnesium hydroxide/simethicone Suspension 30 milliLiter(s) Oral every 4 hours PRN Dyspepsia  magnesium hydroxide Suspension 30 milliLiter(s) Oral at bedtime PRN constipation  OLANZapine 2.5 milliGRAM(s) Oral every 6 hours PRN severe agitation  OLANZapine Injectable 2.5 milliGRAM(s) IntraMuscular once PRN severe agitation
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
## 2021-11-24 NOTE — BH INPATIENT PSYCHIATRY PROGRESS NOTE - NSBHFUPINTERVALHXFT_PSY_A_CORE
The patient was seen for follow up. Yesterday, the patient required Zyprexa 2.5mg PO at 6:40pm for severe agitation despite having received both standing doses earlier. There were no further events overnight or PRNs required.This morning, the patient continues to be at her cognitive baseline, calm and cooperative with team. She reported feeling safe on the unit, denied paranoia or receiving messages from other patients/staff. She denied SI/HI but endorsed AH/VH of "advisors from [place]" visiting her and discussing her children. She denied any dizziness, stiffness, or changes in bowel/bladder function. She denied any pain or pruritus.

## 2021-11-24 NOTE — BH INPATIENT PSYCHIATRY PROGRESS NOTE - NSBHCHARTREVIEWVS_PSY_A_CORE FT
Vital Signs Last 24 Hrs  T(C): 36.6 (11-24-21 @ 07:53), Max: 36.6 (11-24-21 @ 07:53)  T(F): 97.8 (11-24-21 @ 07:53), Max: 97.8 (11-24-21 @ 07:53)  HR: --  BP: --  BP(mean): --  RR: --  SpO2: --    Orthostatic VS  11-24-21 @ 07:53  Lying BP: --/-- HR: --  Sitting BP: 94/47 HR: 54  Standing BP: --/-- HR: --  Site: upper left arm  Mode: electronic  Orthostatic VS  11-23-21 @ 20:18  Lying BP: --/-- HR: --  Sitting BP: 138/69 HR: 86  Standing BP: --/-- HR: --  Site: --  Mode: --  Orthostatic VS  11-23-21 @ 08:06  Lying BP: --/-- HR: --  Sitting BP: 135/83 HR: 82  Standing BP: 122/89 HR: 112  Site: --  Mode: --  Orthostatic VS  11-22-21 @ 20:49  Lying BP: --/-- HR: --  Sitting BP: 119/61 HR: 88  Standing BP: --/-- HR: --  Site: --  Mode: --

## 2021-11-25 PROCEDURE — 99231 SBSQ HOSP IP/OBS SF/LOW 25: CPT

## 2021-11-25 RX ADMIN — POLYETHYLENE GLYCOL 3350 8.5 GRAM(S): 17 POWDER, FOR SOLUTION ORAL at 08:46

## 2021-11-25 RX ADMIN — Medication 3 MILLIGRAM(S): at 20:47

## 2021-11-25 RX ADMIN — Medication 25 MILLIGRAM(S): at 08:46

## 2021-11-25 RX ADMIN — OLANZAPINE 2.5 MILLIGRAM(S): 15 TABLET, FILM COATED ORAL at 06:28

## 2021-11-25 RX ADMIN — Medication 20 MILLIGRAM(S): at 08:46

## 2021-11-25 RX ADMIN — SENNA PLUS 2 TABLET(S): 8.6 TABLET ORAL at 20:47

## 2021-11-25 RX ADMIN — DIVALPROEX SODIUM 750 MILLIGRAM(S): 500 TABLET, DELAYED RELEASE ORAL at 08:46

## 2021-11-25 RX ADMIN — DIVALPROEX SODIUM 750 MILLIGRAM(S): 500 TABLET, DELAYED RELEASE ORAL at 20:47

## 2021-11-25 RX ADMIN — OLANZAPINE 2.5 MILLIGRAM(S): 15 TABLET, FILM COATED ORAL at 16:42

## 2021-11-25 RX ADMIN — Medication 0.25 MILLIGRAM(S): at 20:47

## 2021-11-25 RX ADMIN — OLANZAPINE 2.5 MILLIGRAM(S): 15 TABLET, FILM COATED ORAL at 11:17

## 2021-11-25 RX ADMIN — Medication 1 APPLICATION(S): at 22:18

## 2021-11-25 RX ADMIN — Medication 25 MILLIGRAM(S): at 21:48

## 2021-11-25 RX ADMIN — OLANZAPINE 2.5 MILLIGRAM(S): 15 TABLET, FILM COATED ORAL at 20:48

## 2021-11-25 NOTE — BH INPATIENT PSYCHIATRY PROGRESS NOTE - MSE UNSTRUCTURED FT
Patient is awake and alert. Affect is bright, superficial, jocular. Laughs and sings. Some hand tremor. Some environmental over-utilization. Poor insight.

## 2021-11-25 NOTE — BH INPATIENT PSYCHIATRY PROGRESS NOTE - NSBHASSESSSUMMFT_PSY_ALL_CORE
66 y.o. F, as per records is , has been residing at [facility] ([facility]) since 2010, with past psychiatric history of dementia with behavioral disturbance, mood disorder, RODY, possible inpatient psychiatric admission to [facility] this year, has a chronic medical history notable for NPH (w/  shunt), HTN, DMT2, TBI (unspecified), pedal edema presenting from [facility] for physically aggressive behavior and diffuse rash. Records also indicate that [facility] was seeking a [facility] admission. Pt is AOx1 which per chart appears to be patient's baseline. Pt was doing well on previous medication regimen but having increase episodes of agitation. Pt most likely having increase in aggressive behavior 2/2 to TBI vs dementia. Patient requires inpatient admission for medication optimization and stabilization of agitation.     11/22: No acute events overnight or PRNs required, VSS, med adherent. Endorses AH of mother's voice telling her to take good care of her baby. Denies any safety concerns. Patient became agitated at 1pm after trying to follow repair men off unit, denies wanting to leave the unit. Incident likely related to disinhibition rather than psychosis, but further monitoring and collateral necessary to better understand the patient and hopefully delineate a pattern.   11/23: No acute events overnight or PRNs required, VSS, med adherent. Has been in more appropriate behavioral control following change in Zyprexa schedule. Was unable to take Depakote overnight due to difficulty swallowing the pill. Switched to sprinkles this morning which patient took without issue. Will continue monitoring on current regimen given change in formulation. Ammonia pending given patient's tremor. Will check VPA level when patient consistently taking Depakote sprinkles.   11/24: Patient required Zyprexa PO PRN yesterday at 6:40pm for agitation, given with good effect. Slept well overnight and adherent with meds. VPA/ammonia deferred given prior adherence issues due to difficulty swallowing. Will continue to titrate Zyprexa and check VPA/ammonia when the patient is taking   reliably.  11/24: Patient remains confused, agitated at times, labile affect, not aggressive, continue olanzapine 2.5 mg

## 2021-11-25 NOTE — BH INPATIENT PSYCHIATRY PROGRESS NOTE - NSBHCHARTREVIEWVS_PSY_A_CORE FT
Vital Signs Last 24 Hrs  T(C): --  T(F): --  HR: --  BP: --  BP(mean): --  RR: 17 (11-24-21 @ 20:27) (17 - 17)  SpO2: --    Orthostatic VS  11-25-21 @ 07:46  Lying BP: 103/50 HR: 60  Sitting BP: --/-- HR: --  Standing BP: --/-- HR: --  Site: upper right arm  Mode: electronic  Orthostatic VS  11-24-21 @ 20:27  Lying BP: --/-- HR: --  Sitting BP: 136/86 HR: 110  Standing BP: --/-- HR: --  Site: --  Mode: --  Orthostatic VS  11-24-21 @ 07:53  Lying BP: --/-- HR: --  Sitting BP: 94/47 HR: 54  Standing BP: --/-- HR: --  Site: upper left arm  Mode: electronic  Orthostatic VS  11-23-21 @ 20:18  Lying BP: --/-- HR: --  Sitting BP: 138/69 HR: 86  Standing BP: --/-- HR: --  Site: --  Mode: --

## 2021-11-25 NOTE — BH INPATIENT PSYCHIATRY PROGRESS NOTE - CURRENT MEDICATION
MEDICATIONS  (STANDING):  clonazePAM  Tablet 0.25 milliGRAM(s) Oral at bedtime  clotrimazole 1% Cream 1 Application(s) Topical two times a day  diVALproex Sprinkle 750 milliGRAM(s) Oral two times a day  furosemide    Tablet 20 milliGRAM(s) Oral daily  melatonin. 3 milliGRAM(s) Oral at bedtime  metoprolol tartrate 25 milliGRAM(s) Oral two times a day  OLANZapine 2.5 milliGRAM(s) Oral <User Schedule>  polyethylene glycol 3350 8.5 Gram(s) Oral daily  senna 2 Tablet(s) Oral at bedtime    MEDICATIONS  (PRN):  acetaminophen     Tablet .. 650 milliGRAM(s) Oral every 6 hours PRN Temp greater or equal to 38C (100.4F), Mild Pain (1 - 3), Moderate Pain (4 - 6)  aluminum hydroxide/magnesium hydroxide/simethicone Suspension 30 milliLiter(s) Oral every 4 hours PRN Dyspepsia  magnesium hydroxide Suspension 30 milliLiter(s) Oral at bedtime PRN constipation  OLANZapine 2.5 milliGRAM(s) Oral every 6 hours PRN severe agitation  OLANZapine Injectable 2.5 milliGRAM(s) IntraMuscular once PRN severe agitation

## 2021-11-26 LAB — VALPROATE SERPL-MCNC: 68.3 UG/ML — SIGNIFICANT CHANGE UP (ref 50–100)

## 2021-11-26 PROCEDURE — 99232 SBSQ HOSP IP/OBS MODERATE 35: CPT

## 2021-11-26 RX ADMIN — Medication 3 MILLIGRAM(S): at 20:24

## 2021-11-26 RX ADMIN — SENNA PLUS 2 TABLET(S): 8.6 TABLET ORAL at 20:24

## 2021-11-26 RX ADMIN — OLANZAPINE 2.5 MILLIGRAM(S): 15 TABLET, FILM COATED ORAL at 12:01

## 2021-11-26 RX ADMIN — DIVALPROEX SODIUM 750 MILLIGRAM(S): 500 TABLET, DELAYED RELEASE ORAL at 12:02

## 2021-11-26 RX ADMIN — POLYETHYLENE GLYCOL 3350 8.5 GRAM(S): 17 POWDER, FOR SOLUTION ORAL at 09:16

## 2021-11-26 RX ADMIN — Medication 20 MILLIGRAM(S): at 09:16

## 2021-11-26 RX ADMIN — OLANZAPINE 2.5 MILLIGRAM(S): 15 TABLET, FILM COATED ORAL at 06:50

## 2021-11-26 RX ADMIN — Medication 1 APPLICATION(S): at 09:21

## 2021-11-26 RX ADMIN — Medication 1 APPLICATION(S): at 20:56

## 2021-11-26 RX ADMIN — OLANZAPINE 2.5 MILLIGRAM(S): 15 TABLET, FILM COATED ORAL at 16:38

## 2021-11-26 RX ADMIN — Medication 25 MILLIGRAM(S): at 20:24

## 2021-11-26 RX ADMIN — OLANZAPINE 2.5 MILLIGRAM(S): 15 TABLET, FILM COATED ORAL at 22:41

## 2021-11-26 RX ADMIN — Medication 25 MILLIGRAM(S): at 09:16

## 2021-11-26 RX ADMIN — DIVALPROEX SODIUM 750 MILLIGRAM(S): 500 TABLET, DELAYED RELEASE ORAL at 20:24

## 2021-11-26 NOTE — BH INPATIENT PSYCHIATRY PROGRESS NOTE - NSBHFUPINTERVALHXFT_PSY_A_CORE
The patient was seen for follow up. There were no acute events overnight or PRN medications required. This morning, the patient continues to be at her cognitive baseline, calm and cooperative with team. She reported feeling safe on the unit, denied paranoia or receiving messages from other patients/staff. She denied SI, HI, and AVH. She denied any dizziness, stiffness, or changes in bowel/bladder function. She denied any pain or pruritus. We discussed the need for blood work given her medication titration and the patient agreed to be cooperative with staff.

## 2021-11-26 NOTE — BH INPATIENT PSYCHIATRY PROGRESS NOTE - NSBHCHARTREVIEWVS_PSY_A_CORE FT
Vital Signs Last 24 Hrs  T(C): 36.6 (11-26-21 @ 06:41), Max: 36.6 (11-26-21 @ 06:41)  T(F): 97.8 (11-26-21 @ 06:41), Max: 97.8 (11-26-21 @ 06:41)  HR: --  BP: --  BP(mean): --  RR: --  SpO2: --    Orthostatic VS  11-26-21 @ 06:41  Lying BP: --/-- HR: --  Sitting BP: 118/67 HR: 54  Standing BP: --/-- HR: --  Site: --  Mode: --  Orthostatic VS  11-25-21 @ 21:25  Lying BP: --/-- HR: --  Sitting BP: 118/55 HR: 78  Standing BP: --/-- HR: --  Site: upper left arm  Mode: electronic  Orthostatic VS  11-25-21 @ 07:46  Lying BP: 103/50 HR: 60  Sitting BP: --/-- HR: --  Standing BP: --/-- HR: --  Site: upper right arm  Mode: electronic  Orthostatic VS  11-24-21 @ 20:27  Lying BP: --/-- HR: --  Sitting BP: 136/86 HR: 110  Standing BP: --/-- HR: --  Site: --  Mode: --   Vital Signs Last 24 Hrs  T(C): 36.6 (11-26-21 @ 06:41), Max: 36.6 (11-26-21 @ 06:41)  T(F): 97.8 (11-26-21 @ 06:41), Max: 97.8 (11-26-21 @ 06:41)  HR: 63 (11-26-21 @ 12:05) (63 - 63)  BP: --  BP(mean): --  RR: --  SpO2: --    Orthostatic VS  11-26-21 @ 06:41  Lying BP: --/-- HR: --  Sitting BP: 118/67 HR: 54  Standing BP: --/-- HR: --  Site: --  Mode: --  Orthostatic VS  11-25-21 @ 21:25  Lying BP: --/-- HR: --  Sitting BP: 118/55 HR: 78  Standing BP: --/-- HR: --  Site: upper left arm  Mode: electronic  Orthostatic VS  11-25-21 @ 07:46  Lying BP: 103/50 HR: 60  Sitting BP: --/-- HR: --  Standing BP: --/-- HR: --  Site: upper right arm  Mode: electronic  Orthostatic VS  11-24-21 @ 20:27  Lying BP: --/-- HR: --  Sitting BP: 136/86 HR: 110  Standing BP: --/-- HR: --  Site: --  Mode: --

## 2021-11-26 NOTE — BH INPATIENT PSYCHIATRY PROGRESS NOTE - CURRENT MEDICATION
MEDICATIONS  (STANDING):  clotrimazole 1% Cream 1 Application(s) Topical two times a day  diVALproex Sprinkle 750 milliGRAM(s) Oral two times a day  furosemide    Tablet 20 milliGRAM(s) Oral daily  melatonin. 3 milliGRAM(s) Oral at bedtime  metoprolol tartrate 25 milliGRAM(s) Oral two times a day  OLANZapine 2.5 milliGRAM(s) Oral <User Schedule>  polyethylene glycol 3350 8.5 Gram(s) Oral daily  senna 2 Tablet(s) Oral at bedtime    MEDICATIONS  (PRN):  acetaminophen     Tablet .. 650 milliGRAM(s) Oral every 6 hours PRN Temp greater or equal to 38C (100.4F), Mild Pain (1 - 3), Moderate Pain (4 - 6)  aluminum hydroxide/magnesium hydroxide/simethicone Suspension 30 milliLiter(s) Oral every 4 hours PRN Dyspepsia  magnesium hydroxide Suspension 30 milliLiter(s) Oral at bedtime PRN constipation  OLANZapine 2.5 milliGRAM(s) Oral every 6 hours PRN severe agitation  OLANZapine Injectable 2.5 milliGRAM(s) IntraMuscular once PRN severe agitation

## 2021-11-26 NOTE — BH INPATIENT PSYCHIATRY PROGRESS NOTE - NSBHASSESSSUMMFT_PSY_ALL_CORE
66 y.o. F, as per records is , has been residing at [facility] ([facility]) since 2010, with past psychiatric history of dementia with behavioral disturbance, mood disorder, RODY, possible inpatient psychiatric admission to [facility] this year, has a chronic medical history notable for NPH (w/  shunt), HTN, DMT2, TBI (unspecified), pedal edema presenting from [facility] for physically aggressive behavior and diffuse rash. Records also indicate that [facility] was seeking a [facility] admission. Pt is AOx1 which per chart appears to be patient's baseline. Pt was doing well on previous medication regimen but having increase episodes of agitation. Pt most likely having increase in aggressive behavior 2/2 to TBI vs dementia. Patient requires inpatient admission for medication optimization and stabilization of agitation.     11/22: No acute events overnight or PRNs required, VSS, med adherent. Endorses AH of mother's voice telling her to take good care of her baby. Denies any safety concerns. Patient became agitated at 1pm after trying to follow repair men off unit, denies wanting to leave the unit. Incident likely related to disinhibition rather than psychosis, but further monitoring and collateral necessary to better understand the patient and hopefully delineate a pattern.   11/23: No acute events overnight or PRNs required, VSS, med adherent. Has been in more appropriate behavioral control following change in Zyprexa schedule. Was unable to take Depakote overnight due to difficulty swallowing the pill. Switched to sprinkles this morning which patient took without issue. Will continue monitoring on current regimen given change in formulation. Ammonia pending given patient's tremor. Will check VPA level when patient consistently taking Depakote sprinkles.   11/24: Patient required Zyprexa PO PRN yesterday at 6:40pm for agitation, given with good effect. Slept well overnight and adherent with meds. VPA/ammonia deferred given prior adherence issues due to difficulty swallowing. Will continue to titrate Zyprexa and check VPA/ammonia when the patient is taking reliably. EKG today given ongoing titration.   11/25: Patient remains confused, labile, but calm today.  11/26: No acute events over PRNs required. Patient remains intermittently labile but redirectable. Accepting of treatment. Will check VPA/ammonia today given improved adherence.    Plan:  1. Legals: 9.27 status, *DNR/DNI* [MOLST in chart]  2. Safety: routine observation. PRNs: Zyprexa 2.5 mg PO/IM q6h   3. Psychiatry: Depakote sprinkles 750 mg BID, Continue Zyprexa 2.5mg at 6am + 11am + 4pm + 9pm, DISCONTINUE Klonopin 0.25mg QHS  4. Group, milieu, individual therapy as appropriate.  5. Medical: no acute medical issues. Lopressor 25mg daily for HTN, Lasix 20mg daily for edema, Senna 2mg QHS for constipation/ppx + Miralax 8.5g daily  6. Dispo: pending clinical improvement.  Patient continues to require inpatient hospitalization for stabilization and safety. 66 y.o. F, as per records is , has been residing at [facility] ([facility]) since 2010, with past psychiatric history of dementia with behavioral disturbance, mood disorder, RODY, possible inpatient psychiatric admission to [facility] this year, has a chronic medical history notable for NPH (w/  shunt), HTN, DMT2, TBI (unspecified), pedal [place] [facility] [place] behavior and diffuse rash. Records also indicate that [facility] was seeking a [facility] admission. Pt is AOx1 which per chart appears to be patient's baseline. Pt was doing well on previous medication regimen but having increase episodes of agitation. Pt most likely having increase in aggressive behavior 2/2 to TBI vs dementia. Patient requires inpatient admission for medication optimization and stabilization of agitation.     11/22: No acute events overnight or PRNs required, VSS, med adherent. Endorses AH of mother's voice telling her to take good care of her baby. Denies any safety concerns. Patient became agitated at 1pm after trying to follow repair men off unit, denies wanting to leave the unit. Incident likely related to disinhibition rather than psychosis, but further monitoring and collateral necessary to better understand the patient and hopefully delineate a pattern.   11/23: No acute events overnight or PRNs required, VSS, med adherent. Has been in more appropriate behavioral control following change in Zyprexa schedule. Was unable to take Depakote overnight due to difficulty swallowing the pill. Switched to sprinkles this morning which patient took without issue. Will continue monitoring on current regimen given change in formulation. Ammonia pending given patient's tremor. Will check VPA level when patient consistently taking Depakote sprinkles.   11/24: Patient required Zyprexa PO PRN yesterday at 6:40pm for agitation, given with good effect. Slept well overnight and adherent with meds. VPA/ammonia deferred given prior adherence issues due to difficulty swallowing. Will continue to titrate Zyprexa and check VPA/ammonia when the patient is taking reliably. EKG today given ongoing titration.   11/25: Patient remains confused, labile, but calm today.  11/26: No acute events over PRNs required. Patient remains intermittently labile but redirectable. Accepting of treatment. Will check VPA/ammonia today given improved adherence. VPA 68, Ammonia pending.    Plan:  1. Legals: 9.27 status, *DNR/DNI* [MOLST in chart]  2. Safety: routine observation. PRNs: Zyprexa 2.5 mg PO/IM q6h   3. Psychiatry: Depakote sprinkles 750 mg BID, Continue Zyprexa 2.5mg at 6am + 11am + 4pm + 9pm, DISCONTINUE Klonopin 0.25mg QHS  4. Group, milieu, individual therapy as appropriate.  5. Medical: no acute medical issues. Lopressor 25mg daily for HTN, Lasix 20mg daily for edema, Senna 2mg QHS for constipation/ppx + Miralax 8.5g daily  6. Dispo: pending clinical improvement.  Patient continues to require inpatient hospitalization for stabilization and safety.

## 2021-11-27 LAB — AMMONIA BLD-MCNC: 15 UMOL/L — SIGNIFICANT CHANGE UP (ref 11–55)

## 2021-11-27 PROCEDURE — 99232 SBSQ HOSP IP/OBS MODERATE 35: CPT

## 2021-11-27 RX ADMIN — Medication 25 MILLIGRAM(S): at 09:20

## 2021-11-27 RX ADMIN — Medication 25 MILLIGRAM(S): at 21:06

## 2021-11-27 RX ADMIN — POLYETHYLENE GLYCOL 3350 8.5 GRAM(S): 17 POWDER, FOR SOLUTION ORAL at 09:21

## 2021-11-27 RX ADMIN — Medication 1 APPLICATION(S): at 09:35

## 2021-11-27 RX ADMIN — Medication 3 MILLIGRAM(S): at 21:06

## 2021-11-27 RX ADMIN — OLANZAPINE 2.5 MILLIGRAM(S): 15 TABLET, FILM COATED ORAL at 16:58

## 2021-11-27 RX ADMIN — OLANZAPINE 2.5 MILLIGRAM(S): 15 TABLET, FILM COATED ORAL at 06:40

## 2021-11-27 RX ADMIN — SENNA PLUS 2 TABLET(S): 8.6 TABLET ORAL at 21:06

## 2021-11-27 RX ADMIN — Medication 20 MILLIGRAM(S): at 09:20

## 2021-11-27 RX ADMIN — DIVALPROEX SODIUM 750 MILLIGRAM(S): 500 TABLET, DELAYED RELEASE ORAL at 09:19

## 2021-11-27 RX ADMIN — DIVALPROEX SODIUM 750 MILLIGRAM(S): 500 TABLET, DELAYED RELEASE ORAL at 21:07

## 2021-11-27 RX ADMIN — OLANZAPINE 2.5 MILLIGRAM(S): 15 TABLET, FILM COATED ORAL at 22:25

## 2021-11-27 RX ADMIN — Medication 1 APPLICATION(S): at 22:26

## 2021-11-27 NOTE — BH INPATIENT PSYCHIATRY PROGRESS NOTE - NSBHFUPINTERVALHXFT_PSY_A_CORE
no The patient was seen for follow-up for behavioral problems in the setting of cognitive decline. Chart reviewed and case discussed with nursing staff. No acute events overnight. On encounter, patient is pleasant and calm albeit quite confused. Patient denies any concerns and reports feeling safe and "happy". Taking meds. Eating well. No behavioral events in the last 24 hours.

## 2021-11-27 NOTE — BH INPATIENT PSYCHIATRY PROGRESS NOTE - NSBHMETABOLIC_PSY_ALL_CORE_FT
BMI: BMI (kg/m2): 31.6 (11-19-21 @ 17:50)  HbA1c: A1C with Estimated Average Glucose Result: 5.4 % (11-04-21 @ 06:58)  Glucose: POCT Blood Glucose.: 109 mg/dL (11-12-21 @ 16:13)

## 2021-11-27 NOTE — BH INPATIENT PSYCHIATRY PROGRESS NOTE - NSBHASSESSSUMMFT_PSY_ALL_CORE
66-year-old female, as per records is , has been residing at [facility] ([facility]) since 2010, with past psychiatric history of dementia with behavioral disturbance, mood disorder, RODY, possible inpatient psychiatric admission to [facility] this year, has a chronic medical history notable for NPH (w/  shunt), HTN, DMT2, TBI (unspecified), pedal edema presenting from [facility] for physically aggressive behavior and diffuse rash. Records also indicate that [facility] was seeking a [facility] admission. Pt is AOx1 which per chart appears to be patient's baseline. Pt was doing well on previous medication regimen but having increase episodes of agitation. Pt most likely having increase in aggressive behavior 2/2 to TBI vs dementia. Patient requires inpatient admission for medication optimization and stabilization of agitation.     11/27: On exam, patient is pleasantly confused, denies any concerns. No behavorial events. Taking meds. Labs: Serum ammonia wnl at 15.     Plan: Will continue current treatment regimen as per primary team.

## 2021-11-27 NOTE — BH INPATIENT PSYCHIATRY PROGRESS NOTE - NSBHCHARTREVIEWVS_PSY_A_CORE FT
Vital Signs Last 24 Hrs  T(C): 36.6 (11-27-21 @ 07:58), Max: 36.6 (11-27-21 @ 07:58)  T(F): 97.9 (11-27-21 @ 07:58), Max: 97.9 (11-27-21 @ 07:58)      11-27-21 @ 07:58  Sitting BP: 140/79 HR: 77

## 2021-11-28 PROCEDURE — 99231 SBSQ HOSP IP/OBS SF/LOW 25: CPT

## 2021-11-28 RX ADMIN — Medication 3 MILLIGRAM(S): at 20:23

## 2021-11-28 RX ADMIN — OLANZAPINE 2.5 MILLIGRAM(S): 15 TABLET, FILM COATED ORAL at 05:48

## 2021-11-28 RX ADMIN — Medication 25 MILLIGRAM(S): at 20:23

## 2021-11-28 RX ADMIN — OLANZAPINE 2.5 MILLIGRAM(S): 15 TABLET, FILM COATED ORAL at 11:03

## 2021-11-28 RX ADMIN — DIVALPROEX SODIUM 750 MILLIGRAM(S): 500 TABLET, DELAYED RELEASE ORAL at 08:53

## 2021-11-28 RX ADMIN — DIVALPROEX SODIUM 750 MILLIGRAM(S): 500 TABLET, DELAYED RELEASE ORAL at 20:23

## 2021-11-28 RX ADMIN — Medication 1 APPLICATION(S): at 09:00

## 2021-11-28 RX ADMIN — Medication 25 MILLIGRAM(S): at 08:53

## 2021-11-28 RX ADMIN — OLANZAPINE 2.5 MILLIGRAM(S): 15 TABLET, FILM COATED ORAL at 17:12

## 2021-11-28 RX ADMIN — POLYETHYLENE GLYCOL 3350 8.5 GRAM(S): 17 POWDER, FOR SOLUTION ORAL at 08:53

## 2021-11-28 RX ADMIN — Medication 20 MILLIGRAM(S): at 08:53

## 2021-11-28 RX ADMIN — Medication 1 APPLICATION(S): at 20:32

## 2021-11-28 RX ADMIN — SENNA PLUS 2 TABLET(S): 8.6 TABLET ORAL at 20:23

## 2021-11-28 RX ADMIN — OLANZAPINE 2.5 MILLIGRAM(S): 15 TABLET, FILM COATED ORAL at 20:24

## 2021-11-28 NOTE — BH INPATIENT PSYCHIATRY PROGRESS NOTE - MSE UNSTRUCTURED FT
Patient is awake and alert. Affect is addled. Speech is fluent. Thought process is coherent but irrelevant. No delusions expressed. Poor insight.

## 2021-11-28 NOTE — BH INPATIENT PSYCHIATRY PROGRESS NOTE - NSBHASSESSSUMMFT_PSY_ALL_CORE
66-year-old female, as per records is , has been residing at [facility] ([facility]) since 2010, with past psychiatric history of dementia with behavioral disturbance, mood disorder, RODY, possible inpatient psychiatric admission to [facility] this year, has a chronic medical history notable for NPH (w/  shunt), HTN, DMT2, TBI (unspecified), pedal edema presenting from [facility] for physically aggressive behavior and diffuse rash. Records also indicate that [facility] was seeking [facility] admission. Pt is AOx1 which per chart appears to be patient's baseline. Pt was doing well on previous medication regimen but having increase episodes of agitation. Pt most likely having increase in aggressive behavior 2/2 to TBI vs dementia. Patient requires inpatient admission for medication optimization and stabilization of agitation.       Plan: Will continue current treatment regimen as per primary team.

## 2021-11-28 NOTE — BH INPATIENT PSYCHIATRY PROGRESS NOTE - NSBHCHARTREVIEWVS_PSY_A_CORE FT
Vital Signs Last 24 Hrs  T(C): 36.6 (11-28-21 @ 06:38), Max: 36.6 (11-28-21 @ 06:38)  T(F): 97.8 (11-28-21 @ 06:38), Max: 97.8 (11-28-21 @ 06:38)  HR: --  BP: --  BP(mean): --  RR: 18 (11-28-21 @ 06:38) (18 - 18)  SpO2: --    Orthostatic VS  11-28-21 @ 06:38  Lying BP: --/-- HR: --  Sitting BP: 111/58 HR: 60  Standing BP: --/-- HR: --  Site: --  Mode: --  Orthostatic VS  11-27-21 @ 20:53  Lying BP: --/-- HR: --  Sitting BP: 149/93 HR: 106  Standing BP: --/-- HR: --  Site: upper left arm  Mode: electronic  Orthostatic VS  11-27-21 @ 07:58  Lying BP: --/-- HR: --  Sitting BP: 140/79 HR: 77  Standing BP: --/-- HR: --  Site: upper left arm  Mode: electronic  Orthostatic VS  11-26-21 @ 12:05  Lying BP: 114/62 HR: 66  Sitting BP: --/-- HR: --  Standing BP: --/-- HR: --  Site: --  Mode: --

## 2021-11-29 ENCOUNTER — EMERGENCY (EMERGENCY)
Facility: HOSPITAL | Age: 66
LOS: 1 days | Discharge: HOME | End: 2021-11-29
Attending: EMERGENCY MEDICINE | Admitting: EMERGENCY MEDICINE
Payer: MEDICARE

## 2021-11-29 VITALS
OXYGEN SATURATION: 100 % | RESPIRATION RATE: 18 BRPM | TEMPERATURE: 98 F | SYSTOLIC BLOOD PRESSURE: 136 MMHG | DIASTOLIC BLOOD PRESSURE: 61 MMHG | HEART RATE: 82 BPM

## 2021-11-29 VITALS
SYSTOLIC BLOOD PRESSURE: 134 MMHG | HEIGHT: 60 IN | OXYGEN SATURATION: 99 % | RESPIRATION RATE: 16 BRPM | HEART RATE: 77 BPM | TEMPERATURE: 99 F | DIASTOLIC BLOOD PRESSURE: 85 MMHG

## 2021-11-29 DIAGNOSIS — Z98.2 PRESENCE OF CEREBROSPINAL FLUID DRAINAGE DEVICE: Chronic | ICD-10-CM

## 2021-11-29 PROCEDURE — 99232 SBSQ HOSP IP/OBS MODERATE 35: CPT

## 2021-11-29 PROCEDURE — 99284 EMERGENCY DEPT VISIT MOD MDM: CPT

## 2021-11-29 PROCEDURE — 93971 EXTREMITY STUDY: CPT | Mod: 26,LT

## 2021-11-29 PROCEDURE — 99233 SBSQ HOSP IP/OBS HIGH 50: CPT

## 2021-11-29 RX ORDER — OLANZAPINE 15 MG/1
5 TABLET, FILM COATED ORAL
Refills: 0 | Status: COMPLETED | OUTPATIENT
Start: 2021-11-29 | End: 2021-11-29

## 2021-11-29 RX ADMIN — OLANZAPINE 5 MILLIGRAM(S): 15 TABLET, FILM COATED ORAL at 14:22

## 2021-11-29 RX ADMIN — Medication 25 MILLIGRAM(S): at 10:20

## 2021-11-29 RX ADMIN — Medication 1 APPLICATION(S): at 10:21

## 2021-11-29 RX ADMIN — DIVALPROEX SODIUM 750 MILLIGRAM(S): 500 TABLET, DELAYED RELEASE ORAL at 10:19

## 2021-11-29 RX ADMIN — POLYETHYLENE GLYCOL 3350 8.5 GRAM(S): 17 POWDER, FOR SOLUTION ORAL at 10:20

## 2021-11-29 RX ADMIN — OLANZAPINE 2.5 MILLIGRAM(S): 15 TABLET, FILM COATED ORAL at 06:42

## 2021-11-29 RX ADMIN — OLANZAPINE 2.5 MILLIGRAM(S): 15 TABLET, FILM COATED ORAL at 11:46

## 2021-11-29 RX ADMIN — Medication 20 MILLIGRAM(S): at 10:20
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
## 2021-11-29 NOTE — ED PROVIDER NOTE - PATIENT PORTAL LINK FT
You can access the FollowMyHealth Patient Portal offered by [facility] by registering at the following website: [email or web address]. By joining [facility]’s FollowMyHealth portal, you will also be able to view your health information using other applications (apps) compatible with our system.

## 2021-11-29 NOTE — PROGRESS NOTE ADULT - ASSESSMENT
#Dementia w/ behavioral disturbances  -No agitation.  -Further optimization as per psych.    #Rash  -Truncal and extremities.   -On follow up exam -- rashes seem to have recurred since DC-ing steroid. Patient started on anti-fungal  clotrimazole BID last week without improvement  .  - Will re-start patient on steroid, triamcinolone Patient was on this steroid last hospitalization. If no improvement on steroid, would consult dermatology.   -Also suggested to staff to avoid tight fitting clothes - pants were noted to be particularly tight. Should wear loose fitting clothes to keep skin clean and dry.   -Monitor response to therapy.    #Pedal edema  -Could be 2' venous insufficiency. Foot is warm and distal pulses palpable.  -Keep leg elevated at this time.   - Patient left leg with increased swelling and calf tenderness, would get Lower extremity dopplers to rule out DVT. IF positive can start apixaban for AC.     #NPH s/p  shunt  -Stable. Imaging showed no hydro. Recent shunt series was normal.  -Cont supportive measures.    #DM2  -A1c normal.   -Change diet to carb control.  -No need for FS/insulin coverage  while at [facility]. Cont same management.     #Vaginal bleed  -Seen by GYN @ [facility]. Further follow up as OP.   -Monitor for now. Hb stable. If any worsening bleeding, please notify us.      We will continue to follow. please call with any questions.  #Dementia w/ behavioral disturbances  -No agitation.  -Further optimization as per psych.    #Rash  -Truncal and extremities.   -On follow up exam -- rashes seem to have recurred since DC-ing steroid.   - Patient started on anti-fungal  clotrimazole BID last week without improvement. will discontinue   - Will re-start patient on steroid, triamcinolone Patient was on this steroid last hospitalization. If no improvement on steroid, would consult dermatology.   -Also suggested to staff to avoid tight fitting clothes - pants were noted to be particularly tight. Should wear loose fitting clothes to keep skin clean and dry.   -Monitor response to therapy.    #Pedal edema  -Could be 2' venous insufficiency. Foot is warm and distal pulses palpable.  -Keep leg elevated at this time.   - Patient left leg with increased swelling and calf tenderness, would get Lower extremity dopplers to rule out DVT. IF positive can start apixaban for AC.     #NPH s/p  shunt  -Stable. Imaging showed no hydro. Recent shunt series was normal.  -Cont supportive measures.    #DM2  -A1c normal.   -Change diet to carb control.  -No need for FS/insulin coverage  while at [facility]. Cont same management.     #Vaginal bleed  -Seen by GYN @ [facility]. Further follow up as OP.   -Monitor for now. Hb stable. If any worsening bleeding, please notify us.      We will continue to follow. please call with any questions.

## 2021-11-29 NOTE — BH INPATIENT PSYCHIATRY PROGRESS NOTE - NSBHCHARTREVIEWVS_PSY_A_CORE FT
Vital Signs Last 24 Hrs  T(C): 36.6 (11-29-21 @ 08:10), Max: 36.6 (11-29-21 @ 08:10)  T(F): 97.8 (11-29-21 @ 08:10), Max: 97.8 (11-29-21 @ 08:10)  HR: --  BP: --  BP(mean): --  RR: --  SpO2: --    Orthostatic VS  11-29-21 @ 08:10  Lying BP: --/-- HR: --  Sitting BP: 111/70 HR: 93  Standing BP: 120/90 HR: 102  Site: upper right arm  Mode: electronic  Orthostatic VS  11-28-21 @ 19:43  Lying BP: --/-- HR: --  Sitting BP: 109/509 HR: 64  Standing BP: --/-- HR: --  Site: --  Mode: --  Orthostatic VS  11-28-21 @ 06:38  Lying BP: --/-- HR: --  Sitting BP: 111/58 HR: 60  Standing BP: --/-- HR: --  Site: --  Mode: --  Orthostatic VS  11-27-21 @ 20:53  Lying BP: --/-- HR: --  Sitting BP: 149/93 HR: 106  Standing BP: --/-- HR: --  Site: upper left arm  Mode: electronic   Vital Signs Last 24 Hrs  T(C): 37.2 (11-29-21 @ 16:35), Max: 37.2 (11-29-21 @ 16:35)  T(F): 99 (11-29-21 @ 16:35), Max: 99 (11-29-21 @ 16:35)  HR: 77 (11-29-21 @ 16:35) (77 - 77)  BP: 134/85 (11-29-21 @ 16:35) (134/85 - 134/85)  BP(mean): --  RR: 16 (11-29-21 @ 16:35) (16 - 16)  SpO2: 99% (11-29-21 @ 16:35) (99% - 99%)    Orthostatic VS  11-29-21 @ 08:10  Lying BP: --/-- HR: --  Sitting BP: 111/70 HR: 93  Standing BP: 120/90 HR: 102  Site: upper right arm  Mode: electronic  Orthostatic VS  11-28-21 @ 19:43  Lying BP: --/-- HR: --  Sitting BP: 109/509 HR: 64  Standing BP: --/-- HR: --  Site: --  Mode: --  Orthostatic VS  11-28-21 @ 06:38  Lying BP: --/-- HR: --  Sitting BP: 111/58 HR: 60  Standing BP: --/-- HR: --  Site: --  Mode: --  Orthostatic VS  11-27-21 @ 20:53  Lying BP: --/-- HR: --  Sitting BP: 149/93 HR: 106  Standing BP: --/-- HR: --  Site: upper left arm  Mode: electronic

## 2021-11-29 NOTE — BH INPATIENT PSYCHIATRY PROGRESS NOTE - NSBHASSESSSUMMFT_PSY_ALL_CORE
66 y.o. F, as per records is , has been residing at [facility] ([facility]) since 2010, with past psychiatric history of dementia with behavioral disturbance, mood disorder, RODY, possible inpatient psychiatric admission to [facility] this year, has a chronic medical history notable for NPH (w/  shunt), HTN, DMT2, TBI (unspecified), pedal edema presenting from [facility] for physically aggressive behavior and diffuse rash. Records also indicate that [facility] was seeking a [facility] admission. Pt is AOx1 which per chart appears to be patient's baseline. Pt was doing well on previous medication regimen but having increase episodes of agitation. Pt most likely having increase in aggressive behavior 2/2 to TBI vs dementia. Patient requires inpatient admission for medication optimization and stabilization of agitation.     11/22: No acute events overnight or PRNs required, VSS, med adherent. Endorses AH of mother's voice telling her to take good care of her baby. Denies any safety concerns. Patient became agitated at 1pm after trying to follow repair men off unit, denies wanting to leave the unit. Incident likely related to disinhibition rather than psychosis, but further monitoring and collateral necessary to better understand the patient and hopefully delineate a pattern.   11/23: No acute events overnight or PRNs required, VSS, med adherent. Has been in more appropriate behavioral control following change in Zyprexa schedule. Was unable to take Depakote overnight due to difficulty swallowing the pill. Switched to sprinkles this morning which patient took without issue. Will continue monitoring on current regimen given change in formulation. Ammonia pending given patient's tremor. Will check VPA level when patient consistently taking Depakote sprinkles.   11/24: Patient required Zyprexa PO PRN yesterday at 6:40pm for agitation, given with good effect. Slept well overnight and adherent with meds. VPA/ammonia deferred given prior adherence issues due to difficulty swallowing. Will continue to titrate Zyprexa and check VPA/ammonia when the patient is taking reliably. EKG today given ongoing titration.   11/25: Patient remains confused, labile, but calm today.  11/26: No acute events over PRNs required. Patient remains intermittently labile but redirectable. Accepting of treatment. Will check VPA/ammonia today given improved adherence. VPA 68, Ammonia pending.  11/27: On exam, patient is pleasantly confused, denies any concerns. No behavorial events. Taking meds. Labs: Serum ammonia wnl at 15.   11/28: Patient remains confused, labile affect, intermittently laughing then screaming.  11/29: No acute events or PRNs required. Patient remains labile but redirectable and accepting treatment. Endorses pruritis in bilateral forearms and L foot swelling observed on exam. ZHOS evaluation pending.    Plan:  1. Legals: 9.27 status, *DNR/DNI* [MOLST in chart]  2. Safety: routine observation. PRNs: Zyprexa 2.5 mg PO/IM q6h   3. Psychiatry: Depakote sprinkles 750 mg BID, Continue Zyprexa 2.5mg at 6am + 11am + 4pm + 9pm  4. Group, milieu, individual therapy as appropriate.  5. Medical: f/u ZHOS recs. Continue Lopressor 25mg daily for HTN, Lasix 20mg daily for edema, Senna 2mg QHS for constipation/ppx + Miralax 8.5g daily  6. Dispo: pending clinical improvement.  Patient continues to require inpatient hospitalization for stabilization and safety.

## 2021-11-29 NOTE — ED PROVIDER NOTE - PROGRESS NOTE DETAILS
David: no DVT on ultrasound; called [facility] resident recommendation kelfex 500mg every 12 hours for 1 week

## 2021-11-29 NOTE — BH INPATIENT PSYCHIATRY PROGRESS NOTE - NSBHMETABOLIC_PSY_ALL_CORE_FT
BMI: BMI (kg/m2): 31.6 (11-19-21 @ 17:50)  HbA1c: A1C with Estimated Average Glucose Result: 5.4 % (11-04-21 @ 06:58)    Glucose: POCT Blood Glucose.: 109 mg/dL (11-12-21 @ 16:13)    BP: --  Lipid Panel:  BMI: BMI (kg/m2): 31.6 (11-29-21 @ 16:35)  HbA1c: A1C with Estimated Average Glucose Result: 5.4 % (11-04-21 @ 06:58)    Glucose: POCT Blood Glucose.: 109 mg/dL (11-12-21 @ 16:13)    BP: --  Lipid Panel:

## 2021-11-29 NOTE — ED ADULT TRIAGE NOTE - CHIEF COMPLAINT QUOTE
pt from [facility] for r/o dvt in left lower leg. swelling, pain and warmth noted at the extremity. denies chest pain or sob. staff at bedside

## 2021-11-29 NOTE — ED PROVIDER NOTE - NSICDXPASTMEDICALHX_GEN_ALL_CORE_FT
PAST MEDICAL HISTORY:  Dementia     Dementia without behavioral disturbance     Diabetes mellitus     Epilepsy     Hypertension     Hypertension     Mood disorder     Normal pressure hydrocephalus     Normal pressure hydrocephalus     TBI (traumatic brain injury)     Type 2 diabetes mellitus

## 2021-11-29 NOTE — ED PROVIDER NOTE - DISCHARGE DATE
Left msg stating benign dermatofibroma he is to call back with any questions or concerns.  EA 29-Nov-2021

## 2021-11-29 NOTE — ED PROVIDER NOTE - PHYSICAL EXAMINATION
legs  mild pedal edema b/l left>right  left mildly swollen  no calf tenderness  small focus on lower leg ?puncture wound

## 2021-11-29 NOTE — ED PROVIDER NOTE - NSICDXPASTSURGICALHX_GEN_ALL_CORE_FT
PAST SURGICAL HISTORY:  No significant past surgical history      (ventriculoperitoneal) shunt status

## 2021-11-29 NOTE — BH INPATIENT PSYCHIATRY PROGRESS NOTE - NSBHFUPINTERVALHXFT_PSY_A_CORE
The patient was seen for follow up. There were no acute events overnight or PRN medications required. This morning, the patient continues to be at her cognitive baseline, calm and cooperative with team. She reported feeling safe on the unit, denied paranoia or receiving messages from other patients/staff. She denied SI, HI, and AVH. She denied any dizziness, stiffness, or changes in bowel/bladder function. She endorsed pruritus of bilateral forearms, maculopapular rash noted. Patient also noted to have significant swelling of the L foot with TTP.

## 2021-11-29 NOTE — ED ADULT TRIAGE NOTE - HEIGHT IN CM
152.4 The patient expressed a desire to see through the full range of vision from distance, to middle, to near without glasses. The limitations of advanced lens technology were reviewed and the recommendation was made for an extended depth of focus lens (Symfony) in combination with a multifocal lens. Patient understands that each lens will provide only 2 of the 3 ranges of vision. Side effects, specifically halos, reduced contrast, and a 1 in 500 exchange rate due to failure to adapt to the lens were discussed as was the need for enhancement in some cases. The patient elects to proceed with RLE Symfony OD, goal of emmetropia.

## 2021-11-29 NOTE — BH INPATIENT PSYCHIATRY PROGRESS NOTE - CURRENT MEDICATION
MEDICATIONS  (STANDING):  clotrimazole 1% Cream 1 Application(s) Topical two times a day  diVALproex Sprinkle 750 milliGRAM(s) Oral two times a day  furosemide    Tablet 20 milliGRAM(s) Oral daily  melatonin. 3 milliGRAM(s) Oral at bedtime  metoprolol tartrate 25 milliGRAM(s) Oral two times a day  OLANZapine 2.5 milliGRAM(s) Oral <User Schedule>  polyethylene glycol 3350 8.5 Gram(s) Oral daily  senna 2 Tablet(s) Oral at bedtime    MEDICATIONS  (PRN):  acetaminophen     Tablet .. 650 milliGRAM(s) Oral every 6 hours PRN Temp greater or equal to 38C (100.4F), Mild Pain (1 - 3), Moderate Pain (4 - 6)  aluminum hydroxide/magnesium hydroxide/simethicone Suspension 30 milliLiter(s) Oral every 4 hours PRN Dyspepsia  magnesium hydroxide Suspension 30 milliLiter(s) Oral at bedtime PRN constipation  OLANZapine 2.5 milliGRAM(s) Oral every 6 hours PRN severe agitation  OLANZapine Injectable 2.5 milliGRAM(s) IntraMuscular once PRN severe agitation   MEDICATIONS  (STANDING):  diVALproex Sprinkle 750 milliGRAM(s) Oral two times a day  furosemide    Tablet 20 milliGRAM(s) Oral daily  melatonin. 3 milliGRAM(s) Oral at bedtime  metoprolol tartrate 25 milliGRAM(s) Oral two times a day  OLANZapine 2.5 milliGRAM(s) Oral <User Schedule>  polyethylene glycol 3350 8.5 Gram(s) Oral daily  senna 2 Tablet(s) Oral at bedtime  triamcinolone 0.1% Ointment 1 Application(s) Topical every 12 hours    MEDICATIONS  (PRN):  acetaminophen     Tablet .. 650 milliGRAM(s) Oral every 6 hours PRN Temp greater or equal to 38C (100.4F), Mild Pain (1 - 3), Moderate Pain (4 - 6)  aluminum hydroxide/magnesium hydroxide/simethicone Suspension 30 milliLiter(s) Oral every 4 hours PRN Dyspepsia  magnesium hydroxide Suspension 30 milliLiter(s) Oral at bedtime PRN constipation  OLANZapine 2.5 milliGRAM(s) Oral every 6 hours PRN severe agitation  OLANZapine Injectable 2.5 milliGRAM(s) IntraMuscular once PRN severe agitation

## 2021-11-29 NOTE — ED PROVIDER NOTE - NSFOLLOWUPINSTRUCTIONS_ED_ALL_ED_FT
to staff  ultrasound results show no DVT  would recommend keflex 500mg BID for 1 week    send back to ED for any worsening of status

## 2021-11-29 NOTE — ED ADULT TRIAGE NOTE - ARRIVAL FROM
EXAMINATION TYPE: XR chest 2V

 

DATE OF EXAM: 12/25/2019

 

COMPARISON: 10/6/2019

 

HISTORY: 3-year-old female with cough and congestion

 

TECHNIQUE:  PA and lateral views

 

FINDINGS:  

The cardiomediastinal silhouette, aorta, and pulmonary vasculature are within normal limits. There ar
e streaky perihilar peribronchial opacities. No consolidation, air leak, or pleural effusion.

 

 

IMPRESSION:  

Findings which can be seen with viral or reactive small airways disease. No evidence for lobar pneumo
colby at this time. Hospitals/Psychiatric Facilities

## 2021-11-30 PROCEDURE — 99232 SBSQ HOSP IP/OBS MODERATE 35: CPT

## 2021-11-30 RX ORDER — OLANZAPINE 15 MG/1
2.5 TABLET, FILM COATED ORAL ONCE
Refills: 0 | Status: COMPLETED | OUTPATIENT
Start: 2021-11-30 | End: 2021-11-30

## 2021-11-30 RX ORDER — OLANZAPINE 15 MG/1
2.5 TABLET, FILM COATED ORAL ONCE
Refills: 0 | Status: DISCONTINUED | OUTPATIENT
Start: 2021-11-30 | End: 2021-12-19

## 2021-11-30 RX ADMIN — OLANZAPINE 2.5 MILLIGRAM(S): 15 TABLET, FILM COATED ORAL at 06:44

## 2021-11-30 RX ADMIN — Medication 1 APPLICATION(S): at 09:30

## 2021-11-30 RX ADMIN — DIVALPROEX SODIUM 750 MILLIGRAM(S): 500 TABLET, DELAYED RELEASE ORAL at 20:34

## 2021-11-30 RX ADMIN — OLANZAPINE 2.5 MILLIGRAM(S): 15 TABLET, FILM COATED ORAL at 12:46

## 2021-11-30 RX ADMIN — POLYETHYLENE GLYCOL 3350 8.5 GRAM(S): 17 POWDER, FOR SOLUTION ORAL at 09:10

## 2021-11-30 RX ADMIN — Medication 25 MILLIGRAM(S): at 21:41

## 2021-11-30 RX ADMIN — Medication 25 MILLIGRAM(S): at 09:09

## 2021-11-30 RX ADMIN — SENNA PLUS 2 TABLET(S): 8.6 TABLET ORAL at 20:34

## 2021-11-30 RX ADMIN — OLANZAPINE 2.5 MILLIGRAM(S): 15 TABLET, FILM COATED ORAL at 21:41

## 2021-11-30 RX ADMIN — OLANZAPINE 2.5 MILLIGRAM(S): 15 TABLET, FILM COATED ORAL at 17:12

## 2021-11-30 RX ADMIN — Medication 20 MILLIGRAM(S): at 09:09

## 2021-11-30 RX ADMIN — DIVALPROEX SODIUM 750 MILLIGRAM(S): 500 TABLET, DELAYED RELEASE ORAL at 09:09

## 2021-11-30 RX ADMIN — Medication 3 MILLIGRAM(S): at 20:34

## 2021-11-30 RX ADMIN — OLANZAPINE 2.5 MILLIGRAM(S): 15 TABLET, FILM COATED ORAL at 20:34

## 2021-11-30 NOTE — BH INPATIENT PSYCHIATRY PROGRESS NOTE - NSBHFUPINTERVALHXFT_PSY_A_CORE
The patient was seen for follow up. There were no acute events overnight or PRN medications required. Patient was sent to ED for doppler imaging of LE which was unremarkable. This morning, the patient continues to be at her cognitive baseline, calm and cooperative with team. She reported feeling safe on the unit, denied paranoia or receiving messages from other patients/staff. She reported AVH of her mother asking how she is feeling. She denied any dizziness, stiffness, or changes in bowel/bladder function. She endorsed pain and swelling of bilateral LEs, agreed to meet with hospitalist again for further evaluation.

## 2021-11-30 NOTE — BH INPATIENT PSYCHIATRY PROGRESS NOTE - CURRENT MEDICATION
MEDICATIONS  (STANDING):  diVALproex Sprinkle 750 milliGRAM(s) Oral two times a day  furosemide    Tablet 20 milliGRAM(s) Oral daily  melatonin. 3 milliGRAM(s) Oral at bedtime  metoprolol tartrate 25 milliGRAM(s) Oral two times a day  OLANZapine 2.5 milliGRAM(s) Oral <User Schedule>  polyethylene glycol 3350 8.5 Gram(s) Oral daily  senna 2 Tablet(s) Oral at bedtime  triamcinolone 0.1% Ointment 1 Application(s) Topical every 12 hours    MEDICATIONS  (PRN):  acetaminophen     Tablet .. 650 milliGRAM(s) Oral every 6 hours PRN Temp greater or equal to 38C (100.4F), Mild Pain (1 - 3), Moderate Pain (4 - 6)  aluminum hydroxide/magnesium hydroxide/simethicone Suspension 30 milliLiter(s) Oral every 4 hours PRN Dyspepsia  magnesium hydroxide Suspension 30 milliLiter(s) Oral at bedtime PRN constipation  OLANZapine 2.5 milliGRAM(s) Oral every 6 hours PRN severe agitation  OLANZapine Injectable 2.5 milliGRAM(s) IntraMuscular once PRN severe agitation

## 2021-11-30 NOTE — BH INPATIENT PSYCHIATRY PROGRESS NOTE - NSBHASSESSSUMMFT_PSY_ALL_CORE
66 y.o. F, as per records is , has been residing at [facility] ([facility]) since 2010, with past psychiatric history of dementia with behavioral disturbance, mood disorder, RODY, possible inpatient psychiatric admission to [facility] this year, has a chronic medical history notable for NPH (w/  shunt), HTN, DMT2, TBI (unspecified), pedal edema presenting from [facility] for physically aggressive behavior and diffuse rash. Records also indicate that [facility] was seeking a [facility] admission. Pt is AOx1 which per chart appears to be patient's baseline. Pt was doing well on previous medication regimen but having increase episodes of agitation. Pt most likely having increase in aggressive behavior 2/2 to TBI vs dementia. Patient requires inpatient admission for medication optimization and stabilization of agitation.     11/22: No acute events overnight or PRNs required, VSS, med adherent. Endorses AH of mother's voice telling her to take good care of her baby. Denies any safety concerns. Patient became agitated at 1pm after trying to follow repair men off unit, denies wanting to leave the unit. Incident likely related to disinhibition rather than psychosis, but further monitoring and collateral necessary to better understand the patient and hopefully delineate a pattern.   11/23: No acute events overnight or PRNs required, VSS, med adherent. Has been in more appropriate behavioral control following change in Zyprexa schedule. Was unable to take Depakote overnight due to difficulty swallowing the pill. Switched to sprinkles this morning which patient took without issue. Will continue monitoring on current regimen given change in formulation. Ammonia pending given patient's tremor. Will check VPA level when patient consistently taking Depakote sprinkles.   11/24: Patient required Zyprexa PO PRN yesterday at 6:40pm for agitation, given with good effect. Slept well overnight and adherent with meds. VPA/ammonia deferred given prior adherence issues due to difficulty swallowing. Will continue to titrate Zyprexa and check VPA/ammonia when the patient is taking reliably. EKG today given ongoing titration.   11/25: Patient remains confused, labile, but calm today.  11/26: No acute events over PRNs required. Patient remains intermittently labile but redirectable. Accepting of treatment. Will check VPA/ammonia today given improved adherence. VPA 68, Ammonia pending.  11/27: On exam, patient is pleasantly confused, denies any concerns. No behavorial events. Taking meds. Labs: Serum ammonia wnl at 15.   11/28: Patient remains confused, labile affect, intermittently laughing then screaming.  11/29: No acute events or PRNs required. Patient remains labile but redirectable and accepting treatment. Endorses pruritis in bilateral forearms and L foot swelling observed on exam. WILLIE evaluation pending.  11/30: Per WILLIE recs, patient sent to ED yesterday afternoon for doppler imaging to r/o DVT which was unremarkable. The patient was able to tolerate ED course without any behavioral incidents. This morning, patient at baseline, reports concern over LEs and agrees to meet again with WILLIE. ED had recommended consideration of Keflex for possible cellulitis. Awaiting hospitalist recs.     Plan:  1. Legals: 9.27 status, *DNR/DNI* [MOLST in chart]  2. Safety: routine observation. PRNs: Zyprexa 2.5 mg PO/IM q6h   3. Psychiatry: Depakote sprinkles 750 mg BID, Continue Zyprexa 2.5mg at 6am + 11am + 4pm + 9pm  4. Group, milieu, individual therapy as appropriate.  5. Medical: f/u WILLIE recs. Continue Lopressor 25mg daily for HTN, Lasix 20mg daily for edema, Senna 2mg QHS for constipation/ppx + Miralax 8.5g daily  6. Dispo: pending clinical improvement.  Patient continues to require inpatient hospitalization for stabilization and safety.

## 2021-11-30 NOTE — BH INPATIENT PSYCHIATRY PROGRESS NOTE - NSBHMETABOLIC_PSY_ALL_CORE_FT
BMI: BMI (kg/m2): 31.6 (11-29-21 @ 16:35)  HbA1c: A1C with Estimated Average Glucose Result: 5.4 % (11-04-21 @ 06:58)    Glucose: POCT Blood Glucose.: 109 mg/dL (11-12-21 @ 16:13)    BP: --  Lipid Panel:

## 2021-11-30 NOTE — BH INPATIENT PSYCHIATRY PROGRESS NOTE - NSBHCHARTREVIEWVS_PSY_A_CORE FT
Vital Signs Last 24 Hrs  T(C): 36.6 (11-30-21 @ 08:54), Max: 37.2 (11-29-21 @ 16:35)  T(F): 97.9 (11-30-21 @ 08:54), Max: 99 (11-29-21 @ 16:35)  HR: 82 (11-29-21 @ 21:40) (77 - 82)  BP: 136/61 (11-29-21 @ 21:40) (134/85 - 136/61)  BP(mean): --  RR: 18 (11-29-21 @ 21:40) (16 - 18)  SpO2: 100% (11-29-21 @ 21:40) (99% - 100%)    Orthostatic VS  11-30-21 @ 08:54  Lying BP: --/-- HR: --  Sitting BP: 127/95 HR: 86  Standing BP: 139/97 HR: 102  Site: upper left arm  Mode: electronic  Orthostatic VS  11-29-21 @ 08:10  Lying BP: --/-- HR: --  Sitting BP: 111/70 HR: 93  Standing BP: 120/90 HR: 102  Site: upper right arm  Mode: electronic  Orthostatic VS  11-28-21 @ 19:43  Lying BP: --/-- HR: --  Sitting BP: 109/509 HR: 64  Standing BP: --/-- HR: --  Site: --  Mode: --

## 2021-12-01 DIAGNOSIS — N89.8 OTHER SPECIFIED NONINFLAMMATORY DISORDERS OF VAGINA: ICD-10-CM

## 2021-12-01 PROCEDURE — 99223 1ST HOSP IP/OBS HIGH 75: CPT

## 2021-12-01 PROCEDURE — 99232 SBSQ HOSP IP/OBS MODERATE 35: CPT

## 2021-12-01 RX ORDER — PERMETHRIN CREAM 5% W/W 50 MG/G
1 CREAM TOPICAL ONCE
Refills: 0 | Status: COMPLETED | OUTPATIENT
Start: 2021-12-01 | End: 2021-12-01

## 2021-12-01 RX ADMIN — Medication 25 MILLIGRAM(S): at 09:05

## 2021-12-01 RX ADMIN — OLANZAPINE 2.5 MILLIGRAM(S): 15 TABLET, FILM COATED ORAL at 09:05

## 2021-12-01 RX ADMIN — DIVALPROEX SODIUM 750 MILLIGRAM(S): 500 TABLET, DELAYED RELEASE ORAL at 21:34

## 2021-12-01 RX ADMIN — Medication 25 MILLIGRAM(S): at 21:35

## 2021-12-01 RX ADMIN — PERMETHRIN CREAM 5% W/W 1 APPLICATION(S): 50 CREAM TOPICAL at 21:51

## 2021-12-01 RX ADMIN — OLANZAPINE 2.5 MILLIGRAM(S): 15 TABLET, FILM COATED ORAL at 16:37

## 2021-12-01 RX ADMIN — OLANZAPINE 2.5 MILLIGRAM(S): 15 TABLET, FILM COATED ORAL at 21:51

## 2021-12-01 RX ADMIN — SENNA PLUS 2 TABLET(S): 8.6 TABLET ORAL at 21:35

## 2021-12-01 RX ADMIN — DIVALPROEX SODIUM 750 MILLIGRAM(S): 500 TABLET, DELAYED RELEASE ORAL at 09:05

## 2021-12-01 RX ADMIN — OLANZAPINE 2.5 MILLIGRAM(S): 15 TABLET, FILM COATED ORAL at 12:02

## 2021-12-01 RX ADMIN — Medication 20 MILLIGRAM(S): at 09:05

## 2021-12-01 NOTE — BH INPATIENT PSYCHIATRY PROGRESS NOTE - NSBHMETABOLIC_PSY_ALL_CORE_FT
BMI: BMI (kg/m2): 31.6 (11-29-21 @ 16:35)  HbA1c: A1C with Estimated Average Glucose Result: 5.4 % (11-04-21 @ 06:58)    Glucose: POCT Blood Glucose.: 109 mg/dL (11-12-21 @ 16:13)    BP: 110/69 (11-30-21 @ 21:48) (110/69 - 110/69)  Lipid Panel:

## 2021-12-01 NOTE — BH INPATIENT PSYCHIATRY PROGRESS NOTE - NSBHCHARTREVIEWVS_PSY_A_CORE FT
Vital Signs Last 24 Hrs  T(C): --  T(F): --  HR: 80 (11-30-21 @ 21:48) (80 - 80)  BP: 110/69 (11-30-21 @ 21:48) (110/69 - 110/69)  BP(mean): --  RR: --  SpO2: --    Orthostatic VS  11-30-21 @ 08:54  Lying BP: --/-- HR: --  Sitting BP: 127/95 HR: 86  Standing BP: 139/97 HR: 102  Site: upper left arm  Mode: electronic
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
## 2021-12-01 NOTE — BH INPATIENT PSYCHIATRY PROGRESS NOTE - NSBHFUPINTERVALHXFT_PSY_A_CORE
The patient was seen for follow up. Last night, the patient was acutely agitated, posturing at staff, and accepted Zyprexa 2.5mg PO at 9:40pm. The patient was noted to have increasingly diffuse pruritic rash despite adherence to topical steroid. This morning, the patient endorsed feeling itchy, particularly on her forearms bilaterally. She denied any other concerns, insisting she was feeling less angry and more calm. She reported feeling safe on the unit, denied paranoia or receiving messages from other patients/staff. She denied AVH. She denied any dizziness, stiffness, or changes in bowel/bladder function. She agreed to meet with dermatology for further evaluation of her rash.
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
## 2021-12-01 NOTE — BH INPATIENT PSYCHIATRY PROGRESS NOTE - CURRENT MEDICATION
MEDICATIONS  (STANDING):  diVALproex Sprinkle 750 milliGRAM(s) Oral two times a day  furosemide    Tablet 20 milliGRAM(s) Oral daily  melatonin. 3 milliGRAM(s) Oral at bedtime  metoprolol tartrate 25 milliGRAM(s) Oral two times a day  OLANZapine 2.5 milliGRAM(s) Oral <User Schedule>  polyethylene glycol 3350 8.5 Gram(s) Oral daily  senna 2 Tablet(s) Oral at bedtime  triamcinolone 0.1% Ointment 1 Application(s) Topical every 12 hours    MEDICATIONS  (PRN):  acetaminophen     Tablet .. 650 milliGRAM(s) Oral every 6 hours PRN Temp greater or equal to 38C (100.4F), Mild Pain (1 - 3), Moderate Pain (4 - 6)  aluminum hydroxide/magnesium hydroxide/simethicone Suspension 30 milliLiter(s) Oral every 4 hours PRN Dyspepsia  magnesium hydroxide Suspension 30 milliLiter(s) Oral at bedtime PRN constipation  OLANZapine 2.5 milliGRAM(s) Oral every 6 hours PRN severe agitation  OLANZapine Injectable 2.5 milliGRAM(s) IntraMuscular once PRN severe agitation  OLANZapine Injectable 2.5 milliGRAM(s) IntraMuscular once PRN severe agitation

## 2021-12-01 NOTE — CONSULT NOTE ADULT - ASSESSMENT
#Dementia w/ behavioral disturbances  -No agitation.  -Further optimization as per psych.    #Rash  -Truncal and extremities. Improved. Faint red macules on extremities.  Asymptomatic.  -Okay to watch off steroids at this time.  -Will start skin emollient.    #NPH s/p  shutn  -Stable. Imaging showed no hydro. Recent shunt series was normal.  -Cont supportive measures.    #DM2  -A1c normal.   -Change diet to carb control.  -No need for FS/insulin coverage  while at [facility]. Cont same management.     #Vaginal bleed  -Seen by GYN @ [facility]. Further follow up as OP.   -Monitor for now. Hb stable. If any worsening bleeding, please notify us.
#Dermatitis- Favor an eczematous rash, though the differential diagnosis also includes urticarial bullous pemphigoid (a type of autoimmune blistering disease that can present like this in certain phases) as well as scabies (though less likely.)     At this time:    - Start high potency topical steroid (clobetasol 0.05% ointment is on formulary) twice daily for the next two weeks, can then resume using triamcinolone 0.1% ointment twice daily as needed for up to 2 weeks at a time only if needed (Eczema is a chronic process and is therefore likely to recur)  - Would recommend scabies treatment out of caution as scabies can be contagious, especially in the hospital setting. Apply permethrin 5% cream neck down today, and then repeat treatment in 1 week  - Please obtain Bullous Pemphigoid /230 serologies to rule out urticarial bullous pemphigoid   - Please inform us if rash is worsening or does not significantly improve with two week trial of clobetasol ointment     Tyler Zambrano MD  Resident Physician, PGY3  [facility]  Pager: [phone]  Office: [phone]    The patient's chart was reviewed in addition to being seen and examined at bedside with the dermatology attending Dr. Marrero  Recommendations were communicated with the primary team.  Please page [phone] for further related questions.
normal for race

## 2021-12-01 NOTE — BH INPATIENT PSYCHIATRY PROGRESS NOTE - PRN MEDS
MEDICATIONS  (PRN):  acetaminophen     Tablet .. 650 milliGRAM(s) Oral every 6 hours PRN Temp greater or equal to 38C (100.4F), Mild Pain (1 - 3), Moderate Pain (4 - 6)  aluminum hydroxide/magnesium hydroxide/simethicone Suspension 30 milliLiter(s) Oral every 4 hours PRN Dyspepsia  magnesium hydroxide Suspension 30 milliLiter(s) Oral at bedtime PRN constipation  OLANZapine 2.5 milliGRAM(s) Oral every 6 hours PRN severe agitation  OLANZapine Injectable 2.5 milliGRAM(s) IntraMuscular once PRN severe agitation  OLANZapine Injectable 2.5 milliGRAM(s) IntraMuscular once PRN severe agitation

## 2021-12-02 PROCEDURE — 99232 SBSQ HOSP IP/OBS MODERATE 35: CPT

## 2021-12-02 RX ORDER — POLYETHYLENE GLYCOL 3350 17 G/17G
17 POWDER, FOR SOLUTION ORAL DAILY
Refills: 0 | Status: DISCONTINUED | OUTPATIENT
Start: 2021-12-02 | End: 2021-12-18

## 2021-12-02 RX ORDER — MINERAL OIL
133 OIL (ML) MISCELLANEOUS ONCE
Refills: 0 | Status: DISCONTINUED | OUTPATIENT
Start: 2021-12-02 | End: 2021-12-02

## 2021-12-02 RX ORDER — MINERAL OIL
133 OIL (ML) MISCELLANEOUS DAILY
Refills: 0 | Status: DISCONTINUED | OUTPATIENT
Start: 2021-12-02 | End: 2022-05-18

## 2021-12-02 RX ADMIN — Medication 30 MILLILITER(S): at 13:30

## 2021-12-02 RX ADMIN — OLANZAPINE 2.5 MILLIGRAM(S): 15 TABLET, FILM COATED ORAL at 21:34

## 2021-12-02 RX ADMIN — Medication 25 MILLIGRAM(S): at 21:32

## 2021-12-02 RX ADMIN — Medication 1 APPLICATION(S): at 21:51

## 2021-12-02 RX ADMIN — Medication 10 MILLIGRAM(S): at 14:27

## 2021-12-02 RX ADMIN — SENNA PLUS 2 TABLET(S): 8.6 TABLET ORAL at 21:32

## 2021-12-02 RX ADMIN — Medication 3 MILLIGRAM(S): at 21:32

## 2021-12-02 RX ADMIN — Medication 25 MILLIGRAM(S): at 08:59

## 2021-12-02 RX ADMIN — OLANZAPINE 2.5 MILLIGRAM(S): 15 TABLET, FILM COATED ORAL at 10:33

## 2021-12-02 RX ADMIN — DIVALPROEX SODIUM 750 MILLIGRAM(S): 500 TABLET, DELAYED RELEASE ORAL at 21:31

## 2021-12-02 RX ADMIN — Medication 20 MILLIGRAM(S): at 08:59

## 2021-12-02 RX ADMIN — OLANZAPINE 2.5 MILLIGRAM(S): 15 TABLET, FILM COATED ORAL at 17:31

## 2021-12-02 RX ADMIN — POLYETHYLENE GLYCOL 3350 8.5 GRAM(S): 17 POWDER, FOR SOLUTION ORAL at 09:45

## 2021-12-02 RX ADMIN — OLANZAPINE 2.5 MILLIGRAM(S): 15 TABLET, FILM COATED ORAL at 06:37

## 2021-12-02 RX ADMIN — Medication 1 APPLICATION(S): at 10:27

## 2021-12-02 RX ADMIN — DIVALPROEX SODIUM 750 MILLIGRAM(S): 500 TABLET, DELAYED RELEASE ORAL at 08:58

## 2021-12-02 NOTE — BH INPATIENT PSYCHIATRY PROGRESS NOTE - CURRENT MEDICATION
MEDICATIONS  (STANDING):  bisacodyl Suppository 10 milliGRAM(s) Rectal once  clobetasol 0.05% Cream 1 Application(s) Topical two times a day  diVALproex Sprinkle 750 milliGRAM(s) Oral two times a day  furosemide    Tablet 20 milliGRAM(s) Oral daily  melatonin. 3 milliGRAM(s) Oral at bedtime  metoprolol tartrate 25 milliGRAM(s) Oral two times a day  OLANZapine 2.5 milliGRAM(s) Oral <User Schedule>  polyethylene glycol 3350 17 Gram(s) Oral daily  senna 2 Tablet(s) Oral at bedtime    MEDICATIONS  (PRN):  acetaminophen     Tablet .. 650 milliGRAM(s) Oral every 6 hours PRN Temp greater or equal to 38C (100.4F), Mild Pain (1 - 3), Moderate Pain (4 - 6)  aluminum hydroxide/magnesium hydroxide/simethicone Suspension 30 milliLiter(s) Oral every 4 hours PRN Dyspepsia  magnesium hydroxide Suspension 30 milliLiter(s) Oral at bedtime PRN constipation  OLANZapine 2.5 milliGRAM(s) Oral every 6 hours PRN severe agitation  OLANZapine Injectable 2.5 milliGRAM(s) IntraMuscular once PRN severe agitation  OLANZapine Injectable 2.5 milliGRAM(s) IntraMuscular once PRN severe agitation   MEDICATIONS  (STANDING):  clobetasol 0.05% Cream 1 Application(s) Topical two times a day  diVALproex Sprinkle 750 milliGRAM(s) Oral two times a day  furosemide    Tablet 20 milliGRAM(s) Oral daily  melatonin. 3 milliGRAM(s) Oral at bedtime  metoprolol tartrate 25 milliGRAM(s) Oral two times a day  OLANZapine 2.5 milliGRAM(s) Oral <User Schedule>  polyethylene glycol 3350 17 Gram(s) Oral daily  senna 2 Tablet(s) Oral at bedtime    MEDICATIONS  (PRN):  acetaminophen     Tablet .. 650 milliGRAM(s) Oral every 6 hours PRN Temp greater or equal to 38C (100.4F), Mild Pain (1 - 3), Moderate Pain (4 - 6)  aluminum hydroxide/magnesium hydroxide/simethicone Suspension 30 milliLiter(s) Oral every 4 hours PRN Dyspepsia  magnesium hydroxide Suspension 30 milliLiter(s) Oral at bedtime PRN constipation  mineral oil enema 133 milliLiter(s) Rectal daily PRN hard stool  OLANZapine 2.5 milliGRAM(s) Oral every 6 hours PRN severe agitation  OLANZapine Injectable 2.5 milliGRAM(s) IntraMuscular once PRN severe agitation  OLANZapine Injectable 2.5 milliGRAM(s) IntraMuscular once PRN severe agitation  saline laxative (FLEET) Rectal Enema 1 Enema Rectal daily PRN hard stool

## 2021-12-02 NOTE — PHARMACOTHERAPY INTERVENTION NOTE - COMMENTS
Patient can't tolerate the Depakote DR tablets and not able to swallow. Order is changed to sprinkles to make it easier to swallow and for better taste compare to liquid form.
Spoke to md ,he wants both

## 2021-12-02 NOTE — BH INPATIENT PSYCHIATRY PROGRESS NOTE - NSBHFUPINTERVALHXFT_PSY_A_CORE
The patient was seen for follow up. There were no acute events overnight or PRN medications required. This morning, the patient endorsed ongoing pruritis, particularly on her forearms bilaterally. She denied any other concerns, insisting she was feeling less angry and more calm. She reported feeling safe on the unit, denied paranoia or receiving messages from other patients/staff. She denied AVH. She denied any dizziness, stiffness, or changes in bladder function. Acknowledged constipation. She agreed to cooperate with labs recommended by dermatology.

## 2021-12-02 NOTE — BH INPATIENT PSYCHIATRY PROGRESS NOTE - NSBHASSESSSUMMFT_PSY_ALL_CORE
66 y.o. F, as per records is , has been residing at [facility] ([facility]) since 2010, with past psychiatric history of dementia with behavioral disturbance, mood disorder, RODY, possible inpatient psychiatric admission to [facility] this year, has a chronic medical history notable for NPH (w/  shunt), HTN, DMT2, TBI (unspecified), pedal edema presenting from [facility] for physically aggressive behavior and diffuse rash. Records also indicate that [facility] was seeking a [facility] admission. Pt is AOx1 which per chart appears to be patient's baseline. Pt was doing well on previous medication regimen but having increase episodes of agitation. Pt most likely having increase in aggressive behavior 2/2 to TBI vs dementia. Patient requires inpatient admission for medication optimization and stabilization of agitation.     11/22: No acute events overnight or PRNs required, VSS, med adherent. Endorses AH of mother's voice telling her to take good care of her baby. Denies any safety concerns. Patient became agitated at 1pm after trying to follow repair men off unit, denies wanting to leave the unit. Incident likely related to disinhibition rather than psychosis, but further monitoring and collateral necessary to better understand the patient and hopefully delineate a pattern.   11/23: No acute events overnight or PRNs required, VSS, med adherent. Has been in more appropriate behavioral control following change in Zyprexa schedule. Was unable to take Depakote overnight due to difficulty swallowing the pill. Switched to sprinkles this morning which patient took without issue. Will continue monitoring on current regimen given change in formulation. Ammonia pending given patient's tremor. Will check VPA level when patient consistently taking Depakote sprinkles.   11/24: Patient required Zyprexa PO PRN yesterday at 6:40pm for agitation, given with good effect. Slept well overnight and adherent with meds. VPA/ammonia deferred given prior adherence issues due to difficulty swallowing. Will continue to titrate Zyprexa and check VPA/ammonia when the patient is taking reliably. EKG today given ongoing titration.   11/25: Patient remains confused, labile, but calm today.  11/26: No acute events over PRNs required. Patient remains intermittently labile but redirectable. Accepting of treatment. Will check VPA/ammonia today given improved adherence. VPA 68, Ammonia pending.  11/27: On exam, patient is pleasantly confused, denies any concerns. No behavorial events. Taking meds. Labs: Serum ammonia wnl at 15.   11/28: Patient remains confused, labile affect, intermittently laughing then screaming.  11/29: No acute events or PRNs required. Patient remains labile but redirectable and accepting treatment. Endorses pruritis in bilateral forearms and L foot swelling observed on exam. WILLIE evaluation pending.  11/30: Per WILLIE recs, patient sent to ED yesterday afternoon for doppler imaging to r/o DVT which was unremarkable. The patient was able to tolerate ED course without any behavioral incidents. This morning, patient at baseline, reports concern over LEs and agrees to meet again with WILLIE. ED had recommended consideration of Keflex for possible cellulitis. Awaiting hospitalist recs.   12/1: Patient acutely agitated overnight requiring Zyprexa 2.5mg PO accepted with good effect. Noted to have worsening rash despite adherence to topical steroid. Per WILLIE recs, dermatology consulted for further evaluation. Rash likely underlying behavioral exacerbation as the patient had been without issue for several days.  12/2: No acute events or PRNs required. Patient cooperative with Derm and GYN consults yesterday afternoon. Per derm, likely eczema for which will start clobetasol BID. Per recs, was also treated prophylactically for scabies (repeat permethrin in 1 week has been ordered). Per derm, also ordered Bullous Pemphigoid labs to r/o. Per GYN, send BV panel to r/o. Patient initially orthostatic but improved on repeat, denies symptoms.    Plan:  1. Legals: 9.27 status, *DNR/DNI* [MOLST in chart]  2. Safety: routine observation. PRNs: Zyprexa 2.5 mg PO/IM q6h   3. Psychiatry: Depakote sprinkles 750 mg BID, Continue Zyprexa 2.5mg at 6am + 11am + 4pm + 9pm  4. Group, milieu, individual therapy as appropriate.  5. Medical: s/p derm consult, given permethrin and started on clobetasol BID, repeat permethrin ordered 12/8, BP lab to be drawn. s/p GYN consult, BV panel sent. Pt noted to have stool burden on exam, will INCREASE Miralax to 17g and START dulcolax suppository. Continue home Lopressor 25mg daily for HTN, Lasix 20mg daily for edema, Senna 2mg QHS for constipation.  6. Dispo: pending clinical improvement.  Patient continues to require inpatient hospitalization for stabilization and safety.

## 2021-12-02 NOTE — BH INPATIENT PSYCHIATRY PROGRESS NOTE - NSBHMETABOLIC_PSY_ALL_CORE_FT
BMI: BMI (kg/m2): 31.6 (11-29-21 @ 16:35)  HbA1c: A1C with Estimated Average Glucose Result: 5.4 % (11-04-21 @ 06:58)    Glucose: POCT Blood Glucose.: 109 mg/dL (11-12-21 @ 16:13)    BP: 116/62 (12-01-21 @ 21:22) (110/69 - 116/62)  Lipid Panel:

## 2021-12-02 NOTE — BH INPATIENT PSYCHIATRY PROGRESS NOTE - NSBHCHARTREVIEWVS_PSY_A_CORE FT
Vital Signs Last 24 Hrs  T(C): 36.6 (12-01-21 @ 21:22), Max: 36.6 (12-01-21 @ 21:22)  T(F): 97.8 (12-01-21 @ 21:22), Max: 97.8 (12-01-21 @ 21:22)  HR: 92 (12-01-21 @ 21:22) (92 - 92)  BP: 116/62 (12-01-21 @ 21:22) (116/62 - 116/62)  BP(mean): --  RR: 18 (12-01-21 @ 21:22) (18 - 18)  SpO2: 98% (12-01-21 @ 21:22) (98% - 98%)    Orthostatic VS  12-02-21 @ 10:26  Lying BP: --/-- HR: --  Sitting BP: 142/78 HR: 68  Standing BP: 126/74 HR: 72  Site: --  Mode: --  Orthostatic VS  12-02-21 @ 08:06  Lying BP: --/-- HR: --  Sitting BP: 144/96 HR: 60  Standing BP: 124/69 HR: 69  Site: upper right arm  Mode: electronic   Vital Signs Last 24 Hrs  T(C): --  T(F): --  HR: --  BP: --  BP(mean): --  RR: --  SpO2: --    Orthostatic VS  12-02-21 @ 20:35  Lying BP: --/-- HR: --  Sitting BP: 143/59 HR: 80  Standing BP: --/-- HR: --  Site: --  Mode: --  Orthostatic VS  12-02-21 @ 10:26  Lying BP: --/-- HR: --  Sitting BP: 142/78 HR: 68  Standing BP: 126/74 HR: 72  Site: --  Mode: --  Orthostatic VS  12-02-21 @ 08:06  Lying BP: --/-- HR: --  Sitting BP: 144/96 HR: 60  Standing BP: 124/69 HR: 69  Site: upper right arm  Mode: electronic

## 2021-12-02 NOTE — BH INPATIENT PSYCHIATRY PROGRESS NOTE - PRN MEDS
MEDICATIONS  (PRN):  acetaminophen     Tablet .. 650 milliGRAM(s) Oral every 6 hours PRN Temp greater or equal to 38C (100.4F), Mild Pain (1 - 3), Moderate Pain (4 - 6)  aluminum hydroxide/magnesium hydroxide/simethicone Suspension 30 milliLiter(s) Oral every 4 hours PRN Dyspepsia  magnesium hydroxide Suspension 30 milliLiter(s) Oral at bedtime PRN constipation  OLANZapine 2.5 milliGRAM(s) Oral every 6 hours PRN severe agitation  OLANZapine Injectable 2.5 milliGRAM(s) IntraMuscular once PRN severe agitation  OLANZapine Injectable 2.5 milliGRAM(s) IntraMuscular once PRN severe agitation   MEDICATIONS  (PRN):  acetaminophen     Tablet .. 650 milliGRAM(s) Oral every 6 hours PRN Temp greater or equal to 38C (100.4F), Mild Pain (1 - 3), Moderate Pain (4 - 6)  aluminum hydroxide/magnesium hydroxide/simethicone Suspension 30 milliLiter(s) Oral every 4 hours PRN Dyspepsia  magnesium hydroxide Suspension 30 milliLiter(s) Oral at bedtime PRN constipation  mineral oil enema 133 milliLiter(s) Rectal daily PRN hard stool  OLANZapine 2.5 milliGRAM(s) Oral every 6 hours PRN severe agitation  OLANZapine Injectable 2.5 milliGRAM(s) IntraMuscular once PRN severe agitation  OLANZapine Injectable 2.5 milliGRAM(s) IntraMuscular once PRN severe agitation  saline laxative (FLEET) Rectal Enema 1 Enema Rectal daily PRN hard stool

## 2021-12-03 LAB
CANDIDA AB TITR SER: SIGNIFICANT CHANGE UP
G VAGINALIS DNA SPEC QL NAA+PROBE: SIGNIFICANT CHANGE UP
T VAGINALIS SPEC QL WET PREP: SIGNIFICANT CHANGE UP

## 2021-12-03 PROCEDURE — 99232 SBSQ HOSP IP/OBS MODERATE 35: CPT

## 2021-12-03 RX ADMIN — SENNA PLUS 2 TABLET(S): 8.6 TABLET ORAL at 21:39

## 2021-12-03 RX ADMIN — Medication 1 APPLICATION(S): at 10:17

## 2021-12-03 RX ADMIN — OLANZAPINE 2.5 MILLIGRAM(S): 15 TABLET, FILM COATED ORAL at 21:38

## 2021-12-03 RX ADMIN — DIVALPROEX SODIUM 750 MILLIGRAM(S): 500 TABLET, DELAYED RELEASE ORAL at 21:39

## 2021-12-03 RX ADMIN — DIVALPROEX SODIUM 750 MILLIGRAM(S): 500 TABLET, DELAYED RELEASE ORAL at 10:16

## 2021-12-03 RX ADMIN — Medication 20 MILLIGRAM(S): at 10:17

## 2021-12-03 RX ADMIN — Medication 25 MILLIGRAM(S): at 21:38

## 2021-12-03 RX ADMIN — POLYETHYLENE GLYCOL 3350 17 GRAM(S): 17 POWDER, FOR SOLUTION ORAL at 10:16

## 2021-12-03 RX ADMIN — Medication 25 MILLIGRAM(S): at 10:17

## 2021-12-03 RX ADMIN — OLANZAPINE 2.5 MILLIGRAM(S): 15 TABLET, FILM COATED ORAL at 07:08

## 2021-12-03 RX ADMIN — Medication 3 MILLIGRAM(S): at 21:39

## 2021-12-03 RX ADMIN — OLANZAPINE 2.5 MILLIGRAM(S): 15 TABLET, FILM COATED ORAL at 10:16

## 2021-12-03 NOTE — BH INPATIENT PSYCHIATRY PROGRESS NOTE - NSBHCHARTREVIEWVS_PSY_A_CORE FT
Vital Signs Last 24 Hrs  T(C): --  T(F): --  HR: --  BP: --  BP(mean): --  RR: --  SpO2: --    Orthostatic VS  12-02-21 @ 20:35  Lying BP: --/-- HR: --  Sitting BP: 143/59 HR: 80  Standing BP: --/-- HR: --  Site: --  Mode: --  Orthostatic VS  12-02-21 @ 10:26  Lying BP: --/-- HR: --  Sitting BP: 142/78 HR: 68  Standing BP: 126/74 HR: 72  Site: --  Mode: --  Orthostatic VS  12-02-21 @ 08:06  Lying BP: --/-- HR: --  Sitting BP: 144/96 HR: 60  Standing BP: 124/69 HR: 69  Site: upper right arm  Mode: electronic

## 2021-12-03 NOTE — BH INPATIENT PSYCHIATRY PROGRESS NOTE - CURRENT MEDICATION
MEDICATIONS  (STANDING):  clobetasol 0.05% Cream 1 Application(s) Topical two times a day  diVALproex Sprinkle 750 milliGRAM(s) Oral two times a day  furosemide    Tablet 20 milliGRAM(s) Oral daily  melatonin. 3 milliGRAM(s) Oral at bedtime  metoprolol tartrate 25 milliGRAM(s) Oral two times a day  OLANZapine 2.5 milliGRAM(s) Oral <User Schedule>  polyethylene glycol 3350 17 Gram(s) Oral daily  senna 2 Tablet(s) Oral at bedtime    MEDICATIONS  (PRN):  acetaminophen     Tablet .. 650 milliGRAM(s) Oral every 6 hours PRN Temp greater or equal to 38C (100.4F), Mild Pain (1 - 3), Moderate Pain (4 - 6)  aluminum hydroxide/magnesium hydroxide/simethicone Suspension 30 milliLiter(s) Oral every 4 hours PRN Dyspepsia  magnesium hydroxide Suspension 30 milliLiter(s) Oral at bedtime PRN constipation  mineral oil enema 133 milliLiter(s) Rectal daily PRN hard stool  OLANZapine 2.5 milliGRAM(s) Oral every 6 hours PRN severe agitation  OLANZapine Injectable 2.5 milliGRAM(s) IntraMuscular once PRN severe agitation  OLANZapine Injectable 2.5 milliGRAM(s) IntraMuscular once PRN severe agitation  saline laxative (FLEET) Rectal Enema 1 Enema Rectal daily PRN hard stool

## 2021-12-03 NOTE — BH INPATIENT PSYCHIATRY PROGRESS NOTE - NSBHASSESSSUMMFT_PSY_ALL_CORE
66 y.o. F, as per records is , has been residing at [facility] ([facility]) since 2010, with past psychiatric history of dementia with behavioral disturbance, mood disorder, RODY, possible inpatient psychiatric admission to [facility] this year, has a chronic medical history notable for NPH (w/  shunt), HTN, DMT2, TBI (unspecified), pedal edema presenting from [facility] for physically aggressive behavior and diffuse rash. Records also indicate that [facility] was seeking a [facility] admission. Pt is AOx1 which per chart appears to be patient's baseline. Pt was doing well on previous medication regimen but having increase episodes of agitation. Pt most likely having increase in aggressive behavior 2/2 to TBI vs dementia. Patient requires inpatient admission for medication optimization and stabilization of agitation.     11/22: No acute events overnight or PRNs required, VSS, med adherent. Endorses AH of mother's voice telling her to take good care of her baby. Denies any safety concerns. Patient became agitated at 1pm after trying to follow repair men off unit, denies wanting to leave the unit. Incident likely related to disinhibition rather than psychosis, but further monitoring and collateral necessary to better understand the patient and hopefully delineate a pattern.   11/23: No acute events overnight or PRNs required, VSS, med adherent. Has been in more appropriate behavioral control following change in Zyprexa schedule. Was unable to take Depakote overnight due to difficulty swallowing the pill. Switched to sprinkles this morning which patient took without issue. Will continue monitoring on current regimen given change in formulation. Ammonia pending given patient's tremor. Will check VPA level when patient consistently taking Depakote sprinkles.   11/24: Patient required Zyprexa PO PRN yesterday at 6:40pm for agitation, given with good effect. Slept well overnight and adherent with meds. VPA/ammonia deferred given prior adherence issues due to difficulty swallowing. Will continue to titrate Zyprexa and check VPA/ammonia when the patient is taking reliably. EKG today given ongoing titration.   11/25: Patient remains confused, labile, but calm today.  11/26: No acute events over PRNs required. Patient remains intermittently labile but redirectable. Accepting of treatment. Will check VPA/ammonia today given improved adherence. VPA 68, Ammonia pending.  11/27: On exam, patient is pleasantly confused, denies any concerns. No behavorial events. Taking meds. Labs: Serum ammonia wnl at 15.   11/28: Patient remains confused, labile affect, intermittently laughing then screaming.  11/29: No acute events or PRNs required. Patient remains labile but redirectable and accepting treatment. Endorses pruritis in bilateral forearms and L foot swelling observed on exam. WILLIE evaluation pending.  11/30: Per WILLIE recs, patient sent to ED yesterday afternoon for doppler imaging to r/o DVT which was unremarkable. The patient was able to tolerate ED course without any behavioral incidents. This morning, patient at baseline, reports concern over LEs and agrees to meet again with WILLIE. ED had recommended consideration of Keflex for possible cellulitis. Awaiting hospitalist recs.   12/1: Patient acutely agitated overnight requiring Zyprexa 2.5mg PO accepted with good effect. Noted to have worsening rash despite adherence to topical steroid. Per WILLIE recs, dermatology consulted for further evaluation. Rash likely underlying behavioral exacerbation as the patient had been without issue for several days.  12/2: No acute events or PRNs required. Patient cooperative with Derm and GYN consults yesterday afternoon. Per derm, likely eczema for which will start clobetasol BID. Per recs, was also treated prophylactically for scabies (repeat permethrin in 1 week has been ordered). Per derm, also ordered Bullous Pemphigoid labs to r/o. Per GYN, send BV panel to r/o. Patient initially orthostatic but improved on repeat, denies symptoms.    Plan:  1. Legals: 9.27 status, *DNR/DNI* [MOLST in chart]  2. Safety: routine observation. PRNs: Zyprexa 2.5 mg PO/IM q6h   3. Psychiatry: Depakote sprinkles 750 mg BID, Continue Zyprexa 2.5mg at 6am + 11am + 4pm + 9pm  4. Group, milieu, individual therapy as appropriate.  5. Medical: s/p derm consult, given permethrin and started on clobetasol BID, repeat permethrin ordered 12/8, BP lab to be drawn. s/p GYN consult, BV panel sent. Pt noted to have stool burden on exam, will INCREASE Miralax to 17g and START dulcolax suppository. Continue home Lopressor 25mg daily for HTN, Lasix 20mg daily for edema, Senna 2mg QHS for constipation.  6. Dispo: pending clinical improvement.  Patient continues to require inpatient hospitalization for stabilization and safety. 66 y.o. F, as per records is , has been residing at [facility] ([facility]) since 2010, with past psychiatric history of dementia with behavioral disturbance, mood disorder, RODY, possible inpatient psychiatric admission to [facility] this year, has a chronic medical history notable for NPH (w/  shunt), HTN, DMT2, TBI (unspecified), pedal edema presenting from [facility] for physically aggressive behavior and diffuse rash. Records also indicate that [facility] was seeking a [facility] admission. Pt is AOx1 which per chart appears to be patient's baseline. Pt was doing well on previous medication regimen but having increase episodes of agitation. Pt most likely having increase in aggressive behavior 2/2 to TBI vs dementia. Patient requires inpatient admission for medication optimization and stabilization of agitation.     11/22: No acute events overnight or PRNs required, VSS, med adherent. Endorses AH of mother's voice telling her to take good care of her baby. Denies any safety concerns. Patient became agitated at 1pm after trying to follow repair men off unit, denies wanting to leave the unit. Incident likely related to disinhibition rather than psychosis, but further monitoring and collateral necessary to better understand the patient and hopefully delineate a pattern.   11/23: No acute events overnight or PRNs required, VSS, med adherent. Has been in more appropriate behavioral control following change in Zyprexa schedule. Was unable to take Depakote overnight due to difficulty swallowing the pill. Switched to sprinkles this morning which patient took without issue. Will continue monitoring on current regimen given change in formulation. Ammonia pending given patient's tremor. Will check VPA level when patient consistently taking Depakote sprinkles.   11/24: Patient required Zyprexa PO PRN yesterday at 6:40pm for agitation, given with good effect. Slept well overnight and adherent with meds. VPA/ammonia deferred given prior adherence issues due to difficulty swallowing. Will continue to titrate Zyprexa and check VPA/ammonia when the patient is taking reliably. EKG today given ongoing titration.   11/25: Patient remains confused, labile, but calm today.  11/26: No acute events over PRNs required. Patient remains intermittently labile but redirectable. Accepting of treatment. Will check VPA/ammonia today given improved adherence. VPA 68, Ammonia pending.  11/27: On exam, patient is pleasantly confused, denies any concerns. No behavorial events. Taking meds. Labs: Serum ammonia wnl at 15.   11/28: Patient remains confused, labile affect, intermittently laughing then screaming.  11/29: No acute events or PRNs required. Patient remains labile but redirectable and accepting treatment. Endorses pruritis in bilateral forearms and L foot swelling observed on exam. WILLIE evaluation pending.  11/30: Per WILLIE recs, patient sent to ED yesterday afternoon for doppler imaging to r/o DVT which was unremarkable. The patient was able to tolerate ED course without any behavioral incidents. This morning, patient at baseline, reports concern over LEs and agrees to meet again with WILLIE. ED had recommended consideration of Keflex for possible cellulitis. Awaiting hospitalist recs.   12/1: Patient acutely agitated overnight requiring Zyprexa 2.5mg PO accepted with good effect. Noted to have worsening rash despite adherence to topical steroid. Per WILLIE recs, dermatology consulted for further evaluation. Rash likely underlying behavioral exacerbation as the patient had been without issue for several days.  12/2: No acute events or PRNs required. Patient cooperative with Derm and GYN consults yesterday afternoon. Per derm, likely eczema for which will start clobetasol BID. Per recs, was also treated prophylactically for scabies (repeat permethrin in 1 week has been ordered). Per derm, also ordered Bullous Pemphigoid labs to r/o. Per GYN, send BV panel to r/o. Patient initially orthostatic but improved on repeat, denies symptoms.    Plan:  1. Legals: 9.27 status, *DNR/DNI* [MOLST in chart]  2. Safety: routine observation. PRNs: Zyprexa 2.5 mg PO/IM q6h   3. Psychiatry: Depakote sprinkles 750 mg BID, Continue Zyprexa 2.5mg at 6am + 11am + 4pm + 9pm  4. Group, milieu, individual therapy as appropriate.  5. Medical: s/p derm consult, given permethrin and started on clobetasol BID, repeat permethrin ordered 12/8, BP lab to be drawn. s/p GYN consult, BV panel sent. Pt noted to have stool burden on exam, will INCREASE Miralax to 17g and START dulcolax suppository. Continue home Lopressor 25mg daily for HTN, Lasix 20mg daily for edema, Senna 2mg QHS for constipation.  6. Dispo: pending clinical improvement.  Patient continues to require inpatient hospitalization for stabilization and safety.  12/3 PAtient modestly better still labile. Cont Zyprexa Depakote Monitor  bowel pattern and rash as constipation, pruritis may have exacerbated agitation

## 2021-12-03 NOTE — BH INPATIENT PSYCHIATRY PROGRESS NOTE - PRN MEDS
MEDICATIONS  (PRN):  acetaminophen     Tablet .. 650 milliGRAM(s) Oral every 6 hours PRN Temp greater or equal to 38C (100.4F), Mild Pain (1 - 3), Moderate Pain (4 - 6)  aluminum hydroxide/magnesium hydroxide/simethicone Suspension 30 milliLiter(s) Oral every 4 hours PRN Dyspepsia  magnesium hydroxide Suspension 30 milliLiter(s) Oral at bedtime PRN constipation  mineral oil enema 133 milliLiter(s) Rectal daily PRN hard stool  OLANZapine 2.5 milliGRAM(s) Oral every 6 hours PRN severe agitation  OLANZapine Injectable 2.5 milliGRAM(s) IntraMuscular once PRN severe agitation  OLANZapine Injectable 2.5 milliGRAM(s) IntraMuscular once PRN severe agitation  saline laxative (FLEET) Rectal Enema 1 Enema Rectal daily PRN hard stool

## 2021-12-03 NOTE — BH INPATIENT PSYCHIATRY PROGRESS NOTE - NSBHASSESSSUMMFT_PSY_ALL_CORE
66 y.o. F, as per records is , has been residing at [facility] ([facility]) since 2010, with past psychiatric history of dementia with behavioral disturbance, mood disorder, RODY, possible inpatient psychiatric admission to [facility] this year, has a chronic medical history notable for NPH (w/  shunt), HTN, DMT2, TBI (unspecified), pedal edema presenting from [facility] for physically aggressive behavior and diffuse rash. Records also indicate that [facility] was seeking a [facility] admission. Pt is AOx1 which per chart appears to be patient's baseline. Pt was doing well on previous medication regimen but having increase episodes of agitation. Pt most likely having increase in aggressive behavior 2/2 to TBI vs dementia. Patient requires inpatient admission for medication optimization and stabilization of agitation.     11/22: No acute events overnight or PRNs required, VSS, med adherent. Endorses AH of mother's voice telling her to take good care of her baby. Denies any safety concerns. Patient became agitated at 1pm after trying to follow repair men off unit, denies wanting to leave the unit. Incident likely related to disinhibition rather than psychosis, but further monitoring and collateral necessary to better understand the patient and hopefully delineate a pattern.   11/23: No acute events overnight or PRNs required, VSS, med adherent. Has been in more appropriate behavioral control following change in Zyprexa schedule. Was unable to take Depakote overnight due to difficulty swallowing the pill. Switched to sprinkles this morning which patient took without issue. Will continue monitoring on current regimen given change in formulation. Ammonia pending given patient's tremor. Will check VPA level when patient consistently taking Depakote sprinkles.   11/24: Patient required Zyprexa PO PRN yesterday at 6:40pm for agitation, given with good effect. Slept well overnight and adherent with meds. VPA/ammonia deferred given prior adherence issues due to difficulty swallowing. Will continue to titrate Zyprexa and check VPA/ammonia when the patient is taking reliably. EKG today given ongoing titration.   11/25: Patient remains confused, labile, but calm today.  11/26: No acute events over PRNs required. Patient remains intermittently labile but redirectable. Accepting of treatment. Will check VPA/ammonia today given improved adherence. VPA 68, Ammonia pending.  11/27: On exam, patient is pleasantly confused, denies any concerns. No behavorial events. Taking meds. Labs: Serum ammonia wnl at 15.   11/28: Patient remains confused, labile affect, intermittently laughing then screaming.  11/29: No acute events or PRNs required. Patient remains labile but redirectable and accepting treatment. Endorses pruritis in bilateral forearms and L foot swelling observed on exam. WILLIE evaluation pending.  11/30: Per WILLIE recs, patient sent to ED yesterday afternoon for doppler imaging to r/o DVT which was unremarkable. The patient was able to tolerate ED course without any behavioral incidents. This morning, patient at baseline, reports concern over LEs and agrees to meet again with WILLIE. ED had recommended consideration of Keflex for possible cellulitis. Awaiting hospitalist recs.   12/1: Patient acutely agitated overnight requiring Zyprexa 2.5mg PO accepted with good effect. Noted to have worsening rash despite adherence to topical steroid. Per WILLIE recs, dermatology consulted for further evaluation. Rash likely underlying behavioral exacerbation as the patient had been without issue for several days.  12/2: No acute events or PRNs required. Patient cooperative with Derm and GYN consults yesterday afternoon. Per derm, likely eczema for which will start clobetasol BID. Per recs, was also treated prophylactically for scabies (repeat permethrin in 1 week has been ordered). Per derm, also ordered Bullous Pemphigoid labs to r/o. Per GYN, send BV panel to r/o. Patient initially orthostatic but improved on repeat, denies symptoms.    Plan:  1. Legals: 9.27 status, *DNR/DNI* [MOLST in chart]  2. Safety: routine observation. PRNs: Zyprexa 2.5 mg PO/IM q6h   3. Psychiatry: Depakote sprinkles 750 mg BID, Continue Zyprexa 2.5mg at 6am + 11am + 4pm + 9pm  4. Group, milieu, individual therapy as appropriate.  5. Medical: s/p derm consult, given permethrin and started on clobetasol BID, repeat permethrin ordered 12/8, BP lab to be drawn. s/p GYN consult, BV panel sent. Pt noted to have stool burden on exam, will INCREASE Miralax to 17g and START dulcolax suppository. Continue home Lopressor 25mg daily for HTN, Lasix 20mg daily for edema, Senna 2mg QHS for constipation.  6. Dispo: pending clinical improvement.  Patient continues to require inpatient hospitalization for stabilization and safety.  12/3 Patient improved somewhat but still labile. Will cont current meds Consider titrating and consolidating olanzpine. monitor bowel and dermatitis as constipation and rash may worsen agitation

## 2021-12-03 NOTE — BH INPATIENT PSYCHIATRY PROGRESS NOTE - NSBHFUPINTERVALHXFT_PSY_A_CORE
The patient was seen for follow up. There were no acute events overnight or PRN medications required. This morning, the patient endorsed ongoing pruritis, particularly on her forearms bilaterally. She denied any other concerns, insisting she was feeling less angry and more calm. She reported feeling safe on the unit, denied paranoia or receiving messages from other patients/staff. She denied AVH. She denied any dizziness, stiffness, or changes in bladder function. Acknowledged constipation. She agreed to cooperate with labs recommended by dermatology.   12/3 Patient seen for complications of TBI dementia. No major overnight events. Patient not aggressive still labile  from silly giddy to angry, raising voice. Eating sleping fairly well. Had large BM

## 2021-12-04 PROCEDURE — 99231 SBSQ HOSP IP/OBS SF/LOW 25: CPT

## 2021-12-04 RX ADMIN — POLYETHYLENE GLYCOL 3350 17 GRAM(S): 17 POWDER, FOR SOLUTION ORAL at 10:33

## 2021-12-04 RX ADMIN — OLANZAPINE 2.5 MILLIGRAM(S): 15 TABLET, FILM COATED ORAL at 10:32

## 2021-12-04 RX ADMIN — Medication 1 APPLICATION(S): at 10:45

## 2021-12-04 RX ADMIN — Medication 1 APPLICATION(S): at 21:36

## 2021-12-04 RX ADMIN — DIVALPROEX SODIUM 750 MILLIGRAM(S): 500 TABLET, DELAYED RELEASE ORAL at 21:16

## 2021-12-04 RX ADMIN — DIVALPROEX SODIUM 750 MILLIGRAM(S): 500 TABLET, DELAYED RELEASE ORAL at 10:32

## 2021-12-04 RX ADMIN — OLANZAPINE 2.5 MILLIGRAM(S): 15 TABLET, FILM COATED ORAL at 15:25

## 2021-12-04 RX ADMIN — Medication 25 MILLIGRAM(S): at 10:32

## 2021-12-04 RX ADMIN — SENNA PLUS 2 TABLET(S): 8.6 TABLET ORAL at 21:16

## 2021-12-04 RX ADMIN — Medication 20 MILLIGRAM(S): at 10:32

## 2021-12-04 RX ADMIN — OLANZAPINE 2.5 MILLIGRAM(S): 15 TABLET, FILM COATED ORAL at 21:15

## 2021-12-04 RX ADMIN — Medication 25 MILLIGRAM(S): at 21:16

## 2021-12-04 RX ADMIN — OLANZAPINE 2.5 MILLIGRAM(S): 15 TABLET, FILM COATED ORAL at 13:42

## 2021-12-04 RX ADMIN — Medication 3 MILLIGRAM(S): at 21:15

## 2021-12-04 NOTE — BH INPATIENT PSYCHIATRY PROGRESS NOTE - NSBHMETABOLIC_PSY_ALL_CORE_FT
BMI: BMI (kg/m2): 31.6 (11-29-21 @ 16:35)  HbA1c: A1C with Estimated Average Glucose Result: 5.4 % (11-04-21 @ 06:58)    Glucose: POCT Blood Glucose.: 109 mg/dL (11-12-21 @ 16:13)    BP: 116/62 (12-01-21 @ 21:22) (116/62 - 116/62)  Lipid Panel:

## 2021-12-04 NOTE — BH INPATIENT PSYCHIATRY PROGRESS NOTE - NSBHCHARTREVIEWVS_PSY_A_CORE FT
Vital Signs Last 24 Hrs  T(C): 35.7 (12-04-21 @ 07:48), Max: 35.7 (12-04-21 @ 07:48)  T(F): 96.3 (12-04-21 @ 07:48), Max: 96.3 (12-04-21 @ 07:48)  HR: --  BP: --  BP(mean): --  RR: --  SpO2: --    Orthostatic VS  12-04-21 @ 07:48  Lying BP: --/-- HR: --  Sitting BP: 111/74 HR: 55  Standing BP: --/-- HR: --  Site: --  Mode: --  Orthostatic VS  12-02-21 @ 20:35  Lying BP: --/-- HR: --  Sitting BP: 143/59 HR: 80  Standing BP: --/-- HR: --  Site: --  Mode: --  Orthostatic VS  12-02-21 @ 10:26  Lying BP: --/-- HR: --  Sitting BP: 142/78 HR: 68  Standing BP: 126/74 HR: 72  Site: --  Mode: --

## 2021-12-05 RX ADMIN — POLYETHYLENE GLYCOL 3350 17 GRAM(S): 17 POWDER, FOR SOLUTION ORAL at 10:04

## 2021-12-05 RX ADMIN — OLANZAPINE 2.5 MILLIGRAM(S): 15 TABLET, FILM COATED ORAL at 20:34

## 2021-12-05 RX ADMIN — Medication 20 MILLIGRAM(S): at 10:04

## 2021-12-05 RX ADMIN — Medication 25 MILLIGRAM(S): at 10:04

## 2021-12-05 RX ADMIN — OLANZAPINE 2.5 MILLIGRAM(S): 15 TABLET, FILM COATED ORAL at 11:58

## 2021-12-05 RX ADMIN — OLANZAPINE 2.5 MILLIGRAM(S): 15 TABLET, FILM COATED ORAL at 15:45

## 2021-12-05 RX ADMIN — OLANZAPINE 2.5 MILLIGRAM(S): 15 TABLET, FILM COATED ORAL at 17:55

## 2021-12-05 RX ADMIN — Medication 1 APPLICATION(S): at 20:35

## 2021-12-05 RX ADMIN — DIVALPROEX SODIUM 750 MILLIGRAM(S): 500 TABLET, DELAYED RELEASE ORAL at 10:04

## 2021-12-05 RX ADMIN — DIVALPROEX SODIUM 750 MILLIGRAM(S): 500 TABLET, DELAYED RELEASE ORAL at 20:33

## 2021-12-05 RX ADMIN — SENNA PLUS 2 TABLET(S): 8.6 TABLET ORAL at 20:34

## 2021-12-05 RX ADMIN — OLANZAPINE 2.5 MILLIGRAM(S): 15 TABLET, FILM COATED ORAL at 07:04

## 2021-12-05 RX ADMIN — Medication 3 MILLIGRAM(S): at 20:33

## 2021-12-05 RX ADMIN — Medication 25 MILLIGRAM(S): at 20:33

## 2021-12-06 LAB — VALPROATE SERPL-MCNC: 67.2 UG/ML — SIGNIFICANT CHANGE UP (ref 50–100)

## 2021-12-06 PROCEDURE — 99232 SBSQ HOSP IP/OBS MODERATE 35: CPT

## 2021-12-06 RX ORDER — OLANZAPINE 15 MG/1
5 TABLET, FILM COATED ORAL THREE TIMES A DAY
Refills: 0 | Status: DISCONTINUED | OUTPATIENT
Start: 2021-12-06 | End: 2021-12-10

## 2021-12-06 RX ADMIN — POLYETHYLENE GLYCOL 3350 17 GRAM(S): 17 POWDER, FOR SOLUTION ORAL at 09:35

## 2021-12-06 RX ADMIN — OLANZAPINE 2.5 MILLIGRAM(S): 15 TABLET, FILM COATED ORAL at 06:54

## 2021-12-06 RX ADMIN — Medication 3 MILLIGRAM(S): at 20:57

## 2021-12-06 RX ADMIN — OLANZAPINE 5 MILLIGRAM(S): 15 TABLET, FILM COATED ORAL at 09:35

## 2021-12-06 RX ADMIN — Medication 25 MILLIGRAM(S): at 09:35

## 2021-12-06 RX ADMIN — Medication 1 APPLICATION(S): at 11:13

## 2021-12-06 RX ADMIN — DIVALPROEX SODIUM 750 MILLIGRAM(S): 500 TABLET, DELAYED RELEASE ORAL at 20:56

## 2021-12-06 RX ADMIN — Medication 20 MILLIGRAM(S): at 10:03

## 2021-12-06 RX ADMIN — OLANZAPINE 5 MILLIGRAM(S): 15 TABLET, FILM COATED ORAL at 12:17

## 2021-12-06 RX ADMIN — DIVALPROEX SODIUM 750 MILLIGRAM(S): 500 TABLET, DELAYED RELEASE ORAL at 09:35

## 2021-12-06 RX ADMIN — OLANZAPINE 5 MILLIGRAM(S): 15 TABLET, FILM COATED ORAL at 20:57

## 2021-12-06 RX ADMIN — Medication 25 MILLIGRAM(S): at 20:57

## 2021-12-06 RX ADMIN — SENNA PLUS 2 TABLET(S): 8.6 TABLET ORAL at 20:57

## 2021-12-06 NOTE — BH TREATMENT PLAN - NSTXPATIENTPARTICIPATE_PSY_ALL_CORE
[place] Patient participated in identification of needs/problems/goals for treatment/Patient participated in defining interventions/Patient participated in development of after care plan

## 2021-12-06 NOTE — BH INPATIENT PSYCHIATRY PROGRESS NOTE - NSBHMETABOLIC_PSY_ALL_CORE_FT
BMI: BMI (kg/m2): 31.6 (11-29-21 @ 16:35)  HbA1c: A1C with Estimated Average Glucose Result: 5.4 % (11-04-21 @ 06:58)    Glucose: POCT Blood Glucose.: 109 mg/dL (11-12-21 @ 16:13)    BP: 110/56 (12-06-21 @ 07:45) (110/56 - 143/63)  Lipid Panel:

## 2021-12-06 NOTE — BH INPATIENT PSYCHIATRY PROGRESS NOTE - CURRENT MEDICATION
MEDICATIONS  (STANDING):  clobetasol 0.05% Cream 1 Application(s) Topical two times a day  diVALproex Sprinkle 750 milliGRAM(s) Oral two times a day  furosemide    Tablet 20 milliGRAM(s) Oral daily  melatonin. 3 milliGRAM(s) Oral at bedtime  metoprolol tartrate 25 milliGRAM(s) Oral two times a day  OLANZapine Disintegrating Tablet 5 milliGRAM(s) Oral three times a day  polyethylene glycol 3350 17 Gram(s) Oral daily  senna 2 Tablet(s) Oral at bedtime    MEDICATIONS  (PRN):  acetaminophen     Tablet .. 650 milliGRAM(s) Oral every 6 hours PRN Temp greater or equal to 38C (100.4F), Mild Pain (1 - 3), Moderate Pain (4 - 6)  aluminum hydroxide/magnesium hydroxide/simethicone Suspension 30 milliLiter(s) Oral every 4 hours PRN Dyspepsia  magnesium hydroxide Suspension 30 milliLiter(s) Oral at bedtime PRN constipation  mineral oil enema 133 milliLiter(s) Rectal daily PRN hard stool  OLANZapine 2.5 milliGRAM(s) Oral every 6 hours PRN severe agitation  OLANZapine Injectable 2.5 milliGRAM(s) IntraMuscular once PRN severe agitation  OLANZapine Injectable 2.5 milliGRAM(s) IntraMuscular once PRN severe agitation  saline laxative (FLEET) Rectal Enema 1 Enema Rectal daily PRN hard stool

## 2021-12-06 NOTE — BH INPATIENT PSYCHIATRY PROGRESS NOTE - NSBHASSESSSUMMFT_PSY_ALL_CORE
66 y.o. F, as per records is , has been residing at [facility] ([facility]) since 2010, with past psychiatric history of dementia with behavioral disturbance, mood disorder, RODY, possible inpatient psychiatric admission to [facility] this year, has a chronic medical history notable for NPH (w/  shunt), HTN, DMT2, TBI (unspecified), pedal edema presenting from [facility] for physically aggressive behavior and diffuse rash. Records also indicate that [facility] was seeking a [facility] admission. Pt is AOx1 which per chart appears to be patient's baseline. Pt was doing well on previous medication regimen but having increase episodes of agitation. Pt most likely having increase in aggressive behavior 2/2 to TBI vs dementia. Patient requires inpatient admission for medication optimization and stabilization of agitation.     11/22: No acute events overnight or PRNs required, VSS, med adherent. Endorses AH of mother's voice telling her to take good care of her baby. Denies any safety concerns. Patient became agitated at 1pm after trying to follow repair men off unit, denies wanting to leave the unit. Incident likely related to disinhibition rather than psychosis, but further monitoring and collateral necessary to better understand the patient and hopefully delineate a pattern.   11/23: No acute events overnight or PRNs required, VSS, med adherent. Has been in more appropriate behavioral control following change in Zyprexa schedule. Was unable to take Depakote overnight due to difficulty swallowing the pill. Switched to sprinkles this morning which patient took without issue. Will continue monitoring on current regimen given change in formulation. Ammonia pending given patient's tremor. Will check VPA level when patient consistently taking Depakote sprinkles.   11/24: Patient required Zyprexa PO PRN yesterday at 6:40pm for agitation, given with good effect. Slept well overnight and adherent with meds. VPA/ammonia deferred given prior adherence issues due to difficulty swallowing. Will continue to titrate Zyprexa and check VPA/ammonia when the patient is taking reliably. EKG today given ongoing titration.   11/25: Patient remains confused, labile, but calm today.  11/26: No acute events over PRNs required. Patient remains intermittently labile but redirectable. Accepting of treatment. Will check VPA/ammonia today given improved adherence. VPA 68, Ammonia pending.  11/27: On exam, patient is pleasantly confused, denies any concerns. No behavorial events. Taking meds. Labs: Serum ammonia wnl at 15.   11/28: Patient remains confused, labile affect, intermittently laughing then screaming.  11/29: No acute events or PRNs required. Patient remains labile but redirectable and accepting treatment. Endorses pruritis in bilateral forearms and L foot swelling observed on exam. WILLIE evaluation pending.  11/30: Per WILLIE recs, patient sent to ED yesterday afternoon for doppler imaging to r/o DVT which was unremarkable. The patient was able to tolerate ED course without any behavioral incidents. This morning, patient at baseline, reports concern over LEs and agrees to meet again with WILLIE. ED had recommended consideration of Keflex for possible cellulitis. Awaiting hospitalist recs.   12/1: Patient acutely agitated overnight requiring Zyprexa 2.5mg PO accepted with good effect. Noted to have worsening rash despite adherence to topical steroid. Per WILLIE recs, dermatology consulted for further evaluation. Rash likely underlying behavioral exacerbation as the patient had been without issue for several days.  12/2: No acute events or PRNs required. Patient cooperative with Derm and GYN consults yesterday afternoon. Per derm, likely eczema for which will start clobetasol BID. Per recs, was also treated prophylactically for scabies (repeat permethrin in 1 week has been ordered). Per derm, also ordered Bullous Pemphigoid labs to r/o. Per GYN, send BV panel to r/o. Patient initially orthostatic but improved on repeat, denies symptoms.  12/3: Patient seen for complications of TBI dementia. No major overnight events. Patient not aggressive still labile  from silly giddy to angry, raising voice. Eating sleping fairly well. Had large BM.  12/4: Patient continues to be confused and disorganized.  12/6: Patient continuing to require PRNs, given Zyprexa 2.5mg PO yesterday afternoon with good effect. Remains intermittently labile without overt aggression. Denies SE and no evidence of ongoing constipation. Skin improving and patient denies any subjective irritation.    Plan:  1. Legals: 9.27 status, *DNR/DNI* [MOLST in chart]  2. Safety: routine observation. PRNs: Zyprexa 2.5 mg PO/IM q6h   3. Psychiatry: Depakote sprinkles 750 mg BID, Increase to Zydis 5mg TID due to ongoing need of PRNs and to facilitate formulation/schedule. Will repeat ECG.   4. Group, milieu, individual therapy as appropriate.  5. Medical: s/p derm consult, given permethrin and started on clobetasol BID, repeat permethrin ordered 12/8, BP lab to be drawn. s/p GYN consult, BV panel sent and WNL. Pt noted to have stool burden on exam, pt had EM s/p enema. Continue home Lopressor 25mg daily for HTN, Lasix 20mg daily for edema, Miralax 17g daily and Senna 2mg QHS for constipation.  6. Dispo: pending clinical improvement.  Patient continues to require inpatient hospitalization for stabilization and safety. 66 y.o. F, as per records is , has been residing at [facility] ([facility]) since 2010, with past psychiatric history of dementia with behavioral disturbance, mood disorder, RODY, possible inpatient psychiatric admission to [facility] this year, has a chronic medical history notable for NPH (w/  shunt), HTN, DMT2, TBI (unspecified), pedal edema presenting from [facility] for physically aggressive behavior and diffuse rash. Records also indicate that [facility] was seeking a [facility] admission. Pt is AOx1 which per chart appears to be patient's baseline. Pt was doing well on previous medication regimen but having increase episodes of agitation. Pt most likely having increase in aggressive behavior 2/2 to TBI vs dementia. Patient requires inpatient admission for medication optimization and stabilization of agitation.     11/22: No acute events overnight or PRNs required, VSS, med adherent. Endorses AH of mother's voice telling her to take good care of her baby. Denies any safety concerns. Patient became agitated at 1pm after trying to follow repair men off unit, denies wanting to leave the unit. Incident likely related to disinhibition rather than psychosis, but further monitoring and collateral necessary to better understand the patient and hopefully delineate a pattern.   11/23: No acute events overnight or PRNs required, VSS, med adherent. Has been in more appropriate behavioral control following change in Zyprexa schedule. Was unable to take Depakote overnight due to difficulty swallowing the pill. Switched to sprinkles this morning which patient took without issue. Will continue monitoring on current regimen given change in formulation. Ammonia pending given patient's tremor. Will check VPA level when patient consistently taking Depakote sprinkles.   11/24: Patient required Zyprexa PO PRN yesterday at 6:40pm for agitation, given with good effect. Slept well overnight and adherent with meds. VPA/ammonia deferred given prior adherence issues due to difficulty swallowing. Will continue to titrate Zyprexa and check VPA/ammonia when the patient is taking reliably. EKG today given ongoing titration.   11/25: Patient remains confused, labile, but calm today.  11/26: No acute events over PRNs required. Patient remains intermittently labile but redirectable. Accepting of treatment. Will check VPA/ammonia today given improved adherence. VPA 68, Ammonia pending.  11/27: On exam, patient is pleasantly confused, denies any concerns. No behavorial events. Taking meds. Labs: Serum ammonia wnl at 15.   11/28: Patient remains confused, labile affect, intermittently laughing then screaming.  11/29: No acute events or PRNs required. Patient remains labile but redirectable and accepting treatment. Endorses pruritis in bilateral forearms and L foot swelling observed on exam. WILLIE evaluation pending.  11/30: Per WILLIE recs, patient sent to ED yesterday afternoon for doppler imaging to r/o DVT which was unremarkable. The patient was able to tolerate ED course without any behavioral incidents. This morning, patient at baseline, reports concern over LEs and agrees to meet again with WILLIE. ED had recommended consideration of Keflex for possible cellulitis. Awaiting hospitalist recs.   12/1: Patient acutely agitated overnight requiring Zyprexa 2.5mg PO accepted with good effect. Noted to have worsening rash despite adherence to topical steroid. Per WILLIE recs, dermatology consulted for further evaluation. Rash likely underlying behavioral exacerbation as the patient had been without issue for several days.  12/2: No acute events or PRNs required. Patient cooperative with Derm and GYN consults yesterday afternoon. Per derm, likely eczema for which will start clobetasol BID. Per recs, was also treated prophylactically for scabies (repeat permethrin in 1 week has been ordered). Per derm, also ordered Bullous Pemphigoid labs to r/o. Per GYN, send BV panel to r/o. Patient initially orthostatic but improved on repeat, denies symptoms.  12/3: Patient seen for complications of TBI dementia. No major overnight events. Patient not aggressive still labile  from silly giddy to angry, raising voice. Eating sleping fairly well. Had large BM.  12/4: Patient continues to be confused and disorganized.  12/6: Patient continuing to require PRNs, given Zyprexa 2.5mg PO yesterday afternoon with good effect. Remains intermittently labile without overt aggression. Denies SE and no evidence of ongoing constipation. Skin improving and patient denies any subjective irritation. VPA stable, today 67.    Plan:  1. Legals: 9.27 status, *DNR/DNI* [MOLST in chart]  2. Safety: routine observation. PRNs: Zyprexa 2.5 mg PO/IM q6h   3. Psychiatry: Depakote sprinkles 750 mg BID, Increase to Zydis 5mg TID due to ongoing need of PRNs and to facilitate formulation/schedule. Will repeat ECG.   4. Group, milieu, individual therapy as appropriate.  5. Medical: s/p derm consult, given permethrin and started on clobetasol BID, repeat permethrin ordered 12/8, BP lab to be drawn. s/p GYN consult, BV panel sent and WNL. Pt noted to have stool burden on exam, pt had EM s/p enema. Continue home Lopressor 25mg daily for HTN, Lasix 20mg daily for edema, Miralax 17g daily and Senna 2mg QHS for constipation.  6. Dispo: pending clinical improvement.  Patient continues to require inpatient hospitalization for stabilization and safety.

## 2021-12-06 NOTE — BH INPATIENT PSYCHIATRY PROGRESS NOTE - NSBHFUPINTERVALHXFT_PSY_A_CORE
The patient was seen for follow up. There were no acute events overnight, however the patient was given Zyprexa 2.5mg PO yesterday at 5pm. This morning, the patient endorsed improvement in pruritis, denied feeling itchy or pain. She denied any other concerns, insisting she was feeling less angry and more calm. She reported feeling safe on the unit, denied paranoia or receiving messages from other patients/staff. She denied AVH. She denied any dizziness, stiffness, or abdominal pain.

## 2021-12-06 NOTE — BH INPATIENT PSYCHIATRY PROGRESS NOTE - NSBHCHARTREVIEWVS_PSY_A_CORE FT
Vital Signs Last 24 Hrs  T(C): 36.6 (12-06-21 @ 07:45), Max: 36.6 (12-06-21 @ 07:45)  T(F): 97.8 (12-06-21 @ 07:45), Max: 97.8 (12-06-21 @ 07:45)  HR: --  BP: 110/56 (12-06-21 @ 07:45) (110/56 - 143/63)  BP(mean): 87 (12-06-21 @ 07:45) (75 - 87)  RR: 18 (12-05-21 @ 20:37) (18 - 18)  SpO2: --    Orthostatic VS  12-05-21 @ 10:06  Lying BP: --/-- HR: --  Sitting BP: 123/97 HR: 102  Standing BP: 104/61 HR: 106  Site: --  Mode: --  Orthostatic VS  12-04-21 @ 17:34  Lying BP: --/-- HR: --  Sitting BP: 119/80 HR: 99  Standing BP: --/-- HR: --  Site: --  Mode: --

## 2021-12-07 PROCEDURE — 99231 SBSQ HOSP IP/OBS SF/LOW 25: CPT

## 2021-12-07 RX ADMIN — OLANZAPINE 5 MILLIGRAM(S): 15 TABLET, FILM COATED ORAL at 09:20

## 2021-12-07 RX ADMIN — DIVALPROEX SODIUM 750 MILLIGRAM(S): 500 TABLET, DELAYED RELEASE ORAL at 21:28

## 2021-12-07 RX ADMIN — POLYETHYLENE GLYCOL 3350 17 GRAM(S): 17 POWDER, FOR SOLUTION ORAL at 09:20

## 2021-12-07 RX ADMIN — Medication 3 MILLIGRAM(S): at 21:27

## 2021-12-07 RX ADMIN — OLANZAPINE 5 MILLIGRAM(S): 15 TABLET, FILM COATED ORAL at 13:00

## 2021-12-07 RX ADMIN — Medication 20 MILLIGRAM(S): at 09:20

## 2021-12-07 RX ADMIN — DIVALPROEX SODIUM 750 MILLIGRAM(S): 500 TABLET, DELAYED RELEASE ORAL at 09:19

## 2021-12-07 RX ADMIN — Medication 25 MILLIGRAM(S): at 21:27

## 2021-12-07 RX ADMIN — SENNA PLUS 2 TABLET(S): 8.6 TABLET ORAL at 21:27

## 2021-12-07 RX ADMIN — OLANZAPINE 5 MILLIGRAM(S): 15 TABLET, FILM COATED ORAL at 21:27

## 2021-12-07 RX ADMIN — Medication 1 APPLICATION(S): at 09:29

## 2021-12-07 RX ADMIN — Medication 1 APPLICATION(S): at 21:41

## 2021-12-07 RX ADMIN — Medication 25 MILLIGRAM(S): at 09:20

## 2021-12-07 NOTE — BH INPATIENT PSYCHIATRY PROGRESS NOTE - NSBHFUPINTERVALHXFT_PSY_A_CORE
The patient was seen for follow up. There were no acute events overnight or PRNs required. The patient reportedly refused HS clobetasol. This morning, the patient denied any concerns, including itchy skin, LE pain, stomach ache, headache, chest pain, difficulty breathing, dizziness, or stiffness. She reported feeling more safe on the unit, denied paranoia or receiving messages from other patients/staff. She denied AVH. We discussed the need for BID cream to maintain improvements in skin, of which the patient verbalized understanding and agreed to cooperate with moving forward.

## 2021-12-07 NOTE — BH INPATIENT PSYCHIATRY PROGRESS NOTE - NSBHMETABOLIC_PSY_ALL_CORE_FT
BMI: BMI (kg/m2): 31.6 (11-29-21 @ 16:35)  HbA1c: A1C with Estimated Average Glucose Result: 5.4 % (11-04-21 @ 06:58)    Glucose: POCT Blood Glucose.: 109 mg/dL (11-12-21 @ 16:13)    BP: 100/70 (12-07-21 @ 08:03) (100/70 - 143/63)  Lipid Panel:

## 2021-12-07 NOTE — BH INPATIENT PSYCHIATRY PROGRESS NOTE - NSBHCHARTREVIEWVS_PSY_A_CORE FT
Vital Signs Last 24 Hrs  T(C): 36.8 (12-07-21 @ 08:03), Max: 36.8 (12-07-21 @ 08:03)  T(F): 98.2 (12-07-21 @ 08:03), Max: 98.2 (12-07-21 @ 08:03)  HR: 70 (12-07-21 @ 08:03) (70 - 70)  BP: 100/70 (12-07-21 @ 08:03) (100/70 - 100/70)  BP(mean): --  RR: --  SpO2: --

## 2021-12-07 NOTE — BH INPATIENT PSYCHIATRY PROGRESS NOTE - NSBHASSESSSUMMFT_PSY_ALL_CORE
66 y.o. F, as per records is , has been residing at [facility] ([facility]) since 2010, with past psychiatric history of dementia with behavioral disturbance, mood disorder, RODY, possible inpatient psychiatric admission to [facility] this year, has a chronic medical history notable for NPH (w/  shunt), HTN, DMT2, TBI (unspecified), pedal [place] [facility] [place] behavior and diffuse rash. Records also indicate that [facility] was seeking a [facility] admission. Pt is AOx1 which per chart appears to be patient's baseline. Pt was doing well on previous medication regimen but having increase episodes of agitation. Pt most likely having increase in aggressive behavior 2/2 to TBI vs dementia. Patient requires inpatient admission for medication optimization and stabilization of agitation.     11/22: No acute events overnight or PRNs required, VSS, med adherent. Endorses AH of mother's voice telling her to take good care of her baby. Denies any safety concerns. Patient became agitated at 1pm after trying to follow repair men off unit, denies wanting to leave the unit. Incident likely related to disinhibition rather than psychosis, but further monitoring and collateral necessary to better understand the patient and hopefully delineate a pattern.   11/23: No acute events overnight or PRNs required, VSS, med adherent. Has been in more appropriate behavioral control following change in Zyprexa schedule. Was unable to take Depakote overnight due to difficulty swallowing the pill. Switched to sprinkles this morning which patient took without issue. Will continue monitoring on current regimen given change in formulation. Ammonia pending given patient's tremor. Will check VPA level when patient consistently taking Depakote sprinkles.   11/24: Patient required Zyprexa PO PRN yesterday at 6:40pm for agitation, given with good effect. Slept well overnight and adherent with meds. VPA/ammonia deferred given prior adherence issues due to difficulty swallowing. Will continue to titrate Zyprexa and check VPA/ammonia when the patient is taking reliably. EKG today given ongoing titration.   11/25: Patient remains confused, labile, but calm today.  11/26: No acute events over PRNs required. Patient remains intermittently labile but redirectable. Accepting of treatment. Will check VPA/ammonia today given improved adherence. VPA 68, Ammonia pending.  11/27: On exam, patient is pleasantly confused, denies any concerns. No behavorial events. Taking meds. Labs: Serum ammonia wnl at 15.   11/28: Patient remains confused, labile affect, intermittently laughing then screaming.  11/29: No acute events or PRNs required. Patient remains labile but redirectable and accepting treatment. Endorses pruritis in bilateral forearms and L foot swelling observed on exam. WILLIE evaluation pending.  11/30: Per WILLIE recs, patient sent to ED yesterday afternoon for doppler imaging to r/o DVT which was unremarkable. The patient was able to tolerate ED course without any behavioral incidents. This morning, patient at baseline, reports concern over LEs and agrees to meet again with WILLIE. ED had recommended consideration of Keflex for possible cellulitis. Awaiting hospitalist recs.   12/1: Patient acutely agitated overnight requiring Zyprexa 2.5mg PO accepted with good effect. Noted to have worsening rash despite adherence to topical steroid. Per WILLIE recs, dermatology consulted for further evaluation. Rash likely underlying behavioral exacerbation as the patient had been without issue for several days.  12/2: No acute events or PRNs required. Patient cooperative with Derm and GYN consults yesterday afternoon. Per derm, likely eczema for which will start clobetasol BID. Per recs, was also treated prophylactically for scabies (repeat permethrin in 1 week has been ordered). Per derm, also ordered Bullous Pemphigoid labs to r/o. Per GYN, send BV panel to r/o. Patient initially orthostatic but improved on repeat, denies symptoms.  12/3: Patient seen for complications of TBI dementia. No major overnight events. Patient not aggressive still labile  from silly giddy to angry, raising voice. Eating sleping fairly well. Had large BM.  12/4: Patient continues to be confused and disorganized.  12/6: Patient continuing to require PRNs, given Zyprexa 2.5mg PO yesterday afternoon with good effect. Remains intermittently labile without overt aggression. Denies SE and no evidence of ongoing constipation. Skin improving and patient denies any subjective irritation. VPA stable, today 67.  12/7: No acute events overnight or PRNs required. Denies SE and no evidence on exam. Denies any concerns with skin. Unable to draw BP lab, will try again later.    Plan:  1. Legals: 9.27 status, *DNR/DNI* [MOLST in chart]  2. Safety: routine observation. PRNs: Zyprexa 2.5 mg PO/IM q6h   3. Psychiatry: Depakote sprinkles 750 mg BID, Zydis 5mg TID Will repeat ECG this week.   4. Group, milieu, individual therapy as appropriate.  5. Medical: s/p derm consult, given permethrin and started on clobetasol BID, repeat permethrin ordered 12/8, BP lab to be drawn. s/p GYN consult, BV panel sent and WNL. Pt noted to have stool burden on exam, pt had EM s/p enema. Continue home Lopressor 25mg daily for HTN, Lasix 20mg daily for edema, Miralax 17g daily and Senna 2mg QHS for constipation.  6. Dispo: pending clinical improvement.  Patient continues to require inpatient hospitalization for stabilization and safety.

## 2021-12-08 LAB
ANION GAP SERPL CALC-SCNC: 10 MMOL/L — SIGNIFICANT CHANGE UP (ref 7–14)
BUN SERPL-MCNC: 22 MG/DL — SIGNIFICANT CHANGE UP (ref 7–23)
CALCIUM SERPL-MCNC: 9.2 MG/DL — SIGNIFICANT CHANGE UP (ref 8.4–10.5)
CHLORIDE SERPL-SCNC: 110 MMOL/L — HIGH (ref 98–107)
CO2 SERPL-SCNC: 26 MMOL/L — SIGNIFICANT CHANGE UP (ref 22–31)
CREAT SERPL-MCNC: 0.61 MG/DL — SIGNIFICANT CHANGE UP (ref 0.5–1.3)
GLUCOSE SERPL-MCNC: 77 MG/DL — SIGNIFICANT CHANGE UP (ref 70–99)
POTASSIUM SERPL-MCNC: 4.2 MMOL/L — SIGNIFICANT CHANGE UP (ref 3.5–5.3)
POTASSIUM SERPL-SCNC: 4.2 MMOL/L — SIGNIFICANT CHANGE UP (ref 3.5–5.3)
SODIUM SERPL-SCNC: 146 MMOL/L — HIGH (ref 135–145)

## 2021-12-08 PROCEDURE — 93010 ELECTROCARDIOGRAM REPORT: CPT

## 2021-12-08 PROCEDURE — 99232 SBSQ HOSP IP/OBS MODERATE 35: CPT

## 2021-12-08 RX ORDER — PERMETHRIN CREAM 5% W/W 50 MG/G
1 CREAM TOPICAL ONCE
Refills: 0 | Status: COMPLETED | OUTPATIENT
Start: 2021-12-08 | End: 2021-12-08

## 2021-12-08 RX ADMIN — Medication 25 MILLIGRAM(S): at 20:51

## 2021-12-08 RX ADMIN — SENNA PLUS 2 TABLET(S): 8.6 TABLET ORAL at 20:51

## 2021-12-08 RX ADMIN — Medication 3 MILLIGRAM(S): at 20:52

## 2021-12-08 RX ADMIN — OLANZAPINE 5 MILLIGRAM(S): 15 TABLET, FILM COATED ORAL at 20:52

## 2021-12-08 RX ADMIN — POLYETHYLENE GLYCOL 3350 17 GRAM(S): 17 POWDER, FOR SOLUTION ORAL at 17:14

## 2021-12-08 RX ADMIN — OLANZAPINE 5 MILLIGRAM(S): 15 TABLET, FILM COATED ORAL at 17:14

## 2021-12-08 RX ADMIN — PERMETHRIN CREAM 5% W/W 1 APPLICATION(S): 50 CREAM TOPICAL at 16:26

## 2021-12-08 RX ADMIN — Medication 25 MILLIGRAM(S): at 17:13

## 2021-12-08 RX ADMIN — DIVALPROEX SODIUM 750 MILLIGRAM(S): 500 TABLET, DELAYED RELEASE ORAL at 20:52

## 2021-12-08 NOTE — BH INPATIENT PSYCHIATRY PROGRESS NOTE - NSBHFUPINTERVALHXFT_PSY_A_CORE
The patient was seen for follow up. There were no acute events overnight or PRNs required. This morning, the patient appeared sedated in the day room, however was able to converse with primary team. She denied any concerns, including itchy skin, LE pain, stomach ache, headache, chest pain, difficulty breathing, dizziness, or stiffness. She reported feeling safe on the unit, denied paranoia or perceptual disturbance. She agreed to come to staff with concerns.

## 2021-12-08 NOTE — BH INPATIENT PSYCHIATRY PROGRESS NOTE - CURRENT MEDICATION
MEDICATIONS  (STANDING):  clobetasol 0.05% Cream 1 Application(s) Topical two times a day  diVALproex Sprinkle 750 milliGRAM(s) Oral two times a day  furosemide    Tablet 20 milliGRAM(s) Oral daily  melatonin. 3 milliGRAM(s) Oral at bedtime  metoprolol tartrate 25 milliGRAM(s) Oral two times a day  OLANZapine Disintegrating Tablet 5 milliGRAM(s) Oral three times a day  polyethylene glycol 3350 17 Gram(s) Oral daily  senna 2 Tablet(s) Oral at bedtime    MEDICATIONS  (PRN):  acetaminophen     Tablet .. 650 milliGRAM(s) Oral every 6 hours PRN Temp greater or equal to 38C (100.4F), Mild Pain (1 - 3), Moderate Pain (4 - 6)  aluminum hydroxide/magnesium hydroxide/simethicone Suspension 30 milliLiter(s) Oral every 4 hours PRN Dyspepsia  magnesium hydroxide Suspension 30 milliLiter(s) Oral at bedtime PRN constipation  mineral oil enema 133 milliLiter(s) Rectal daily PRN hard stool  OLANZapine 2.5 milliGRAM(s) Oral every 6 hours PRN severe agitation  OLANZapine Injectable 2.5 milliGRAM(s) IntraMuscular once PRN severe agitation  OLANZapine Injectable 2.5 milliGRAM(s) IntraMuscular once PRN severe agitation  saline laxative (FLEET) Rectal Enema 1 Enema Rectal daily PRN hard stool   MEDICATIONS  (STANDING):  clobetasol 0.05% Cream 1 Application(s) Topical two times a day  diVALproex Sprinkle 750 milliGRAM(s) Oral two times a day  furosemide    Tablet 20 milliGRAM(s) Oral daily  melatonin. 3 milliGRAM(s) Oral at bedtime  metoprolol tartrate 25 milliGRAM(s) Oral two times a day  OLANZapine Disintegrating Tablet 5 milliGRAM(s) Oral three times a day  permethrin 5% Cream 1 Application(s) Topical once  polyethylene glycol 3350 17 Gram(s) Oral daily  senna 2 Tablet(s) Oral at bedtime    MEDICATIONS  (PRN):  acetaminophen     Tablet .. 650 milliGRAM(s) Oral every 6 hours PRN Temp greater or equal to 38C (100.4F), Mild Pain (1 - 3), Moderate Pain (4 - 6)  aluminum hydroxide/magnesium hydroxide/simethicone Suspension 30 milliLiter(s) Oral every 4 hours PRN Dyspepsia  magnesium hydroxide Suspension 30 milliLiter(s) Oral at bedtime PRN constipation  mineral oil enema 133 milliLiter(s) Rectal daily PRN hard stool  OLANZapine 2.5 milliGRAM(s) Oral every 6 hours PRN severe agitation  OLANZapine Injectable 2.5 milliGRAM(s) IntraMuscular once PRN severe agitation  OLANZapine Injectable 2.5 milliGRAM(s) IntraMuscular once PRN severe agitation  saline laxative (FLEET) Rectal Enema 1 Enema Rectal daily PRN hard stool

## 2021-12-08 NOTE — BH INPATIENT PSYCHIATRY PROGRESS NOTE - NSBHCHARTREVIEWVS_PSY_A_CORE FT
Vital Signs Last 24 Hrs  T(C): --  T(F): --  HR: --  BP: --  BP(mean): --  RR: 17 (12-07-21 @ 20:26) (17 - 17)  SpO2: --    Orthostatic VS  12-07-21 @ 20:26  Lying BP: --/-- HR: --  Sitting BP: 118/61 HR: 79  Standing BP: --/-- HR: --  Site: --  Mode: --

## 2021-12-08 NOTE — BH INPATIENT PSYCHIATRY PROGRESS NOTE - NSBHASSESSSUMMFT_PSY_ALL_CORE
66 y.o. F, as per records is , has been residing at [facility] ([facility]) since 2010, with past psychiatric history of dementia with behavioral disturbance, mood disorder, RODY, possible inpatient psychiatric admission to [facility] this year, has a chronic medical history notable for NPH (w/  shunt), HTN, DMT2, TBI (unspecified), pedal edema presenting from [facility] for physically aggressive behavior and diffuse rash. Records also indicate that [facility] was seeking a [facility] admission. Pt is AOx1 which per chart appears to be patient's baseline. Pt was doing well on previous medication regimen but having increase episodes of agitation. Pt most likely having increase in aggressive behavior 2/2 to TBI vs dementia. Patient requires inpatient admission for medication optimization and stabilization of agitation.     11/22: No acute events overnight or PRNs required, VSS, med adherent. Endorses AH of mother's voice telling her to take good care of her baby. Denies any safety concerns. Patient became agitated at 1pm after trying to follow repair men off unit, denies wanting to leave the unit. Incident likely related to disinhibition rather than psychosis, but further monitoring and collateral necessary to better understand the patient and hopefully delineate a pattern.   11/23: No acute events overnight or PRNs required, VSS, med adherent. Has been in more appropriate behavioral control following change in Zyprexa schedule. Was unable to take Depakote overnight due to difficulty swallowing the pill. Switched to sprinkles this morning which patient took without issue. Will continue monitoring on current regimen given change in formulation. Ammonia pending given patient's tremor. Will check VPA level when patient consistently taking Depakote sprinkles.   11/24: Patient required Zyprexa PO PRN yesterday at 6:40pm for agitation, given with good effect. Slept well overnight and adherent with meds. VPA/ammonia deferred given prior adherence issues due to difficulty swallowing. Will continue to titrate Zyprexa and check VPA/ammonia when the patient is taking reliably. EKG today given ongoing titration.   11/25: Patient remains confused, labile, but calm today.  11/26: No acute events over PRNs required. Patient remains intermittently labile but redirectable. Accepting of treatment. Will check VPA/ammonia today given improved adherence. VPA 68, Ammonia pending.  11/27: On exam, patient is pleasantly confused, denies any concerns. No behavorial events. Taking meds. Labs: Serum ammonia wnl at 15.   11/28: Patient remains confused, labile affect, intermittently laughing then screaming.  11/29: No acute events or PRNs required. Patient remains labile but redirectable and accepting treatment. Endorses pruritis in bilateral forearms and L foot swelling observed on exam. WILLIE evaluation pending.  11/30: Per WILLIE recs, patient sent to ED yesterday afternoon for doppler imaging to r/o DVT which was unremarkable. The patient was able to tolerate ED course without any behavioral incidents. This morning, patient at baseline, reports concern over LEs and agrees to meet again with WILLIE. ED had recommended consideration of Keflex for possible cellulitis. Awaiting hospitalist recs.   12/1: Patient acutely agitated overnight requiring Zyprexa 2.5mg PO accepted with good effect. Noted to have worsening rash despite adherence to topical steroid. Per WILLIE recs, dermatology consulted for further evaluation. Rash likely underlying behavioral exacerbation as the patient had been without issue for several days.  12/2: No acute events or PRNs required. Patient cooperative with Derm and GYN consults yesterday afternoon. Per derm, likely eczema for which will start clobetasol BID. Per recs, was also treated prophylactically for scabies (repeat permethrin in 1 week has been ordered). Per derm, also ordered Bullous Pemphigoid labs to r/o. Per GYN, send BV panel to r/o. Patient initially orthostatic but improved on repeat, denies symptoms.  12/3: Patient seen for complications of TBI dementia. No major overnight events. Patient not aggressive still labile  from silly giddy to angry, raising voice. Eating sleping fairly well. Had large BM.  12/4: Patient continues to be confused and disorganized.  12/6: Patient continuing to require PRNs, given Zyprexa 2.5mg PO yesterday afternoon with good effect. Remains intermittently labile without overt aggression. Denies SE and no evidence of ongoing constipation. Skin improving and patient denies any subjective irritation. VPA stable, today 67.  12/7: No acute events overnight or PRNs required. Denies SE and no evidence on exam. Denies any concerns with skin. Eventually cooperated with BP lab.  12/8: No acute events overnight or PRNs required. Denies SE and no evidence on exam. No further skin concerns. Patient sedated, which may be related to med titration. Will continue to monitor for now and make changes if it persists. Will check ECG tomorrow. Repeat permethrin tonight per derm recommendations.    Plan:  1. Legals: 9.27 status, *DNR/DNI* [MOLST in chart]  2. Safety: routine observation. PRNs: Zyprexa 2.5 mg PO/IM q6h   3. Psychiatry: Depakote sprinkles 750 mg BID, Zydis 5mg TID Will repeat ECG tomorrow.   4. Group, milieu, individual therapy as appropriate.  5. Medical: s/p derm consult, given permethrin and started on clobetasol BID, repeat permethrin ordered 12/8, BP lab drawn and being processed. s/p GYN consult, BV panel sent and WNL. Pt noted to have stool burden on exam, pt had EM s/p enema. Continue home Lopressor 25mg daily for HTN, Lasix 20mg daily for edema, Miralax 17g daily and Senna 2mg QHS for constipation.  6. Dispo: pending clinical improvement.  Patient continues to require inpatient hospitalization for stabilization and safety.

## 2021-12-09 PROCEDURE — 99232 SBSQ HOSP IP/OBS MODERATE 35: CPT

## 2021-12-09 RX ADMIN — POLYETHYLENE GLYCOL 3350 17 GRAM(S): 17 POWDER, FOR SOLUTION ORAL at 09:32

## 2021-12-09 RX ADMIN — Medication 3 MILLIGRAM(S): at 21:28

## 2021-12-09 RX ADMIN — DIVALPROEX SODIUM 750 MILLIGRAM(S): 500 TABLET, DELAYED RELEASE ORAL at 09:29

## 2021-12-09 RX ADMIN — OLANZAPINE 5 MILLIGRAM(S): 15 TABLET, FILM COATED ORAL at 09:29

## 2021-12-09 RX ADMIN — Medication 20 MILLIGRAM(S): at 09:30

## 2021-12-09 RX ADMIN — DIVALPROEX SODIUM 750 MILLIGRAM(S): 500 TABLET, DELAYED RELEASE ORAL at 21:28

## 2021-12-09 RX ADMIN — OLANZAPINE 5 MILLIGRAM(S): 15 TABLET, FILM COATED ORAL at 21:28

## 2021-12-09 RX ADMIN — OLANZAPINE 2.5 MILLIGRAM(S): 15 TABLET, FILM COATED ORAL at 11:44

## 2021-12-09 RX ADMIN — Medication 1 APPLICATION(S): at 21:49

## 2021-12-09 RX ADMIN — Medication 25 MILLIGRAM(S): at 09:30

## 2021-12-09 RX ADMIN — Medication 25 MILLIGRAM(S): at 21:28

## 2021-12-09 RX ADMIN — SENNA PLUS 2 TABLET(S): 8.6 TABLET ORAL at 21:28

## 2021-12-09 RX ADMIN — Medication 1 APPLICATION(S): at 16:06

## 2021-12-09 NOTE — BH INPATIENT PSYCHIATRY PROGRESS NOTE - NSBHMETABOLIC_PSY_ALL_CORE_FT
BMI: BMI (kg/m2): 31.6 (11-29-21 @ 16:35)  HbA1c: A1C with Estimated Average Glucose Result: 5.4 % (11-04-21 @ 06:58)    Glucose: POCT Blood Glucose.: 109 mg/dL (11-12-21 @ 16:13)    BP: 100/70 (12-07-21 @ 08:03) (100/70 - 100/70)  Lipid Panel:
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
## 2021-12-09 NOTE — BH INPATIENT PSYCHIATRY PROGRESS NOTE - NSBHCHARTREVIEWVS_PSY_A_CORE FT
Vital Signs Last 24 Hrs  T(C): 36.4 (12-09-21 @ 09:28), Max: 36.4 (12-09-21 @ 09:28)  T(F): 97.6 (12-09-21 @ 09:28), Max: 97.6 (12-09-21 @ 09:28)  HR: --  BP: --  BP(mean): --  RR: --  SpO2: --    Orthostatic VS  12-09-21 @ 09:28  Lying BP: --/-- HR: --  Sitting BP: 117/56 HR: 73  Standing BP: --/-- HR: --  Site: --  Mode: --  Orthostatic VS  12-07-21 @ 20:26  Lying BP: --/-- HR: --  Sitting BP: 118/61 HR: 79  Standing BP: --/-- HR: --  Site: --  Mode: --
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
## 2021-12-10 PROCEDURE — 99232 SBSQ HOSP IP/OBS MODERATE 35: CPT

## 2021-12-10 RX ORDER — OLANZAPINE 15 MG/1
5 TABLET, FILM COATED ORAL
Refills: 0 | Status: DISCONTINUED | OUTPATIENT
Start: 2021-12-10 | End: 2021-12-13

## 2021-12-10 RX ORDER — OLANZAPINE 15 MG/1
2.5 TABLET, FILM COATED ORAL ONCE
Refills: 0 | Status: COMPLETED | OUTPATIENT
Start: 2021-12-10 | End: 2021-12-10

## 2021-12-10 RX ADMIN — Medication 3 MILLIGRAM(S): at 21:22

## 2021-12-10 RX ADMIN — DIVALPROEX SODIUM 750 MILLIGRAM(S): 500 TABLET, DELAYED RELEASE ORAL at 21:22

## 2021-12-10 RX ADMIN — DIVALPROEX SODIUM 750 MILLIGRAM(S): 500 TABLET, DELAYED RELEASE ORAL at 12:42

## 2021-12-10 RX ADMIN — Medication 20 MILLIGRAM(S): at 12:43

## 2021-12-10 RX ADMIN — Medication 1 APPLICATION(S): at 21:23

## 2021-12-10 RX ADMIN — Medication 1 APPLICATION(S): at 18:09

## 2021-12-10 RX ADMIN — Medication 25 MILLIGRAM(S): at 12:42

## 2021-12-10 RX ADMIN — SENNA PLUS 2 TABLET(S): 8.6 TABLET ORAL at 21:21

## 2021-12-10 RX ADMIN — POLYETHYLENE GLYCOL 3350 17 GRAM(S): 17 POWDER, FOR SOLUTION ORAL at 12:43

## 2021-12-10 RX ADMIN — Medication 25 MILLIGRAM(S): at 21:21

## 2021-12-10 RX ADMIN — OLANZAPINE 2.5 MILLIGRAM(S): 15 TABLET, FILM COATED ORAL at 14:23

## 2021-12-10 RX ADMIN — OLANZAPINE 5 MILLIGRAM(S): 15 TABLET, FILM COATED ORAL at 12:42

## 2021-12-10 RX ADMIN — OLANZAPINE 5 MILLIGRAM(S): 15 TABLET, FILM COATED ORAL at 17:43
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
## 2021-12-10 NOTE — BH INPATIENT PSYCHIATRY PROGRESS NOTE - CURRENT MEDICATION
MEDICATIONS  (STANDING):  clobetasol 0.05% Cream 1 Application(s) Topical two times a day  diVALproex Sprinkle 750 milliGRAM(s) Oral two times a day  furosemide    Tablet 20 milliGRAM(s) Oral daily  melatonin. 3 milliGRAM(s) Oral at bedtime  metoprolol tartrate 25 milliGRAM(s) Oral two times a day  OLANZapine Disintegrating Tablet 5 milliGRAM(s) Oral <User Schedule>  polyethylene glycol 3350 17 Gram(s) Oral daily  senna 2 Tablet(s) Oral at bedtime    MEDICATIONS  (PRN):  acetaminophen     Tablet .. 650 milliGRAM(s) Oral every 6 hours PRN Temp greater or equal to 38C (100.4F), Mild Pain (1 - 3), Moderate Pain (4 - 6)  aluminum hydroxide/magnesium hydroxide/simethicone Suspension 30 milliLiter(s) Oral every 4 hours PRN Dyspepsia  magnesium hydroxide Suspension 30 milliLiter(s) Oral at bedtime PRN constipation  mineral oil enema 133 milliLiter(s) Rectal daily PRN hard stool  OLANZapine 2.5 milliGRAM(s) Oral every 6 hours PRN severe agitation  OLANZapine Injectable 2.5 milliGRAM(s) IntraMuscular once PRN severe agitation  OLANZapine Injectable 2.5 milliGRAM(s) IntraMuscular once PRN severe agitation  saline laxative (FLEET) Rectal Enema 1 Enema Rectal daily PRN hard stool

## 2021-12-10 NOTE — BH INPATIENT PSYCHIATRY PROGRESS NOTE - NSBHCHARTREVIEWVS_PSY_A_CORE FT
Vital Signs Last 24 Hrs  T(C): 36.3 (12-10-21 @ 07:51), Max: 36.3 (12-10-21 @ 07:51)  T(F): 97.4 (12-10-21 @ 07:51), Max: 97.4 (12-10-21 @ 07:51)  HR: --  BP: --  BP(mean): --  RR: 17 (12-09-21 @ 20:28) (17 - 17)  SpO2: --    Orthostatic VS  12-10-21 @ 07:51  Lying BP: --/-- HR: --  Sitting BP: 149/70 HR: 61  Standing BP: --/-- HR: --  Site: upper left arm  Mode: electronic  Orthostatic VS  12-09-21 @ 20:28  Lying BP: --/-- HR: --  Sitting BP: 141/67 HR: 73  Standing BP: --/-- HR: --  Site: --  Mode: --  Orthostatic VS  12-09-21 @ 09:28  Lying BP: --/-- HR: --  Sitting BP: 117/56 HR: 73  Standing BP: --/-- HR: --  Site: --  Mode: --

## 2021-12-11 PROCEDURE — 99232 SBSQ HOSP IP/OBS MODERATE 35: CPT

## 2021-12-11 RX ADMIN — Medication 25 MILLIGRAM(S): at 10:22

## 2021-12-11 RX ADMIN — DIVALPROEX SODIUM 750 MILLIGRAM(S): 500 TABLET, DELAYED RELEASE ORAL at 21:38

## 2021-12-11 RX ADMIN — OLANZAPINE 5 MILLIGRAM(S): 15 TABLET, FILM COATED ORAL at 10:23

## 2021-12-11 RX ADMIN — Medication 25 MILLIGRAM(S): at 21:38

## 2021-12-11 RX ADMIN — POLYETHYLENE GLYCOL 3350 17 GRAM(S): 17 POWDER, FOR SOLUTION ORAL at 10:24

## 2021-12-11 RX ADMIN — Medication 20 MILLIGRAM(S): at 10:23

## 2021-12-11 RX ADMIN — OLANZAPINE 5 MILLIGRAM(S): 15 TABLET, FILM COATED ORAL at 12:18

## 2021-12-11 RX ADMIN — OLANZAPINE 5 MILLIGRAM(S): 15 TABLET, FILM COATED ORAL at 16:38

## 2021-12-11 RX ADMIN — SENNA PLUS 2 TABLET(S): 8.6 TABLET ORAL at 21:37

## 2021-12-11 RX ADMIN — DIVALPROEX SODIUM 750 MILLIGRAM(S): 500 TABLET, DELAYED RELEASE ORAL at 10:23

## 2021-12-11 RX ADMIN — Medication 1 APPLICATION(S): at 10:24

## 2021-12-11 RX ADMIN — Medication 3 MILLIGRAM(S): at 21:38

## 2021-12-11 RX ADMIN — Medication 1 APPLICATION(S): at 21:37

## 2021-12-11 NOTE — BH INPATIENT PSYCHIATRY PROGRESS NOTE - NSBHFUPINTERVALHXFT_PSY_A_CORE
The patient was seen for follow up. There were no acute events overnight or PRNs required. This morning, the patient was laying in bed, somewhat sedated but arousable to verbal stimuli. She came out of her room for breakfast and was seen later working with PT and using her walker. Pt denies any concerns, reports stable mood, sleeping well and eating "ALL of the breakfast". Agrees to come to staff if she has any complaints.

## 2021-12-11 NOTE — BH INPATIENT PSYCHIATRY PROGRESS NOTE - NSBHCHARTREVIEWVS_PSY_A_CORE FT
Vital Signs Last 24 Hrs  T(C): 36.1 (12-11-21 @ 10:31), Max: 36.1 (12-11-21 @ 10:31)  T(F): 97 (12-11-21 @ 10:31), Max: 97 (12-11-21 @ 10:31)  HR: --  BP: --  BP(mean): --  RR: --  SpO2: --    Orthostatic VS  12-11-21 @ 10:31  Lying BP: --/-- HR: --  Sitting BP: 146/80 HR: 78  Standing BP: 140/78 HR: 80  Site: --  Mode: --  Orthostatic VS  12-10-21 @ 07:51  Lying BP: --/-- HR: --  Sitting BP: 149/70 HR: 61  Standing BP: --/-- HR: --  Site: upper left arm  Mode: electronic  Orthostatic VS  12-09-21 @ 20:28  Lying BP: --/-- HR: --  Sitting BP: 141/67 HR: 73  Standing BP: --/-- HR: --  Site: --  Mode: --

## 2021-12-11 NOTE — BH INPATIENT PSYCHIATRY PROGRESS NOTE - NSBHPSYCHOLCOGABN_PSY_A_CORE
disoriented to time/disoriented to place/disoriented to situation
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
## 2021-12-12 PROCEDURE — 99232 SBSQ HOSP IP/OBS MODERATE 35: CPT

## 2021-12-12 RX ADMIN — Medication 3 MILLIGRAM(S): at 21:00

## 2021-12-12 RX ADMIN — SENNA PLUS 2 TABLET(S): 8.6 TABLET ORAL at 21:00

## 2021-12-12 RX ADMIN — OLANZAPINE 5 MILLIGRAM(S): 15 TABLET, FILM COATED ORAL at 10:26

## 2021-12-12 RX ADMIN — POLYETHYLENE GLYCOL 3350 17 GRAM(S): 17 POWDER, FOR SOLUTION ORAL at 10:27

## 2021-12-12 RX ADMIN — DIVALPROEX SODIUM 750 MILLIGRAM(S): 500 TABLET, DELAYED RELEASE ORAL at 21:00

## 2021-12-12 RX ADMIN — Medication 25 MILLIGRAM(S): at 21:00

## 2021-12-12 RX ADMIN — Medication 25 MILLIGRAM(S): at 10:27

## 2021-12-12 RX ADMIN — DIVALPROEX SODIUM 750 MILLIGRAM(S): 500 TABLET, DELAYED RELEASE ORAL at 10:26

## 2021-12-12 RX ADMIN — Medication 20 MILLIGRAM(S): at 10:26

## 2021-12-12 RX ADMIN — OLANZAPINE 5 MILLIGRAM(S): 15 TABLET, FILM COATED ORAL at 12:35

## 2021-12-12 RX ADMIN — Medication 1 APPLICATION(S): at 09:50

## 2021-12-12 RX ADMIN — OLANZAPINE 5 MILLIGRAM(S): 15 TABLET, FILM COATED ORAL at 17:26
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
## 2021-12-12 NOTE — BH INPATIENT PSYCHIATRY PROGRESS NOTE - NSBHCHARTREVIEWVS_PSY_A_CORE FT
Vital Signs Last 24 Hrs  T(C): 36.4 (12-12-21 @ 07:59), Max: 36.4 (12-12-21 @ 07:59)  T(F): 97.5 (12-12-21 @ 07:59), Max: 97.5 (12-12-21 @ 07:59)    Orthostatic VS  12-12-21 @ 07:59  Sitting BP: 131/66 HR: 72

## 2021-12-12 NOTE — BH INPATIENT PSYCHIATRY PROGRESS NOTE - NSBHMETABOLIC_PSY_ALL_CORE_FT
BMI: BMI (kg/m2): 31.6 (11-29-21 @ 16:35)  HbA1c: A1C with Estimated Average Glucose Result: 5.4 % (11-04-21 @ 06:58)  Glucose: POCT Blood Glucose.: 109 mg/dL (11-12-21 @ 16:13)

## 2021-12-12 NOTE — BH INPATIENT PSYCHIATRY PROGRESS NOTE - NSBHFUPINTERVALHXFT_PSY_A_CORE
The patient was seen for follow-up for behavioral issues in the setting of dementia. Chart reviewed and case discussed with nursing staff. No acute overnight event, no psych PRNs required. On encounter, patient is seated in the dining area by herself eating breakfast. Patient reports stable mood, denies any new concerns, sleeping well and eating well. Taking meds.

## 2021-12-13 LAB
BP 180, S: <2 RU/ML — SIGNIFICANT CHANGE UP
BP 230, S: <2 RU/ML — SIGNIFICANT CHANGE UP

## 2021-12-13 PROCEDURE — 99232 SBSQ HOSP IP/OBS MODERATE 35: CPT

## 2021-12-13 RX ORDER — OLANZAPINE 15 MG/1
7.5 TABLET, FILM COATED ORAL
Refills: 0 | Status: DISCONTINUED | OUTPATIENT
Start: 2021-12-13 | End: 2021-12-16

## 2021-12-13 RX ORDER — OLANZAPINE 15 MG/1
5 TABLET, FILM COATED ORAL
Refills: 0 | Status: DISCONTINUED | OUTPATIENT
Start: 2021-12-13 | End: 2021-12-16

## 2021-12-13 RX ADMIN — OLANZAPINE 5 MILLIGRAM(S): 15 TABLET, FILM COATED ORAL at 14:15

## 2021-12-13 RX ADMIN — Medication 20 MILLIGRAM(S): at 10:16

## 2021-12-13 RX ADMIN — DIVALPROEX SODIUM 750 MILLIGRAM(S): 500 TABLET, DELAYED RELEASE ORAL at 10:16

## 2021-12-13 RX ADMIN — OLANZAPINE 5 MILLIGRAM(S): 15 TABLET, FILM COATED ORAL at 17:07

## 2021-12-13 RX ADMIN — Medication 1 APPLICATION(S): at 14:15

## 2021-12-13 RX ADMIN — POLYETHYLENE GLYCOL 3350 17 GRAM(S): 17 POWDER, FOR SOLUTION ORAL at 10:16

## 2021-12-13 RX ADMIN — OLANZAPINE 5 MILLIGRAM(S): 15 TABLET, FILM COATED ORAL at 10:17

## 2021-12-13 RX ADMIN — Medication 25 MILLIGRAM(S): at 10:17

## 2021-12-13 NOTE — BH INPATIENT PSYCHIATRY PROGRESS NOTE - NSBHCHARTREVIEWVS_PSY_A_CORE FT
Vital Signs Last 24 Hrs  T(C): 36.1 (12-13-21 @ 07:04), Max: 36.1 (12-13-21 @ 07:04)  T(F): 97 (12-13-21 @ 07:04), Max: 97 (12-13-21 @ 07:04)  HR: 74 (12-13-21 @ 07:04) (74 - 74)  BP: 108/69 (12-13-21 @ 07:04) (108/69 - 113/62)  BP(mean): 75 (12-12-21 @ 20:39) (75 - 75)  RR: --  SpO2: --    Orthostatic VS  12-12-21 @ 07:59  Lying BP: --/-- HR: --  Sitting BP: 131/66 HR: 72  Standing BP: --/-- HR: --  Site: --  Mode: --

## 2021-12-13 NOTE — BH INPATIENT PSYCHIATRY PROGRESS NOTE - CURRENT MEDICATION
MEDICATIONS  (STANDING):  clobetasol 0.05% Cream 1 Application(s) Topical two times a day  diVALproex Sprinkle 750 milliGRAM(s) Oral two times a day  furosemide    Tablet 20 milliGRAM(s) Oral daily  melatonin. 3 milliGRAM(s) Oral at bedtime  metoprolol tartrate 25 milliGRAM(s) Oral two times a day  OLANZapine Disintegrating Tablet 5 milliGRAM(s) Oral <User Schedule>  polyethylene glycol 3350 17 Gram(s) Oral daily  senna 2 Tablet(s) Oral at bedtime    MEDICATIONS  (PRN):  acetaminophen     Tablet .. 650 milliGRAM(s) Oral every 6 hours PRN Temp greater or equal to 38C (100.4F), Mild Pain (1 - 3), Moderate Pain (4 - 6)  aluminum hydroxide/magnesium hydroxide/simethicone Suspension 30 milliLiter(s) Oral every 4 hours PRN Dyspepsia  magnesium hydroxide Suspension 30 milliLiter(s) Oral at bedtime PRN constipation  mineral oil enema 133 milliLiter(s) Rectal daily PRN hard stool  OLANZapine 2.5 milliGRAM(s) Oral every 6 hours PRN severe agitation  OLANZapine Injectable 2.5 milliGRAM(s) IntraMuscular once PRN severe agitation  OLANZapine Injectable 2.5 milliGRAM(s) IntraMuscular once PRN severe agitation  saline laxative (FLEET) Rectal Enema 1 Enema Rectal daily PRN hard stool   MEDICATIONS  (STANDING):  clobetasol 0.05% Cream 1 Application(s) Topical two times a day  diVALproex Sprinkle 750 milliGRAM(s) Oral two times a day  furosemide    Tablet 20 milliGRAM(s) Oral daily  melatonin. 3 milliGRAM(s) Oral at bedtime  metoprolol tartrate 25 milliGRAM(s) Oral two times a day  OLANZapine 7.5 milliGRAM(s) Oral <User Schedule>  OLANZapine 5 milliGRAM(s) Oral <User Schedule>  polyethylene glycol 3350 17 Gram(s) Oral daily  senna 2 Tablet(s) Oral at bedtime    MEDICATIONS  (PRN):  acetaminophen     Tablet .. 650 milliGRAM(s) Oral every 6 hours PRN Temp greater or equal to 38C (100.4F), Mild Pain (1 - 3), Moderate Pain (4 - 6)  aluminum hydroxide/magnesium hydroxide/simethicone Suspension 30 milliLiter(s) Oral every 4 hours PRN Dyspepsia  magnesium hydroxide Suspension 30 milliLiter(s) Oral at bedtime PRN constipation  mineral oil enema 133 milliLiter(s) Rectal daily PRN hard stool  OLANZapine 2.5 milliGRAM(s) Oral every 6 hours PRN severe agitation  OLANZapine Injectable 2.5 milliGRAM(s) IntraMuscular once PRN severe agitation  OLANZapine Injectable 2.5 milliGRAM(s) IntraMuscular once PRN severe agitation  saline laxative (FLEET) Rectal Enema 1 Enema Rectal daily PRN hard stool
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
## 2021-12-13 NOTE — BH INPATIENT PSYCHIATRY PROGRESS NOTE - NSBHFUPINTERVALHXFT_PSY_A_CORE
The patient was seen for follow-up. Chart reviewed and case discussed with nursing staff. No acute overnights event or PRN medications required. However the patient continues to be tenuous with staff particularly in the middle of the day. This morning, the patient was observed sitting in the day room, not appearing sedated, and cooperated with interview. She endorsed good sleep and appetite, denied any safety concerns, AVH, and paranoia. She initially denied pruritis, then endorsed feeling itchy during the interview (although never scratched herself or gestured). She agreed to continue cooperating with topical steroid applications.

## 2021-12-13 NOTE — BH INPATIENT PSYCHIATRY PROGRESS NOTE - NSBHMETABOLIC_PSY_ALL_CORE_FT
BMI: BMI (kg/m2): 31.6 (11-29-21 @ 16:35)  HbA1c: A1C with Estimated Average Glucose Result: 5.4 % (11-04-21 @ 06:58)    Glucose: POCT Blood Glucose.: 109 mg/dL (11-12-21 @ 16:13)    BP: 108/69 (12-13-21 @ 07:04) (108/69 - 113/62)  Lipid Panel:

## 2021-12-14 PROCEDURE — 99232 SBSQ HOSP IP/OBS MODERATE 35: CPT

## 2021-12-14 RX ADMIN — Medication 3 MILLIGRAM(S): at 21:37

## 2021-12-14 RX ADMIN — SENNA PLUS 2 TABLET(S): 8.6 TABLET ORAL at 21:36

## 2021-12-14 RX ADMIN — Medication 25 MILLIGRAM(S): at 21:36

## 2021-12-14 RX ADMIN — OLANZAPINE 7.5 MILLIGRAM(S): 15 TABLET, FILM COATED ORAL at 09:48

## 2021-12-14 RX ADMIN — OLANZAPINE 7.5 MILLIGRAM(S): 15 TABLET, FILM COATED ORAL at 12:31

## 2021-12-14 RX ADMIN — Medication 1 APPLICATION(S): at 22:01

## 2021-12-14 RX ADMIN — OLANZAPINE 5 MILLIGRAM(S): 15 TABLET, FILM COATED ORAL at 17:22

## 2021-12-14 RX ADMIN — DIVALPROEX SODIUM 750 MILLIGRAM(S): 500 TABLET, DELAYED RELEASE ORAL at 21:37

## 2021-12-14 NOTE — BH INPATIENT PSYCHIATRY PROGRESS NOTE - NSBHMSEMOVE_PSY_A_CORE
Tremors/Other
No abnormal movements
Tremors/Other
No abnormal movements
Tremors/Other

## 2021-12-14 NOTE — UM REPORT PROGRESS NOTE - ESTIMATED DISCHARGE DATE
21-Dec-2021 28-Dec-2021 11-Jan-2022 18-Jan-2022 25-Jan-2022 08-Feb-2022 15-Feb-2022 21-Feb-2022 01-Mar-2022 08-Mar-2022 23-Mar-2022 30-Mar-2022 06-Apr-2022 13-Apr-2022 20-Apr-2022 25-Apr-2022 02-May-2022 09-May-2022 17-May-2022 24-May-2022

## 2021-12-14 NOTE — BH INPATIENT PSYCHIATRY PROGRESS NOTE - NSBHMSEMUSCLE_PSY_A_CORE
Other
Normal muscle tone/strength
Other
Other
Normal muscle tone/strength
Other

## 2021-12-14 NOTE — BH INPATIENT PSYCHIATRY PROGRESS NOTE - NSBHFUPINTERVALHXFT_PSY_A_CORE
The patient was seen for follow-up. Chart reviewed and case discussed with nursing staff. No acute overnights event or PRN medications required. However the patient continues to be tenuous with bathing, toileting, and meds. This morning, the patient cooperated with interview. She endorsed good sleep and appetite, denied any safety concerns, AVH, and paranoia. She denied pruritus and agreed to continue cooperating with topical steroid applications.

## 2021-12-14 NOTE — BH INPATIENT PSYCHIATRY PROGRESS NOTE - NSBHMSEEYE_PSY_A_CORE
Good

## 2021-12-14 NOTE — UM REPORT PROGRESS NOTE - NSUMRMCOMMENTS_GEN_A_CORE FT
LOS LOS, patient is in coinsurance days secondary will not provide auth LOS, patient is in lifetime reserve days, secondary will not provide auth LOS, patient has exhausted all medicare days, secondary will not provide auth

## 2021-12-14 NOTE — BH INPATIENT PSYCHIATRY PROGRESS NOTE - NSBHMSEREMMEM_PSY_A_CORE
Unable to assess

## 2021-12-14 NOTE — BH INPATIENT PSYCHIATRY PROGRESS NOTE - NSBHMSETHTASSOC_PSY_A_CORE
Loose
Normal
Loose
Normal
Loose
Loose
Normal

## 2021-12-14 NOTE — BH INPATIENT PSYCHIATRY PROGRESS NOTE - GENERAL APPEARANCE
Deformities present

## 2021-12-14 NOTE — BH INPATIENT PSYCHIATRY PROGRESS NOTE - NSBHMSEGAIT_PSY_A_CORE
Normal gait / station
Unable to assess
Normal gait / station

## 2021-12-14 NOTE — BH INPATIENT PSYCHIATRY PROGRESS NOTE - NSBHATTESTNPTRAINEE_PSY_A_CORE
agree

## 2021-12-14 NOTE — BH INPATIENT PSYCHIATRY PROGRESS NOTE - NSBHMSETHTPROC_PSY_A_CORE
Circumstantial/Perseverative/Impaired reasoning
Disorganized/Tangential/Perseverative/Illogical/Impaired reasoning
Circumstantial/Perseverative/Impaired reasoning
Disorganized/Tangential/Perseverative/Illogical/Impaired reasoning
Disorganized/Tangential/Illogical/Impaired reasoning
Disorganized/Tangential/Perseverative/Illogical/Impaired reasoning
Disorganized/Tangential/Illogical/Impaired reasoning
Disorganized/Tangential/Perseverative/Illogical/Impaired reasoning
Circumstantial/Perseverative/Impaired reasoning
Disorganized/Tangential/Perseverative/Illogical/Impaired reasoning

## 2021-12-14 NOTE — BH INPATIENT PSYCHIATRY PROGRESS NOTE - LEVEL OF CONSCIOUSNESS
Alert
Other
Lethargic, arousable to verbal stimulus
Alert
Alert
Lethargic, arousable to verbal stimulus
Alert
Lethargic, arousable to verbal stimulus
Alert
Lethargic, arousable to verbal stimulus
Other
Alert
Lethargic, arousable to verbal stimulus

## 2021-12-14 NOTE — UM REPORT PROGRESS NOTE - NSUMSWNOTE_GEN_A_CORE FT
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 disorganized and disoriented but overall she has been in better control and more cooperative with personal care and medication. The pt's mood is bright and no SI/HI verbalized. The pt's speech is poorly modulated but now less loud and vocally disruptive. The pt can be engaged in unit activities, particularly music and art, and distracted with use of a Claudia Mouse doll. The pt wanders about the unit and continues to be intrusive with peers at times but accepting of staff redirection. In the past weeks the pt has not been aggressive and has accepted redirection from staff.  The pt's prior SNF, [facility], has not accepted pt's referral, asserting that the facility could not manage the pt's psychiatric illness. SNF placement efforts have been extended to over 100 SNFs but to date the pt's referral has not been accepted by any facility.  The pt is a 67 year old female with a TBI and dx Dementia with behavioral disturbance who resided in [facility] since 2010.  She was stable there for years but was admitted due to recent onset of agitation and aggressive behavior.  The pt's thinking remains disorganized and disoriented but overall she has been in better control and more cooperative with personal care and medication. The pt's mood is bright and no SI/HI verbalized. The pt's speech is poorly modulated but now less loud and vocally disruptive. The pt can be engaged in unit activities, particularly music and art, and distracted with use of a Claudia Mouse doll. The pt wanders about the unit and continues to be intrusive with peers at times but accepting of staff redirection. In the past weeks the pt has not been aggressive and has accepted redirection from staff.  The pt's prior SNF, [facility], has not accepted pt's referral, asserting that the facility could not manage the pt's psychiatric illness. SNF placement efforts have been extended to over 125 SNFs but to date the pt's referral has not been accepted by any facility.  The pt is a 67 year old female with a TBI and dx Dementia with behavioral disturbance who resided in [facility] since 2010.  She was stable there for years but was admitted due to recent onset of agitation and aggressive behavior.  The pt's thinking remains disorganized and disoriented but overall she has been in better control and more cooperative with personal care and medication. The pt's mood is bright and no SI/HI verbalized. The pt's speech is poorly modulated but now less loud and vocally disruptive. The pt can be engaged in unit activities, particularly music and art, and distracted with use of a Claudia Mouse doll. The pt wanders about the unit and continues to be intrusive with peers at times but accepting of staff redirection. In the past weeks the pt has not been aggressive and has accepted redirection from staff.  The pt's prior SNF, [facility], previously rejected the pt's referral, asserting that the facility could not manage the pt's psychiatric illness. This past week SW sent additional clinical information to [facility] admissions office reflecting the pt's stability and sent a letter from the pt's psychiatrist offering to address any concerns they might have with the nursing and medical director. However, [facility] rejected the pt's referral again.  SNF placement efforts have continued and to date referrals have been made to over 160 SNFs but the pt's referral has not been accepted by any facility.

## 2021-12-14 NOTE — BH INPATIENT PSYCHIATRY PROGRESS NOTE - NSBHMSEPERCEPT_PSY_A_CORE
No abnormalities
Auditory hallucinations/Visual hallucinations
No abnormalities
Auditory hallucinations/Visual hallucinations
No abnormalities

## 2021-12-14 NOTE — BH INPATIENT PSYCHIATRY PROGRESS NOTE - NSBHPSYCHOLCOGORIENT_PSY_A_CORE
Not fully oriented...
Unable to assess
Not fully oriented...
Unable to assess
Not fully oriented...
Unable to assess
Not fully oriented...

## 2021-12-14 NOTE — BH INPATIENT PSYCHIATRY PROGRESS NOTE - NSBHMSETHTCONTENT_PSY_A_CORE
Unremarkable
Unable to assess
Unremarkable
Unable to assess
Unremarkable

## 2021-12-14 NOTE — BH INPATIENT PSYCHIATRY PROGRESS NOTE - NSBHCHARTREVIEWVS_PSY_A_CORE FT
Vital Signs Last 24 Hrs  T(C): --  T(F): --  HR: --  BP: --  BP(mean): --  RR: --  SpO2: --    Orthostatic VS  12-13-21 @ 20:16  Lying BP: --/-- HR: --  Sitting BP: 126/72 HR: 76  Standing BP: --/-- HR: --  Site: --  Mode: --

## 2021-12-14 NOTE — BH INPATIENT PSYCHIATRY PROGRESS NOTE - NSBHMSEAFFRANGE_PSY_A_CORE
Full

## 2021-12-14 NOTE — BH INPATIENT PSYCHIATRY PROGRESS NOTE - CURRENT MEDICATION
MEDICATIONS  (STANDING):  clobetasol 0.05% Cream 1 Application(s) Topical two times a day  diVALproex Sprinkle 750 milliGRAM(s) Oral two times a day  furosemide    Tablet 20 milliGRAM(s) Oral daily  melatonin. 3 milliGRAM(s) Oral at bedtime  metoprolol tartrate 25 milliGRAM(s) Oral two times a day  OLANZapine 7.5 milliGRAM(s) Oral <User Schedule>  OLANZapine 5 milliGRAM(s) Oral <User Schedule>  polyethylene glycol 3350 17 Gram(s) Oral daily  senna 2 Tablet(s) Oral at bedtime    MEDICATIONS  (PRN):  acetaminophen     Tablet .. 650 milliGRAM(s) Oral every 6 hours PRN Temp greater or equal to 38C (100.4F), Mild Pain (1 - 3), Moderate Pain (4 - 6)  aluminum hydroxide/magnesium hydroxide/simethicone Suspension 30 milliLiter(s) Oral every 4 hours PRN Dyspepsia  magnesium hydroxide Suspension 30 milliLiter(s) Oral at bedtime PRN constipation  mineral oil enema 133 milliLiter(s) Rectal daily PRN hard stool  OLANZapine 2.5 milliGRAM(s) Oral every 6 hours PRN severe agitation  OLANZapine Injectable 2.5 milliGRAM(s) IntraMuscular once PRN severe agitation  OLANZapine Injectable 2.5 milliGRAM(s) IntraMuscular once PRN severe agitation  saline laxative (FLEET) Rectal Enema 1 Enema Rectal daily PRN hard stool

## 2021-12-14 NOTE — BH INPATIENT PSYCHIATRY PROGRESS NOTE - NSBHMSESPABN_PSY_A_CORE
Loud volume/Increased productivity/Impaired articulation/Other
Loud volume/Increased productivity
Loud volume/Increased productivity
Loud volume/Increased productivity/Impaired articulation/Other
Loud volume/Impaired articulation
Loud volume/Increased productivity/Impaired articulation/Other
Loud volume/Impaired articulation
Loud volume/Increased productivity/Impaired articulation/Other
Loud volume/Impaired articulation
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
## 2021-12-14 NOTE — BH INPATIENT PSYCHIATRY PROGRESS NOTE - NSBHMSEBODY_PSY_A_CORE
Overweight
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
## 2021-12-14 NOTE — UM REPORT PROGRESS NOTE - NSUMSWACTION_GEN_A_CORE FT
CAMILO provides pt support and redirection as needed. CAMILO sent an overnight letter to the pt's , requesting he call the unit to speak with this SW or pt's MD and provide an emergency contact. CAMILO will initiate referral back to [facility] as the pt shows stabilization. CAMILO provides pt support and redirection as needed. CAMILO sent an overnight letter to the pt's , requesting he call the unit to speak with this SW or pt's MD and provide an emergency contact but no response received. CAMILO will request [facility] police make another Wellness check to ascertain the pt's  status as  has osteogenesis imperfecta. CAMILO will initiate referral back to [facility] as the pt shows stabilization. CAMILO provides pt support and redirection as needed. SW had been unable to reach the pt's  who has osteogenesis imperfecta and the pt's only identified contact. CAMILO requested [facility] police make a second Wellness check and discovered that pt's  was hospitalized in [place]. CAMILO was able to speak with pt's  and obtained several family emergency contacts with 's consent to speak with them if he was unable to be reached. CAMILO will initiate referral back to [facility] as the pt shows stabilization. CAMILO provides pt support and redirection as needed. The pt's  has osteogenesis imperfecta and had been hospitalized in [facility]) for the past several weeks. CAMILO obtained several family emergency contacts with 's consent to speak with them if he was unable to be reached. CAMILO was unable to locate pt's  this week as he is no longer in [place], does not answer his cell, and home phone are not in service. CAMILO spoke with pt's nephew who is in [place], did not know that the pt was not in [place], and reported knowing that [facility] had requested pt's  pay privately to maintain a bedhold there. CAMILO will f/u with [facility] administration regarding the pt's return there. SW has had difficulty reaching the pt's  who has been hospitalized for the past several weeks and now is home.  has indicated wanting the pt to return to [facility] for her long term care. CAMILO sent clinical information for their review and will f/u. SW has been unable to reach the pt's  in recent week. The pt's  has medical problems and had been hospitalized for the past several weeks but left a message indicating that he is home now.  has indicated wanting the pt to return to [facility] for her long term care. SW will continue to try to reach pt's  and will f/u with [facility]. SW continues to be unable to reach the pt's  in recent week. The pt's  has serious medical problems, had been hospitalized for the past several weeks, reportedly is now at home, but does not answer his phone. CAMILO spoke with 's nephew whom he had identified and consented to as an emergency contact.  has indicated wanting the pt to return to [facility] for her long term care. CAMILO discussed disposition issues with pt's nephew who consented to placement efforts being extended to other SNFs. Nephew indicated that he would continue to try to reach pt's  and had mailed him a new phone this past week. SW spoke he pt's  who has osteogenesis imperfecta, recent serious medical problems including covid, was hospitalized for several weeks, and now is recovering at home. SW provided treatment update about the pt's persistent episodic agitation and intrusiveness. Pt's  hopes the pt can return to [facility] but is accepting of alternative SNF placement options if needed. SW has not been able to reach the pt's  this week. He has osteogenesis imperfecta, has had recent serious medical problems including covid, and had been hospitalized for several weeks. Pt's  indicated hope that the pt could return to [facility] but was accepting of alternative SNF placement options if needed. SW has not been able to reach the pt's . He has osteogenesis imperfecta, has had recent serious medical problems including covid, and had been hospitalized for several weeks. Pt's  indicated hope that the pt could return to [facility] but was accepting of alternative SNF placement options if needed. CAMILO was able to reach the pt's  after several weeks of no contact. Pt's  reported having problems with his land line and cell phone. SW provided treatment updates and arranged for  to speak with the pt who showed recognition of him. Pt's  indicated hope that the pt could return to [facility] for her long term care. CAMILO will send clinical information to [facility] when the pt shows stabilization in efforts to refer the pt back to her prior residence. Writer is CAMILO longo providing  coverage for 03/08/2022.   Unit CAMILO was able to reach the pt's  after several weeks of no contact. Pt's  reported having problems with his land line and cell phone. CAMILO provided treatment updates and arranged for  to speak with the pt who showed recognition of him. Pt's  indicated hope that the pt could return to [facility] for her long term care. CAMILO will send clinical information to [facility] as the pt shows more stabilization in efforts to refer the pt back to her prior residence. CAMILO provides pt support and redirection as needed. CAMILO has been in contact with the pt's  who expresses hope that the pt will be accepted back to [facility] for her long term care. CAMILO will send clinical information to [facility] for their review. SW provides pt support and redirection as needed. SW informed the pt's  of  's refusal to accept pt back for her long term care. SW advised him that it will be difficult to secure SNF placement in light of the pt's hx of behavioral disturbance, TBI dx, and ambulatory status.  is accepting of SNF placement efforts being extended.  SW provides pt support and redirection as needed. The pt's  is aware of 's refusal to accept pt back for her long term care and accepting of placement efforts being extended to other facilities. SW advised him that it will be difficult to secure SNF placement in light of the pt's hx of behavioral disturbance, TBI dx, and ambulatory status.  SW provides pt support and redirection as needed. The pt's  is aware of 's refusal to accept pt back for her long term care and accepting of placement efforts being extended to other facilities. SW advised him that it will be difficult to secure SNF placement in light of the pt's hx of behavioral disturbance, TBI dx, and ambulatory status. The pt's  expressed feeling depressed and isolated due to his medical problems (osteogenesis imperfecta with multiple fractures and hospitalizations in the past few years) and lack of contact with his wife due to his mobility limitations and the pt's hospitalization. The pt's  was accepting [facility]. SW provides pt support and redirection as needed. The pt's  is aware of 's refusal to accept pt back for her long term care and accepting of placement efforts being extended to other facilities. SW advised him that it will be difficult to secure SNF placement in light of the pt's hx of behavioral disturbance, TBI dx, and ambulatory status. The pt's  expressed feeling depressed and isolated due to his medical problems (osteogenesis imperfecta with multiple fractures and hospitalizations in the past few years) and lack of contact with his wife due to his mobility limitations and the pt's hospitalization. The pt's  was accepting of SW [facility]. SNF placement efforts are ongoing.

## 2021-12-14 NOTE — BH INPATIENT PSYCHIATRY PROGRESS NOTE - NSBHMSEAFFQUAL_PSY_A_CORE
Euthymic

## 2021-12-14 NOTE — BH INPATIENT PSYCHIATRY PROGRESS NOTE - NSBHMSEATTEN_PSY_A_CORE
Impaired
Normal
Impaired
Normal
Impaired
Normal
Impaired

## 2021-12-14 NOTE — BH INPATIENT PSYCHIATRY PROGRESS NOTE - NSBHMSESPEECH_PSY_A_CORE
Abnormal as indicated, otherwise normal...

## 2021-12-15 PROCEDURE — 99232 SBSQ HOSP IP/OBS MODERATE 35: CPT

## 2021-12-15 RX ADMIN — DIVALPROEX SODIUM 750 MILLIGRAM(S): 500 TABLET, DELAYED RELEASE ORAL at 09:56

## 2021-12-15 RX ADMIN — POLYETHYLENE GLYCOL 3350 17 GRAM(S): 17 POWDER, FOR SOLUTION ORAL at 09:55

## 2021-12-15 RX ADMIN — Medication 25 MILLIGRAM(S): at 21:08

## 2021-12-15 RX ADMIN — Medication 1 APPLICATION(S): at 09:57

## 2021-12-15 RX ADMIN — OLANZAPINE 5 MILLIGRAM(S): 15 TABLET, FILM COATED ORAL at 16:55

## 2021-12-15 RX ADMIN — Medication 25 MILLIGRAM(S): at 09:55

## 2021-12-15 RX ADMIN — OLANZAPINE 7.5 MILLIGRAM(S): 15 TABLET, FILM COATED ORAL at 12:14

## 2021-12-15 RX ADMIN — Medication 3 MILLIGRAM(S): at 21:09

## 2021-12-15 RX ADMIN — Medication 1 APPLICATION(S): at 21:09

## 2021-12-15 RX ADMIN — DIVALPROEX SODIUM 750 MILLIGRAM(S): 500 TABLET, DELAYED RELEASE ORAL at 21:08

## 2021-12-15 RX ADMIN — SENNA PLUS 2 TABLET(S): 8.6 TABLET ORAL at 21:08

## 2021-12-15 RX ADMIN — OLANZAPINE 7.5 MILLIGRAM(S): 15 TABLET, FILM COATED ORAL at 09:58

## 2021-12-15 RX ADMIN — Medication 20 MILLIGRAM(S): at 09:55

## 2021-12-15 NOTE — BH INPATIENT PSYCHIATRY PROGRESS NOTE - NSBHCHARTREVIEWVS_PSY_A_CORE FT
Vital Signs Last 24 Hrs  T(C): --  T(F): --  HR: --  BP: --  BP(mean): --  RR: --  SpO2: --    Orthostatic VS  12-15-21 @ 09:50  Lying BP: --/-- HR: --  Sitting BP: 135/85 HR: 98  Standing BP: --/-- HR: --  Site: --  Mode: --  Orthostatic VS  12-15-21 @ 07:38  Lying BP: 126/63 HR: 68  Sitting BP: --/-- HR: --  Standing BP: --/-- HR: --  Site: --  Mode: --  Orthostatic VS  12-14-21 @ 20:33  Lying BP: --/-- HR: --  Sitting BP: 145/56 HR: 84  Standing BP: --/-- HR: --  Site: --  Mode: --  Orthostatic VS  12-13-21 @ 20:16  Lying BP: --/-- HR: --  Sitting BP: 126/72 HR: 76  Standing BP: --/-- HR: --  Site: --  Mode: --

## 2021-12-15 NOTE — BH INPATIENT PSYCHIATRY PROGRESS NOTE - NSBHASSESSSUMMFT_PSY_ALL_CORE
66 y.o. F, as per records is , has been residing at [facility] ([facility] since 2010, with past psychiatric history of dementia with behavioral disturbance, mood disorder, RODY, possible inpatient psychiatric admission to [facility] this year, has a chronic medical history notable for NPH (w/  shunt), HTN, DMT2, TBI (unspecified), pedal edema presenting from [facility] for physically aggressive behavior and diffuse rash. Records also indicate that [facility] was seeking a [facility] admission. Pt is AOx1 which per chart appears to be patient's baseline. Pt was doing well on previous medication regimen but having increase episodes of agitation. Pt most likely having increase in aggressive behavior 2/2 to TBI vs dementia. Patient requires inpatient admission for medication optimization and stabilization of agitation.     11/22: No acute events overnight or PRNs required, VSS, med adherent. Endorses AH of mother's voice telling her to take good care of her baby. Denies any safety concerns. Patient became agitated at 1pm after trying to follow repair men off unit, denies wanting to leave the unit. Incident likely related to disinhibition rather than psychosis, but further monitoring and collateral necessary to better understand the patient and hopefully delineate a pattern.   11/23: No acute events overnight or PRNs required, VSS, med adherent. Has been in more appropriate behavioral control following change in Zyprexa schedule. Was unable to take Depakote overnight due to difficulty swallowing the pill. Switched to sprinkles this morning which patient took without issue. Will continue monitoring on current regimen given change in formulation. Ammonia pending given patient's tremor. Will check VPA level when patient consistently taking Depakote sprinkles.   11/24: Patient required Zyprexa PO PRN yesterday at 6:40pm for agitation, given with good effect. Slept well overnight and adherent with meds. VPA/ammonia deferred given prior adherence issues due to difficulty swallowing. Will continue to titrate Zyprexa and check VPA/ammonia when the patient is taking reliably. EKG today given ongoing titration.   11/25: Patient remains confused, labile, but calm today.  11/26: No acute events over PRNs required. Patient remains intermittently labile but redirectable. Accepting of treatment. Will check VPA/ammonia today given improved adherence. VPA 68, Ammonia pending.  11/27: On exam, patient is pleasantly confused, denies any concerns. No behavorial events. Taking meds. Labs: Serum ammonia wnl at 15.   11/28: Patient remains confused, labile affect, intermittently laughing then screaming.  11/29: No acute events or PRNs required. Patient remains labile but redirectable and accepting treatment. Endorses pruritis in bilateral forearms and L foot swelling observed on exam. WILLIE evaluation pending.  11/30: Per WILLIE recs, patient sent to ED yesterday afternoon for doppler imaging to r/o DVT which was unremarkable. The patient was able to tolerate ED course without any behavioral incidents. This morning, patient at baseline, reports concern over LEs and agrees to meet again with WILLIE. ED had recommended consideration of Keflex for possible cellulitis. Awaiting hospitalist recs.   12/1: Patient acutely agitated overnight requiring Zyprexa 2.5mg PO accepted with good effect. Noted to have worsening rash despite adherence to topical steroid. Per WILLIE recs, dermatology consulted for further evaluation. Rash likely underlying behavioral exacerbation as the patient had been without issue for several days.  12/2: No acute events or PRNs required. Patient cooperative with Derm and GYN consults yesterday afternoon. Per derm, likely eczema for which will start clobetasol BID. Per recs, was also treated prophylactically for scabies (repeat permethrin in 1 week has been ordered). Per derm, also ordered Bullous Pemphigoid labs to r/o. Per GYN, send BV panel to r/o. Patient initially orthostatic but improved on repeat, denies symptoms.  12/3: Patient seen for complications of TBI dementia. No major overnight events. Patient not aggressive still labile  from silly giddy to angry, raising voice. Eating sleping fairly well. Had large BM.  12/4: Patient continues to be confused and disorganized.  12/6: Patient continuing to require PRNs, given Zyprexa 2.5mg PO yesterday afternoon with good effect. Remains intermittently labile without overt aggression. Denies SE and no evidence of ongoing constipation. Skin improving and patient denies any subjective irritation. VPA stable, today 67.  12/7: No acute events overnight or PRNs required. Denies SE and no evidence on exam. Denies any concerns with skin. Eventually cooperated with BP lab.  12/8: No acute events overnight or PRNs required. Denies SE and no evidence on exam. No further skin concerns. Patient sedated, which may be related to med titration. Will continue to monitor for now and make changes if it persists. Will check ECG tomorrow. Repeat permethrin tonight per derm recommendations.  12/9: No acute events overnight or PRNs required. Denies SE and no evidence on exam. Skin is stable for the time being. Patient less sedated this morning, ECG yesterday was sinus with QTc 422. As such, will continue Zyprexa schedule. Mildly elevated electrolytes on BMP likely attributable to dehydration. Patient received Zyprexa PRN at 11:44am after pushing another patient who was provoking her. Staff instructed to hold 1pm 5mg dose but give another 2.5mg PRN if needed.  12/10: No acute events overnight or PRNs required. Denies SE and skin concerns, no issues on exam. Patient more sedated than yesterday but less than the day prior. Pattern appears to be that she is more sedated in the morning but wakes up over the course of the day. As such, will reschedule pm dose earlier in the day.  12/11: This morning, the patient was laying in bed, somewhat sedated but arousable to verbal stimuli. She came out of her room for breakfast and was seen later working with PT and using her walker. Pt denies any concerns, reports stable mood, sleeping well and eating "ALL of the breakfast". Agrees to come to staff if she has any complaints.  12/12: Patient pleasant and calm on encounter and denies any new concerns. Eating and sleeping well. No behavioral events.  12/13: No acute events overnight or PRNs required. Denies SE and no issues observed on exam. Patient has been receiving Zyprexa TID as scheduled and is no longer sedated. Remains tenuous with staff, relatively labile and unpredictable, particularly around the middle of the day. Will increase Zyprexa by 5mg/day. BP labs WNL.  12/14: No acute events overnight or PRNs required. Denies SE and no issues on exam. Remains tenuous with staff, particularly with toileting, bathing, and meds. Continue med regimen. Began implementing behavioral intervention with stuffed animal which thus far has been well received. Staff to bring Claudia Mouse when they interact with the patient and encourage its use when the patient requires redirection.     Plan:  1. Legals: 9.27 status, *DNR/DNI* [MOLST in chart]  2. Safety: routine observation. PRNs: Zyprexa 2.5 mg PO/IM q6h   3. Psychiatry: Depakote sprinkles 750 mg BID, Zyprexa 7.5mg at 9a + 1p and Zyprexa 5mg at 5p, repeat ECG Friday  4. Group, milieu, individual therapy as appropriate. *Staff to use stuffed Claudia Mouse to redirect the patient when she becomes irritable.*  5. Medical:  -RASH: s/p derm consult, given permethrin x2 and started on clobetasol BID for 2 weeks. BP lab drawn and WNL. Most likely eczema. On 12/16 will transition from clobetasol BID to triamcinolone PRN per derm recs.   -ODOR: s/p GYN consult, BV panel sent and WNL. Pt noted to have stool burden on exam, pt had BM s/p enema.   -CONSTIPATION: continue Miralax 17g daily and Senna 2mg QHS, fleet enema PRN  -HTN: continue home Lopressor 25mg daily for HTN  -EDEMA: continue home Lasix 20mg daily  *PLEASE ENCOURAGE HYDRATION as patient with mildly elevated Na and Cl on BMP.  6. Dispo: pending clinical improvement.  Patient continues to require inpatient hospitalization for stabilization and safety.  12/15: Patient labile, calmer today so far but need more observation points given changeability. Cont olanzapine monitor gait, vitals watch for constipation

## 2021-12-15 NOTE — BH INPATIENT PSYCHIATRY PROGRESS NOTE - MSE UNSTRUCTURED FT
Patient is awake and alert. Relates in childlike manner. Mood  is perplexed, pleasant now, affect labile. Speech is fluen with poor modulation of volume, tone. Repeats words at different registers. Thought process is coherent but irrelevant. No delusions expressed. no nunez. No SI/HI. Oriented to self.  Poor insight.

## 2021-12-15 NOTE — BH INPATIENT PSYCHIATRY PROGRESS NOTE - NSBHFUPINTERVALHXFT_PSY_A_CORE
The patient was seen for follow-up for dementia with behavioral disturbances. Chart reviewed and case discussed with nursing staff. No acute overnights event or PRN medications required. However the patient continues to be resistive with care. Sleep appetite okay. Denies dizziness, constipation

## 2021-12-16 PROCEDURE — 99232 SBSQ HOSP IP/OBS MODERATE 35: CPT

## 2021-12-16 RX ORDER — OLANZAPINE 15 MG/1
7.5 TABLET, FILM COATED ORAL
Refills: 0 | Status: DISCONTINUED | OUTPATIENT
Start: 2021-12-16 | End: 2021-12-20

## 2021-12-16 RX ORDER — METOPROLOL TARTRATE 50 MG
25 TABLET ORAL
Refills: 0 | Status: COMPLETED | OUTPATIENT
Start: 2021-12-16 | End: 2021-12-16

## 2021-12-16 RX ADMIN — Medication 25 MILLIGRAM(S): at 09:39

## 2021-12-16 RX ADMIN — Medication 20 MILLIGRAM(S): at 09:39

## 2021-12-16 RX ADMIN — OLANZAPINE 7.5 MILLIGRAM(S): 15 TABLET, FILM COATED ORAL at 16:53

## 2021-12-16 RX ADMIN — DIVALPROEX SODIUM 750 MILLIGRAM(S): 500 TABLET, DELAYED RELEASE ORAL at 21:44

## 2021-12-16 RX ADMIN — Medication 3 MILLIGRAM(S): at 21:45

## 2021-12-16 RX ADMIN — DIVALPROEX SODIUM 750 MILLIGRAM(S): 500 TABLET, DELAYED RELEASE ORAL at 09:39

## 2021-12-16 RX ADMIN — OLANZAPINE 7.5 MILLIGRAM(S): 15 TABLET, FILM COATED ORAL at 13:55

## 2021-12-16 RX ADMIN — POLYETHYLENE GLYCOL 3350 17 GRAM(S): 17 POWDER, FOR SOLUTION ORAL at 09:38

## 2021-12-16 RX ADMIN — Medication 25 MILLIGRAM(S): at 21:44

## 2021-12-16 RX ADMIN — SENNA PLUS 2 TABLET(S): 8.6 TABLET ORAL at 21:44

## 2021-12-16 RX ADMIN — OLANZAPINE 7.5 MILLIGRAM(S): 15 TABLET, FILM COATED ORAL at 09:39

## 2021-12-16 NOTE — BH INPATIENT PSYCHIATRY PROGRESS NOTE - PRN MEDS
MEDICATIONS  (PRN):  acetaminophen     Tablet .. 650 milliGRAM(s) Oral every 6 hours PRN Temp greater or equal to 38C (100.4F), Mild Pain (1 - 3), Moderate Pain (4 - 6)  aluminum hydroxide/magnesium hydroxide/simethicone Suspension 30 milliLiter(s) Oral every 4 hours PRN Dyspepsia  magnesium hydroxide Suspension 30 milliLiter(s) Oral at bedtime PRN constipation  mineral oil enema 133 milliLiter(s) Rectal daily PRN hard stool  OLANZapine 2.5 milliGRAM(s) Oral every 6 hours PRN severe agitation  OLANZapine Injectable 2.5 milliGRAM(s) IntraMuscular once PRN severe agitation  OLANZapine Injectable 2.5 milliGRAM(s) IntraMuscular once PRN severe agitation  saline laxative (FLEET) Rectal Enema 1 Enema Rectal daily PRN hard stool  triamcinolone 0.1% Cream 1 Application(s) Topical two times a day PRN pruritis/rash

## 2021-12-16 NOTE — BH INPATIENT PSYCHIATRY PROGRESS NOTE - NSBHCHARTREVIEWVS_PSY_A_CORE FT
Vital Signs Last 24 Hrs  T(C): --  T(F): --  HR: --  BP: --  BP(mean): --  RR: --  SpO2: --    Orthostatic VS  12-15-21 @ 20:29  Lying BP: --/-- HR: --  Sitting BP: 123/90 HR: --  Standing BP: --/-- HR: --  Site: --  Mode: --  Orthostatic VS  12-15-21 @ 09:50  Lying BP: --/-- HR: --  Sitting BP: 135/85 HR: 98  Standing BP: --/-- HR: --  Site: --  Mode: --  Orthostatic VS  12-15-21 @ 07:38  Lying BP: 126/63 HR: 68  Sitting BP: --/-- HR: --  Standing BP: --/-- HR: --  Site: --  Mode: --  Orthostatic VS  12-14-21 @ 20:33  Lying BP: --/-- HR: --  Sitting BP: 145/56 HR: 84  Standing BP: --/-- HR: --  Site: --  Mode: --

## 2021-12-16 NOTE — BH INPATIENT PSYCHIATRY PROGRESS NOTE - CURRENT MEDICATION
MEDICATIONS  (STANDING):  diVALproex Sprinkle 750 milliGRAM(s) Oral two times a day  furosemide    Tablet 20 milliGRAM(s) Oral daily  melatonin. 3 milliGRAM(s) Oral at bedtime  metoprolol tartrate 25 milliGRAM(s) Oral two times a day  OLANZapine 7.5 milliGRAM(s) Oral <User Schedule>  polyethylene glycol 3350 17 Gram(s) Oral daily  senna 2 Tablet(s) Oral at bedtime    MEDICATIONS  (PRN):  acetaminophen     Tablet .. 650 milliGRAM(s) Oral every 6 hours PRN Temp greater or equal to 38C (100.4F), Mild Pain (1 - 3), Moderate Pain (4 - 6)  aluminum hydroxide/magnesium hydroxide/simethicone Suspension 30 milliLiter(s) Oral every 4 hours PRN Dyspepsia  magnesium hydroxide Suspension 30 milliLiter(s) Oral at bedtime PRN constipation  mineral oil enema 133 milliLiter(s) Rectal daily PRN hard stool  OLANZapine 2.5 milliGRAM(s) Oral every 6 hours PRN severe agitation  OLANZapine Injectable 2.5 milliGRAM(s) IntraMuscular once PRN severe agitation  OLANZapine Injectable 2.5 milliGRAM(s) IntraMuscular once PRN severe agitation  saline laxative (FLEET) Rectal Enema 1 Enema Rectal daily PRN hard stool  triamcinolone 0.1% Cream 1 Application(s) Topical two times a day PRN pruritis/rash

## 2021-12-16 NOTE — BH INPATIENT PSYCHIATRY PROGRESS NOTE - NSBHASSESSSUMMFT_PSY_ALL_CORE
66 y.o. F, as per records is , has been residing at [facility] ([facility]) since 2010, with past psychiatric history of dementia with behavioral disturbance, mood disorder, RODY, possible inpatient psychiatric admission to [facility] this year, has a chronic medical history notable for NPH (w/  shunt), HTN, DMT2, TBI (unspecified), [place] [facility] [place] and diffuse [place]. Records also indicate that [facility] was seeking a [facility] admission. Pt is AOx1 which per chart appears to be patient's baseline. Pt was doing well on previous medication regimen but having increase episodes of agitation. Pt most likely having increase in aggressive behavior 2/2 to TBI vs dementia. Patient requires inpatient admission for medication optimization and stabilization of agitation.     11/22: No acute events overnight or PRNs required, VSS, med adherent. Endorses AH of mother's voice telling her to take good care of her baby. Denies any safety concerns. Patient became agitated at 1pm after trying to follow repair men off unit, denies wanting to leave the unit. Incident likely related to disinhibition rather than psychosis, but further monitoring and collateral necessary to better understand the patient and hopefully delineate a pattern.   11/23: No acute events overnight or PRNs required, VSS, med adherent. Has been in more appropriate behavioral control following change in Zyprexa schedule. Was unable to take Depakote overnight due to difficulty swallowing the pill. Switched to sprinkles this morning which patient took without issue. Will continue monitoring on current regimen given change in formulation. Ammonia pending given patient's tremor. Will check VPA level when patient consistently taking Depakote sprinkles.   11/24: Patient required Zyprexa PO PRN yesterday at 6:40pm for agitation, given with good effect. Slept well overnight and adherent with meds. VPA/ammonia deferred given prior adherence issues due to difficulty swallowing. Will continue to titrate Zyprexa and check VPA/ammonia when the patient is taking reliably. EKG today given ongoing titration.   11/25: Patient remains confused, labile, but calm today.  11/26: No acute events over PRNs required. Patient remains intermittently labile but redirectable. Accepting of treatment. Will check VPA/ammonia today given improved adherence. VPA 68, Ammonia pending.  11/27: On exam, patient is pleasantly confused, denies any concerns. No behavorial events. Taking meds. Labs: Serum ammonia wnl at 15.   11/28: Patient remains confused, labile affect, intermittently laughing then screaming.  11/29: No acute events or PRNs required. Patient remains labile but redirectable and accepting treatment. Endorses pruritis in bilateral forearms and L foot swelling observed on exam. WILLIE evaluation pending.  11/30: Per WILLIE recs, patient sent to ED yesterday afternoon for doppler imaging to r/o DVT which was unremarkable. The patient was able to tolerate ED course without any behavioral incidents. This morning, patient at baseline, reports concern over LEs and agrees to meet again with WILLIE. ED had recommended consideration of Keflex for possible cellulitis. Awaiting hospitalist recs.   12/1: Patient acutely agitated overnight requiring Zyprexa 2.5mg PO accepted with good effect. Noted to have worsening rash despite adherence to topical steroid. Per WILLIE recs, dermatology consulted for further evaluation. Rash likely underlying behavioral exacerbation as the patient had been without issue for several days.  12/2: No acute events or PRNs required. Patient cooperative with Derm and GYN consults yesterday afternoon. Per derm, likely eczema for which will start clobetasol BID. Per recs, was also treated prophylactically for scabies (repeat permethrin in 1 week has been ordered). Per derm, also ordered Bullous Pemphigoid labs to r/o. Per GYN, send BV panel to r/o. Patient initially orthostatic but improved on repeat, denies symptoms.  12/3: Patient seen for complications of TBI dementia. No major overnight events. Patient not aggressive still labile  from silly giddy to angry, raising voice. Eating sleping fairly well. Had large BM.  12/4: Patient continues to be confused and disorganized.  12/6: Patient continuing to require PRNs, given Zyprexa 2.5mg PO yesterday afternoon with good effect. Remains intermittently labile without overt aggression. Denies SE and no evidence of ongoing constipation. Skin improving and patient denies any subjective irritation. VPA stable, today 67.  12/7: No acute events overnight or PRNs required. Denies SE and no evidence on exam. Denies any concerns with skin. Eventually cooperated with BP lab.  12/8: No acute events overnight or PRNs required. Denies SE and no evidence on exam. No further skin concerns. Patient sedated, which may be related to med titration. Will continue to monitor for now and make changes if it persists. Will check ECG tomorrow. Repeat permethrin tonight per derm recommendations.  12/9: No acute events overnight or PRNs required. Denies SE and no evidence on exam. Skin is stable for the time being. Patient less sedated this morning, ECG yesterday was sinus with QTc 422. As such, will continue Zyprexa schedule. Mildly elevated electrolytes on BMP likely attributable to dehydration. Patient received Zyprexa PRN at 11:44am after pushing another patient who was provoking her. Staff instructed to hold 1pm 5mg dose but give another 2.5mg PRN if needed.  12/10: No acute events overnight or PRNs required. Denies SE and skin concerns, no issues on exam. Patient more sedated than yesterday but less than the day prior. Pattern appears to be that she is more sedated in the morning but wakes up over the course of the day. As such, will reschedule pm dose earlier in the day.  12/11: This morning, the patient was laying in bed, somewhat sedated but arousable to verbal stimuli. She came out of her room for breakfast and was seen later working with PT and using her walker. Pt denies any concerns, reports stable mood, sleeping well and eating "ALL of the breakfast". Agrees to come to staff if she has any complaints.  12/12: Patient pleasant and calm on encounter and denies any new concerns. Eating and sleeping well. No behavioral events.  12/13: No acute events overnight or PRNs required. Denies SE and no issues observed on exam. Patient has been receiving Zyprexa TID as scheduled and is no longer sedated. Remains tenuous with staff, relatively labile and unpredictable, particularly around the middle of the day. Will increase Zyprexa by 5mg/day. BP labs WNL.  12/14: No acute events overnight or PRNs required. Denies SE and no issues on exam. Remains tenuous with staff, particularly with toileting, bathing, and meds. Continue med regimen. Began implementing behavioral intervention with stuffed animal which thus far has been well received. Staff to bring Claudia Mouse when they interact with the patient and encourage its use when the patient requires redirection.     Plan:  1. Legals: 9.27 status, *DNR/DNI* [MOLST in chart]  2. Safety: routine observation. PRNs: Zyprexa 2.5 mg PO/IM q6h   3. Psychiatry: Depakote sprinkles 750 mg BID, Zyprexa 7.5mg at 9a + 1p and Zyprexa 5mg at 5p, repeat ECG Friday  4. Group, milieu, individual therapy as appropriate. *Staff to use stuffed Claudia Mouse to redirect the patient when she becomes irritable.*  5. Medical:  -RASH: s/p derm consult, given permethrin x2 and started on clobetasol BID for 2 weeks. BP lab drawn and WNL. Most likely eczema. On 12/16 will transition from clobetasol BID to triamcinolone PRN per derm recs.   -ODOR: s/p GYN consult, BV panel sent and WNL. Pt noted to have stool burden on exam, pt had BM s/p enema.   -CONSTIPATION: continue Miralax 17g daily and Senna 2mg QHS, fleet enema PRN  -HTN: continue home Lopressor 25mg daily for HTN  -EDEMA: continue home Lasix 20mg daily  *PLEASE ENCOURAGE HYDRATION as patient with mildly elevated Na and Cl on BMP.  6. Dispo: pending clinical improvement.  Patient continues to require inpatient hospitalization for stabilization and safety.  12/15: Patient labile, calmer today so far but need more observation points given changeability. Cont olanzapine monitor gait, vitals watch for constipation  12/16: Patient labile, volative resistive with care. Will increase olanzapine, check EKG consider changing metorolol to propanolol as there is EBM support for this med

## 2021-12-16 NOTE — BH INPATIENT PSYCHIATRY PROGRESS NOTE - NSBHFUPINTERVALHXFT_PSY_A_CORE
The patient was seen for follow-up for dementia with behavioral disturbances. Chart reviewed and case discussed with nursing staff. No acute overnights event or PRN medications required. However the patient continues to be resistive with care. Sleep appetite okay. Refused vitals

## 2021-12-16 NOTE — BH INPATIENT PSYCHIATRY PROGRESS NOTE - MSE UNSTRUCTURED FT
Patient is awake and alert. Relates in childlike manner. Mood  is perplexed, pleasant now, affect labile. Speech is fluent with poor modulation of volume, tone. Repeats words at different registers. Thought process is coherent but irrelevant. No delusions expressed. no nunez. No SI/HI. Oriented to self.  Poor insight.

## 2021-12-17 PROCEDURE — 99232 SBSQ HOSP IP/OBS MODERATE 35: CPT

## 2021-12-17 PROCEDURE — 93010 ELECTROCARDIOGRAM REPORT: CPT

## 2021-12-17 RX ADMIN — DIVALPROEX SODIUM 750 MILLIGRAM(S): 500 TABLET, DELAYED RELEASE ORAL at 20:17

## 2021-12-17 RX ADMIN — OLANZAPINE 7.5 MILLIGRAM(S): 15 TABLET, FILM COATED ORAL at 13:59

## 2021-12-17 RX ADMIN — Medication 20 MILLIGRAM(S): at 08:49

## 2021-12-17 RX ADMIN — POLYETHYLENE GLYCOL 3350 17 GRAM(S): 17 POWDER, FOR SOLUTION ORAL at 08:49

## 2021-12-17 RX ADMIN — Medication 3 MILLIGRAM(S): at 20:18

## 2021-12-17 RX ADMIN — OLANZAPINE 7.5 MILLIGRAM(S): 15 TABLET, FILM COATED ORAL at 08:48

## 2021-12-17 RX ADMIN — DIVALPROEX SODIUM 750 MILLIGRAM(S): 500 TABLET, DELAYED RELEASE ORAL at 08:49

## 2021-12-17 RX ADMIN — SENNA PLUS 2 TABLET(S): 8.6 TABLET ORAL at 20:18

## 2021-12-17 RX ADMIN — OLANZAPINE 7.5 MILLIGRAM(S): 15 TABLET, FILM COATED ORAL at 17:08

## 2021-12-17 NOTE — BH TREATMENT PLAN - NSCMSPTSTRENGTHS_PSY_ALL_CORE
Expressive of emotions/Strong support system/Supportive family

## 2021-12-17 NOTE — BH INPATIENT PSYCHIATRY PROGRESS NOTE - NSBHASSESSSUMMFT_PSY_ALL_CORE
66 y.o. F, as per records is , has been residing at [facility] ([facility]) since 2010, with past psychiatric history of dementia with behavioral disturbance, mood disorder, RODY, possible inpatient psychiatric admission to [facility] this year, has a chronic medical history notable for NPH (w/  shunt), HTN, DMT2, TBI (unspecified), pedal edema presenting from [facility] for physically aggressive behavior and diffuse rash. Records also indicate that [facility] was seeking a [facility] admission. Pt is AOx1 which per chart appears to be patient's baseline. Pt was doing well on previous medication regimen but having increase episodes of agitation. Pt most likely having increase in aggressive behavior 2/2 to TBI vs dementia. Patient requires inpatient admission for medication optimization and stabilization of agitation.     11/22: No acute events overnight or PRNs required, VSS, med adherent. Endorses AH of mother's voice telling her to take good care of her baby. Denies any safety concerns. Patient became agitated at 1pm after trying to follow repair men off unit, denies wanting to leave the unit. Incident likely related to disinhibition rather than psychosis, but further monitoring and collateral necessary to better understand the patient and hopefully delineate a pattern.   11/23: No acute events overnight or PRNs required, VSS, med adherent. Has been in more appropriate behavioral control following change in Zyprexa schedule. Was unable to take Depakote overnight due to difficulty swallowing the pill. Switched to sprinkles this morning which patient took without issue. Will continue monitoring on current regimen given change in formulation. Ammonia pending given patient's tremor. Will check VPA level when patient consistently taking Depakote sprinkles.   11/24: Patient required Zyprexa PO PRN yesterday at 6:40pm for agitation, given with good effect. Slept well overnight and adherent with meds. VPA/ammonia deferred given prior adherence issues due to difficulty swallowing. Will continue to titrate Zyprexa and check VPA/ammonia when the patient is taking reliably. EKG today given ongoing titration.   11/25: Patient remains confused, labile, but calm today.  11/26: No acute events over PRNs required. Patient remains intermittently labile but redirectable. Accepting of treatment. Will check VPA/ammonia today given improved adherence. VPA 68, Ammonia pending.  11/27: On exam, patient is pleasantly confused, denies any concerns. No behavorial events. Taking meds. Labs: Serum ammonia wnl at 15.   11/28: Patient remains confused, labile affect, intermittently laughing then screaming.  11/29: No acute events or PRNs required. Patient remains labile but redirectable and accepting treatment. Endorses pruritis in bilateral forearms and L foot swelling observed on exam. WILLIE evaluation pending.  11/30: Per WILLIE recs, patient sent to ED yesterday afternoon for doppler imaging to r/o DVT which was unremarkable. The patient was able to tolerate ED course without any behavioral incidents. This morning, patient at baseline, reports concern over LEs and agrees to meet again with WILLIE. ED had recommended consideration of Keflex for possible cellulitis. Awaiting hospitalist recs.   12/1: Patient acutely agitated overnight requiring Zyprexa 2.5mg PO accepted with good effect. Noted to have worsening rash despite adherence to topical steroid. Per WILLIE recs, dermatology consulted for further evaluation. Rash likely underlying behavioral exacerbation as the patient had been without issue for several days.  12/2: No acute events or PRNs required. Patient cooperative with Derm and GYN consults yesterday afternoon. Per derm, likely eczema for which will start clobetasol BID. Per recs, was also treated prophylactically for scabies (repeat permethrin in 1 week has been ordered). Per derm, also ordered Bullous Pemphigoid labs to r/o. Per GYN, send BV panel to r/o. Patient initially orthostatic but improved on repeat, denies symptoms.  12/3: Patient seen for complications of TBI dementia. No major overnight events. Patient not aggressive still labile  from silly giddy to angry, raising voice. Eating sleping fairly well. Had large BM.  12/4: Patient continues to be confused and disorganized.  12/6: Patient continuing to require PRNs, given Zyprexa 2.5mg PO yesterday afternoon with good effect. Remains intermittently labile without overt aggression. Denies SE and no evidence of ongoing constipation. Skin improving and patient denies any subjective irritation. VPA stable, today 67.  12/7: No acute events overnight or PRNs required. Denies SE and no evidence on exam. Denies any concerns with skin. Eventually cooperated with BP lab.  12/8: No acute events overnight or PRNs required. Denies SE and no evidence on exam. No further skin concerns. Patient sedated, which may be related to med titration. Will continue to monitor for now and make changes if it persists. Will check ECG tomorrow. Repeat permethrin tonight per derm recommendations.  12/9: No acute events overnight or PRNs required. Denies SE and no evidence on exam. Skin is stable for the time being. Patient less sedated this morning, ECG yesterday was sinus with QTc 422. As such, will continue Zyprexa schedule. Mildly elevated electrolytes on BMP likely attributable to dehydration. Patient received Zyprexa PRN at 11:44am after pushing another patient who was provoking her. Staff instructed to hold 1pm 5mg dose but give another 2.5mg PRN if needed.  12/10: No acute events overnight or PRNs required. Denies SE and skin concerns, no issues on exam. Patient more sedated than yesterday but less than the day prior. Pattern appears to be that she is more sedated in the morning but wakes up over the course of the day. As such, will reschedule pm dose earlier in the day.  12/11: This morning, the patient was laying in bed, somewhat sedated but arousable to verbal stimuli. She came out of her room for breakfast and was seen later working with PT and using her walker. Pt denies any concerns, reports stable mood, sleeping well and eating "ALL of the breakfast". Agrees to come to staff if she has any complaints.  12/12: Patient pleasant and calm on encounter and denies any new concerns. Eating and sleeping well. No behavioral events.  12/13: No acute events overnight or PRNs required. Denies SE and no issues observed on exam. Patient has been receiving Zyprexa TID as scheduled and is no longer sedated. Remains tenuous with staff, relatively labile and unpredictable, particularly around the middle of the day. Will increase Zyprexa by 5mg/day. BP labs WNL.  12/14: No acute events overnight or PRNs required. Denies SE and no issues on exam. Remains tenuous with staff, particularly with toileting, bathing, and meds. Continue med regimen. Began implementing behavioral intervention with stuffed animal which thus far has been well received. Staff to bring Claudia Mouse when they interact with the patient and encourage its use when the patient requires redirection.     Plan:  1. Legals: 9.27 status, *DNR/DNI* [MOLST in chart]  2. Safety: routine observation. PRNs: Zyprexa 2.5 mg PO/IM q6h   3. Psychiatry: Depakote sprinkles 750 mg BID, Zyprexa 7.5mg at 9a + 1p and Zyprexa 5mg at 5p, repeat ECG Friday  4. Group, milieu, individual therapy as appropriate. *Staff to use stuffed Claudia Mouse to redirect the patient when she becomes irritable.*  5. Medical:  -RASH: s/p derm consult, given permethrin x2 and started on clobetasol BID for 2 weeks. BP lab drawn and WNL. Most likely eczema. On 12/16 will transition from clobetasol BID to triamcinolone PRN per derm recs.   -ODOR: s/p GYN consult, BV panel sent and WNL. Pt noted to have stool burden on exam, pt had BM s/p enema.   -CONSTIPATION: continue Miralax 17g daily and Senna 2mg QHS, fleet enema PRN  -HTN: continue home Lopressor 25mg daily for HTN  -EDEMA: continue home Lasix 20mg daily  *PLEASE ENCOURAGE HYDRATION as patient with mildly elevated Na and Cl on BMP.  6. Dispo: pending clinical improvement.  Patient continues to require inpatient hospitalization for stabilization and safety.  12/15: Patient labile, calmer today so far but need more observation points given changeability. Cont olanzapine monitor gait, vitals watch for constipation  12/16: Patient labile, volative resistive with care. Will increase olanzapine, check EKG consider changing metorolol to propanolol as there is EBM support for this med  12/17 Not with sig change not aggressive with peers. CoStart trial of Inderal, will need to reconsider if cannot get vitals regular to assess safety  during dose titration

## 2021-12-17 NOTE — BH INPATIENT PSYCHIATRY PROGRESS NOTE - NSBHCHARTREVIEWVS_PSY_A_CORE FT
Vital Signs Last 24 Hrs  T(C): 36.4 (12-17-21 @ 07:58), Max: 36.4 (12-17-21 @ 07:58)  T(F): 97.6 (12-17-21 @ 07:58), Max: 97.6 (12-17-21 @ 07:58)  HR: --  BP: --  BP(mean): --  RR: --  SpO2: --    Orthostatic VS  12-16-21 @ 20:38  Lying BP: --/-- HR: --  Sitting BP: 129/65 HR: 73  Standing BP: --/-- HR: --  Site: upper left arm  Mode: electronic  Orthostatic VS  12-15-21 @ 20:29  Lying BP: --/-- HR: --  Sitting BP: 123/90 HR: --  Standing BP: --/-- HR: --  Site: --  Mode: --

## 2021-12-17 NOTE — BH INPATIENT PSYCHIATRY PROGRESS NOTE - CURRENT MEDICATION
MEDICATIONS  (STANDING):  diVALproex Sprinkle 750 milliGRAM(s) Oral two times a day  furosemide    Tablet 20 milliGRAM(s) Oral daily  melatonin. 3 milliGRAM(s) Oral at bedtime  OLANZapine 7.5 milliGRAM(s) Oral <User Schedule>  polyethylene glycol 3350 17 Gram(s) Oral daily  propranolol 20 milliGRAM(s) Oral three times a day  senna 2 Tablet(s) Oral at bedtime    MEDICATIONS  (PRN):  acetaminophen     Tablet .. 650 milliGRAM(s) Oral every 6 hours PRN Temp greater or equal to 38C (100.4F), Mild Pain (1 - 3), Moderate Pain (4 - 6)  aluminum hydroxide/magnesium hydroxide/simethicone Suspension 30 milliLiter(s) Oral every 4 hours PRN Dyspepsia  magnesium hydroxide Suspension 30 milliLiter(s) Oral at bedtime PRN constipation  mineral oil enema 133 milliLiter(s) Rectal daily PRN hard stool  OLANZapine 2.5 milliGRAM(s) Oral every 6 hours PRN severe agitation  OLANZapine Injectable 2.5 milliGRAM(s) IntraMuscular once PRN severe agitation  OLANZapine Injectable 2.5 milliGRAM(s) IntraMuscular once PRN severe agitation  saline laxative (FLEET) Rectal Enema 1 Enema Rectal daily PRN hard stool  triamcinolone 0.1% Cream 1 Application(s) Topical two times a day PRN pruritis/rash

## 2021-12-17 NOTE — BH INPATIENT PSYCHIATRY PROGRESS NOTE - NSBHFUPINTERVALHXFT_PSY_A_CORE
The patient was seen for follow-up for dementia with behavioral disturbances. Chart reviewed and case discussed with nursing staff. No acute overnights event or PRN medications required. However the patient continues to be resistive , raises voice, points finger with care. Sleep appetite okay. Refused vitals

## 2021-12-17 NOTE — BH TREATMENT PLAN - NSPTSTATEDGOAL_PSY_ALL_CORE
Pt is too confused to identify a goal.

## 2021-12-18 PROCEDURE — 99232 SBSQ HOSP IP/OBS MODERATE 35: CPT

## 2021-12-18 RX ORDER — POLYETHYLENE GLYCOL 3350 17 G/17G
8.5 POWDER, FOR SOLUTION ORAL DAILY
Refills: 0 | Status: DISCONTINUED | OUTPATIENT
Start: 2021-12-18 | End: 2021-12-19

## 2021-12-18 RX ADMIN — DIVALPROEX SODIUM 750 MILLIGRAM(S): 500 TABLET, DELAYED RELEASE ORAL at 09:13

## 2021-12-18 RX ADMIN — DIVALPROEX SODIUM 750 MILLIGRAM(S): 500 TABLET, DELAYED RELEASE ORAL at 22:33

## 2021-12-18 RX ADMIN — Medication 20 MILLIGRAM(S): at 09:12

## 2021-12-18 RX ADMIN — OLANZAPINE 7.5 MILLIGRAM(S): 15 TABLET, FILM COATED ORAL at 09:12

## 2021-12-18 RX ADMIN — OLANZAPINE 7.5 MILLIGRAM(S): 15 TABLET, FILM COATED ORAL at 12:05

## 2021-12-18 RX ADMIN — OLANZAPINE 7.5 MILLIGRAM(S): 15 TABLET, FILM COATED ORAL at 19:25

## 2021-12-18 RX ADMIN — Medication 3 MILLIGRAM(S): at 22:34

## 2021-12-18 NOTE — BH INPATIENT PSYCHIATRY PROGRESS NOTE - NSBHCHARTREVIEWVS_PSY_A_CORE FT
Vital Signs Last 24 Hrs  T(C): 36.1 (12-18-21 @ 07:46), Max: 36.1 (12-18-21 @ 07:46)  T(F): 97 (12-18-21 @ 07:46), Max: 97 (12-18-21 @ 07:46)  HR: 72 (12-18-21 @ 07:46) (72 - 78)  BP: 122/58 (12-18-21 @ 07:46) (122/58 - 140/67)  BP(mean): --  RR: --  SpO2: --    Orthostatic VS  12-18-21 @ 09:00  Lying BP: --/-- HR: --  Sitting BP: 153/68 HR: 69  Standing BP: 141/64 HR: 70  Site: --  Mode: --  Orthostatic VS  12-17-21 @ 22:14  Lying BP: --/-- HR: --  Sitting BP: 140/67 HR: 78  Standing BP: --/-- HR: --  Site: --  Mode: --  Orthostatic VS  12-16-21 @ 20:38  Lying BP: --/-- HR: --  Sitting BP: 129/65 HR: 73  Standing BP: --/-- HR: --  Site: upper left arm  Mode: electronic

## 2021-12-18 NOTE — BH INPATIENT PSYCHIATRY PROGRESS NOTE - NSBHASSESSSUMMFT_PSY_ALL_CORE
66 y.o. F, as per records is , has been residing at [facility] ([facility]) since 2010, with past psychiatric history of dementia with behavioral disturbance, mood disorder, RODY, possible inpatient psychiatric admission to [facility] this year, has a chronic medical history notable for NPH (w/  shunt), HTN, DMT2, TBI (unspecified), pedal edema presenting from [facility] for physically aggressive behavior and diffuse rash. Records also indicate that [facility] was seeking a [facility] admission. Pt is AOx1 which per chart appears to be patient's baseline. Pt was doing well on previous medication regimen but having increase episodes of agitation. Pt most likely having increase in aggressive behavior 2/2 to TBI vs dementia. Patient requires inpatient admission for medication optimization and stabilization of agitation.     11/22: No acute events overnight or PRNs required, VSS, med adherent. Endorses AH of mother's voice telling her to take good care of her baby. Denies any safety concerns. Patient became agitated at 1pm after trying to follow repair men off unit, denies wanting to leave the unit. Incident likely related to disinhibition rather than psychosis, but further monitoring and collateral necessary to better understand the patient and hopefully delineate a pattern.   11/23: No acute events overnight or PRNs required, VSS, med adherent. Has been in more appropriate behavioral control following change in Zyprexa schedule. Was unable to take Depakote overnight due to difficulty swallowing the pill. Switched to sprinkles this morning which patient took without issue. Will continue monitoring on current regimen given change in formulation. Ammonia pending given patient's tremor. Will check VPA level when patient consistently taking Depakote sprinkles.   11/24: Patient required Zyprexa PO PRN yesterday at 6:40pm for agitation, given with good effect. Slept well overnight and adherent with meds. VPA/ammonia deferred given prior adherence issues due to difficulty swallowing. Will continue to titrate Zyprexa and check VPA/ammonia when the patient is taking reliably. EKG today given ongoing titration.   11/25: Patient remains confused, labile, but calm today.  11/26: No acute events over PRNs required. Patient remains intermittently labile but redirectable. Accepting of treatment. Will check VPA/ammonia today given improved adherence. VPA 68, Ammonia pending.  11/27: On exam, patient is pleasantly confused, denies any concerns. No behavorial events. Taking meds. Labs: Serum ammonia wnl at 15.   11/28: Patient remains confused, labile affect, intermittently laughing then screaming.  11/29: No acute events or PRNs required. Patient remains labile but redirectable and accepting treatment. Endorses pruritis in bilateral forearms and L foot swelling observed on exam. WILLIE evaluation pending.  11/30: Per WILLIE recs, patient sent to ED yesterday afternoon for doppler imaging to r/o DVT which was unremarkable. The patient was able to tolerate ED course without any behavioral incidents. This morning, patient at baseline, reports concern over LEs and agrees to meet again with WILLIE. ED had recommended consideration of Keflex for possible cellulitis. Awaiting hospitalist recs.   12/1: Patient acutely agitated overnight requiring Zyprexa 2.5mg PO accepted with good effect. Noted to have worsening rash despite adherence to topical steroid. Per WILLIE recs, dermatology consulted for further evaluation. Rash likely underlying behavioral exacerbation as the patient had been without issue for several days.  12/2: No acute events or PRNs required. Patient cooperative with Derm and GYN consults yesterday afternoon. Per derm, likely eczema for which will start clobetasol BID. Per recs, was also treated prophylactically for scabies (repeat permethrin in 1 week has been ordered). Per derm, also ordered Bullous Pemphigoid labs to r/o. Per GYN, send BV panel to r/o. Patient initially orthostatic but improved on repeat, denies symptoms.  12/3: Patient seen for complications of TBI dementia. No major overnight events. Patient not aggressive still labile  from silly giddy to angry, raising voice. Eating sleping fairly well. Had large BM.  12/4: Patient continues to be confused and disorganized.  12/6: Patient continuing to require PRNs, given Zyprexa 2.5mg PO yesterday afternoon with good effect. Remains intermittently labile without overt aggression. Denies SE and no evidence of ongoing constipation. Skin improving and patient denies any subjective irritation. VPA stable, today 67.  12/7: No acute events overnight or PRNs required. Denies SE and no evidence on exam. Denies any concerns with skin. Eventually cooperated with BP lab.  12/8: No acute events overnight or PRNs required. Denies SE and no evidence on exam. No further skin concerns. Patient sedated, which may be related to med titration. Will continue to monitor for now and make changes if it persists. Will check ECG tomorrow. Repeat permethrin tonight per derm recommendations.  12/9: No acute events overnight or PRNs required. Denies SE and no evidence on exam. Skin is stable for the time being. Patient less sedated this morning, ECG yesterday was sinus with QTc 422. As such, will continue Zyprexa schedule. Mildly elevated electrolytes on BMP likely attributable to dehydration. Patient received Zyprexa PRN at 11:44am after pushing another patient who was provoking her. Staff instructed to hold 1pm 5mg dose but give another 2.5mg PRN if needed.  12/10: No acute events overnight or PRNs required. Denies SE and skin concerns, no issues on exam. Patient more sedated than yesterday but less than the day prior. Pattern appears to be that she is more sedated in the morning but wakes up over the course of the day. As such, will reschedule pm dose earlier in the day.  12/11: This morning, the patient was laying in bed, somewhat sedated but arousable to verbal stimuli. She came out of her room for breakfast and was seen later working with PT and using her walker. Pt denies any concerns, reports stable mood, sleeping well and eating "ALL of the breakfast". Agrees to come to staff if she has any complaints.  12/12: Patient pleasant and calm on encounter and denies any new concerns. Eating and sleeping well. No behavioral events.  12/13: No acute events overnight or PRNs required. Denies SE and no issues observed on exam. Patient has been receiving Zyprexa TID as scheduled and is no longer sedated. Remains tenuous with staff, relatively labile and unpredictable, particularly around the middle of the day. Will increase Zyprexa by 5mg/day. BP labs WNL.  12/14: No acute events overnight or PRNs required. Denies SE and no issues on exam. Remains tenuous with staff, particularly with toileting, bathing, and meds. Continue med regimen. Began implementing behavioral intervention with stuffed animal which thus far has been well received. Staff to bring Claudia Mouse when they interact with the patient and encourage its use when the patient requires redirection.     Plan:  1. Legals: 9.27 status, *DNR/DNI* [MOLST in chart]  2. Safety: routine observation. PRNs: Zyprexa 2.5 mg PO/IM q6h   3. Psychiatry: Depakote sprinkles 750 mg BID, Zyprexa 7.5mg at 9a + 1p and Zyprexa 5mg at 5p, repeat ECG Friday  4. Group, milieu, individual therapy as appropriate. *Staff to use stuffed Claudia Mouse to redirect the patient when she becomes irritable.*  5. Medical:  -RASH: s/p derm consult, given permethrin x2 and started on clobetasol BID for 2 weeks. BP lab drawn and WNL. Most likely eczema. On 12/16 will transition from clobetasol BID to triamcinolone PRN per derm recs.   -ODOR: s/p GYN consult, BV panel sent and WNL. Pt noted to have stool burden on exam, pt had BM s/p enema.   -CONSTIPATION: continue Miralax 17g daily and Senna 2mg QHS, fleet enema PRN  -HTN: continue home Lopressor 25mg daily for HTN  -EDEMA: continue home Lasix 20mg daily  *PLEASE ENCOURAGE HYDRATION as patient with mildly elevated Na and Cl on BMP.  6. Dispo: pending clinical improvement.  Patient continues to require inpatient hospitalization for stabilization and safety.  12/15: Patient labile, calmer today so far but need more observation points given changeability. Cont olanzapine monitor gait, vitals watch for constipation  12/16: Patient labile, volative resistive with care. Will increase olanzapine, check EKG consider changing metorolol to propanolol as there is EBM support for this med  12/17 Not with sig change not aggressive with peers. CoStart trial of Inderal, will need to reconsider if cannot get vitals regular to assess safety  during dose titration  12/18Patient vocally agitated and resitive with care, some excess laxative effect. Tolerating Inderal plan further titration Inderal, lower Miralax

## 2021-12-18 NOTE — BH INPATIENT PSYCHIATRY PROGRESS NOTE - CURRENT MEDICATION
MEDICATIONS  (STANDING):  diVALproex Sprinkle 750 milliGRAM(s) Oral two times a day  furosemide    Tablet 20 milliGRAM(s) Oral daily  melatonin. 3 milliGRAM(s) Oral at bedtime  OLANZapine 7.5 milliGRAM(s) Oral <User Schedule>  polyethylene glycol 3350 8.5 Gram(s) Oral daily  propranolol 20 milliGRAM(s) Oral three times a day  senna 2 Tablet(s) Oral at bedtime    MEDICATIONS  (PRN):  acetaminophen     Tablet .. 650 milliGRAM(s) Oral every 6 hours PRN Temp greater or equal to 38C (100.4F), Mild Pain (1 - 3), Moderate Pain (4 - 6)  aluminum hydroxide/magnesium hydroxide/simethicone Suspension 30 milliLiter(s) Oral every 4 hours PRN Dyspepsia  magnesium hydroxide Suspension 30 milliLiter(s) Oral at bedtime PRN constipation  mineral oil enema 133 milliLiter(s) Rectal daily PRN hard stool  OLANZapine 2.5 milliGRAM(s) Oral every 6 hours PRN severe agitation  OLANZapine Injectable 2.5 milliGRAM(s) IntraMuscular once PRN severe agitation  OLANZapine Injectable 2.5 milliGRAM(s) IntraMuscular once PRN severe agitation  saline laxative (FLEET) Rectal Enema 1 Enema Rectal daily PRN hard stool  triamcinolone 0.1% Cream 1 Application(s) Topical two times a day PRN pruritis/rash

## 2021-12-18 NOTE — BH INPATIENT PSYCHIATRY PROGRESS NOTE - NSBHMETABOLIC_PSY_ALL_CORE_FT
BMI: BMI (kg/m2): 31.6 (11-29-21 @ 16:35)  HbA1c: A1C with Estimated Average Glucose Result: 5.4 % (11-04-21 @ 06:58)    Glucose: POCT Blood Glucose.: 109 mg/dL (11-12-21 @ 16:13)    BP: 122/58 (12-18-21 @ 07:46) (122/58 - 140/67)  Lipid Panel:

## 2021-12-18 NOTE — BH INPATIENT PSYCHIATRY PROGRESS NOTE - NSBHFUPINTERVALHXFT_PSY_A_CORE
The patient was seen for follow-up for dementia with behavioral disturbances. Chart reviewed and case discussed with nursing staff. No acute overnights event or PRN medications required. However the patient continues to be resistive , raises voice, points finger with care. Sleep appetite okay. VSS. No orthostasis or bradycardia. Patient agitated when had BM and was being cleaned yelled, cursed

## 2021-12-19 PROCEDURE — 99231 SBSQ HOSP IP/OBS SF/LOW 25: CPT

## 2021-12-19 RX ADMIN — OLANZAPINE 7.5 MILLIGRAM(S): 15 TABLET, FILM COATED ORAL at 10:39

## 2021-12-19 RX ADMIN — DIVALPROEX SODIUM 750 MILLIGRAM(S): 500 TABLET, DELAYED RELEASE ORAL at 22:29

## 2021-12-19 RX ADMIN — DIVALPROEX SODIUM 750 MILLIGRAM(S): 500 TABLET, DELAYED RELEASE ORAL at 10:40

## 2021-12-19 RX ADMIN — OLANZAPINE 2.5 MILLIGRAM(S): 15 TABLET, FILM COATED ORAL at 19:08

## 2021-12-19 RX ADMIN — Medication 20 MILLIGRAM(S): at 10:39

## 2021-12-19 RX ADMIN — OLANZAPINE 7.5 MILLIGRAM(S): 15 TABLET, FILM COATED ORAL at 13:01

## 2021-12-19 RX ADMIN — OLANZAPINE 7.5 MILLIGRAM(S): 15 TABLET, FILM COATED ORAL at 17:45

## 2021-12-19 RX ADMIN — Medication 3 MILLIGRAM(S): at 22:30

## 2021-12-19 NOTE — BH INPATIENT PSYCHIATRY PROGRESS NOTE - NSBHASSESSSUMMFT_PSY_ALL_CORE
66 y.o. F, as per records is , has been residing at [facility] ([facility]) since 2010, with past psychiatric history of dementia with behavioral disturbance, mood disorder, RODY, possible inpatient psychiatric admission to [facility] this year, has a chronic medical history notable for NPH (w/  shunt), HTN, DMT2, TBI (unspecified), pedal edema presenting from [facility] for physically aggressive behavior and diffuse rash. Records also indicate that [facility] was seeking a [facility] admission. Pt is AOx1 which per chart appears to be patient's baseline. Pt was doing well on previous medication regimen but having increase episodes of agitation. Pt most likely having increase in aggressive behavior 2/2 to TBI vs dementia. Patient requires inpatient admission for medication optimization and stabilization of agitation.     11/22: No acute events overnight or PRNs required, VSS, med adherent. Endorses AH of mother's voice telling her to take good care of her baby. Denies any safety concerns. Patient became agitated at 1pm after trying to follow repair men off unit, denies wanting to leave the unit. Incident likely related to disinhibition rather than psychosis, but further monitoring and collateral necessary to better understand the patient and hopefully delineate a pattern.   11/23: No acute events overnight or PRNs required, VSS, med adherent. Has been in more appropriate behavioral control following change in Zyprexa schedule. Was unable to take Depakote overnight due to difficulty swallowing the pill. Switched to sprinkles this morning which patient took without issue. Will continue monitoring on current regimen given change in formulation. Ammonia pending given patient's tremor. Will check VPA level when patient consistently taking Depakote sprinkles.   11/24: Patient required Zyprexa PO PRN yesterday at 6:40pm for agitation, given with good effect. Slept well overnight and adherent with meds. VPA/ammonia deferred given prior adherence issues due to difficulty swallowing. Will continue to titrate Zyprexa and check VPA/ammonia when the patient is taking reliably. EKG today given ongoing titration.   11/25: Patient remains confused, labile, but calm today.  11/26: No acute events over PRNs required. Patient remains intermittently labile but redirectable. Accepting of treatment. Will check VPA/ammonia today given improved adherence. VPA 68, Ammonia pending.  11/27: On exam, patient is pleasantly confused, denies any concerns. No behavorial events. Taking meds. Labs: Serum ammonia wnl at 15.   11/28: Patient remains confused, labile affect, intermittently laughing then screaming.  11/29: No acute events or PRNs required. Patient remains labile but redirectable and accepting treatment. Endorses pruritis in bilateral forearms and L foot swelling observed on exam. WILLIE evaluation pending.  11/30: Per WILLIE recs, patient sent to ED yesterday afternoon for doppler imaging to r/o DVT which was unremarkable. The patient was able to tolerate ED course without any behavioral incidents. This morning, patient at baseline, reports concern over LEs and agrees to meet again with WILLIE. ED had recommended consideration of Keflex for possible cellulitis. Awaiting hospitalist recs.   12/1: Patient acutely agitated overnight requiring Zyprexa 2.5mg PO accepted with good effect. Noted to have worsening rash despite adherence to topical steroid. Per WILLIE recs, dermatology consulted for further evaluation. Rash likely underlying behavioral exacerbation as the patient had been without issue for several days.  12/2: No acute events or PRNs required. Patient cooperative with Derm and GYN consults yesterday afternoon. Per derm, likely eczema for which will start clobetasol BID. Per recs, was also treated prophylactically for scabies (repeat permethrin in 1 week has been ordered). Per derm, also ordered Bullous Pemphigoid labs to r/o. Per GYN, send BV panel to r/o. Patient initially orthostatic but improved on repeat, denies symptoms.  12/3: Patient seen for complications of TBI dementia. No major overnight events. Patient not aggressive still labile  from silly giddy to angry, raising voice. Eating sleping fairly well. Had large BM.  12/4: Patient continues to be confused and disorganized.  12/6: Patient continuing to require PRNs, given Zyprexa 2.5mg PO yesterday afternoon with good effect. Remains intermittently labile without overt aggression. Denies SE and no evidence of ongoing constipation. Skin improving and patient denies any subjective irritation. VPA stable, today 67.  12/7: No acute events overnight or PRNs required. Denies SE and no evidence on exam. Denies any concerns with skin. Eventually cooperated with BP lab.  12/8: No acute events overnight or PRNs required. Denies SE and no evidence on exam. No further skin concerns. Patient sedated, which may be related to med titration. Will continue to monitor for now and make changes if it persists. Will check ECG tomorrow. Repeat permethrin tonight per derm recommendations.  12/9: No acute events overnight or PRNs required. Denies SE and no evidence on exam. Skin is stable for the time being. Patient less sedated this morning, ECG yesterday was sinus with QTc 422. As such, will continue Zyprexa schedule. Mildly elevated electrolytes on BMP likely attributable to dehydration. Patient received Zyprexa PRN at 11:44am after pushing another patient who was provoking her. Staff instructed to hold 1pm 5mg dose but give another 2.5mg PRN if needed.  12/10: No acute events overnight or PRNs required. Denies SE and skin concerns, no issues on exam. Patient more sedated than yesterday but less than the day prior. Pattern appears to be that she is more sedated in the morning but wakes up over the course of the day. As such, will reschedule pm dose earlier in the day.  12/11: This morning, the patient was laying in bed, somewhat sedated but arousable to verbal stimuli. She came out of her room for breakfast and was seen later working with PT and using her walker. Pt denies any concerns, reports stable mood, sleeping well and eating "ALL of the breakfast". Agrees to come to staff if she has any complaints.  12/12: Patient pleasant and calm on encounter and denies any new concerns. Eating and sleeping well. No behavioral events.  12/13: No acute events overnight or PRNs required. Denies SE and no issues observed on exam. Patient has been receiving Zyprexa TID as scheduled and is no longer sedated. Remains tenuous with staff, relatively labile and unpredictable, particularly around the middle of the day. Will increase Zyprexa by 5mg/day. BP labs WNL.  12/14: No acute events overnight or PRNs required. Denies SE and no issues on exam. Remains tenuous with staff, particularly with toileting, bathing, and meds. Continue med regimen. Began implementing behavioral intervention with stuffed animal which thus far has been well received. Staff to bring Claudia Mouse when they interact with the patient and encourage its use when the patient requires redirection.     Plan:  1. Legals: 9.27 status, *DNR/DNI* [MOLST in chart]  2. Safety: routine observation. PRNs: Zyprexa 2.5 mg PO/IM q6h   3. Psychiatry: Depakote sprinkles 750 mg BID, Zyprexa 7.5mg at 9a + 1p and Zyprexa 5mg at 5p, repeat ECG Friday  4. Group, milieu, individual therapy as appropriate. *Staff to use stuffed Claudia Mouse to redirect the patient when she becomes irritable.*  5. Medical:  -RASH: s/p derm consult, given permethrin x2 and started on clobetasol BID for 2 weeks. BP lab drawn and WNL. Most likely eczema. On 12/16 will transition from clobetasol BID to triamcinolone PRN per derm recs.   -ODOR: s/p GYN consult, BV panel sent and WNL. Pt noted to have stool burden on exam, pt had BM s/p enema.   -CONSTIPATION: continue Miralax 17g daily and Senna 2mg QHS, fleet enema PRN  -HTN: continue home Lopressor 25mg daily for HTN  -EDEMA: continue home Lasix 20mg daily  *PLEASE ENCOURAGE HYDRATION as patient with mildly elevated Na and Cl on BMP.  6. Dispo: pending clinical improvement.  Patient continues to require inpatient hospitalization for stabilization and safety.  12/15: Patient labile, calmer today so far but need more observation points given changeability. Cont olanzapine monitor gait, vitals watch for constipation  12/16: Patient labile, volative resistive with care. Will increase olanzapine, check EKG consider changing metorolol to propanolol as there is EBM support for this med  12/17 Not with sig change not aggressive with peers. CoStart trial of Inderal, will need to reconsider if cannot get vitals regular to assess safety  during dose titration  12/18Patient vocally agitated and resitive with care, some excess laxative effect. Tolerating Inderal plan further titration Inderal, lower Miralax  12/19 Labile but some improvement. plan hold laxatives and restart lower dose, plan further titration Inderal

## 2021-12-19 NOTE — BH INPATIENT PSYCHIATRY PROGRESS NOTE - CURRENT MEDICATION
MEDICATIONS  (STANDING):  diVALproex Sprinkle 750 milliGRAM(s) Oral two times a day  furosemide    Tablet 20 milliGRAM(s) Oral daily  melatonin. 3 milliGRAM(s) Oral at bedtime  OLANZapine 7.5 milliGRAM(s) Oral <User Schedule>  propranolol 20 milliGRAM(s) Oral three times a day    MEDICATIONS  (PRN):  acetaminophen     Tablet .. 650 milliGRAM(s) Oral every 6 hours PRN Temp greater or equal to 38C (100.4F), Mild Pain (1 - 3), Moderate Pain (4 - 6)  aluminum hydroxide/magnesium hydroxide/simethicone Suspension 30 milliLiter(s) Oral every 4 hours PRN Dyspepsia  magnesium hydroxide Suspension 30 milliLiter(s) Oral at bedtime PRN constipation  mineral oil enema 133 milliLiter(s) Rectal daily PRN hard stool  OLANZapine 2.5 milliGRAM(s) Oral every 6 hours PRN severe agitation  OLANZapine Injectable 2.5 milliGRAM(s) IntraMuscular once PRN severe agitation  OLANZapine Injectable 2.5 milliGRAM(s) IntraMuscular once PRN severe agitation  saline laxative (FLEET) Rectal Enema 1 Enema Rectal daily PRN hard stool  triamcinolone 0.1% Cream 1 Application(s) Topical two times a day PRN pruritis/rash

## 2021-12-19 NOTE — BH INPATIENT PSYCHIATRY PROGRESS NOTE - NSBHFUPINTERVALHXFT_PSY_A_CORE
The patient was seen for follow-up for dementia with behavioral disturbances. Chart reviewed and case discussed with nursing staff. No acute overnights event or PRN medications required. However the patient continues to be resistive , raises voice, points finger with care intermittently. Sleep appetite okay. VSS. No orthostasis or bradycardia.

## 2021-12-19 NOTE — BH INPATIENT PSYCHIATRY PROGRESS NOTE - NSBHCHARTREVIEWVS_PSY_A_CORE FT
Vital Signs Last 24 Hrs  T(C): --  T(F): --  HR: --  BP: --  BP(mean): --  RR: --  SpO2: --    Orthostatic VS  12-19-21 @ 09:11  Lying BP: --/-- HR: --  Sitting BP: 136/63 HR: 59  Standing BP: --/-- HR: --  Site: --  Mode: --  Orthostatic VS  12-18-21 @ 20:27  Lying BP: --/-- HR: --  Sitting BP: 135/61 HR: 69  Standing BP: --/-- HR: --  Site: --  Mode: --  Orthostatic VS  12-18-21 @ 09:00  Lying BP: --/-- HR: --  Sitting BP: 153/68 HR: 69  Standing BP: 141/64 HR: 70  Site: --  Mode: --  Orthostatic VS  12-17-21 @ 22:14  Lying BP: --/-- HR: --  Sitting BP: 140/67 HR: 78  Standing BP: --/-- HR: --  Site: --  Mode: --

## 2021-12-20 PROCEDURE — 99231 SBSQ HOSP IP/OBS SF/LOW 25: CPT

## 2021-12-20 RX ORDER — HALOPERIDOL DECANOATE 100 MG/ML
1 INJECTION INTRAMUSCULAR THREE TIMES A DAY
Refills: 0 | Status: DISCONTINUED | OUTPATIENT
Start: 2021-12-20 | End: 2021-12-27

## 2021-12-20 RX ORDER — OLANZAPINE 15 MG/1
5 TABLET, FILM COATED ORAL
Refills: 0 | Status: DISCONTINUED | OUTPATIENT
Start: 2021-12-20 | End: 2021-12-21

## 2021-12-20 RX ADMIN — HALOPERIDOL DECANOATE 1 MILLIGRAM(S): 100 INJECTION INTRAMUSCULAR at 21:04

## 2021-12-20 RX ADMIN — OLANZAPINE 7.5 MILLIGRAM(S): 15 TABLET, FILM COATED ORAL at 12:26

## 2021-12-20 RX ADMIN — OLANZAPINE 5 MILLIGRAM(S): 15 TABLET, FILM COATED ORAL at 16:34

## 2021-12-20 RX ADMIN — OLANZAPINE 7.5 MILLIGRAM(S): 15 TABLET, FILM COATED ORAL at 08:35

## 2021-12-20 RX ADMIN — DIVALPROEX SODIUM 750 MILLIGRAM(S): 500 TABLET, DELAYED RELEASE ORAL at 21:05

## 2021-12-20 RX ADMIN — Medication 20 MILLIGRAM(S): at 08:35

## 2021-12-20 RX ADMIN — DIVALPROEX SODIUM 750 MILLIGRAM(S): 500 TABLET, DELAYED RELEASE ORAL at 08:34

## 2021-12-20 RX ADMIN — Medication 3 MILLIGRAM(S): at 21:04

## 2021-12-20 NOTE — BH INPATIENT PSYCHIATRY PROGRESS NOTE - NSBHASSESSSUMMFT_PSY_ALL_CORE
66 y.o. F, as per records is , has been residing at [facility] ([facility]) since 2010, with past psychiatric history of dementia with behavioral disturbance, mood disorder, RODY, possible inpatient psychiatric admission to [facility] this year, has a chronic medical history notable for NPH (w/  shunt), HTN, DMT2, TBI (unspecified), pedal edema presenting from [facility] for physically aggressive behavior and diffuse rash. Records also indicate that [facility] was seeking a [facility] admission. Pt is AOx1 which per chart appears to be patient's baseline. Pt was doing well on previous medication regimen but having increase episodes of agitation. Pt most likely having increase in aggressive behavior 2/2 to TBI vs dementia. Patient requires inpatient admission for medication optimization and stabilization of agitation.     11/22: No acute events overnight or PRNs required, VSS, med adherent. Endorses AH of mother's voice telling her to take good care of her baby. Denies any safety concerns. Patient became agitated at 1pm after trying to follow repair men off unit, denies wanting to leave the unit. Incident likely related to disinhibition rather than psychosis, but further monitoring and collateral necessary to better understand the patient and hopefully delineate a pattern.   11/23: No acute events overnight or PRNs required, VSS, med adherent. Has been in more appropriate behavioral control following change in Zyprexa schedule. Was unable to take Depakote overnight due to difficulty swallowing the pill. Switched to sprinkles this morning which patient took without issue. Will continue monitoring on current regimen given change in formulation. Ammonia pending given patient's tremor. Will check VPA level when patient consistently taking Depakote sprinkles.   11/24: Patient required Zyprexa PO PRN yesterday at 6:40pm for agitation, given with good effect. Slept well overnight and adherent with meds. VPA/ammonia deferred given prior adherence issues due to difficulty swallowing. Will continue to titrate Zyprexa and check VPA/ammonia when the patient is taking reliably. EKG today given ongoing titration.   11/25: Patient remains confused, labile, but calm today.  11/26: No acute events over PRNs required. Patient remains intermittently labile but redirectable. Accepting of treatment. Will check VPA/ammonia today given improved adherence. VPA 68, Ammonia pending.  11/27: On exam, patient is pleasantly confused, denies any concerns. No behavorial events. Taking meds. Labs: Serum ammonia wnl at 15.   11/28: Patient remains confused, labile affect, intermittently laughing then screaming.  11/29: No acute events or PRNs required. Patient remains labile but redirectable and accepting treatment. Endorses pruritis in bilateral forearms and L foot swelling observed on exam. WILLIE evaluation pending.  11/30: Per WILLIE recs, patient sent to ED yesterday afternoon for doppler imaging to r/o DVT which was unremarkable. The patient was able to tolerate ED course without any behavioral incidents. This morning, patient at baseline, reports concern over LEs and agrees to meet again with WILLIE. ED had recommended consideration of Keflex for possible cellulitis. Awaiting hospitalist recs.   12/1: Patient acutely agitated overnight requiring Zyprexa 2.5mg PO accepted with good effect. Noted to have worsening rash despite adherence to topical steroid. Per WILLIE recs, dermatology consulted for further evaluation. Rash likely underlying behavioral exacerbation as the patient had been without issue for several days.  12/2: No acute events or PRNs required. Patient cooperative with Derm and GYN consults yesterday afternoon. Per derm, likely eczema for which will start clobetasol BID. Per recs, was also treated prophylactically for scabies (repeat permethrin in 1 week has been ordered). Per derm, also ordered Bullous Pemphigoid labs to r/o. Per GYN, send BV panel to r/o. Patient initially orthostatic but improved on repeat, denies symptoms.  12/3: Patient seen for complications of TBI dementia. No major overnight events. Patient not aggressive still labile  from silly giddy to angry, raising voice. Eating sleping fairly well. Had large BM.  12/4: Patient continues to be confused and disorganized.  12/6: Patient continuing to require PRNs, given Zyprexa 2.5mg PO yesterday afternoon with good effect. Remains intermittently labile without overt aggression. Denies SE and no evidence of ongoing constipation. Skin improving and patient denies any subjective irritation. VPA stable, today 67.  12/7: No acute events overnight or PRNs required. Denies SE and no evidence on exam. Denies any concerns with skin. Eventually cooperated with BP lab.  12/8: No acute events overnight or PRNs required. Denies SE and no evidence on exam. No further skin concerns. Patient sedated, which may be related to med titration. Will continue to monitor for now and make changes if it persists. Will check ECG tomorrow. Repeat permethrin tonight per derm recommendations.  12/9: No acute events overnight or PRNs required. Denies SE and no evidence on exam. Skin is stable for the time being. Patient less sedated this morning, ECG yesterday was sinus with QTc 422. As such, will continue Zyprexa schedule. Mildly elevated electrolytes on BMP likely attributable to dehydration. Patient received Zyprexa PRN at 11:44am after pushing another patient who was provoking her. Staff instructed to hold 1pm 5mg dose but give another 2.5mg PRN if needed.  12/10: No acute events overnight or PRNs required. Denies SE and skin concerns, no issues on exam. Patient more sedated than yesterday but less than the day prior. Pattern appears to be that she is more sedated in the morning but wakes up over the course of the day. As such, will reschedule pm dose earlier in the day.  12/11: This morning, the patient was laying in bed, somewhat sedated but arousable to verbal stimuli. She came out of her room for breakfast and was seen later working with PT and using her walker. Pt denies any concerns, reports stable mood, sleeping well and eating "ALL of the breakfast". Agrees to come to staff if she has any complaints.  12/12: Patient pleasant and calm on encounter and denies any new concerns. Eating and sleeping well. No behavioral events.  12/13: No acute events overnight or PRNs required. Denies SE and no issues observed on exam. Patient has been receiving Zyprexa TID as scheduled and is no longer sedated. Remains tenuous with staff, relatively labile and unpredictable, particularly around the middle of the day. Will increase Zyprexa by 5mg/day. BP labs WNL.  12/14: No acute events overnight or PRNs required. Denies SE and no issues on exam. Remains tenuous with staff, particularly with toileting, bathing, and meds. Continue med regimen. Began implementing behavioral intervention with stuffed animal which thus far has been well received. Staff to bring Claudia Mouse when they interact with the patient and encourage its use when the patient requires redirection.     Plan:  1. Legals: 9.27 status, *DNR/DNI* [MOLST in chart]  2. Safety: routine observation. PRNs: Zyprexa 2.5 mg PO/IM q6h   3. Psychiatry: Depakote sprinkles 750 mg BID, Zyprexa 7.5mg at 9a + 1p and Zyprexa 5mg at 5p, repeat ECG Friday  4. Group, milieu, individual therapy as appropriate. *Staff to use stuffed Claudia Mouse to redirect the patient when she becomes irritable.*  5. Medical:  -RASH: s/p derm consult, given permethrin x2 and started on clobetasol BID for 2 weeks. BP lab drawn and WNL. Most likely eczema. On 12/16 will transition from clobetasol BID to triamcinolone PRN per derm recs.   -ODOR: s/p GYN consult, BV panel sent and WNL. Pt noted to have stool burden on exam, pt had BM s/p enema.   -CONSTIPATION: continue Miralax 17g daily and Senna 2mg QHS, fleet enema PRN  -HTN: continue home Lopressor 25mg daily for HTN  -EDEMA: continue home Lasix 20mg daily  *PLEASE ENCOURAGE HYDRATION as patient with mildly elevated Na and Cl on BMP.  6. Dispo: pending clinical improvement.  Patient continues to require inpatient hospitalization for stabilization and safety.  12/15: Patient labile, calmer today so far but need more observation points given changeability. Cont olanzapine monitor gait, vitals watch for constipation  12/16: Patient labile, volative resistive with care. Will increase olanzapine, check EKG consider changing metorolol to propanolol as there is EBM support for this med  12/17 Not with sig change not aggressive with peers. CoStart trial of Inderal, will need to reconsider if cannot get vitals regular to assess safety  during dose titration  12/18Patient vocally agitated and resitive with care, some excess laxative effect. Tolerating Inderal plan further titration Inderal, lower Miralax  12/19 Labile but some improvement. plan hold laxatives and restart lower dose, plan further titration Inderal   12/20 Aggressive episode, even though may have inadvertently provoked by peer yelling. Given aggressive epsiode despite 25mg/d olanzapine need to consider trial a failure. Will taper olanzapine and start haldol. Unable  to reach family to notify

## 2021-12-20 NOTE — BH INPATIENT PSYCHIATRY PROGRESS NOTE - NSBHFUPINTERVALHXFT_PSY_A_CORE
The patient was seen for follow-up for dementia with behavioral disturbances. Chart reviewed and case discussed with nursing staff. Patient wandered into peers room and when peered yelled in surprise and alarm this triggered patient to push peer who fell.  Sleep appetite okay. VSS. No orthostasis or bradycardia.

## 2021-12-20 NOTE — BH INPATIENT PSYCHIATRY PROGRESS NOTE - PRN MEDS
MEDICATIONS  (PRN):  acetaminophen     Tablet .. 650 milliGRAM(s) Oral every 6 hours PRN Temp greater or equal to 38C (100.4F), Mild Pain (1 - 3), Moderate Pain (4 - 6)  aluminum hydroxide/magnesium hydroxide/simethicone Suspension 30 milliLiter(s) Oral every 4 hours PRN Dyspepsia  magnesium hydroxide Suspension 30 milliLiter(s) Oral at bedtime PRN constipation  mineral oil enema 133 milliLiter(s) Rectal daily PRN hard stool  OLANZapine 2.5 milliGRAM(s) Oral every 6 hours PRN severe agitation  OLANZapine Injectable 2.5 milliGRAM(s) IntraMuscular once PRN severe agitation  saline laxative (FLEET) Rectal Enema 1 Enema Rectal daily PRN hard stool  triamcinolone 0.1% Cream 1 Application(s) Topical two times a day PRN pruritis/rash

## 2021-12-20 NOTE — BH INPATIENT PSYCHIATRY PROGRESS NOTE - NSBHCHARTREVIEWVS_PSY_A_CORE FT
Vital Signs Last 24 Hrs  T(C): 36.1 (12-20-21 @ 08:02), Max: 36.1 (12-20-21 @ 08:02)  T(F): 97 (12-20-21 @ 08:02), Max: 97 (12-20-21 @ 08:02)  HR: 66 (12-20-21 @ 12:24) (66 - 66)  BP: 102/60 (12-20-21 @ 12:24) (102/60 - 102/60)  BP(mean): --  RR: 16 (12-20-21 @ 12:24) (16 - 16)  SpO2: 100% (12-20-21 @ 12:24) (100% - 100%)    Orthostatic VS  12-20-21 @ 08:02  Lying BP: 113/75 HR: 57  Sitting BP: --/-- HR: --  Standing BP: --/-- HR: --  Site: upper right arm  Mode: electronic  Orthostatic VS  12-19-21 @ 20:25  Lying BP: --/-- HR: --  Sitting BP: 100/57 HR: 67  Standing BP: --/-- HR: --  Site: --  Mode: --  Orthostatic VS  12-19-21 @ 12:50  Lying BP: --/-- HR: --  Sitting BP: 124/58 HR: 84  Standing BP: --/-- HR: --  Site: --  Mode: --  Orthostatic VS  12-19-21 @ 09:11  Lying BP: --/-- HR: --  Sitting BP: 136/63 HR: 59  Standing BP: --/-- HR: --  Site: --  Mode: --  Orthostatic VS  12-18-21 @ 20:27  Lying BP: --/-- HR: --  Sitting BP: 135/61 HR: 69  Standing BP: --/-- HR: --  Site: --  Mode: --

## 2021-12-20 NOTE — BH INPATIENT PSYCHIATRY PROGRESS NOTE - NSBHMETABOLIC_PSY_ALL_CORE_FT
BMI: BMI (kg/m2): 31.6 (11-29-21 @ 16:35)  HbA1c: A1C with Estimated Average Glucose Result: 5.4 % (11-04-21 @ 06:58)    Glucose: POCT Blood Glucose.: 109 mg/dL (11-12-21 @ 16:13)    BP: 102/60 (12-20-21 @ 12:24) (102/60 - 140/67)  Lipid Panel:

## 2021-12-20 NOTE — BH INPATIENT PSYCHIATRY PROGRESS NOTE - CURRENT MEDICATION
MEDICATIONS  (STANDING):  diVALproex Sprinkle 750 milliGRAM(s) Oral two times a day  furosemide    Tablet 20 milliGRAM(s) Oral daily  haloperidol     Tablet 1 milliGRAM(s) Oral three times a day  melatonin. 3 milliGRAM(s) Oral at bedtime  OLANZapine 5 milliGRAM(s) Oral <User Schedule>  propranolol 20 milliGRAM(s) Oral three times a day    MEDICATIONS  (PRN):  acetaminophen     Tablet .. 650 milliGRAM(s) Oral every 6 hours PRN Temp greater or equal to 38C (100.4F), Mild Pain (1 - 3), Moderate Pain (4 - 6)  aluminum hydroxide/magnesium hydroxide/simethicone Suspension 30 milliLiter(s) Oral every 4 hours PRN Dyspepsia  magnesium hydroxide Suspension 30 milliLiter(s) Oral at bedtime PRN constipation  mineral oil enema 133 milliLiter(s) Rectal daily PRN hard stool  OLANZapine 2.5 milliGRAM(s) Oral every 6 hours PRN severe agitation  OLANZapine Injectable 2.5 milliGRAM(s) IntraMuscular once PRN severe agitation  saline laxative (FLEET) Rectal Enema 1 Enema Rectal daily PRN hard stool  triamcinolone 0.1% Cream 1 Application(s) Topical two times a day PRN pruritis/rash

## 2021-12-21 PROCEDURE — 99231 SBSQ HOSP IP/OBS SF/LOW 25: CPT

## 2021-12-21 RX ORDER — OLANZAPINE 15 MG/1
5 TABLET, FILM COATED ORAL
Refills: 0 | Status: DISCONTINUED | OUTPATIENT
Start: 2021-12-21 | End: 2021-12-22

## 2021-12-21 RX ADMIN — OLANZAPINE 5 MILLIGRAM(S): 15 TABLET, FILM COATED ORAL at 13:06

## 2021-12-21 RX ADMIN — HALOPERIDOL DECANOATE 1 MILLIGRAM(S): 100 INJECTION INTRAMUSCULAR at 10:27

## 2021-12-21 RX ADMIN — DIVALPROEX SODIUM 750 MILLIGRAM(S): 500 TABLET, DELAYED RELEASE ORAL at 21:23

## 2021-12-21 RX ADMIN — Medication 20 MILLIGRAM(S): at 10:27

## 2021-12-21 RX ADMIN — HALOPERIDOL DECANOATE 1 MILLIGRAM(S): 100 INJECTION INTRAMUSCULAR at 13:06

## 2021-12-21 RX ADMIN — DIVALPROEX SODIUM 750 MILLIGRAM(S): 500 TABLET, DELAYED RELEASE ORAL at 10:27

## 2021-12-21 RX ADMIN — OLANZAPINE 5 MILLIGRAM(S): 15 TABLET, FILM COATED ORAL at 17:06

## 2021-12-21 RX ADMIN — HALOPERIDOL DECANOATE 1 MILLIGRAM(S): 100 INJECTION INTRAMUSCULAR at 21:23

## 2021-12-21 RX ADMIN — Medication 3 MILLIGRAM(S): at 21:23

## 2021-12-21 RX ADMIN — OLANZAPINE 5 MILLIGRAM(S): 15 TABLET, FILM COATED ORAL at 10:28

## 2021-12-21 NOTE — BH INPATIENT PSYCHIATRY PROGRESS NOTE - NSBHMETABOLIC_PSY_ALL_CORE_FT
BMI: BMI (kg/m2): 31.6 (11-29-21 @ 16:35)  HbA1c: A1C with Estimated Average Glucose Result: 5.4 % (11-04-21 @ 06:58)    Glucose: POCT Blood Glucose.: 109 mg/dL (11-12-21 @ 16:13)    BP: 126/53 (12-21-21 @ 08:15) (102/60 - 126/53)  Lipid Panel:

## 2021-12-21 NOTE — BH INPATIENT PSYCHIATRY PROGRESS NOTE - NSBHASSESSSUMMFT_PSY_ALL_CORE
66 y.o. F, as per records is , has been residing at [facility] ([facility]) since 2010, with past psychiatric history of dementia with behavioral disturbance, mood disorder, RODY, possible inpatient psychiatric admission to [facility] this year, has a chronic medical history notable for NPH (w/  shunt), HTN, DMT2, TBI (unspecified), [place] [facility] [place] and diffuse [place]. Records also indicate that [facility] was seeking a [facility] admission. Pt is AOx1 which per chart appears to be patient's baseline. Pt was doing well on previous medication regimen but having increase episodes of agitation. Pt most likely having increase in aggressive behavior 2/2 to TBI vs dementia. Patient requires inpatient admission for medication optimization and stabilization of agitation.     11/22: No acute events overnight or PRNs required, VSS, med adherent. Endorses AH of mother's voice telling her to take good care of her baby. Denies any safety concerns. Patient became agitated at 1pm after trying to follow repair men off unit, denies wanting to leave the unit. Incident likely related to disinhibition rather than psychosis, but further monitoring and collateral necessary to better understand the patient and hopefully delineate a pattern.   11/23: No acute events overnight or PRNs required, VSS, med adherent. Has been in more appropriate behavioral control following change in Zyprexa schedule. Was unable to take Depakote overnight due to difficulty swallowing the pill. Switched to sprinkles this morning which patient took without issue. Will continue monitoring on current regimen given change in formulation. Ammonia pending given patient's tremor. Will check VPA level when patient consistently taking Depakote sprinkles.   11/24: Patient required Zyprexa PO PRN yesterday at 6:40pm for agitation, given with good effect. Slept well overnight and adherent with meds. VPA/ammonia deferred given prior adherence issues due to difficulty swallowing. Will continue to titrate Zyprexa and check VPA/ammonia when the patient is taking reliably. EKG today given ongoing titration.   11/25: Patient remains confused, labile, but calm today.  11/26: No acute events over PRNs required. Patient remains intermittently labile but redirectable. Accepting of treatment. Will check VPA/ammonia today given improved adherence. VPA 68, Ammonia pending.  11/27: On exam, patient is pleasantly confused, denies any concerns. No behavorial events. Taking meds. Labs: Serum ammonia wnl at 15.   11/28: Patient remains confused, labile affect, intermittently laughing then screaming.  11/29: No acute events or PRNs required. Patient remains labile but redirectable and accepting treatment. Endorses pruritis in bilateral forearms and L foot swelling observed on exam. WILLIE evaluation pending.  11/30: Per WILLIE recs, patient sent to ED yesterday afternoon for doppler imaging to r/o DVT which was unremarkable. The patient was able to tolerate ED course without any behavioral incidents. This morning, patient at baseline, reports concern over LEs and agrees to meet again with WILLIE. ED had recommended consideration of Keflex for possible cellulitis. Awaiting hospitalist recs.   12/1: Patient acutely agitated overnight requiring Zyprexa 2.5mg PO accepted with good effect. Noted to have worsening rash despite adherence to topical steroid. Per WILLIE recs, dermatology consulted for further evaluation. Rash likely underlying behavioral exacerbation as the patient had been without issue for several days.  12/2: No acute events or PRNs required. Patient cooperative with Derm and GYN consults yesterday afternoon. Per derm, likely eczema for which will start clobetasol BID. Per recs, was also treated prophylactically for scabies (repeat permethrin in 1 week has been ordered). Per derm, also ordered Bullous Pemphigoid labs to r/o. Per GYN, send BV panel to r/o. Patient initially orthostatic but improved on repeat, denies symptoms.  12/3: Patient seen for complications of TBI dementia. No major overnight events. Patient not aggressive still labile  from silly giddy to angry, raising voice. Eating sleping fairly well. Had large BM.  12/4: Patient continues to be confused and disorganized.  12/6: Patient continuing to require PRNs, given Zyprexa 2.5mg PO yesterday afternoon with good effect. Remains intermittently labile without overt aggression. Denies SE and no evidence of ongoing constipation. Skin improving and patient denies any subjective irritation. VPA stable, today 67.  12/7: No acute events overnight or PRNs required. Denies SE and no evidence on exam. Denies any concerns with skin. Eventually cooperated with BP lab.  12/8: No acute events overnight or PRNs required. Denies SE and no evidence on exam. No further skin concerns. Patient sedated, which may be related to med titration. Will continue to monitor for now and make changes if it persists. Will check ECG tomorrow. Repeat permethrin tonight per derm recommendations.  12/9: No acute events overnight or PRNs required. Denies SE and no evidence on exam. Skin is stable for the time being. Patient less sedated this morning, ECG yesterday was sinus with QTc 422. As such, will continue Zyprexa schedule. Mildly elevated electrolytes on BMP likely attributable to dehydration. Patient received Zyprexa PRN at 11:44am after pushing another patient who was provoking her. Staff instructed to hold 1pm 5mg dose but give another 2.5mg PRN if needed.  12/10: No acute events overnight or PRNs required. Denies SE and skin concerns, no issues on exam. Patient more sedated than yesterday but less than the day prior. Pattern appears to be that she is more sedated in the morning but wakes up over the course of the day. As such, will reschedule pm dose earlier in the day.  12/11: This morning, the patient was laying in bed, somewhat sedated but arousable to verbal stimuli. She came out of her room for breakfast and was seen later working with PT and using her walker. Pt denies any concerns, reports stable mood, sleeping well and eating "ALL of the breakfast". Agrees to come to staff if she has any complaints.  12/12: Patient pleasant and calm on encounter and denies any new concerns. Eating and sleeping well. No behavioral events.  12/13: No acute events overnight or PRNs required. Denies SE and no issues observed on exam. Patient has been receiving Zyprexa TID as scheduled and is no longer sedated. Remains tenuous with staff, relatively labile and unpredictable, particularly around the middle of the day. Will increase Zyprexa by 5mg/day. BP labs WNL.  12/14: No acute events overnight or PRNs required. Denies SE and no issues on exam. Remains tenuous with staff, particularly with toileting, bathing, and meds. Continue med regimen. Began implementing behavioral intervention with stuffed animal which thus far has been well received. Staff to bring Claudia Mouse when they interact with the patient and encourage its use when the patient requires redirection.     Plan:  1. Legals: 9.27 status, *DNR/DNI* [MOLST in chart]  2. Safety: routine observation. PRNs: Zyprexa 2.5 mg PO/IM q6h   3. Psychiatry: Depakote sprinkles 750 mg BID, Zyprexa 7.5mg at 9a + 1p and Zyprexa 5mg at 5p, repeat ECG Friday  4. Group, milieu, individual therapy as appropriate. *Staff to use stuffed Claudia Mouse to redirect the patient when she becomes irritable.*  5. Medical:  -RASH: s/p derm consult, given permethrin x2 and started on clobetasol BID for 2 weeks. BP lab drawn and WNL. Most likely eczema. On 12/16 will transition from clobetasol BID to triamcinolone PRN per derm recs.   -ODOR: s/p GYN consult, BV panel sent and WNL. Pt noted to have stool burden on exam, pt had BM s/p enema.   -CONSTIPATION: continue Miralax 17g daily and Senna 2mg QHS, fleet enema PRN  -HTN: continue home Lopressor 25mg daily for HTN  -EDEMA: continue home Lasix 20mg daily  *PLEASE ENCOURAGE HYDRATION as patient with mildly elevated Na and Cl on BMP.  6. Dispo: pending clinical improvement.  Patient continues to require inpatient hospitalization for stabilization and safety.  12/15: Patient labile, calmer today so far but need more observation points given changeability. Cont olanzapine monitor gait, vitals watch for constipation  12/16: Patient labile, volative resistive with care. Will increase olanzapine, check EKG consider changing metorolol to propanolol as there is EBM support for this med  12/17 Not with sig change not aggressive with peers. CoStart trial of Inderal, will need to reconsider if cannot get vitals regular to assess safety  during dose titration  12/18Patient vocally agitated and resitive with care, some excess laxative effect. Tolerating Inderal plan further titration Inderal, lower Miralax  12/19 Labile but some improvement. plan hold laxatives and restart lower dose, plan further titration Inderal   12/20 Aggressive episode, even though may have inadvertently provoked by peer yelling. Given aggressive epsiode despite 25mg/d olanzapine need to consider trial a failure. Will taper olanzapine and start haldol. Unable  to reach family to notify  12/21 No aggression, still volatile yells curses if redirected. Will cont crossover to haldol from olanzapine. Cont Inderal but suspend titration til converted over to haldol

## 2021-12-21 NOTE — BH INPATIENT PSYCHIATRY PROGRESS NOTE - NSBHFUPINTERVALHXFT_PSY_A_CORE
The patient was seen for follow-up for dementia with behavioral disturbances. Chart reviewed and case discussed with nursing staff.No further peer to peer incidents. Some vocal outburst when directed  Sleep appetite okay. VSS. No low BP or bradycardia.

## 2021-12-21 NOTE — BH INPATIENT PSYCHIATRY PROGRESS NOTE - NSBHCHARTREVIEWVS_PSY_A_CORE FT
Vital Signs Last 24 Hrs  T(C): 36.4 (12-21-21 @ 08:15), Max: 36.4 (12-21-21 @ 08:15)  T(F): 97.5 (12-21-21 @ 08:15), Max: 97.5 (12-21-21 @ 08:15)  HR: 60 (12-21-21 @ 08:15) (60 - 60)  BP: 126/53 (12-21-21 @ 08:15) (126/53 - 126/53)  BP(mean): --  RR: --  SpO2: --    Orthostatic VS  12-20-21 @ 20:38  Lying BP: --/-- HR: --  Sitting BP: 125/65 HR: 65  Standing BP: --/-- HR: --  Site: --  Mode: --  Orthostatic VS  12-20-21 @ 08:02  Lying BP: 113/75 HR: 57  Sitting BP: --/-- HR: --  Standing BP: --/-- HR: --  Site: upper right arm  Mode: electronic  Orthostatic VS  12-19-21 @ 20:25  Lying BP: --/-- HR: --  Sitting BP: 100/57 HR: 67  Standing BP: --/-- HR: --  Site: --  Mode: --

## 2021-12-22 LAB — SARS-COV-2 RNA SPEC QL NAA+PROBE: SIGNIFICANT CHANGE UP

## 2021-12-22 PROCEDURE — 99232 SBSQ HOSP IP/OBS MODERATE 35: CPT

## 2021-12-22 RX ADMIN — DIVALPROEX SODIUM 750 MILLIGRAM(S): 500 TABLET, DELAYED RELEASE ORAL at 10:55

## 2021-12-22 RX ADMIN — Medication 3 MILLIGRAM(S): at 20:51

## 2021-12-22 RX ADMIN — DIVALPROEX SODIUM 750 MILLIGRAM(S): 500 TABLET, DELAYED RELEASE ORAL at 20:50

## 2021-12-22 RX ADMIN — HALOPERIDOL DECANOATE 1 MILLIGRAM(S): 100 INJECTION INTRAMUSCULAR at 13:56

## 2021-12-22 RX ADMIN — Medication 20 MILLIGRAM(S): at 10:55

## 2021-12-22 RX ADMIN — HALOPERIDOL DECANOATE 1 MILLIGRAM(S): 100 INJECTION INTRAMUSCULAR at 20:50

## 2021-12-22 RX ADMIN — HALOPERIDOL DECANOATE 1 MILLIGRAM(S): 100 INJECTION INTRAMUSCULAR at 10:55

## 2021-12-22 NOTE — BH INPATIENT PSYCHIATRY PROGRESS NOTE - MSE UNSTRUCTURED FT
Patient is awake and alert. Relates in childlike manner. Mood  is perplexed, pleasant now, affect labile. Speech is fluent with poor modulation of volume, tone. Not as loud Repeats words at different registers. Thought process is coherent but irrelevant. No delusions expressed. no nunez. No SI/HI. Oriented to self.  Poor insight.

## 2021-12-22 NOTE — BH INPATIENT PSYCHIATRY PROGRESS NOTE - CURRENT MEDICATION
MEDICATIONS  (STANDING):  diVALproex Sprinkle 750 milliGRAM(s) Oral two times a day  furosemide    Tablet 20 milliGRAM(s) Oral daily  haloperidol     Tablet 1 milliGRAM(s) Oral three times a day  melatonin. 3 milliGRAM(s) Oral at bedtime  propranolol 20 milliGRAM(s) Oral three times a day    MEDICATIONS  (PRN):  acetaminophen     Tablet .. 650 milliGRAM(s) Oral every 6 hours PRN Temp greater or equal to 38C (100.4F), Mild Pain (1 - 3), Moderate Pain (4 - 6)  aluminum hydroxide/magnesium hydroxide/simethicone Suspension 30 milliLiter(s) Oral every 4 hours PRN Dyspepsia  magnesium hydroxide Suspension 30 milliLiter(s) Oral at bedtime PRN constipation  mineral oil enema 133 milliLiter(s) Rectal daily PRN hard stool  OLANZapine 2.5 milliGRAM(s) Oral every 6 hours PRN severe agitation  OLANZapine Injectable 2.5 milliGRAM(s) IntraMuscular once PRN severe agitation  saline laxative (FLEET) Rectal Enema 1 Enema Rectal daily PRN hard stool  triamcinolone 0.1% Cream 1 Application(s) Topical two times a day PRN pruritis/rash

## 2021-12-22 NOTE — BH INPATIENT PSYCHIATRY PROGRESS NOTE - NSBHFUPINTERVALHXFT_PSY_A_CORE
The patient was seen for follow-up for dementia with behavioral disturbances. Chart reviewed and case discussed with nursing staff.No further peer to peer incidents. Some vocal outburst when re-directed  Sleep appetite okay. VSS. No low BP or bradycardia.

## 2021-12-22 NOTE — BH INPATIENT PSYCHIATRY PROGRESS NOTE - NSBHMETABOLIC_PSY_ALL_CORE_FT
BMI: BMI (kg/m2): 31.6 (11-29-21 @ 16:35)  HbA1c: A1C with Estimated Average Glucose Result: 5.4 % (11-04-21 @ 06:58)    Glucose: POCT Blood Glucose.: 109 mg/dL (11-12-21 @ 16:13)    BP: 112/76 (12-22-21 @ 10:19) (102/60 - 144/61)  Lipid Panel:

## 2021-12-22 NOTE — BH INPATIENT PSYCHIATRY PROGRESS NOTE - NSBHASSESSSUMMFT_PSY_ALL_CORE
66 y.o. F, as per records is , has been residing at [facility] ([facility]) since 2010, with past psychiatric history of dementia with behavioral disturbance, mood disorder, RODY, possible inpatient psychiatric admission to [facility] this year, has a chronic medical history notable for NPH (w/  shunt), HTN, DMT2, TBI (unspecified), pedal edema presenting from [facility] for physically aggressive behavior and diffuse rash. Records also indicate that [facility] was seeking a [facility] admission. Pt is AOx1 which per chart appears to be patient's baseline. Pt was doing well on previous medication regimen but having increase episodes of agitation. Pt most likely having increase in aggressive behavior 2/2 to TBI vs dementia. Patient requires inpatient admission for medication optimization and stabilization of agitation.     11/22: No acute events overnight or PRNs required, VSS, med adherent. Endorses AH of mother's voice telling her to take good care of her baby. Denies any safety concerns. Patient became agitated at 1pm after trying to follow repair men off unit, denies wanting to leave the unit. Incident likely related to disinhibition rather than psychosis, but further monitoring and collateral necessary to better understand the patient and hopefully delineate a pattern.   11/23: No acute events overnight or PRNs required, VSS, med adherent. Has been in more appropriate behavioral control following change in Zyprexa schedule. Was unable to take Depakote overnight due to difficulty swallowing the pill. Switched to sprinkles this morning which patient took without issue. Will continue monitoring on current regimen given change in formulation. Ammonia pending given patient's tremor. Will check VPA level when patient consistently taking Depakote sprinkles.   11/24: Patient required Zyprexa PO PRN yesterday at 6:40pm for agitation, given with good effect. Slept well overnight and adherent with meds. VPA/ammonia deferred given prior adherence issues due to difficulty swallowing. Will continue to titrate Zyprexa and check VPA/ammonia when the patient is taking reliably. EKG today given ongoing titration.   11/25: Patient remains confused, labile, but calm today.  11/26: No acute events over PRNs required. Patient remains intermittently labile but redirectable. Accepting of treatment. Will check VPA/ammonia today given improved adherence. VPA 68, Ammonia pending.  11/27: On exam, patient is pleasantly confused, denies any concerns. No behavorial events. Taking meds. Labs: Serum ammonia wnl at 15.   11/28: Patient remains confused, labile affect, intermittently laughing then screaming.  11/29: No acute events or PRNs required. Patient remains labile but redirectable and accepting treatment. Endorses pruritis in bilateral forearms and L foot swelling observed on exam. WILLIE evaluation pending.  11/30: Per WILLIE recs, patient sent to ED yesterday afternoon for doppler imaging to r/o DVT which was unremarkable. The patient was able to tolerate ED course without any behavioral incidents. This morning, patient at baseline, reports concern over LEs and agrees to meet again with WILLIE. ED had recommended consideration of Keflex for possible cellulitis. Awaiting hospitalist recs.   12/1: Patient acutely agitated overnight requiring Zyprexa 2.5mg PO accepted with good effect. Noted to have worsening rash despite adherence to topical steroid. Per WILLIE recs, dermatology consulted for further evaluation. Rash likely underlying behavioral exacerbation as the patient had been without issue for several days.  12/2: No acute events or PRNs required. Patient cooperative with Derm and GYN consults yesterday afternoon. Per derm, likely eczema for which will start clobetasol BID. Per recs, was also treated prophylactically for scabies (repeat permethrin in 1 week has been ordered). Per derm, also ordered Bullous Pemphigoid labs to r/o. Per GYN, send BV panel to r/o. Patient initially orthostatic but improved on repeat, denies symptoms.  12/3: Patient seen for complications of TBI dementia. No major overnight events. Patient not aggressive still labile  from silly giddy to angry, raising voice. Eating sleping fairly well. Had large BM.  12/4: Patient continues to be confused and disorganized.  12/6: Patient continuing to require PRNs, given Zyprexa 2.5mg PO yesterday afternoon with good effect. Remains intermittently labile without overt aggression. Denies SE and no evidence of ongoing constipation. Skin improving and patient denies any subjective irritation. VPA stable, today 67.  12/7: No acute events overnight or PRNs required. Denies SE and no evidence on exam. Denies any concerns with skin. Eventually cooperated with BP lab.  12/8: No acute events overnight or PRNs required. Denies SE and no evidence on exam. No further skin concerns. Patient sedated, which may be related to med titration. Will continue to monitor for now and make changes if it persists. Will check ECG tomorrow. Repeat permethrin tonight per derm recommendations.  12/9: No acute events overnight or PRNs required. Denies SE and no evidence on exam. Skin is stable for the time being. Patient less sedated this morning, ECG yesterday was sinus with QTc 422. As such, will continue Zyprexa schedule. Mildly elevated electrolytes on BMP likely attributable to dehydration. Patient received Zyprexa PRN at 11:44am after pushing another patient who was provoking her. Staff instructed to hold 1pm 5mg dose but give another 2.5mg PRN if needed.  12/10: No acute events overnight or PRNs required. Denies SE and skin concerns, no issues on exam. Patient more sedated than yesterday but less than the day prior. Pattern appears to be that she is more sedated in the morning but wakes up over the course of the day. As such, will reschedule pm dose earlier in the day.  12/11: This morning, the patient was laying in bed, somewhat sedated but arousable to verbal stimuli. She came out of her room for breakfast and was seen later working with PT and using her walker. Pt denies any concerns, reports stable mood, sleeping well and eating "ALL of the breakfast". Agrees to come to staff if she has any complaints.  12/12: Patient pleasant and calm on encounter and denies any new concerns. Eating and sleeping well. No behavioral events.  12/13: No acute events overnight or PRNs required. Denies SE and no issues observed on exam. Patient has been receiving Zyprexa TID as scheduled and is no longer sedated. Remains tenuous with staff, relatively labile and unpredictable, particularly around the middle of the day. Will increase Zyprexa by 5mg/day. BP labs WNL.  12/14: No acute events overnight or PRNs required. Denies SE and no issues on exam. Remains tenuous with staff, particularly with toileting, bathing, and meds. Continue med regimen. Began implementing behavioral intervention with stuffed animal which thus far has been well received. Staff to bring Claudia Mouse when they interact with the patient and encourage its use when the patient requires redirection.     Plan:  1. Legals: 9.27 status, *DNR/DNI* [MOLST in chart]  2. Safety: routine observation. PRNs: Zyprexa 2.5 mg PO/IM q6h   3. Psychiatry: Depakote sprinkles 750 mg BID, Zyprexa 7.5mg at 9a + 1p and Zyprexa 5mg at 5p, repeat ECG Friday  4. Group, milieu, individual therapy as appropriate. *Staff to use stuffed Claudia Mouse to redirect the patient when she becomes irritable.*  5. Medical:  -RASH: s/p derm consult, given permethrin x2 and started on clobetasol BID for 2 weeks. BP lab drawn and WNL. Most likely eczema. On 12/16 will transition from clobetasol BID to triamcinolone PRN per derm recs.   -ODOR: s/p GYN consult, BV panel sent and WNL. Pt noted to have stool burden on exam, pt had BM s/p enema.   -CONSTIPATION: continue Miralax 17g daily and Senna 2mg QHS, fleet enema PRN  -HTN: continue home Lopressor 25mg daily for HTN  -EDEMA: continue home Lasix 20mg daily  *PLEASE ENCOURAGE HYDRATION as patient with mildly elevated Na and Cl on BMP.  6. Dispo: pending clinical improvement.  Patient continues to require inpatient hospitalization for stabilization and safety.  12/15: Patient labile, calmer today so far but need more observation points given changeability. Cont olanzapine monitor gait, vitals watch for constipation  12/16: Patient labile, volative resistive with care. Will increase olanzapine, check EKG consider changing metorolol to propanolol as there is EBM support for this med  12/17 Not with sig change not aggressive with peers. CoStart trial of Inderal, will need to reconsider if cannot get vitals regular to assess safety  during dose titration  12/18Patient vocally agitated and resitive with care, some excess laxative effect. Tolerating Inderal plan further titration Inderal, lower Miralax  12/19 Labile but some improvement. plan hold laxatives and restart lower dose, plan further titration Inderal   12/20 Aggressive episode, even though may have inadvertently provoked by peer yelling. Given aggressive epsiode despite 25mg/d olanzapine need to consider trial a failure. Will taper olanzapine and start haldol. Unable  to reach family to notify  12/21 No aggression, still volatile yells curses if redirected. Will cont crossover to haldol from olanzapine. Cont Inderal but suspend titration til converted over to haldol  12/22 Patiwnd had incident of placing self on floor. Today appears calmer, no sig EPS, not sedated. Plan cont haldol have stopped olanzapine

## 2021-12-22 NOTE — BH INPATIENT PSYCHIATRY PROGRESS NOTE - NSBHCHARTREVIEWVS_PSY_A_CORE FT
Vital Signs Last 24 Hrs  T(C): --  T(F): --  HR: 91 (12-22-21 @ 10:19) (56 - 91)  BP: 112/76 (12-22-21 @ 10:19) (112/76 - 144/61)  BP(mean): --  RR: --  SpO2: --    Orthostatic VS  12-20-21 @ 20:38  Lying BP: --/-- HR: --  Sitting BP: 125/65 HR: 65  Standing BP: --/-- HR: --  Site: --  Mode: --

## 2021-12-23 PROCEDURE — 99232 SBSQ HOSP IP/OBS MODERATE 35: CPT

## 2021-12-23 RX ADMIN — Medication 20 MILLIGRAM(S): at 09:47

## 2021-12-23 RX ADMIN — DIVALPROEX SODIUM 750 MILLIGRAM(S): 500 TABLET, DELAYED RELEASE ORAL at 22:10

## 2021-12-23 RX ADMIN — Medication 3 MILLIGRAM(S): at 22:10

## 2021-12-23 RX ADMIN — DIVALPROEX SODIUM 750 MILLIGRAM(S): 500 TABLET, DELAYED RELEASE ORAL at 09:47

## 2021-12-23 RX ADMIN — HALOPERIDOL DECANOATE 1 MILLIGRAM(S): 100 INJECTION INTRAMUSCULAR at 22:10

## 2021-12-23 RX ADMIN — HALOPERIDOL DECANOATE 1 MILLIGRAM(S): 100 INJECTION INTRAMUSCULAR at 09:48

## 2021-12-23 RX ADMIN — HALOPERIDOL DECANOATE 1 MILLIGRAM(S): 100 INJECTION INTRAMUSCULAR at 13:31

## 2021-12-23 NOTE — BH INPATIENT PSYCHIATRY PROGRESS NOTE - NSBHFUPINTERVALHXFT_PSY_A_CORE
The patient was seen for follow-up for dementia with behavioral disturbances. Chart reviewed and case discussed with nursing staff. No further peer to peer incidents. Some vocal outburst when re-directed  Sleep appetite okay. VSS. No low BP or bradycardia.

## 2021-12-23 NOTE — BH INPATIENT PSYCHIATRY PROGRESS NOTE - NSBHASSESSSUMMFT_PSY_ALL_CORE
66 y.o. F, as per records is , has been residing at [facility] ([facility]) since 2010, with past psychiatric history of dementia with behavioral disturbance, mood disorder, RODY, possible inpatient psychiatric admission to [facility] this year, has a chronic medical history notable for NPH (w/  shunt), HTN, DMT2, TBI (unspecified), pedal edema presenting from [facility] for physically aggressive behavior and diffuse rash. Records also indicate that [facility] was seeking a [facility] admission. Pt is AOx1 which per chart appears to be patient's baseline. Pt was doing well on previous medication regimen but having increase episodes of agitation. Pt most likely having increase in aggressive behavior 2/2 to TBI vs dementia. Patient requires inpatient admission for medication optimization and stabilization of agitation.     11/22: No acute events overnight or PRNs required, VSS, med adherent. Endorses AH of mother's voice telling her to take good care of her baby. Denies any safety concerns. Patient became agitated at 1pm after trying to follow repair men off unit, denies wanting to leave the unit. Incident likely related to disinhibition rather than psychosis, but further monitoring and collateral necessary to better understand the patient and hopefully delineate a pattern.   11/23: No acute events overnight or PRNs required, VSS, med adherent. Has been in more appropriate behavioral control following change in Zyprexa schedule. Was unable to take Depakote overnight due to difficulty swallowing the pill. Switched to sprinkles this morning which patient took without issue. Will continue monitoring on current regimen given change in formulation. Ammonia pending given patient's tremor. Will check VPA level when patient consistently taking Depakote sprinkles.   11/24: Patient required Zyprexa PO PRN yesterday at 6:40pm for agitation, given with good effect. Slept well overnight and adherent with meds. VPA/ammonia deferred given prior adherence issues due to difficulty swallowing. Will continue to titrate Zyprexa and check VPA/ammonia when the patient is taking reliably. EKG today given ongoing titration.   11/25: Patient remains confused, labile, but calm today.  11/26: No acute events over PRNs required. Patient remains intermittently labile but redirectable. Accepting of treatment. Will check VPA/ammonia today given improved adherence. VPA 68, Ammonia pending.  11/27: On exam, patient is pleasantly confused, denies any concerns. No behavorial events. Taking meds. Labs: Serum ammonia wnl at 15.   11/28: Patient remains confused, labile affect, intermittently laughing then screaming.  11/29: No acute events or PRNs required. Patient remains labile but redirectable and accepting treatment. Endorses pruritis in bilateral forearms and L foot swelling observed on exam. WILLIE evaluation pending.  11/30: Per WILLIE recs, patient sent to ED yesterday afternoon for doppler imaging to r/o DVT which was unremarkable. The patient was able to tolerate ED course without any behavioral incidents. This morning, patient at baseline, reports concern over LEs and agrees to meet again with WILLIE. ED had recommended consideration of Keflex for possible cellulitis. Awaiting hospitalist recs.   12/1: Patient acutely agitated overnight requiring Zyprexa 2.5mg PO accepted with good effect. Noted to have worsening rash despite adherence to topical steroid. Per WILLIE recs, dermatology consulted for further evaluation. Rash likely underlying behavioral exacerbation as the patient had been without issue for several days.  12/2: No acute events or PRNs required. Patient cooperative with Derm and GYN consults yesterday afternoon. Per derm, likely eczema for which will start clobetasol BID. Per recs, was also treated prophylactically for scabies (repeat permethrin in 1 week has been ordered). Per derm, also ordered Bullous Pemphigoid labs to r/o. Per GYN, send BV panel to r/o. Patient initially orthostatic but improved on repeat, denies symptoms.  12/3: Patient seen for complications of TBI dementia. No major overnight events. Patient not aggressive still labile  from silly giddy to angry, raising voice. Eating sleping fairly well. Had large BM.  12/4: Patient continues to be confused and disorganized.  12/6: Patient continuing to require PRNs, given Zyprexa 2.5mg PO yesterday afternoon with good effect. Remains intermittently labile without overt aggression. Denies SE and no evidence of ongoing constipation. Skin improving and patient denies any subjective irritation. VPA stable, today 67.  12/7: No acute events overnight or PRNs required. Denies SE and no evidence on exam. Denies any concerns with skin. Eventually cooperated with BP lab.  12/8: No acute events overnight or PRNs required. Denies SE and no evidence on exam. No further skin concerns. Patient sedated, which may be related to med titration. Will continue to monitor for now and make changes if it persists. Will check ECG tomorrow. Repeat permethrin tonight per derm recommendations.  12/9: No acute events overnight or PRNs required. Denies SE and no evidence on exam. Skin is stable for the time being. Patient less sedated this morning, ECG yesterday was sinus with QTc 422. As such, will continue Zyprexa schedule. Mildly elevated electrolytes on BMP likely attributable to dehydration. Patient received Zyprexa PRN at 11:44am after pushing another patient who was provoking her. Staff instructed to hold 1pm 5mg dose but give another 2.5mg PRN if needed.  12/10: No acute events overnight or PRNs required. Denies SE and skin concerns, no issues on exam. Patient more sedated than yesterday but less than the day prior. Pattern appears to be that she is more sedated in the morning but wakes up over the course of the day. As such, will reschedule pm dose earlier in the day.  12/11: This morning, the patient was laying in bed, somewhat sedated but arousable to verbal stimuli. She came out of her room for breakfast and was seen later working with PT and using her walker. Pt denies any concerns, reports stable mood, sleeping well and eating "ALL of the breakfast". Agrees to come to staff if she has any complaints.  12/12: Patient pleasant and calm on encounter and denies any new concerns. Eating and sleeping well. No behavioral events.  12/13: No acute events overnight or PRNs required. Denies SE and no issues observed on exam. Patient has been receiving Zyprexa TID as scheduled and is no longer sedated. Remains tenuous with staff, relatively labile and unpredictable, particularly around the middle of the day. Will increase Zyprexa by 5mg/day. BP labs WNL.  12/14: No acute events overnight or PRNs required. Denies SE and no issues on exam. Remains tenuous with staff, particularly with toileting, bathing, and meds. Continue med regimen. Began implementing behavioral intervention with stuffed animal which thus far has been well received. Staff to bring Claudia Mouse when they interact with the patient and encourage its use when the patient requires redirection.     Plan:  1. Legals: 9.27 status, *DNR/DNI* [MOLST in chart]  2. Safety: routine observation. PRNs: Zyprexa 2.5 mg PO/IM q6h   3. Psychiatry: Depakote sprinkles 750 mg BID, Zyprexa 7.5mg at 9a + 1p and Zyprexa 5mg at 5p, repeat ECG Friday  4. Group, milieu, individual therapy as appropriate. *Staff to use stuffed Claudia Mouse to redirect the patient when she becomes irritable.*  5. Medical:  -RASH: s/p derm consult, given permethrin x2 and started on clobetasol BID for 2 weeks. BP lab drawn and WNL. Most likely eczema. On 12/16 will transition from clobetasol BID to triamcinolone PRN per derm recs.   -ODOR: s/p GYN consult, BV panel sent and WNL. Pt noted to have stool burden on exam, pt had BM s/p enema.   -CONSTIPATION: continue Miralax 17g daily and Senna 2mg QHS, fleet enema PRN  -HTN: continue home Lopressor 25mg daily for HTN  -EDEMA: continue home Lasix 20mg daily  *PLEASE ENCOURAGE HYDRATION as patient with mildly elevated Na and Cl on BMP.  6. Dispo: pending clinical improvement.  Patient continues to require inpatient hospitalization for stabilization and safety.  12/15: Patient labile, calmer today so far but need more observation points given changeability. Cont olanzapine monitor gait, vitals watch for constipation  12/16: Patient labile, volative resistive with care. Will increase olanzapine, check EKG consider changing metorolol to propanolol as there is EBM support for this med  12/17 Not with sig change not aggressive with peers. CoStart trial of Inderal, will need to reconsider if cannot get vitals regular to assess safety  during dose titration  12/18Patient vocally agitated and resitive with care, some excess laxative effect. Tolerating Inderal plan further titration Inderal, lower Miralax  12/19 Labile but some improvement. plan hold laxatives and restart lower dose, plan further titration Inderal   12/20 Aggressive episode, even though may have inadvertently provoked by peer yelling. Given aggressive epsiode despite 25mg/d olanzapine need to consider trial a failure. Will taper olanzapine and start haldol. Unable  to reach family to notify  12/21 No aggression, still volatile yells curses if redirected. Will cont crossover to haldol from olanzapine. Cont Inderal but suspend titration til converted over to haldol  12/22 Patiwnd had incident of placing self on floor. Today appears calmer, no sig EPS, not sedated. Plan cont haldol have stopped olanzapine  12/23 So far seems better on haldol not as loud less raising  voice or cursing, tolerating well. Cont haldol watch for changes as olanzapine washes out

## 2021-12-23 NOTE — BH INPATIENT PSYCHIATRY PROGRESS NOTE - NSBHMETABOLIC_PSY_ALL_CORE_FT
BMI: BMI (kg/m2): 31.6 (11-29-21 @ 16:35)  HbA1c: A1C with Estimated Average Glucose Result: 5.4 % (11-04-21 @ 06:58)    Glucose: POCT Blood Glucose.: 109 mg/dL (11-12-21 @ 16:13)    BP: 131/70 (12-22-21 @ 20:49) (112/76 - 144/61)  Lipid Panel:

## 2021-12-23 NOTE — BH INPATIENT PSYCHIATRY PROGRESS NOTE - NSBHCHARTREVIEWVS_PSY_A_CORE FT
Vital Signs Last 24 Hrs  T(C): 36.4 (12-23-21 @ 09:21), Max: 36.4 (12-23-21 @ 09:21)  T(F): 97.5 (12-23-21 @ 09:21), Max: 97.5 (12-23-21 @ 09:21)  HR: 79 (12-22-21 @ 20:49) (79 - 79)  BP: 131/70 (12-22-21 @ 20:49) (131/70 - 131/70)  BP(mean): --  RR: --  SpO2: --    Orthostatic VS  12-23-21 @ 09:21  Lying BP: --/-- HR: --  Sitting BP: 119/61 HR: 64  Standing BP: 124/58 HR: 64  Site: --  Mode: electronic

## 2021-12-24 LAB — SARS-COV-2 RNA SPEC QL NAA+PROBE: SIGNIFICANT CHANGE UP

## 2021-12-24 PROCEDURE — 99231 SBSQ HOSP IP/OBS SF/LOW 25: CPT

## 2021-12-24 RX ADMIN — DIVALPROEX SODIUM 750 MILLIGRAM(S): 500 TABLET, DELAYED RELEASE ORAL at 20:39

## 2021-12-24 RX ADMIN — HALOPERIDOL DECANOATE 1 MILLIGRAM(S): 100 INJECTION INTRAMUSCULAR at 09:53

## 2021-12-24 RX ADMIN — HALOPERIDOL DECANOATE 1 MILLIGRAM(S): 100 INJECTION INTRAMUSCULAR at 20:40

## 2021-12-24 RX ADMIN — Medication 20 MILLIGRAM(S): at 09:52

## 2021-12-24 RX ADMIN — Medication 3 MILLIGRAM(S): at 20:40

## 2021-12-24 RX ADMIN — DIVALPROEX SODIUM 750 MILLIGRAM(S): 500 TABLET, DELAYED RELEASE ORAL at 09:54

## 2021-12-24 NOTE — BH INPATIENT PSYCHIATRY PROGRESS NOTE - MSE UNSTRUCTURED FT
Patient is awake and alert. Poor attention. Speech decreased production. Affect constricted. TP concrete, coherent. No delusions expressed. No motor disturbance.  Limited insight. NO tremor or movement disorder appreciated

## 2021-12-24 NOTE — BH INPATIENT PSYCHIATRY PROGRESS NOTE - NSBHCHARTREVIEWVS_PSY_A_CORE FT
Vital Signs Last 24 Hrs  T(C): 36.4 (12-23-21 @ 09:21), Max: 36.4 (12-23-21 @ 09:21)  T(F): 97.5 (12-23-21 @ 09:21), Max: 97.5 (12-23-21 @ 09:21)  HR: 70 (12-23-21 @ 22:02) (70 - 70)  BP: 128/79 (12-23-21 @ 22:02) (128/79 - 128/79)  BP(mean): --  RR: --  SpO2: 100% (12-23-21 @ 22:02) (100% - 100%)    Orthostatic VS  12-23-21 @ 09:21  Lying BP: --/-- HR: --  Sitting BP: 119/61 HR: 64  Standing BP: 124/58 HR: 64  Site: --  Mode: electronic

## 2021-12-24 NOTE — BH INPATIENT PSYCHIATRY PROGRESS NOTE - NSBHFUPINTERVALHXFT_PSY_A_CORE
The patient was seen for follow-up for dementia with behavioral disturbances. Has been calm. She has not been aggressive. Slept well overnight.

## 2021-12-24 NOTE — BH INPATIENT PSYCHIATRY PROGRESS NOTE - NSBHMETABOLIC_PSY_ALL_CORE_FT
BMI: BMI (kg/m2): 31.6 (11-29-21 @ 16:35)  HbA1c: A1C with Estimated Average Glucose Result: 5.4 % (11-04-21 @ 06:58)    Glucose: POCT Blood Glucose.: 109 mg/dL (11-12-21 @ 16:13)    BP: 128/79 (12-23-21 @ 22:02) (112/76 - 144/61)  Lipid Panel:

## 2021-12-24 NOTE — BH INPATIENT PSYCHIATRY PROGRESS NOTE - NSBHASSESSSUMMFT_PSY_ALL_CORE
66 y.o. F, as per records is , has been residing at [facility] ([facility]) since 2010, with past psychiatric history of dementia with behavioral disturbance, mood disorder, RODY, possible inpatient psychiatric admission to [facility] this year, has a chronic medical history notable for NPH (w/  shunt), HTN, DMT2, TBI (unspecified), pedal edema presenting from [facility] for physically aggressive behavior and diffuse rash. Records also indicate that [facility] was seeking a [facility] admission. Pt is AOx1 which per chart appears to be patient's baseline. Pt was doing well on previous medication regimen but having increase episodes of agitation. Pt most likely having increase in aggressive behavior 2/2 to TBI vs dementia. Patient requires inpatient admission for medication optimization and stabilization of agitation.     11/22: No acute events overnight or PRNs required, VSS, med adherent. Endorses AH of mother's voice telling her to take good care of her baby. Denies any safety concerns. Patient became agitated at 1pm after trying to follow repair men off unit, denies wanting to leave the unit. Incident likely related to disinhibition rather than psychosis, but further monitoring and collateral necessary to better understand the patient and hopefully delineate a pattern.   11/23: No acute events overnight or PRNs required, VSS, med adherent. Has been in more appropriate behavioral control following change in Zyprexa schedule. Was unable to take Depakote overnight due to difficulty swallowing the pill. Switched to sprinkles this morning which patient took without issue. Will continue monitoring on current regimen given change in formulation. Ammonia pending given patient's tremor. Will check VPA level when patient consistently taking Depakote sprinkles.   11/24: Patient required Zyprexa PO PRN yesterday at 6:40pm for agitation, given with good effect. Slept well overnight and adherent with meds. VPA/ammonia deferred given prior adherence issues due to difficulty swallowing. Will continue to titrate Zyprexa and check VPA/ammonia when the patient is taking reliably. EKG today given ongoing titration.   11/25: Patient remains confused, labile, but calm today.  11/26: No acute events over PRNs required. Patient remains intermittently labile but redirectable. Accepting of treatment. Will check VPA/ammonia today given improved adherence. VPA 68, Ammonia pending.  11/27: On exam, patient is pleasantly confused, denies any concerns. No behavorial events. Taking meds. Labs: Serum ammonia wnl at 15.   11/28: Patient remains confused, labile affect, intermittently laughing then screaming.  11/29: No acute events or PRNs required. Patient remains labile but redirectable and accepting treatment. Endorses pruritis in bilateral forearms and L foot swelling observed on exam. WILLIE evaluation pending.  11/30: Per WILLIE recs, patient sent to ED yesterday afternoon for doppler imaging to r/o DVT which was unremarkable. The patient was able to tolerate ED course without any behavioral incidents. This morning, patient at baseline, reports concern over LEs and agrees to meet again with WILLIE. ED had recommended consideration of Keflex for possible cellulitis. Awaiting hospitalist recs.   12/1: Patient acutely agitated overnight requiring Zyprexa 2.5mg PO accepted with good effect. Noted to have worsening rash despite adherence to topical steroid. Per WILLIE recs, dermatology consulted for further evaluation. Rash likely underlying behavioral exacerbation as the patient had been without issue for several days.  12/2: No acute events or PRNs required. Patient cooperative with Derm and GYN consults yesterday afternoon. Per derm, likely eczema for which will start clobetasol BID. Per recs, was also treated prophylactically for scabies (repeat permethrin in 1 week has been ordered). Per derm, also ordered Bullous Pemphigoid labs to r/o. Per GYN, send BV panel to r/o. Patient initially orthostatic but improved on repeat, denies symptoms.  12/3: Patient seen for complications of TBI dementia. No major overnight events. Patient not aggressive still labile  from silly giddy to angry, raising voice. Eating sleping fairly well. Had large BM.  12/4: Patient continues to be confused and disorganized.  12/6: Patient continuing to require PRNs, given Zyprexa 2.5mg PO yesterday afternoon with good effect. Remains intermittently labile without overt aggression. Denies SE and no evidence of ongoing constipation. Skin improving and patient denies any subjective irritation. VPA stable, today 67.  12/7: No acute events overnight or PRNs required. Denies SE and no evidence on exam. Denies any concerns with skin. Eventually cooperated with BP lab.  12/8: No acute events overnight or PRNs required. Denies SE and no evidence on exam. No further skin concerns. Patient sedated, which may be related to med titration. Will continue to monitor for now and make changes if it persists. Will check ECG tomorrow. Repeat permethrin tonight per derm recommendations.  12/9: No acute events overnight or PRNs required. Denies SE and no evidence on exam. Skin is stable for the time being. Patient less sedated this morning, ECG yesterday was sinus with QTc 422. As such, will continue Zyprexa schedule. Mildly elevated electrolytes on BMP likely attributable to dehydration. Patient received Zyprexa PRN at 11:44am after pushing another patient who was provoking her. Staff instructed to hold 1pm 5mg dose but give another 2.5mg PRN if needed.  12/10: No acute events overnight or PRNs required. Denies SE and skin concerns, no issues on exam. Patient more sedated than yesterday but less than the day prior. Pattern appears to be that she is more sedated in the morning but wakes up over the course of the day. As such, will reschedule pm dose earlier in the day.  12/11: This morning, the patient was laying in bed, somewhat sedated but arousable to verbal stimuli. She came out of her room for breakfast and was seen later working with PT and using her walker. Pt denies any concerns, reports stable mood, sleeping well and eating "ALL of the breakfast". Agrees to come to staff if she has any complaints.  12/12: Patient pleasant and calm on encounter and denies any new concerns. Eating and sleeping well. No behavioral events.  12/13: No acute events overnight or PRNs required. Denies SE and no issues observed on exam. Patient has been receiving Zyprexa TID as scheduled and is no longer sedated. Remains tenuous with staff, relatively labile and unpredictable, particularly around the middle of the day. Will increase Zyprexa by 5mg/day. BP labs WNL.  12/14: No acute events overnight or PRNs required. Denies SE and no issues on exam. Remains tenuous with staff, particularly with toileting, bathing, and meds. Continue med regimen. Began implementing behavioral intervention with stuffed animal which thus far has been well received. Staff to bring Claudia Mouse when they interact with the patient and encourage its use when the patient requires redirection.     Plan:  1. Legals: 9.27 status, *DNR/DNI* [MOLST in chart]  2. Safety: routine observation. PRNs: Zyprexa 2.5 mg PO/IM q6h   3. Psychiatry: Depakote sprinkles 750 mg BID, Zyprexa 7.5mg at 9a + 1p and Zyprexa 5mg at 5p, repeat ECG Friday  4. Group, milieu, individual therapy as appropriate. *Staff to use stuffed Claudia Mouse to redirect the patient when she becomes irritable.*  5. Medical:  -RASH: s/p derm consult, given permethrin x2 and started on clobetasol BID for 2 weeks. BP lab drawn and WNL. Most likely eczema. On 12/16 will transition from clobetasol BID to triamcinolone PRN per derm recs.   -ODOR: s/p GYN consult, BV panel sent and WNL. Pt noted to have stool burden on exam, pt had BM s/p enema.   -CONSTIPATION: continue Miralax 17g daily and Senna 2mg QHS, fleet enema PRN  -HTN: continue home Lopressor 25mg daily for HTN  -EDEMA: continue home Lasix 20mg daily  *PLEASE ENCOURAGE HYDRATION as patient with mildly elevated Na and Cl on BMP.  6. Dispo: pending clinical improvement.  Patient continues to require inpatient hospitalization for stabilization and safety.  12/15: Patient labile, calmer today so far but need more observation points given changeability. Cont olanzapine monitor gait, vitals watch for constipation  12/16: Patient labile, volative resistive with care. Will increase olanzapine, check EKG consider changing metorolol to propanolol as there is EBM support for this med  12/17 Not with sig change not aggressive with peers. CoStart trial of Inderal, will need to reconsider if cannot get vitals regular to assess safety  during dose titration  12/18Patient vocally agitated and resitive with care, some excess laxative effect. Tolerating Inderal plan further titration Inderal, lower Miralax  12/19 Labile but some improvement. plan hold laxatives and restart lower dose, plan further titration Inderal   12/20 Aggressive episode, even though may have inadvertently provoked by peer yelling. Given aggressive epsiode despite 25mg/d olanzapine need to consider trial a failure. Will taper olanzapine and start haldol. Unable  to reach family to notify  12/21 No aggression, still volatile yells curses if redirected. Will cont crossover to haldol from olanzapine. Cont Inderal but suspend titration til converted over to haldol  12/22 Patiwnd had incident of placing self on floor. Today appears calmer, no sig EPS, not sedated. Plan cont haldol have stopped olanzapine  12/23 So far seems better on haldol not as loud less raising  voice or cursing, tolerating well. Cont haldol watch for changes as olanzapine washes out  12/24: Less agitated/improving, no EPS, continue haloperidol

## 2021-12-25 RX ADMIN — DIVALPROEX SODIUM 750 MILLIGRAM(S): 500 TABLET, DELAYED RELEASE ORAL at 10:14

## 2021-12-25 RX ADMIN — HALOPERIDOL DECANOATE 1 MILLIGRAM(S): 100 INJECTION INTRAMUSCULAR at 10:14

## 2021-12-25 RX ADMIN — DIVALPROEX SODIUM 750 MILLIGRAM(S): 500 TABLET, DELAYED RELEASE ORAL at 20:38

## 2021-12-25 RX ADMIN — HALOPERIDOL DECANOATE 1 MILLIGRAM(S): 100 INJECTION INTRAMUSCULAR at 13:40

## 2021-12-25 RX ADMIN — Medication 20 MILLIGRAM(S): at 10:13

## 2021-12-25 RX ADMIN — Medication 3 MILLIGRAM(S): at 20:39

## 2021-12-25 RX ADMIN — HALOPERIDOL DECANOATE 1 MILLIGRAM(S): 100 INJECTION INTRAMUSCULAR at 20:38

## 2021-12-26 PROCEDURE — 99231 SBSQ HOSP IP/OBS SF/LOW 25: CPT

## 2021-12-26 RX ADMIN — HALOPERIDOL DECANOATE 1 MILLIGRAM(S): 100 INJECTION INTRAMUSCULAR at 09:23

## 2021-12-26 RX ADMIN — HALOPERIDOL DECANOATE 1 MILLIGRAM(S): 100 INJECTION INTRAMUSCULAR at 21:22

## 2021-12-26 RX ADMIN — DIVALPROEX SODIUM 750 MILLIGRAM(S): 500 TABLET, DELAYED RELEASE ORAL at 21:23

## 2021-12-26 RX ADMIN — Medication 20 MILLIGRAM(S): at 09:23

## 2021-12-26 RX ADMIN — DIVALPROEX SODIUM 750 MILLIGRAM(S): 500 TABLET, DELAYED RELEASE ORAL at 09:23

## 2021-12-26 RX ADMIN — HALOPERIDOL DECANOATE 1 MILLIGRAM(S): 100 INJECTION INTRAMUSCULAR at 12:53

## 2021-12-26 RX ADMIN — Medication 3 MILLIGRAM(S): at 21:23

## 2021-12-26 NOTE — BH INPATIENT PSYCHIATRY PROGRESS NOTE - NSBHASSESSSUMMFT_PSY_ALL_CORE
66 y.o. F, as per records is , has been residing at [facility] ([facility]) since 2010, with past psychiatric history of dementia with behavioral disturbance, mood disorder, RODY, possible inpatient psychiatric admission to [facility] this year, has a chronic medical history notable for NPH (w/  shunt), HTN, DMT2, TBI (unspecified), pedal edema presenting from [facility] for physically aggressive behavior and diffuse rash. Records also indicate that [facility] was seeking a [facility] admission. Pt is AOx1 which per chart appears to be patient's baseline. Pt was doing well on previous medication regimen but having increase episodes of agitation. Pt most likely having increase in aggressive behavior 2/2 to TBI vs dementia. Patient requires inpatient admission for medication optimization and stabilization of agitation.     11/22: No acute events overnight or PRNs required, VSS, med adherent. Endorses AH of mother's voice telling her to take good care of her baby. Denies any safety concerns. Patient became agitated at 1pm after trying to follow repair men off unit, denies wanting to leave the unit. Incident likely related to disinhibition rather than psychosis, but further monitoring and collateral necessary to better understand the patient and hopefully delineate a pattern.   11/23: No acute events overnight or PRNs required, VSS, med adherent. Has been in more appropriate behavioral control following change in Zyprexa schedule. Was unable to take Depakote overnight due to difficulty swallowing the pill. Switched to sprinkles this morning which patient took without issue. Will continue monitoring on current regimen given change in formulation. Ammonia pending given patient's tremor. Will check VPA level when patient consistently taking Depakote sprinkles.   11/24: Patient required Zyprexa PO PRN yesterday at 6:40pm for agitation, given with good effect. Slept well overnight and adherent with meds. VPA/ammonia deferred given prior adherence issues due to difficulty swallowing. Will continue to titrate Zyprexa and check VPA/ammonia when the patient is taking reliably. EKG today given ongoing titration.   11/25: Patient remains confused, labile, but calm today.  11/26: No acute events over PRNs required. Patient remains intermittently labile but redirectable. Accepting of treatment. Will check VPA/ammonia today given improved adherence. VPA 68, Ammonia pending.  11/27: On exam, patient is pleasantly confused, denies any concerns. No behavorial events. Taking meds. Labs: Serum ammonia wnl at 15.   11/28: Patient remains confused, labile affect, intermittently laughing then screaming.  11/29: No acute events or PRNs required. Patient remains labile but redirectable and accepting treatment. Endorses pruritis in bilateral forearms and L foot swelling observed on exam. WILLIE evaluation pending.  11/30: Per WILLIE recs, patient sent to ED yesterday afternoon for doppler imaging to r/o DVT which was unremarkable. The patient was able to tolerate ED course without any behavioral incidents. This morning, patient at baseline, reports concern over LEs and agrees to meet again with WILLIE. ED had recommended consideration of Keflex for possible cellulitis. Awaiting hospitalist recs.   12/1: Patient acutely agitated overnight requiring Zyprexa 2.5mg PO accepted with good effect. Noted to have worsening rash despite adherence to topical steroid. Per WILLIE recs, dermatology consulted for further evaluation. Rash likely underlying behavioral exacerbation as the patient had been without issue for several days.  12/2: No acute events or PRNs required. Patient cooperative with Derm and GYN consults yesterday afternoon. Per derm, likely eczema for which will start clobetasol BID. Per recs, was also treated prophylactically for scabies (repeat permethrin in 1 week has been ordered). Per derm, also ordered Bullous Pemphigoid labs to r/o. Per GYN, send BV panel to r/o. Patient initially orthostatic but improved on repeat, denies symptoms.  12/3: Patient seen for complications of TBI dementia. No major overnight events. Patient not aggressive still labile  from silly giddy to angry, raising voice. Eating sleping fairly well. Had large BM.  12/4: Patient continues to be confused and disorganized.  12/6: Patient continuing to require PRNs, given Zyprexa 2.5mg PO yesterday afternoon with good effect. Remains intermittently labile without overt aggression. Denies SE and no evidence of ongoing constipation. Skin improving and patient denies any subjective irritation. VPA stable, today 67.  12/7: No acute events overnight or PRNs required. Denies SE and no evidence on exam. Denies any concerns with skin. Eventually cooperated with BP lab.  12/8: No acute events overnight or PRNs required. Denies SE and no evidence on exam. No further skin concerns. Patient sedated, which may be related to med titration. Will continue to monitor for now and make changes if it persists. Will check ECG tomorrow. Repeat permethrin tonight per derm recommendations.  12/9: No acute events overnight or PRNs required. Denies SE and no evidence on exam. Skin is stable for the time being. Patient less sedated this morning, ECG yesterday was sinus with QTc 422. As such, will continue Zyprexa schedule. Mildly elevated electrolytes on BMP likely attributable to dehydration. Patient received Zyprexa PRN at 11:44am after pushing another patient who was provoking her. Staff instructed to hold 1pm 5mg dose but give another 2.5mg PRN if needed.  12/10: No acute events overnight or PRNs required. Denies SE and skin concerns, no issues on exam. Patient more sedated than yesterday but less than the day prior. Pattern appears to be that she is more sedated in the morning but wakes up over the course of the day. As such, will reschedule pm dose earlier in the day.  12/11: This morning, the patient was laying in bed, somewhat sedated but arousable to verbal stimuli. She came out of her room for breakfast and was seen later working with PT and using her walker. Pt denies any concerns, reports stable mood, sleeping well and eating "ALL of the breakfast". Agrees to come to staff if she has any complaints.  12/12: Patient pleasant and calm on encounter and denies any new concerns. Eating and sleeping well. No behavioral events.  12/13: No acute events overnight or PRNs required. Denies SE and no issues observed on exam. Patient has been receiving Zyprexa TID as scheduled and is no longer sedated. Remains tenuous with staff, relatively labile and unpredictable, particularly around the middle of the day. Will increase Zyprexa by 5mg/day. BP labs WNL.  12/14: No acute events overnight or PRNs required. Denies SE and no issues on exam. Remains tenuous with staff, particularly with toileting, bathing, and meds. Continue med regimen. Began implementing behavioral intervention with stuffed animal which thus far has been well received. Staff to bring Claudia Mouse when they interact with the patient and encourage its use when the patient requires redirection.     Plan:  1. Legals: 9.27 status, *DNR/DNI* [MOLST in chart]  2. Safety: routine observation. PRNs: Zyprexa 2.5 mg PO/IM q6h   3. Psychiatry: Depakote sprinkles 750 mg BID, Zyprexa 7.5mg at 9a + 1p and Zyprexa 5mg at 5p, repeat ECG Friday  4. Group, milieu, individual therapy as appropriate. *Staff to use stuffed Claudia Mouse to redirect the patient when she becomes irritable.*  5. Medical:  -RASH: s/p derm consult, given permethrin x2 and started on clobetasol BID for 2 weeks. BP lab drawn and WNL. Most likely eczema. On 12/16 will transition from clobetasol BID to triamcinolone PRN per derm recs.   -ODOR: s/p GYN consult, BV panel sent and WNL. Pt noted to have stool burden on exam, pt had BM s/p enema.   -CONSTIPATION: continue Miralax 17g daily and Senna 2mg QHS, fleet enema PRN  -HTN: continue home Lopressor 25mg daily for HTN  -EDEMA: continue home Lasix 20mg daily  *PLEASE ENCOURAGE HYDRATION as patient with mildly elevated Na and Cl on BMP.  6. Dispo: pending clinical improvement.  Patient continues to require inpatient hospitalization for stabilization and safety.  12/15: Patient labile, calmer today so far but need more observation points given changeability. Cont olanzapine monitor gait, vitals watch for constipation  12/16: Patient labile, volative resistive with care. Will increase olanzapine, check EKG consider changing metorolol to propanolol as there is EBM support for this med  12/17 Not with sig change not aggressive with peers. CoStart trial of Inderal, will need to reconsider if cannot get vitals regular to assess safety  during dose titration  12/18Patient vocally agitated and resitive with care, some excess laxative effect. Tolerating Inderal plan further titration Inderal, lower Miralax  12/19 Labile but some improvement. plan hold laxatives and restart lower dose, plan further titration Inderal   12/20 Aggressive episode, even though may have inadvertently provoked by peer yelling. Given aggressive epsiode despite 25mg/d olanzapine need to consider trial a failure. Will taper olanzapine and start haldol. Unable  to reach family to notify  12/21 No aggression, still volatile yells curses if redirected. Will cont crossover to haldol from olanzapine. Cont Inderal but suspend titration til converted over to haldol  12/22 Patiwnd had incident of placing self on floor. Today appears calmer, no sig EPS, not sedated. Plan cont haldol have stopped olanzapine  12/23 So far seems better on haldol not as loud less raising  voice or cursing, tolerating well. Cont haldol watch for changes as olanzapine washes out  12/26: Patient has been calm, not screaming

## 2021-12-26 NOTE — BH INPATIENT PSYCHIATRY PROGRESS NOTE - NSBHFUPINTERVALHXFT_PSY_A_CORE
The patient was seen for follow-up for dementia with behavioral disturbances. Remains oddly related, childish at times, not aggressive

## 2021-12-26 NOTE — BH INPATIENT PSYCHIATRY PROGRESS NOTE - NSBHCHARTREVIEWVS_PSY_A_CORE FT
Vital Signs Last 24 Hrs  T(C): --  T(F): --  HR: --  BP: --  BP(mean): --  RR: --  SpO2: --    Orthostatic VS  12-25-21 @ 20:53  Lying BP: --/-- HR: --  Sitting BP: 143/72 HR: 74  Standing BP: --/-- HR: --  Site: --  Mode: --  Orthostatic VS  12-25-21 @ 06:56  Lying BP: --/-- HR: --  Sitting BP: 122/64 HR: 63  Standing BP: 129/87 HR: 81  Site: --  Mode: --  Orthostatic VS  12-24-21 @ 20:34  Lying BP: --/-- HR: --  Sitting BP: 113/89 HR: 82  Standing BP: --/-- HR: --  Site: --  Mode: --

## 2021-12-26 NOTE — BH INPATIENT PSYCHIATRY PROGRESS NOTE - NSBHMETABOLIC_PSY_ALL_CORE_FT
BMI: BMI (kg/m2): 31.6 (11-29-21 @ 16:35)  HbA1c: A1C with Estimated Average Glucose Result: 5.4 % (11-04-21 @ 06:58)    Glucose: POCT Blood Glucose.: 109 mg/dL (11-12-21 @ 16:13)    BP: 131/96 (12-25-21 @ 08:21) (128/79 - 131/96)  Lipid Panel:

## 2021-12-26 NOTE — BH INPATIENT PSYCHIATRY PROGRESS NOTE - MSE UNSTRUCTURED FT
Patient is awake and alert, in bed. Poor attention. Speech decreased production. Affect jocular, somewhat inappropriately happy. TP concrete, coherent. No delusions expressed. No motor disturbance. Limited insight. NO tremor or movement disorder appreciated. No suicidal ideations.

## 2021-12-27 PROCEDURE — 99231 SBSQ HOSP IP/OBS SF/LOW 25: CPT

## 2021-12-27 RX ORDER — HALOPERIDOL DECANOATE 100 MG/ML
1 INJECTION INTRAMUSCULAR
Refills: 0 | Status: DISCONTINUED | OUTPATIENT
Start: 2021-12-27 | End: 2021-12-29

## 2021-12-27 RX ADMIN — DIVALPROEX SODIUM 750 MILLIGRAM(S): 500 TABLET, DELAYED RELEASE ORAL at 21:48

## 2021-12-27 RX ADMIN — DIVALPROEX SODIUM 750 MILLIGRAM(S): 500 TABLET, DELAYED RELEASE ORAL at 13:08

## 2021-12-27 RX ADMIN — Medication 20 MILLIGRAM(S): at 15:29

## 2021-12-27 RX ADMIN — HALOPERIDOL DECANOATE 1 MILLIGRAM(S): 100 INJECTION INTRAMUSCULAR at 16:27

## 2021-12-27 RX ADMIN — Medication 3 MILLIGRAM(S): at 21:48

## 2021-12-27 RX ADMIN — HALOPERIDOL DECANOATE 1 MILLIGRAM(S): 100 INJECTION INTRAMUSCULAR at 15:29

## 2021-12-27 NOTE — BH INPATIENT PSYCHIATRY PROGRESS NOTE - NSBHFUPINTERVALHXFT_PSY_A_CORE
The patient was seen for follow-up for dementia with behavioral disturbances. Remains oddly related, childish at times,aggressive with care, difficulto ADL's then sleepy this AM

## 2021-12-27 NOTE — BH INPATIENT PSYCHIATRY PROGRESS NOTE - MSE UNSTRUCTURED FT
Patient is awake and alert, in bed. Poor attention. Speech decreased production, childlike pattern, severe word finding problems. Affect jocular, somewhat inappropriately happy. TP concrete, coherent. No delusions expressed. No motor disturbance. Limited insight. Has tremor  but not stiff. No suicidal ideations. No nunez. Confused disoriented. Poor insight

## 2021-12-27 NOTE — BH INPATIENT PSYCHIATRY PROGRESS NOTE - NSBHCHARTREVIEWVS_PSY_A_CORE FT
Vital Signs Last 24 Hrs  T(C): --  T(F): --  HR: 80 (12-26-21 @ 20:48) (80 - 80)  BP: 118/73 (12-26-21 @ 20:48) (118/73 - 118/73)  BP(mean): --  RR: --  SpO2: --    Orthostatic VS  12-25-21 @ 20:53  Lying BP: --/-- HR: --  Sitting BP: 143/72 HR: 74  Standing BP: --/-- HR: --  Site: --  Mode: --

## 2021-12-27 NOTE — BH INPATIENT PSYCHIATRY PROGRESS NOTE - CURRENT MEDICATION
MEDICATIONS  (STANDING):  diVALproex Sprinkle 750 milliGRAM(s) Oral two times a day  furosemide    Tablet 20 milliGRAM(s) Oral daily  haloperidol     Tablet 1 milliGRAM(s) Oral <User Schedule>  melatonin. 3 milliGRAM(s) Oral at bedtime  propranolol 20 milliGRAM(s) Oral three times a day    MEDICATIONS  (PRN):  acetaminophen     Tablet .. 650 milliGRAM(s) Oral every 6 hours PRN Temp greater or equal to 38C (100.4F), Mild Pain (1 - 3), Moderate Pain (4 - 6)  aluminum hydroxide/magnesium hydroxide/simethicone Suspension 30 milliLiter(s) Oral every 4 hours PRN Dyspepsia  magnesium hydroxide Suspension 30 milliLiter(s) Oral at bedtime PRN constipation  mineral oil enema 133 milliLiter(s) Rectal daily PRN hard stool  OLANZapine 2.5 milliGRAM(s) Oral every 6 hours PRN severe agitation  OLANZapine Injectable 2.5 milliGRAM(s) IntraMuscular once PRN severe agitation  saline laxative (FLEET) Rectal Enema 1 Enema Rectal daily PRN hard stool  triamcinolone 0.1% Cream 1 Application(s) Topical two times a day PRN pruritis/rash

## 2021-12-27 NOTE — BH INPATIENT PSYCHIATRY PROGRESS NOTE - NSBHMETABOLIC_PSY_ALL_CORE_FT
BMI: BMI (kg/m2): 31.6 (11-29-21 @ 16:35)  HbA1c: A1C with Estimated Average Glucose Result: 5.4 % (11-04-21 @ 06:58)    Glucose: POCT Blood Glucose.: 109 mg/dL (11-12-21 @ 16:13)    BP: 118/73 (12-26-21 @ 20:48) (118/73 - 131/96)  Lipid Panel:

## 2021-12-27 NOTE — BH INPATIENT PSYCHIATRY PROGRESS NOTE - NSBHASSESSSUMMFT_PSY_ALL_CORE
66 y.o. F, as per records is , has been residing at [facility] ([facility]) since 2010, with past psychiatric history of dementia with behavioral disturbance, mood disorder, RODY, possible inpatient psychiatric admission to [facility] this year, has a chronic medical history notable for NPH (w/  shunt), HTN, DMT2, TBI (unspecified), pedal edema presenting from [facility] for physically aggressive behavior and diffuse rash. Records also indicate that [facility] was seeking a [facility] admission. Pt is AOx1 which per chart appears to be patient's baseline. Pt was doing well on previous medication regimen but having increase episodes of agitation. Pt most likely having increase in aggressive behavior 2/2 to TBI vs dementia. Patient requires inpatient admission for medication optimization and stabilization of agitation.     11/22: No acute events overnight or PRNs required, VSS, med adherent. Endorses AH of mother's voice telling her to take good care of her baby. Denies any safety concerns. Patient became agitated at 1pm after trying to follow repair men off unit, denies wanting to leave the unit. Incident likely related to disinhibition rather than psychosis, but further monitoring and collateral necessary to better understand the patient and hopefully delineate a pattern.   11/23: No acute events overnight or PRNs required, VSS, med adherent. Has been in more appropriate behavioral control following change in Zyprexa schedule. Was unable to take Depakote overnight due to difficulty swallowing the pill. Switched to sprinkles this morning which patient took without issue. Will continue monitoring on current regimen given change in formulation. Ammonia pending given patient's tremor. Will check VPA level when patient consistently taking Depakote sprinkles.   11/24: Patient required Zyprexa PO PRN yesterday at 6:40pm for agitation, given with good effect. Slept well overnight and adherent with meds. VPA/ammonia deferred given prior adherence issues due to difficulty swallowing. Will continue to titrate Zyprexa and check VPA/ammonia when the patient is taking reliably. EKG today given ongoing titration.   11/25: Patient remains confused, labile, but calm today.  11/26: No acute events over PRNs required. Patient remains intermittently labile but redirectable. Accepting of treatment. Will check VPA/ammonia today given improved adherence. VPA 68, Ammonia pending.  11/27: On exam, patient is pleasantly confused, denies any concerns. No behavorial events. Taking meds. Labs: Serum ammonia wnl at 15.   11/28: Patient remains confused, labile affect, intermittently laughing then screaming.  11/29: No acute events or PRNs required. Patient remains labile but redirectable and accepting treatment. Endorses pruritis in bilateral forearms and L foot swelling observed on exam. WILLIE evaluation pending.  11/30: Per WILLIE recs, patient sent to ED yesterday afternoon for doppler imaging to r/o DVT which was unremarkable. The patient was able to tolerate ED course without any behavioral incidents. This morning, patient at baseline, reports concern over LEs and agrees to meet again with WILLIE. ED had recommended consideration of Keflex for possible cellulitis. Awaiting hospitalist recs.   12/1: Patient acutely agitated overnight requiring Zyprexa 2.5mg PO accepted with good effect. Noted to have worsening rash despite adherence to topical steroid. Per WILLIE recs, dermatology consulted for further evaluation. Rash likely underlying behavioral exacerbation as the patient had been without issue for several days.  12/2: No acute events or PRNs required. Patient cooperative with Derm and GYN consults yesterday afternoon. Per derm, likely eczema for which will start clobetasol BID. Per recs, was also treated prophylactically for scabies (repeat permethrin in 1 week has been ordered). Per derm, also ordered Bullous Pemphigoid labs to r/o. Per GYN, send BV panel to r/o. Patient initially orthostatic but improved on repeat, denies symptoms.  12/3: Patient seen for complications of TBI dementia. No major overnight events. Patient not aggressive still labile  from silly giddy to angry, raising voice. Eating sleping fairly well. Had large BM.  12/4: Patient continues to be confused and disorganized.  12/6: Patient continuing to require PRNs, given Zyprexa 2.5mg PO yesterday afternoon with good effect. Remains intermittently labile without overt aggression. Denies SE and no evidence of ongoing constipation. Skin improving and patient denies any subjective irritation. VPA stable, today 67.  12/7: No acute events overnight or PRNs required. Denies SE and no evidence on exam. Denies any concerns with skin. Eventually cooperated with BP lab.  12/8: No acute events overnight or PRNs required. Denies SE and no evidence on exam. No further skin concerns. Patient sedated, which may be related to med titration. Will continue to monitor for now and make changes if it persists. Will check ECG tomorrow. Repeat permethrin tonight per derm recommendations.  12/9: No acute events overnight or PRNs required. Denies SE and no evidence on exam. Skin is stable for the time being. Patient less sedated this morning, ECG yesterday was sinus with QTc 422. As such, will continue Zyprexa schedule. Mildly elevated electrolytes on BMP likely attributable to dehydration. Patient received Zyprexa PRN at 11:44am after pushing another patient who was provoking her. Staff instructed to hold 1pm 5mg dose but give another 2.5mg PRN if needed.  12/10: No acute events overnight or PRNs required. Denies SE and skin concerns, no issues on exam. Patient more sedated than yesterday but less than the day prior. Pattern appears to be that she is more sedated in the morning but wakes up over the course of the day. As such, will reschedule pm dose earlier in the day.  12/11: This morning, the patient was laying in bed, somewhat sedated but arousable to verbal stimuli. She came out of her room for breakfast and was seen later working with PT and using her walker. Pt denies any concerns, reports stable mood, sleeping well and eating "ALL of the breakfast". Agrees to come to staff if she has any complaints.  12/12: Patient pleasant and calm on encounter and denies any new concerns. Eating and sleeping well. No behavioral events.  12/13: No acute events overnight or PRNs required. Denies SE and no issues observed on exam. Patient has been receiving Zyprexa TID as scheduled and is no longer sedated. Remains tenuous with staff, relatively labile and unpredictable, particularly around the middle of the day. Will increase Zyprexa by 5mg/day. BP labs WNL.  12/14: No acute events overnight or PRNs required. Denies SE and no issues on exam. Remains tenuous with staff, particularly with toileting, bathing, and meds. Continue med regimen. Began implementing behavioral intervention with stuffed animal which thus far has been well received. Staff to bring Claudia Mouse when they interact with the patient and encourage its use when the patient requires redirection.     Plan:  1. Legals: 9.27 status, *DNR/DNI* [MOLST in chart]  2. Safety: routine observation. PRNs: Zyprexa 2.5 mg PO/IM q6h   3. Psychiatry: Depakote sprinkles 750 mg BID, Zyprexa 7.5mg at 9a + 1p and Zyprexa 5mg at 5p, repeat ECG Friday  4. Group, milieu, individual therapy as appropriate. *Staff to use stuffed Claudia Mouse to redirect the patient when she becomes irritable.*  5. Medical:  -RASH: s/p derm consult, given permethrin x2 and started on clobetasol BID for 2 weeks. BP lab drawn and WNL. Most likely eczema. On 12/16 will transition from clobetasol BID to triamcinolone PRN per derm recs.   -ODOR: s/p GYN consult, BV panel sent and WNL. Pt noted to have stool burden on exam, pt had BM s/p enema.   -CONSTIPATION: continue Miralax 17g daily and Senna 2mg QHS, fleet enema PRN  -HTN: continue home Lopressor 25mg daily for HTN  -EDEMA: continue home Lasix 20mg daily  *PLEASE ENCOURAGE HYDRATION as patient with mildly elevated Na and Cl on BMP.  6. Dispo: pending clinical improvement.  Patient continues to require inpatient hospitalization for stabilization and safety.  12/15: Patient labile, calmer today so far but need more observation points given changeability. Cont olanzapine monitor gait, vitals watch for constipation  12/16: Patient labile, volative resistive with care. Will increase olanzapine, check EKG consider changing metorolol to propanolol as there is EBM support for this med  12/17 Not with sig change not aggressive with peers. CoStart trial of Inderal, will need to reconsider if cannot get vitals regular to assess safety  during dose titration  12/18Patient vocally agitated and resitive with care, some excess laxative effect. Tolerating Inderal plan further titration Inderal, lower Miralax  12/19 Labile but some improvement. plan hold laxatives and restart lower dose, plan further titration Inderal   12/20 Aggressive episode, even though may have inadvertently provoked by peer yelling. Given aggressive epsiode despite 25mg/d olanzapine need to consider trial a failure. Will taper olanzapine and start haldol. Unable  to reach family to notify  12/21 No aggression, still volatile yells curses if redirected. Will cont crossover to haldol from olanzapine. Cont Inderal but suspend titration til converted over to haldol  12/22 Patiwnd had incident of placing self on floor. Today appears calmer, no sig EPS, not sedated. Plan cont haldol have stopped olanzapine  12/23 So far seems better on haldol not as loud less raising  voice or cursing, tolerating well. Cont haldol watch for changes as olanzapine washes out  12/26: Patient has been calm, not screaming   12/27 Had bad night, then sleep in AM. Will cont haldol bt change timing consider going up on Inderal worth attempting as has not done well on antipsychotics

## 2021-12-28 LAB — SARS-COV-2 RNA SPEC QL NAA+PROBE: SIGNIFICANT CHANGE UP

## 2021-12-28 PROCEDURE — 99231 SBSQ HOSP IP/OBS SF/LOW 25: CPT

## 2021-12-28 RX ADMIN — HALOPERIDOL DECANOATE 1 MILLIGRAM(S): 100 INJECTION INTRAMUSCULAR at 12:55

## 2021-12-28 RX ADMIN — Medication 20 MILLIGRAM(S): at 08:12

## 2021-12-28 RX ADMIN — Medication 3 MILLIGRAM(S): at 21:31

## 2021-12-28 RX ADMIN — HALOPERIDOL DECANOATE 1 MILLIGRAM(S): 100 INJECTION INTRAMUSCULAR at 16:47

## 2021-12-28 RX ADMIN — HALOPERIDOL DECANOATE 1 MILLIGRAM(S): 100 INJECTION INTRAMUSCULAR at 08:12

## 2021-12-28 RX ADMIN — DIVALPROEX SODIUM 750 MILLIGRAM(S): 500 TABLET, DELAYED RELEASE ORAL at 21:31

## 2021-12-28 RX ADMIN — DIVALPROEX SODIUM 750 MILLIGRAM(S): 500 TABLET, DELAYED RELEASE ORAL at 08:13

## 2021-12-28 NOTE — BH INPATIENT PSYCHIATRY PROGRESS NOTE - NSBHCHARTREVIEWVS_PSY_A_CORE FT
Vital Signs Last 24 Hrs  T(C): 36.6 (12-28-21 @ 06:12), Max: 36.7 (12-27-21 @ 15:55)  T(F): 97.9 (12-28-21 @ 06:12), Max: 98.1 (12-27-21 @ 15:55)  HR: 79 (12-28-21 @ 06:12) (79 - 79)  BP: 119/65 (12-28-21 @ 06:12) (119/65 - 119/65)  BP(mean): --  RR: --  SpO2: --    Orthostatic VS  12-28-21 @ 12:50  Lying BP: --/-- HR: --  Sitting BP: 118/67 HR: 69  Standing BP: --/-- HR: --  Site: --  Mode: --  Orthostatic VS  12-27-21 @ 20:29  Lying BP: --/-- HR: --  Sitting BP: 116/65 HR: 75  Standing BP: --/-- HR: --  Site: --  Mode: --

## 2021-12-28 NOTE — BH INPATIENT PSYCHIATRY PROGRESS NOTE - NSBHFUPINTERVALHXFT_PSY_A_CORE
The patient was seen for follow-up for dementia with behavioral disturbances. Remains oddly related, childish at times, aggressive with care, difficul to provide ADL's

## 2021-12-28 NOTE — BH INPATIENT PSYCHIATRY PROGRESS NOTE - NSBHASSESSSUMMFT_PSY_ALL_CORE
66 y.o. F, as per records is , has been residing at [facility] ([facility]) since 2010, with past psychiatric history of dementia with behavioral disturbance, mood disorder, RODY, possible inpatient psychiatric admission to [facility] this year, has a chronic medical history notable for NPH (w/  shunt), HTN, DMT2, TBI (unspecified), [place] [facility] and [place]. Records also indicate that [facility] was seeking a [facility] admission. Pt is AOx1 which per chart appears to be patient's baseline. Pt was doing well on previous medication regimen but having increase episodes of agitation. Pt most likely having increase in aggressive behavior 2/2 to TBI vs dementia. Patient requires inpatient admission for medication optimization and stabilization of agitation.     11/22: No acute events overnight or PRNs required, VSS, med adherent. Endorses AH of mother's voice telling her to take good care of her baby. Denies any safety concerns. Patient became agitated at 1pm after trying to follow repair men off unit, denies wanting to leave the unit. Incident likely related to disinhibition rather than psychosis, but further monitoring and collateral necessary to better understand the patient and hopefully delineate a pattern.   11/23: No acute events overnight or PRNs required, VSS, med adherent. Has been in more appropriate behavioral control following change in Zyprexa schedule. Was unable to take Depakote overnight due to difficulty swallowing the pill. Switched to sprinkles this morning which patient took without issue. Will continue monitoring on current regimen given change in formulation. Ammonia pending given patient's tremor. Will check VPA level when patient consistently taking Depakote sprinkles.   11/24: Patient required Zyprexa PO PRN yesterday at 6:40pm for agitation, given with good effect. Slept well overnight and adherent with meds. VPA/ammonia deferred given prior adherence issues due to difficulty swallowing. Will continue to titrate Zyprexa and check VPA/ammonia when the patient is taking reliably. EKG today given ongoing titration.   11/25: Patient remains confused, labile, but calm today.  11/26: No acute events over PRNs required. Patient remains intermittently labile but redirectable. Accepting of treatment. Will check VPA/ammonia today given improved adherence. VPA 68, Ammonia pending.  11/27: On exam, patient is pleasantly confused, denies any concerns. No behavorial events. Taking meds. Labs: Serum ammonia wnl at 15.   11/28: Patient remains confused, labile affect, intermittently laughing then screaming.  11/29: No acute events or PRNs required. Patient remains labile but redirectable and accepting treatment. Endorses pruritis in bilateral forearms and L foot swelling observed on exam. WILLIE evaluation pending.  11/30: Per WILLIE recs, patient sent to ED yesterday afternoon for doppler imaging to r/o DVT which was unremarkable. The patient was able to tolerate ED course without any behavioral incidents. This morning, patient at baseline, reports concern over LEs and agrees to meet again with WILLIE. ED had recommended consideration of Keflex for possible cellulitis. Awaiting hospitalist recs.   12/1: Patient acutely agitated overnight requiring Zyprexa 2.5mg PO accepted with good effect. Noted to have worsening rash despite adherence to topical steroid. Per WILLIE recs, dermatology consulted for further evaluation. Rash likely underlying behavioral exacerbation as the patient had been without issue for several days.  12/2: No acute events or PRNs required. Patient cooperative with Derm and GYN consults yesterday afternoon. Per derm, likely eczema for which will start clobetasol BID. Per recs, was also treated prophylactically for scabies (repeat permethrin in 1 week has been ordered). Per derm, also ordered Bullous Pemphigoid labs to r/o. Per GYN, send BV panel to r/o. Patient initially orthostatic but improved on repeat, denies symptoms.  12/3: Patient seen for complications of TBI dementia. No major overnight events. Patient not aggressive still labile  from silly giddy to angry, raising voice. Eating sleping fairly well. Had large BM.  12/4: Patient continues to be confused and disorganized.  12/6: Patient continuing to require PRNs, given Zyprexa 2.5mg PO yesterday afternoon with good effect. Remains intermittently labile without overt aggression. Denies SE and no evidence of ongoing constipation. Skin improving and patient denies any subjective irritation. VPA stable, today 67.  12/7: No acute events overnight or PRNs required. Denies SE and no evidence on exam. Denies any concerns with skin. Eventually cooperated with BP lab.  12/8: No acute events overnight or PRNs required. Denies SE and no evidence on exam. No further skin concerns. Patient sedated, which may be related to med titration. Will continue to monitor for now and make changes if it persists. Will check ECG tomorrow. Repeat permethrin tonight per derm recommendations.  12/9: No acute events overnight or PRNs required. Denies SE and no evidence on exam. Skin is stable for the time being. Patient less sedated this morning, ECG yesterday was sinus with QTc 422. As such, will continue Zyprexa schedule. Mildly elevated electrolytes on BMP likely attributable to dehydration. Patient received Zyprexa PRN at 11:44am after pushing another patient who was provoking her. Staff instructed to hold 1pm 5mg dose but give another 2.5mg PRN if needed.  12/10: No acute events overnight or PRNs required. Denies SE and skin concerns, no issues on exam. Patient more sedated than yesterday but less than the day prior. Pattern appears to be that she is more sedated in the morning but wakes up over the course of the day. As such, will reschedule pm dose earlier in the day.  12/11: This morning, the patient was laying in bed, somewhat sedated but arousable to verbal stimuli. She came out of her room for breakfast and was seen later working with PT and using her walker. Pt denies any concerns, reports stable mood, sleeping well and eating "ALL of the breakfast". Agrees to come to staff if she has any complaints.  12/12: Patient pleasant and calm on encounter and denies any new concerns. Eating and sleeping well. No behavioral events.  12/13: No acute events overnight or PRNs required. Denies SE and no issues observed on exam. Patient has been receiving Zyprexa TID as scheduled and is no longer sedated. Remains tenuous with staff, relatively labile and unpredictable, particularly around the middle of the day. Will increase Zyprexa by 5mg/day. BP labs WNL.  12/14: No acute events overnight or PRNs required. Denies SE and no issues on exam. Remains tenuous with staff, particularly with toileting, bathing, and meds. Continue med regimen. Began implementing behavioral intervention with stuffed animal which thus far has been well received. Staff to bring Claudia Mouse when they interact with the patient and encourage its use when the patient requires redirection.     Plan:  1. Legals: 9.27 status, *DNR/DNI* [MOLST in chart]  2. Safety: routine observation. PRNs: Zyprexa 2.5 mg PO/IM q6h   3. Psychiatry: Depakote sprinkles 750 mg BID, Zyprexa 7.5mg at 9a + 1p and Zyprexa 5mg at 5p, repeat ECG Friday  4. Group, milieu, individual therapy as appropriate. *Staff to use stuffed Claudia Mouse to redirect the patient when she becomes irritable.*  5. Medical:  -RASH: s/p derm consult, given permethrin x2 and started on clobetasol BID for 2 weeks. BP lab drawn and WNL. Most likely eczema. On 12/16 will transition from clobetasol BID to triamcinolone PRN per derm recs.   -ODOR: s/p GYN consult, BV panel sent and WNL. Pt noted to have stool burden on exam, pt had BM s/p enema.   -CONSTIPATION: continue Miralax 17g daily and Senna 2mg QHS, fleet enema PRN  -HTN: continue home Lopressor 25mg daily for HTN  -EDEMA: continue home Lasix 20mg daily  *PLEASE ENCOURAGE HYDRATION as patient with mildly elevated Na and Cl on BMP.  6. Dispo: pending clinical improvement.  Patient continues to require inpatient hospitalization for stabilization and safety.  12/15: Patient labile, calmer today so far but need more observation points given changeability. Cont olanzapine monitor gait, vitals watch for constipation  12/16: Patient labile, volative resistive with care. Will increase olanzapine, check EKG consider changing metorolol to propanolol as there is EBM support for this med  12/17 Not with sig change not aggressive with peers. CoStart trial of Inderal, will need to reconsider if cannot get vitals regular to assess safety  during dose titration  12/18Patient vocally agitated and resitive with care, some excess laxative effect. Tolerating Inderal plan further titration Inderal, lower Miralax  12/19 Labile but some improvement. plan hold laxatives and restart lower dose, plan further titration Inderal   12/20 Aggressive episode, even though may have inadvertently provoked by peer yelling. Given aggressive epsiode despite 25mg/d olanzapine need to consider trial a failure. Will taper olanzapine and start haldol. Unable  to reach family to notify  12/21 No aggression, still volatile yells curses if redirected. Will cont crossover to haldol from olanzapine. Cont Inderal but suspend titration til converted over to haldol  12/22 Patiwnd had incident of placing self on floor. Today appears calmer, no sig EPS, not sedated. Plan cont haldol have stopped olanzapine  12/23 So far seems better on haldol not as loud less raising  voice or cursing, tolerating well. Cont haldol watch for changes as olanzapine washes out  12/26: Patient has been calm, not screaming   12/27 Had bad night, then sleep in AM. Will cont haldol bt change timing consider going up on Inderal worth attempting as has not done well on antipsychotics  12/28 Remains diffiuclt with ADLs but less loud not aggression towards peers. Will increase Inderal to 30mg tid

## 2021-12-28 NOTE — BH INPATIENT PSYCHIATRY PROGRESS NOTE - CURRENT MEDICATION
MEDICATIONS  (STANDING):  diVALproex Sprinkle 750 milliGRAM(s) Oral two times a day  furosemide    Tablet 20 milliGRAM(s) Oral daily  haloperidol     Tablet 1 milliGRAM(s) Oral <User Schedule>  melatonin. 3 milliGRAM(s) Oral at bedtime  propranolol 30 milliGRAM(s) Oral three times a day    MEDICATIONS  (PRN):  acetaminophen     Tablet .. 650 milliGRAM(s) Oral every 6 hours PRN Temp greater or equal to 38C (100.4F), Mild Pain (1 - 3), Moderate Pain (4 - 6)  aluminum hydroxide/magnesium hydroxide/simethicone Suspension 30 milliLiter(s) Oral every 4 hours PRN Dyspepsia  magnesium hydroxide Suspension 30 milliLiter(s) Oral at bedtime PRN constipation  mineral oil enema 133 milliLiter(s) Rectal daily PRN hard stool  OLANZapine 2.5 milliGRAM(s) Oral every 6 hours PRN severe agitation  OLANZapine Injectable 2.5 milliGRAM(s) IntraMuscular once PRN severe agitation  saline laxative (FLEET) Rectal Enema 1 Enema Rectal daily PRN hard stool  triamcinolone 0.1% Cream 1 Application(s) Topical two times a day PRN pruritis/rash

## 2021-12-28 NOTE — BH INPATIENT PSYCHIATRY PROGRESS NOTE - NSBHMETABOLIC_PSY_ALL_CORE_FT
BMI: BMI (kg/m2): 31.6 (11-29-21 @ 16:35)  HbA1c: A1C with Estimated Average Glucose Result: 5.4 % (11-04-21 @ 06:58)    Glucose: POCT Blood Glucose.: 109 mg/dL (11-12-21 @ 16:13)    BP: 119/65 (12-28-21 @ 06:12) (118/73 - 119/65)  Lipid Panel:

## 2021-12-29 PROCEDURE — 99231 SBSQ HOSP IP/OBS SF/LOW 25: CPT

## 2021-12-29 RX ORDER — HALOPERIDOL DECANOATE 100 MG/ML
1 INJECTION INTRAMUSCULAR
Refills: 0 | Status: DISCONTINUED | OUTPATIENT
Start: 2021-12-29 | End: 2021-12-30

## 2021-12-29 RX ADMIN — DIVALPROEX SODIUM 750 MILLIGRAM(S): 500 TABLET, DELAYED RELEASE ORAL at 08:21

## 2021-12-29 RX ADMIN — Medication 20 MILLIGRAM(S): at 08:20

## 2021-12-29 RX ADMIN — HALOPERIDOL DECANOATE 1 MILLIGRAM(S): 100 INJECTION INTRAMUSCULAR at 08:21

## 2021-12-29 RX ADMIN — Medication 3 MILLIGRAM(S): at 21:11

## 2021-12-29 RX ADMIN — DIVALPROEX SODIUM 750 MILLIGRAM(S): 500 TABLET, DELAYED RELEASE ORAL at 21:10

## 2021-12-29 RX ADMIN — HALOPERIDOL DECANOATE 1 MILLIGRAM(S): 100 INJECTION INTRAMUSCULAR at 12:33

## 2021-12-29 NOTE — BH INPATIENT PSYCHIATRY PROGRESS NOTE - NSBHFUPINTERVALHXFT_PSY_A_CORE
The patient was seen for follow-up for dementia with behavioral disturbances. Remains oddly related, childish at times, aggressive with care, difficult to provide ADL's Staff noting more tremor

## 2021-12-29 NOTE — BH INPATIENT PSYCHIATRY PROGRESS NOTE - NSBHASSESSSUMMFT_PSY_ALL_CORE
66 y.o. F, as per records is , has been residing at [facility] ([facility]) since 2010, with past psychiatric history of dementia with behavioral disturbance, mood disorder, RODY, possible inpatient psychiatric admission to [facility] this year, has a chronic medical history notable for NPH (w/  shunt), HTN, DMT2, TBI (unspecified), pedal edema presenting from [facility] [place] for physically aggressive behavior and diffuse rash. Records also indicate that [facility] was seeking a [facility] admission. Pt is AOx1 which per chart appears to be patient's baseline. Pt was doing well on previous medication regimen but having increase episodes of agitation. Pt most likely having increase in aggressive behavior 2/2 to TBI vs dementia. Patient requires inpatient admission for medication optimization and stabilization of agitation.     11/22: No acute events overnight or PRNs required, VSS, med adherent. Endorses AH of mother's voice telling her to take good care of her baby. Denies any safety concerns. Patient became agitated at 1pm after trying to follow repair men off unit, denies wanting to leave the unit. Incident likely related to disinhibition rather than psychosis, but further monitoring and collateral necessary to better understand the patient and hopefully delineate a pattern.   11/23: No acute events overnight or PRNs required, VSS, med adherent. Has been in more appropriate behavioral control following change in Zyprexa schedule. Was unable to take Depakote overnight due to difficulty swallowing the pill. Switched to sprinkles this morning which patient took without issue. Will continue monitoring on current regimen given change in formulation. Ammonia pending given patient's tremor. Will check VPA level when patient consistently taking Depakote sprinkles.   11/24: Patient required Zyprexa PO PRN yesterday at 6:40pm for agitation, given with good effect. Slept well overnight and adherent with meds. VPA/ammonia deferred given prior adherence issues due to difficulty swallowing. Will continue to titrate Zyprexa and check VPA/ammonia when the patient is taking reliably. EKG today given ongoing titration.   11/25: Patient remains confused, labile, but calm today.  11/26: No acute events over PRNs required. Patient remains intermittently labile but redirectable. Accepting of treatment. Will check VPA/ammonia today given improved adherence. VPA 68, Ammonia pending.  11/27: On exam, patient is pleasantly confused, denies any concerns. No behavorial events. Taking meds. Labs: Serum ammonia wnl at 15.   11/28: Patient remains confused, labile affect, intermittently laughing then screaming.  11/29: No acute events or PRNs required. Patient remains labile but redirectable and accepting treatment. Endorses pruritis in bilateral forearms and L foot swelling observed on exam. WILLIE evaluation pending.  11/30: Per WILLIE recs, patient sent to ED yesterday afternoon for doppler imaging to r/o DVT which was unremarkable. The patient was able to tolerate ED course without any behavioral incidents. This morning, patient at baseline, reports concern over LEs and agrees to meet again with WILLIE. ED had recommended consideration of Keflex for possible cellulitis. Awaiting hospitalist recs.   12/1: Patient acutely agitated overnight requiring Zyprexa 2.5mg PO accepted with good effect. Noted to have worsening rash despite adherence to topical steroid. Per WILLIE recs, dermatology consulted for further evaluation. Rash likely underlying behavioral exacerbation as the patient had been without issue for several days.  12/2: No acute events or PRNs required. Patient cooperative with Derm and GYN consults yesterday afternoon. Per derm, likely eczema for which will start clobetasol BID. Per recs, was also treated prophylactically for scabies (repeat permethrin in 1 week has been ordered). Per derm, also ordered Bullous Pemphigoid labs to r/o. Per GYN, send BV panel to r/o. Patient initially orthostatic but improved on repeat, denies symptoms.  12/3: Patient seen for complications of TBI dementia. No major overnight events. Patient not aggressive still labile  from silly giddy to angry, raising voice. Eating sleping fairly well. Had large BM.  12/4: Patient continues to be confused and disorganized.  12/6: Patient continuing to require PRNs, given Zyprexa 2.5mg PO yesterday afternoon with good effect. Remains intermittently labile without overt aggression. Denies SE and no evidence of ongoing constipation. Skin improving and patient denies any subjective irritation. VPA stable, today 67.  12/7: No acute events overnight or PRNs required. Denies SE and no evidence on exam. Denies any concerns with skin. Eventually cooperated with BP lab.  12/8: No acute events overnight or PRNs required. Denies SE and no evidence on exam. No further skin concerns. Patient sedated, which may be related to med titration. Will continue to monitor for now and make changes if it persists. Will check ECG tomorrow. Repeat permethrin tonight per derm recommendations.  12/9: No acute events overnight or PRNs required. Denies SE and no evidence on exam. Skin is stable for the time being. Patient less sedated this morning, ECG yesterday was sinus with QTc 422. As such, will continue Zyprexa schedule. Mildly elevated electrolytes on BMP likely attributable to dehydration. Patient received Zyprexa PRN at 11:44am after pushing another patient who was provoking her. Staff instructed to hold 1pm 5mg dose but give another 2.5mg PRN if needed.  12/10: No acute events overnight or PRNs required. Denies SE and skin concerns, no issues on exam. Patient more sedated than yesterday but less than the day prior. Pattern appears to be that she is more sedated in the morning but wakes up over the course of the day. As such, will reschedule pm dose earlier in the day.  12/11: This morning, the patient was laying in bed, somewhat sedated but arousable to verbal stimuli. She came out of her room for breakfast and was seen later working with PT and using her walker. Pt denies any concerns, reports stable mood, sleeping well and eating "ALL of the breakfast". Agrees to come to staff if she has any complaints.  12/12: Patient pleasant and calm on encounter and denies any new concerns. Eating and sleeping well. No behavioral events.  12/13: No acute events overnight or PRNs required. Denies SE and no issues observed on exam. Patient has been receiving Zyprexa TID as scheduled and is no longer sedated. Remains tenuous with staff, relatively labile and unpredictable, particularly around the middle of the day. Will increase Zyprexa by 5mg/day. BP labs WNL.  12/14: No acute events overnight or PRNs required. Denies SE and no issues on exam. Remains tenuous with staff, particularly with toileting, bathing, and meds. Continue med regimen. Began implementing behavioral intervention with stuffed animal which thus far has been well received. Staff to bring Claudia Mouse when they interact with the patient and encourage its use when the patient requires redirection.     Plan:  1. Legals: 9.27 status, *DNR/DNI* [MOLST in chart]  2. Safety: routine observation. PRNs: Zyprexa 2.5 mg PO/IM q6h   3. Psychiatry: Depakote sprinkles 750 mg BID, Zyprexa 7.5mg at 9a + 1p and Zyprexa 5mg at 5p, repeat ECG Friday  4. Group, milieu, individual therapy as appropriate. *Staff to use stuffed Claudia Mouse to redirect the patient when she becomes irritable.*  5. Medical:  -RASH: s/p derm consult, given permethrin x2 and started on clobetasol BID for 2 weeks. BP lab drawn and WNL. Most likely eczema. On 12/16 will transition from clobetasol BID to triamcinolone PRN per derm recs.   -ODOR: s/p GYN consult, BV panel sent and WNL. Pt noted to have stool burden on exam, pt had BM s/p enema.   -CONSTIPATION: continue Miralax 17g daily and Senna 2mg QHS, fleet enema PRN  -HTN: continue home Lopressor 25mg daily for HTN  -EDEMA: continue home Lasix 20mg daily  *PLEASE ENCOURAGE HYDRATION as patient with mildly elevated Na and Cl on BMP.  6. Dispo: pending clinical improvement.  Patient continues to require inpatient hospitalization for stabilization and safety.  12/15: Patient labile, calmer today so far but need more observation points given changeability. Cont olanzapine monitor gait, vitals watch for constipation  12/16: Patient labile, volative resistive with care. Will increase olanzapine, check EKG consider changing metorolol to propanolol as there is EBM support for this med  12/17 Not with sig change not aggressive with peers. CoStart trial of Inderal, will need to reconsider if cannot get vitals regular to assess safety  during dose titration  12/18Patient vocally agitated and resitive with care, some excess laxative effect. Tolerating Inderal plan further titration Inderal, lower Miralax  12/19 Labile but some improvement. plan hold laxatives and restart lower dose, plan further titration Inderal   12/20 Aggressive episode, even though may have inadvertently provoked by peer yelling. Given aggressive epsiode despite 25mg/d olanzapine need to consider trial a failure. Will taper olanzapine and start haldol. Unable  to reach family to notify  12/21 No aggression, still volatile yells curses if redirected. Will cont crossover to haldol from olanzapine. Cont Inderal but suspend titration til converted over to haldol  12/22 Patiwnd had incident of placing self on floor. Today appears calmer, no sig EPS, not sedated. Plan cont haldol have stopped olanzapine  12/23 So far seems better on haldol not as loud less raising  voice or cursing, tolerating well. Cont haldol watch for changes as olanzapine washes out  12/26: Patient has been calm, not screaming   12/27 Had bad night, then sleep in AM. Will cont haldol bt change timing consider going up on Inderal worth attempting as has not done well on antipsychotics  12/28 Remains diffiuclt with ADLs but less loud not aggression towards peers. Will increase Inderal to 30mg tid  12/29 emains labile with ADL related agitation, increased trmeor cont to titrate Inderal will reduce haldol

## 2021-12-29 NOTE — BH INPATIENT PSYCHIATRY PROGRESS NOTE - NSBHCHARTREVIEWVS_PSY_A_CORE FT
Vital Signs Last 24 Hrs  T(C): 36.5 (12-29-21 @ 12:21), Max: 36.7 (12-28-21 @ 21:42)  T(F): 97.7 (12-29-21 @ 12:21), Max: 98.1 (12-28-21 @ 21:42)  HR: 71 (12-28-21 @ 21:42) (71 - 71)  BP: 133/59 (12-28-21 @ 21:42) (133/59 - 133/59)  BP(mean): --  RR: 18 (12-28-21 @ 21:42) (18 - 18)  SpO2: 97% (12-28-21 @ 21:42) (97% - 97%)    Orthostatic VS  12-29-21 @ 12:21  Lying BP: --/-- HR: --  Sitting BP: 120/55 HR: 65  Standing BP: --/-- HR: --  Site: --  Mode: --  Orthostatic VS  12-29-21 @ 09:44  Lying BP: --/-- HR: --  Sitting BP: 112/65 HR: 65  Standing BP: 117/60 HR: 64  Site: --  Mode: --  Orthostatic VS  12-28-21 @ 12:50  Lying BP: --/-- HR: --  Sitting BP: 118/67 HR: 69  Standing BP: --/-- HR: --  Site: --  Mode: --  Orthostatic VS  12-27-21 @ 20:29  Lying BP: --/-- HR: --  Sitting BP: 116/65 HR: 75  Standing BP: --/-- HR: --  Site: --  Mode: --

## 2021-12-29 NOTE — BH INPATIENT PSYCHIATRY PROGRESS NOTE - MSE UNSTRUCTURED FT
Patient is awake and alert, in bed. Poor attention. Speech decreased production, childlike pattern, severe word finding problems. Affect jocular, somewhat inappropriately happy. TP concrete, coherent. No delusions expressed. No motor disturbance. Limited insight. Has tremorincreased not rigid. No suicidal ideations. No nunez. Confused disoriented. Poor insight

## 2021-12-29 NOTE — BH INPATIENT PSYCHIATRY PROGRESS NOTE - NSBHMETABOLIC_PSY_ALL_CORE_FT
BMI: BMI (kg/m2): 31.6 (11-29-21 @ 16:35)  HbA1c: A1C with Estimated Average Glucose Result: 5.4 % (11-04-21 @ 06:58)    Glucose: POCT Blood Glucose.: 109 mg/dL (11-12-21 @ 16:13)    BP: 133/59 (12-28-21 @ 21:42) (118/73 - 133/59)  Lipid Panel:

## 2021-12-30 LAB
ANION GAP SERPL CALC-SCNC: 12 MMOL/L — SIGNIFICANT CHANGE UP (ref 7–14)
BASOPHILS # BLD AUTO: 0.07 K/UL — SIGNIFICANT CHANGE UP (ref 0–0.2)
BASOPHILS NFR BLD AUTO: 0.6 % — SIGNIFICANT CHANGE UP (ref 0–2)
BUN SERPL-MCNC: 22 MG/DL — SIGNIFICANT CHANGE UP (ref 7–23)
CALCIUM SERPL-MCNC: 9.4 MG/DL — SIGNIFICANT CHANGE UP (ref 8.4–10.5)
CHLORIDE SERPL-SCNC: 107 MMOL/L — SIGNIFICANT CHANGE UP (ref 98–107)
CO2 SERPL-SCNC: 25 MMOL/L — SIGNIFICANT CHANGE UP (ref 22–31)
CREAT SERPL-MCNC: 0.61 MG/DL — SIGNIFICANT CHANGE UP (ref 0.5–1.3)
EOSINOPHIL # BLD AUTO: 0.21 K/UL — SIGNIFICANT CHANGE UP (ref 0–0.5)
EOSINOPHIL NFR BLD AUTO: 1.9 % — SIGNIFICANT CHANGE UP (ref 0–6)
GLUCOSE BLDC GLUCOMTR-MCNC: 78 MG/DL — SIGNIFICANT CHANGE UP (ref 70–99)
GLUCOSE SERPL-MCNC: 84 MG/DL — SIGNIFICANT CHANGE UP (ref 70–99)
HCT VFR BLD CALC: 43.6 % — SIGNIFICANT CHANGE UP (ref 34.5–45)
HGB BLD-MCNC: 13.8 G/DL — SIGNIFICANT CHANGE UP (ref 11.5–15.5)
IANC: 6.53 K/UL — SIGNIFICANT CHANGE UP (ref 1.5–8.5)
IMM GRANULOCYTES NFR BLD AUTO: 0.7 % — SIGNIFICANT CHANGE UP (ref 0–1.5)
LYMPHOCYTES # BLD AUTO: 27.5 % — SIGNIFICANT CHANGE UP (ref 13–44)
LYMPHOCYTES # BLD AUTO: 3.06 K/UL — SIGNIFICANT CHANGE UP (ref 1–3.3)
MAGNESIUM SERPL-MCNC: 2.1 MG/DL — SIGNIFICANT CHANGE UP (ref 1.6–2.6)
MCHC RBC-ENTMCNC: 30.5 PG — SIGNIFICANT CHANGE UP (ref 27–34)
MCHC RBC-ENTMCNC: 31.7 GM/DL — LOW (ref 32–36)
MCV RBC AUTO: 96.5 FL — SIGNIFICANT CHANGE UP (ref 80–100)
MONOCYTES # BLD AUTO: 1.18 K/UL — HIGH (ref 0–0.9)
MONOCYTES NFR BLD AUTO: 10.6 % — SIGNIFICANT CHANGE UP (ref 2–14)
NEUTROPHILS # BLD AUTO: 6.53 K/UL — SIGNIFICANT CHANGE UP (ref 1.8–7.4)
NEUTROPHILS NFR BLD AUTO: 58.7 % — SIGNIFICANT CHANGE UP (ref 43–77)
NRBC # BLD: 0 /100 WBCS — SIGNIFICANT CHANGE UP
NRBC # FLD: 0 K/UL — SIGNIFICANT CHANGE UP
PHOSPHATE SERPL-MCNC: 3.2 MG/DL — SIGNIFICANT CHANGE UP (ref 2.5–4.5)
PLATELET # BLD AUTO: 222 K/UL — SIGNIFICANT CHANGE UP (ref 150–400)
POTASSIUM SERPL-MCNC: 4 MMOL/L — SIGNIFICANT CHANGE UP (ref 3.5–5.3)
POTASSIUM SERPL-SCNC: 4 MMOL/L — SIGNIFICANT CHANGE UP (ref 3.5–5.3)
RBC # BLD: 4.52 M/UL — SIGNIFICANT CHANGE UP (ref 3.8–5.2)
RBC # FLD: 14.9 % — HIGH (ref 10.3–14.5)
SODIUM SERPL-SCNC: 144 MMOL/L — SIGNIFICANT CHANGE UP (ref 135–145)
VALPROATE SERPL-MCNC: 64.7 UG/ML — SIGNIFICANT CHANGE UP (ref 50–100)
WBC # BLD: 11.13 K/UL — HIGH (ref 3.8–10.5)
WBC # FLD AUTO: 11.13 K/UL — HIGH (ref 3.8–10.5)

## 2021-12-30 PROCEDURE — 99232 SBSQ HOSP IP/OBS MODERATE 35: CPT

## 2021-12-30 RX ORDER — HALOPERIDOL DECANOATE 100 MG/ML
0.5 INJECTION INTRAMUSCULAR
Refills: 0 | Status: DISCONTINUED | OUTPATIENT
Start: 2021-12-30 | End: 2022-01-02

## 2021-12-30 RX ORDER — PETROLATUM,WHITE
1 JELLY (GRAM) TOPICAL THREE TIMES A DAY
Refills: 0 | Status: DISCONTINUED | OUTPATIENT
Start: 2021-12-30 | End: 2022-05-18

## 2021-12-30 RX ADMIN — HALOPERIDOL DECANOATE 1 MILLIGRAM(S): 100 INJECTION INTRAMUSCULAR at 09:28

## 2021-12-30 RX ADMIN — HALOPERIDOL DECANOATE 0.5 MILLIGRAM(S): 100 INJECTION INTRAMUSCULAR at 17:22

## 2021-12-30 RX ADMIN — DIVALPROEX SODIUM 750 MILLIGRAM(S): 500 TABLET, DELAYED RELEASE ORAL at 12:24

## 2021-12-30 RX ADMIN — DIVALPROEX SODIUM 750 MILLIGRAM(S): 500 TABLET, DELAYED RELEASE ORAL at 21:14

## 2021-12-30 RX ADMIN — Medication 1 APPLICATION(S): at 21:14

## 2021-12-30 RX ADMIN — Medication 20 MILLIGRAM(S): at 09:30

## 2021-12-30 RX ADMIN — Medication 3 MILLIGRAM(S): at 21:14

## 2021-12-30 NOTE — BH INPATIENT PSYCHIATRY PROGRESS NOTE - NSBHMETABOLIC_PSY_ALL_CORE_FT
BMI: BMI (kg/m2): 31.6 (11-29-21 @ 16:35)  HbA1c: A1C with Estimated Average Glucose Result: 5.4 % (11-04-21 @ 06:58)    Glucose: POCT Blood Glucose.: 78 mg/dL (12-30-21 @ 07:34)    BP: 133/59 (12-28-21 @ 21:42) (119/65 - 133/59)  Lipid Panel:

## 2021-12-30 NOTE — BH INPATIENT PSYCHIATRY PROGRESS NOTE - NSBHCHARTREVIEWVS_PSY_A_CORE FT
Vital Signs Last 24 Hrs  T(C): 36.3 (12-30-21 @ 08:17), Max: 36.3 (12-30-21 @ 08:17)  T(F): 97.3 (12-30-21 @ 08:17), Max: 97.3 (12-30-21 @ 08:17)  HR: --  BP: --  BP(mean): --  RR: --  SpO2: --    Orthostatic VS  12-30-21 @ 08:17  Lying BP: --/-- HR: --  Sitting BP: 124/63 HR: 65  Standing BP: 116/80 HR: 70  Site: upper left arm  Mode: electronic  Orthostatic VS  12-29-21 @ 12:21  Lying BP: --/-- HR: --  Sitting BP: 120/55 HR: 65  Standing BP: --/-- HR: --  Site: --  Mode: --  Orthostatic VS  12-29-21 @ 09:44  Lying BP: --/-- HR: --  Sitting BP: 112/65 HR: 65  Standing BP: 117/60 HR: 64  Site: --  Mode: --

## 2021-12-30 NOTE — BH INPATIENT PSYCHIATRY PROGRESS NOTE - MSE UNSTRUCTURED FT
Patient is awake and alert, in bed. Easily distracted. Speech decreased production, childlike pattern, severe word finding problems. Affect jocular, somewhat inappropriately happy. TP concrete, coherent. No delusions expressed. No motor disturbance. Limited insight. Has tremor somewhat increased not rigid. No suicidal ideations. No nunze. Confused disoriented. Poor insight

## 2021-12-30 NOTE — BH INPATIENT PSYCHIATRY PROGRESS NOTE - NSBHFUPINTERVALHXFT_PSY_A_CORE
The patient was seen for follow-up for dementia with behavioral disturbances. Remains oddly related, childish at times, aggressive with care though better, difficult to provide ADL's Staff noting more tremor, more subdue soft spoken. VSS afebrile sl increase WBC staff report legs sl more swollen

## 2021-12-30 NOTE — PROGRESS NOTE ADULT - ASSESSMENT
66 year old woman w/ dementia w/ behavioral disturbance, NPH sp  shunt, DM2 medicine re-consulted for lower extremity edema. Symmetric edema, low suspicion for DVT. Likely chronic venostasis.      #Pedal edema  -Could be 2' venous insufficiency. Edema symmetric. Low suspicion for DVT.   -Keep legs elevated at this time. Compression stockings if patient can tolerate.   -duplex negative 11/29 for DVT   - would hold off on up-titration of lasix given leukocytosis of unclear significance.     #Leukocytosis  - patient w/ mild leukocytosis of unclear significance  - send UA and Ucx, low suspicion of cellulitis mild erythema of lower extremities likely venous stasis changes   - monitor temperature curve    #Dementia w/ behavioral disturbances  -No agitation.  -Further optimization as per psych.    #NPH s/p  shunt  -Stable. Imaging showed no hydro. Recent shunt series was normal.  -Cont supportive measures.    #DM2  -A1c normal.   -No need for FS/insulin coverage  while at [facility]. Cont same management.

## 2021-12-30 NOTE — BH INPATIENT PSYCHIATRY PROGRESS NOTE - CURRENT MEDICATION
MEDICATIONS  (STANDING):  AQUAPHOR (petrolatum Ointment) 1 Application(s) Topical three times a day  diVALproex Sprinkle 750 milliGRAM(s) Oral two times a day  furosemide    Tablet 20 milliGRAM(s) Oral daily  haloperidol     Tablet 0.5 milliGRAM(s) Oral <User Schedule>  melatonin. 3 milliGRAM(s) Oral at bedtime  propranolol 30 milliGRAM(s) Oral three times a day    MEDICATIONS  (PRN):  acetaminophen     Tablet .. 650 milliGRAM(s) Oral every 6 hours PRN Temp greater or equal to 38C (100.4F), Mild Pain (1 - 3), Moderate Pain (4 - 6)  aluminum hydroxide/magnesium hydroxide/simethicone Suspension 30 milliLiter(s) Oral every 4 hours PRN Dyspepsia  magnesium hydroxide Suspension 30 milliLiter(s) Oral at bedtime PRN constipation  mineral oil enema 133 milliLiter(s) Rectal daily PRN hard stool  OLANZapine 2.5 milliGRAM(s) Oral every 6 hours PRN severe agitation  OLANZapine Injectable 2.5 milliGRAM(s) IntraMuscular once PRN severe agitation  saline laxative (FLEET) Rectal Enema 1 Enema Rectal daily PRN hard stool  triamcinolone 0.1% Cream 1 Application(s) Topical two times a day PRN pruritis/rash

## 2021-12-30 NOTE — BH INPATIENT PSYCHIATRY PROGRESS NOTE - NSBHASSESSSUMMFT_PSY_ALL_CORE
66 y.o. F, as per records is , has been residing at [facility] ([facility]) since 2010, with past psychiatric history of dementia with behavioral disturbance, mood disorder, RODY, possible inpatient psychiatric admission to [facility] this year, has a chronic medical history notable for NPH (w/  shunt), HTN, DMT2, TBI (unspecified), pedal edema presenting from [facility] for physically aggressive behavior and diffuse rash. Records also indicate that [facility] was seeking a [facility] admission. Pt is AOx1 which per chart appears to be patient's baseline. Pt was doing well on previous medication regimen but having increase episodes of agitation. Pt most likely having increase in aggressive behavior 2/2 to TBI vs dementia. Patient requires inpatient admission for medication optimization and stabilization of agitation.     11/22: No acute events overnight or PRNs required, VSS, med adherent. Endorses AH of mother's voice telling her to take good care of her baby. Denies any safety concerns. Patient became agitated at 1pm after trying to follow repair men off unit, denies wanting to leave the unit. Incident likely related to disinhibition rather than psychosis, but further monitoring and collateral necessary to better understand the patient and hopefully delineate a pattern.   11/23: No acute events overnight or PRNs required, VSS, med adherent. Has been in more appropriate behavioral control following change in Zyprexa schedule. Was unable to take Depakote overnight due to difficulty swallowing the pill. Switched to sprinkles this morning which patient took without issue. Will continue monitoring on current regimen given change in formulation. Ammonia pending given patient's tremor. Will check VPA level when patient consistently taking Depakote sprinkles.   11/24: Patient required Zyprexa PO PRN yesterday at 6:40pm for agitation, given with good effect. Slept well overnight and adherent with meds. VPA/ammonia deferred given prior adherence issues due to difficulty swallowing. Will continue to titrate Zyprexa and check VPA/ammonia when the patient is taking reliably. EKG today given ongoing titration.   11/25: Patient remains confused, labile, but calm today.  11/26: No acute events over PRNs required. Patient remains intermittently labile but redirectable. Accepting of treatment. Will check VPA/ammonia today given improved adherence. VPA 68, Ammonia pending.  11/27: On exam, patient is pleasantly confused, denies any concerns. No behavorial events. Taking meds. Labs: Serum ammonia wnl at 15.   11/28: Patient remains confused, labile affect, intermittently laughing then screaming.  11/29: No acute events or PRNs required. Patient remains labile but redirectable and accepting treatment. Endorses pruritis in bilateral forearms and L foot swelling observed on exam. WILLIE evaluation pending.  11/30: Per WILLIE recs, patient sent to ED yesterday afternoon for doppler imaging to r/o DVT which was unremarkable. The patient was able to tolerate ED course without any behavioral incidents. This morning, patient at baseline, reports concern over LEs and agrees to meet again with WILLIE. ED had recommended consideration of Keflex for possible cellulitis. Awaiting hospitalist recs.   12/1: Patient acutely agitated overnight requiring Zyprexa 2.5mg PO accepted with good effect. Noted to have worsening rash despite adherence to topical steroid. Per WILLIE recs, dermatology consulted for further evaluation. Rash likely underlying behavioral exacerbation as the patient had been without issue for several days.  12/2: No acute events or PRNs required. Patient cooperative with Derm and GYN consults yesterday afternoon. Per derm, likely eczema for which will start clobetasol BID. Per recs, was also treated prophylactically for scabies (repeat permethrin in 1 week has been ordered). Per derm, also ordered Bullous Pemphigoid labs to r/o. Per GYN, send BV panel to r/o. Patient initially orthostatic but improved on repeat, denies symptoms.  12/3: Patient seen for complications of TBI dementia. No major overnight events. Patient not aggressive still labile  from silly giddy to angry, raising voice. Eating sleping fairly well. Had large BM.  12/4: Patient continues to be confused and disorganized.  12/6: Patient continuing to require PRNs, given Zyprexa 2.5mg PO yesterday afternoon with good effect. Remains intermittently labile without overt aggression. Denies SE and no evidence of ongoing constipation. Skin improving and patient denies any subjective irritation. VPA stable, today 67.  12/7: No acute events overnight or PRNs required. Denies SE and no evidence on exam. Denies any concerns with skin. Eventually cooperated with BP lab.  12/8: No acute events overnight or PRNs required. Denies SE and no evidence on exam. No further skin concerns. Patient sedated, which may be related to med titration. Will continue to monitor for now and make changes if it persists. Will check ECG tomorrow. Repeat permethrin tonight per derm recommendations.  12/9: No acute events overnight or PRNs required. Denies SE and no evidence on exam. Skin is stable for the time being. Patient less sedated this morning, ECG yesterday was sinus with QTc 422. As such, will continue Zyprexa schedule. Mildly elevated electrolytes on BMP likely attributable to dehydration. Patient received Zyprexa PRN at 11:44am after pushing another patient who was provoking her. Staff instructed to hold 1pm 5mg dose but give another 2.5mg PRN if needed.  12/10: No acute events overnight or PRNs required. Denies SE and skin concerns, no issues on exam. Patient more sedated than yesterday but less than the day prior. Pattern appears to be that she is more sedated in the morning but wakes up over the course of the day. As such, will reschedule pm dose earlier in the day.  12/11: This morning, the patient was laying in bed, somewhat sedated but arousable to verbal stimuli. She came out of her room for breakfast and was seen later working with PT and using her walker. Pt denies any concerns, reports stable mood, sleeping well and eating "ALL of the breakfast". Agrees to come to staff if she has any complaints.  12/12: Patient pleasant and calm on encounter and denies any new concerns. Eating and sleeping well. No behavioral events.  12/13: No acute events overnight or PRNs required. Denies SE and no issues observed on exam. Patient has been receiving Zyprexa TID as scheduled and is no longer sedated. Remains tenuous with staff, relatively labile and unpredictable, particularly around the middle of the day. Will increase Zyprexa by 5mg/day. BP labs WNL.  12/14: No acute events overnight or PRNs required. Denies SE and no issues on exam. Remains tenuous with staff, particularly with toileting, bathing, and meds. Continue med regimen. Began implementing behavioral intervention with stuffed animal which thus far has been well received. Staff to bring Claudia Mouse when they interact with the patient and encourage its use when the patient requires redirection.     Plan:  1. Legals: 9.27 status, *DNR/DNI* [MOLST in chart]  2. Safety: routine observation. PRNs: Zyprexa 2.5 mg PO/IM q6h   3. Psychiatry: Depakote sprinkles 750 mg BID, Zyprexa 7.5mg at 9a + 1p and Zyprexa 5mg at 5p, repeat ECG Friday  4. Group, milieu, individual therapy as appropriate. *Staff to use stuffed Claudia Mouse to redirect the patient when she becomes irritable.*  5. Medical:  -RASH: s/p derm consult, given permethrin x2 and started on clobetasol BID for 2 weeks. BP lab drawn and WNL. Most likely eczema. On 12/16 will transition from clobetasol BID to triamcinolone PRN per derm recs.   -ODOR: s/p GYN consult, BV panel sent and WNL. Pt noted to have stool burden on exam, pt had BM s/p enema.   -CONSTIPATION: continue Miralax 17g daily and Senna 2mg QHS, fleet enema PRN  -HTN: continue home Lopressor 25mg daily for HTN  -EDEMA: continue home Lasix 20mg daily  *PLEASE ENCOURAGE HYDRATION as patient with mildly elevated Na and Cl on BMP.  6. Dispo: pending clinical improvement.  Patient continues to require inpatient hospitalization for stabilization and safety.  12/15: Patient labile, calmer today so far but need more observation points given changeability. Cont olanzapine monitor gait, vitals watch for constipation  12/16: Patient labile, volative resistive with care. Will increase olanzapine, check EKG consider changing metorolol to propanolol as there is EBM support for this med  12/17 Not with sig change not aggressive with peers. CoStart trial of Inderal, will need to reconsider if cannot get vitals regular to assess safety  during dose titration  12/18Patient vocally agitated and resitive with care, some excess laxative effect. Tolerating Inderal plan further titration Inderal, lower Miralax  12/19 Labile but some improvement. plan hold laxatives and restart lower dose, plan further titration Inderal   12/20 Aggressive episode, even though may have inadvertently provoked by peer yelling. Given aggressive epsiode despite 25mg/d olanzapine need to consider trial a failure. Will taper olanzapine and start haldol. Unable  to reach family to notify  12/21 No aggression, still volatile yells curses if redirected. Will cont crossover to haldol from olanzapine. Cont Inderal but suspend titration til converted over to haldol  12/22 Patiwnd had incident of placing self on floor. Today appears calmer, no sig EPS, not sedated. Plan cont haldol have stopped olanzapine  12/23 So far seems better on haldol not as loud less raising  voice or cursing, tolerating well. Cont haldol watch for changes as olanzapine washes out  12/26: Patient has been calm, not screaming   12/27 Had bad night, then sleep in AM. Will cont haldol bt change timing consider going up on Inderal worth attempting as has not done well on antipsychotics  12/28 Remains diffiuclt with ADLs but less loud not aggression towards peers. Will increase Inderal to 30mg tid  12/29 emains labile with ADL related agitation, increased trmeor cont to titrate Inderal will reduce haldol  12/20 Patient less agitated possibly beneficial effect of Inderal. Will cont Inderal and since has increased tremor will further reduce haldol. Reviewed with internist have ordered U/A and c and s. Attempt to elevate legs as much as patient will allow

## 2021-12-31 PROCEDURE — 99231 SBSQ HOSP IP/OBS SF/LOW 25: CPT

## 2021-12-31 RX ADMIN — Medication 1 APPLICATION(S): at 21:00

## 2021-12-31 RX ADMIN — HALOPERIDOL DECANOATE 0.5 MILLIGRAM(S): 100 INJECTION INTRAMUSCULAR at 10:21

## 2021-12-31 RX ADMIN — Medication 3 MILLIGRAM(S): at 20:12

## 2021-12-31 RX ADMIN — HALOPERIDOL DECANOATE 0.5 MILLIGRAM(S): 100 INJECTION INTRAMUSCULAR at 13:11

## 2021-12-31 RX ADMIN — Medication 1 APPLICATION(S): at 09:56

## 2021-12-31 RX ADMIN — HALOPERIDOL DECANOATE 0.5 MILLIGRAM(S): 100 INJECTION INTRAMUSCULAR at 16:49

## 2021-12-31 RX ADMIN — DIVALPROEX SODIUM 750 MILLIGRAM(S): 500 TABLET, DELAYED RELEASE ORAL at 20:09

## 2021-12-31 RX ADMIN — DIVALPROEX SODIUM 750 MILLIGRAM(S): 500 TABLET, DELAYED RELEASE ORAL at 09:55

## 2021-12-31 RX ADMIN — Medication 20 MILLIGRAM(S): at 09:55

## 2021-12-31 RX ADMIN — Medication 1 APPLICATION(S): at 13:11

## 2021-12-31 NOTE — BH INPATIENT PSYCHIATRY PROGRESS NOTE - NSBHCHARTREVIEWVS_PSY_A_CORE FT
Vital Signs Last 24 Hrs  T(C): 36.4 (12-30-21 @ 15:48), Max: 36.4 (12-30-21 @ 15:48)  T(F): 97.5 (12-30-21 @ 15:48), Max: 97.5 (12-30-21 @ 15:48)  HR: --  BP: --  BP(mean): --  RR: 16 (12-30-21 @ 21:00) (16 - 16)  SpO2: --    Orthostatic VS  12-31-21 @ 07:35  Lying BP: --/-- HR: --  Sitting BP: 144/74 HR: 60  Standing BP: 141/80 HR: 65  Site: --  Mode: --  Orthostatic VS  12-30-21 @ 21:00  Lying BP: --/-- HR: --  Sitting BP: 118/60 HR: 78  Standing BP: --/-- HR: --  Site: --  Mode: --  Orthostatic VS  12-30-21 @ 08:17  Lying BP: --/-- HR: --  Sitting BP: 124/63 HR: 65  Standing BP: 116/80 HR: 70  Site: upper left arm  Mode: electronic  Orthostatic VS  12-29-21 @ 12:21  Lying BP: --/-- HR: --  Sitting BP: 120/55 HR: 65  Standing BP: --/-- HR: --  Site: --  Mode: --

## 2021-12-31 NOTE — BH INPATIENT PSYCHIATRY PROGRESS NOTE - NSBHMETABOLIC_PSY_ALL_CORE_FT
BMI: BMI (kg/m2): 31.6 (11-29-21 @ 16:35)  HbA1c: A1C with Estimated Average Glucose Result: 5.4 % (11-04-21 @ 06:58)    Glucose: POCT Blood Glucose.: 78 mg/dL (12-30-21 @ 07:34)    BP: 133/59 (12-28-21 @ 21:42) (133/59 - 133/59)  Lipid Panel:

## 2021-12-31 NOTE — BH INPATIENT PSYCHIATRY PROGRESS NOTE - MSE UNSTRUCTURED FT
Patient is awake and alert. Bilateral resting tremor. Affect neutral, jocular at times. Speech fluent, under-productive. Though process disjointed.   No delusions expressed. Stable on her feet. No suicidal ideations.

## 2021-12-31 NOTE — BH INPATIENT PSYCHIATRY PROGRESS NOTE - NSBHFUPINTERVALHXFT_PSY_A_CORE
The patient was seen for follow-up for dementia with behavioral disturbances. Seen by hospitalist for bilateral lower extremity edema, low suspicion for   DVT. Remains disorganized.

## 2021-12-31 NOTE — BH INPATIENT PSYCHIATRY PROGRESS NOTE - NSBHASSESSSUMMFT_PSY_ALL_CORE
66 y.o. F, as per records is , has been residing at [facility] ([facility]) since 2010, with past psychiatric history of dementia with behavioral disturbance, mood disorder, RODY, possible inpatient psychiatric admission to [facility] this year, has a chronic medical history notable for NPH (w/  shunt), HTN, DMT2, TBI (unspecified), pedal edema presenting from [facility] for physically aggressive behavior and diffuse rash. Records also indicate that [facility] was seeking a [facility] admission. Pt is AOx1 which per chart appears to be patient's baseline. Pt was doing well on previous medication regimen but having increase episodes of agitation. Pt most likely having increase in aggressive behavior 2/2 to TBI vs dementia. Patient requires inpatient admission for medication optimization and stabilization of agitation.     11/22: No acute events overnight or PRNs required, VSS, med adherent. Endorses AH of mother's voice telling her to take good care of her baby. Denies any safety concerns. Patient became agitated at 1pm after trying to follow repair men off unit, denies wanting to leave the unit. Incident likely related to disinhibition rather than psychosis, but further monitoring and collateral necessary to better understand the patient and hopefully delineate a pattern.   11/23: No acute events overnight or PRNs required, VSS, med adherent. Has been in more appropriate behavioral control following change in Zyprexa schedule. Was unable to take Depakote overnight due to difficulty swallowing the pill. Switched to sprinkles this morning which patient took without issue. Will continue monitoring on current regimen given change in formulation. Ammonia pending given patient's tremor. Will check VPA level when patient consistently taking Depakote sprinkles.   11/24: Patient required Zyprexa PO PRN yesterday at 6:40pm for agitation, given with good effect. Slept well overnight and adherent with meds. VPA/ammonia deferred given prior adherence issues due to difficulty swallowing. Will continue to titrate Zyprexa and check VPA/ammonia when the patient is taking reliably. EKG today given ongoing titration.   11/25: Patient remains confused, labile, but calm today.  11/26: No acute events over PRNs required. Patient remains intermittently labile but redirectable. Accepting of treatment. Will check VPA/ammonia today given improved adherence. VPA 68, Ammonia pending.  11/27: On exam, patient is pleasantly confused, denies any concerns. No behavorial events. Taking meds. Labs: Serum ammonia wnl at 15.   11/28: Patient remains confused, labile affect, intermittently laughing then screaming.  11/29: No acute events or PRNs required. Patient remains labile but redirectable and accepting treatment. Endorses pruritis in bilateral forearms and L foot swelling observed on exam. WILLIE evaluation pending.  11/30: Per WILLIE recs, patient sent to ED yesterday afternoon for doppler imaging to r/o DVT which was unremarkable. The patient was able to tolerate ED course without any behavioral incidents. This morning, patient at baseline, reports concern over LEs and agrees to meet again with WILLIE. ED had recommended consideration of Keflex for possible cellulitis. Awaiting hospitalist recs.   12/1: Patient acutely agitated overnight requiring Zyprexa 2.5mg PO accepted with good effect. Noted to have worsening rash despite adherence to topical steroid. Per WILLIE recs, dermatology consulted for further evaluation. Rash likely underlying behavioral exacerbation as the patient had been without issue for several days.  12/2: No acute events or PRNs required. Patient cooperative with Derm and GYN consults yesterday afternoon. Per derm, likely eczema for which will start clobetasol BID. Per recs, was also treated prophylactically for scabies (repeat permethrin in 1 week has been ordered). Per derm, also ordered Bullous Pemphigoid labs to r/o. Per GYN, send BV panel to r/o. Patient initially orthostatic but improved on repeat, denies symptoms.  12/3: Patient seen for complications of TBI dementia. No major overnight events. Patient not aggressive still labile  from silly giddy to angry, raising voice. Eating sleping fairly well. Had large BM.  12/4: Patient continues to be confused and disorganized.  12/6: Patient continuing to require PRNs, given Zyprexa 2.5mg PO yesterday afternoon with good effect. Remains intermittently labile without overt aggression. Denies SE and no evidence of ongoing constipation. Skin improving and patient denies any subjective irritation. VPA stable, today 67.  12/7: No acute events overnight or PRNs required. Denies SE and no evidence on exam. Denies any concerns with skin. Eventually cooperated with BP lab.  12/8: No acute events overnight or PRNs required. Denies SE and no evidence on exam. No further skin concerns. Patient sedated, which may be related to med titration. Will continue to monitor for now and make changes if it persists. Will check ECG tomorrow. Repeat permethrin tonight per derm recommendations.  12/9: No acute events overnight or PRNs required. Denies SE and no evidence on exam. Skin is stable for the time being. Patient less sedated this morning, ECG yesterday was sinus with QTc 422. As such, will continue Zyprexa schedule. Mildly elevated electrolytes on BMP likely attributable to dehydration. Patient received Zyprexa PRN at 11:44am after pushing another patient who was provoking her. Staff instructed to hold 1pm 5mg dose but give another 2.5mg PRN if needed.  12/10: No acute events overnight or PRNs required. Denies SE and skin concerns, no issues on exam. Patient more sedated than yesterday but less than the day prior. Pattern appears to be that she is more sedated in the morning but wakes up over the course of the day. As such, will reschedule pm dose earlier in the day.  12/11: This morning, the patient was laying in bed, somewhat sedated but arousable to verbal stimuli. She came out of her room for breakfast and was seen later working with PT and using her walker. Pt denies any concerns, reports stable mood, sleeping well and eating "ALL of the breakfast". Agrees to come to staff if she has any complaints.  12/12: Patient pleasant and calm on encounter and denies any new concerns. Eating and sleeping well. No behavioral events.  12/13: No acute events overnight or PRNs required. Denies SE and no issues observed on exam. Patient has been receiving Zyprexa TID as scheduled and is no longer sedated. Remains tenuous with staff, relatively labile and unpredictable, particularly around the middle of the day. Will increase Zyprexa by 5mg/day. BP labs WNL.  12/14: No acute events overnight or PRNs required. Denies SE and no issues on exam. Remains tenuous with staff, particularly with toileting, bathing, and meds. Continue med regimen. Began implementing behavioral intervention with stuffed animal which thus far has been well received. Staff to bring Claudia Mouse when they interact with the patient and encourage its use when the patient requires redirection.     Plan:  1. Legals: 9.27 status, *DNR/DNI* [MOLST in chart]  2. Safety: routine observation. PRNs: Zyprexa 2.5 mg PO/IM q6h   3. Psychiatry: Depakote sprinkles 750 mg BID, Zyprexa 7.5mg at 9a + 1p and Zyprexa 5mg at 5p, repeat ECG Friday  4. Group, milieu, individual therapy as appropriate. *Staff to use stuffed Claudia Mouse to redirect the patient when she becomes irritable.*  5. Medical:  -RASH: s/p derm consult, given permethrin x2 and started on clobetasol BID for 2 weeks. BP lab drawn and WNL. Most likely eczema. On 12/16 will transition from clobetasol BID to triamcinolone PRN per derm recs.   -ODOR: s/p GYN consult, BV panel sent and WNL. Pt noted to have stool burden on exam, pt had BM s/p enema.   -CONSTIPATION: continue Miralax 17g daily and Senna 2mg QHS, fleet enema PRN  -HTN: continue home Lopressor 25mg daily for HTN  -EDEMA: continue home Lasix 20mg daily  *PLEASE ENCOURAGE HYDRATION as patient with mildly elevated Na and Cl on BMP.  6. Dispo: pending clinical improvement.  Patient continues to require inpatient hospitalization for stabilization and safety.  12/15: Patient labile, calmer today so far but need more observation points given changeability. Cont olanzapine monitor gait, vitals watch for constipation  12/16: Patient labile, volative resistive with care. Will increase olanzapine, check EKG consider changing metorolol to propanolol as there is EBM support for this med  12/17 Not with sig change not aggressive with peers. CoStart trial of Inderal, will need to reconsider if cannot get vitals regular to assess safety  during dose titration  12/18Patient vocally agitated and resitive with care, some excess laxative effect. Tolerating Inderal plan further titration Inderal, lower Miralax  12/19 Labile but some improvement. plan hold laxatives and restart lower dose, plan further titration Inderal   12/20 Aggressive episode, even though may have inadvertently provoked by peer yelling. Given aggressive epsiode despite 25mg/d olanzapine need to consider trial a failure. Will taper olanzapine and start haldol. Unable  to reach family to notify  12/21 No aggression, still volatile yells curses if redirected. Will cont crossover to haldol from olanzapine. Cont Inderal but suspend titration til converted over to haldol  12/22 Patiwnd had incident of placing self on floor. Today appears calmer, no sig EPS, not sedated. Plan cont haldol have stopped olanzapine  12/23 So far seems better on haldol not as loud less raising  voice or cursing, tolerating well. Cont haldol watch for changes as olanzapine washes out  12/26: Patient has been calm, not screaming   12/27 Had bad night, then sleep in AM. Will cont haldol bt change timing consider going up on Inderal worth attempting as has not done well on antipsychotics  12/28 Remains diffiuclt with ADLs but less loud not aggression towards peers. Will increase Inderal to 30mg tid  12/29 emains labile with ADL related agitation, increased trmeor cont to titrate Inderal will reduce haldol  12/30 Patient less agitated possibly beneficial effect of Inderal. Will cont Inderal and since has increased tremor will further reduce haldol. Reviewed with internist have ordered U/A and c and s. Attempt to elevate legs as much as patient will allow  12/31: Some mild EPS, but less agitation, recent reduction of haloperidol, would continue current dose

## 2022-01-01 LAB — SARS-COV-2 RNA SPEC QL NAA+PROBE: SIGNIFICANT CHANGE UP

## 2022-01-01 PROCEDURE — 99231 SBSQ HOSP IP/OBS SF/LOW 25: CPT

## 2022-01-01 RX ADMIN — Medication 1 APPLICATION(S): at 10:58

## 2022-01-01 RX ADMIN — HALOPERIDOL DECANOATE 0.5 MILLIGRAM(S): 100 INJECTION INTRAMUSCULAR at 10:30

## 2022-01-01 RX ADMIN — HALOPERIDOL DECANOATE 0.5 MILLIGRAM(S): 100 INJECTION INTRAMUSCULAR at 13:55

## 2022-01-01 RX ADMIN — Medication 1 APPLICATION(S): at 21:19

## 2022-01-01 RX ADMIN — DIVALPROEX SODIUM 750 MILLIGRAM(S): 500 TABLET, DELAYED RELEASE ORAL at 10:34

## 2022-01-01 RX ADMIN — OLANZAPINE 2.5 MILLIGRAM(S): 15 TABLET, FILM COATED ORAL at 10:32

## 2022-01-01 RX ADMIN — Medication 20 MILLIGRAM(S): at 10:34

## 2022-01-01 RX ADMIN — HALOPERIDOL DECANOATE 0.5 MILLIGRAM(S): 100 INJECTION INTRAMUSCULAR at 17:05

## 2022-01-01 RX ADMIN — DIVALPROEX SODIUM 750 MILLIGRAM(S): 500 TABLET, DELAYED RELEASE ORAL at 21:17

## 2022-01-01 RX ADMIN — Medication 3 MILLIGRAM(S): at 21:19

## 2022-01-01 RX ADMIN — Medication 1 APPLICATION(S): at 14:26

## 2022-01-01 NOTE — BH INPATIENT PSYCHIATRY PROGRESS NOTE - NSBHASSESSSUMMFT_PSY_ALL_CORE
As per previous documentation: "66 y.o. F, as per records is , has been residing at [facility] ([facility]) since 2010, with past psychiatric history of dementia with behavioral disturbance, mood disorder, RODY, possible inpatient psychiatric admission to [facility] this year, has a chronic medical history notable for NPH (w/  shunt), HTN, DMT2, TBI (unspecified), pedal edema presenting from [facility] for physically aggressive behavior and diffuse rash. Records also indicate that [facility] was seeking a [facility] admission. Pt is AOx1 which per chart appears to be patient's baseline. Pt was doing well on previous medication regimen but having increase episodes of agitation. Pt most likely having increase in aggressive behavior 2/2 to TBI vs dementia. Patient requires inpatient admission for medication optimization and stabilization of agitation."    "12/28 Remains diffiuclt with ADLs but less loud not aggression towards peers. Will increase Inderal to 30mg tid  12/29 remains labile with ADL related agitation, increased trmeor cont to titrate Inderal will reduce haldol  12/30 Patient less agitated possibly beneficial effect of Inderal. Will cont Inderal and since has increased tremor will further reduce haldol. Reviewed with internist have ordered U/A and c and s. Attempt to elevate legs as much as patient will allow  12/31: Some mild EPS, but less agitation, recent reduction of haloperidol, would continue current dose"  01/01/2022: Patient received PRN Zyprexa X 1 dose for agitation this morning. On encounter, she calm and rather child-like with perplexed affect. Denies any concerns. Still with b/l UE tremors.   Plan: Will continue Haldol and Depakote at current dose. Will continue rest of the current medications as per primary team.

## 2022-01-01 NOTE — BH INPATIENT PSYCHIATRY PROGRESS NOTE - NSBHMETABOLIC_PSY_ALL_CORE_FT
BMI: BMI (kg/m2): 31.6 (11-29-21 @ 16:35)  HbA1c: A1C with Estimated Average Glucose Result: 5.4 % (11-04-21 @ 06:58)  Glucose: POCT Blood Glucose.: 78 mg/dL (12-30-21 @ 07:34)

## 2022-01-01 NOTE — BH INPATIENT PSYCHIATRY PROGRESS NOTE - NSBHFUPINTERVALHXFT_PSY_A_CORE
The patient was seen for follow-up for behavioral concerns in the setting of dementia. Chart reviewed and case discussed with nursing staff. Per staff, patient remains disorganized and required PRN Zyprexa for agitation at 10:00am this morning. On encounter, patient is seated by the phone and playing with her stuffed toy. Is disorganized, rather child like and unable to give appropriate responses. Taking meds. Appetite and sleep are adequate.

## 2022-01-01 NOTE — BH INPATIENT PSYCHIATRY PROGRESS NOTE - MSE UNSTRUCTURED FT
Patient appears stated age, is awake and alert. Minimally cooperative on encounter with intermittent eye contact. Bilateral resting tremor noted. Reported mood "okay, yes" with anxious and perplexed affect. Speech is fluent with poor modulation of volume, tone, repeats words. Thought process is disjointed. No delusions expressed. No SI/ HI. Insight and judgement are limited. Impulse control intact at the time of exam.

## 2022-01-02 PROCEDURE — 99231 SBSQ HOSP IP/OBS SF/LOW 25: CPT

## 2022-01-02 RX ORDER — HALOPERIDOL DECANOATE 100 MG/ML
0.5 INJECTION INTRAMUSCULAR
Refills: 0 | Status: DISCONTINUED | OUTPATIENT
Start: 2022-01-02 | End: 2022-01-05

## 2022-01-02 RX ADMIN — Medication 1 APPLICATION(S): at 21:35

## 2022-01-02 RX ADMIN — Medication 20 MILLIGRAM(S): at 08:59

## 2022-01-02 RX ADMIN — Medication 3 MILLIGRAM(S): at 21:10

## 2022-01-02 RX ADMIN — DIVALPROEX SODIUM 750 MILLIGRAM(S): 500 TABLET, DELAYED RELEASE ORAL at 08:59

## 2022-01-02 RX ADMIN — Medication 1 APPLICATION(S): at 12:22

## 2022-01-02 RX ADMIN — HALOPERIDOL DECANOATE 0.5 MILLIGRAM(S): 100 INJECTION INTRAMUSCULAR at 16:55

## 2022-01-02 RX ADMIN — Medication 1 APPLICATION(S): at 09:00

## 2022-01-02 RX ADMIN — HALOPERIDOL DECANOATE 0.5 MILLIGRAM(S): 100 INJECTION INTRAMUSCULAR at 08:59

## 2022-01-02 RX ADMIN — DIVALPROEX SODIUM 750 MILLIGRAM(S): 500 TABLET, DELAYED RELEASE ORAL at 21:09

## 2022-01-02 NOTE — BH INPATIENT PSYCHIATRY PROGRESS NOTE - NSBHFUPINTERVALHXFT_PSY_A_CORE
The patient was seen for follow-up for dementia with behavioral disturbances. Seen by hospitalist for bilateral lower extremity edema, low suspicion for   DVT. Remains disorganized. Some increased upper extremity tremor.

## 2022-01-02 NOTE — BH INPATIENT PSYCHIATRY PROGRESS NOTE - NSBHASSESSSUMMFT_PSY_ALL_CORE
66 y.o. F, as per records is , has been residing at [facility] ([facility]) since 2010, with past psychiatric history of dementia with behavioral disturbance, mood disorder, RODY, possible inpatient psychiatric admission to [facility] this year, has a chronic medical history notable for NPH (w/  shunt), HTN, DMT2, TBI (unspecified), pedal edema presenting from [facility] for physically aggressive behavior and diffuse rash. Records also indicate that [facility] was seeking a [facility] admission. Pt is AOx1 which per chart appears to be patient's baseline. Pt was doing well on previous medication regimen but having increase episodes of agitation. Pt most likely having increase in aggressive behavior 2/2 to TBI vs dementia. Patient requires inpatient admission for medication optimization and stabilization of agitation.     11/22: No acute events overnight or PRNs required, VSS, med adherent. Endorses AH of mother's voice telling her to take good care of her baby. Denies any safety concerns. Patient became agitated at 1pm after trying to follow repair men off unit, denies wanting to leave the unit. Incident likely related to disinhibition rather than psychosis, but further monitoring and collateral necessary to better understand the patient and hopefully delineate a pattern.   11/23: No acute events overnight or PRNs required, VSS, med adherent. Has been in more appropriate behavioral control following change in Zyprexa schedule. Was unable to take Depakote overnight due to difficulty swallowing the pill. Switched to sprinkles this morning which patient took without issue. Will continue monitoring on current regimen given change in formulation. Ammonia pending given patient's tremor. Will check VPA level when patient consistently taking Depakote sprinkles.   11/24: Patient required Zyprexa PO PRN yesterday at 6:40pm for agitation, given with good effect. Slept well overnight and adherent with meds. VPA/ammonia deferred given prior adherence issues due to difficulty swallowing. Will continue to titrate Zyprexa and check VPA/ammonia when the patient is taking reliably. EKG today given ongoing titration.   11/25: Patient remains confused, labile, but calm today.  11/26: No acute events over PRNs required. Patient remains intermittently labile but redirectable. Accepting of treatment. Will check VPA/ammonia today given improved adherence. VPA 68, Ammonia pending.  11/27: On exam, patient is pleasantly confused, denies any concerns. No behavorial events. Taking meds. Labs: Serum ammonia wnl at 15.   11/28: Patient remains confused, labile affect, intermittently laughing then screaming.  11/29: No acute events or PRNs required. Patient remains labile but redirectable and accepting treatment. Endorses pruritis in bilateral forearms and L foot swelling observed on exam. WILLIE evaluation pending.  11/30: Per WILLIE recs, patient sent to ED yesterday afternoon for doppler imaging to r/o DVT which was unremarkable. The patient was able to tolerate ED course without any behavioral incidents. This morning, patient at baseline, reports concern over LEs and agrees to meet again with WILLIE. ED had recommended consideration of Keflex for possible cellulitis. Awaiting hospitalist recs.   12/1: Patient acutely agitated overnight requiring Zyprexa 2.5mg PO accepted with good effect. Noted to have worsening rash despite adherence to topical steroid. Per WILLIE recs, dermatology consulted for further evaluation. Rash likely underlying behavioral exacerbation as the patient had been without issue for several days.  12/2: No acute events or PRNs required. Patient cooperative with Derm and GYN consults yesterday afternoon. Per derm, likely eczema for which will start clobetasol BID. Per recs, was also treated prophylactically for scabies (repeat permethrin in 1 week has been ordered). Per derm, also ordered Bullous Pemphigoid labs to r/o. Per GYN, send BV panel to r/o. Patient initially orthostatic but improved on repeat, denies symptoms.  12/3: Patient seen for complications of TBI dementia. No major overnight events. Patient not aggressive still labile  from silly giddy to angry, raising voice. Eating sleping fairly well. Had large BM.  12/4: Patient continues to be confused and disorganized.  12/6: Patient continuing to require PRNs, given Zyprexa 2.5mg PO yesterday afternoon with good effect. Remains intermittently labile without overt aggression. Denies SE and no evidence of ongoing constipation. Skin improving and patient denies any subjective irritation. VPA stable, today 67.  12/7: No acute events overnight or PRNs required. Denies SE and no evidence on exam. Denies any concerns with skin. Eventually cooperated with BP lab.  12/8: No acute events overnight or PRNs required. Denies SE and no evidence on exam. No further skin concerns. Patient sedated, which may be related to med titration. Will continue to monitor for now and make changes if it persists. Will check ECG tomorrow. Repeat permethrin tonight per derm recommendations.  12/9: No acute events overnight or PRNs required. Denies SE and no evidence on exam. Skin is stable for the time being. Patient less sedated this morning, ECG yesterday was sinus with QTc 422. As such, will continue Zyprexa schedule. Mildly elevated electrolytes on BMP likely attributable to dehydration. Patient received Zyprexa PRN at 11:44am after pushing another patient who was provoking her. Staff instructed to hold 1pm 5mg dose but give another 2.5mg PRN if needed.  12/10: No acute events overnight or PRNs required. Denies SE and skin concerns, no issues on exam. Patient more sedated than yesterday but less than the day prior. Pattern appears to be that she is more sedated in the morning but wakes up over the course of the day. As such, will reschedule pm dose earlier in the day.  12/11: This morning, the patient was laying in bed, somewhat sedated but arousable to verbal stimuli. She came out of her room for breakfast and was seen later working with PT and using her walker. Pt denies any concerns, reports stable mood, sleeping well and eating "ALL of the breakfast". Agrees to come to staff if she has any complaints.  12/12: Patient pleasant and calm on encounter and denies any new concerns. Eating and sleeping well. No behavioral events.  12/13: No acute events overnight or PRNs required. Denies SE and no issues observed on exam. Patient has been receiving Zyprexa TID as scheduled and is no longer sedated. Remains tenuous with staff, relatively labile and unpredictable, particularly around the middle of the day. Will increase Zyprexa by 5mg/day. BP labs WNL.  12/14: No acute events overnight or PRNs required. Denies SE and no issues on exam. Remains tenuous with staff, particularly with toileting, bathing, and meds. Continue med regimen. Began implementing behavioral intervention with stuffed animal which thus far has been well received. Staff to bring Claudia Mouse when they interact with the patient and encourage its use when the patient requires redirection.     Plan:  1. Legals: 9.27 status, *DNR/DNI* [MOLST in chart]  2. Safety: routine observation. PRNs: Zyprexa 2.5 mg PO/IM q6h   3. Psychiatry: Depakote sprinkles 750 mg BID, Zyprexa 7.5mg at 9a + 1p and Zyprexa 5mg at 5p, repeat ECG Friday  4. Group, milieu, individual therapy as appropriate. *Staff to use stuffed Claudia Mouse to redirect the patient when she becomes irritable.*  5. Medical:  -RASH: s/p derm consult, given permethrin x2 and started on clobetasol BID for 2 weeks. BP lab drawn and WNL. Most likely eczema. On 12/16 will transition from clobetasol BID to triamcinolone PRN per derm recs.   -ODOR: s/p GYN consult, BV panel sent and WNL. Pt noted to have stool burden on exam, pt had BM s/p enema.   -CONSTIPATION: continue Miralax 17g daily and Senna 2mg QHS, fleet enema PRN  -HTN: continue home Lopressor 25mg daily for HTN  -EDEMA: continue home Lasix 20mg daily  *PLEASE ENCOURAGE HYDRATION as patient with mildly elevated Na and Cl on BMP.  6. Dispo: pending clinical improvement.  Patient continues to require inpatient hospitalization for stabilization and safety.  12/15: Patient labile, calmer today so far but need more observation points given changeability. Cont olanzapine monitor gait, vitals watch for constipation  12/16: Patient labile, volative resistive with care. Will increase olanzapine, check EKG consider changing metorolol to propanolol as there is EBM support for this med  12/17 Not with sig change not aggressive with peers. CoStart trial of Inderal, will need to reconsider if cannot get vitals regular to assess safety  during dose titration  12/18Patient vocally agitated and resitive with care, some excess laxative effect. Tolerating Inderal plan further titration Inderal, lower Miralax  12/19 Labile but some improvement. plan hold laxatives and restart lower dose, plan further titration Inderal   12/20 Aggressive episode, even though may have inadvertently provoked by peer yelling. Given aggressive epsiode despite 25mg/d olanzapine need to consider trial a failure. Will taper olanzapine and start haldol. Unable  to reach family to notify  12/21 No aggression, still volatile yells curses if redirected. Will cont crossover to haldol from olanzapine. Cont Inderal but suspend titration til converted over to haldol  12/22 Patiwnd had incident of placing self on floor. Today appears calmer, no sig EPS, not sedated. Plan cont haldol have stopped olanzapine  12/23 So far seems better on haldol not as loud less raising  voice or cursing, tolerating well. Cont haldol watch for changes as olanzapine washes out  12/26: Patient has been calm, not screaming   12/27 Had bad night, then sleep in AM. Will cont haldol bt change timing consider going up on Inderal worth attempting as has not done well on antipsychotics  12/28 Remains diffiuclt with ADLs but less loud not aggression towards peers. Will increase Inderal to 30mg tid  12/29 emains labile with ADL related agitation, increased trmeor cont to titrate Inderal will reduce haldol  12/30 Patient less agitated possibly beneficial effect of Inderal. Will cont Inderal and since has increased tremor will further reduce haldol. Reviewed with internist have ordered U/A and c and s. Attempt to elevate legs as much as patient will allow  12/31: Some mild EPS, but less agitation, recent reduction of haloperidol, would continue current dose  1/2/2022: EPS somewhat worse, will reduce haloperidol to 0.5 bid

## 2022-01-02 NOTE — BH INPATIENT PSYCHIATRY PROGRESS NOTE - NSBHCHARTREVIEWVS_PSY_A_CORE FT
Vital Signs Last 24 Hrs  T(C): 36.6 (01-02-22 @ 06:17), Max: 36.6 (01-02-22 @ 06:17)  T(F): 97.9 (01-02-22 @ 06:17), Max: 97.9 (01-02-22 @ 06:17)  HR: 69 (01-02-22 @ 06:17) (69 - 69)  BP: 106/55 (01-02-22 @ 06:17) (106/55 - 106/55)  BP(mean): --  RR: 17 (01-01-22 @ 20:36) (17 - 17)  SpO2: --    Orthostatic VS  01-01-22 @ 20:36  Lying BP: --/-- HR: --  Sitting BP: 147/96 HR: 107  Standing BP: --/-- HR: --  Site: --  Mode: --  Orthostatic VS  01-01-22 @ 07:39  Lying BP: --/-- HR: --  Sitting BP: 125/57 HR: 64  Standing BP: 139/57 HR: 66  Site: --  Mode: --  Orthostatic VS  12-31-21 @ 20:17  Lying BP: --/-- HR: --  Sitting BP: 134/64 HR: 72  Standing BP: --/-- HR: --  Site: upper left arm  Mode: electronic

## 2022-01-02 NOTE — BH INPATIENT PSYCHIATRY PROGRESS NOTE - MSE UNSTRUCTURED FT
Patient is awake and alert. Bilateral resting tremor, mild akinesia. Speech decreased production. Paucity of thought. Limited participation in exam.   Poor insight. No suicidal ideations.

## 2022-01-02 NOTE — BH INPATIENT PSYCHIATRY PROGRESS NOTE - NSBHMETABOLIC_PSY_ALL_CORE_FT
BMI: BMI (kg/m2): 31.6 (11-29-21 @ 16:35)  HbA1c: A1C with Estimated Average Glucose Result: 5.4 % (11-04-21 @ 06:58)    Glucose: POCT Blood Glucose.: 78 mg/dL (12-30-21 @ 07:34)    BP: 106/55 (01-02-22 @ 06:17) (106/55 - 106/55)  Lipid Panel:

## 2022-01-03 PROCEDURE — 99232 SBSQ HOSP IP/OBS MODERATE 35: CPT

## 2022-01-03 RX ORDER — LANOLIN ALCOHOL/MO/W.PET/CERES
5 CREAM (GRAM) TOPICAL AT BEDTIME
Refills: 0 | Status: DISCONTINUED | OUTPATIENT
Start: 2022-01-03 | End: 2022-05-18

## 2022-01-03 RX ADMIN — HALOPERIDOL DECANOATE 0.5 MILLIGRAM(S): 100 INJECTION INTRAMUSCULAR at 16:57

## 2022-01-03 RX ADMIN — HALOPERIDOL DECANOATE 0.5 MILLIGRAM(S): 100 INJECTION INTRAMUSCULAR at 09:32

## 2022-01-03 RX ADMIN — Medication 20 MILLIGRAM(S): at 09:33

## 2022-01-03 RX ADMIN — Medication 1 APPLICATION(S): at 13:31

## 2022-01-03 RX ADMIN — Medication 5 MILLIGRAM(S): at 22:01

## 2022-01-03 RX ADMIN — DIVALPROEX SODIUM 750 MILLIGRAM(S): 500 TABLET, DELAYED RELEASE ORAL at 09:32

## 2022-01-03 RX ADMIN — DIVALPROEX SODIUM 750 MILLIGRAM(S): 500 TABLET, DELAYED RELEASE ORAL at 22:00

## 2022-01-03 RX ADMIN — Medication 1 APPLICATION(S): at 22:00

## 2022-01-03 RX ADMIN — Medication 1 APPLICATION(S): at 09:31

## 2022-01-03 NOTE — BH INPATIENT PSYCHIATRY PROGRESS NOTE - MSE UNSTRUCTURED FT
Patient is awake and alert. Bilateral resting tremor, mild akinesia. Speech decreased production, softer. Paucity of thought.   Poor insight. No suicidal ideations. Mood neutral, less lability. No overt delusions, nunez. Thinking impoverished. Oriented to self Poor insight

## 2022-01-03 NOTE — BH INPATIENT PSYCHIATRY PROGRESS NOTE - NSBHFUPINTERVALHXFT_PSY_A_CORE
The patient was seen for follow-up for dementia with behavioral disturbances. Seen by hospitalist for bilateral lower extremity edema, low suspicion for   DVT. Remains disorganized. Some increased upper extremity tremor. Sleep very erratic, appetite good, had accident with soft or loose stool. VSS. Await orthostatic reading idf she will cooperate. Agitation during care but not other times

## 2022-01-03 NOTE — BH INPATIENT PSYCHIATRY PROGRESS NOTE - NSBHMETABOLIC_PSY_ALL_CORE_FT
BMI: BMI (kg/m2): 31.6 (11-29-21 @ 16:35)  HbA1c: A1C with Estimated Average Glucose Result: 5.4 % (11-04-21 @ 06:58)    Glucose: POCT Blood Glucose.: 78 mg/dL (12-30-21 @ 07:34)    BP: 120/67 (01-03-22 @ 06:41) (106/55 - 123/53)  Lipid Panel:

## 2022-01-03 NOTE — BH INPATIENT PSYCHIATRY PROGRESS NOTE - NSBHASSESSSUMMFT_PSY_ALL_CORE
66 y.o. F, as per records is , has been residing at [facility] ([facility]) since 2010, with past psychiatric history of dementia with behavioral disturbance, mood disorder, RODY, possible inpatient psychiatric admission to [facility] this year, has a chronic medical history notable for NPH (w/  shunt), HTN, DMT2, TBI (unspecified), pedal edema presenting from [facility] for physically aggressive behavior and diffuse rash. Records also indicate that [facility] was seeking a [facility] admission. Pt is AOx1 which per chart appears to be patient's baseline. Pt was doing well on previous medication regimen but having increase episodes of agitation. Pt most likely having increase in aggressive behavior 2/2 to TBI vs dementia. Patient requires inpatient admission for medication optimization and stabilization of agitation.     11/22: No acute events overnight or PRNs required, VSS, med adherent. Endorses AH of mother's voice telling her to take good care of her baby. Denies any safety concerns. Patient became agitated at 1pm after trying to follow repair men off unit, denies wanting to leave the unit. Incident likely related to disinhibition rather than psychosis, but further monitoring and collateral necessary to better understand the patient and hopefully delineate a pattern.   11/23: No acute events overnight or PRNs required, VSS, med adherent. Has been in more appropriate behavioral control following change in Zyprexa schedule. Was unable to take Depakote overnight due to difficulty swallowing the pill. Switched to sprinkles this morning which patient took without issue. Will continue monitoring on current regimen given change in formulation. Ammonia pending given patient's tremor. Will check VPA level when patient consistently taking Depakote sprinkles.   11/24: Patient required Zyprexa PO PRN yesterday at 6:40pm for agitation, given with good effect. Slept well overnight and adherent with meds. VPA/ammonia deferred given prior adherence issues due to difficulty swallowing. Will continue to titrate Zyprexa and check VPA/ammonia when the patient is taking reliably. EKG today given ongoing titration.   11/25: Patient remains confused, labile, but calm today.  11/26: No acute events over PRNs required. Patient remains intermittently labile but redirectable. Accepting of treatment. Will check VPA/ammonia today given improved adherence. VPA 68, Ammonia pending.  11/27: On exam, patient is pleasantly confused, denies any concerns. No behavorial events. Taking meds. Labs: Serum ammonia wnl at 15.   11/28: Patient remains confused, labile affect, intermittently laughing then screaming.  11/29: No acute events or PRNs required. Patient remains labile but redirectable and accepting treatment. Endorses pruritis in bilateral forearms and L foot swelling observed on exam. WILLIE evaluation pending.  11/30: Per WILLIE recs, patient sent to ED yesterday afternoon for doppler imaging to r/o DVT which was unremarkable. The patient was able to tolerate ED course without any behavioral incidents. This morning, patient at baseline, reports concern over LEs and agrees to meet again with WILLIE. ED had recommended consideration of Keflex for possible cellulitis. Awaiting hospitalist recs.   12/1: Patient acutely agitated overnight requiring Zyprexa 2.5mg PO accepted with good effect. Noted to have worsening rash despite adherence to topical steroid. Per WILLIE recs, dermatology consulted for further evaluation. Rash likely underlying behavioral exacerbation as the patient had been without issue for several days.  12/2: No acute events or PRNs required. Patient cooperative with Derm and GYN consults yesterday afternoon. Per derm, likely eczema for which will start clobetasol BID. Per recs, was also treated prophylactically for scabies (repeat permethrin in 1 week has been ordered). Per derm, also ordered Bullous Pemphigoid labs to r/o. Per GYN, send BV panel to r/o. Patient initially orthostatic but improved on repeat, denies symptoms.  12/3: Patient seen for complications of TBI dementia. No major overnight events. Patient not aggressive still labile  from silly giddy to angry, raising voice. Eating sleping fairly well. Had large BM.  12/4: Patient continues to be confused and disorganized.  12/6: Patient continuing to require PRNs, given Zyprexa 2.5mg PO yesterday afternoon with good effect. Remains intermittently labile without overt aggression. Denies SE and no evidence of ongoing constipation. Skin improving and patient denies any subjective irritation. VPA stable, today 67.  12/7: No acute events overnight or PRNs required. Denies SE and no evidence on exam. Denies any concerns with skin. Eventually cooperated with BP lab.  12/8: No acute events overnight or PRNs required. Denies SE and no evidence on exam. No further skin concerns. Patient sedated, which may be related to med titration. Will continue to monitor for now and make changes if it persists. Will check ECG tomorrow. Repeat permethrin tonight per derm recommendations.  12/9: No acute events overnight or PRNs required. Denies SE and no evidence on exam. Skin is stable for the time being. Patient less sedated this morning, ECG yesterday was sinus with QTc 422. As such, will continue Zyprexa schedule. Mildly elevated electrolytes on BMP likely attributable to dehydration. Patient received Zyprexa PRN at 11:44am after pushing another patient who was provoking her. Staff instructed to hold 1pm 5mg dose but give another 2.5mg PRN if needed.  12/10: No acute events overnight or PRNs required. Denies SE and skin concerns, no issues on exam. Patient more sedated than yesterday but less than the day prior. Pattern appears to be that she is more sedated in the morning but wakes up over the course of the day. As such, will reschedule pm dose earlier in the day.  12/11: This morning, the patient was laying in bed, somewhat sedated but arousable to verbal stimuli. She came out of her room for breakfast and was seen later working with PT and using her walker. Pt denies any concerns, reports stable mood, sleeping well and eating "ALL of the breakfast". Agrees to come to staff if she has any complaints.  12/12: Patient pleasant and calm on encounter and denies any new concerns. Eating and sleeping well. No behavioral events.  12/13: No acute events overnight or PRNs required. Denies SE and no issues observed on exam. Patient has been receiving Zyprexa TID as scheduled and is no longer sedated. Remains tenuous with staff, relatively labile and unpredictable, particularly around the middle of the day. Will increase Zyprexa by 5mg/day. BP labs WNL.  12/14: No acute events overnight or PRNs required. Denies SE and no issues on exam. Remains tenuous with staff, particularly with toileting, bathing, and meds. Continue med regimen. Began implementing behavioral intervention with stuffed animal which thus far has been well received. Staff to bring Claudia Mouse when they interact with the patient and encourage its use when the patient requires redirection.     Plan:  1. Legals: 9.27 status, *DNR/DNI* [MOLST in chart]  2. Safety: routine observation. PRNs: Zyprexa 2.5 mg PO/IM q6h   3. Psychiatry: Depakote sprinkles 750 mg BID, Zyprexa 7.5mg at 9a + 1p and Zyprexa 5mg at 5p, repeat ECG Friday  4. Group, milieu, individual therapy as appropriate. *Staff to use stuffed Claudia Mouse to redirect the patient when she becomes irritable.*  5. Medical:  -RASH: s/p derm consult, given permethrin x2 and started on clobetasol BID for 2 weeks. BP lab drawn and WNL. Most likely eczema. On 12/16 will transition from clobetasol BID to triamcinolone PRN per derm recs.   -ODOR: s/p GYN consult, BV panel sent and WNL. Pt noted to have stool burden on exam, pt had BM s/p enema.   -CONSTIPATION: continue Miralax 17g daily and Senna 2mg QHS, fleet enema PRN  -HTN: continue home Lopressor 25mg daily for HTN  -EDEMA: continue home Lasix 20mg daily  *PLEASE ENCOURAGE HYDRATION as patient with mildly elevated Na and Cl on BMP.  6. Dispo: pending clinical improvement.  Patient continues to require inpatient hospitalization for stabilization and safety.  12/15: Patient labile, calmer today so far but need more observation points given changeability. Cont olanzapine monitor gait, vitals watch for constipation  12/16: Patient labile, volative resistive with care. Will increase olanzapine, check EKG consider changing metorolol to propanolol as there is EBM support for this med  12/17 Not with sig change not aggressive with peers. CoStart trial of Inderal, will need to reconsider if cannot get vitals regular to assess safety  during dose titration  12/18Patient vocally agitated and resitive with care, some excess laxative effect. Tolerating Inderal plan further titration Inderal, lower Miralax  12/19 Labile but some improvement. plan hold laxatives and restart lower dose, plan further titration Inderal   12/20 Aggressive episode, even though may have inadvertently provoked by peer yelling. Given aggressive epsiode despite 25mg/d olanzapine need to consider trial a failure. Will taper olanzapine and start haldol. Unable  to reach family to notify  12/21 No aggression, still volatile yells curses if redirected. Will cont crossover to haldol from olanzapine. Cont Inderal but suspend titration til converted over to haldol  12/22 Patiwnd had incident of placing self on floor. Today appears calmer, no sig EPS, not sedated. Plan cont haldol have stopped olanzapine  12/23 So far seems better on haldol not as loud less raising  voice or cursing, tolerating well. Cont haldol watch for changes as olanzapine washes out  12/26: Patient has been calm, not screaming   12/27 Had bad night, then sleep in AM. Will cont haldol bt change timing consider going up on Inderal worth attempting as has not done well on antipsychotics  12/28 Remains diffiuclt with ADLs but less loud not aggression towards peers. Will increase Inderal to 30mg tid  12/29 emains labile with ADL related agitation, increased trmeor cont to titrate Inderal will reduce haldol  12/30 Patient less agitated possibly beneficial effect of Inderal. Will cont Inderal and since has increased tremor will further reduce haldol. Reviewed with internist have ordered U/A and c and s. Attempt to elevate legs as much as patient will allow  12/31: Some mild EPS, but less agitation, recent reduction of haloperidol, would continue current dose  1/2/2022: EPS somewhat worse, will reduce haloperidol to 0.5 bid   1/3 Improvement but EPS,haldol reduced. if not orthostatic may increase Inderal. Will increase melatonin for now for sleep

## 2022-01-03 NOTE — BH INPATIENT PSYCHIATRY PROGRESS NOTE - NSBHCHARTREVIEWVS_PSY_A_CORE FT
Vital Signs Last 24 Hrs  T(C): 36.9 (01-03-22 @ 06:41), Max: 36.9 (01-03-22 @ 06:41)  T(F): 98.5 (01-03-22 @ 06:41), Max: 98.5 (01-03-22 @ 06:41)  HR: 87 (01-03-22 @ 06:41) (78 - 87)  BP: 120/67 (01-03-22 @ 06:41) (120/67 - 123/53)  BP(mean): --  RR: --  SpO2: --    Orthostatic VS  01-02-22 @ 12:10  Lying BP: --/-- HR: --  Sitting BP: 117/46 HR: 66  Standing BP: --/-- HR: --  Site: --  Mode: --  Orthostatic VS  01-01-22 @ 20:36  Lying BP: --/-- HR: --  Sitting BP: 147/96 HR: 107  Standing BP: --/-- HR: --  Site: --  Mode: --

## 2022-01-04 PROCEDURE — 99232 SBSQ HOSP IP/OBS MODERATE 35: CPT

## 2022-01-04 RX ADMIN — Medication 1 APPLICATION(S): at 13:19

## 2022-01-04 RX ADMIN — HALOPERIDOL DECANOATE 0.5 MILLIGRAM(S): 100 INJECTION INTRAMUSCULAR at 17:30

## 2022-01-04 RX ADMIN — DIVALPROEX SODIUM 750 MILLIGRAM(S): 500 TABLET, DELAYED RELEASE ORAL at 22:43

## 2022-01-04 RX ADMIN — HALOPERIDOL DECANOATE 0.5 MILLIGRAM(S): 100 INJECTION INTRAMUSCULAR at 09:23

## 2022-01-04 RX ADMIN — Medication 1 APPLICATION(S): at 09:41

## 2022-01-04 RX ADMIN — DIVALPROEX SODIUM 750 MILLIGRAM(S): 500 TABLET, DELAYED RELEASE ORAL at 09:24

## 2022-01-04 RX ADMIN — Medication 20 MILLIGRAM(S): at 09:23

## 2022-01-04 RX ADMIN — Medication 5 MILLIGRAM(S): at 22:42

## 2022-01-04 NOTE — BH INPATIENT PSYCHIATRY PROGRESS NOTE - NSBHASSESSSUMMFT_PSY_ALL_CORE
66 y.o. F, as per records is , has been residing at [facility] ([facility] since 2010, with past psychiatric history of dementia with behavioral disturbance, mood disorder, RODY, possible inpatient psychiatric admission to [facility] this year, has a chronic medical history notable for NPH (w/  shunt), HTN, DMT2, TBI (unspecified), pedal edema presenting from [facility] for physically aggressive behavior and diffuse rash. Records also indicate that [facility] was seeking a [facility] admission. Pt is AOx1 which per chart appears to be patient's baseline. Pt was doing well on previous medication regimen but having increase episodes of agitation. Pt most likely having increase in aggressive behavior 2/2 to TBI vs dementia. Patient requires inpatient admission for medication optimization and stabilization of agitation.     11/22: No acute events overnight or PRNs required, VSS, med adherent. Endorses AH of mother's voice telling her to take good care of her baby. Denies any safety concerns. Patient became agitated at 1pm after trying to follow repair men off unit, denies wanting to leave the unit. Incident likely related to disinhibition rather than psychosis, but further monitoring and collateral necessary to better understand the patient and hopefully delineate a pattern.   11/23: No acute events overnight or PRNs required, VSS, med adherent. Has been in more appropriate behavioral control following change in Zyprexa schedule. Was unable to take Depakote overnight due to difficulty swallowing the pill. Switched to sprinkles this morning which patient took without issue. Will continue monitoring on current regimen given change in formulation. Ammonia pending given patient's tremor. Will check VPA level when patient consistently taking Depakote sprinkles.   11/24: Patient required Zyprexa PO PRN yesterday at 6:40pm for agitation, given with good effect. Slept well overnight and adherent with meds. VPA/ammonia deferred given prior adherence issues due to difficulty swallowing. Will continue to titrate Zyprexa and check VPA/ammonia when the patient is taking reliably. EKG today given ongoing titration.   11/25: Patient remains confused, labile, but calm today.  11/26: No acute events over PRNs required. Patient remains intermittently labile but redirectable. Accepting of treatment. Will check VPA/ammonia today given improved adherence. VPA 68, Ammonia pending.  11/27: On exam, patient is pleasantly confused, denies any concerns. No behavorial events. Taking meds. Labs: Serum ammonia wnl at 15.   11/28: Patient remains confused, labile affect, intermittently laughing then screaming.  11/29: No acute events or PRNs required. Patient remains labile but redirectable and accepting treatment. Endorses pruritis in bilateral forearms and L foot swelling observed on exam. WILLIE evaluation pending.  11/30: Per WILLIE recs, patient sent to ED yesterday afternoon for doppler imaging to r/o DVT which was unremarkable. The patient was able to tolerate ED course without any behavioral incidents. This morning, patient at baseline, reports concern over LEs and agrees to meet again with WILLIE. ED had recommended consideration of Keflex for possible cellulitis. Awaiting hospitalist recs.   12/1: Patient acutely agitated overnight requiring Zyprexa 2.5mg PO accepted with good effect. Noted to have worsening rash despite adherence to topical steroid. Per WILLIE recs, dermatology consulted for further evaluation. Rash likely underlying behavioral exacerbation as the patient had been without issue for several days.  12/2: No acute events or PRNs required. Patient cooperative with Derm and GYN consults yesterday afternoon. Per derm, likely eczema for which will start clobetasol BID. Per recs, was also treated prophylactically for scabies (repeat permethrin in 1 week has been ordered). Per derm, also ordered Bullous Pemphigoid labs to r/o. Per GYN, send BV panel to r/o. Patient initially orthostatic but improved on repeat, denies symptoms.  12/3: Patient seen for complications of TBI dementia. No major overnight events. Patient not aggressive still labile  from silly giddy to angry, raising voice. Eating sleping fairly well. Had large BM.  12/4: Patient continues to be confused and disorganized.  12/6: Patient continuing to require PRNs, given Zyprexa 2.5mg PO yesterday afternoon with good effect. Remains intermittently labile without overt aggression. Denies SE and no evidence of ongoing constipation. Skin improving and patient denies any subjective irritation. VPA stable, today 67.  12/7: No acute events overnight or PRNs required. Denies SE and no evidence on exam. Denies any concerns with skin. Eventually cooperated with BP lab.  12/8: No acute events overnight or PRNs required. Denies SE and no evidence on exam. No further skin concerns. Patient sedated, which may be related to med titration. Will continue to monitor for now and make changes if it persists. Will check ECG tomorrow. Repeat permethrin tonight per derm recommendations.  12/9: No acute events overnight or PRNs required. Denies SE and no evidence on exam. Skin is stable for the time being. Patient less sedated this morning, ECG yesterday was sinus with QTc 422. As such, will continue Zyprexa schedule. Mildly elevated electrolytes on BMP likely attributable to dehydration. Patient received Zyprexa PRN at 11:44am after pushing another patient who was provoking her. Staff instructed to hold 1pm 5mg dose but give another 2.5mg PRN if needed.  12/10: No acute events overnight or PRNs required. Denies SE and skin concerns, no issues on exam. Patient more sedated than yesterday but less than the day prior. Pattern appears to be that she is more sedated in the morning but wakes up over the course of the day. As such, will reschedule pm dose earlier in the day.  12/11: This morning, the patient was laying in bed, somewhat sedated but arousable to verbal stimuli. She came out of her room for breakfast and was seen later working with PT and using her walker. Pt denies any concerns, reports stable mood, sleeping well and eating "ALL of the breakfast". Agrees to come to staff if she has any complaints.  12/12: Patient pleasant and calm on encounter and denies any new concerns. Eating and sleeping well. No behavioral events.  12/13: No acute events overnight or PRNs required. Denies SE and no issues observed on exam. Patient has been receiving Zyprexa TID as scheduled and is no longer sedated. Remains tenuous with staff, relatively labile and unpredictable, particularly around the middle of the day. Will increase Zyprexa by 5mg/day. BP labs WNL.  12/14: No acute events overnight or PRNs required. Denies SE and no issues on exam. Remains tenuous with staff, particularly with toileting, bathing, and meds. Continue med regimen. Began implementing behavioral intervention with stuffed animal which thus far has been well received. Staff to bring Claudia Mouse when they interact with the patient and encourage its use when the patient requires redirection.     Plan:  1. Legals: 9.27 status, *DNR/DNI* [MOLST in chart]  2. Safety: routine observation. PRNs: Zyprexa 2.5 mg PO/IM q6h   3. Psychiatry: Depakote sprinkles 750 mg BID, Zyprexa 7.5mg at 9a + 1p and Zyprexa 5mg at 5p, repeat ECG Friday  4. Group, milieu, individual therapy as appropriate. *Staff to use stuffed Claudia Mouse to redirect the patient when she becomes irritable.*  5. Medical:  -RASH: s/p derm consult, given permethrin x2 and started on clobetasol BID for 2 weeks. BP lab drawn and WNL. Most likely eczema. On 12/16 will transition from clobetasol BID to triamcinolone PRN per derm recs.   -ODOR: s/p GYN consult, BV panel sent and WNL. Pt noted to have stool burden on exam, pt had BM s/p enema.   -CONSTIPATION: continue Miralax 17g daily and Senna 2mg QHS, fleet enema PRN  -HTN: continue home Lopressor 25mg daily for HTN  -EDEMA: continue home Lasix 20mg daily  *PLEASE ENCOURAGE HYDRATION as patient with mildly elevated Na and Cl on BMP.  6. Dispo: pending clinical improvement.  Patient continues to require inpatient hospitalization for stabilization and safety.  12/15: Patient labile, calmer today so far but need more observation points given changeability. Cont olanzapine monitor gait, vitals watch for constipation  12/16: Patient labile, volative resistive with care. Will increase olanzapine, check EKG consider changing metorolol to propanolol as there is EBM support for this med  12/17 Not with sig change not aggressive with peers. CoStart trial of Inderal, will need to reconsider if cannot get vitals regular to assess safety  during dose titration  12/18Patient vocally agitated and resitive with care, some excess laxative effect. Tolerating Inderal plan further titration Inderal, lower Miralax  12/19 Labile but some improvement. plan hold laxatives and restart lower dose, plan further titration Inderal   12/20 Aggressive episode, even though may have inadvertently provoked by peer yelling. Given aggressive epsiode despite 25mg/d olanzapine need to consider trial a failure. Will taper olanzapine and start haldol. Unable  to reach family to notify  12/21 No aggression, still volatile yells curses if redirected. Will cont crossover to haldol from olanzapine. Cont Inderal but suspend titration til converted over to haldol  12/22 Patiwnd had incident of placing self on floor. Today appears calmer, no sig EPS, not sedated. Plan cont haldol have stopped olanzapine  12/23 So far seems better on haldol not as loud less raising  voice or cursing, tolerating well. Cont haldol watch for changes as olanzapine washes out  12/26: Patient has been calm, not screaming   12/27 Had bad night, then sleep in AM. Will cont haldol bt change timing consider going up on Inderal worth attempting as has not done well on antipsychotics  12/28 Remains diffiuclt with ADLs but less loud not aggression towards peers. Will increase Inderal to 30mg tid  12/29 emains labile with ADL related agitation, increased trmeor cont to titrate Inderal will reduce haldol  12/30 Patient less agitated possibly beneficial effect of Inderal. Will cont Inderal and since has increased tremor will further reduce haldol. Reviewed with internist have ordered U/A and c and s. Attempt to elevate legs as much as patient will allow  12/31: Some mild EPS, but less agitation, recent reduction of haloperidol, would continue current dose  1/2/2022: EPS somewhat worse, will reduce haloperidol to 0.5 bid   1/3 Improvement but EPS,haldol reduced. if not orthostatic may increase Inderal. Will increase melatonin for now for sleep  1/4 Some improvement in vocal and physical agitation. Consider increasing Inderal. EPS less on reduced haldol. COnsidet change to risperidone if BP okay

## 2022-01-04 NOTE — BH INPATIENT PSYCHIATRY PROGRESS NOTE - NSBHFUPINTERVALHXFT_PSY_A_CORE
The patient was seen for follow-up for dementia with behavioral disturbances.. Remains disorganized.  Sleep very erratic, appetite good, stools soft , no watery VSS. VSS. Agitation during care but not other times and somewhat less

## 2022-01-04 NOTE — BH INPATIENT PSYCHIATRY PROGRESS NOTE - CURRENT MEDICATION
MEDICATIONS  (STANDING):  AQUAPHOR (petrolatum Ointment) 1 Application(s) Topical three times a day  diVALproex Sprinkle 750 milliGRAM(s) Oral two times a day  furosemide    Tablet 20 milliGRAM(s) Oral daily  haloperidol     Tablet 0.5 milliGRAM(s) Oral <User Schedule>  melatonin. 5 milliGRAM(s) Oral at bedtime  propranolol 30 milliGRAM(s) Oral three times a day    MEDICATIONS  (PRN):  acetaminophen     Tablet .. 650 milliGRAM(s) Oral every 6 hours PRN Temp greater or equal to 38C (100.4F), Mild Pain (1 - 3), Moderate Pain (4 - 6)  aluminum hydroxide/magnesium hydroxide/simethicone Suspension 30 milliLiter(s) Oral every 4 hours PRN Dyspepsia  magnesium hydroxide Suspension 30 milliLiter(s) Oral at bedtime PRN constipation  mineral oil enema 133 milliLiter(s) Rectal daily PRN hard stool  OLANZapine 2.5 milliGRAM(s) Oral every 6 hours PRN severe agitation  OLANZapine Injectable 2.5 milliGRAM(s) IntraMuscular once PRN severe agitation  saline laxative (FLEET) Rectal Enema 1 Enema Rectal daily PRN hard stool  triamcinolone 0.1% Cream 1 Application(s) Topical two times a day PRN pruritis/rash

## 2022-01-04 NOTE — BH INPATIENT PSYCHIATRY PROGRESS NOTE - NSBHCHARTREVIEWVS_PSY_A_CORE FT
Vital Signs Last 24 Hrs  T(C): 36.6 (01-04-22 @ 06:26), Max: 36.7 (01-03-22 @ 15:37)  T(F): 97.8 (01-04-22 @ 06:26), Max: 98.1 (01-03-22 @ 15:37)  HR: --  BP: --  BP(mean): --  RR: 18 (01-04-22 @ 06:26) (18 - 18)  SpO2: --    Orthostatic VS  01-04-22 @ 06:26  Lying BP: --/-- HR: --  Sitting BP: 123/66 HR: 60  Standing BP: 123/66 HR: 57  Site: upper right arm  Mode: electronic

## 2022-01-04 NOTE — BH INPATIENT PSYCHIATRY PROGRESS NOTE - MSE UNSTRUCTURED FT
Patient is awake and alert. Bilateral resting tremor,less. Speech decreased production, softer. Paucity of thought.   Poor insight. No suicidal ideations. Mood neutral, less lability. No overt delusions, nunez. Thinking impoverished. Oriented to self Poor insight

## 2022-01-05 PROCEDURE — 99232 SBSQ HOSP IP/OBS MODERATE 35: CPT

## 2022-01-05 RX ORDER — RISPERIDONE 4 MG/1
0.25 TABLET ORAL
Refills: 0 | Status: DISCONTINUED | OUTPATIENT
Start: 2022-01-05 | End: 2022-01-21

## 2022-01-05 RX ORDER — HALOPERIDOL DECANOATE 100 MG/ML
0.5 INJECTION INTRAMUSCULAR EVERY 6 HOURS
Refills: 0 | Status: DISCONTINUED | OUTPATIENT
Start: 2022-01-05 | End: 2022-01-10

## 2022-01-05 RX ORDER — HALOPERIDOL DECANOATE 100 MG/ML
1 INJECTION INTRAMUSCULAR ONCE
Refills: 0 | Status: DISCONTINUED | OUTPATIENT
Start: 2022-01-05 | End: 2022-01-10

## 2022-01-05 RX ADMIN — DIVALPROEX SODIUM 750 MILLIGRAM(S): 500 TABLET, DELAYED RELEASE ORAL at 21:36

## 2022-01-05 RX ADMIN — Medication 1 APPLICATION(S): at 21:59

## 2022-01-05 RX ADMIN — RISPERIDONE 0.25 MILLIGRAM(S): 4 TABLET ORAL at 16:13

## 2022-01-05 RX ADMIN — DIVALPROEX SODIUM 750 MILLIGRAM(S): 500 TABLET, DELAYED RELEASE ORAL at 12:15

## 2022-01-05 RX ADMIN — Medication 5 MILLIGRAM(S): at 21:37

## 2022-01-05 RX ADMIN — Medication 20 MILLIGRAM(S): at 12:17

## 2022-01-05 RX ADMIN — Medication 1 APPLICATION(S): at 12:17

## 2022-01-05 NOTE — BH INPATIENT PSYCHIATRY PROGRESS NOTE - PRN MEDS
MEDICATIONS  (PRN):  acetaminophen     Tablet .. 650 milliGRAM(s) Oral every 6 hours PRN Temp greater or equal to 38C (100.4F), Mild Pain (1 - 3), Moderate Pain (4 - 6)  aluminum hydroxide/magnesium hydroxide/simethicone Suspension 30 milliLiter(s) Oral every 4 hours PRN Dyspepsia  haloperidol     Tablet 0.5 milliGRAM(s) Oral every 6 hours PRN agitation  haloperidol    Injectable 1 milliGRAM(s) IntraMuscular once PRN severe agitation  magnesium hydroxide Suspension 30 milliLiter(s) Oral at bedtime PRN constipation  mineral oil enema 133 milliLiter(s) Rectal daily PRN hard stool  saline laxative (FLEET) Rectal Enema 1 Enema Rectal daily PRN hard stool  triamcinolone 0.1% Cream 1 Application(s) Topical two times a day PRN pruritis/rash

## 2022-01-05 NOTE — BH INPATIENT PSYCHIATRY PROGRESS NOTE - CURRENT MEDICATION
MEDICATIONS  (STANDING):  AQUAPHOR (petrolatum Ointment) 1 Application(s) Topical three times a day  diVALproex Sprinkle 750 milliGRAM(s) Oral two times a day  furosemide    Tablet 20 milliGRAM(s) Oral daily  melatonin. 5 milliGRAM(s) Oral at bedtime  propranolol 30 milliGRAM(s) Oral three times a day  risperiDONE   Tablet 0.25 milliGRAM(s) Oral <User Schedule>    MEDICATIONS  (PRN):  acetaminophen     Tablet .. 650 milliGRAM(s) Oral every 6 hours PRN Temp greater or equal to 38C (100.4F), Mild Pain (1 - 3), Moderate Pain (4 - 6)  aluminum hydroxide/magnesium hydroxide/simethicone Suspension 30 milliLiter(s) Oral every 4 hours PRN Dyspepsia  haloperidol     Tablet 0.5 milliGRAM(s) Oral every 6 hours PRN agitation  haloperidol    Injectable 1 milliGRAM(s) IntraMuscular once PRN severe agitation  magnesium hydroxide Suspension 30 milliLiter(s) Oral at bedtime PRN constipation  mineral oil enema 133 milliLiter(s) Rectal daily PRN hard stool  saline laxative (FLEET) Rectal Enema 1 Enema Rectal daily PRN hard stool  triamcinolone 0.1% Cream 1 Application(s) Topical two times a day PRN pruritis/rash

## 2022-01-05 NOTE — BH INPATIENT PSYCHIATRY PROGRESS NOTE - NSBHCHARTREVIEWVS_PSY_A_CORE FT
Vital Signs Last 24 Hrs  T(C): 36.8 (01-05-22 @ 05:42), Max: 36.8 (01-04-22 @ 15:59)  T(F): 98.3 (01-05-22 @ 05:42), Max: 98.3 (01-04-22 @ 15:59)  HR: 79 (01-05-22 @ 05:42) (79 - 79)  BP: 118/65 (01-05-22 @ 05:42) (118/65 - 118/65)  BP(mean): --  RR: 18 (01-04-22 @ 20:19) (18 - 18)  SpO2: --    Orthostatic VS  01-04-22 @ 20:19  Lying BP: --/-- HR: --  Sitting BP: 118/75 HR: --  Standing BP: --/91 HR: --  Site: --  Mode: --  Orthostatic VS  01-04-22 @ 06:26  Lying BP: --/-- HR: --  Sitting BP: 123/66 HR: 60  Standing BP: 123/66 HR: 57  Site: upper right arm  Mode: electronic

## 2022-01-05 NOTE — BH INPATIENT PSYCHIATRY PROGRESS NOTE - NSBHFUPINTERVALHXFT_PSY_A_CORE
The patient was seen for follow-up for dementia with behavioral disturbances.. Remains disorganized.  Sleep  erratic, often sleeps late in morning, appetite good, stools soft , no watery VSS.  Agitation during care but not other times and somewhat less

## 2022-01-05 NOTE — BH INPATIENT PSYCHIATRY PROGRESS NOTE - NSBHASSESSSUMMFT_PSY_ALL_CORE
66 y.o. F, as per records is , has been residing at [facility] ([facility]) since 2010, with past psychiatric history of dementia with behavioral disturbance, mood disorder, RODY, possible inpatient psychiatric admission to [facility] this year, has a chronic medical history notable for NPH (w/  shunt), HTN, DMT2, TBI (unspecified), pedal edema presenting from [facility] for physically aggressive behavior and diffuse rash. Records also indicate that [facility] was seeking a [facility] admission. Pt is AOx1 which per chart appears to be patient's baseline. Pt was doing well on previous medication regimen but having increase episodes of agitation. Pt most likely having increase in aggressive behavior 2/2 to TBI vs dementia. Patient requires inpatient admission for medication optimization and stabilization of agitation.     11/22: No acute events overnight or PRNs required, VSS, med adherent. Endorses AH of mother's voice telling her to take good care of her baby. Denies any safety concerns. Patient became agitated at 1pm after trying to follow repair men off unit, denies wanting to leave the unit. Incident likely related to disinhibition rather than psychosis, but further monitoring and collateral necessary to better understand the patient and hopefully delineate a pattern.   11/23: No acute events overnight or PRNs required, VSS, med adherent. Has been in more appropriate behavioral control following change in Zyprexa schedule. Was unable to take Depakote overnight due to difficulty swallowing the pill. Switched to sprinkles this morning which patient took without issue. Will continue monitoring on current regimen given change in formulation. Ammonia pending given patient's tremor. Will check VPA level when patient consistently taking Depakote sprinkles.   11/24: Patient required Zyprexa PO PRN yesterday at 6:40pm for agitation, given with good effect. Slept well overnight and adherent with meds. VPA/ammonia deferred given prior adherence issues due to difficulty swallowing. Will continue to titrate Zyprexa and check VPA/ammonia when the patient is taking reliably. EKG today given ongoing titration.   11/25: Patient remains confused, labile, but calm today.  11/26: No acute events over PRNs required. Patient remains intermittently labile but redirectable. Accepting of treatment. Will check VPA/ammonia today given improved adherence. VPA 68, Ammonia pending.  11/27: On exam, patient is pleasantly confused, denies any concerns. No behavorial events. Taking meds. Labs: Serum ammonia wnl at 15.   11/28: Patient remains confused, labile affect, intermittently laughing then screaming.  11/29: No acute events or PRNs required. Patient remains labile but redirectable and accepting treatment. Endorses pruritis in bilateral forearms and L foot swelling observed on exam. WILLIE evaluation pending.  11/30: Per WILLIE recs, patient sent to ED yesterday afternoon for doppler imaging to r/o DVT which was unremarkable. The patient was able to tolerate ED course without any behavioral incidents. This morning, patient at baseline, reports concern over LEs and agrees to meet again with WILLIE. ED had recommended consideration of Keflex for possible cellulitis. Awaiting hospitalist recs.   12/1: Patient acutely agitated overnight requiring Zyprexa 2.5mg PO accepted with good effect. Noted to have worsening rash despite adherence to topical steroid. Per WILLIE recs, dermatology consulted for further evaluation. Rash likely underlying behavioral exacerbation as the patient had been without issue for several days.  12/2: No acute events or PRNs required. Patient cooperative with Derm and GYN consults yesterday afternoon. Per derm, likely eczema for which will start clobetasol BID. Per recs, was also treated prophylactically for scabies (repeat permethrin in 1 week has been ordered). Per derm, also ordered Bullous Pemphigoid labs to r/o. Per GYN, send BV panel to r/o. Patient initially orthostatic but improved on repeat, denies symptoms.  12/3: Patient seen for complications of TBI dementia. No major overnight events. Patient not aggressive still labile  from silly giddy to angry, raising voice. Eating sleping fairly well. Had large BM.  12/4: Patient continues to be confused and disorganized.  12/6: Patient continuing to require PRNs, given Zyprexa 2.5mg PO yesterday afternoon with good effect. Remains intermittently labile without overt aggression. Denies SE and no evidence of ongoing constipation. Skin improving and patient denies any subjective irritation. VPA stable, today 67.  12/7: No acute events overnight or PRNs required. Denies SE and no evidence on exam. Denies any concerns with skin. Eventually cooperated with BP lab.  12/8: No acute events overnight or PRNs required. Denies SE and no evidence on exam. No further skin concerns. Patient sedated, which may be related to med titration. Will continue to monitor for now and make changes if it persists. Will check ECG tomorrow. Repeat permethrin tonight per derm recommendations.  12/9: No acute events overnight or PRNs required. Denies SE and no evidence on exam. Skin is stable for the time being. Patient less sedated this morning, ECG yesterday was sinus with QTc 422. As such, will continue Zyprexa schedule. Mildly elevated electrolytes on BMP likely attributable to dehydration. Patient received Zyprexa PRN at 11:44am after pushing another patient who was provoking her. Staff instructed to hold 1pm 5mg dose but give another 2.5mg PRN if needed.  12/10: No acute events overnight or PRNs required. Denies SE and skin concerns, no issues on exam. Patient more sedated than yesterday but less than the day prior. Pattern appears to be that she is more sedated in the morning but wakes up over the course of the day. As such, will reschedule pm dose earlier in the day.  12/11: This morning, the patient was laying in bed, somewhat sedated but arousable to verbal stimuli. She came out of her room for breakfast and was seen later working with PT and using her walker. Pt denies any concerns, reports stable mood, sleeping well and eating "ALL of the breakfast". Agrees to come to staff if she has any complaints.  12/12: Patient pleasant and calm on encounter and denies any new concerns. Eating and sleeping well. No behavioral events.  12/13: No acute events overnight or PRNs required. Denies SE and no issues observed on exam. Patient has been receiving Zyprexa TID as scheduled and is no longer sedated. Remains tenuous with staff, relatively labile and unpredictable, particularly around the middle of the day. Will increase Zyprexa by 5mg/day. BP labs WNL.  12/14: No acute events overnight or PRNs required. Denies SE and no issues on exam. Remains tenuous with staff, particularly with toileting, bathing, and meds. Continue med regimen. Began implementing behavioral intervention with stuffed animal which thus far has been well received. Staff to bring Claudia Mouse when they interact with the patient and encourage its use when the patient requires redirection.     Plan:  1. Legals: 9.27 status, *DNR/DNI* [MOLST in chart]  2. Safety: routine observation. PRNs: Zyprexa 2.5 mg PO/IM q6h   3. Psychiatry: Depakote sprinkles 750 mg BID, Zyprexa 7.5mg at 9a + 1p and Zyprexa 5mg at 5p, repeat ECG Friday  4. Group, milieu, individual therapy as appropriate. *Staff to use stuffed Lcaudia Mouse to redirect the patient when she becomes irritable.*  5. Medical:  -RASH: s/p derm consult, given permethrin x2 and started on clobetasol BID for 2 weeks. BP lab drawn and WNL. Most likely eczema. On 12/16 will transition from clobetasol BID to triamcinolone PRN per derm recs.   -ODOR: s/p GYN consult, BV panel sent and WNL. Pt noted to have stool burden on exam, pt had BM s/p enema.   -CONSTIPATION: continue Miralax 17g daily and Senna 2mg QHS, fleet enema PRN  -HTN: continue home Lopressor 25mg daily for HTN  -EDEMA: continue home Lasix 20mg daily  *PLEASE ENCOURAGE HYDRATION as patient with mildly elevated Na and Cl on BMP.  6. Dispo: pending clinical improvement.  Patient continues to require inpatient hospitalization for stabilization and safety.  12/15: Patient labile, calmer today so far but need more observation points given changeability. Cont olanzapine monitor gait, vitals watch for constipation  12/16: Patient labile, volative resistive with care. Will increase olanzapine, check EKG consider changing metorolol to propanolol as there is EBM support for this med  12/17 Not with sig change not aggressive with peers. CoStart trial of Inderal, will need to reconsider if cannot get vitals regular to assess safety  during dose titration  12/18Patient vocally agitated and resitive with care, some excess laxative effect. Tolerating Inderal plan further titration Inderal, lower Miralax  12/19 Labile but some improvement. plan hold laxatives and restart lower dose, plan further titration Inderal   12/20 Aggressive episode, even though may have inadvertently provoked by peer yelling. Given aggressive epsiode despite 25mg/d olanzapine need to consider trial a failure. Will taper olanzapine and start haldol. Unable  to reach family to notify  12/21 No aggression, still volatile yells curses if redirected. Will cont crossover to haldol from olanzapine. Cont Inderal but suspend titration til converted over to haldol  12/22 Patiwnd had incident of placing self on floor. Today appears calmer, no sig EPS, not sedated. Plan cont haldol have stopped olanzapine  12/23 So far seems better on haldol not as loud less raising  voice or cursing, tolerating well. Cont haldol watch for changes as olanzapine washes out  12/26: Patient has been calm, not screaming   12/27 Had bad night, then sleep in AM. Will cont haldol bt change timing consider going up on Inderal worth attempting as has not done well on antipsychotics  12/28 Remains diffiuclt with ADLs but less loud not aggression towards peers. Will increase Inderal to 30mg tid  12/29 emains labile with ADL related agitation, increased trmeor cont to titrate Inderal will reduce haldol  12/30 Patient less agitated possibly beneficial effect of Inderal. Will cont Inderal and since has increased tremor will further reduce haldol. Reviewed with internist have ordered U/A and c and s. Attempt to elevate legs as much as patient will allow  12/31: Some mild EPS, but less agitation, recent reduction of haloperidol, would continue current dose  1/2/2022: EPS somewhat worse, will reduce haloperidol to 0.5 bid   1/3 Improvement but EPS,haldol reduced. if not orthostatic may increase Inderal. Will increase melatonin for now for sleep  1/4 Some improvement in vocal and physical agitation. Consider increasing Inderal. EPS less on reduced haldol. COnsidet change to risperidone if BP okay  1/5: Improved still with resistance with care, erratic sleep, tremor. Will change haldol over to risperidone to lower EPS and td risk

## 2022-01-05 NOTE — BH INPATIENT PSYCHIATRY PROGRESS NOTE - NSBHMETABOLIC_PSY_ALL_CORE_FT
BMI: BMI (kg/m2): 31.6 (11-29-21 @ 16:35)  HbA1c: A1C with Estimated Average Glucose Result: 5.4 % (11-04-21 @ 06:58)    Glucose: POCT Blood Glucose.: 78 mg/dL (12-30-21 @ 07:34)    BP: 118/65 (01-05-22 @ 05:42) (118/65 - 123/53)  Lipid Panel:

## 2022-01-06 LAB — SARS-COV-2 RNA SPEC QL NAA+PROBE: SIGNIFICANT CHANGE UP

## 2022-01-06 PROCEDURE — 99232 SBSQ HOSP IP/OBS MODERATE 35: CPT

## 2022-01-06 RX ADMIN — Medication 1 APPLICATION(S): at 13:26

## 2022-01-06 RX ADMIN — Medication 5 MILLIGRAM(S): at 20:45

## 2022-01-06 RX ADMIN — DIVALPROEX SODIUM 750 MILLIGRAM(S): 500 TABLET, DELAYED RELEASE ORAL at 20:45

## 2022-01-06 RX ADMIN — Medication 20 MILLIGRAM(S): at 09:53

## 2022-01-06 RX ADMIN — Medication 1 APPLICATION(S): at 21:28

## 2022-01-06 RX ADMIN — DIVALPROEX SODIUM 750 MILLIGRAM(S): 500 TABLET, DELAYED RELEASE ORAL at 09:06

## 2022-01-06 RX ADMIN — Medication 1 APPLICATION(S): at 09:10

## 2022-01-06 RX ADMIN — RISPERIDONE 0.25 MILLIGRAM(S): 4 TABLET ORAL at 16:15

## 2022-01-06 NOTE — BH INPATIENT PSYCHIATRY PROGRESS NOTE - NSBHFUPINTERVALHXFT_PSY_A_CORE
The patient was seen for follow-up for dementia with behavioral disturbances.. Remains disorganized.  Sleep  erratic, often sleeps late in morning, appetite good, no excessive stoolingVSS.  resistive  during care but not other times and somewhat less

## 2022-01-06 NOTE — BH INPATIENT PSYCHIATRY PROGRESS NOTE - NSBHASSESSSUMMFT_PSY_ALL_CORE
66 y.o. F, as per records is , has been residing at [facility] ([facility]) since 2010, with past psychiatric history of dementia with behavioral disturbance, mood disorder, RODY, possible inpatient psychiatric admission to [facility] this year, has a chronic medical history notable for NPH (w/  shunt), HTN, DMT2, TBI (unspecified), pedal edema presenting from [facility] for physically aggressive behavior and diffuse rash. Records also indicate that [facility] was seeking a [facility] admission. Pt is AOx1 which per chart appears to be patient's baseline. Pt was doing well on previous medication regimen but having increase episodes of agitation. Pt most likely having increase in aggressive behavior 2/2 to TBI vs dementia. Patient requires inpatient admission for medication optimization and stabilization of agitation.     11/22: No acute events overnight or PRNs required, VSS, med adherent. Endorses AH of mother's voice telling her to take good care of her baby. Denies any safety concerns. Patient became agitated at 1pm after trying to follow repair men off unit, denies wanting to leave the unit. Incident likely related to disinhibition rather than psychosis, but further monitoring and collateral necessary to better understand the patient and hopefully delineate a pattern.   11/23: No acute events overnight or PRNs required, VSS, med adherent. Has been in more appropriate behavioral control following change in Zyprexa schedule. Was unable to take Depakote overnight due to difficulty swallowing the pill. Switched to sprinkles this morning which patient took without issue. Will continue monitoring on current regimen given change in formulation. Ammonia pending given patient's tremor. Will check VPA level when patient consistently taking Depakote sprinkles.   11/24: Patient required Zyprexa PO PRN yesterday at 6:40pm for agitation, given with good effect. Slept well overnight and adherent with meds. VPA/ammonia deferred given prior adherence issues due to difficulty swallowing. Will continue to titrate Zyprexa and check VPA/ammonia when the patient is taking reliably. EKG today given ongoing titration.   11/25: Patient remains confused, labile, but calm today.  11/26: No acute events over PRNs required. Patient remains intermittently labile but redirectable. Accepting of treatment. Will check VPA/ammonia today given improved adherence. VPA 68, Ammonia pending.  11/27: On exam, patient is pleasantly confused, denies any concerns. No behavorial events. Taking meds. Labs: Serum ammonia wnl at 15.   11/28: Patient remains confused, labile affect, intermittently laughing then screaming.  11/29: No acute events or PRNs required. Patient remains labile but redirectable and accepting treatment. Endorses pruritis in bilateral forearms and L foot swelling observed on exam. WILLIE evaluation pending.  11/30: Per WILLIE recs, patient sent to ED yesterday afternoon for doppler imaging to r/o DVT which was unremarkable. The patient was able to tolerate ED course without any behavioral incidents. This morning, patient at baseline, reports concern over LEs and agrees to meet again with WILLIE. ED had recommended consideration of Keflex for possible cellulitis. Awaiting hospitalist recs.   12/1: Patient acutely agitated overnight requiring Zyprexa 2.5mg PO accepted with good effect. Noted to have worsening rash despite adherence to topical steroid. Per WILLIE recs, dermatology consulted for further evaluation. Rash likely underlying behavioral exacerbation as the patient had been without issue for several days.  12/2: No acute events or PRNs required. Patient cooperative with Derm and GYN consults yesterday afternoon. Per derm, likely eczema for which will start clobetasol BID. Per recs, was also treated prophylactically for scabies (repeat permethrin in 1 week has been ordered). Per derm, also ordered Bullous Pemphigoid labs to r/o. Per GYN, send BV panel to r/o. Patient initially orthostatic but improved on repeat, denies symptoms.  12/3: Patient seen for complications of TBI dementia. No major overnight events. Patient not aggressive still labile  from silly giddy to angry, raising voice. Eating sleping fairly well. Had large BM.  12/4: Patient continues to be confused and disorganized.  12/6: Patient continuing to require PRNs, given Zyprexa 2.5mg PO yesterday afternoon with good effect. Remains intermittently labile without overt aggression. Denies SE and no evidence of ongoing constipation. Skin improving and patient denies any subjective irritation. VPA stable, today 67.  12/7: No acute events overnight or PRNs required. Denies SE and no evidence on exam. Denies any concerns with skin. Eventually cooperated with BP lab.  12/8: No acute events overnight or PRNs required. Denies SE and no evidence on exam. No further skin concerns. Patient sedated, which may be related to med titration. Will continue to monitor for now and make changes if it persists. Will check ECG tomorrow. Repeat permethrin tonight per derm recommendations.  12/9: No acute events overnight or PRNs required. Denies SE and no evidence on exam. Skin is stable for the time being. Patient less sedated this morning, ECG yesterday was sinus with QTc 422. As such, will continue Zyprexa schedule. Mildly elevated electrolytes on BMP likely attributable to dehydration. Patient received Zyprexa PRN at 11:44am after pushing another patient who was provoking her. Staff instructed to hold 1pm 5mg dose but give another 2.5mg PRN if needed.  12/10: No acute events overnight or PRNs required. Denies SE and skin concerns, no issues on exam. Patient more sedated than yesterday but less than the day prior. Pattern appears to be that she is more sedated in the morning but wakes up over the course of the day. As such, will reschedule pm dose earlier in the day.  12/11: This morning, the patient was laying in bed, somewhat sedated but arousable to verbal stimuli. She came out of her room for breakfast and was seen later working with PT and using her walker. Pt denies any concerns, reports stable mood, sleeping well and eating "ALL of the breakfast". Agrees to come to staff if she has any complaints.  12/12: Patient pleasant and calm on encounter and denies any new concerns. Eating and sleeping well. No behavioral events.  12/13: No acute events overnight or PRNs required. Denies SE and no issues observed on exam. Patient has been receiving Zyprexa TID as scheduled and is no longer sedated. Remains tenuous with staff, relatively labile and unpredictable, particularly around the middle of the day. Will increase Zyprexa by 5mg/day. BP labs WNL.  12/14: No acute events overnight or PRNs required. Denies SE and no issues on exam. Remains tenuous with staff, particularly with toileting, bathing, and meds. Continue med regimen. Began implementing behavioral intervention with stuffed animal which thus far has been well received. Staff to bring Claudia Mouse when they interact with the patient and encourage its use when the patient requires redirection.     Plan:  1. Legals: 9.27 status, *DNR/DNI* [MOLST in chart]  2. Safety: routine observation. PRNs: Zyprexa 2.5 mg PO/IM q6h   3. Psychiatry: Depakote sprinkles 750 mg BID, Zyprexa 7.5mg at 9a + 1p and Zyprexa 5mg at 5p, repeat ECG Friday  4. Group, milieu, individual therapy as appropriate. *Staff to use stuffed Claudia Mouse to redirect the patient when she becomes irritable.*  5. Medical:  -RASH: s/p derm consult, given permethrin x2 and started on clobetasol BID for 2 weeks. BP lab drawn and WNL. Most likely eczema. On 12/16 will transition from clobetasol BID to triamcinolone PRN per derm recs.   -ODOR: s/p GYN consult, BV panel sent and WNL. Pt noted to have stool burden on exam, pt had BM s/p enema.   -CONSTIPATION: continue Miralax 17g daily and Senna 2mg QHS, fleet enema PRN  -HTN: continue home Lopressor 25mg daily for HTN  -EDEMA: continue home Lasix 20mg daily  *PLEASE ENCOURAGE HYDRATION as patient with mildly elevated Na and Cl on BMP.  6. Dispo: pending clinical improvement.  Patient continues to require inpatient hospitalization for stabilization and safety.  12/15: Patient labile, calmer today so far but need more observation points given changeability. Cont olanzapine monitor gait, vitals watch for constipation  12/16: Patient labile, volative resistive with care. Will increase olanzapine, check EKG consider changing metorolol to propanolol as there is EBM support for this med  12/17 Not with sig change not aggressive with peers. CoStart trial of Inderal, will need to reconsider if cannot get vitals regular to assess safety  during dose titration  12/18Patient vocally agitated and resitive with care, some excess laxative effect. Tolerating Inderal plan further titration Inderal, lower Miralax  12/19 Labile but some improvement. plan hold laxatives and restart lower dose, plan further titration Inderal   12/20 Aggressive episode, even though may have inadvertently provoked by peer yelling. Given aggressive epsiode despite 25mg/d olanzapine need to consider trial a failure. Will taper olanzapine and start haldol. Unable  to reach family to notify  12/21 No aggression, still volatile yells curses if redirected. Will cont crossover to haldol from olanzapine. Cont Inderal but suspend titration til converted over to haldol  12/22 Patiwnd had incident of placing self on floor. Today appears calmer, no sig EPS, not sedated. Plan cont haldol have stopped olanzapine  12/23 So far seems better on haldol not as loud less raising  voice or cursing, tolerating well. Cont haldol watch for changes as olanzapine washes out  12/26: Patient has been calm, not screaming   12/27 Had bad night, then sleep in AM. Will cont haldol bt change timing consider going up on Inderal worth attempting as has not done well on antipsychotics  12/28 Remains diffiuclt with ADLs but less loud not aggression towards peers. Will increase Inderal to 30mg tid  12/29 emains labile with ADL related agitation, increased trmeor cont to titrate Inderal will reduce haldol  12/30 Patient less agitated possibly beneficial effect of Inderal. Will cont Inderal and since has increased tremor will further reduce haldol. Reviewed with internist have ordered U/A and c and s. Attempt to elevate legs as much as patient will allow  12/31: Some mild EPS, but less agitation, recent reduction of haloperidol, would continue current dose  1/2/2022: EPS somewhat worse, will reduce haloperidol to 0.5 bid   1/3 Improvement but EPS,haldol reduced. if not orthostatic may increase Inderal. Will increase melatonin for now for sleep  1/4 Some improvement in vocal and physical agitation. Consider increasing Inderal. EPS less on reduced haldol. COnsidet change to risperidone if BP okay  1/5: Improved still with resistance with care, erratic sleep, tremor. Will change haldol over to risperidone to lower EPS and td risk  1/6 Showing some improvement with Inderal plus antipsychotic. Have changed from haldol to rispridone to reduce tremor and EPS risk, will titrate if BP's stable

## 2022-01-06 NOTE — BH INPATIENT PSYCHIATRY PROGRESS NOTE - NSBHMETABOLIC_PSY_ALL_CORE_FT
BMI: BMI (kg/m2): 31.6 (11-29-21 @ 16:35)  HbA1c: A1C with Estimated Average Glucose Result: 5.4 % (11-04-21 @ 06:58)    Glucose: POCT Blood Glucose.: 78 mg/dL (12-30-21 @ 07:34)    BP: 126/68 (01-05-22 @ 22:47) (118/65 - 126/68)  Lipid Panel:

## 2022-01-06 NOTE — BH INPATIENT PSYCHIATRY PROGRESS NOTE - MSE UNSTRUCTURED FT
Patient is awake and alert. Bilateral resting tremor,less. Speech decreased production, softer, childlike, abnormal prosody. Paucity of thought.   Poor insight. No suicidal ideations. Mood neutral, less lability. No overt delusions, nunez. Thinking impoverished. Oriented to self Poor insight

## 2022-01-06 NOTE — BH INPATIENT PSYCHIATRY PROGRESS NOTE - NSBHCHARTREVIEWVS_PSY_A_CORE FT
Vital Signs Last 24 Hrs  T(C): 36.3 (01-05-22 @ 15:58), Max: 36.3 (01-05-22 @ 15:58)  T(F): 97.3 (01-05-22 @ 15:58), Max: 97.3 (01-05-22 @ 15:58)  HR: 72 (01-05-22 @ 22:47) (72 - 72)  BP: 126/68 (01-05-22 @ 22:47) (126/68 - 126/68)  BP(mean): --  RR: --  SpO2: 99% (01-06-22 @ 07:51) (99% - 99%)    Orthostatic VS  01-06-22 @ 07:51  Lying BP: 127/101 HR: 61  Sitting BP: 139/69 HR: 107  Standing BP: --/-- HR: --  Site: --  Mode: --  Orthostatic VS  01-04-22 @ 20:19  Lying BP: --/-- HR: --  Sitting BP: 118/75 HR: --  Standing BP: --/91 HR: --  Site: --  Mode: --

## 2022-01-07 PROCEDURE — 99232 SBSQ HOSP IP/OBS MODERATE 35: CPT

## 2022-01-07 RX ADMIN — Medication 5 MILLIGRAM(S): at 21:24

## 2022-01-07 RX ADMIN — Medication 1 APPLICATION(S): at 13:53

## 2022-01-07 RX ADMIN — DIVALPROEX SODIUM 750 MILLIGRAM(S): 500 TABLET, DELAYED RELEASE ORAL at 09:39

## 2022-01-07 RX ADMIN — Medication 1 APPLICATION(S): at 09:40

## 2022-01-07 RX ADMIN — Medication 20 MILLIGRAM(S): at 09:39

## 2022-01-07 RX ADMIN — RISPERIDONE 0.25 MILLIGRAM(S): 4 TABLET ORAL at 17:04

## 2022-01-07 RX ADMIN — Medication 1 APPLICATION(S): at 22:19

## 2022-01-07 RX ADMIN — DIVALPROEX SODIUM 750 MILLIGRAM(S): 500 TABLET, DELAYED RELEASE ORAL at 21:24

## 2022-01-07 NOTE — BH INPATIENT PSYCHIATRY PROGRESS NOTE - MSE UNSTRUCTURED FT
Patient is awake and alert. Bilateral resting tremor,prominent but does not interfere with eating. Speech decreased production, softer, childlike, abnormal prosody. Paucity of thought.   Poor insight. No suicidal ideations. Mood neutral, less lability. No overt delusions, nunez. Thinking impoverished. Oriented to self Poor insight

## 2022-01-07 NOTE — BH INPATIENT PSYCHIATRY PROGRESS NOTE - NSBHASSESSSUMMFT_PSY_ALL_CORE
66 y.o. F, as per records is , has been residing at [facility] ([facility]) since 2010, with past psychiatric history of dementia with behavioral disturbance, mood disorder, ORDY, possible inpatient psychiatric admission to [facility] this year, has a chronic medical history notable for NPH (w/  shunt), HTN, DMT2, TBI (unspecified), pedal edema presenting from [facility] for physically aggressive behavior and diffuse rash. Records also indicate that [facility] was seeking a [facility] admission. Pt is AOx1 which per chart appears to be patient's baseline. Pt was doing well on previous medication regimen but having increase episodes of agitation. Pt most likely having increase in aggressive behavior 2/2 to TBI vs dementia. Patient requires inpatient admission for medication optimization and stabilization of agitation.     11/22: No acute events overnight or PRNs required, VSS, med adherent. Endorses AH of mother's voice telling her to take good care of her baby. Denies any safety concerns. Patient became agitated at 1pm after trying to follow repair men off unit, denies wanting to leave the unit. Incident likely related to disinhibition rather than psychosis, but further monitoring and collateral necessary to better understand the patient and hopefully delineate a pattern.   11/23: No acute events overnight or PRNs required, VSS, med adherent. Has been in more appropriate behavioral control following change in Zyprexa schedule. Was unable to take Depakote overnight due to difficulty swallowing the pill. Switched to sprinkles this morning which patient took without issue. Will continue monitoring on current regimen given change in formulation. Ammonia pending given patient's tremor. Will check VPA level when patient consistently taking Depakote sprinkles.   11/24: Patient required Zyprexa PO PRN yesterday at 6:40pm for agitation, given with good effect. Slept well overnight and adherent with meds. VPA/ammonia deferred given prior adherence issues due to difficulty swallowing. Will continue to titrate Zyprexa and check VPA/ammonia when the patient is taking reliably. EKG today given ongoing titration.   11/25: Patient remains confused, labile, but calm today.  11/26: No acute events over PRNs required. Patient remains intermittently labile but redirectable. Accepting of treatment. Will check VPA/ammonia today given improved adherence. VPA 68, Ammonia pending.  11/27: On exam, patient is pleasantly confused, denies any concerns. No behavorial events. Taking meds. Labs: Serum ammonia wnl at 15.   11/28: Patient remains confused, labile affect, intermittently laughing then screaming.  11/29: No acute events or PRNs required. Patient remains labile but redirectable and accepting treatment. Endorses pruritis in bilateral forearms and L foot swelling observed on exam. WILLIE evaluation pending.  11/30: Per WILLIE recs, patient sent to ED yesterday afternoon for doppler imaging to r/o DVT which was unremarkable. The patient was able to tolerate ED course without any behavioral incidents. This morning, patient at baseline, reports concern over LEs and agrees to meet again with WILLIE. ED had recommended consideration of Keflex for possible cellulitis. Awaiting hospitalist recs.   12/1: Patient acutely agitated overnight requiring Zyprexa 2.5mg PO accepted with good effect. Noted to have worsening rash despite adherence to topical steroid. Per WILLIE recs, dermatology consulted for further evaluation. Rash likely underlying behavioral exacerbation as the patient had been without issue for several days.  12/2: No acute events or PRNs required. Patient cooperative with Derm and GYN consults yesterday afternoon. Per derm, likely eczema for which will start clobetasol BID. Per recs, was also treated prophylactically for scabies (repeat permethrin in 1 week has been ordered). Per derm, also ordered Bullous Pemphigoid labs to r/o. Per GYN, send BV panel to r/o. Patient initially orthostatic but improved on repeat, denies symptoms.  12/3: Patient seen for complications of TBI dementia. No major overnight events. Patient not aggressive still labile  from silly giddy to angry, raising voice. Eating sleping fairly well. Had large BM.  12/4: Patient continues to be confused and disorganized.  12/6: Patient continuing to require PRNs, given Zyprexa 2.5mg PO yesterday afternoon with good effect. Remains intermittently labile without overt aggression. Denies SE and no evidence of ongoing constipation. Skin improving and patient denies any subjective irritation. VPA stable, today 67.  12/7: No acute events overnight or PRNs required. Denies SE and no evidence on exam. Denies any concerns with skin. Eventually cooperated with BP lab.  12/8: No acute events overnight or PRNs required. Denies SE and no evidence on exam. No further skin concerns. Patient sedated, which may be related to med titration. Will continue to monitor for now and make changes if it persists. Will check ECG tomorrow. Repeat permethrin tonight per derm recommendations.  12/9: No acute events overnight or PRNs required. Denies SE and no evidence on exam. Skin is stable for the time being. Patient less sedated this morning, ECG yesterday was sinus with QTc 422. As such, will continue Zyprexa schedule. Mildly elevated electrolytes on BMP likely attributable to dehydration. Patient received Zyprexa PRN at 11:44am after pushing another patient who was provoking her. Staff instructed to hold 1pm 5mg dose but give another 2.5mg PRN if needed.  12/10: No acute events overnight or PRNs required. Denies SE and skin concerns, no issues on exam. Patient more sedated than yesterday but less than the day prior. Pattern appears to be that she is more sedated in the morning but wakes up over the course of the day. As such, will reschedule pm dose earlier in the day.  12/11: This morning, the patient was laying in bed, somewhat sedated but arousable to verbal stimuli. She came out of her room for breakfast and was seen later working with PT and using her walker. Pt denies any concerns, reports stable mood, sleeping well and eating "ALL of the breakfast". Agrees to come to staff if she has any complaints.  12/12: Patient pleasant and calm on encounter and denies any new concerns. Eating and sleeping well. No behavioral events.  12/13: No acute events overnight or PRNs required. Denies SE and no issues observed on exam. Patient has been receiving Zyprexa TID as scheduled and is no longer sedated. Remains tenuous with staff, relatively labile and unpredictable, particularly around the middle of the day. Will increase Zyprexa by 5mg/day. BP labs WNL.  12/14: No acute events overnight or PRNs required. Denies SE and no issues on exam. Remains tenuous with staff, particularly with toileting, bathing, and meds. Continue med regimen. Began implementing behavioral intervention with stuffed animal which thus far has been well received. Staff to bring Claudia Mouse when they interact with the patient and encourage its use when the patient requires redirection.     Plan:  1. Legals: 9.27 status, *DNR/DNI* [MOLST in chart]  2. Safety: routine observation. PRNs: Zyprexa 2.5 mg PO/IM q6h   3. Psychiatry: Depakote sprinkles 750 mg BID, Zyprexa 7.5mg at 9a + 1p and Zyprexa 5mg at 5p, repeat ECG Friday  4. Group, milieu, individual therapy as appropriate. *Staff to use stuffed Claudia Mouse to redirect the patient when she becomes irritable.*  5. Medical:  -RASH: s/p derm consult, given permethrin x2 and started on clobetasol BID for 2 weeks. BP lab drawn and WNL. Most likely eczema. On 12/16 will transition from clobetasol BID to triamcinolone PRN per derm recs.   -ODOR: s/p GYN consult, BV panel sent and WNL. Pt noted to have stool burden on exam, pt had BM s/p enema.   -CONSTIPATION: continue Miralax 17g daily and Senna 2mg QHS, fleet enema PRN  -HTN: continue home Lopressor 25mg daily for HTN  -EDEMA: continue home Lasix 20mg daily  *PLEASE ENCOURAGE HYDRATION as patient with mildly elevated Na and Cl on BMP.  6. Dispo: pending clinical improvement.  Patient continues to require inpatient hospitalization for stabilization and safety.  12/15: Patient labile, calmer today so far but need more observation points given changeability. Cont olanzapine monitor gait, vitals watch for constipation  12/16: Patient labile, volative resistive with care. Will increase olanzapine, check EKG consider changing metorolol to propanolol as there is EBM support for this med  12/17 Not with sig change not aggressive with peers. CoStart trial of Inderal, will need to reconsider if cannot get vitals regular to assess safety  during dose titration  12/18Patient vocally agitated and resitive with care, some excess laxative effect. Tolerating Inderal plan further titration Inderal, lower Miralax  12/19 Labile but some improvement. plan hold laxatives and restart lower dose, plan further titration Inderal   12/20 Aggressive episode, even though may have inadvertently provoked by peer yelling. Given aggressive epsiode despite 25mg/d olanzapine need to consider trial a failure. Will taper olanzapine and start haldol. Unable  to reach family to notify  12/21 No aggression, still volatile yells curses if redirected. Will cont crossover to haldol from olanzapine. Cont Inderal but suspend titration til converted over to haldol  12/22 Patiwnd had incident of placing self on floor. Today appears calmer, no sig EPS, not sedated. Plan cont haldol have stopped olanzapine  12/23 So far seems better on haldol not as loud less raising  voice or cursing, tolerating well. Cont haldol watch for changes as olanzapine washes out  12/26: Patient has been calm, not screaming   12/27 Had bad night, then sleep in AM. Will cont haldol bt change timing consider going up on Inderal worth attempting as has not done well on antipsychotics  12/28 Remains diffiuclt with ADLs but less loud not aggression towards peers. Will increase Inderal to 30mg tid  12/29 emains labile with ADL related agitation, increased trmeor cont to titrate Inderal will reduce haldol  12/30 Patient less agitated possibly beneficial effect of Inderal. Will cont Inderal and since has increased tremor will further reduce haldol. Reviewed with internist have ordered U/A and c and s. Attempt to elevate legs as much as patient will allow  12/31: Some mild EPS, but less agitation, recent reduction of haloperidol, would continue current dose  1/2/2022: EPS somewhat worse, will reduce haloperidol to 0.5 bid   1/3 Improvement but EPS,haldol reduced. if not orthostatic may increase Inderal. Will increase melatonin for now for sleep  1/4 Some improvement in vocal and physical agitation. Consider increasing Inderal. EPS less on reduced haldol. COnsidet change to risperidone if BP okay  1/5: Improved still with resistance with care, erratic sleep, tremor. Will change haldol over to risperidone to lower EPS and td risk  1/6 Showing some improvement with Inderal plus antipsychotic. Have changed from haldol to rispridone to reduce tremor and EPS risk, will titrate if BP's stable  1/7 Improving, tolerating risperidone without drop in BP, will cont current treatment, hopefully tremor will reduce now off haldol

## 2022-01-07 NOTE — BH INPATIENT PSYCHIATRY PROGRESS NOTE - NSBHCHARTREVIEWVS_PSY_A_CORE FT
Vital Signs Last 24 Hrs  T(C): 36.2 (01-07-22 @ 07:45), Max: 36.9 (01-06-22 @ 16:04)  T(F): 97.1 (01-07-22 @ 07:45), Max: 98.5 (01-06-22 @ 16:04)  HR: --  BP: --  BP(mean): --  RR: --  SpO2: --    Orthostatic VS  01-07-22 @ 07:45  Lying BP: --/-- HR: --  Sitting BP: 114/50 HR: 58  Standing BP: 110/55 HR: 59  Site: upper left arm  Mode: electronic  Orthostatic VS  01-06-22 @ 20:35  Lying BP: --/-- HR: --  Sitting BP: --/-- HR: --  Standing BP: 130/103 HR: 149  Site: --  Mode: --  Orthostatic VS  01-06-22 @ 07:51  Lying BP: 127/101 HR: 61  Sitting BP: 139/69 HR: 107  Standing BP: --/-- HR: --  Site: --  Mode: --

## 2022-01-08 RX ADMIN — Medication 20 MILLIGRAM(S): at 08:35

## 2022-01-08 RX ADMIN — DIVALPROEX SODIUM 750 MILLIGRAM(S): 500 TABLET, DELAYED RELEASE ORAL at 21:58

## 2022-01-08 RX ADMIN — RISPERIDONE 0.25 MILLIGRAM(S): 4 TABLET ORAL at 17:38

## 2022-01-08 RX ADMIN — Medication 5 MILLIGRAM(S): at 21:58

## 2022-01-08 RX ADMIN — Medication 1 APPLICATION(S): at 13:10

## 2022-01-08 RX ADMIN — DIVALPROEX SODIUM 750 MILLIGRAM(S): 500 TABLET, DELAYED RELEASE ORAL at 08:35

## 2022-01-08 RX ADMIN — Medication 1 APPLICATION(S): at 08:36

## 2022-01-09 RX ADMIN — Medication 20 MILLIGRAM(S): at 08:24

## 2022-01-09 RX ADMIN — RISPERIDONE 0.25 MILLIGRAM(S): 4 TABLET ORAL at 16:30

## 2022-01-09 RX ADMIN — Medication 1 APPLICATION(S): at 12:42

## 2022-01-09 RX ADMIN — Medication 5 MILLIGRAM(S): at 22:10

## 2022-01-09 RX ADMIN — DIVALPROEX SODIUM 750 MILLIGRAM(S): 500 TABLET, DELAYED RELEASE ORAL at 22:10

## 2022-01-09 RX ADMIN — Medication 1 APPLICATION(S): at 22:17

## 2022-01-09 RX ADMIN — Medication 1 APPLICATION(S): at 08:25

## 2022-01-09 RX ADMIN — DIVALPROEX SODIUM 750 MILLIGRAM(S): 500 TABLET, DELAYED RELEASE ORAL at 08:25

## 2022-01-10 PROCEDURE — 99232 SBSQ HOSP IP/OBS MODERATE 35: CPT

## 2022-01-10 RX ADMIN — Medication 1 APPLICATION(S): at 08:31

## 2022-01-10 RX ADMIN — DIVALPROEX SODIUM 750 MILLIGRAM(S): 500 TABLET, DELAYED RELEASE ORAL at 08:29

## 2022-01-10 RX ADMIN — RISPERIDONE 0.25 MILLIGRAM(S): 4 TABLET ORAL at 16:20

## 2022-01-10 RX ADMIN — DIVALPROEX SODIUM 750 MILLIGRAM(S): 500 TABLET, DELAYED RELEASE ORAL at 21:26

## 2022-01-10 RX ADMIN — Medication 1 APPLICATION(S): at 21:42

## 2022-01-10 RX ADMIN — Medication 20 MILLIGRAM(S): at 08:30

## 2022-01-10 RX ADMIN — Medication 1 APPLICATION(S): at 12:05

## 2022-01-10 RX ADMIN — Medication 5 MILLIGRAM(S): at 21:27

## 2022-01-10 NOTE — BH INPATIENT PSYCHIATRY PROGRESS NOTE - PRN MEDS
MEDICATIONS  (PRN):  acetaminophen     Tablet .. 650 milliGRAM(s) Oral every 6 hours PRN Temp greater or equal to 38C (100.4F), Mild Pain (1 - 3), Moderate Pain (4 - 6)  aluminum hydroxide/magnesium hydroxide/simethicone Suspension 30 milliLiter(s) Oral every 4 hours PRN Dyspepsia  magnesium hydroxide Suspension 30 milliLiter(s) Oral at bedtime PRN constipation  mineral oil enema 133 milliLiter(s) Rectal daily PRN hard stool  saline laxative (FLEET) Rectal Enema 1 Enema Rectal daily PRN hard stool  triamcinolone 0.1% Cream 1 Application(s) Topical two times a day PRN pruritis/rash

## 2022-01-10 NOTE — BH INPATIENT PSYCHIATRY PROGRESS NOTE - NSBHCHARTREVIEWVS_PSY_A_CORE FT
Vital Signs Last 24 Hrs  T(C): --  T(F): --  HR: 71 (01-10-22 @ 11:57) (71 - 81)  BP: 104/57 (01-10-22 @ 11:57) (104/57 - 125/93)  BP(mean): --  RR: 16 (01-10-22 @ 11:57) (16 - 17)  SpO2: 99% (01-10-22 @ 11:57) (99% - 99%)    Orthostatic VS  01-09-22 @ 06:35  Lying BP: --/-- HR: --  Sitting BP: 112/64 HR: 76  Standing BP: --/-- HR: --  Site: --  Mode: --  Orthostatic VS  01-08-22 @ 15:50  Lying BP: --/-- HR: --  Sitting BP: 166/76 HR: 153  Standing BP: --/-- HR: --  Site: --  Mode: --

## 2022-01-10 NOTE — BH INPATIENT PSYCHIATRY PROGRESS NOTE - NSBHASSESSSUMMFT_PSY_ALL_CORE
66 y.o. F, as per records is , has been residing at [facility] ([facility]) since 2010, with past psychiatric history of dementia with behavioral disturbance, mood disorder, RODY, possible inpatient psychiatric admission to [facility] this year, has a chronic medical history notable for NPH (w/  shunt), HTN, DMT2, TBI (unspecified), pedal edema presenting from [facility] for physically aggressive behavior and diffuse rash. Records also indicate that [facility] was seeking a [facility] admission. Pt is AOx1 which per chart appears to be patient's baseline. Pt was doing well on previous medication regimen but having increase episodes of agitation. Pt most likely having increase in aggressive behavior 2/2 to TBI vs dementia. Patient requires inpatient admission for medication optimization and stabilization of agitation.     11/22: No acute events overnight or PRNs required, VSS, med adherent. Endorses AH of mother's voice telling her to take good care of her baby. Denies any safety concerns. Patient became agitated at 1pm after trying to follow repair men off unit, denies wanting to leave the unit. Incident likely related to disinhibition rather than psychosis, but further monitoring and collateral necessary to better understand the patient and hopefully delineate a pattern.   11/23: No acute events overnight or PRNs required, VSS, med adherent. Has been in more appropriate behavioral control following change in Zyprexa schedule. Was unable to take Depakote overnight due to difficulty swallowing the pill. Switched to sprinkles this morning which patient took without issue. Will continue monitoring on current regimen given change in formulation. Ammonia pending given patient's tremor. Will check VPA level when patient consistently taking Depakote sprinkles.   11/24: Patient required Zyprexa PO PRN yesterday at 6:40pm for agitation, given with good effect. Slept well overnight and adherent with meds. VPA/ammonia deferred given prior adherence issues due to difficulty swallowing. Will continue to titrate Zyprexa and check VPA/ammonia when the patient is taking reliably. EKG today given ongoing titration.   11/25: Patient remains confused, labile, but calm today.  11/26: No acute events over PRNs required. Patient remains intermittently labile but redirectable. Accepting of treatment. Will check VPA/ammonia today given improved adherence. VPA 68, Ammonia pending.  11/27: On exam, patient is pleasantly confused, denies any concerns. No behavorial events. Taking meds. Labs: Serum ammonia wnl at 15.   11/28: Patient remains confused, labile affect, intermittently laughing then screaming.  11/29: No acute events or PRNs required. Patient remains labile but redirectable and accepting treatment. Endorses pruritis in bilateral forearms and L foot swelling observed on exam. WILLIE evaluation pending.  11/30: Per WILLIE recs, patient sent to ED yesterday afternoon for doppler imaging to r/o DVT which was unremarkable. The patient was able to tolerate ED course without any behavioral incidents. This morning, patient at baseline, reports concern over LEs and agrees to meet again with WILLIE. ED had recommended consideration of Keflex for possible cellulitis. Awaiting hospitalist recs.   12/1: Patient acutely agitated overnight requiring Zyprexa 2.5mg PO accepted with good effect. Noted to have worsening rash despite adherence to topical steroid. Per WILLIE recs, dermatology consulted for further evaluation. Rash likely underlying behavioral exacerbation as the patient had been without issue for several days.  12/2: No acute events or PRNs required. Patient cooperative with Derm and GYN consults yesterday afternoon. Per derm, likely eczema for which will start clobetasol BID. Per recs, was also treated prophylactically for scabies (repeat permethrin in 1 week has been ordered). Per derm, also ordered Bullous Pemphigoid labs to r/o. Per GYN, send BV panel to r/o. Patient initially orthostatic but improved on repeat, denies symptoms.  12/3: Patient seen for complications of TBI dementia. No major overnight events. Patient not aggressive still labile  from silly giddy to angry, raising voice. Eating sleping fairly well. Had large BM.  12/4: Patient continues to be confused and disorganized.  12/6: Patient continuing to require PRNs, given Zyprexa 2.5mg PO yesterday afternoon with good effect. Remains intermittently labile without overt aggression. Denies SE and no evidence of ongoing constipation. Skin improving and patient denies any subjective irritation. VPA stable, today 67.  12/7: No acute events overnight or PRNs required. Denies SE and no evidence on exam. Denies any concerns with skin. Eventually cooperated with BP lab.  12/8: No acute events overnight or PRNs required. Denies SE and no evidence on exam. No further skin concerns. Patient sedated, which may be related to med titration. Will continue to monitor for now and make changes if it persists. Will check ECG tomorrow. Repeat permethrin tonight per derm recommendations.  12/9: No acute events overnight or PRNs required. Denies SE and no evidence on exam. Skin is stable for the time being. Patient less sedated this morning, ECG yesterday was sinus with QTc 422. As such, will continue Zyprexa schedule. Mildly elevated electrolytes on BMP likely attributable to dehydration. Patient received Zyprexa PRN at 11:44am after pushing another patient who was provoking her. Staff instructed to hold 1pm 5mg dose but give another 2.5mg PRN if needed.  12/10: No acute events overnight or PRNs required. Denies SE and skin concerns, no issues on exam. Patient more sedated than yesterday but less than the day prior. Pattern appears to be that she is more sedated in the morning but wakes up over the course of the day. As such, will reschedule pm dose earlier in the day.  12/11: This morning, the patient was laying in bed, somewhat sedated but arousable to verbal stimuli. She came out of her room for breakfast and was seen later working with PT and using her walker. Pt denies any concerns, reports stable mood, sleeping well and eating "ALL of the breakfast". Agrees to come to staff if she has any complaints.  12/12: Patient pleasant and calm on encounter and denies any new concerns. Eating and sleeping well. No behavioral events.  12/13: No acute events overnight or PRNs required. Denies SE and no issues observed on exam. Patient has been receiving Zyprexa TID as scheduled and is no longer sedated. Remains tenuous with staff, relatively labile and unpredictable, particularly around the middle of the day. Will increase Zyprexa by 5mg/day. BP labs WNL.  12/14: No acute events overnight or PRNs required. Denies SE and no issues on exam. Remains tenuous with staff, particularly with toileting, bathing, and meds. Continue med regimen. Began implementing behavioral intervention with stuffed animal which thus far has been well received. Staff to bring Claudia Mouse when they interact with the patient and encourage its use when the patient requires redirection.     Plan:  1. Legals: 9.27 status, *DNR/DNI* [MOLST in chart]  2. Safety: routine observation. PRNs: Zyprexa 2.5 mg PO/IM q6h   3. Psychiatry: Depakote sprinkles 750 mg BID, Zyprexa 7.5mg at 9a + 1p and Zyprexa 5mg at 5p, repeat ECG Friday  4. Group, milieu, individual therapy as appropriate. *Staff to use stuffed Claudia Mouse to redirect the patient when she becomes irritable.*  5. Medical:  -RASH: s/p derm consult, given permethrin x2 and started on clobetasol BID for 2 weeks. BP lab drawn and WNL. Most likely eczema. On 12/16 will transition from clobetasol BID to triamcinolone PRN per derm recs.   -ODOR: s/p GYN consult, BV panel sent and WNL. Pt noted to have stool burden on exam, pt had BM s/p enema.   -CONSTIPATION: continue Miralax 17g daily and Senna 2mg QHS, fleet enema PRN  -HTN: continue home Lopressor 25mg daily for HTN  -EDEMA: continue home Lasix 20mg daily  *PLEASE ENCOURAGE HYDRATION as patient with mildly elevated Na and Cl on BMP.  6. Dispo: pending clinical improvement.  Patient continues to require inpatient hospitalization for stabilization and safety.  12/15: Patient labile, calmer today so far but need more observation points given changeability. Cont olanzapine monitor gait, vitals watch for constipation  12/16: Patient labile, volative resistive with care. Will increase olanzapine, check EKG consider changing metorolol to propanolol as there is EBM support for this med  12/17 Not with sig change not aggressive with peers. CoStart trial of Inderal, will need to reconsider if cannot get vitals regular to assess safety  during dose titration  12/18Patient vocally agitated and resitive with care, some excess laxative effect. Tolerating Inderal plan further titration Inderal, lower Miralax  12/19 Labile but some improvement. plan hold laxatives and restart lower dose, plan further titration Inderal   12/20 Aggressive episode, even though may have inadvertently provoked by peer yelling. Given aggressive epsiode despite 25mg/d olanzapine need to consider trial a failure. Will taper olanzapine and start haldol. Unable  to reach family to notify  12/21 No aggression, still volatile yells curses if redirected. Will cont crossover to haldol from olanzapine. Cont Inderal but suspend titration til converted over to haldol  12/22 Patiwnd had incident of placing self on floor. Today appears calmer, no sig EPS, not sedated. Plan cont haldol have stopped olanzapine  12/23 So far seems better on haldol not as loud less raising  voice or cursing, tolerating well. Cont haldol watch for changes as olanzapine washes out  12/26: Patient has been calm, not screaming   12/27 Had bad night, then sleep in AM. Will cont haldol bt change timing consider going up on Inderal worth attempting as has not done well on antipsychotics  12/28 Remains diffiuclt with ADLs but less loud not aggression towards peers. Will increase Inderal to 30mg tid  12/29 emains labile with ADL related agitation, increased trmeor cont to titrate Inderal will reduce haldol  12/30 Patient less agitated possibly beneficial effect of Inderal. Will cont Inderal and since has increased tremor will further reduce haldol. Reviewed with internist have ordered U/A and c and s. Attempt to elevate legs as much as patient will allow  12/31: Some mild EPS, but less agitation, recent reduction of haloperidol, would continue current dose  1/2/2022: EPS somewhat worse, will reduce haloperidol to 0.5 bid   1/3 Improvement but EPS,haldol reduced. if not orthostatic may increase Inderal. Will increase melatonin for now for sleep  1/4 Some improvement in vocal and physical agitation. Consider increasing Inderal. EPS less on reduced haldol. COnsidet change to risperidone if BP okay  1/5: Improved still with resistance with care, erratic sleep, tremor. Will change haldol over to risperidone to lower EPS and td risk  1/6 Showing some improvement with Inderal plus antipsychotic. Have changed from haldol to rispridone to reduce tremor and EPS risk, will titrate if BP's stable  1/7 Improving, tolerating risperidone without drop in BP, will cont current treatment, hopefully tremor will reduce now off haldol  1/10 Improved unclear if excess sedation or daytime sleep is related to sleep quality at night. COnt med but will recheck VPA level , get ammonia level also CBC CMP in AM

## 2022-01-10 NOTE — BH INPATIENT PSYCHIATRY PROGRESS NOTE - NSBHMETABOLIC_PSY_ALL_CORE_FT
BMI: BMI (kg/m2): 31.6 (11-29-21 @ 16:35)  HbA1c: A1C with Estimated Average Glucose Result: 5.4 % (11-04-21 @ 06:58)    Glucose: POCT Blood Glucose.: 78 mg/dL (12-30-21 @ 07:34)    BP: 104/57 (01-10-22 @ 11:57) (102/62 - 125/93)  Lipid Panel:

## 2022-01-10 NOTE — BH INPATIENT PSYCHIATRY PROGRESS NOTE - NSBHFUPINTERVALHXFT_PSY_A_CORE
The patient was seen for follow-up for dementia with behavioral disturbances.. Remains disorganized.  Sleep  erratic, often sleeps late in morning, appetite good, no excessive stoolingVSS.  resistive  during care but not other times and somewhat less, some daytime napping

## 2022-01-10 NOTE — BH INPATIENT PSYCHIATRY PROGRESS NOTE - MSE UNSTRUCTURED FT
Patient is awake and alert. Bilateral resting tremor,prominent but does not interfere with eating and improved. Speech decreased production, softer, childlike, abnormal prosody. Paucity of thought.   Poor insight. No suicidal ideations. Mood neutral, less lability. No overt delusions, nunez. Thinking impoverished. Oriented to self Poor insight

## 2022-01-10 NOTE — BH INPATIENT PSYCHIATRY PROGRESS NOTE - CURRENT MEDICATION
MEDICATIONS  (STANDING):  AQUAPHOR (petrolatum Ointment) 1 Application(s) Topical three times a day  diVALproex Sprinkle 750 milliGRAM(s) Oral two times a day  furosemide    Tablet 20 milliGRAM(s) Oral daily  melatonin. 5 milliGRAM(s) Oral at bedtime  propranolol 30 milliGRAM(s) Oral three times a day  risperiDONE   Tablet 0.25 milliGRAM(s) Oral <User Schedule>    MEDICATIONS  (PRN):  acetaminophen     Tablet .. 650 milliGRAM(s) Oral every 6 hours PRN Temp greater or equal to 38C (100.4F), Mild Pain (1 - 3), Moderate Pain (4 - 6)  aluminum hydroxide/magnesium hydroxide/simethicone Suspension 30 milliLiter(s) Oral every 4 hours PRN Dyspepsia  magnesium hydroxide Suspension 30 milliLiter(s) Oral at bedtime PRN constipation  mineral oil enema 133 milliLiter(s) Rectal daily PRN hard stool  saline laxative (FLEET) Rectal Enema 1 Enema Rectal daily PRN hard stool  triamcinolone 0.1% Cream 1 Application(s) Topical two times a day PRN pruritis/rash

## 2022-01-11 LAB
ALBUMIN SERPL ELPH-MCNC: 3.8 G/DL — SIGNIFICANT CHANGE UP (ref 3.3–5)
ALP SERPL-CCNC: 50 U/L — SIGNIFICANT CHANGE UP (ref 40–120)
ALT FLD-CCNC: 9 U/L — SIGNIFICANT CHANGE UP (ref 4–33)
AMMONIA BLD-MCNC: 28 UMOL/L — SIGNIFICANT CHANGE UP (ref 11–55)
ANION GAP SERPL CALC-SCNC: 11 MMOL/L — SIGNIFICANT CHANGE UP (ref 7–14)
AST SERPL-CCNC: 13 U/L — SIGNIFICANT CHANGE UP (ref 4–32)
BASOPHILS # BLD AUTO: 0.03 K/UL — SIGNIFICANT CHANGE UP (ref 0–0.2)
BASOPHILS NFR BLD AUTO: 0.4 % — SIGNIFICANT CHANGE UP (ref 0–2)
BILIRUB SERPL-MCNC: 0.3 MG/DL — SIGNIFICANT CHANGE UP (ref 0.2–1.2)
BUN SERPL-MCNC: 20 MG/DL — SIGNIFICANT CHANGE UP (ref 7–23)
CALCIUM SERPL-MCNC: 9.5 MG/DL — SIGNIFICANT CHANGE UP (ref 8.4–10.5)
CHLORIDE SERPL-SCNC: 102 MMOL/L — SIGNIFICANT CHANGE UP (ref 98–107)
CO2 SERPL-SCNC: 27 MMOL/L — SIGNIFICANT CHANGE UP (ref 22–31)
CREAT SERPL-MCNC: 0.56 MG/DL — SIGNIFICANT CHANGE UP (ref 0.5–1.3)
EOSINOPHIL # BLD AUTO: 0.11 K/UL — SIGNIFICANT CHANGE UP (ref 0–0.5)
EOSINOPHIL NFR BLD AUTO: 1.5 % — SIGNIFICANT CHANGE UP (ref 0–6)
GLUCOSE SERPL-MCNC: 159 MG/DL — HIGH (ref 70–99)
HCT VFR BLD CALC: 40.7 % — SIGNIFICANT CHANGE UP (ref 34.5–45)
HGB BLD-MCNC: 12.7 G/DL — SIGNIFICANT CHANGE UP (ref 11.5–15.5)
IANC: 4.07 K/UL — SIGNIFICANT CHANGE UP (ref 1.5–8.5)
IMM GRANULOCYTES NFR BLD AUTO: 0.3 % — SIGNIFICANT CHANGE UP (ref 0–1.5)
LYMPHOCYTES # BLD AUTO: 2.48 K/UL — SIGNIFICANT CHANGE UP (ref 1–3.3)
LYMPHOCYTES # BLD AUTO: 32.8 % — SIGNIFICANT CHANGE UP (ref 13–44)
MCHC RBC-ENTMCNC: 30.2 PG — SIGNIFICANT CHANGE UP (ref 27–34)
MCHC RBC-ENTMCNC: 31.2 GM/DL — LOW (ref 32–36)
MCV RBC AUTO: 96.9 FL — SIGNIFICANT CHANGE UP (ref 80–100)
MONOCYTES # BLD AUTO: 0.85 K/UL — SIGNIFICANT CHANGE UP (ref 0–0.9)
MONOCYTES NFR BLD AUTO: 11.2 % — SIGNIFICANT CHANGE UP (ref 2–14)
NEUTROPHILS # BLD AUTO: 4.07 K/UL — SIGNIFICANT CHANGE UP (ref 1.8–7.4)
NEUTROPHILS NFR BLD AUTO: 53.8 % — SIGNIFICANT CHANGE UP (ref 43–77)
NRBC # BLD: 0 /100 WBCS — SIGNIFICANT CHANGE UP
NRBC # FLD: 0 K/UL — SIGNIFICANT CHANGE UP
PLATELET # BLD AUTO: 162 K/UL — SIGNIFICANT CHANGE UP (ref 150–400)
POTASSIUM SERPL-MCNC: 4.3 MMOL/L — SIGNIFICANT CHANGE UP (ref 3.5–5.3)
POTASSIUM SERPL-SCNC: 4.3 MMOL/L — SIGNIFICANT CHANGE UP (ref 3.5–5.3)
PROT SERPL-MCNC: 7.1 G/DL — SIGNIFICANT CHANGE UP (ref 6–8.3)
RBC # BLD: 4.2 M/UL — SIGNIFICANT CHANGE UP (ref 3.8–5.2)
RBC # FLD: 14.6 % — HIGH (ref 10.3–14.5)
SARS-COV-2 RNA SPEC QL NAA+PROBE: SIGNIFICANT CHANGE UP
SODIUM SERPL-SCNC: 140 MMOL/L — SIGNIFICANT CHANGE UP (ref 135–145)
VALPROATE SERPL-MCNC: 56.5 UG/ML — SIGNIFICANT CHANGE UP (ref 50–100)
WBC # BLD: 7.56 K/UL — SIGNIFICANT CHANGE UP (ref 3.8–10.5)
WBC # FLD AUTO: 7.56 K/UL — SIGNIFICANT CHANGE UP (ref 3.8–10.5)

## 2022-01-11 PROCEDURE — 99232 SBSQ HOSP IP/OBS MODERATE 35: CPT

## 2022-01-11 RX ADMIN — RISPERIDONE 0.25 MILLIGRAM(S): 4 TABLET ORAL at 17:46

## 2022-01-11 RX ADMIN — DIVALPROEX SODIUM 750 MILLIGRAM(S): 500 TABLET, DELAYED RELEASE ORAL at 09:47

## 2022-01-11 RX ADMIN — Medication 20 MILLIGRAM(S): at 09:46

## 2022-01-11 RX ADMIN — Medication 1 APPLICATION(S): at 12:26

## 2022-01-11 RX ADMIN — Medication 1 APPLICATION(S): at 20:42

## 2022-01-11 RX ADMIN — Medication 1 APPLICATION(S): at 09:59

## 2022-01-11 RX ADMIN — DIVALPROEX SODIUM 750 MILLIGRAM(S): 500 TABLET, DELAYED RELEASE ORAL at 20:42

## 2022-01-11 RX ADMIN — Medication 5 MILLIGRAM(S): at 20:45

## 2022-01-11 NOTE — BH INPATIENT PSYCHIATRY PROGRESS NOTE - NSBHFUPINTERVALHXFT_PSY_A_CORE
The patient was seen for follow-up for dementia with behavioral disturbances.. Remains disorganized.  Sleep  erratic, often sleeps late in morning, appetite good, no excessive stoolingVSS.  resistive  during care but not other times and somewhat less, some sundowning reported louder in PM

## 2022-01-11 NOTE — BH INPATIENT PSYCHIATRY PROGRESS NOTE - NSBHASSESSSUMMFT_PSY_ALL_CORE
66 y.o. F, as per records is , has been residing at [facility] ([facility]) since 2010, with past psychiatric history of dementia with behavioral disturbance, mood disorder, RODY, possible inpatient psychiatric admission to [facility] this year, has a chronic medical history notable for NPH (w/  shunt), HTN, DMT2, TBI (unspecified), pedal [place] [facility] [place] and diffuse rash. Records also indicate that [facility] was seeking a [facility] admission. Pt is AOx1 which per chart appears to be patient's baseline. Pt was doing well on previous medication regimen but having increase episodes of agitation. Pt most likely having increase in aggressive behavior 2/2 to TBI vs dementia. Patient requires inpatient admission for medication optimization and stabilization of agitation.     11/22: No acute events overnight or PRNs required, VSS, med adherent. Endorses AH of mother's voice telling her to take good care of her baby. Denies any safety concerns. Patient became agitated at 1pm after trying to follow repair men off unit, denies wanting to leave the unit. Incident likely related to disinhibition rather than psychosis, but further monitoring and collateral necessary to better understand the patient and hopefully delineate a pattern.   11/23: No acute events overnight or PRNs required, VSS, med adherent. Has been in more appropriate behavioral control following change in Zyprexa schedule. Was unable to take Depakote overnight due to difficulty swallowing the pill. Switched to sprinkles this morning which patient took without issue. Will continue monitoring on current regimen given change in formulation. Ammonia pending given patient's tremor. Will check VPA level when patient consistently taking Depakote sprinkles.   11/24: Patient required Zyprexa PO PRN yesterday at 6:40pm for agitation, given with good effect. Slept well overnight and adherent with meds. VPA/ammonia deferred given prior adherence issues due to difficulty swallowing. Will continue to titrate Zyprexa and check VPA/ammonia when the patient is taking reliably. EKG today given ongoing titration.   11/25: Patient remains confused, labile, but calm today.  11/26: No acute events over PRNs required. Patient remains intermittently labile but redirectable. Accepting of treatment. Will check VPA/ammonia today given improved adherence. VPA 68, Ammonia pending.  11/27: On exam, patient is pleasantly confused, denies any concerns. No behavorial events. Taking meds. Labs: Serum ammonia wnl at 15.   11/28: Patient remains confused, labile affect, intermittently laughing then screaming.  11/29: No acute events or PRNs required. Patient remains labile but redirectable and accepting treatment. Endorses pruritis in bilateral forearms and L foot swelling observed on exam. WILLIE evaluation pending.  11/30: Per WILLIE recs, patient sent to ED yesterday afternoon for doppler imaging to r/o DVT which was unremarkable. The patient was able to tolerate ED course without any behavioral incidents. This morning, patient at baseline, reports concern over LEs and agrees to meet again with WILLIE. ED had recommended consideration of Keflex for possible cellulitis. Awaiting hospitalist recs.   12/1: Patient acutely agitated overnight requiring Zyprexa 2.5mg PO accepted with good effect. Noted to have worsening rash despite adherence to topical steroid. Per WILLIE recs, dermatology consulted for further evaluation. Rash likely underlying behavioral exacerbation as the patient had been without issue for several days.  12/2: No acute events or PRNs required. Patient cooperative with Derm and GYN consults yesterday afternoon. Per derm, likely eczema for which will start clobetasol BID. Per recs, was also treated prophylactically for scabies (repeat permethrin in 1 week has been ordered). Per derm, also ordered Bullous Pemphigoid labs to r/o. Per GYN, send BV panel to r/o. Patient initially orthostatic but improved on repeat, denies symptoms.  12/3: Patient seen for complications of TBI dementia. No major overnight events. Patient not aggressive still labile  from silly giddy to angry, raising voice. Eating sleping fairly well. Had large BM.  12/4: Patient continues to be confused and disorganized.  12/6: Patient continuing to require PRNs, given Zyprexa 2.5mg PO yesterday afternoon with good effect. Remains intermittently labile without overt aggression. Denies SE and no evidence of ongoing constipation. Skin improving and patient denies any subjective irritation. VPA stable, today 67.  12/7: No acute events overnight or PRNs required. Denies SE and no evidence on exam. Denies any concerns with skin. Eventually cooperated with BP lab.  12/8: No acute events overnight or PRNs required. Denies SE and no evidence on exam. No further skin concerns. Patient sedated, which may be related to med titration. Will continue to monitor for now and make changes if it persists. Will check ECG tomorrow. Repeat permethrin tonight per derm recommendations.  12/9: No acute events overnight or PRNs required. Denies SE and no evidence on exam. Skin is stable for the time being. Patient less sedated this morning, ECG yesterday was sinus with QTc 422. As such, will continue Zyprexa schedule. Mildly elevated electrolytes on BMP likely attributable to dehydration. Patient received Zyprexa PRN at 11:44am after pushing another patient who was provoking her. Staff instructed to hold 1pm 5mg dose but give another 2.5mg PRN if needed.  12/10: No acute events overnight or PRNs required. Denies SE and skin concerns, no issues on exam. Patient more sedated than yesterday but less than the day prior. Pattern appears to be that she is more sedated in the morning but wakes up over the course of the day. As such, will reschedule pm dose earlier in the day.  12/11: This morning, the patient was laying in bed, somewhat sedated but arousable to verbal stimuli. She came out of her room for breakfast and was seen later working with PT and using her walker. Pt denies any concerns, reports stable mood, sleeping well and eating "ALL of the breakfast". Agrees to come to staff if she has any complaints.  12/12: Patient pleasant and calm on encounter and denies any new concerns. Eating and sleeping well. No behavioral events.  12/13: No acute events overnight or PRNs required. Denies SE and no issues observed on exam. Patient has been receiving Zyprexa TID as scheduled and is no longer sedated. Remains tenuous with staff, relatively labile and unpredictable, particularly around the middle of the day. Will increase Zyprexa by 5mg/day. BP labs WNL.  12/14: No acute events overnight or PRNs required. Denies SE and no issues on exam. Remains tenuous with staff, particularly with toileting, bathing, and meds. Continue med regimen. Began implementing behavioral intervention with stuffed animal which thus far has been well received. Staff to bring Claudia Mouse when they interact with the patient and encourage its use when the patient requires redirection.     Plan:  1. Legals: 9.27 status, *DNR/DNI* [MOLST in chart]  2. Safety: routine observation. PRNs: Zyprexa 2.5 mg PO/IM q6h   3. Psychiatry: Depakote sprinkles 750 mg BID, Zyprexa 7.5mg at 9a + 1p and Zyprexa 5mg at 5p, repeat ECG Friday  4. Group, milieu, individual therapy as appropriate. *Staff to use stuffed Claudia Mouse to redirect the patient when she becomes irritable.*  5. Medical:  -RASH: s/p derm consult, given permethrin x2 and started on clobetasol BID for 2 weeks. BP lab drawn and WNL. Most likely eczema. On 12/16 will transition from clobetasol BID to triamcinolone PRN per derm recs.   -ODOR: s/p GYN consult, BV panel sent and WNL. Pt noted to have stool burden on exam, pt had BM s/p enema.   -CONSTIPATION: continue Miralax 17g daily and Senna 2mg QHS, fleet enema PRN  -HTN: continue home Lopressor 25mg daily for HTN  -EDEMA: continue home Lasix 20mg daily  *PLEASE ENCOURAGE HYDRATION as patient with mildly elevated Na and Cl on BMP.  6. Dispo: pending clinical improvement.  Patient continues to require inpatient hospitalization for stabilization and safety.  12/15: Patient labile, calmer today so far but need more observation points given changeability. Cont olanzapine monitor gait, vitals watch for constipation  12/16: Patient labile, volative resistive with care. Will increase olanzapine, check EKG consider changing metorolol to propanolol as there is EBM support for this med  12/17 Not with sig change not aggressive with peers. CoStart trial of Inderal, will need to reconsider if cannot get vitals regular to assess safety  during dose titration  12/18Patient vocally agitated and resitive with care, some excess laxative effect. Tolerating Inderal plan further titration Inderal, lower Miralax  12/19 Labile but some improvement. plan hold laxatives and restart lower dose, plan further titration Inderal   12/20 Aggressive episode, even though may have inadvertently provoked by peer yelling. Given aggressive epsiode despite 25mg/d olanzapine need to consider trial a failure. Will taper olanzapine and start haldol. Unable  to reach family to notify  12/21 No aggression, still volatile yells curses if redirected. Will cont crossover to haldol from olanzapine. Cont Inderal but suspend titration til converted over to haldol  12/22 Patiwnd had incident of placing self on floor. Today appears calmer, no sig EPS, not sedated. Plan cont haldol have stopped olanzapine  12/23 So far seems better on haldol not as loud less raising  voice or cursing, tolerating well. Cont haldol watch for changes as olanzapine washes out  12/26: Patient has been calm, not screaming   12/27 Had bad night, then sleep in AM. Will cont haldol bt change timing consider going up on Inderal worth attempting as has not done well on antipsychotics  12/28 Remains diffiuclt with ADLs but less loud not aggression towards peers. Will increase Inderal to 30mg tid  12/29 emains labile with ADL related agitation, increased trmeor cont to titrate Inderal will reduce haldol  12/30 Patient less agitated possibly beneficial effect of Inderal. Will cont Inderal and since has increased tremor will further reduce haldol. Reviewed with internist have ordered U/A and c and s. Attempt to elevate legs as much as patient will allow  12/31: Some mild EPS, but less agitation, recent reduction of haloperidol, would continue current dose  1/2/2022: EPS somewhat worse, will reduce haloperidol to 0.5 bid   1/3 Improvement but EPS,haldol reduced. if not orthostatic may increase Inderal. Will increase melatonin for now for sleep  1/4 Some improvement in vocal and physical agitation. Consider increasing Inderal. EPS less on reduced haldol. COnsidet change to risperidone if BP okay  1/5: Improved still with resistance with care, erratic sleep, tremor. Will change haldol over to risperidone to lower EPS and td risk  1/6 Showing some improvement with Inderal plus antipsychotic. Have changed from haldol to rispridone to reduce tremor and EPS risk, will titrate if BP's stable  1/7 Improving, tolerating risperidone without drop in BP, will cont current treatment, hopefully tremor will reduce now off haldol  1/10 Improved unclear if excess sedation or daytime sleep is related to sleep quality at night. COnt med but will recheck VPA level , get ammonia level also CBC CMP in AM  1/11 Patient imperoved cont current meds, consider increasing PM risperidone to address sundowning. No major issues identified on today's bloodwork

## 2022-01-11 NOTE — BH INPATIENT PSYCHIATRY PROGRESS NOTE - NSBHCHARTREVIEWVS_PSY_A_CORE FT
Vital Signs Last 24 Hrs  T(C): 36.4 (01-11-22 @ 07:39), Max: 36.4 (01-11-22 @ 07:39)  T(F): 97.6 (01-11-22 @ 07:39), Max: 97.6 (01-11-22 @ 07:39)  HR: --  BP: --  BP(mean): --  RR: 17 (01-10-22 @ 20:19) (17 - 17)  SpO2: --    Orthostatic VS  01-11-22 @ 07:39  Lying BP: --/-- HR: --  Sitting BP: 127/63 HR: 94  Standing BP: 116/48 HR: 58  Site: --  Mode: --  Orthostatic VS  01-10-22 @ 20:19  Lying BP: --/-- HR: --  Sitting BP: 145/71 HR: 75  Standing BP: --/-- HR: --  Site: --  Mode: --

## 2022-01-12 RX ADMIN — Medication 5 MILLIGRAM(S): at 20:45

## 2022-01-12 RX ADMIN — Medication 20 MILLIGRAM(S): at 09:49

## 2022-01-12 RX ADMIN — Medication 1 APPLICATION(S): at 09:49

## 2022-01-12 RX ADMIN — Medication 1 APPLICATION(S): at 13:10

## 2022-01-12 RX ADMIN — DIVALPROEX SODIUM 750 MILLIGRAM(S): 500 TABLET, DELAYED RELEASE ORAL at 20:46

## 2022-01-12 RX ADMIN — Medication 1 APPLICATION(S): at 21:33

## 2022-01-12 RX ADMIN — RISPERIDONE 0.25 MILLIGRAM(S): 4 TABLET ORAL at 17:06

## 2022-01-12 RX ADMIN — DIVALPROEX SODIUM 750 MILLIGRAM(S): 500 TABLET, DELAYED RELEASE ORAL at 09:49

## 2022-01-13 PROCEDURE — 99232 SBSQ HOSP IP/OBS MODERATE 35: CPT

## 2022-01-13 RX ADMIN — DIVALPROEX SODIUM 750 MILLIGRAM(S): 500 TABLET, DELAYED RELEASE ORAL at 20:38

## 2022-01-13 RX ADMIN — DIVALPROEX SODIUM 750 MILLIGRAM(S): 500 TABLET, DELAYED RELEASE ORAL at 09:02

## 2022-01-13 RX ADMIN — Medication 5 MILLIGRAM(S): at 20:38

## 2022-01-13 RX ADMIN — RISPERIDONE 0.25 MILLIGRAM(S): 4 TABLET ORAL at 17:32

## 2022-01-13 RX ADMIN — Medication 1 APPLICATION(S): at 21:16

## 2022-01-13 RX ADMIN — Medication 20 MILLIGRAM(S): at 09:04

## 2022-01-13 NOTE — BH INPATIENT PSYCHIATRY PROGRESS NOTE - NSBHMETABOLIC_PSY_ALL_CORE_FT
BMI: BMI (kg/m2): 31.6 (11-29-21 @ 16:35)  HbA1c: A1C with Estimated Average Glucose Result: 5.4 % (11-04-21 @ 06:58)    Glucose: POCT Blood Glucose.: 78 mg/dL (12-30-21 @ 07:34)    BP: 120/53 (01-13-22 @ 06:29) (117/83 - 120/53)  Lipid Panel:

## 2022-01-13 NOTE — BH INPATIENT PSYCHIATRY PROGRESS NOTE - CURRENT MEDICATION
MEDICATIONS  (STANDING):  AQUAPHOR (petrolatum Ointment) 1 Application(s) Topical three times a day  artificial  tears Solution 1 Drop(s) Left EYE every 4 hours  diVALproex Sprinkle 750 milliGRAM(s) Oral two times a day  furosemide    Tablet 20 milliGRAM(s) Oral daily  melatonin. 5 milliGRAM(s) Oral at bedtime  propranolol 30 milliGRAM(s) Oral three times a day  risperiDONE   Tablet 0.25 milliGRAM(s) Oral <User Schedule>    MEDICATIONS  (PRN):  acetaminophen     Tablet .. 650 milliGRAM(s) Oral every 6 hours PRN Temp greater or equal to 38C (100.4F), Mild Pain (1 - 3), Moderate Pain (4 - 6)  aluminum hydroxide/magnesium hydroxide/simethicone Suspension 30 milliLiter(s) Oral every 4 hours PRN Dyspepsia  magnesium hydroxide Suspension 30 milliLiter(s) Oral at bedtime PRN constipation  mineral oil enema 133 milliLiter(s) Rectal daily PRN hard stool  saline laxative (FLEET) Rectal Enema 1 Enema Rectal daily PRN hard stool  triamcinolone 0.1% Cream 1 Application(s) Topical two times a day PRN pruritis/rash

## 2022-01-13 NOTE — BH INPATIENT PSYCHIATRY PROGRESS NOTE - NSBHFUPINTERVALHXFT_PSY_A_CORE
The patient was seen for follow-up for dementia with behavioral disturbances.. Remains disorganized.  Sleep  erratic, often sleeps late in morning, appetite good,VSS.  resistive  during care but not other times and somewhat less, some sundowning reported louder in PM

## 2022-01-13 NOTE — BH INPATIENT PSYCHIATRY PROGRESS NOTE - NSBHASSESSSUMMFT_PSY_ALL_CORE
1/13 Patient improved, no aggression, has predictable resistiveness with care . Cont Inderal and low dose risperidone. Patient's 2PC expiring patient has advanced dementia. She has no current living arrangement and cannot be safely d/markel to community w/o one. Therefore will apply for further retention

## 2022-01-13 NOTE — BH INPATIENT PSYCHIATRY PROGRESS NOTE - NSBHCHARTREVIEWVS_PSY_A_CORE FT
Vital Signs Last 24 Hrs  T(C): 36.7 (01-13-22 @ 06:29), Max: 36.7 (01-13-22 @ 06:29)  T(F): 98 (01-13-22 @ 06:29), Max: 98 (01-13-22 @ 06:29)  HR: 62 (01-13-22 @ 06:29) (62 - 62)  BP: 120/53 (01-13-22 @ 06:29) (120/53 - 120/53)  BP(mean): --  RR: --  SpO2: --    Orthostatic VS  01-12-22 @ 20:18  Lying BP: --/-- HR: --  Sitting BP: 132/78 HR: 75  Standing BP: --/-- HR: --  Site: upper left arm  Mode: electronic  Orthostatic VS  01-11-22 @ 20:34  Lying BP: --/-- HR: --  Sitting BP: 121/99 HR: 78  Standing BP: --/-- HR: --  Site: --  Mode: --

## 2022-01-13 NOTE — BH INPATIENT PSYCHIATRY PROGRESS NOTE - MSE UNSTRUCTURED FT
Patient is awake and alert. Bilateral resting tremor,prominent but does not interfere with eating and improved. Speech decreased production, softer, childlike, abnormal prosody. Repeats phrases in high pitch then low pitch. Paucity of thought.   Poor insight. No suicidal ideations. Mood neutral, less lability. No overt delusions, nunez. Thinking impoverished. Oriented to self Poor insight

## 2022-01-14 PROCEDURE — 99232 SBSQ HOSP IP/OBS MODERATE 35: CPT

## 2022-01-14 RX ADMIN — Medication 1 APPLICATION(S): at 10:01

## 2022-01-14 RX ADMIN — DIVALPROEX SODIUM 750 MILLIGRAM(S): 500 TABLET, DELAYED RELEASE ORAL at 09:25

## 2022-01-14 RX ADMIN — Medication 20 MILLIGRAM(S): at 09:24

## 2022-01-14 RX ADMIN — Medication 5 MILLIGRAM(S): at 20:41

## 2022-01-14 RX ADMIN — RISPERIDONE 0.25 MILLIGRAM(S): 4 TABLET ORAL at 17:34

## 2022-01-14 RX ADMIN — Medication 1 APPLICATION(S): at 13:00

## 2022-01-14 RX ADMIN — DIVALPROEX SODIUM 750 MILLIGRAM(S): 500 TABLET, DELAYED RELEASE ORAL at 20:41

## 2022-01-14 NOTE — BH INPATIENT PSYCHIATRY PROGRESS NOTE - NSBHASSESSSUMMFT_PSY_ALL_CORE
1/13 Patient improved, no aggression, has predictable resistiveness with care . Cont Inderal and low dose risperidone. Patient's 2PC expiring patient has advanced dementia. She has no current living arrangement and cannot be safely d/markel to community w/o one. Therefore will apply for further retention  1/14: Pt is visible in the unit, pleasant on approach. Disorganized. Denies any complaints to writer. No PRNs needed. Tolerating Inderal well, VSS stable.

## 2022-01-14 NOTE — BH INPATIENT PSYCHIATRY PROGRESS NOTE - NSBHFUPINTERVALHXFT_PSY_A_CORE
The patient was seen for follow-up for dementia with behavioral disturbances.. Remains disorganized.  Sleep  erratic, often sleeps late in morning, appetite good,VSS.  resistive  during care but not other times and somewhat less, some sundowning reported louder in PM.   1/14: Pt is visible in the unit, pleasant on approach. Disorganized. Denies any complaints to writer. No PRNs needed.

## 2022-01-14 NOTE — BH INPATIENT PSYCHIATRY PROGRESS NOTE - NSBHMETABOLIC_PSY_ALL_CORE_FT
BMI: BMI (kg/m2): 31.6 (11-29-21 @ 16:35)  HbA1c: A1C with Estimated Average Glucose Result: 5.4 % (11-04-21 @ 06:58)    Glucose: POCT Blood Glucose.: 78 mg/dL (12-30-21 @ 07:34)    BP: 118/60 (01-14-22 @ 09:27) (117/83 - 120/53)  Lipid Panel:

## 2022-01-14 NOTE — BH INPATIENT PSYCHIATRY PROGRESS NOTE - NSBHCHARTREVIEWVS_PSY_A_CORE FT
Vital Signs Last 24 Hrs  T(C): 36.9 (01-14-22 @ 15:45), Max: 37.2 (01-13-22 @ 20:29)  T(F): 98.5 (01-14-22 @ 15:45), Max: 98.9 (01-13-22 @ 20:29)  HR: 68 (01-14-22 @ 09:27) (68 - 69)  BP: 118/60 (01-14-22 @ 09:27) (118/60 - 119/57)  BP(mean): --  RR: --  SpO2: --    Orthostatic VS  01-14-22 @ 08:05  Lying BP: --/-- HR: --  Sitting BP: 97/53 HR: 63  Standing BP: 117/65 HR: 67  Site: upper right arm  Mode: electronic  Orthostatic VS  01-12-22 @ 20:18  Lying BP: --/-- HR: --  Sitting BP: 132/78 HR: 75  Standing BP: --/-- HR: --  Site: upper left arm  Mode: electronic

## 2022-01-15 LAB — SARS-COV-2 RNA SPEC QL NAA+PROBE: SIGNIFICANT CHANGE UP

## 2022-01-15 RX ADMIN — RISPERIDONE 0.25 MILLIGRAM(S): 4 TABLET ORAL at 17:52

## 2022-01-15 RX ADMIN — Medication 20 MILLIGRAM(S): at 10:30

## 2022-01-15 RX ADMIN — DIVALPROEX SODIUM 750 MILLIGRAM(S): 500 TABLET, DELAYED RELEASE ORAL at 10:30

## 2022-01-15 RX ADMIN — Medication 5 MILLIGRAM(S): at 21:19

## 2022-01-15 RX ADMIN — Medication 1 APPLICATION(S): at 21:20

## 2022-01-15 RX ADMIN — Medication 1 APPLICATION(S): at 10:10

## 2022-01-15 RX ADMIN — Medication 1 APPLICATION(S): at 13:06

## 2022-01-15 RX ADMIN — DIVALPROEX SODIUM 750 MILLIGRAM(S): 500 TABLET, DELAYED RELEASE ORAL at 21:20

## 2022-01-16 RX ADMIN — Medication 1 APPLICATION(S): at 13:45

## 2022-01-16 RX ADMIN — DIVALPROEX SODIUM 750 MILLIGRAM(S): 500 TABLET, DELAYED RELEASE ORAL at 08:59

## 2022-01-16 RX ADMIN — DIVALPROEX SODIUM 750 MILLIGRAM(S): 500 TABLET, DELAYED RELEASE ORAL at 21:03

## 2022-01-16 RX ADMIN — Medication 20 MILLIGRAM(S): at 08:58

## 2022-01-16 RX ADMIN — Medication 1 APPLICATION(S): at 09:00

## 2022-01-16 RX ADMIN — RISPERIDONE 0.25 MILLIGRAM(S): 4 TABLET ORAL at 17:00

## 2022-01-16 RX ADMIN — Medication 5 MILLIGRAM(S): at 21:03

## 2022-01-17 RX ADMIN — DIVALPROEX SODIUM 750 MILLIGRAM(S): 500 TABLET, DELAYED RELEASE ORAL at 09:09

## 2022-01-17 RX ADMIN — DIVALPROEX SODIUM 750 MILLIGRAM(S): 500 TABLET, DELAYED RELEASE ORAL at 21:07

## 2022-01-17 RX ADMIN — Medication 1 APPLICATION(S): at 09:16

## 2022-01-17 RX ADMIN — Medication 20 MILLIGRAM(S): at 09:09

## 2022-01-17 RX ADMIN — Medication 1 APPLICATION(S): at 13:15

## 2022-01-17 RX ADMIN — RISPERIDONE 0.25 MILLIGRAM(S): 4 TABLET ORAL at 17:00

## 2022-01-17 RX ADMIN — Medication 5 MILLIGRAM(S): at 21:06

## 2022-01-17 RX ADMIN — Medication 1 APPLICATION(S): at 21:00

## 2022-01-18 PROCEDURE — 99232 SBSQ HOSP IP/OBS MODERATE 35: CPT

## 2022-01-18 RX ADMIN — Medication 5 MILLIGRAM(S): at 21:00

## 2022-01-18 RX ADMIN — Medication 20 MILLIGRAM(S): at 08:52

## 2022-01-18 RX ADMIN — RISPERIDONE 0.25 MILLIGRAM(S): 4 TABLET ORAL at 17:34

## 2022-01-18 RX ADMIN — DIVALPROEX SODIUM 750 MILLIGRAM(S): 500 TABLET, DELAYED RELEASE ORAL at 08:51

## 2022-01-18 RX ADMIN — Medication 1 APPLICATION(S): at 13:14

## 2022-01-18 RX ADMIN — Medication 1 APPLICATION(S): at 08:55

## 2022-01-18 RX ADMIN — DIVALPROEX SODIUM 750 MILLIGRAM(S): 500 TABLET, DELAYED RELEASE ORAL at 21:01

## 2022-01-18 NOTE — BH INPATIENT PSYCHIATRY PROGRESS NOTE - NSBHCHARTREVIEWVS_PSY_A_CORE FT
Vital Signs Last 24 Hrs  T(C): --  T(F): --  HR: 101 (01-18-22 @ 06:34) (73 - 101)  BP: 134/69 (01-18-22 @ 06:34) (134/69 - 143/64)  BP(mean): --  RR: --  SpO2: --    Orthostatic VS  01-16-22 @ 20:33  Lying BP: --/-- HR: --  Sitting BP: 110/53 HR: 76  Standing BP: --/-- HR: --  Site: --  Mode: --

## 2022-01-18 NOTE — BH INPATIENT PSYCHIATRY PROGRESS NOTE - NSBHFUPINTERVALHXFT_PSY_A_CORE
The patient was seen for follow-up for dementia with behavioral disturbances.. Remains disorganized.  Sleep  fair, often sleeps late in morning, appetite good,VSS.  resistive  during care but not other times and overall less, some sundowning  voice reported louder in PM.

## 2022-01-18 NOTE — BH INPATIENT PSYCHIATRY PROGRESS NOTE - NSBHASSESSSUMMFT_PSY_ALL_CORE
1/13 Patient improved, no aggression, has predictable resistiveness with care . Cont Inderal and low dose risperidone. Patient's 2PC expiring patient has advanced dementia. She has no current living arrangement and cannot be safely d/markel to community w/o one. Therefore will apply for further retention  1/14: Pt is visible in the unit, pleasant on approach. Disorganized. Denies any complaints to writer. No PRNs needed. Tolerating Inderal well, VSS stable.   1/18: Patient improved and sustaining gains. Cont current Inderal and low dose risperidone at current doses.

## 2022-01-18 NOTE — BH INPATIENT PSYCHIATRY PROGRESS NOTE - NSBHMETABOLIC_PSY_ALL_CORE_FT
BMI: BMI (kg/m2): 31.6 (11-29-21 @ 16:35)  HbA1c: A1C with Estimated Average Glucose Result: 5.4 % (11-04-21 @ 06:58)    Glucose: POCT Blood Glucose.: 78 mg/dL (12-30-21 @ 07:34)    BP: 134/69 (01-18-22 @ 06:34) (134/69 - 143/64)  Lipid Panel:

## 2022-01-19 LAB — SARS-COV-2 RNA SPEC QL NAA+PROBE: SIGNIFICANT CHANGE UP

## 2022-01-19 PROCEDURE — 99231 SBSQ HOSP IP/OBS SF/LOW 25: CPT

## 2022-01-19 RX ADMIN — Medication 1 APPLICATION(S): at 09:07

## 2022-01-19 RX ADMIN — DIVALPROEX SODIUM 750 MILLIGRAM(S): 500 TABLET, DELAYED RELEASE ORAL at 09:06

## 2022-01-19 RX ADMIN — Medication 5 MILLIGRAM(S): at 22:02

## 2022-01-19 RX ADMIN — Medication 1 APPLICATION(S): at 12:37

## 2022-01-19 RX ADMIN — RISPERIDONE 0.25 MILLIGRAM(S): 4 TABLET ORAL at 16:45

## 2022-01-19 RX ADMIN — Medication 20 MILLIGRAM(S): at 09:06

## 2022-01-19 RX ADMIN — DIVALPROEX SODIUM 750 MILLIGRAM(S): 500 TABLET, DELAYED RELEASE ORAL at 22:01

## 2022-01-19 NOTE — BH INPATIENT PSYCHIATRY PROGRESS NOTE - NSBHMETABOLIC_PSY_ALL_CORE_FT
BMI: BMI (kg/m2): 31.6 (11-29-21 @ 16:35)  HbA1c: A1C with Estimated Average Glucose Result: 5.4 % (11-04-21 @ 06:58)    Glucose: POCT Blood Glucose.: 78 mg/dL (12-30-21 @ 07:34)    BP: 106/71 (01-19-22 @ 12:49) (106/71 - 143/64)  Lipid Panel:

## 2022-01-19 NOTE — BH INPATIENT PSYCHIATRY PROGRESS NOTE - NSBHFUPINTERVALHXFT_PSY_A_CORE
The patient was seen for follow-up for dementia with behavioral disturbances.. Remains disorganized.  Sleep good , appetite good,VSS.  resistive  during care but not other times and overall less, some sundowning

## 2022-01-19 NOTE — BH INPATIENT PSYCHIATRY PROGRESS NOTE - NSBHCHARTREVIEWVS_PSY_A_CORE FT
Vital Signs Last 24 Hrs  T(C): 36.6 (01-19-22 @ 07:14), Max: 36.6 (01-19-22 @ 07:14)  T(F): 97.9 (01-19-22 @ 07:14), Max: 97.9 (01-19-22 @ 07:14)  HR: 68 (01-19-22 @ 12:49) (68 - 79)  BP: 106/71 (01-19-22 @ 12:49) (106/71 - 107/56)  BP(mean): --  RR: 18 (01-19-22 @ 12:49) (18 - 18)  SpO2: --    Orthostatic VS  01-18-22 @ 20:40  Lying BP: --/-- HR: --  Sitting BP: 130/88 HR: 101  Standing BP: --/-- HR: --  Site: --  Mode: --

## 2022-01-19 NOTE — BH INPATIENT PSYCHIATRY PROGRESS NOTE - NSBHASSESSSUMMFT_PSY_ALL_CORE
1/13 Patient improved, no aggression, has predictable resistiveness with care . Cont Inderal and low dose risperidone. Patient's 2PC expiring patient has advanced dementia. She has no current living arrangement and cannot be safely d/markel to community w/o one. Therefore will apply for further retention  1/14: Pt is visible in the unit, pleasant on approach. Disorganized. Denies any complaints to writer. No PRNs needed. Tolerating Inderal well, VSS stable.   1/18: Patient improved and sustaining gains. Cont current Inderal and low dose risperidone at current doses.   1/19 Patient improved, not aggressive or highly agitated. Cont current treatment w/o changes fo rnow

## 2022-01-20 PROCEDURE — 99231 SBSQ HOSP IP/OBS SF/LOW 25: CPT

## 2022-01-20 RX ADMIN — Medication 1 APPLICATION(S): at 23:06

## 2022-01-20 RX ADMIN — DIVALPROEX SODIUM 750 MILLIGRAM(S): 500 TABLET, DELAYED RELEASE ORAL at 21:56

## 2022-01-20 RX ADMIN — Medication 5 MILLIGRAM(S): at 21:57

## 2022-01-20 RX ADMIN — Medication 1 APPLICATION(S): at 13:23

## 2022-01-20 RX ADMIN — RISPERIDONE 0.25 MILLIGRAM(S): 4 TABLET ORAL at 17:23

## 2022-01-20 RX ADMIN — Medication 20 MILLIGRAM(S): at 08:54

## 2022-01-20 RX ADMIN — DIVALPROEX SODIUM 750 MILLIGRAM(S): 500 TABLET, DELAYED RELEASE ORAL at 08:56

## 2022-01-20 NOTE — BH INPATIENT PSYCHIATRY PROGRESS NOTE - MSE UNSTRUCTURED FT
Patient is awake and alert. relates in childlike manner Bilateral resting tremor,prominent but does not interfere with eating and improved. Speech decreased production, softer, childlike, abnormal prosody. Repeats phrases in high pitch then low pitch. Paucity of thought.   Poor insight. No suicidal ideations. Mood neutral, less lability. No overt delusions, nunez. Thinking impoverished. Oriented to self Poor insight

## 2022-01-20 NOTE — BH INPATIENT PSYCHIATRY PROGRESS NOTE - NSBHASSESSSUMMFT_PSY_ALL_CORE
1/13 Patient improved, no aggression, has predictable resistiveness with care . Cont Inderal and low dose risperidone. Patient's 2PC expiring patient has advanced dementia. She has no current living arrangement and cannot be safely d/markel to community w/o one. Therefore will apply for further retention  1/14: Pt is visible in the unit, pleasant on approach. Disorganized. Denies any complaints to writer. No PRNs needed. Tolerating Inderal well, VSS stable.   1/18: Patient improved and sustaining gains. Cont current Inderal and low dose risperidone at current doses.   1/19 Patient improved, not aggressive or highly agitated. Cont current treatment w/o changes fo now  1/20 Maintaning gains, no sig agitation, cont current treatment

## 2022-01-20 NOTE — BH INPATIENT PSYCHIATRY PROGRESS NOTE - NSBHCHARTREVIEWVS_PSY_A_CORE FT
Vital Signs Last 24 Hrs  T(C): 36.7 (01-20-22 @ 05:47), Max: 36.7 (01-20-22 @ 05:47)  T(F): 98 (01-20-22 @ 05:47), Max: 98 (01-20-22 @ 05:47)  HR: 72 (01-20-22 @ 05:47) (72 - 72)  BP: 109/69 (01-20-22 @ 05:47) (109/69 - 109/69)  BP(mean): --  RR: 18 (01-19-22 @ 20:31) (18 - 18)  SpO2: --    Orthostatic VS  01-19-22 @ 20:31  Lying BP: --/-- HR: --  Sitting BP: 113/94 HR: 97  Standing BP: --/-- HR: --  Site: upper left arm  Mode: electronic  Orthostatic VS  01-18-22 @ 20:40  Lying BP: --/-- HR: --  Sitting BP: 130/88 HR: 101  Standing BP: --/-- HR: --  Site: --  Mode: --

## 2022-01-20 NOTE — BH INPATIENT PSYCHIATRY PROGRESS NOTE - NSBHMETABOLIC_PSY_ALL_CORE_FT
BMI: BMI (kg/m2): 31.6 (11-29-21 @ 16:35)  HbA1c: A1C with Estimated Average Glucose Result: 5.4 % (11-04-21 @ 06:58)    Glucose: POCT Blood Glucose.: 78 mg/dL (12-30-21 @ 07:34)    BP: 109/69 (01-20-22 @ 05:47) (106/71 - 143/64)  Lipid Panel:

## 2022-01-21 PROCEDURE — 99232 SBSQ HOSP IP/OBS MODERATE 35: CPT

## 2022-01-21 RX ORDER — RISPERIDONE 4 MG/1
0.25 TABLET ORAL
Refills: 0 | Status: DISCONTINUED | OUTPATIENT
Start: 2022-01-21 | End: 2022-02-04

## 2022-01-21 RX ADMIN — Medication 1 APPLICATION(S): at 13:49

## 2022-01-21 RX ADMIN — Medication 5 MILLIGRAM(S): at 20:16

## 2022-01-21 RX ADMIN — DIVALPROEX SODIUM 750 MILLIGRAM(S): 500 TABLET, DELAYED RELEASE ORAL at 20:17

## 2022-01-21 RX ADMIN — Medication 20 MILLIGRAM(S): at 08:29

## 2022-01-21 RX ADMIN — Medication 1 APPLICATION(S): at 08:32

## 2022-01-21 RX ADMIN — RISPERIDONE 0.25 MILLIGRAM(S): 4 TABLET ORAL at 17:18

## 2022-01-21 RX ADMIN — DIVALPROEX SODIUM 750 MILLIGRAM(S): 500 TABLET, DELAYED RELEASE ORAL at 08:29

## 2022-01-21 NOTE — BH INPATIENT PSYCHIATRY PROGRESS NOTE - NSBHFUPINTERVALHXFT_PSY_A_CORE
The patient was seen for follow-up for dementia with behavioral disturbances.. Remains disorganized.  Sleep good , appetite good,VSS.  resistive  and sometimes loud during care but not other times and overall less, some sundowning

## 2022-01-21 NOTE — BH INPATIENT PSYCHIATRY PROGRESS NOTE - NSBHASSESSSUMMFT_PSY_ALL_CORE
1/13 Patient improved, no aggression, has predictable resistiveness with care . Cont Inderal and low dose risperidone. Patient's 2PC expiring patient has advanced dementia. She has no current living arrangement and cannot be safely d/markel to community w/o one. Therefore will apply for further retention  1/14: Pt is visible in the unit, pleasant on approach. Disorganized. Denies any complaints to writer. No PRNs needed. Tolerating Inderal well, VSS stable.   1/18: Patient improved and sustaining gains. Cont current Inderal and low dose risperidone at current doses.   1/19 Patient improved, not aggressive or highly agitated. Cont current treatment w/o changes fo now  1/20 Maintaning gains, no sig agitation, cont current treatment  1/21 W/o sig change  sl louder, will increase risperdal to bid especially given low low current dosing is

## 2022-01-21 NOTE — BH INPATIENT PSYCHIATRY PROGRESS NOTE - MSE UNSTRUCTURED FT
Patient is awake and alert. relates in childlike manner Bilateral resting tremor, prominent but does not interfere with eating and improved. Speech decreased production, somewhat louder, childlike, abnormal prosody. Repeats phrases in high pitch then low pitch. Paucity of thought.   Poor insight. No suicidal ideations. Mood neutral, less lability. No overt delusions, nunez. Thinking impoverished. Oriented to self Poor insight

## 2022-01-21 NOTE — BH INPATIENT PSYCHIATRY PROGRESS NOTE - NSBHCHARTREVIEWVS_PSY_A_CORE FT
Vital Signs Last 24 Hrs  T(C): --  T(F): --  HR: 65 (01-21-22 @ 12:57) (65 - 65)  BP: 112/55 (01-21-22 @ 12:57) (112/55 - 112/55)  BP(mean): --  RR: 18 (01-21-22 @ 12:57) (17 - 18)  SpO2: 100% (01-21-22 @ 12:57) (100% - 100%)    Orthostatic VS  01-20-22 @ 20:31  Lying BP: --/-- HR: --  Sitting BP: 127/71 HR: 101  Standing BP: --/-- HR: --  Site: --  Mode: --  Orthostatic VS  01-19-22 @ 20:31  Lying BP: --/-- HR: --  Sitting BP: 113/94 HR: 97  Standing BP: --/-- HR: --  Site: upper left arm  Mode: electronic

## 2022-01-21 NOTE — BH INPATIENT PSYCHIATRY PROGRESS NOTE - NSBHMETABOLIC_PSY_ALL_CORE_FT
BMI: BMI (kg/m2): 31.6 (11-29-21 @ 16:35)  HbA1c: A1C with Estimated Average Glucose Result: 5.4 % (11-04-21 @ 06:58)    Glucose: POCT Blood Glucose.: 78 mg/dL (12-30-21 @ 07:34)    BP: 112/55 (01-21-22 @ 12:57) (106/71 - 112/55)  Lipid Panel:

## 2022-01-22 RX ADMIN — DIVALPROEX SODIUM 750 MILLIGRAM(S): 500 TABLET, DELAYED RELEASE ORAL at 08:16

## 2022-01-22 RX ADMIN — RISPERIDONE 0.25 MILLIGRAM(S): 4 TABLET ORAL at 16:50

## 2022-01-22 RX ADMIN — DIVALPROEX SODIUM 750 MILLIGRAM(S): 500 TABLET, DELAYED RELEASE ORAL at 21:18

## 2022-01-22 RX ADMIN — Medication 1 APPLICATION(S): at 21:19

## 2022-01-22 RX ADMIN — Medication 20 MILLIGRAM(S): at 08:16

## 2022-01-22 RX ADMIN — RISPERIDONE 0.25 MILLIGRAM(S): 4 TABLET ORAL at 08:17

## 2022-01-22 RX ADMIN — Medication 5 MILLIGRAM(S): at 21:18

## 2022-01-23 LAB — SARS-COV-2 RNA SPEC QL NAA+PROBE: SIGNIFICANT CHANGE UP

## 2022-01-23 RX ADMIN — DIVALPROEX SODIUM 750 MILLIGRAM(S): 500 TABLET, DELAYED RELEASE ORAL at 08:57

## 2022-01-23 RX ADMIN — DIVALPROEX SODIUM 750 MILLIGRAM(S): 500 TABLET, DELAYED RELEASE ORAL at 21:29

## 2022-01-23 RX ADMIN — Medication 5 MILLIGRAM(S): at 21:29

## 2022-01-23 RX ADMIN — RISPERIDONE 0.25 MILLIGRAM(S): 4 TABLET ORAL at 17:20

## 2022-01-23 RX ADMIN — RISPERIDONE 0.25 MILLIGRAM(S): 4 TABLET ORAL at 08:56

## 2022-01-23 RX ADMIN — Medication 1 APPLICATION(S): at 13:00

## 2022-01-23 RX ADMIN — Medication 1 APPLICATION(S): at 21:30

## 2022-01-23 RX ADMIN — Medication 20 MILLIGRAM(S): at 08:56

## 2022-01-24 PROCEDURE — 99231 SBSQ HOSP IP/OBS SF/LOW 25: CPT

## 2022-01-24 RX ADMIN — Medication 5 MILLIGRAM(S): at 21:34

## 2022-01-24 RX ADMIN — Medication 20 MILLIGRAM(S): at 09:44

## 2022-01-24 RX ADMIN — Medication 1 APPLICATION(S): at 22:27

## 2022-01-24 RX ADMIN — Medication 1 APPLICATION(S): at 09:50

## 2022-01-24 RX ADMIN — RISPERIDONE 0.25 MILLIGRAM(S): 4 TABLET ORAL at 09:43

## 2022-01-24 RX ADMIN — DIVALPROEX SODIUM 750 MILLIGRAM(S): 500 TABLET, DELAYED RELEASE ORAL at 09:43

## 2022-01-24 RX ADMIN — RISPERIDONE 0.25 MILLIGRAM(S): 4 TABLET ORAL at 17:46

## 2022-01-24 RX ADMIN — Medication 1 APPLICATION(S): at 12:00

## 2022-01-24 RX ADMIN — DIVALPROEX SODIUM 750 MILLIGRAM(S): 500 TABLET, DELAYED RELEASE ORAL at 21:34

## 2022-01-24 NOTE — BH INPATIENT PSYCHIATRY PROGRESS NOTE - NSBHFUPINTERVALHXFT_PSY_A_CORE
The patient was seen for follow-up for dementia with behavioral disturbances.. Remains disorganized.  Sleep good , appetite good,VSS on recheck after low on machine.  resistive  and sometimes loud during care but not other times and overall less, some sundowning

## 2022-01-24 NOTE — BH INPATIENT PSYCHIATRY PROGRESS NOTE - NSBHMETABOLIC_PSY_ALL_CORE_FT
BMI: BMI (kg/m2): 31.6 (11-29-21 @ 16:35)  HbA1c: A1C with Estimated Average Glucose Result: 5.4 % (11-04-21 @ 06:58)    Glucose: POCT Blood Glucose.: 78 mg/dL (12-30-21 @ 07:34)    BP: 120/69 (01-23-22 @ 06:42) (120/69 - 120/69)  Lipid Panel:

## 2022-01-24 NOTE — BH INPATIENT PSYCHIATRY PROGRESS NOTE - NSBHASSESSSUMMFT_PSY_ALL_CORE
1/13 Patient improved, no aggression, has predictable resistiveness with care . Cont Inderal and low dose risperidone. Patient's 2PC expiring patient has advanced dementia. She has no current living arrangement and cannot be safely d/markel to community w/o one. Therefore will apply for further retention  1/14: Pt is visible in the unit, pleasant on approach. Disorganized. Denies any complaints to writer. No PRNs needed. Tolerating Inderal well, VSS stable.   1/18: Patient improved and sustaining gains. Cont current Inderal and low dose risperidone at current doses.   1/19 Patient improved, not aggressive or highly agitated. Cont current treatment w/o changes fo now  1/20 Maintaning gains, no sig agitation, cont current treatment  1/21 W/o sig change  sl louder, will increase risperdal to bid especially given low low current dosing is   1/24 Patient maining gains, minimal loud verbalization with showering toileting but no agitation, aggression. Cont current rx plan

## 2022-01-24 NOTE — BH INPATIENT PSYCHIATRY PROGRESS NOTE - NSBHCHARTREVIEWVS_PSY_A_CORE FT
Vital Signs Last 24 Hrs  T(C): --  T(F): --  HR: --  BP: --  BP(mean): --  RR: 16 (01-23-22 @ 20:45) (16 - 16)  SpO2: --    Orthostatic VS  01-24-22 @ 09:07  Lying BP: --/-- HR: --  Sitting BP: 91/54 HR: 58  Standing BP: --/-- HR: --  Site: --  Mode: --  Orthostatic VS  01-23-22 @ 20:45  Lying BP: --/-- HR: --  Sitting BP: 125/70 HR: 80  Standing BP: --/-- HR: --  Site: --  Mode: --  Orthostatic VS  01-22-22 @ 19:37  Lying BP: --/-- HR: --  Sitting BP: 113/50 HR: 65  Standing BP: --/-- HR: --  Site: upper right arm  Mode: electronic

## 2022-01-24 NOTE — BH INPATIENT PSYCHIATRY PROGRESS NOTE - MSE UNSTRUCTURED FT
Patient is awake and alert. relates in childlike manner Bilateral resting tremor, prominent but does not interfere with eating and improved. Speech decreased production, generally nl volume, childlike, abnormal prosody. Repeats phrases in high pitch then low pitch. Paucity of thought.   Poor insight. No suicidal ideations. Mood neutral, less lability. No overt delusions, nunez. Thinking impoverished. Oriented to self Poor insight

## 2022-01-25 PROCEDURE — 99231 SBSQ HOSP IP/OBS SF/LOW 25: CPT

## 2022-01-25 RX ADMIN — Medication 5 MILLIGRAM(S): at 21:32

## 2022-01-25 RX ADMIN — RISPERIDONE 0.25 MILLIGRAM(S): 4 TABLET ORAL at 16:50

## 2022-01-25 RX ADMIN — RISPERIDONE 0.25 MILLIGRAM(S): 4 TABLET ORAL at 10:02

## 2022-01-25 RX ADMIN — Medication 1 APPLICATION(S): at 21:33

## 2022-01-25 RX ADMIN — DIVALPROEX SODIUM 750 MILLIGRAM(S): 500 TABLET, DELAYED RELEASE ORAL at 10:01

## 2022-01-25 RX ADMIN — Medication 20 MILLIGRAM(S): at 10:02

## 2022-01-25 RX ADMIN — Medication 1 APPLICATION(S): at 10:22

## 2022-01-25 RX ADMIN — Medication 1 APPLICATION(S): at 13:05

## 2022-01-25 RX ADMIN — DIVALPROEX SODIUM 750 MILLIGRAM(S): 500 TABLET, DELAYED RELEASE ORAL at 21:32

## 2022-01-25 NOTE — BH INPATIENT PSYCHIATRY PROGRESS NOTE - NSBHCHARTREVIEWVS_PSY_A_CORE FT
Vital Signs Last 24 Hrs  T(C): 36.6 (01-25-22 @ 07:54), Max: 36.6 (01-25-22 @ 07:54)  T(F): 97.8 (01-25-22 @ 07:54), Max: 97.8 (01-25-22 @ 07:54)  HR: --  BP: --  BP(mean): --  RR: --  SpO2: --    Orthostatic VS  01-25-22 @ 07:54  Lying BP: --/-- HR: --  Sitting BP: 117/60 HR: 71  Standing BP: --/-- HR: --  Site: --  Mode: --  Orthostatic VS  01-24-22 @ 10:04  Lying BP: --/-- HR: --  Sitting BP: 111/68 HR: 68  Standing BP: --/-- HR: --  Site: --  Mode: --  Orthostatic VS  01-24-22 @ 09:07  Lying BP: --/-- HR: --  Sitting BP: 91/54 HR: 58  Standing BP: --/-- HR: --  Site: --  Mode: --  Orthostatic VS  01-23-22 @ 20:45  Lying BP: --/-- HR: --  Sitting BP: 125/70 HR: 80  Standing BP: --/-- HR: --  Site: --  Mode: --

## 2022-01-25 NOTE — BH INPATIENT PSYCHIATRY PROGRESS NOTE - MSE UNSTRUCTURED FT
Patient is awake and alert. relates in childlike manner Bilateral resting tremor, prominent but does not interfere with eating and not changed with 0.25mg increment in risperidone. Speech decreased production, generally nl volume, childlike, abnormal prosody. Repeats phrases in high pitch then low pitch. Paucity of thought.   Poor insight. No suicidal ideations. Mood neutral, less lability. No overt delusions, nunez. Thinking impoverished. Oriented to self Poor insight

## 2022-01-25 NOTE — BH INPATIENT PSYCHIATRY PROGRESS NOTE - NSBHASSESSSUMMFT_PSY_ALL_CORE
1/13 Patient improved, no aggression, has predictable resistiveness with care . Cont Inderal and low dose risperidone. Patient's 2PC expiring patient has advanced dementia. She has no current living arrangement and cannot be safely d/markel to community w/o one. Therefore will apply for further retention  1/14: Pt is visible in the unit, pleasant on approach. Disorganized. Denies any complaints to writer. No PRNs needed. Tolerating Inderal well, VSS stable.   1/18: Patient improved and sustaining gains. Cont current Inderal and low dose risperidone at current doses.   1/19 Patient improved, not aggressive or highly agitated. Cont current treatment w/o changes fo now  1/20 Maintaning gains, no sig agitation, cont current treatment  1/21 W/o sig change  sl louder, will increase risperdal to bid especially given low low current dosing is   1/24 Patient maining gains, minimal loud verbalization with showering toileting but no agitation, aggression. Cont current rx plan  1/25 Maintaining gains, not aggression or agitated

## 2022-01-25 NOTE — BH INPATIENT PSYCHIATRY PROGRESS NOTE - NSBHFUPINTERVALHXFT_PSY_A_CORE
The patient was seen for follow-up for dementia with behavioral disturbances.. Remains disorganized.  Sleep good , appetite good,VSS, no low BP.

## 2022-01-26 PROCEDURE — 99231 SBSQ HOSP IP/OBS SF/LOW 25: CPT

## 2022-01-26 RX ADMIN — RISPERIDONE 0.25 MILLIGRAM(S): 4 TABLET ORAL at 09:13

## 2022-01-26 RX ADMIN — RISPERIDONE 0.25 MILLIGRAM(S): 4 TABLET ORAL at 17:57

## 2022-01-26 RX ADMIN — Medication 1 APPLICATION(S): at 09:21

## 2022-01-26 RX ADMIN — DIVALPROEX SODIUM 750 MILLIGRAM(S): 500 TABLET, DELAYED RELEASE ORAL at 21:29

## 2022-01-26 RX ADMIN — Medication 1 APPLICATION(S): at 21:48

## 2022-01-26 RX ADMIN — Medication 1 APPLICATION(S): at 13:00

## 2022-01-26 RX ADMIN — Medication 5 MILLIGRAM(S): at 21:29

## 2022-01-26 RX ADMIN — Medication 20 MILLIGRAM(S): at 09:13

## 2022-01-26 RX ADMIN — DIVALPROEX SODIUM 750 MILLIGRAM(S): 500 TABLET, DELAYED RELEASE ORAL at 09:13

## 2022-01-26 NOTE — BH INPATIENT PSYCHIATRY PROGRESS NOTE - NSBHMETABOLIC_PSY_ALL_CORE_FT
BMI: BMI (kg/m2): 31.6 (11-29-21 @ 16:35)  HbA1c: A1C with Estimated Average Glucose Result: 5.4 % (11-04-21 @ 06:58)    Glucose: POCT Blood Glucose.: 78 mg/dL (12-30-21 @ 07:34)    BP: --  Lipid Panel:

## 2022-01-26 NOTE — BH INPATIENT PSYCHIATRY PROGRESS NOTE - NSBHASSESSSUMMFT_PSY_ALL_CORE
1/13 Patient improved, no aggression, has predictable resistiveness with care . Cont Inderal and low dose risperidone. Patient's 2PC expiring patient has advanced dementia. She has no current living arrangement and cannot be safely d/markel to community w/o one. Therefore will apply for further retention  1/14: Pt is visible in the unit, pleasant on approach. Disorganized. Denies any complaints to writer. No PRNs needed. Tolerating Inderal well, VSS stable.   1/18: Patient improved and sustaining gains. Cont current Inderal and low dose risperidone at current doses.   1/19 Patient improved, not aggressive or highly agitated. Cont current treatment w/o changes fo now  1/20 Maintaning gains, no sig agitation, cont current treatment  1/21 W/o sig change  sl louder, will increase risperdal to bid especially given low low current dosing is   1/24 Patient maining gains, minimal loud verbalization with showering toileting but no agitation, aggression. Cont current rx plan  1/25 Maintaining gains, not aggression or agitated  1/26 Maintaining gains. Will continue curret risperidone and Inderal at current doses

## 2022-01-26 NOTE — BH INPATIENT PSYCHIATRY PROGRESS NOTE - NSBHCHARTREVIEWVS_PSY_A_CORE FT
Vital Signs Last 24 Hrs  T(C): 36.1 (01-26-22 @ 07:55), Max: 36.7 (01-25-22 @ 16:42)  T(F): 97 (01-26-22 @ 07:55), Max: 98 (01-25-22 @ 16:42)  HR: --  BP: --  BP(mean): --  RR: --  SpO2: --    Orthostatic VS  01-26-22 @ 07:55  Lying BP: 121/94 HR: 92  Sitting BP: --/-- HR: --  Standing BP: --/-- HR: --  Site: --  Mode: --  Orthostatic VS  01-25-22 @ 20:15  Lying BP: --/-- HR: --  Sitting BP: 108/90 HR: 94  Standing BP: --/-- HR: --  Site: --  Mode: --  Orthostatic VS  01-25-22 @ 07:54  Lying BP: --/-- HR: --  Sitting BP: 117/60 HR: 71  Standing BP: --/-- HR: --  Site: --  Mode: --

## 2022-01-27 LAB — SARS-COV-2 RNA SPEC QL NAA+PROBE: SIGNIFICANT CHANGE UP

## 2022-01-27 RX ADMIN — Medication 1 APPLICATION(S): at 20:31

## 2022-01-27 RX ADMIN — DIVALPROEX SODIUM 750 MILLIGRAM(S): 500 TABLET, DELAYED RELEASE ORAL at 20:30

## 2022-01-27 RX ADMIN — RISPERIDONE 0.25 MILLIGRAM(S): 4 TABLET ORAL at 10:02

## 2022-01-27 RX ADMIN — RISPERIDONE 0.25 MILLIGRAM(S): 4 TABLET ORAL at 17:01

## 2022-01-27 RX ADMIN — DIVALPROEX SODIUM 750 MILLIGRAM(S): 500 TABLET, DELAYED RELEASE ORAL at 10:00

## 2022-01-27 RX ADMIN — Medication 5 MILLIGRAM(S): at 20:29

## 2022-01-27 RX ADMIN — Medication 1 APPLICATION(S): at 10:00

## 2022-01-27 RX ADMIN — Medication 1 APPLICATION(S): at 12:31

## 2022-01-27 RX ADMIN — Medication 20 MILLIGRAM(S): at 10:00

## 2022-01-27 NOTE — BH INPATIENT PSYCHIATRY PROGRESS NOTE - NSBHASSESSSUMMFT_PSY_ALL_CORE
1/13 Patient improved, no aggression, has predictable resistiveness with care . Cont Inderal and low dose risperidone. Patient's 2PC expiring patient has advanced dementia. She has no current living arrangement and cannot be safely d/markel to community w/o one. Therefore will apply for further retention  1/14: Pt is visible in the unit, pleasant on approach. Disorganized. Denies any complaints to writer. No PRNs needed. Tolerating Inderal well, VSS stable.   1/18: Patient improved and sustaining gains. Cont current Inderal and low dose risperidone at current doses.   1/19 Patient improved, not aggressive or highly agitated. Cont current treatment w/o changes fo now  1/20 Maintaning gains, no sig agitation, cont current treatment  1/21 W/o sig change  sl louder, will increase risperdal to bid especially given low low current dosing is   1/24 Patient maining gains, minimal loud verbalization with showering toileting but no agitation, aggression. Cont current rx plan  1/25 Maintaining gains, not aggression or agitated  1/26 Maintaining gains. Will continue current risperidone and Inderal at current doses  1/27: Patient maintaining gains, cont current rx and placement efforts

## 2022-01-27 NOTE — BH INPATIENT PSYCHIATRY PROGRESS NOTE - NSBHCHARTREVIEWVS_PSY_A_CORE FT
Vital Signs Last 24 Hrs  T(C): 36.7 (01-27-22 @ 09:44), Max: 36.9 (01-26-22 @ 16:45)  T(F): 98.1 (01-27-22 @ 09:44), Max: 98.5 (01-26-22 @ 16:45)  HR: --  BP: --  BP(mean): --  RR: --  SpO2: --    Orthostatic VS  01-27-22 @ 12:27  Lying BP: --/-- HR: --  Sitting BP: 108/60 HR: 73  Standing BP: --/-- HR: --  Site: --  Mode: --  Orthostatic VS  01-27-22 @ 09:44  Lying BP: --/-- HR: --  Sitting BP: 107/59 HR: 85  Standing BP: --/-- HR: --  Site: --  Mode: --  Orthostatic VS  01-26-22 @ 20:38  Lying BP: --/-- HR: --  Sitting BP: 121/67 HR: 78  Standing BP: --/-- HR: --  Site: --  Mode: --  Orthostatic VS  01-26-22 @ 07:55  Lying BP: 121/94 HR: 92  Sitting BP: --/-- HR: --  Standing BP: --/-- HR: --  Site: --  Mode: --  Orthostatic VS  01-25-22 @ 20:15  Lying BP: --/-- HR: --  Sitting BP: 108/90 HR: 94  Standing BP: --/-- HR: --  Site: --  Mode: --

## 2022-01-28 PROCEDURE — 99232 SBSQ HOSP IP/OBS MODERATE 35: CPT

## 2022-01-28 RX ADMIN — Medication 5 MILLIGRAM(S): at 18:14

## 2022-01-28 RX ADMIN — Medication 20 MILLIGRAM(S): at 08:27

## 2022-01-28 RX ADMIN — RISPERIDONE 0.25 MILLIGRAM(S): 4 TABLET ORAL at 16:56

## 2022-01-28 RX ADMIN — DIVALPROEX SODIUM 750 MILLIGRAM(S): 500 TABLET, DELAYED RELEASE ORAL at 08:27

## 2022-01-28 RX ADMIN — Medication 1 APPLICATION(S): at 21:00

## 2022-01-28 RX ADMIN — DIVALPROEX SODIUM 750 MILLIGRAM(S): 500 TABLET, DELAYED RELEASE ORAL at 18:14

## 2022-01-28 NOTE — BH INPATIENT PSYCHIATRY PROGRESS NOTE - MSE UNSTRUCTURED FT
Patient is awake and alert. relates in childlike manner Bilateral resting tremor, prominent but does not interfere with eating and not changed with 0.25mg increment in risperidone. Speech decreased production, generally nl volume, childlike, abnormal prosody. Repeats phrases in high pitch then low pitch.  Somewhat louderPaucity of thought.   Poor insight. No suicidal ideations. Mood neutral, less lability. No overt delusions, nunez. Thinking impoverished. Oriented to self Poor insight

## 2022-01-28 NOTE — BH INPATIENT PSYCHIATRY PROGRESS NOTE - CURRENT MEDICATION
MEDICATIONS  (STANDING):  AQUAPHOR (petrolatum Ointment) 1 Application(s) Topical three times a day  artificial  tears Solution 1 Drop(s) Left EYE every 4 hours  diVALproex Sprinkle 750 milliGRAM(s) Oral two times a day  furosemide    Tablet 20 milliGRAM(s) Oral daily  melatonin. 5 milliGRAM(s) Oral at bedtime  propranolol 40 milliGRAM(s) Oral three times a day  risperiDONE   Tablet 0.25 milliGRAM(s) Oral <User Schedule>    MEDICATIONS  (PRN):  acetaminophen     Tablet .. 650 milliGRAM(s) Oral every 6 hours PRN Temp greater or equal to 38C (100.4F), Mild Pain (1 - 3), Moderate Pain (4 - 6)  aluminum hydroxide/magnesium hydroxide/simethicone Suspension 30 milliLiter(s) Oral every 4 hours PRN Dyspepsia  magnesium hydroxide Suspension 30 milliLiter(s) Oral at bedtime PRN constipation  mineral oil enema 133 milliLiter(s) Rectal daily PRN hard stool  saline laxative (FLEET) Rectal Enema 1 Enema Rectal daily PRN hard stool  triamcinolone 0.1% Cream 1 Application(s) Topical two times a day PRN pruritis/rash

## 2022-01-28 NOTE — BH INPATIENT PSYCHIATRY PROGRESS NOTE - NSBHFUPINTERVALHXFT_PSY_A_CORE
The patient was seen for follow-up for dementia with behavioral disturbances.. Remains disorganized.  Sleep good , appetite good,had low BP on machine but nl on manual  re check. Somewhat louder but remains redirectable and not aggressive
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
## 2022-01-28 NOTE — BH INPATIENT PSYCHIATRY PROGRESS NOTE - NSBHCHARTREVIEWVS_PSY_A_CORE FT
Vital Signs Last 24 Hrs  T(C): 36.8 (01-28-22 @ 07:49), Max: 37.1 (01-27-22 @ 16:38)  T(F): 98.3 (01-28-22 @ 07:49), Max: 98.7 (01-27-22 @ 16:38)  HR: 78 (01-28-22 @ 08:45) (70 - 78)  BP: 118/68 (01-28-22 @ 08:45) (96/42 - 118/68)  BP(mean): --  RR: --  SpO2: --    Orthostatic VS  01-27-22 @ 20:58  Lying BP: --/-- HR: 74  Sitting BP: 110/66 HR: --  Standing BP: --/-- HR: --  Site: --  Mode: --  Orthostatic VS  01-27-22 @ 12:27  Lying BP: --/-- HR: --  Sitting BP: 108/60 HR: 73  Standing BP: --/-- HR: --  Site: --  Mode: --  Orthostatic VS  01-27-22 @ 09:44  Lying BP: --/-- HR: --  Sitting BP: 107/59 HR: 85  Standing BP: --/-- HR: --  Site: --  Mode: --  Orthostatic VS  01-26-22 @ 20:38  Lying BP: --/-- HR: --  Sitting BP: 121/67 HR: 78  Standing BP: --/-- HR: --  Site: --  Mode: --

## 2022-01-28 NOTE — BH INPATIENT PSYCHIATRY PROGRESS NOTE - NSBHMETABOLIC_PSY_ALL_CORE_FT
BMI: BMI (kg/m2): 31.6 (11-29-21 @ 16:35)  HbA1c: A1C with Estimated Average Glucose Result: 5.4 % (11-04-21 @ 06:58)    Glucose: POCT Blood Glucose.: 78 mg/dL (12-30-21 @ 07:34)    BP: 118/68 (01-28-22 @ 08:45) (96/42 - 118/68)  Lipid Panel:

## 2022-01-29 RX ADMIN — Medication 1 DROP(S): at 21:36

## 2022-01-29 RX ADMIN — Medication 1 APPLICATION(S): at 13:38

## 2022-01-29 RX ADMIN — Medication 5 MILLIGRAM(S): at 20:42

## 2022-01-29 RX ADMIN — Medication 1 APPLICATION(S): at 09:11

## 2022-01-29 RX ADMIN — DIVALPROEX SODIUM 750 MILLIGRAM(S): 500 TABLET, DELAYED RELEASE ORAL at 20:42

## 2022-01-29 RX ADMIN — DIVALPROEX SODIUM 750 MILLIGRAM(S): 500 TABLET, DELAYED RELEASE ORAL at 09:14

## 2022-01-29 RX ADMIN — RISPERIDONE 0.25 MILLIGRAM(S): 4 TABLET ORAL at 16:00

## 2022-01-29 RX ADMIN — Medication 20 MILLIGRAM(S): at 09:14

## 2022-01-29 RX ADMIN — RISPERIDONE 0.25 MILLIGRAM(S): 4 TABLET ORAL at 09:14
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
## 2022-01-30 RX ADMIN — DIVALPROEX SODIUM 750 MILLIGRAM(S): 500 TABLET, DELAYED RELEASE ORAL at 09:13

## 2022-01-30 RX ADMIN — RISPERIDONE 0.25 MILLIGRAM(S): 4 TABLET ORAL at 09:13

## 2022-01-30 RX ADMIN — Medication 1 APPLICATION(S): at 22:10

## 2022-01-30 RX ADMIN — Medication 5 MILLIGRAM(S): at 20:31

## 2022-01-30 RX ADMIN — Medication 1 APPLICATION(S): at 09:59

## 2022-01-30 RX ADMIN — DIVALPROEX SODIUM 750 MILLIGRAM(S): 500 TABLET, DELAYED RELEASE ORAL at 20:30

## 2022-01-30 RX ADMIN — Medication 20 MILLIGRAM(S): at 09:13

## 2022-01-30 RX ADMIN — RISPERIDONE 0.25 MILLIGRAM(S): 4 TABLET ORAL at 14:25

## 2022-01-30 RX ADMIN — Medication 1 APPLICATION(S): at 13:08

## 2022-01-31 LAB — SARS-COV-2 RNA SPEC QL NAA+PROBE: SIGNIFICANT CHANGE UP

## 2022-01-31 PROCEDURE — 99232 SBSQ HOSP IP/OBS MODERATE 35: CPT

## 2022-01-31 RX ADMIN — Medication 1 APPLICATION(S): at 13:28

## 2022-01-31 RX ADMIN — RISPERIDONE 0.25 MILLIGRAM(S): 4 TABLET ORAL at 09:42

## 2022-01-31 RX ADMIN — Medication 20 MILLIGRAM(S): at 09:43

## 2022-01-31 RX ADMIN — Medication 1 APPLICATION(S): at 09:45

## 2022-01-31 RX ADMIN — RISPERIDONE 0.25 MILLIGRAM(S): 4 TABLET ORAL at 16:55

## 2022-01-31 RX ADMIN — DIVALPROEX SODIUM 750 MILLIGRAM(S): 500 TABLET, DELAYED RELEASE ORAL at 21:35

## 2022-01-31 RX ADMIN — Medication 5 MILLIGRAM(S): at 21:35

## 2022-01-31 RX ADMIN — Medication 1 APPLICATION(S): at 21:53

## 2022-01-31 RX ADMIN — DIVALPROEX SODIUM 750 MILLIGRAM(S): 500 TABLET, DELAYED RELEASE ORAL at 09:43

## 2022-01-31 NOTE — BH INPATIENT PSYCHIATRY PROGRESS NOTE - NSBHMETABOLIC_PSY_ALL_CORE_FT
BMI: BMI (kg/m2): 31.6 (11-29-21 @ 16:35)  HbA1c: A1C with Estimated Average Glucose Result: 5.4 % (11-04-21 @ 06:58)    Glucose: POCT Blood Glucose.: 78 mg/dL (12-30-21 @ 07:34)    BP: 128/68 (01-30-22 @ 20:49) (103/50 - 128/68)  Lipid Panel:

## 2022-01-31 NOTE — BH INPATIENT PSYCHIATRY PROGRESS NOTE - NSBHCHARTREVIEWVS_PSY_A_CORE FT
Vital Signs Last 24 Hrs  T(C): 36.9 (01-30-22 @ 16:28), Max: 36.9 (01-30-22 @ 16:28)  T(F): 98.5 (01-30-22 @ 16:28), Max: 98.5 (01-30-22 @ 16:28)  HR: --  BP: 128/68 (01-30-22 @ 20:49) (128/68 - 128/68)  BP(mean): 73 (01-30-22 @ 20:49) (73 - 73)  RR: --  SpO2: --    Orthostatic VS  01-31-22 @ 07:57  Lying BP: 106/60 HR: 60  Sitting BP: --/-- HR: --  Standing BP: --/-- HR: --  Site: --  Mode: --  Orthostatic VS  01-30-22 @ 07:49  Lying BP: --/-- HR: --  Sitting BP: 111/97 HR: 98  Standing BP: 115/96 HR: 90  Site: --  Mode: --  Orthostatic VS  01-29-22 @ 20:48  Lying BP: --/-- HR: --  Sitting BP: 122/57 HR: 71  Standing BP: --/-- HR: --  Site: --  Mode: --

## 2022-01-31 NOTE — BH INPATIENT PSYCHIATRY PROGRESS NOTE - CURRENT MEDICATION
MEDICATIONS  (STANDING):  AQUAPHOR (petrolatum Ointment) 1 Application(s) Topical three times a day  diVALproex Sprinkle 750 milliGRAM(s) Oral two times a day  furosemide    Tablet 20 milliGRAM(s) Oral daily  melatonin. 5 milliGRAM(s) Oral at bedtime  propranolol 40 milliGRAM(s) Oral three times a day  risperiDONE   Tablet 0.25 milliGRAM(s) Oral <User Schedule>    MEDICATIONS  (PRN):  acetaminophen     Tablet .. 650 milliGRAM(s) Oral every 6 hours PRN Temp greater or equal to 38C (100.4F), Mild Pain (1 - 3), Moderate Pain (4 - 6)  aluminum hydroxide/magnesium hydroxide/simethicone Suspension 30 milliLiter(s) Oral every 4 hours PRN Dyspepsia  magnesium hydroxide Suspension 30 milliLiter(s) Oral at bedtime PRN constipation  mineral oil enema 133 milliLiter(s) Rectal daily PRN hard stool  saline laxative (FLEET) Rectal Enema 1 Enema Rectal daily PRN hard stool  triamcinolone 0.1% Cream 1 Application(s) Topical two times a day PRN pruritis/rash
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
## 2022-02-01 PROCEDURE — 99232 SBSQ HOSP IP/OBS MODERATE 35: CPT

## 2022-02-01 RX ADMIN — DIVALPROEX SODIUM 750 MILLIGRAM(S): 500 TABLET, DELAYED RELEASE ORAL at 08:23

## 2022-02-01 RX ADMIN — DIVALPROEX SODIUM 750 MILLIGRAM(S): 500 TABLET, DELAYED RELEASE ORAL at 21:10

## 2022-02-01 RX ADMIN — RISPERIDONE 0.25 MILLIGRAM(S): 4 TABLET ORAL at 08:23

## 2022-02-01 RX ADMIN — Medication 1 APPLICATION(S): at 13:09

## 2022-02-01 RX ADMIN — Medication 1 APPLICATION(S): at 08:46

## 2022-02-01 RX ADMIN — Medication 5 MILLIGRAM(S): at 21:10

## 2022-02-01 RX ADMIN — Medication 20 MILLIGRAM(S): at 08:24

## 2022-02-01 RX ADMIN — Medication 1 APPLICATION(S): at 21:39

## 2022-02-01 RX ADMIN — RISPERIDONE 0.25 MILLIGRAM(S): 4 TABLET ORAL at 16:14

## 2022-02-01 NOTE — BH INPATIENT PSYCHIATRY PROGRESS NOTE - NSBHFUPINTERVALHXFT_PSY_A_CORE
The patient was seen for follow-up for dementia with behavioral disturbances.. Remains disorganized.  Sleep good , appetite good,VSS. Louder than recent baseline but remains redirectable .

## 2022-02-01 NOTE — BH INPATIENT PSYCHIATRY PROGRESS NOTE - NSBHCHARTREVIEWVS_PSY_A_CORE FT
Vital Signs Last 24 Hrs  T(C): 36.2 (02-01-22 @ 08:04), Max: 36.2 (01-31-22 @ 16:17)  T(F): 97.1 (02-01-22 @ 08:04), Max: 97.2 (01-31-22 @ 16:17)  HR: 64 (02-01-22 @ 13:03) (64 - 72)  BP: 110/62 (02-01-22 @ 13:03) (110/62 - 120/60)  BP(mean): --  RR: --  SpO2: --    Orthostatic VS  01-31-22 @ 19:54  Lying BP: --/-- HR: --  Sitting BP: 127/53 HR: 72  Standing BP: --/-- HR: --  Site: --  Mode: --  Orthostatic VS  01-31-22 @ 07:57  Lying BP: 106/60 HR: 60  Sitting BP: --/-- HR: --  Standing BP: --/-- HR: --  Site: --  Mode: --

## 2022-02-01 NOTE — BH INPATIENT PSYCHIATRY PROGRESS NOTE - NSBHMETABOLIC_PSY_ALL_CORE_FT
BMI: BMI (kg/m2): 31.6 (11-29-21 @ 16:35)  HbA1c: A1C with Estimated Average Glucose Result: 5.4 % (11-04-21 @ 06:58)    Glucose: POCT Blood Glucose.: 78 mg/dL (12-30-21 @ 07:34)    BP: 110/62 (02-01-22 @ 13:03) (103/50 - 128/68)  Lipid Panel:

## 2022-02-01 NOTE — BH INPATIENT PSYCHIATRY PROGRESS NOTE - NSBHASSESSSUMMFT_PSY_ALL_CORE
1/13 Patient improved, no aggression, has predictable resistiveness with care . Cont Inderal and low dose risperidone. Patient's 2PC expiring patient has advanced dementia. She has no current living arrangement and cannot be safely d/markel to community w/o one. Therefore will apply for further retention  1/14: Pt is visible in the unit, pleasant on approach. Disorganized. Denies any complaints to writer. No PRNs needed. Tolerating Inderal well, VSS stable.   1/18: Patient improved and sustaining gains. Cont current Inderal and low dose risperidone at current doses.   1/19 Patient improved, not aggressive or highly agitated. Cont current treatment w/o changes fo now  1/20 Maintaning gains, no sig agitation, cont current treatment  1/21 W/o sig change  sl louder, will increase risperdal to bid especially given low low current dosing is   1/24 Patient maining gains, minimal loud verbalization with showering toileting but no agitation, aggression. Cont current rx plan  1/25 Maintaining gains, not aggression or agitated  1/26 Maintaining gains. Will continue current risperidone and Inderal at current doses  1/27: Patient maintaining gains, cont current rx and placement efforts  1/28 Sl noiser otherwie okay. Will increase Inderal and reduce risperdal to avoid low BP. Possibly change from very noisy roommate. Will explore feasibility of room change though for now not an option based on bed situation  1/31 Calmer today, suspect that very loud roommate is overstimulating her. Cont current meds, back on bid risperidone, consider increasing risperidone and or Inderal if needed. Will get EKG as she is on moderate dose Inderal

## 2022-02-01 NOTE — BH INPATIENT PSYCHIATRY PROGRESS NOTE - MSE UNSTRUCTURED FT
Patient is awake and alert. relates in childlike manner Bilateral resting tremor, prominent but does not interfere with eating and not changed  Speech decreased production, generally nl volume, childlike, abnormal prosody. Repeats phrases in high pitch then low pitch. Loud at times., sings at times Paucity of thought.   Poor insight. No suicidal ideations. Mood neutral, less lability. No overt delusions, nunez. Thinking impoverished. Oriented to self Poor insight

## 2022-02-02 PROCEDURE — 99232 SBSQ HOSP IP/OBS MODERATE 35: CPT

## 2022-02-02 RX ADMIN — RISPERIDONE 0.25 MILLIGRAM(S): 4 TABLET ORAL at 08:42

## 2022-02-02 RX ADMIN — Medication 1 APPLICATION(S): at 12:35

## 2022-02-02 RX ADMIN — Medication 20 MILLIGRAM(S): at 08:42

## 2022-02-02 RX ADMIN — Medication 5 MILLIGRAM(S): at 21:05

## 2022-02-02 RX ADMIN — Medication 1 APPLICATION(S): at 09:26

## 2022-02-02 RX ADMIN — DIVALPROEX SODIUM 750 MILLIGRAM(S): 500 TABLET, DELAYED RELEASE ORAL at 21:03

## 2022-02-02 RX ADMIN — DIVALPROEX SODIUM 750 MILLIGRAM(S): 500 TABLET, DELAYED RELEASE ORAL at 08:42

## 2022-02-02 RX ADMIN — Medication 1 APPLICATION(S): at 22:16

## 2022-02-02 RX ADMIN — RISPERIDONE 0.25 MILLIGRAM(S): 4 TABLET ORAL at 17:08

## 2022-02-02 NOTE — BH INPATIENT PSYCHIATRY PROGRESS NOTE - MSE UNSTRUCTURED FT
Patient is awake and alert. relates in childlike manner Bilateral resting tremor, prominent but does not interfere with eating and not changed  Speech decreased production, generally nl volume, childlike, abnormal prosody. Repeats phrases in high pitch then low pitch. Loud at times., sings at times, yelling Paucity of content.   Poor insight. No suicidal ideations. Mood neutral, less lability. No overt delusions, nunez. Thinking impoverished. Oriented to self Poor insight

## 2022-02-02 NOTE — BH INPATIENT PSYCHIATRY PROGRESS NOTE - NSBHMETABOLIC_PSY_ALL_CORE_FT
BMI: BMI (kg/m2): 31.6 (11-29-21 @ 16:35)  HbA1c: A1C with Estimated Average Glucose Result: 5.4 % (11-04-21 @ 06:58)    Glucose: POCT Blood Glucose.: 78 mg/dL (12-30-21 @ 07:34)    BP: 106/58 (02-01-22 @ 21:02) (103/50 - 128/68)  Lipid Panel:

## 2022-02-02 NOTE — BH INPATIENT PSYCHIATRY PROGRESS NOTE - NSBHCHARTREVIEWVS_PSY_A_CORE FT
Vital Signs Last 24 Hrs  T(C): 36.6 (02-01-22 @ 16:47), Max: 36.6 (02-01-22 @ 16:47)  T(F): 97.8 (02-01-22 @ 16:47), Max: 97.8 (02-01-22 @ 16:47)  HR: 70 (02-01-22 @ 21:02) (64 - 70)  BP: 106/58 (02-01-22 @ 21:02) (106/58 - 110/62)  BP(mean): --  RR: 18 (02-02-22 @ 08:25) (18 - 18)  SpO2: 98% (02-02-22 @ 08:25) (98% - 98%)    Orthostatic VS  02-02-22 @ 12:15  Lying BP: --/-- HR: --  Sitting BP: 123/62 HR: 69  Standing BP: --/-- HR: --  Site: --  Mode: --  Orthostatic VS  02-02-22 @ 08:25  Lying BP: --/-- HR: --  Sitting BP: 102/64 HR: 67  Standing BP: --/-- HR: --  Site: --  Mode: --  Orthostatic VS  01-31-22 @ 19:54  Lying BP: --/-- HR: --  Sitting BP: 127/53 HR: 72  Standing BP: --/-- HR: --  Site: --  Mode: --

## 2022-02-02 NOTE — BH INPATIENT PSYCHIATRY PROGRESS NOTE - NSBHASSESSSUMMFT_PSY_ALL_CORE
1/13 Patient improved, no aggression, has predictable resistiveness with care . Cont Inderal and low dose risperidone. Patient's 2PC expiring patient has advanced dementia. She has no current living arrangement and cannot be safely d/markel to community w/o one. Therefore will apply for further retention  1/14: Pt is visible in the unit, pleasant on approach. Disorganized. Denies any complaints to writer. No PRNs needed. Tolerating Inderal well, VSS stable.   1/18: Patient improved and sustaining gains. Cont current Inderal and low dose risperidone at current doses.   1/19 Patient improved, not aggressive or highly agitated. Cont current treatment w/o changes fo now  1/20 Maintaning gains, no sig agitation, cont current treatment  1/21 W/o sig change  sl louder, will increase risperdal to bid especially given low low current dosing is   1/24 Patient maining gains, minimal loud verbalization with showering toileting but no agitation, aggression. Cont current rx plan  1/25 Maintaining gains, not aggression or agitated  1/26 Maintaining gains. Will continue current risperidone and Inderal at current doses  1/27: Patient maintaining gains, cont current rx and placement efforts  1/28 Sl noiser otherwie okay. Will increase Inderal and reduce risperdal to avoid low BP. Possibly change from very noisy roommate. Will explore feasibility of room change though for now not an option based on bed situation  1/31 Calmer today, suspect that very loud roommate is overstimulating her. Cont current meds, back on bid risperidone, consider increasing risperidone and or Inderal if needed. Will get EKG as she is on moderate dose Inderal  2/2 Patient loud, yells at times resitive with care no peer to peer aggression. Will modestly titrate Inderal, cont risperidone. EKG NSR. Moniotr BP's

## 2022-02-02 NOTE — BH INPATIENT PSYCHIATRY PROGRESS NOTE - NSBHFUPINTERVALHXFT_PSY_A_CORE
The patient was seen for follow-up for dementia with behavioral disturbances.. Remains disorganized.  Sleep good , appetite good,VSS. Louder than recent baseline but remains redirectable . No aggressive interactions

## 2022-02-02 NOTE — BH INPATIENT PSYCHIATRY PROGRESS NOTE - CURRENT MEDICATION
MEDICATIONS  (STANDING):  AQUAPHOR (petrolatum Ointment) 1 Application(s) Topical three times a day  diVALproex Sprinkle 750 milliGRAM(s) Oral two times a day  furosemide    Tablet 20 milliGRAM(s) Oral daily  melatonin. 5 milliGRAM(s) Oral at bedtime  propranolol 50 milliGRAM(s) Oral three times a day  risperiDONE   Tablet 0.25 milliGRAM(s) Oral <User Schedule>    MEDICATIONS  (PRN):  acetaminophen     Tablet .. 650 milliGRAM(s) Oral every 6 hours PRN Temp greater or equal to 38C (100.4F), Mild Pain (1 - 3), Moderate Pain (4 - 6)  aluminum hydroxide/magnesium hydroxide/simethicone Suspension 30 milliLiter(s) Oral every 4 hours PRN Dyspepsia  magnesium hydroxide Suspension 30 milliLiter(s) Oral at bedtime PRN constipation  mineral oil enema 133 milliLiter(s) Rectal daily PRN hard stool  saline laxative (FLEET) Rectal Enema 1 Enema Rectal daily PRN hard stool  triamcinolone 0.1% Cream 1 Application(s) Topical two times a day PRN pruritis/rash

## 2022-02-03 RX ADMIN — DIVALPROEX SODIUM 750 MILLIGRAM(S): 500 TABLET, DELAYED RELEASE ORAL at 21:15

## 2022-02-03 RX ADMIN — Medication 20 MILLIGRAM(S): at 09:36

## 2022-02-03 RX ADMIN — RISPERIDONE 0.25 MILLIGRAM(S): 4 TABLET ORAL at 16:54

## 2022-02-03 RX ADMIN — DIVALPROEX SODIUM 750 MILLIGRAM(S): 500 TABLET, DELAYED RELEASE ORAL at 09:36

## 2022-02-03 RX ADMIN — Medication 5 MILLIGRAM(S): at 21:15

## 2022-02-03 RX ADMIN — Medication 1 APPLICATION(S): at 22:39

## 2022-02-03 RX ADMIN — RISPERIDONE 0.25 MILLIGRAM(S): 4 TABLET ORAL at 09:36

## 2022-02-03 RX ADMIN — Medication 1 APPLICATION(S): at 09:40

## 2022-02-04 LAB — SARS-COV-2 RNA SPEC QL NAA+PROBE: SIGNIFICANT CHANGE UP

## 2022-02-04 PROCEDURE — 99232 SBSQ HOSP IP/OBS MODERATE 35: CPT

## 2022-02-04 RX ORDER — FLUPHENAZINE HYDROCHLORIDE 1 MG/1
1 TABLET, FILM COATED ORAL ONCE
Refills: 0 | Status: COMPLETED | OUTPATIENT
Start: 2022-02-04 | End: 2022-02-04

## 2022-02-04 RX ORDER — FLUPHENAZINE HYDROCHLORIDE 1 MG/1
1 TABLET, FILM COATED ORAL DAILY
Refills: 0 | Status: DISCONTINUED | OUTPATIENT
Start: 2022-02-04 | End: 2022-02-08

## 2022-02-04 RX ORDER — FLUPHENAZINE HYDROCHLORIDE 1 MG/1
1 TABLET, FILM COATED ORAL EVERY 6 HOURS
Refills: 0 | Status: DISCONTINUED | OUTPATIENT
Start: 2022-02-04 | End: 2022-05-18

## 2022-02-04 RX ORDER — FLUPHENAZINE HYDROCHLORIDE 1 MG/1
1 TABLET, FILM COATED ORAL ONCE
Refills: 0 | Status: DISCONTINUED | OUTPATIENT
Start: 2022-02-04 | End: 2022-05-18

## 2022-02-04 RX ADMIN — RISPERIDONE 0.25 MILLIGRAM(S): 4 TABLET ORAL at 10:05

## 2022-02-04 RX ADMIN — DIVALPROEX SODIUM 750 MILLIGRAM(S): 500 TABLET, DELAYED RELEASE ORAL at 10:05

## 2022-02-04 RX ADMIN — Medication 1 APPLICATION(S): at 10:37

## 2022-02-04 RX ADMIN — DIVALPROEX SODIUM 750 MILLIGRAM(S): 500 TABLET, DELAYED RELEASE ORAL at 21:22

## 2022-02-04 RX ADMIN — Medication 5 MILLIGRAM(S): at 21:22

## 2022-02-04 RX ADMIN — FLUPHENAZINE HYDROCHLORIDE 1 MILLIGRAM(S): 1 TABLET, FILM COATED ORAL at 11:58

## 2022-02-04 RX ADMIN — Medication 1 APPLICATION(S): at 21:17

## 2022-02-04 RX ADMIN — FLUPHENAZINE HYDROCHLORIDE 1 MILLIGRAM(S): 1 TABLET, FILM COATED ORAL at 18:17

## 2022-02-04 RX ADMIN — Medication 20 MILLIGRAM(S): at 10:05

## 2022-02-04 NOTE — BH INPATIENT PSYCHIATRY PROGRESS NOTE - PRN MEDS
MEDICATIONS  (PRN):  acetaminophen     Tablet .. 650 milliGRAM(s) Oral every 6 hours PRN Temp greater or equal to 38C (100.4F), Mild Pain (1 - 3), Moderate Pain (4 - 6)  aluminum hydroxide/magnesium hydroxide/simethicone Suspension 30 milliLiter(s) Oral every 4 hours PRN Dyspepsia  fluPHENAZine 1 milliGRAM(s) Oral every 6 hours PRN agitation  magnesium hydroxide Suspension 30 milliLiter(s) Oral at bedtime PRN constipation  mineral oil enema 133 milliLiter(s) Rectal daily PRN hard stool  saline laxative (FLEET) Rectal Enema 1 Enema Rectal daily PRN hard stool  triamcinolone 0.1% Cream 1 Application(s) Topical two times a day PRN pruritis/rash

## 2022-02-04 NOTE — BH INPATIENT PSYCHIATRY PROGRESS NOTE - NSBHFUPINTERVALHXFT_PSY_A_CORE
The patient was seen for follow-up for dementia with behavioral disturbances.. Remains disorganized.  Sleep good , appetite good,VSS. Louder than recent baseline getting too close to peers and addressing them with loud voice, needed redirection and prn. No aggressive interactions

## 2022-02-04 NOTE — BH INPATIENT PSYCHIATRY PROGRESS NOTE - NSBHASSESSSUMMFT_PSY_ALL_CORE
1/13 Patient improved, no aggression, has predictable resistiveness with care . Cont Inderal and low dose risperidone. Patient's 2PC expiring patient has advanced dementia. She has no current living arrangement and cannot be safely d/markel to community w/o one. Therefore will apply for further retention  1/14: Pt is visible in the unit, pleasant on approach. Disorganized. Denies any complaints to writer. No PRNs needed. Tolerating Inderal well, VSS stable.   1/18: Patient improved and sustaining gains. Cont current Inderal and low dose risperidone at current doses.   1/19 Patient improved, not aggressive or highly agitated. Cont current treatment w/o changes fo now  1/20 Maintaning gains, no sig agitation, cont current treatment  1/21 W/o sig change  sl louder, will increase risperdal to bid especially given low low current dosing is   1/24 Patient maining gains, minimal loud verbalization with showering toileting but no agitation, aggression. Cont current rx plan  1/25 Maintaining gains, not aggression or agitated  1/26 Maintaining gains. Will continue current risperidone and Inderal at current doses  1/27: Patient maintaining gains, cont current rx and placement efforts  1/28 Sl noiser otherwie okay. Will increase Inderal and reduce risperdal to avoid low BP. Possibly change from very noisy roommate. Will explore feasibility of room change though for now not an option based on bed situation  1/31 Calmer today, suspect that very loud roommate is overstimulating her. Cont current meds, back on bid risperidone, consider increasing risperidone and or Inderal if needed. Will get EKG as she is on moderate dose Inderal  2/2 Patient loud, yells at times resistive with care no peer to peer aggression. Will modestly titrate Inderal, cont risperidone. EKG NSR. Monitor BP's  2/4: Patient louder more intrusive, needed prn may be due to unit itself being very overstimulating and noisy. Plan cont Inderal cannot raise as BP borderline.Will change from risperidone to prolixin to avoid alpha effects of the former which may drop BP in conjunction with Inderal

## 2022-02-04 NOTE — BH INPATIENT PSYCHIATRY PROGRESS NOTE - NSBHMETABOLIC_PSY_ALL_CORE_FT
BMI: BMI (kg/m2): 31.6 (11-29-21 @ 16:35)  HbA1c: A1C with Estimated Average Glucose Result: 5.4 % (11-04-21 @ 06:58)    Glucose: POCT Blood Glucose.: 78 mg/dL (12-30-21 @ 07:34)    BP: 106/58 (02-03-22 @ 20:59) (106/58 - 108/60)  Lipid Panel:

## 2022-02-04 NOTE — BH INPATIENT PSYCHIATRY PROGRESS NOTE - CURRENT MEDICATION
MEDICATIONS  (STANDING):  AQUAPHOR (petrolatum Ointment) 1 Application(s) Topical three times a day  diVALproex Sprinkle 750 milliGRAM(s) Oral two times a day  fluPHENAZine 1 milliGRAM(s) Oral daily  furosemide    Tablet 20 milliGRAM(s) Oral daily  melatonin. 5 milliGRAM(s) Oral at bedtime  propranolol 50 milliGRAM(s) Oral three times a day    MEDICATIONS  (PRN):  acetaminophen     Tablet .. 650 milliGRAM(s) Oral every 6 hours PRN Temp greater or equal to 38C (100.4F), Mild Pain (1 - 3), Moderate Pain (4 - 6)  aluminum hydroxide/magnesium hydroxide/simethicone Suspension 30 milliLiter(s) Oral every 4 hours PRN Dyspepsia  fluPHENAZine 1 milliGRAM(s) Oral every 6 hours PRN agitation  magnesium hydroxide Suspension 30 milliLiter(s) Oral at bedtime PRN constipation  mineral oil enema 133 milliLiter(s) Rectal daily PRN hard stool  saline laxative (FLEET) Rectal Enema 1 Enema Rectal daily PRN hard stool  triamcinolone 0.1% Cream 1 Application(s) Topical two times a day PRN pruritis/rash

## 2022-02-04 NOTE — BH INPATIENT PSYCHIATRY PROGRESS NOTE - NSBHCHARTREVIEWVS_PSY_A_CORE FT
Vital Signs Last 24 Hrs  T(C): 37 (02-04-22 @ 08:00), Max: 37 (02-04-22 @ 08:00)  T(F): 98.6 (02-04-22 @ 08:00), Max: 98.6 (02-04-22 @ 08:00)  HR: 62 (02-03-22 @ 20:59) (62 - 62)  BP: 106/58 (02-03-22 @ 20:59) (106/58 - 106/58)  BP(mean): --  RR: --  SpO2: --    Orthostatic VS  02-04-22 @ 08:00  Lying BP: --/-- HR: --  Sitting BP: 101/52 HR: 52  Standing BP: --/-- HR: --  Site: --  Mode: --  Orthostatic VS  02-03-22 @ 20:44  Lying BP: --/-- HR: --  Sitting BP: 102/44 HR: 62  Standing BP: --/-- HR: --  Site: upper left arm  Mode: electronic  Orthostatic VS  02-02-22 @ 20:37  Lying BP: --/-- HR: --  Sitting BP: 112/76 HR: 72  Standing BP: --/-- HR: --  Site: --  Mode: --

## 2022-02-05 PROCEDURE — 99231 SBSQ HOSP IP/OBS SF/LOW 25: CPT

## 2022-02-05 RX ORDER — FLUPHENAZINE HYDROCHLORIDE 1 MG/1
1 TABLET, FILM COATED ORAL ONCE
Refills: 0 | Status: DISCONTINUED | OUTPATIENT
Start: 2022-02-05 | End: 2022-05-18

## 2022-02-05 RX ADMIN — Medication 1 APPLICATION(S): at 08:12

## 2022-02-05 RX ADMIN — FLUPHENAZINE HYDROCHLORIDE 1 MILLIGRAM(S): 1 TABLET, FILM COATED ORAL at 08:09

## 2022-02-05 RX ADMIN — Medication 1 APPLICATION(S): at 21:32

## 2022-02-05 RX ADMIN — Medication 20 MILLIGRAM(S): at 08:09

## 2022-02-05 RX ADMIN — DIVALPROEX SODIUM 750 MILLIGRAM(S): 500 TABLET, DELAYED RELEASE ORAL at 21:31

## 2022-02-05 RX ADMIN — DIVALPROEX SODIUM 750 MILLIGRAM(S): 500 TABLET, DELAYED RELEASE ORAL at 08:09

## 2022-02-05 RX ADMIN — Medication 5 MILLIGRAM(S): at 21:30

## 2022-02-05 NOTE — BH INPATIENT PSYCHIATRY PROGRESS NOTE - CURRENT MEDICATION
MEDICATIONS  (STANDING):  AQUAPHOR (petrolatum Ointment) 1 Application(s) Topical three times a day  diVALproex Sprinkle 750 milliGRAM(s) Oral two times a day  fluPHENAZine 1 milliGRAM(s) Oral daily  furosemide    Tablet 20 milliGRAM(s) Oral daily  melatonin. 5 milliGRAM(s) Oral at bedtime  propranolol 60 milliGRAM(s) Oral three times a day    MEDICATIONS  (PRN):  acetaminophen     Tablet .. 650 milliGRAM(s) Oral every 6 hours PRN Temp greater or equal to 38C (100.4F), Mild Pain (1 - 3), Moderate Pain (4 - 6)  aluminum hydroxide/magnesium hydroxide/simethicone Suspension 30 milliLiter(s) Oral every 4 hours PRN Dyspepsia  fluPHENAZine 1 milliGRAM(s) Oral every 6 hours PRN agitation  fluPHENAZine  Injectable 1 milliGRAM(s) IntraMuscular once PRN agitation  magnesium hydroxide Suspension 30 milliLiter(s) Oral at bedtime PRN constipation  mineral oil enema 133 milliLiter(s) Rectal daily PRN hard stool  saline laxative (FLEET) Rectal Enema 1 Enema Rectal daily PRN hard stool  triamcinolone 0.1% Cream 1 Application(s) Topical two times a day PRN pruritis/rash

## 2022-02-05 NOTE — BH INPATIENT PSYCHIATRY PROGRESS NOTE - NSBHFUPINTERVALHXFT_PSY_A_CORE
The patient was seen for follow-up for dementia with behavioral disturbances.. Remains disorganized.  Sleep good , appetite good,VSS. Louder than recent baseline getting too close to peers and addressing them with loud voice, needed redirection and prn IM LAST NIGHT. No aggressive interactions

## 2022-02-05 NOTE — BH INPATIENT PSYCHIATRY PROGRESS NOTE - NSBHCHARTREVIEWVS_PSY_A_CORE FT
Vital Signs Last 24 Hrs  T(C): 36.6 (02-05-22 @ 07:50), Max: 36.8 (02-04-22 @ 16:43)  T(F): 97.8 (02-05-22 @ 07:50), Max: 98.3 (02-04-22 @ 16:43)  HR: --  BP: --  BP(mean): --  RR: --  SpO2: --    Orthostatic VS  02-05-22 @ 07:50  Lying BP: --/-- HR: --  Sitting BP: 121/66 HR: 65  Standing BP: 108/70 HR: 67  Site: --  Mode: --  Orthostatic VS  02-04-22 @ 08:00  Lying BP: --/-- HR: --  Sitting BP: 101/52 HR: 52  Standing BP: --/-- HR: --  Site: --  Mode: --  Orthostatic VS  02-03-22 @ 20:44  Lying BP: --/-- HR: --  Sitting BP: 102/44 HR: 62  Standing BP: --/-- HR: --  Site: upper left arm  Mode: electronic

## 2022-02-05 NOTE — BH INPATIENT PSYCHIATRY PROGRESS NOTE - PRN MEDS
MEDICATIONS  (PRN):  acetaminophen     Tablet .. 650 milliGRAM(s) Oral every 6 hours PRN Temp greater or equal to 38C (100.4F), Mild Pain (1 - 3), Moderate Pain (4 - 6)  aluminum hydroxide/magnesium hydroxide/simethicone Suspension 30 milliLiter(s) Oral every 4 hours PRN Dyspepsia  fluPHENAZine 1 milliGRAM(s) Oral every 6 hours PRN agitation  fluPHENAZine  Injectable 1 milliGRAM(s) IntraMuscular once PRN agitation  magnesium hydroxide Suspension 30 milliLiter(s) Oral at bedtime PRN constipation  mineral oil enema 133 milliLiter(s) Rectal daily PRN hard stool  saline laxative (FLEET) Rectal Enema 1 Enema Rectal daily PRN hard stool  triamcinolone 0.1% Cream 1 Application(s) Topical two times a day PRN pruritis/rash

## 2022-02-05 NOTE — BH INPATIENT PSYCHIATRY PROGRESS NOTE - NSBHASSESSSUMMFT_PSY_ALL_CORE
1/13 Patient improved, no aggression, has predictable resistiveness with care . Cont Inderal and low dose risperidone. Patient's 2PC expiring patient has advanced dementia. She has no current living arrangement and cannot be safely d/markel to community w/o one. Therefore will apply for further retention  1/14: Pt is visible in the unit, pleasant on approach. Disorganized. Denies any complaints to writer. No PRNs needed. Tolerating Inderal well, VSS stable.   1/18: Patient improved and sustaining gains. Cont current Inderal and low dose risperidone at current doses.   1/19 Patient improved, not aggressive or highly agitated. Cont current treatment w/o changes fo now  1/20 Maintaning gains, no sig agitation, cont current treatment  1/21 W/o sig change  sl louder, will increase risperdal to bid especially given low low current dosing is   1/24 Patient maining gains, minimal loud verbalization with showering toileting but no agitation, aggression. Cont current rx plan  1/25 Maintaining gains, not aggression or agitated  1/26 Maintaining gains. Will continue current risperidone and Inderal at current doses  1/27: Patient maintaining gains, cont current rx and placement efforts  1/28 Sl noiser otherwie okay. Will increase Inderal and reduce risperdal to avoid low BP. Possibly change from very noisy roommate. Will explore feasibility of room change though for now not an option based on bed situation  1/31 Calmer today, suspect that very loud roommate is overstimulating her. Cont current meds, back on bid risperidone, consider increasing risperidone and or Inderal if needed. Will get EKG as she is on moderate dose Inderal  2/2 Patient loud, yells at times resistive with care no peer to peer aggression. Will modestly titrate Inderal, cont risperidone. EKG NSR. Monitor BP's  2/4: Patient louder more intrusive, needed prn may be due to unit itself being very overstimulating and noisy. Plan cont Inderal cannot raise as BP borderline.Will change from risperidone to prolixin to avoid alpha effects of the former which may drop BP in conjunction with Inderal  2/5 Was agitated unclear reason for worsening. will increase Inderal though beginning to appearing while this med has helped no further benefit from titration. Cont prolixin, consider adding second dose

## 2022-02-06 PROCEDURE — 99231 SBSQ HOSP IP/OBS SF/LOW 25: CPT

## 2022-02-06 RX ADMIN — DIVALPROEX SODIUM 750 MILLIGRAM(S): 500 TABLET, DELAYED RELEASE ORAL at 20:51

## 2022-02-06 RX ADMIN — Medication 1 APPLICATION(S): at 15:00

## 2022-02-06 RX ADMIN — DIVALPROEX SODIUM 750 MILLIGRAM(S): 500 TABLET, DELAYED RELEASE ORAL at 08:54

## 2022-02-06 RX ADMIN — Medication 20 MILLIGRAM(S): at 08:53

## 2022-02-06 RX ADMIN — Medication 1 APPLICATION(S): at 21:50

## 2022-02-06 RX ADMIN — Medication 5 MILLIGRAM(S): at 20:51

## 2022-02-06 RX ADMIN — FLUPHENAZINE HYDROCHLORIDE 1 MILLIGRAM(S): 1 TABLET, FILM COATED ORAL at 08:53

## 2022-02-06 NOTE — BH INPATIENT PSYCHIATRY PROGRESS NOTE - NSBHMETABOLIC_PSY_ALL_CORE_FT
BMI: BMI (kg/m2): 31.6 (11-29-21 @ 16:35)  HbA1c: A1C with Estimated Average Glucose Result: 5.4 % (11-04-21 @ 06:58)    Glucose: POCT Blood Glucose.: 78 mg/dL (12-30-21 @ 07:34)    BP: 106/58 (02-03-22 @ 20:59) (106/58 - 106/58)  Lipid Panel:

## 2022-02-06 NOTE — BH INPATIENT PSYCHIATRY PROGRESS NOTE - NSBHCHARTREVIEWVS_PSY_A_CORE FT
Vital Signs Last 24 Hrs  T(C): 36.6 (02-06-22 @ 07:50), Max: 36.7 (02-05-22 @ 20:51)  T(F): 97.8 (02-06-22 @ 07:50), Max: 98.1 (02-05-22 @ 20:51)  HR: --  BP: --  BP(mean): --  RR: --  SpO2: --    Orthostatic VS  02-06-22 @ 07:50  Lying BP: --/-- HR: --  Sitting BP: 126/60 HR: 67  Standing BP: 124/55 HR: 60  Site: upper left arm  Mode: electronic  Orthostatic VS  02-05-22 @ 20:51  Lying BP: --/-- HR: --  Sitting BP: 119/71 HR: 60  Standing BP: --/-- HR: --  Site: --  Mode: --  Orthostatic VS  02-05-22 @ 12:25  Lying BP: --/-- HR: --  Sitting BP: 121/54 HR: 71  Standing BP: --/-- HR: --  Site: --  Mode: --  Orthostatic VS  02-05-22 @ 07:50  Lying BP: --/-- HR: --  Sitting BP: 121/66 HR: 65  Standing BP: 108/70 HR: 67  Site: --  Mode: --

## 2022-02-06 NOTE — BH INPATIENT PSYCHIATRY PROGRESS NOTE - NSBHASSESSSUMMFT_PSY_ALL_CORE
1/13 Patient improved, no aggression, has predictable resistiveness with care . Cont Inderal and low dose risperidone. Patient's 2PC expiring patient has advanced dementia. She has no current living arrangement and cannot be safely d/markel to community w/o one. Therefore will apply for further retention  1/14: Pt is visible in the unit, pleasant on approach. Disorganized. Denies any complaints to writer. No PRNs needed. Tolerating Inderal well, VSS stable.   1/18: Patient improved and sustaining gains. Cont current Inderal and low dose risperidone at current doses.   1/19 Patient improved, not aggressive or highly agitated. Cont current treatment w/o changes fo now  1/20 Maintaning gains, no sig agitation, cont current treatment  1/21 W/o sig change  sl louder, will increase risperdal to bid especially given low low current dosing is   1/24 Patient maining gains, minimal loud verbalization with showering toileting but no agitation, aggression. Cont current rx plan  1/25 Maintaining gains, not aggression or agitated  1/26 Maintaining gains. Will continue current risperidone and Inderal at current doses  1/27: Patient maintaining gains, cont current rx and placement efforts  1/28 Sl noiser otherwie okay. Will increase Inderal and reduce risperdal to avoid low BP. Possibly change from very noisy roommate. Will explore feasibility of room change though for now not an option based on bed situation  1/31 Calmer today, suspect that very loud roommate is overstimulating her. Cont current meds, back on bid risperidone, consider increasing risperidone and or Inderal if needed. Will get EKG as she is on moderate dose Inderal  2/2 Patient loud, yells at times resistive with care no peer to peer aggression. Will modestly titrate Inderal, cont risperidone. EKG NSR. Monitor BP's  2/4: Patient louder more intrusive, needed prn may be due to unit itself being very overstimulating and noisy. Plan cont Inderal cannot raise as BP borderline.Will change from risperidone to prolixin to avoid alpha effects of the former which may drop BP in conjunction with Inderal  2/5 Was agitated unclear reason for worsening. will increase Inderal though beginning to appearing while this med has helped no further benefit from titration. Cont prolixin, consider adding second dose  2/6 BP more stable on prolixin, cont current med trial eval need for prolixin increase to bid

## 2022-02-06 NOTE — BH INPATIENT PSYCHIATRY PROGRESS NOTE - CURRENT MEDICATION
MEDICATIONS  (STANDING):  AQUAPHOR (petrolatum Ointment) 1 Application(s) Topical three times a day  diVALproex Sprinkle 750 milliGRAM(s) Oral two times a day  fluPHENAZine 1 milliGRAM(s) Oral daily  furosemide    Tablet 20 milliGRAM(s) Oral daily  melatonin. 5 milliGRAM(s) Oral at bedtime  propranolol 60 milliGRAM(s) Oral three times a day    MEDICATIONS  (PRN):  acetaminophen     Tablet .. 650 milliGRAM(s) Oral every 6 hours PRN Temp greater or equal to 38C (100.4F), Mild Pain (1 - 3), Moderate Pain (4 - 6)  aluminum hydroxide/magnesium hydroxide/simethicone Suspension 30 milliLiter(s) Oral every 4 hours PRN Dyspepsia  fluPHENAZine 1 milliGRAM(s) Oral every 6 hours PRN agitation  fluPHENAZine  Injectable 1 milliGRAM(s) IntraMuscular once PRN agitation  fluPHENAZine  Injectable 1 milliGRAM(s) IntraMuscular once PRN agitation  magnesium hydroxide Suspension 30 milliLiter(s) Oral at bedtime PRN constipation  mineral oil enema 133 milliLiter(s) Rectal daily PRN hard stool  saline laxative (FLEET) Rectal Enema 1 Enema Rectal daily PRN hard stool  triamcinolone 0.1% Cream 1 Application(s) Topical two times a day PRN pruritis/rash

## 2022-02-06 NOTE — BH INPATIENT PSYCHIATRY PROGRESS NOTE - PRN MEDS
MEDICATIONS  (PRN):  acetaminophen     Tablet .. 650 milliGRAM(s) Oral every 6 hours PRN Temp greater or equal to 38C (100.4F), Mild Pain (1 - 3), Moderate Pain (4 - 6)  aluminum hydroxide/magnesium hydroxide/simethicone Suspension 30 milliLiter(s) Oral every 4 hours PRN Dyspepsia  fluPHENAZine 1 milliGRAM(s) Oral every 6 hours PRN agitation  fluPHENAZine  Injectable 1 milliGRAM(s) IntraMuscular once PRN agitation  fluPHENAZine  Injectable 1 milliGRAM(s) IntraMuscular once PRN agitation  magnesium hydroxide Suspension 30 milliLiter(s) Oral at bedtime PRN constipation  mineral oil enema 133 milliLiter(s) Rectal daily PRN hard stool  saline laxative (FLEET) Rectal Enema 1 Enema Rectal daily PRN hard stool  triamcinolone 0.1% Cream 1 Application(s) Topical two times a day PRN pruritis/rash

## 2022-02-07 PROCEDURE — 99231 SBSQ HOSP IP/OBS SF/LOW 25: CPT

## 2022-02-07 RX ORDER — FLUPHENAZINE HYDROCHLORIDE 1 MG/1
1 TABLET, FILM COATED ORAL ONCE
Refills: 0 | Status: DISCONTINUED | OUTPATIENT
Start: 2022-02-07 | End: 2022-05-18

## 2022-02-07 RX ADMIN — Medication 5 MILLIGRAM(S): at 21:10

## 2022-02-07 RX ADMIN — Medication 20 MILLIGRAM(S): at 08:26

## 2022-02-07 RX ADMIN — Medication 1 APPLICATION(S): at 22:31

## 2022-02-07 RX ADMIN — DIVALPROEX SODIUM 750 MILLIGRAM(S): 500 TABLET, DELAYED RELEASE ORAL at 08:26

## 2022-02-07 RX ADMIN — Medication 1 APPLICATION(S): at 09:12

## 2022-02-07 RX ADMIN — FLUPHENAZINE HYDROCHLORIDE 1 MILLIGRAM(S): 1 TABLET, FILM COATED ORAL at 08:26

## 2022-02-07 RX ADMIN — DIVALPROEX SODIUM 750 MILLIGRAM(S): 500 TABLET, DELAYED RELEASE ORAL at 21:10

## 2022-02-07 NOTE — BH INPATIENT PSYCHIATRY PROGRESS NOTE - PRN MEDS
MEDICATIONS  (PRN):  acetaminophen     Tablet .. 650 milliGRAM(s) Oral every 6 hours PRN Temp greater or equal to 38C (100.4F), Mild Pain (1 - 3), Moderate Pain (4 - 6)  aluminum hydroxide/magnesium hydroxide/simethicone Suspension 30 milliLiter(s) Oral every 4 hours PRN Dyspepsia  fluPHENAZine 1 milliGRAM(s) Oral every 6 hours PRN agitation  fluPHENAZine  Injectable 1 milliGRAM(s) IntraMuscular once PRN agitation  fluPHENAZine  Injectable 1 milliGRAM(s) IntraMuscular once PRN agitation  fluPHENAZine  Injectable 1 milliGRAM(s) IntraMuscular once PRN agitation  magnesium hydroxide Suspension 30 milliLiter(s) Oral at bedtime PRN constipation  mineral oil enema 133 milliLiter(s) Rectal daily PRN hard stool  saline laxative (FLEET) Rectal Enema 1 Enema Rectal daily PRN hard stool  triamcinolone 0.1% Cream 1 Application(s) Topical two times a day PRN pruritis/rash

## 2022-02-07 NOTE — BH INPATIENT PSYCHIATRY PROGRESS NOTE - CURRENT MEDICATION
MEDICATIONS  (STANDING):  AQUAPHOR (petrolatum Ointment) 1 Application(s) Topical three times a day  diVALproex Sprinkle 750 milliGRAM(s) Oral two times a day  fluPHENAZine 1 milliGRAM(s) Oral daily  furosemide    Tablet 20 milliGRAM(s) Oral daily  melatonin. 5 milliGRAM(s) Oral at bedtime  propranolol 60 milliGRAM(s) Oral three times a day    MEDICATIONS  (PRN):  acetaminophen     Tablet .. 650 milliGRAM(s) Oral every 6 hours PRN Temp greater or equal to 38C (100.4F), Mild Pain (1 - 3), Moderate Pain (4 - 6)  aluminum hydroxide/magnesium hydroxide/simethicone Suspension 30 milliLiter(s) Oral every 4 hours PRN Dyspepsia  fluPHENAZine 1 milliGRAM(s) Oral every 6 hours PRN agitation  fluPHENAZine  Injectable 1 milliGRAM(s) IntraMuscular once PRN agitation  fluPHENAZine  Injectable 1 milliGRAM(s) IntraMuscular once PRN agitation  fluPHENAZine  Injectable 1 milliGRAM(s) IntraMuscular once PRN agitation  magnesium hydroxide Suspension 30 milliLiter(s) Oral at bedtime PRN constipation  mineral oil enema 133 milliLiter(s) Rectal daily PRN hard stool  saline laxative (FLEET) Rectal Enema 1 Enema Rectal daily PRN hard stool  triamcinolone 0.1% Cream 1 Application(s) Topical two times a day PRN pruritis/rash

## 2022-02-07 NOTE — BH INPATIENT PSYCHIATRY PROGRESS NOTE - NSBHMETABOLIC_PSY_ALL_CORE_FT
BMI: BMI (kg/m2): 31.6 (11-29-21 @ 16:35)  HbA1c: A1C with Estimated Average Glucose Result: 5.4 % (11-04-21 @ 06:58)    Glucose: POCT Blood Glucose.: 78 mg/dL (12-30-21 @ 07:34)    BP: 148/70 (02-07-22 @ 07:20) (148/70 - 148/70)  Lipid Panel:

## 2022-02-07 NOTE — BH INPATIENT PSYCHIATRY PROGRESS NOTE - NSBHFUPINTERVALHXFT_PSY_A_CORE
The patient was seen for follow-up for dementia with behavioral disturbances. Remains disorganized. No PRNs needed yesterday. Pt was agitated, yelling this morning after she was cleaned by staff. Responded well to redirection with Claudia Mouse plush. Later during lunch time she touched a peer's face and was loud once again. Staff was able to have her walk away from the dining area and she had her lunch in the day room. PRN was activated but was not used as pt responded well to being moved to a less stimulating environment.

## 2022-02-07 NOTE — BH INPATIENT PSYCHIATRY PROGRESS NOTE - NSBHASSESSSUMMFT_PSY_ALL_CORE
1/13 Patient improved, no aggression, has predictable resistiveness with care . Cont Inderal and low dose risperidone. Patient's 2PC expiring patient has advanced dementia. She has no current living arrangement and cannot be safely d/markel to community w/o one. Therefore will apply for further retention  1/14: Pt is visible in the unit, pleasant on approach. Disorganized. Denies any complaints to writer. No PRNs needed. Tolerating Inderal well, VSS stable.   1/18: Patient improved and sustaining gains. Cont current Inderal and low dose risperidone at current doses.   1/19 Patient improved, not aggressive or highly agitated. Cont current treatment w/o changes fo now  1/20 Maintaning gains, no sig agitation, cont current treatment  1/21 W/o sig change  sl louder, will increase risperdal to bid especially given low low current dosing is   1/24 Patient maining gains, minimal loud verbalization with showering toileting but no agitation, aggression. Cont current rx plan  1/25 Maintaining gains, not aggression or agitated  1/26 Maintaining gains. Will continue current risperidone and Inderal at current doses  1/27: Patient maintaining gains, cont current rx and placement efforts  1/28 Sl noiser otherwie okay. Will increase Inderal and reduce risperdal to avoid low BP. Possibly change from very noisy roommate. Will explore feasibility of room change though for now not an option based on bed situation  1/31 Calmer today, suspect that very loud roommate is overstimulating her. Cont current meds, back on bid risperidone, consider increasing risperidone and or Inderal if needed. Will get EKG as she is on moderate dose Inderal  2/2 Patient loud, yells at times resistive with care no peer to peer aggression. Will modestly titrate Inderal, cont risperidone. EKG NSR. Monitor BP's  2/4: Patient louder more intrusive, needed prn may be due to unit itself being very overstimulating and noisy. Plan cont Inderal cannot raise as BP borderline.Will change from risperidone to prolixin to avoid alpha effects of the former which may drop BP in conjunction with Inderal  2/5 Was agitated unclear reason for worsening. will increase Inderal though beginning to appearing while this med has helped no further benefit from titration. Cont prolixin, consider adding second dose  2/6 BP more stable on prolixin, cont current med trial eval need for prolixin increase to bid  2/7: No PRNs needed yesterday. Pt was agitated, yelling this morning after she was cleaned by staff. Responded well to redirection with Claudia Arriola plush. Later during lunch time she touched a peer's face and was loud once again. Staff was able to have her walk away from the dining area and she had her lunch in the day room. PRN was activated but was not used as pt responded well to being moved to a less stimulating environment.

## 2022-02-07 NOTE — BH INPATIENT PSYCHIATRY PROGRESS NOTE - NSBHCHARTREVIEWVS_PSY_A_CORE FT
Vital Signs Last 24 Hrs  T(C): 36.6 (02-07-22 @ 07:20), Max: 36.8 (02-06-22 @ 19:00)  T(F): 97.9 (02-07-22 @ 07:20), Max: 98.2 (02-06-22 @ 19:00)  HR: 78 (02-07-22 @ 07:20) (78 - 78)  BP: 148/70 (02-07-22 @ 07:20) (148/70 - 148/70)  BP(mean): --  RR: --  SpO2: --    Orthostatic VS  02-06-22 @ 19:00  Lying BP: --/-- HR: --  Sitting BP: 108/64 HR: 63  Standing BP: 124/52 HR: 68  Site: upper left arm  Mode: electronic  Orthostatic VS  02-06-22 @ 07:50  Lying BP: --/-- HR: --  Sitting BP: 126/60 HR: 67  Standing BP: 124/55 HR: 60  Site: upper left arm  Mode: electronic  Orthostatic VS  02-05-22 @ 20:51  Lying BP: --/-- HR: --  Sitting BP: 119/71 HR: 60  Standing BP: --/-- HR: --  Site: --  Mode: --

## 2022-02-08 LAB — SARS-COV-2 RNA SPEC QL NAA+PROBE: SIGNIFICANT CHANGE UP

## 2022-02-08 PROCEDURE — 99232 SBSQ HOSP IP/OBS MODERATE 35: CPT

## 2022-02-08 RX ORDER — FLUPHENAZINE HYDROCHLORIDE 1 MG/1
1 TABLET, FILM COATED ORAL
Refills: 0 | Status: DISCONTINUED | OUTPATIENT
Start: 2022-02-08 | End: 2022-02-09

## 2022-02-08 RX ADMIN — FLUPHENAZINE HYDROCHLORIDE 1 MILLIGRAM(S): 1 TABLET, FILM COATED ORAL at 20:58

## 2022-02-08 RX ADMIN — DIVALPROEX SODIUM 750 MILLIGRAM(S): 500 TABLET, DELAYED RELEASE ORAL at 08:52

## 2022-02-08 RX ADMIN — Medication 20 MILLIGRAM(S): at 08:53

## 2022-02-08 RX ADMIN — Medication 5 MILLIGRAM(S): at 20:57

## 2022-02-08 RX ADMIN — FLUPHENAZINE HYDROCHLORIDE 1 MILLIGRAM(S): 1 TABLET, FILM COATED ORAL at 14:26

## 2022-02-08 RX ADMIN — Medication 1 APPLICATION(S): at 08:53

## 2022-02-08 RX ADMIN — Medication 1 APPLICATION(S): at 17:56

## 2022-02-08 RX ADMIN — FLUPHENAZINE HYDROCHLORIDE 1 MILLIGRAM(S): 1 TABLET, FILM COATED ORAL at 08:53

## 2022-02-08 RX ADMIN — DIVALPROEX SODIUM 750 MILLIGRAM(S): 500 TABLET, DELAYED RELEASE ORAL at 20:58

## 2022-02-08 NOTE — BH INPATIENT PSYCHIATRY PROGRESS NOTE - CURRENT MEDICATION
MEDICATIONS  (STANDING):  AQUAPHOR (petrolatum Ointment) 1 Application(s) Topical three times a day  diVALproex Sprinkle 750 milliGRAM(s) Oral two times a day  fluPHENAZine 1 milliGRAM(s) Oral two times a day  furosemide    Tablet 20 milliGRAM(s) Oral daily  melatonin. 5 milliGRAM(s) Oral at bedtime  propranolol 60 milliGRAM(s) Oral three times a day    MEDICATIONS  (PRN):  acetaminophen     Tablet .. 650 milliGRAM(s) Oral every 6 hours PRN Temp greater or equal to 38C (100.4F), Mild Pain (1 - 3), Moderate Pain (4 - 6)  aluminum hydroxide/magnesium hydroxide/simethicone Suspension 30 milliLiter(s) Oral every 4 hours PRN Dyspepsia  fluPHENAZine 1 milliGRAM(s) Oral every 6 hours PRN agitation  fluPHENAZine  Injectable 1 milliGRAM(s) IntraMuscular once PRN agitation  fluPHENAZine  Injectable 1 milliGRAM(s) IntraMuscular once PRN agitation  fluPHENAZine  Injectable 1 milliGRAM(s) IntraMuscular once PRN agitation  magnesium hydroxide Suspension 30 milliLiter(s) Oral at bedtime PRN constipation  mineral oil enema 133 milliLiter(s) Rectal daily PRN hard stool  saline laxative (FLEET) Rectal Enema 1 Enema Rectal daily PRN hard stool  triamcinolone 0.1% Cream 1 Application(s) Topical two times a day PRN pruritis/rash

## 2022-02-08 NOTE — BH INPATIENT PSYCHIATRY PROGRESS NOTE - NSBHFUPINTERVALHXFT_PSY_A_CORE
The patient was seen for follow-up for dementia with behavioral disturbances. Remains disorganized. No PRNs needed yesterday. Pt was agitated, yelling this morning after she was cleaned by staff. Responded well to redirection with Claudia Mouse plush. Later during lunch time she touched a peer's face and was loud once again. Staff was able to have her walk away from the dining area and she had her lunch in the day room. PRN was activated but was not used as pt responded well to being moved to a less stimulating environment. Was loud after getting routine Covid Swab

## 2022-02-08 NOTE — BH INPATIENT PSYCHIATRY PROGRESS NOTE - NSBHCHARTREVIEWVS_PSY_A_CORE FT
Vital Signs Last 24 Hrs  T(C): 36.7 (02-08-22 @ 08:27), Max: 36.7 (02-08-22 @ 08:27)  T(F): 98 (02-08-22 @ 08:27), Max: 98 (02-08-22 @ 08:27)  HR: 58 (02-08-22 @ 08:27) (58 - 58)  BP: --  BP(mean): --  RR: --  SpO2: --    Orthostatic VS  02-08-22 @ 08:27  Lying BP: --/-- HR: --  Sitting BP: 115/94 HR: --  Standing BP: --/-- HR: --  Site: --  Mode: --  Orthostatic VS  02-07-22 @ 20:26  Lying BP: --/-- HR: --  Sitting BP: 128/82 HR: 67  Standing BP: --/-- HR: --  Site: --  Mode: --  Orthostatic VS  02-06-22 @ 19:00  Lying BP: --/-- HR: --  Sitting BP: 108/64 HR: 63  Standing BP: 124/52 HR: 68  Site: upper left arm  Mode: electronic

## 2022-02-08 NOTE — BH INPATIENT PSYCHIATRY PROGRESS NOTE - NSBHASSESSSUMMFT_PSY_ALL_CORE
No 1/13 Patient improved, no aggression, has predictable resistiveness with care . Cont Inderal and low dose risperidone. Patient's 2PC expiring patient has advanced dementia. She has no current living arrangement and cannot be safely d/markel to community w/o one. Therefore will apply for further retention  1/14: Pt is visible in the unit, pleasant on approach. Disorganized. Denies any complaints to writer. No PRNs needed. Tolerating Inderal well, VSS stable.   1/18: Patient improved and sustaining gains. Cont current Inderal and low dose risperidone at current doses.   1/19 Patient improved, not aggressive or highly agitated. Cont current treatment w/o changes fo now  1/20 Maintaning gains, no sig agitation, cont current treatment  1/21 W/o sig change  sl louder, will increase risperdal to bid especially given low low current dosing is   1/24 Patient maining gains, minimal loud verbalization with showering toileting but no agitation, aggression. Cont current rx plan  1/25 Maintaining gains, not aggression or agitated  1/26 Maintaining gains. Will continue current risperidone and Inderal at current doses  1/27: Patient maintaining gains, cont current rx and placement efforts  1/28 Sl noiser otherwie okay. Will increase Inderal and reduce risperdal to avoid low BP. Possibly change from very noisy roommate. Will explore feasibility of room change though for now not an option based on bed situation  1/31 Calmer today, suspect that very loud roommate is overstimulating her. Cont current meds, back on bid risperidone, consider increasing risperidone and or Inderal if needed. Will get EKG as she is on moderate dose Inderal  2/2 Patient loud, yells at times resistive with care no peer to peer aggression. Will modestly titrate Inderal, cont risperidone. EKG NSR. Monitor BP's  2/4: Patient louder more intrusive, needed prn may be due to unit itself being very overstimulating and noisy. Plan cont Inderal cannot raise as BP borderline.Will change from risperidone to prolixin to avoid alpha effects of the former which may drop BP in conjunction with Inderal  2/5 Was agitated unclear reason for worsening. will increase Inderal though beginning to appearing while this med has helped no further benefit from titration. Cont prolixin, consider adding second dose  2/6 BP more stable on prolixin, cont current med trial eval need for prolixin increase to bid  2/7: No PRNs needed yesterday. Pt was agitated, yelling this morning after she was cleaned by staff. Responded well to redirection with Claudia Mouse plush. Later during lunch time she touched a peer's face and was loud once again. Staff was able to have her walk away from the dining area and she had her lunch in the day room. PRN was activated but was not used as pt responded well to being moved to a less stimulating environment.   2/8 had period of improvement then has regressed. Will increase Prolixin, has not been incremental benefit from increase  in beta blocker

## 2022-02-09 PROCEDURE — 99232 SBSQ HOSP IP/OBS MODERATE 35: CPT

## 2022-02-09 RX ORDER — RISPERIDONE 4 MG/1
0.5 TABLET ORAL
Refills: 0 | Status: DISCONTINUED | OUTPATIENT
Start: 2022-02-09 | End: 2022-02-10

## 2022-02-09 RX ADMIN — DIVALPROEX SODIUM 750 MILLIGRAM(S): 500 TABLET, DELAYED RELEASE ORAL at 09:06

## 2022-02-09 RX ADMIN — FLUPHENAZINE HYDROCHLORIDE 1 MILLIGRAM(S): 1 TABLET, FILM COATED ORAL at 09:06

## 2022-02-09 RX ADMIN — Medication 1 APPLICATION(S): at 13:49

## 2022-02-09 RX ADMIN — Medication 5 MILLIGRAM(S): at 21:00

## 2022-02-09 RX ADMIN — Medication 20 MILLIGRAM(S): at 09:07

## 2022-02-09 RX ADMIN — Medication 1 APPLICATION(S): at 20:59

## 2022-02-09 RX ADMIN — RISPERIDONE 0.5 MILLIGRAM(S): 4 TABLET ORAL at 16:27

## 2022-02-09 RX ADMIN — DIVALPROEX SODIUM 750 MILLIGRAM(S): 500 TABLET, DELAYED RELEASE ORAL at 21:00

## 2022-02-09 NOTE — BH INPATIENT PSYCHIATRY PROGRESS NOTE - NSBHFUPINTERVALHXFT_PSY_A_CORE
The patient was seen for follow-up for dementia with behavioral disturbances. Patient pushed peer who was right in front of her last night. Patient eating sleeping well, had BM today. Has been louder and at times more angry.

## 2022-02-09 NOTE — BH INPATIENT PSYCHIATRY PROGRESS NOTE - NSBHASSESSSUMMFT_PSY_ALL_CORE
1/13 Patient improved, no aggression, has predictable resistiveness with care . Cont Inderal and low dose risperidone. Patient's 2PC expiring patient has advanced dementia. She has no current living arrangement and cannot be safely d/markel to community w/o one. Therefore will apply for further retention  1/14: Pt is visible in the unit, pleasant on approach. Disorganized. Denies any complaints to writer. No PRNs needed. Tolerating Inderal well, VSS stable.   1/18: Patient improved and sustaining gains. Cont current Inderal and low dose risperidone at current doses.   1/19 Patient improved, not aggressive or highly agitated. Cont current treatment w/o changes fo now  1/20 Maintaning gains, no sig agitation, cont current treatment  1/21 W/o sig change  sl louder, will increase risperdal to bid especially given low low current dosing is   1/24 Patient maining gains, minimal loud verbalization with showering toileting but no agitation, aggression. Cont current rx plan  1/25 Maintaining gains, not aggression or agitated  1/26 Maintaining gains. Will continue current risperidone and Inderal at current doses  1/27: Patient maintaining gains, cont current rx and placement efforts  1/28 Sl noiser otherwie okay. Will increase Inderal and reduce risperdal to avoid low BP. Possibly change from very noisy roommate. Will explore feasibility of room change though for now not an option based on bed situation  1/31 Calmer today, suspect that very loud roommate is overstimulating her. Cont current meds, back on bid risperidone, consider increasing risperidone and or Inderal if needed. Will get EKG as she is on moderate dose Inderal  2/2 Patient loud, yells at times resistive with care no peer to peer aggression. Will modestly titrate Inderal, cont risperidone. EKG NSR. Monitor BP's  2/4: Patient louder more intrusive, needed prn may be due to unit itself being very overstimulating and noisy. Plan cont Inderal cannot raise as BP borderline.Will change from risperidone to prolixin to avoid alpha effects of the former which may drop BP in conjunction with Inderal  2/5 Was agitated unclear reason for worsening. will increase Inderal though beginning to appearing while this med has helped no further benefit from titration. Cont prolixin, consider adding second dose  2/6 BP more stable on prolixin, cont current med trial eval need for prolixin increase to bid  2/7: No PRNs needed yesterday. Pt was agitated, yelling this morning after she was cleaned by staff. Responded well to redirection with Claudia Mouse plush. Later during lunch time she touched a peer's face and was loud once again. Staff was able to have her walk away from the dining area and she had her lunch in the day room. PRN was activated but was not used as pt responded well to being moved to a less stimulating environment.   2/8 had period of improvement then has regressed. Will increase Prolixin, has not been incremental benefit from increase  in beta blocker  2/9 Aggressive incident reflecting poor response to meds, will reduce beta blocker, and with this go risperidone she should be able to handle BP effects if betabocker tapered will use Co during day for safety. will check for UTI

## 2022-02-09 NOTE — BH INPATIENT PSYCHIATRY PROGRESS NOTE - NSBHCHARTREVIEWVS_PSY_A_CORE FT
Vital Signs Last 24 Hrs  T(C): 36.8 (02-09-22 @ 07:38), Max: 36.8 (02-09-22 @ 07:38)  T(F): 98.2 (02-09-22 @ 07:38), Max: 98.2 (02-09-22 @ 07:38)  HR: --  BP: --  BP(mean): --  RR: 16 (02-08-22 @ 20:45) (16 - 16)  SpO2: --    Orthostatic VS  02-09-22 @ 07:38  Lying BP: --/-- HR: --  Sitting BP: 129/57 HR: 53  Standing BP: 136/70 HR: 66  Site: --  Mode: --  Orthostatic VS  02-08-22 @ 20:45  Lying BP: --/-- HR: --  Sitting BP: 118/76 HR: 78  Standing BP: --/-- HR: --  Site: --  Mode: --  Orthostatic VS  02-08-22 @ 12:39  Lying BP: --/-- HR: --  Sitting BP: 112/72 HR: --  Standing BP: --/-- HR: --  Site: --  Mode: auscultated w/ stethoscope  Orthostatic VS  02-08-22 @ 08:27  Lying BP: --/-- HR: --  Sitting BP: 115/94 HR: --  Standing BP: --/-- HR: --  Site: --  Mode: --  Orthostatic VS  02-07-22 @ 20:26  Lying BP: --/-- HR: --  Sitting BP: 128/82 HR: 67  Standing BP: --/-- HR: --  Site: --  Mode: --

## 2022-02-09 NOTE — BH INPATIENT PSYCHIATRY PROGRESS NOTE - MSE UNSTRUCTURED FT
Patient is awake and alert. relates in childlike manner Bilateral resting tremor, prominent but does not interfere with eating and not changed  Speech decreased production, generally nl volume, childlike, abnormal prosody. Repeats phrases in high pitch then low pitch. Loud at times., sings at times, yelling Paucity of content.   Poor insight. No suicidal ideations. Mood neutral, affect labile. No overt delusions, nunez. Thinking impoverished. Oriented to self Poor insight

## 2022-02-09 NOTE — BH INPATIENT PSYCHIATRY PROGRESS NOTE - CURRENT MEDICATION
MEDICATIONS  (STANDING):  AQUAPHOR (petrolatum Ointment) 1 Application(s) Topical three times a day  diVALproex Sprinkle 750 milliGRAM(s) Oral two times a day  furosemide    Tablet 20 milliGRAM(s) Oral daily  melatonin. 5 milliGRAM(s) Oral at bedtime  propranolol 40 milliGRAM(s) Oral three times a day  risperiDONE   Tablet 0.5 milliGRAM(s) Oral <User Schedule>    MEDICATIONS  (PRN):  acetaminophen     Tablet .. 650 milliGRAM(s) Oral every 6 hours PRN Temp greater or equal to 38C (100.4F), Mild Pain (1 - 3), Moderate Pain (4 - 6)  aluminum hydroxide/magnesium hydroxide/simethicone Suspension 30 milliLiter(s) Oral every 4 hours PRN Dyspepsia  fluPHENAZine 1 milliGRAM(s) Oral every 6 hours PRN agitation  fluPHENAZine  Injectable 1 milliGRAM(s) IntraMuscular once PRN agitation  fluPHENAZine  Injectable 1 milliGRAM(s) IntraMuscular once PRN agitation  fluPHENAZine  Injectable 1 milliGRAM(s) IntraMuscular once PRN agitation  magnesium hydroxide Suspension 30 milliLiter(s) Oral at bedtime PRN constipation  mineral oil enema 133 milliLiter(s) Rectal daily PRN hard stool  saline laxative (FLEET) Rectal Enema 1 Enema Rectal daily PRN hard stool  triamcinolone 0.1% Cream 1 Application(s) Topical two times a day PRN pruritis/rash

## 2022-02-10 RX ORDER — RISPERIDONE 4 MG/1
0.75 TABLET ORAL
Refills: 0 | Status: DISCONTINUED | OUTPATIENT
Start: 2022-02-10 | End: 2022-02-14

## 2022-02-10 RX ADMIN — RISPERIDONE 0.75 MILLIGRAM(S): 4 TABLET ORAL at 17:09

## 2022-02-10 RX ADMIN — RISPERIDONE 0.5 MILLIGRAM(S): 4 TABLET ORAL at 08:05

## 2022-02-10 RX ADMIN — Medication 1 APPLICATION(S): at 09:37

## 2022-02-10 RX ADMIN — Medication 5 MILLIGRAM(S): at 20:52

## 2022-02-10 RX ADMIN — DIVALPROEX SODIUM 750 MILLIGRAM(S): 500 TABLET, DELAYED RELEASE ORAL at 20:52

## 2022-02-10 RX ADMIN — Medication 1 APPLICATION(S): at 13:42

## 2022-02-10 RX ADMIN — DIVALPROEX SODIUM 750 MILLIGRAM(S): 500 TABLET, DELAYED RELEASE ORAL at 08:05

## 2022-02-10 RX ADMIN — Medication 20 MILLIGRAM(S): at 08:05

## 2022-02-11 RX ORDER — SENNA PLUS 8.6 MG/1
2 TABLET ORAL AT BEDTIME
Refills: 0 | Status: DISCONTINUED | OUTPATIENT
Start: 2022-02-11 | End: 2022-02-11

## 2022-02-11 RX ORDER — RISPERIDONE 4 MG/1
0.5 TABLET ORAL AT BEDTIME
Refills: 0 | Status: DISCONTINUED | OUTPATIENT
Start: 2022-02-12 | End: 2022-02-14

## 2022-02-11 RX ORDER — SENNA PLUS 8.6 MG/1
1 TABLET ORAL AT BEDTIME
Refills: 0 | Status: DISCONTINUED | OUTPATIENT
Start: 2022-02-11 | End: 2022-05-18

## 2022-02-11 RX ADMIN — Medication 1 APPLICATION(S): at 13:10

## 2022-02-11 RX ADMIN — Medication 5 MILLIGRAM(S): at 22:26

## 2022-02-11 RX ADMIN — RISPERIDONE 0.75 MILLIGRAM(S): 4 TABLET ORAL at 08:15

## 2022-02-11 RX ADMIN — SENNA PLUS 1 TABLET(S): 8.6 TABLET ORAL at 22:26

## 2022-02-11 RX ADMIN — Medication 1 APPLICATION(S): at 22:25

## 2022-02-11 RX ADMIN — RISPERIDONE 0.75 MILLIGRAM(S): 4 TABLET ORAL at 17:44

## 2022-02-11 RX ADMIN — DIVALPROEX SODIUM 750 MILLIGRAM(S): 500 TABLET, DELAYED RELEASE ORAL at 22:25

## 2022-02-11 RX ADMIN — Medication 20 MILLIGRAM(S): at 13:09

## 2022-02-11 RX ADMIN — DIVALPROEX SODIUM 750 MILLIGRAM(S): 500 TABLET, DELAYED RELEASE ORAL at 08:16

## 2022-02-11 RX ADMIN — Medication 1 APPLICATION(S): at 13:09

## 2022-02-11 NOTE — BH INPATIENT PSYCHIATRY PROGRESS NOTE - NSBHASSESSSUMMFT_PSY_ALL_CORE
1/13 Patient improved, no aggression, has predictable resistiveness with care . Cont Inderal and low dose risperidone. Patient's 2PC expiring patient has advanced dementia. She has no current living arrangement and cannot be safely d/markel to community w/o one. Therefore will apply for further retention  1/14: Pt is visible in the unit, pleasant on approach. Disorganized. Denies any complaints to writer. No PRNs needed. Tolerating Inderal well, VSS stable.   1/18: Patient improved and sustaining gains. Cont current Inderal and low dose risperidone at current doses.   1/19 Patient improved, not aggressive or highly agitated. Cont current treatment w/o changes fo now  1/20 Maintaning gains, no sig agitation, cont current treatment  1/21 W/o sig change  sl louder, will increase risperdal to bid especially given low low current dosing is   1/24 Patient maining gains, minimal loud verbalization with showering toileting but no agitation, aggression. Cont current rx plan  1/25 Maintaining gains, not aggression or agitated  1/26 Maintaining gains. Will continue current risperidone and Inderal at current doses  1/27: Patient maintaining gains, cont current rx and placement efforts  1/28 Sl noiser otherwie okay. Will increase Inderal and reduce risperdal to avoid low BP. Possibly change from very noisy roommate. Will explore feasibility of room change though for now not an option based on bed situation  1/31 Calmer today, suspect that very loud roommate is overstimulating her. Cont current meds, back on bid risperidone, consider increasing risperidone and or Inderal if needed. Will get EKG as she is on moderate dose Inderal  2/2 Patient loud, yells at times resistive with care no peer to peer aggression. Will modestly titrate Inderal, cont risperidone. EKG NSR. Monitor BP's  2/4: Patient louder more intrusive, needed prn may be due to unit itself being very overstimulating and noisy. Plan cont Inderal cannot raise as BP borderline.Will change from risperidone to prolixin to avoid alpha effects of the former which may drop BP in conjunction with Inderal  2/5 Was agitated unclear reason for worsening. will increase Inderal though beginning to appearing while this med has helped no further benefit from titration. Cont prolixin, consider adding second dose  2/6 BP more stable on prolixin, cont current med trial eval need for prolixin increase to bid  2/7: No PRNs needed yesterday. Pt was agitated, yelling this morning after she was cleaned by staff. Responded well to redirection with Claudia Mouse plush. Later during lunch time she touched a peer's face and was loud once again. Staff was able to have her walk away from the dining area and she had her lunch in the day room. PRN was activated but was not used as pt responded well to being moved to a less stimulating environment.   2/8 had period of improvement then has regressed. Will increase Prolixin, has not been incremental benefit from increase  in beta blocker  2/9 Aggressive incident reflecting poor response to meds, will reduce beta blocker, and with this go risperidone she should be able to handle BP effects if betabocker tapered will use Co during day for safety. will check for UTI  2/11 Patient remains reactive, labile, yelling, no further peer to peer episodes. Tolerating risperidone. COnt observational status , increase risperdal, will taper down Inderal if low BP interfering with titration of risperidone

## 2022-02-11 NOTE — BH INPATIENT PSYCHIATRY PROGRESS NOTE - NSBHCHARTREVIEWVS_PSY_A_CORE FT
Patient attempts to control her cholesterol with diet and exercise. Her liver enzymes are within normal. Recent cholesterol improved compared to previous blood tests. Lipid panel showed that she has total cholesterol 203, HDL 89, LDL 91.   Vital Signs Last 24 Hrs  T(C): 36.7 (02-10-22 @ 21:36), Max: 36.7 (02-10-22 @ 21:36)  T(F): 98.1 (02-10-22 @ 21:36), Max: 98.1 (02-10-22 @ 21:36)  HR: --  BP: --  BP(mean): --  RR: --  SpO2: --    Orthostatic VS  02-11-22 @ 08:00  Lying BP: --/-- HR: --  Sitting BP: 118/95 HR: 63  Standing BP: --/-- HR: --  Site: --  Mode: --  Orthostatic VS  02-10-22 @ 21:36  Lying BP: --/-- HR: --  Sitting BP: 127/60 HR: 69  Standing BP: --/-- HR: --  Site: --  Mode: --  Orthostatic VS  02-10-22 @ 09:16  Lying BP: --/-- HR: --  Sitting BP: 124/60 HR: 67  Standing BP: --/-- HR: --  Site: upper left arm  Mode: electronic  Orthostatic VS  02-10-22 @ 08:10  Lying BP: --/-- HR: --  Sitting BP: 93/74 HR: 78  Standing BP: --/-- HR: --  Site: upper left arm  Mode: electronic  Orthostatic VS  02-09-22 @ 20:30  Lying BP: --/-- HR: --  Sitting BP: 130/68 HR: 66  Standing BP: --/-- HR: --  Site: --  Mode: --

## 2022-02-11 NOTE — BH INPATIENT PSYCHIATRY PROGRESS NOTE - NSBHFUPINTERVALHXFT_PSY_A_CORE
The patient was seen for follow-up for dementia with behavioral disturbances. Patient pushed peer who was right in front of her night before last. Patient eating sleeping well. No further episodes. she denies dizziness, constipation

## 2022-02-11 NOTE — BH INPATIENT PSYCHIATRY PROGRESS NOTE - CURRENT MEDICATION
MEDICATIONS  (STANDING):  AQUAPHOR (petrolatum Ointment) 1 Application(s) Topical three times a day  diVALproex Sprinkle 750 milliGRAM(s) Oral two times a day  furosemide    Tablet 20 milliGRAM(s) Oral daily  melatonin. 5 milliGRAM(s) Oral at bedtime  propranolol 30 milliGRAM(s) Oral three times a day  risperiDONE   Tablet 0.75 milliGRAM(s) Oral <User Schedule>    MEDICATIONS  (PRN):  acetaminophen     Tablet .. 650 milliGRAM(s) Oral every 6 hours PRN Temp greater or equal to 38C (100.4F), Mild Pain (1 - 3), Moderate Pain (4 - 6)  aluminum hydroxide/magnesium hydroxide/simethicone Suspension 30 milliLiter(s) Oral every 4 hours PRN Dyspepsia  fluPHENAZine 1 milliGRAM(s) Oral every 6 hours PRN agitation  fluPHENAZine  Injectable 1 milliGRAM(s) IntraMuscular once PRN agitation  fluPHENAZine  Injectable 1 milliGRAM(s) IntraMuscular once PRN agitation  fluPHENAZine  Injectable 1 milliGRAM(s) IntraMuscular once PRN agitation  magnesium hydroxide Suspension 30 milliLiter(s) Oral at bedtime PRN constipation  mineral oil enema 133 milliLiter(s) Rectal daily PRN hard stool  saline laxative (FLEET) Rectal Enema 1 Enema Rectal daily PRN hard stool  triamcinolone 0.1% Cream 1 Application(s) Topical two times a day PRN pruritis/rash

## 2022-02-11 NOTE — BH INPATIENT PSYCHIATRY PROGRESS NOTE - NSBHMETABOLICLABS_PSY_ALL_CORE
Labs within last 12 months

## 2022-02-12 LAB — SARS-COV-2 RNA SPEC QL NAA+PROBE: SIGNIFICANT CHANGE UP

## 2022-02-12 PROCEDURE — 99231 SBSQ HOSP IP/OBS SF/LOW 25: CPT

## 2022-02-12 RX ADMIN — Medication 5 MILLIGRAM(S): at 21:22

## 2022-02-12 RX ADMIN — SENNA PLUS 1 TABLET(S): 8.6 TABLET ORAL at 21:21

## 2022-02-12 RX ADMIN — RISPERIDONE 0.75 MILLIGRAM(S): 4 TABLET ORAL at 17:05

## 2022-02-12 RX ADMIN — DIVALPROEX SODIUM 750 MILLIGRAM(S): 500 TABLET, DELAYED RELEASE ORAL at 21:22

## 2022-02-12 RX ADMIN — Medication 1 APPLICATION(S): at 22:16

## 2022-02-12 RX ADMIN — RISPERIDONE 0.5 MILLIGRAM(S): 4 TABLET ORAL at 21:21

## 2022-02-12 NOTE — BH INPATIENT PSYCHIATRY PROGRESS NOTE - NSBHCHARTREVIEWVS_PSY_A_CORE FT
Vital Signs Last 24 Hrs  T(C): 36.4 (02-12-22 @ 08:52), Max: 36.8 (02-11-22 @ 20:41)  T(F): 97.6 (02-12-22 @ 08:52), Max: 98.2 (02-11-22 @ 20:41)  HR: --  BP: --  BP(mean): --  RR: --  SpO2: --    Orthostatic VS  02-12-22 @ 08:52  Lying BP: --/-- HR: --  Sitting BP: 104/65 HR: 58  Standing BP: --/-- HR: --  Site: upper left arm  Mode: electronic  Orthostatic VS  02-11-22 @ 20:41  Lying BP: --/-- HR: --  Sitting BP: 125/95 HR: 72  Standing BP: --/-- HR: --  Site: --  Mode: --  Orthostatic VS  02-11-22 @ 08:00  Lying BP: --/-- HR: --  Sitting BP: 118/95 HR: 63  Standing BP: --/-- HR: --  Site: --  Mode: --  Orthostatic VS  02-10-22 @ 21:36  Lying BP: --/-- HR: --  Sitting BP: 127/60 HR: 69  Standing BP: --/-- HR: --  Site: --  Mode: --  Orthostatic VS  02-10-22 @ 09:16  Lying BP: --/-- HR: --  Sitting BP: 124/60 HR: 67  Standing BP: --/-- HR: --  Site: upper left arm  Mode: electronic

## 2022-02-12 NOTE — BH INPATIENT PSYCHIATRY PROGRESS NOTE - CURRENT MEDICATION
MEDICATIONS  (STANDING):  AQUAPHOR (petrolatum Ointment) 1 Application(s) Topical three times a day  diVALproex Sprinkle 750 milliGRAM(s) Oral two times a day  furosemide    Tablet 20 milliGRAM(s) Oral daily  melatonin. 5 milliGRAM(s) Oral at bedtime  propranolol 30 milliGRAM(s) Oral three times a day  risperiDONE   Tablet 0.75 milliGRAM(s) Oral <User Schedule>  risperiDONE   Tablet 0.5 milliGRAM(s) Oral at bedtime  senna 1 Tablet(s) Oral at bedtime    MEDICATIONS  (PRN):  acetaminophen     Tablet .. 650 milliGRAM(s) Oral every 6 hours PRN Temp greater or equal to 38C (100.4F), Mild Pain (1 - 3), Moderate Pain (4 - 6)  aluminum hydroxide/magnesium hydroxide/simethicone Suspension 30 milliLiter(s) Oral every 4 hours PRN Dyspepsia  fluPHENAZine 1 milliGRAM(s) Oral every 6 hours PRN agitation  fluPHENAZine  Injectable 1 milliGRAM(s) IntraMuscular once PRN agitation  fluPHENAZine  Injectable 1 milliGRAM(s) IntraMuscular once PRN agitation  fluPHENAZine  Injectable 1 milliGRAM(s) IntraMuscular once PRN agitation  magnesium hydroxide Suspension 30 milliLiter(s) Oral at bedtime PRN constipation  mineral oil enema 133 milliLiter(s) Rectal daily PRN hard stool  saline laxative (FLEET) Rectal Enema 1 Enema Rectal daily PRN hard stool  triamcinolone 0.1% Cream 1 Application(s) Topical two times a day PRN pruritis/rash

## 2022-02-13 PROCEDURE — 99231 SBSQ HOSP IP/OBS SF/LOW 25: CPT

## 2022-02-13 RX ADMIN — RISPERIDONE 0.5 MILLIGRAM(S): 4 TABLET ORAL at 21:41

## 2022-02-13 RX ADMIN — RISPERIDONE 0.75 MILLIGRAM(S): 4 TABLET ORAL at 17:04

## 2022-02-13 RX ADMIN — DIVALPROEX SODIUM 750 MILLIGRAM(S): 500 TABLET, DELAYED RELEASE ORAL at 21:37

## 2022-02-13 RX ADMIN — SENNA PLUS 1 TABLET(S): 8.6 TABLET ORAL at 21:41

## 2022-02-13 RX ADMIN — Medication 20 MILLIGRAM(S): at 09:29

## 2022-02-13 RX ADMIN — RISPERIDONE 0.75 MILLIGRAM(S): 4 TABLET ORAL at 09:28

## 2022-02-13 RX ADMIN — Medication 5 MILLIGRAM(S): at 21:37

## 2022-02-13 RX ADMIN — Medication 1 APPLICATION(S): at 09:26

## 2022-02-13 RX ADMIN — Medication 1 APPLICATION(S): at 12:16

## 2022-02-13 RX ADMIN — Medication 1 APPLICATION(S): at 21:41

## 2022-02-13 RX ADMIN — DIVALPROEX SODIUM 750 MILLIGRAM(S): 500 TABLET, DELAYED RELEASE ORAL at 09:29

## 2022-02-13 NOTE — BH INPATIENT PSYCHIATRY PROGRESS NOTE - NSBHCHARTREVIEWVS_PSY_A_CORE FT
Vital Signs Last 24 Hrs  T(C): 36.4 (02-12-22 @ 08:52), Max: 36.4 (02-12-22 @ 08:52)  T(F): 97.6 (02-12-22 @ 08:52), Max: 97.6 (02-12-22 @ 08:52)  HR: --  BP: --  BP(mean): --  RR: 17 (02-12-22 @ 20:24) (17 - 17)  SpO2: --    Orthostatic VS  02-12-22 @ 20:24  Lying BP: --/-- HR: --  Sitting BP: 120/65 HR: 74  Standing BP: --/-- HR: --  Site: --  Mode: --  Orthostatic VS  02-12-22 @ 08:52  Lying BP: --/-- HR: --  Sitting BP: 104/65 HR: 58  Standing BP: --/-- HR: --  Site: upper left arm  Mode: electronic  Orthostatic VS  02-11-22 @ 20:41  Lying BP: --/-- HR: --  Sitting BP: 125/95 HR: 72  Standing BP: --/-- HR: --  Site: --  Mode: --

## 2022-02-14 PROCEDURE — 99232 SBSQ HOSP IP/OBS MODERATE 35: CPT

## 2022-02-14 RX ORDER — RISPERIDONE 4 MG/1
1 TABLET ORAL
Refills: 0 | Status: DISCONTINUED | OUTPATIENT
Start: 2022-02-14 | End: 2022-02-15

## 2022-02-14 RX ORDER — GABAPENTIN 400 MG/1
300 CAPSULE ORAL ONCE
Refills: 0 | Status: COMPLETED | OUTPATIENT
Start: 2022-02-14 | End: 2022-02-14

## 2022-02-14 RX ADMIN — GABAPENTIN 300 MILLIGRAM(S): 400 CAPSULE ORAL at 13:40

## 2022-02-14 RX ADMIN — RISPERIDONE 1 MILLIGRAM(S): 4 TABLET ORAL at 21:25

## 2022-02-14 RX ADMIN — RISPERIDONE 1 MILLIGRAM(S): 4 TABLET ORAL at 17:42

## 2022-02-14 RX ADMIN — DIVALPROEX SODIUM 750 MILLIGRAM(S): 500 TABLET, DELAYED RELEASE ORAL at 21:22

## 2022-02-14 RX ADMIN — Medication 5 MILLIGRAM(S): at 21:22

## 2022-02-14 RX ADMIN — DIVALPROEX SODIUM 750 MILLIGRAM(S): 500 TABLET, DELAYED RELEASE ORAL at 08:35

## 2022-02-14 RX ADMIN — Medication 1 APPLICATION(S): at 21:24

## 2022-02-14 RX ADMIN — RISPERIDONE 0.75 MILLIGRAM(S): 4 TABLET ORAL at 08:36

## 2022-02-14 RX ADMIN — Medication 20 MILLIGRAM(S): at 08:36

## 2022-02-14 RX ADMIN — SENNA PLUS 1 TABLET(S): 8.6 TABLET ORAL at 21:25

## 2022-02-14 NOTE — BH INPATIENT PSYCHIATRY PROGRESS NOTE - NSBHMETABOLIC_PSY_ALL_CORE_FT
BMI: BMI (kg/m2): 31.6 (11-29-21 @ 16:35)  HbA1c: A1C with Estimated Average Glucose Result: 5.4 % (11-04-21 @ 06:58)    Glucose: POCT Blood Glucose.: 78 mg/dL (12-30-21 @ 07:34)    BP: 129/83 (02-13-22 @ 09:24) (129/83 - 129/83)  Lipid Panel:

## 2022-02-14 NOTE — BH INPATIENT PSYCHIATRY PROGRESS NOTE - NSBHFUPINTERVALHXFT_PSY_A_CORE
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 Patient w/o overnight events, no prns. Eating, sleeping okay. Not as loud and angry, less outbursts.

## 2022-02-14 NOTE — BH INPATIENT PSYCHIATRY PROGRESS NOTE - CURRENT MEDICATION
MEDICATIONS  (STANDING):  AQUAPHOR (petrolatum Ointment) 1 Application(s) Topical three times a day  diVALproex Sprinkle 750 milliGRAM(s) Oral two times a day  furosemide    Tablet 20 milliGRAM(s) Oral daily  melatonin. 5 milliGRAM(s) Oral at bedtime  propranolol 30 milliGRAM(s) Oral three times a day  risperiDONE   Tablet 1 milliGRAM(s) Oral <User Schedule>  senna 1 Tablet(s) Oral at bedtime    MEDICATIONS  (PRN):  acetaminophen     Tablet .. 650 milliGRAM(s) Oral every 6 hours PRN Temp greater or equal to 38C (100.4F), Mild Pain (1 - 3), Moderate Pain (4 - 6)  aluminum hydroxide/magnesium hydroxide/simethicone Suspension 30 milliLiter(s) Oral every 4 hours PRN Dyspepsia  fluPHENAZine 1 milliGRAM(s) Oral every 6 hours PRN agitation  fluPHENAZine  Injectable 1 milliGRAM(s) IntraMuscular once PRN agitation  fluPHENAZine  Injectable 1 milliGRAM(s) IntraMuscular once PRN agitation  fluPHENAZine  Injectable 1 milliGRAM(s) IntraMuscular once PRN agitation  magnesium hydroxide Suspension 30 milliLiter(s) Oral at bedtime PRN constipation  mineral oil enema 133 milliLiter(s) Rectal daily PRN hard stool  saline laxative (FLEET) Rectal Enema 1 Enema Rectal daily PRN hard stool  triamcinolone 0.1% Cream 1 Application(s) Topical two times a day PRN pruritis/rash

## 2022-02-14 NOTE — BH INPATIENT PSYCHIATRY PROGRESS NOTE - MSE UNSTRUCTURED FT
Patient is awake and alert. relates in childlike manner Bilateral resting tremor, prominent but does not interfere with eating and not changed  Speech decreased production, generally nl volume, childlike, abnormal prosody. Repeats phrases in high pitch then low pitch. Speech not as loud. Paucity of content.   Poor insight. No suicidal ideations. Mood neutral, affect labile. No overt delusions, nunez. Thinking impoverished. Oriented to self Poor insight

## 2022-02-14 NOTE — BH INPATIENT PSYCHIATRY PROGRESS NOTE - NSBHCHARTREVIEWVS_PSY_A_CORE FT
Vital Signs Last 24 Hrs  T(C): --  T(F): --  HR: --  BP: --  BP(mean): --  RR: --  SpO2: --    Orthostatic VS  02-14-22 @ 07:37  Lying BP: 110/68 HR: 70  Sitting BP: --/-- HR: --  Standing BP: --/-- HR: --  Site: --  Mode: --  Orthostatic VS  02-12-22 @ 20:24  Lying BP: --/-- HR: --  Sitting BP: 120/65 HR: 74  Standing BP: --/-- HR: --  Site: --  Mode: --

## 2022-02-14 NOTE — BH INPATIENT PSYCHIATRY PROGRESS NOTE - NSBHASSESSSUMMFT_PSY_ALL_CORE
2/14/22 Patient somewhat calmer. Continue to titrate risperdione,consider adding Ativan for further calmeing effect as may has not sustained benefit on any single medication 2/14/22 Patient somewhat calmer. Continue to titrate risperdione,consider adding gabapentin for further calming effect as may has not sustained benefit on any single medication, plan taper Depakote as has not been effective

## 2022-02-15 PROCEDURE — 99232 SBSQ HOSP IP/OBS MODERATE 35: CPT

## 2022-02-15 RX ORDER — RISPERIDONE 4 MG/1
1 TABLET ORAL THREE TIMES A DAY
Refills: 0 | Status: DISCONTINUED | OUTPATIENT
Start: 2022-02-16 | End: 2022-02-18

## 2022-02-15 RX ORDER — RISPERIDONE 4 MG/1
1 TABLET ORAL
Refills: 0 | Status: COMPLETED | OUTPATIENT
Start: 2022-02-15 | End: 2022-02-15

## 2022-02-15 RX ADMIN — Medication 1 APPLICATION(S): at 20:59

## 2022-02-15 RX ADMIN — SENNA PLUS 1 TABLET(S): 8.6 TABLET ORAL at 20:58

## 2022-02-15 RX ADMIN — RISPERIDONE 1 MILLIGRAM(S): 4 TABLET ORAL at 09:32

## 2022-02-15 RX ADMIN — DIVALPROEX SODIUM 750 MILLIGRAM(S): 500 TABLET, DELAYED RELEASE ORAL at 20:59

## 2022-02-15 RX ADMIN — RISPERIDONE 1 MILLIGRAM(S): 4 TABLET ORAL at 20:58

## 2022-02-15 RX ADMIN — Medication 1 APPLICATION(S): at 09:07

## 2022-02-15 RX ADMIN — Medication 1 APPLICATION(S): at 12:11

## 2022-02-15 RX ADMIN — FLUPHENAZINE HYDROCHLORIDE 1 MILLIGRAM(S): 1 TABLET, FILM COATED ORAL at 12:11

## 2022-02-15 RX ADMIN — DIVALPROEX SODIUM 750 MILLIGRAM(S): 500 TABLET, DELAYED RELEASE ORAL at 09:08

## 2022-02-15 RX ADMIN — RISPERIDONE 1 MILLIGRAM(S): 4 TABLET ORAL at 16:42

## 2022-02-15 RX ADMIN — Medication 20 MILLIGRAM(S): at 09:09

## 2022-02-15 RX ADMIN — Medication 5 MILLIGRAM(S): at 20:58

## 2022-02-15 NOTE — BH INPATIENT PSYCHIATRY PROGRESS NOTE - NSBHCHARTREVIEWVS_PSY_A_CORE FT
Vital Signs Last 24 Hrs  T(C): 36.4 (02-15-22 @ 11:15), Max: 36.4 (02-15-22 @ 11:15)  T(F): 97.6 (02-15-22 @ 11:15), Max: 97.6 (02-15-22 @ 11:15)  HR: 72 (02-15-22 @ 11:15) (72 - 72)  BP: 105/70 (02-15-22 @ 11:15) (105/70 - 105/70)  BP(mean): --  RR: --  SpO2: --    Orthostatic VS  02-14-22 @ 07:37  Lying BP: 110/68 HR: 70  Sitting BP: --/-- HR: --  Standing BP: --/-- HR: --  Site: --  Mode: --

## 2022-02-15 NOTE — BH INPATIENT PSYCHIATRY PROGRESS NOTE - NSBHASSESSSUMMFT_PSY_ALL_CORE
2/14/22 Patient somewhat calmer. Continue to titrate risperdione,consider adding gabapentin for further calming effect as may has not sustained benefit on any single medication, plan taper Depakote as has not been effective  2/15 improvement since addition of risperidone, plan cont to titrate, if BP gets lower will reduce Inderal as initially seemed helpful but effect was short lived

## 2022-02-15 NOTE — BH INPATIENT PSYCHIATRY PROGRESS NOTE - NSBHMETABOLIC_PSY_ALL_CORE_FT
BMI: BMI (kg/m2): 31.6 (11-29-21 @ 16:35)  HbA1c: A1C with Estimated Average Glucose Result: 5.4 % (11-04-21 @ 06:58)    Glucose: POCT Blood Glucose.: 78 mg/dL (12-30-21 @ 07:34)    BP: 105/70 (02-15-22 @ 11:15) (105/70 - 129/83)  Lipid Panel:

## 2022-02-16 LAB — SARS-COV-2 RNA SPEC QL NAA+PROBE: SIGNIFICANT CHANGE UP

## 2022-02-16 PROCEDURE — 99232 SBSQ HOSP IP/OBS MODERATE 35: CPT

## 2022-02-16 RX ORDER — GABAPENTIN 400 MG/1
100 CAPSULE ORAL
Refills: 0 | Status: DISCONTINUED | OUTPATIENT
Start: 2022-02-16 | End: 2022-02-17

## 2022-02-16 RX ADMIN — Medication 1 APPLICATION(S): at 08:46

## 2022-02-16 RX ADMIN — RISPERIDONE 1 MILLIGRAM(S): 4 TABLET ORAL at 22:54

## 2022-02-16 RX ADMIN — GABAPENTIN 100 MILLIGRAM(S): 400 CAPSULE ORAL at 22:54

## 2022-02-16 RX ADMIN — RISPERIDONE 1 MILLIGRAM(S): 4 TABLET ORAL at 08:45

## 2022-02-16 RX ADMIN — Medication 20 MILLIGRAM(S): at 08:45

## 2022-02-16 RX ADMIN — SENNA PLUS 1 TABLET(S): 8.6 TABLET ORAL at 22:54

## 2022-02-16 RX ADMIN — DIVALPROEX SODIUM 750 MILLIGRAM(S): 500 TABLET, DELAYED RELEASE ORAL at 22:55

## 2022-02-16 RX ADMIN — Medication 5 MILLIGRAM(S): at 22:54

## 2022-02-16 RX ADMIN — RISPERIDONE 1 MILLIGRAM(S): 4 TABLET ORAL at 12:35

## 2022-02-16 RX ADMIN — DIVALPROEX SODIUM 750 MILLIGRAM(S): 500 TABLET, DELAYED RELEASE ORAL at 08:44

## 2022-02-16 NOTE — BH INPATIENT PSYCHIATRY PROGRESS NOTE - NSBHFUPINTERVALHXFT_PSY_A_CORE
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 Patient w/o overnight events, no prns. Eating, sleeping okay. Not as loud and angry, less outbursts though still occur

## 2022-02-16 NOTE — BH INPATIENT PSYCHIATRY PROGRESS NOTE - NSBHCHARTREVIEWVS_PSY_A_CORE FT
Vital Signs Last 24 Hrs  T(C): 36.4 (02-16-22 @ 08:06), Max: 36.4 (02-16-22 @ 08:06)  T(F): 97.6 (02-16-22 @ 08:06), Max: 97.6 (02-16-22 @ 08:06)  HR: --  BP: --  BP(mean): --  RR: 17 (02-15-22 @ 20:31) (17 - 17)  SpO2: --    Orthostatic VS  02-16-22 @ 08:06  Lying BP: 109/50 HR: 60  Sitting BP: --/-- HR: --  Standing BP: --/-- HR: --  Site: upper right arm  Mode: electronic  Orthostatic VS  02-15-22 @ 20:31  Lying BP: --/-- HR: --  Sitting BP: 106/57 HR: 74  Standing BP: --/-- HR: --  Site: --  Mode: --

## 2022-02-16 NOTE — BH INPATIENT PSYCHIATRY PROGRESS NOTE - CURRENT MEDICATION
MEDICATIONS  (STANDING):  AQUAPHOR (petrolatum Ointment) 1 Application(s) Topical three times a day  diVALproex Sprinkle 750 milliGRAM(s) Oral two times a day  furosemide    Tablet 20 milliGRAM(s) Oral daily  gabapentin 100 milliGRAM(s) Oral two times a day  melatonin. 5 milliGRAM(s) Oral at bedtime  propranolol 30 milliGRAM(s) Oral three times a day  risperiDONE   Tablet 1 milliGRAM(s) Oral three times a day  senna 1 Tablet(s) Oral at bedtime    MEDICATIONS  (PRN):  acetaminophen     Tablet .. 650 milliGRAM(s) Oral every 6 hours PRN Temp greater or equal to 38C (100.4F), Mild Pain (1 - 3), Moderate Pain (4 - 6)  aluminum hydroxide/magnesium hydroxide/simethicone Suspension 30 milliLiter(s) Oral every 4 hours PRN Dyspepsia  fluPHENAZine 1 milliGRAM(s) Oral every 6 hours PRN agitation  fluPHENAZine  Injectable 1 milliGRAM(s) IntraMuscular once PRN agitation  fluPHENAZine  Injectable 1 milliGRAM(s) IntraMuscular once PRN agitation  fluPHENAZine  Injectable 1 milliGRAM(s) IntraMuscular once PRN agitation  magnesium hydroxide Suspension 30 milliLiter(s) Oral at bedtime PRN constipation  mineral oil enema 133 milliLiter(s) Rectal daily PRN hard stool  saline laxative (FLEET) Rectal Enema 1 Enema Rectal daily PRN hard stool  triamcinolone 0.1% Cream 1 Application(s) Topical two times a day PRN pruritis/rash

## 2022-02-16 NOTE — BH INPATIENT PSYCHIATRY PROGRESS NOTE - NSBHMETABOLIC_PSY_ALL_CORE_FT
BMI: BMI (kg/m2): 31.6 (11-29-21 @ 16:35)  HbA1c: A1C with Estimated Average Glucose Result: 5.4 % (11-04-21 @ 06:58)    Glucose: POCT Blood Glucose.: 78 mg/dL (12-30-21 @ 07:34)    BP: 105/70 (02-15-22 @ 11:15) (105/70 - 105/70)  Lipid Panel:

## 2022-02-16 NOTE — BH INPATIENT PSYCHIATRY PROGRESS NOTE - NSBHASSESSSUMMFT_PSY_ALL_CORE
2/14/22 Patient somewhat calmer. Continue to titrate risperdione,consider adding gabapentin for further calming effect as may has not sustained benefit on any single medication, plan taper Depakote as has not been effective  2/15 improvement since addition of risperidone, plan cont to titrate, if BP gets lower will reduce Inderal as initially seemed helpful but effect was short lived  2/16 Improved though still has outburst and appears irritable, angry. Will cont meds but add gabapentin and if seems better will use in lieu of Depakote

## 2022-02-17 PROCEDURE — 99232 SBSQ HOSP IP/OBS MODERATE 35: CPT

## 2022-02-17 RX ORDER — GABAPENTIN 400 MG/1
100 CAPSULE ORAL THREE TIMES A DAY
Refills: 0 | Status: DISCONTINUED | OUTPATIENT
Start: 2022-02-17 | End: 2022-02-17

## 2022-02-17 RX ORDER — GABAPENTIN 400 MG/1
100 CAPSULE ORAL ONCE
Refills: 0 | Status: DISCONTINUED | OUTPATIENT
Start: 2022-02-17 | End: 2022-02-17

## 2022-02-17 RX ADMIN — RISPERIDONE 1 MILLIGRAM(S): 4 TABLET ORAL at 21:29

## 2022-02-17 RX ADMIN — GABAPENTIN 100 MILLIGRAM(S): 400 CAPSULE ORAL at 08:56

## 2022-02-17 RX ADMIN — DIVALPROEX SODIUM 750 MILLIGRAM(S): 500 TABLET, DELAYED RELEASE ORAL at 08:56

## 2022-02-17 RX ADMIN — Medication 20 MILLIGRAM(S): at 08:56

## 2022-02-17 RX ADMIN — DIVALPROEX SODIUM 750 MILLIGRAM(S): 500 TABLET, DELAYED RELEASE ORAL at 21:28

## 2022-02-17 RX ADMIN — SENNA PLUS 1 TABLET(S): 8.6 TABLET ORAL at 21:29

## 2022-02-17 RX ADMIN — Medication 1 APPLICATION(S): at 08:57

## 2022-02-17 RX ADMIN — Medication 5 MILLIGRAM(S): at 21:29

## 2022-02-17 RX ADMIN — RISPERIDONE 1 MILLIGRAM(S): 4 TABLET ORAL at 12:34

## 2022-02-17 RX ADMIN — RISPERIDONE 1 MILLIGRAM(S): 4 TABLET ORAL at 08:56

## 2022-02-17 NOTE — BH INPATIENT PSYCHIATRY PROGRESS NOTE - NSBHMETABOLIC_PSY_ALL_CORE_FT
BMI: BMI (kg/m2): 31.6 (11-29-21 @ 16:35)  HbA1c: A1C with Estimated Average Glucose Result: 5.4 % (11-04-21 @ 06:58)    Glucose: POCT Blood Glucose.: 78 mg/dL (12-30-21 @ 07:34)    BP: 128/55 (02-17-22 @ 08:06) (96/55 - 128/55)  Lipid Panel:

## 2022-02-17 NOTE — BH INPATIENT PSYCHIATRY PROGRESS NOTE - NSBHASSESSSUMMFT_PSY_ALL_CORE
2/14/22 Patient somewhat calmer. Continue to titrate risperdione,consider adding gabapentin for further calming effect as may has not sustained benefit on any single medication, plan taper Depakote as has not been effective  2/15 improvement since addition of risperidone, plan cont to titrate, if BP gets lower will reduce Inderal as initially seemed helpful but effect was short lived  2/16 Improved though still has outburst and appears irritable, angry. Will cont meds but add gabapentin and if seems better will use in lieu of Depakote  2/17 Will hold off on gabapetnin as there are case reports of worsening, rather will con to titrate risperidone as she appears somewhat better and tolerating adequately

## 2022-02-17 NOTE — BH INPATIENT PSYCHIATRY PROGRESS NOTE - NSBHCHARTREVIEWVS_PSY_A_CORE FT
Vital Signs Last 24 Hrs  T(C): 36.6 (02-17-22 @ 08:06), Max: 36.6 (02-17-22 @ 08:06)  T(F): 97.9 (02-17-22 @ 08:06), Max: 97.9 (02-17-22 @ 08:06)  HR: 70 (02-17-22 @ 08:06) (70 - 70)  BP: 128/55 (02-17-22 @ 08:06) (128/55 - 128/55)  BP(mean): --  RR: --  SpO2: --    Orthostatic VS  02-16-22 @ 08:06  Lying BP: 109/50 HR: 60  Sitting BP: --/-- HR: --  Standing BP: --/-- HR: --  Site: upper right arm  Mode: electronic  Orthostatic VS  02-15-22 @ 20:31  Lying BP: --/-- HR: --  Sitting BP: 106/57 HR: 74  Standing BP: --/-- HR: --  Site: --  Mode: --

## 2022-02-17 NOTE — BH INPATIENT PSYCHIATRY PROGRESS NOTE - CURRENT MEDICATION
MEDICATIONS  (STANDING):  AQUAPHOR (petrolatum Ointment) 1 Application(s) Topical three times a day  diVALproex Sprinkle 750 milliGRAM(s) Oral two times a day  furosemide    Tablet 20 milliGRAM(s) Oral daily  melatonin. 5 milliGRAM(s) Oral at bedtime  propranolol 30 milliGRAM(s) Oral three times a day  risperiDONE   Tablet 1 milliGRAM(s) Oral three times a day  senna 1 Tablet(s) Oral at bedtime    MEDICATIONS  (PRN):  acetaminophen     Tablet .. 650 milliGRAM(s) Oral every 6 hours PRN Temp greater or equal to 38C (100.4F), Mild Pain (1 - 3), Moderate Pain (4 - 6)  aluminum hydroxide/magnesium hydroxide/simethicone Suspension 30 milliLiter(s) Oral every 4 hours PRN Dyspepsia  fluPHENAZine 1 milliGRAM(s) Oral every 6 hours PRN agitation  fluPHENAZine  Injectable 1 milliGRAM(s) IntraMuscular once PRN agitation  fluPHENAZine  Injectable 1 milliGRAM(s) IntraMuscular once PRN agitation  fluPHENAZine  Injectable 1 milliGRAM(s) IntraMuscular once PRN agitation  magnesium hydroxide Suspension 30 milliLiter(s) Oral at bedtime PRN constipation  mineral oil enema 133 milliLiter(s) Rectal daily PRN hard stool  saline laxative (FLEET) Rectal Enema 1 Enema Rectal daily PRN hard stool  triamcinolone 0.1% Cream 1 Application(s) Topical two times a day PRN pruritis/rash

## 2022-02-18 PROCEDURE — 99232 SBSQ HOSP IP/OBS MODERATE 35: CPT

## 2022-02-18 RX ORDER — RISPERIDONE 4 MG/1
1.25 TABLET ORAL THREE TIMES A DAY
Refills: 0 | Status: DISCONTINUED | OUTPATIENT
Start: 2022-02-18 | End: 2022-02-22

## 2022-02-18 RX ADMIN — RISPERIDONE 1 MILLIGRAM(S): 4 TABLET ORAL at 09:10

## 2022-02-18 RX ADMIN — DIVALPROEX SODIUM 750 MILLIGRAM(S): 500 TABLET, DELAYED RELEASE ORAL at 09:11

## 2022-02-18 RX ADMIN — RISPERIDONE 1 MILLIGRAM(S): 4 TABLET ORAL at 13:57

## 2022-02-18 RX ADMIN — Medication 5 MILLIGRAM(S): at 20:33

## 2022-02-18 RX ADMIN — Medication 20 MILLIGRAM(S): at 09:10

## 2022-02-18 RX ADMIN — DIVALPROEX SODIUM 750 MILLIGRAM(S): 500 TABLET, DELAYED RELEASE ORAL at 20:33

## 2022-02-18 RX ADMIN — SENNA PLUS 1 TABLET(S): 8.6 TABLET ORAL at 20:36

## 2022-02-18 RX ADMIN — RISPERIDONE 1.25 MILLIGRAM(S): 4 TABLET ORAL at 20:33

## 2022-02-18 NOTE — BH INPATIENT PSYCHIATRY PROGRESS NOTE - CURRENT MEDICATION
MEDICATIONS  (STANDING):  AQUAPHOR (petrolatum Ointment) 1 Application(s) Topical three times a day  diVALproex Sprinkle 750 milliGRAM(s) Oral two times a day  furosemide    Tablet 20 milliGRAM(s) Oral daily  melatonin. 5 milliGRAM(s) Oral at bedtime  propranolol 30 milliGRAM(s) Oral three times a day  risperiDONE   Tablet 1.25 milliGRAM(s) Oral three times a day  senna 1 Tablet(s) Oral at bedtime    MEDICATIONS  (PRN):  acetaminophen     Tablet .. 650 milliGRAM(s) Oral every 6 hours PRN Temp greater or equal to 38C (100.4F), Mild Pain (1 - 3), Moderate Pain (4 - 6)  aluminum hydroxide/magnesium hydroxide/simethicone Suspension 30 milliLiter(s) Oral every 4 hours PRN Dyspepsia  fluPHENAZine 1 milliGRAM(s) Oral every 6 hours PRN agitation  fluPHENAZine  Injectable 1 milliGRAM(s) IntraMuscular once PRN agitation  fluPHENAZine  Injectable 1 milliGRAM(s) IntraMuscular once PRN agitation  fluPHENAZine  Injectable 1 milliGRAM(s) IntraMuscular once PRN agitation  magnesium hydroxide Suspension 30 milliLiter(s) Oral at bedtime PRN constipation  mineral oil enema 133 milliLiter(s) Rectal daily PRN hard stool  saline laxative (FLEET) Rectal Enema 1 Enema Rectal daily PRN hard stool  triamcinolone 0.1% Cream 1 Application(s) Topical two times a day PRN pruritis/rash

## 2022-02-18 NOTE — BH INPATIENT PSYCHIATRY PROGRESS NOTE - NSBHFUPINTERVALHXFT_PSY_A_CORE
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 Patient w/o overnight events, no prns. Eating, sleeping okay. Not as loud and angry, less outbursts though still occur on regular basis often in response to stimulation on the unit.

## 2022-02-18 NOTE — BH INPATIENT PSYCHIATRY PROGRESS NOTE - NSBHASSESSSUMMFT_PSY_ALL_CORE
2/14/22 Patient somewhat calmer. Continue to titrate risperdione,consider adding gabapentin for further calming effect as may has not sustained benefit on any single medication, plan taper Depakote as has not been effective  2/15 improvement since addition of risperidone, plan cont to titrate, if BP gets lower will reduce Inderal as initially seemed helpful but effect was short lived  2/16 Improved though still has outburst and appears irritable, angry. Will cont meds but add gabapentin and if seems better will use in lieu of Depakote  2/17 Will hold off on gabapentin as there are case reports of worsening, rather will con to titrate risperidone as she appears somewhat better and tolerating adequately  2/18 better but still reactive, volatile, loud, irritable. Will increase risperidone, tolerating well

## 2022-02-18 NOTE — BH INPATIENT PSYCHIATRY PROGRESS NOTE - MSE UNSTRUCTURED FT
Patient is awake and alert. relates in childlike manner Bilateral resting tremor, prominent but does not interfere with eating and not changed with dose titration  Speech decreased production, generally nl volume, childlike, abnormal prosody. Repeats phrases in high pitch then low pitch. Speech not as loud. Paucity of content.   Poor insight. No suicidal ideations. Mood neutral, affect labile. No overt delusions, nunez. Thinking impoverished. Oriented to self Poor insight

## 2022-02-18 NOTE — BH INPATIENT PSYCHIATRY PROGRESS NOTE - NSBHMETABOLIC_PSY_ALL_CORE_FT
BMI: BMI (kg/m2): 31.6 (11-29-21 @ 16:35)  HbA1c: A1C with Estimated Average Glucose Result: 5.4 % (11-04-21 @ 06:58)    Glucose: POCT Blood Glucose.: 78 mg/dL (12-30-21 @ 07:34)    BP: 115/69 (02-17-22 @ 20:47) (96/55 - 128/55)  Lipid Panel:

## 2022-02-18 NOTE — BH INPATIENT PSYCHIATRY PROGRESS NOTE - NSBHCHARTREVIEWVS_PSY_A_CORE FT
Vital Signs Last 24 Hrs  T(C): 36.6 (02-18-22 @ 07:50), Max: 36.6 (02-18-22 @ 07:50)  T(F): 97.9 (02-18-22 @ 07:50), Max: 97.9 (02-18-22 @ 07:50)  HR: 75 (02-17-22 @ 20:47) (75 - 75)  BP: 115/69 (02-17-22 @ 20:47) (115/69 - 115/69)  BP(mean): --  RR: --  SpO2: --    Orthostatic VS  02-18-22 @ 07:50  Lying BP: --/-- HR: --  Sitting BP: 117/72 HR: 87  Standing BP: 119/80 HR: 83  Site: --  Mode: --

## 2022-02-19 PROCEDURE — 99232 SBSQ HOSP IP/OBS MODERATE 35: CPT

## 2022-02-19 RX ADMIN — RISPERIDONE 1.25 MILLIGRAM(S): 4 TABLET ORAL at 12:24

## 2022-02-19 RX ADMIN — DIVALPROEX SODIUM 750 MILLIGRAM(S): 500 TABLET, DELAYED RELEASE ORAL at 20:46

## 2022-02-19 RX ADMIN — DIVALPROEX SODIUM 750 MILLIGRAM(S): 500 TABLET, DELAYED RELEASE ORAL at 08:07

## 2022-02-19 RX ADMIN — RISPERIDONE 1.25 MILLIGRAM(S): 4 TABLET ORAL at 08:08

## 2022-02-19 RX ADMIN — Medication 5 MILLIGRAM(S): at 20:47

## 2022-02-19 RX ADMIN — Medication 1 APPLICATION(S): at 20:47

## 2022-02-19 RX ADMIN — RISPERIDONE 1.25 MILLIGRAM(S): 4 TABLET ORAL at 20:47

## 2022-02-19 RX ADMIN — Medication 1 APPLICATION(S): at 12:24

## 2022-02-19 RX ADMIN — Medication 1 APPLICATION(S): at 08:14

## 2022-02-19 RX ADMIN — Medication 20 MILLIGRAM(S): at 08:07

## 2022-02-19 RX ADMIN — SENNA PLUS 1 TABLET(S): 8.6 TABLET ORAL at 20:47

## 2022-02-19 NOTE — BH INPATIENT PSYCHIATRY PROGRESS NOTE - NSBHCHARTREVIEWVS_PSY_A_CORE FT
Vital Signs Last 24 Hrs  T(C): 36.4 (02-19-22 @ 07:54), Max: 36.4 (02-19-22 @ 07:54)  T(F): 97.5 (02-19-22 @ 07:54), Max: 97.5 (02-19-22 @ 07:54)  HR: 72 (02-18-22 @ 20:20) (69 - 72)  BP: 117/70 (02-18-22 @ 20:20) (103/69 - 117/70)  BP(mean): --  RR: --  SpO2: --    Orthostatic VS  02-19-22 @ 07:54  Lying BP: --/-- HR: --  Sitting BP: 113/64 HR: 97  Standing BP: --/-- HR: --  Site: upper left arm  Mode: electronic  Orthostatic VS  02-18-22 @ 07:50  Lying BP: --/-- HR: --  Sitting BP: 117/72 HR: 87  Standing BP: 119/80 HR: 83  Site: --  Mode: --

## 2022-02-19 NOTE — BH INPATIENT PSYCHIATRY PROGRESS NOTE - NSBHASSESSSUMMFT_PSY_ALL_CORE
2/14/22 Patient somewhat calmer. Continue to titrate risperdione,consider adding gabapentin for further calming effect as may has not sustained benefit on any single medication, plan taper Depakote as has not been effective  2/15 improvement since addition of risperidone, plan cont to titrate, if BP gets lower will reduce Inderal as initially seemed helpful but effect was short lived  2/16 Improved though still has outburst and appears irritable, angry. Will cont meds but add gabapentin and if seems better will use in lieu of Depakote  2/17 Will hold off on gabapentin as there are case reports of worsening, rather will con to titrate risperidone as she appears somewhat better and tolerating adequately  2/18 better but still reactive, volatile, loud, irritable. Will increase risperidone, tolerating well  2/19: No overnight events, no prns needed for agitation/anxiety. Vitals are stable today. Eating, sleeping okay. Not as loud and angry, less outbursts though still occur on regular basis often in response to stimulation on the unit. Patient is more easily redirectable by removing her from overly stimulating situations or offering plush toys. Patient was calm and pleasant on our encounter today. She denied any complaints.

## 2022-02-19 NOTE — BH INPATIENT PSYCHIATRY PROGRESS NOTE - NSBHMETABOLIC_PSY_ALL_CORE_FT
BMI: BMI (kg/m2): 31.6 (11-29-21 @ 16:35)  HbA1c: A1C with Estimated Average Glucose Result: 5.4 % (11-04-21 @ 06:58)    Glucose: POCT Blood Glucose.: 78 mg/dL (12-30-21 @ 07:34)    BP: 117/70 (02-18-22 @ 20:20) (103/69 - 128/55)  Lipid Panel:

## 2022-02-19 NOTE — BH INPATIENT PSYCHIATRY PROGRESS NOTE - NSBHFUPINTERVALHXFT_PSY_A_CORE
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 No overnight events, no prns needed for agitation/anxiety. Vitals are stable today. Eating, sleeping okay. Not as loud and angry, less outbursts though still occur on regular basis often in response to stimulation on the unit. Patient is more easily redirectable by removing her from overly stimulating situations or offering plush toys. Patient was calm and pleasant on our encounter today. She denied any complaints.

## 2022-02-20 LAB — SARS-COV-2 RNA SPEC QL NAA+PROBE: SIGNIFICANT CHANGE UP

## 2022-02-20 PROCEDURE — 99231 SBSQ HOSP IP/OBS SF/LOW 25: CPT

## 2022-02-20 RX ADMIN — RISPERIDONE 1.25 MILLIGRAM(S): 4 TABLET ORAL at 08:29

## 2022-02-20 RX ADMIN — RISPERIDONE 1.25 MILLIGRAM(S): 4 TABLET ORAL at 12:53

## 2022-02-20 RX ADMIN — RISPERIDONE 1.25 MILLIGRAM(S): 4 TABLET ORAL at 20:30

## 2022-02-20 RX ADMIN — Medication 1 APPLICATION(S): at 16:58

## 2022-02-20 RX ADMIN — DIVALPROEX SODIUM 750 MILLIGRAM(S): 500 TABLET, DELAYED RELEASE ORAL at 08:28

## 2022-02-20 RX ADMIN — DIVALPROEX SODIUM 750 MILLIGRAM(S): 500 TABLET, DELAYED RELEASE ORAL at 20:30

## 2022-02-20 RX ADMIN — Medication 20 MILLIGRAM(S): at 08:29

## 2022-02-20 NOTE — BH INPATIENT PSYCHIATRY PROGRESS NOTE - NSBHCHARTREVIEWVS_PSY_A_CORE FT
Vital Signs Last 24 Hrs  T(C): 36.5 (02-20-22 @ 08:02), Max: 36.5 (02-20-22 @ 08:02)  T(F): 97.7 (02-20-22 @ 08:02), Max: 97.7 (02-20-22 @ 08:02)  HR: 75 (02-19-22 @ 20:33) (75 - 75)  BP: 114/66 (02-19-22 @ 20:33) (114/66 - 114/66)  BP(mean): --  RR: --  SpO2: --    Orthostatic VS  02-20-22 @ 08:02  Lying BP: --/-- HR: --  Sitting BP: 126/61 HR: 64  Standing BP: --/-- HR: --  Site: --  Mode: --  Orthostatic VS  02-19-22 @ 07:54  Lying BP: --/-- HR: --  Sitting BP: 113/64 HR: 97  Standing BP: --/-- HR: --  Site: upper left arm  Mode: electronic

## 2022-02-20 NOTE — BH INPATIENT PSYCHIATRY PROGRESS NOTE - NSBHMETABOLIC_PSY_ALL_CORE_FT
BMI: BMI (kg/m2): 31.6 (11-29-21 @ 16:35)  HbA1c: A1C with Estimated Average Glucose Result: 5.4 % (11-04-21 @ 06:58)    Glucose: POCT Blood Glucose.: 78 mg/dL (12-30-21 @ 07:34)    BP: 114/66 (02-19-22 @ 20:33) (103/69 - 117/70)  Lipid Panel:

## 2022-02-20 NOTE — BH INPATIENT PSYCHIATRY PROGRESS NOTE - MSE UNSTRUCTURED FT
Patient is awake and alert. Affect is bright, somewhat shallow, inappropriate. Speech is fluent. TP concrete, disjointed. No tremor appreciated. Poor insight  No suicidal ideations.

## 2022-02-20 NOTE — BH INPATIENT PSYCHIATRY PROGRESS NOTE - NSBHASSESSSUMMFT_PSY_ALL_CORE
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

## 2022-02-21 PROCEDURE — 99231 SBSQ HOSP IP/OBS SF/LOW 25: CPT

## 2022-02-21 RX ADMIN — RISPERIDONE 1.25 MILLIGRAM(S): 4 TABLET ORAL at 13:35

## 2022-02-21 RX ADMIN — RISPERIDONE 1.25 MILLIGRAM(S): 4 TABLET ORAL at 09:37

## 2022-02-21 RX ADMIN — Medication 20 MILLIGRAM(S): at 09:36

## 2022-02-21 RX ADMIN — DIVALPROEX SODIUM 750 MILLIGRAM(S): 500 TABLET, DELAYED RELEASE ORAL at 09:38

## 2022-02-21 RX ADMIN — RISPERIDONE 1.25 MILLIGRAM(S): 4 TABLET ORAL at 20:45

## 2022-02-21 RX ADMIN — DIVALPROEX SODIUM 750 MILLIGRAM(S): 500 TABLET, DELAYED RELEASE ORAL at 20:44

## 2022-02-21 RX ADMIN — SENNA PLUS 1 TABLET(S): 8.6 TABLET ORAL at 20:45

## 2022-02-21 RX ADMIN — Medication 5 MILLIGRAM(S): at 20:45

## 2022-02-21 NOTE — BH INPATIENT PSYCHIATRY PROGRESS NOTE - MSE UNSTRUCTURED FT
Patient is awake and alert. Affect is bright, inappropriate.  Stands too close to examiner, overly familiar. Speech is fluent. TP concrete, disjointed. Mild hand tremor. Poor insight. No suicidal ideations.

## 2022-02-21 NOTE — BH INPATIENT PSYCHIATRY PROGRESS NOTE - NSBHASSESSSUMMFT_PSY_ALL_CORE
66 year-old with TBI and dementia with impulsive, aggressive behavior    Remains labile, intermittently agitated, continue risperidone 1.25 tid and propranolol

## 2022-02-21 NOTE — BH INPATIENT PSYCHIATRY PROGRESS NOTE - NSBHCHARTREVIEWVS_PSY_A_CORE FT
Vital Signs Last 24 Hrs  T(C): 36.6 (02-21-22 @ 07:59), Max: 36.6 (02-21-22 @ 07:59)  T(F): 97.9 (02-21-22 @ 07:59), Max: 97.9 (02-21-22 @ 07:59)  HR: --  BP: --  BP(mean): --  RR: --  SpO2: --    Orthostatic VS  02-21-22 @ 07:59  Lying BP: --/-- HR: --  Sitting BP: --/-- HR: --  Standing BP: 111/63 HR: 70  Site: upper right arm  Mode: electronic  Orthostatic VS  02-20-22 @ 20:47  Lying BP: --/-- HR: --  Sitting BP: 134/64 HR: 78  Standing BP: --/-- HR: --  Site: --  Mode: --  Orthostatic VS  02-20-22 @ 08:02  Lying BP: --/-- HR: --  Sitting BP: 126/61 HR: 64  Standing BP: --/-- HR: --  Site: --  Mode: --

## 2022-02-22 PROCEDURE — 99231 SBSQ HOSP IP/OBS SF/LOW 25: CPT

## 2022-02-22 RX ORDER — RISPERIDONE 4 MG/1
1.5 TABLET ORAL THREE TIMES A DAY
Refills: 0 | Status: DISCONTINUED | OUTPATIENT
Start: 2022-02-22 | End: 2022-02-24

## 2022-02-22 RX ADMIN — RISPERIDONE 1.5 MILLIGRAM(S): 4 TABLET ORAL at 21:38

## 2022-02-22 RX ADMIN — RISPERIDONE 1.25 MILLIGRAM(S): 4 TABLET ORAL at 09:29

## 2022-02-22 RX ADMIN — Medication 20 MILLIGRAM(S): at 09:29

## 2022-02-22 RX ADMIN — DIVALPROEX SODIUM 750 MILLIGRAM(S): 500 TABLET, DELAYED RELEASE ORAL at 09:30

## 2022-02-22 RX ADMIN — Medication 5 MILLIGRAM(S): at 21:38

## 2022-02-22 RX ADMIN — DIVALPROEX SODIUM 750 MILLIGRAM(S): 500 TABLET, DELAYED RELEASE ORAL at 21:38

## 2022-02-22 RX ADMIN — SENNA PLUS 1 TABLET(S): 8.6 TABLET ORAL at 21:38

## 2022-02-22 NOTE — BH INPATIENT PSYCHIATRY PROGRESS NOTE - NSBHASSESSSUMMFT_PSY_ALL_CORE
66 year-old with TBI and dementia with impulsive, aggressive behavior    2/22 Remains labile, intermittently agitated, will increase risperidone to 1.5mg tid

## 2022-02-22 NOTE — BH INPATIENT PSYCHIATRY PROGRESS NOTE - NSBHMETABOLIC_PSY_ALL_CORE_FT
BMI: BMI (kg/m2): 31.6 (11-29-21 @ 16:35)  HbA1c: A1C with Estimated Average Glucose Result: 5.4 % (11-04-21 @ 06:58)    Glucose: POCT Blood Glucose.: 78 mg/dL (12-30-21 @ 07:34)    BP: 114/66 (02-19-22 @ 20:33) (114/66 - 114/66)  Lipid Panel:

## 2022-02-22 NOTE — BH INPATIENT PSYCHIATRY PROGRESS NOTE - NSBHCHARTREVIEWVS_PSY_A_CORE FT
Vital Signs Last 24 Hrs  T(C): 35.9 (02-22-22 @ 09:17), Max: 35.9 (02-22-22 @ 09:17)  T(F): 96.7 (02-22-22 @ 09:17), Max: 96.7 (02-22-22 @ 09:17)  HR: --  BP: --  BP(mean): --  RR: --  SpO2: --    Orthostatic VS  02-22-22 @ 09:17  Lying BP: --/-- HR: --  Sitting BP: 119/55 HR: 65  Standing BP: --/-- HR: --  Site: upper left arm  Mode: --  Orthostatic VS  02-21-22 @ 20:29  Lying BP: --/-- HR: --  Sitting BP: 119/61 HR: 85  Standing BP: --/-- HR: --  Site: upper left arm  Mode: electronic  Orthostatic VS  02-21-22 @ 13:37  Lying BP: --/-- HR: --  Sitting BP: 136/61 HR: 79  Standing BP: --/-- HR: --  Site: --  Mode: --  Orthostatic VS  02-21-22 @ 07:59  Lying BP: --/-- HR: --  Sitting BP: --/-- HR: --  Standing BP: 111/63 HR: 70  Site: upper right arm  Mode: electronic  Orthostatic VS  02-20-22 @ 20:47  Lying BP: --/-- HR: --  Sitting BP: 134/64 HR: 78  Standing BP: --/-- HR: --  Site: --  Mode: --

## 2022-02-22 NOTE — BH INPATIENT PSYCHIATRY PROGRESS NOTE - CURRENT MEDICATION
MEDICATIONS  (STANDING):  AQUAPHOR (petrolatum Ointment) 1 Application(s) Topical three times a day  diVALproex Sprinkle 750 milliGRAM(s) Oral two times a day  furosemide    Tablet 20 milliGRAM(s) Oral daily  melatonin. 5 milliGRAM(s) Oral at bedtime  propranolol 30 milliGRAM(s) Oral three times a day  risperiDONE   Tablet 1.5 milliGRAM(s) Oral three times a day  senna 1 Tablet(s) Oral at bedtime    MEDICATIONS  (PRN):  acetaminophen     Tablet .. 650 milliGRAM(s) Oral every 6 hours PRN Temp greater or equal to 38C (100.4F), Mild Pain (1 - 3), Moderate Pain (4 - 6)  aluminum hydroxide/magnesium hydroxide/simethicone Suspension 30 milliLiter(s) Oral every 4 hours PRN Dyspepsia  fluPHENAZine 1 milliGRAM(s) Oral every 6 hours PRN agitation  fluPHENAZine  Injectable 1 milliGRAM(s) IntraMuscular once PRN agitation  fluPHENAZine  Injectable 1 milliGRAM(s) IntraMuscular once PRN agitation  fluPHENAZine  Injectable 1 milliGRAM(s) IntraMuscular once PRN agitation  magnesium hydroxide Suspension 30 milliLiter(s) Oral at bedtime PRN constipation  mineral oil enema 133 milliLiter(s) Rectal daily PRN hard stool  saline laxative (FLEET) Rectal Enema 1 Enema Rectal daily PRN hard stool  triamcinolone 0.1% Cream 1 Application(s) Topical two times a day PRN pruritis/rash

## 2022-02-22 NOTE — BH INPATIENT PSYCHIATRY PROGRESS NOTE - MSE UNSTRUCTURED FT
Patient is awake and alert. Affect is bright, inappropriate.  Stands too close to examiner, overly familiar. Speech is fluent. TP concrete, disjointed. Mild hand tremor. Poor insight. No suicidal ideations. oriented to self only.

## 2022-02-23 RX ADMIN — SENNA PLUS 1 TABLET(S): 8.6 TABLET ORAL at 21:29

## 2022-02-23 RX ADMIN — RISPERIDONE 1.5 MILLIGRAM(S): 4 TABLET ORAL at 14:02

## 2022-02-23 RX ADMIN — Medication 1 APPLICATION(S): at 09:54

## 2022-02-23 RX ADMIN — DIVALPROEX SODIUM 750 MILLIGRAM(S): 500 TABLET, DELAYED RELEASE ORAL at 09:51

## 2022-02-23 RX ADMIN — RISPERIDONE 1.5 MILLIGRAM(S): 4 TABLET ORAL at 10:14

## 2022-02-23 RX ADMIN — DIVALPROEX SODIUM 750 MILLIGRAM(S): 500 TABLET, DELAYED RELEASE ORAL at 21:31

## 2022-02-23 RX ADMIN — Medication 1 APPLICATION(S): at 14:02

## 2022-02-23 RX ADMIN — Medication 1 APPLICATION(S): at 21:32

## 2022-02-23 RX ADMIN — RISPERIDONE 1.5 MILLIGRAM(S): 4 TABLET ORAL at 21:29

## 2022-02-23 RX ADMIN — Medication 20 MILLIGRAM(S): at 09:51

## 2022-02-23 RX ADMIN — Medication 5 MILLIGRAM(S): at 21:30

## 2022-02-23 NOTE — BH INPATIENT PSYCHIATRY PROGRESS NOTE - NSBHASSESSSUMMFT_PSY_ALL_CORE
66 year-old with TBI and dementia with impulsive, aggressive behavior    2/22 Remains labile, intermittently agitated, will increase risperidone to 1.5mg tid  2/23 Calmer, less labile, appears clearer. COnt risperdal plan titration to 2mg tid

## 2022-02-23 NOTE — BH INPATIENT PSYCHIATRY PROGRESS NOTE - NSBHFUPINTERVALHXFT_PSY_A_CORE
Patient remains labile, vocal, not aggressive, is eating , sleeping well. Yells at times but more readily redirected. Reported as doing better in activities

## 2022-02-23 NOTE — BH INPATIENT PSYCHIATRY PROGRESS NOTE - MSE UNSTRUCTURED FT
Patient is awake and alert. Affect is bright, inappropriate.  Stands too close to examiner, overly familiar. Speech is loud, childlike repeats phrases in different voice registers. TP concrete, disjointed. Mild hand tremor. Poor insight. No suicidal ideations. oriented to self only.

## 2022-02-23 NOTE — BH INPATIENT PSYCHIATRY PROGRESS NOTE - NSBHCHARTREVIEWVS_PSY_A_CORE FT
Vital Signs Last 24 Hrs  T(C): 36.4 (02-23-22 @ 08:05), Max: 36.4 (02-23-22 @ 08:05)  T(F): 97.6 (02-23-22 @ 08:05), Max: 97.6 (02-23-22 @ 08:05)  HR: --  BP: --  BP(mean): --  RR: --  SpO2: --    Orthostatic VS  02-23-22 @ 08:05  Lying BP: --/-- HR: --  Sitting BP: 131/44 HR: 67  Standing BP: --/-- HR: --  Site: upper left arm  Mode: electronic  Orthostatic VS  02-22-22 @ 19:56  Lying BP: --/-- HR: --  Sitting BP: 137/62 HR: 80  Standing BP: --/-- HR: --  Site: --  Mode: --  Orthostatic VS  02-22-22 @ 09:17  Lying BP: --/-- HR: --  Sitting BP: 119/55 HR: 65  Standing BP: --/-- HR: --  Site: upper left arm  Mode: --  Orthostatic VS  02-21-22 @ 20:29  Lying BP: --/-- HR: --  Sitting BP: 119/61 HR: 85  Standing BP: --/-- HR: --  Site: upper left arm  Mode: electronic  Orthostatic VS  02-21-22 @ 13:37  Lying BP: --/-- HR: --  Sitting BP: 136/61 HR: 79  Standing BP: --/-- HR: --  Site: --  Mode: --

## 2022-02-24 PROCEDURE — 99232 SBSQ HOSP IP/OBS MODERATE 35: CPT

## 2022-02-24 RX ORDER — RISPERIDONE 4 MG/1
2 TABLET ORAL DAILY
Refills: 0 | Status: DISCONTINUED | OUTPATIENT
Start: 2022-02-24 | End: 2022-02-28

## 2022-02-24 RX ORDER — RISPERIDONE 4 MG/1
1.5 TABLET ORAL
Refills: 0 | Status: DISCONTINUED | OUTPATIENT
Start: 2022-02-24 | End: 2022-02-28

## 2022-02-24 RX ADMIN — Medication 20 MILLIGRAM(S): at 08:45

## 2022-02-24 RX ADMIN — RISPERIDONE 1.5 MILLIGRAM(S): 4 TABLET ORAL at 08:44

## 2022-02-24 RX ADMIN — SENNA PLUS 1 TABLET(S): 8.6 TABLET ORAL at 21:26

## 2022-02-24 RX ADMIN — Medication 1 APPLICATION(S): at 21:26

## 2022-02-24 RX ADMIN — Medication 5 MILLIGRAM(S): at 21:26

## 2022-02-24 RX ADMIN — RISPERIDONE 1.5 MILLIGRAM(S): 4 TABLET ORAL at 13:38

## 2022-02-24 RX ADMIN — DIVALPROEX SODIUM 750 MILLIGRAM(S): 500 TABLET, DELAYED RELEASE ORAL at 21:27

## 2022-02-24 RX ADMIN — RISPERIDONE 1.5 MILLIGRAM(S): 4 TABLET ORAL at 21:27

## 2022-02-24 RX ADMIN — DIVALPROEX SODIUM 750 MILLIGRAM(S): 500 TABLET, DELAYED RELEASE ORAL at 08:44

## 2022-02-24 NOTE — BH INPATIENT PSYCHIATRY PROGRESS NOTE - NSBHFUPINTERVALHXFT_PSY_A_CORE
Patient remains labile, vocal, not aggressive, is eating , sleeping well. Yells at times, often associated with toileting. Reported as doing better in some activities activities

## 2022-02-24 NOTE — BH INPATIENT PSYCHIATRY PROGRESS NOTE - CURRENT MEDICATION
MEDICATIONS  (STANDING):  AQUAPHOR (petrolatum Ointment) 1 Application(s) Topical three times a day  diVALproex Sprinkle 750 milliGRAM(s) Oral two times a day  furosemide    Tablet 20 milliGRAM(s) Oral daily  melatonin. 5 milliGRAM(s) Oral at bedtime  propranolol 30 milliGRAM(s) Oral three times a day  risperiDONE   Tablet 1.5 milliGRAM(s) Oral <User Schedule>  risperiDONE   Tablet 2 milliGRAM(s) Oral daily  senna 1 Tablet(s) Oral at bedtime    MEDICATIONS  (PRN):  acetaminophen     Tablet .. 650 milliGRAM(s) Oral every 6 hours PRN Temp greater or equal to 38C (100.4F), Mild Pain (1 - 3), Moderate Pain (4 - 6)  aluminum hydroxide/magnesium hydroxide/simethicone Suspension 30 milliLiter(s) Oral every 4 hours PRN Dyspepsia  fluPHENAZine 1 milliGRAM(s) Oral every 6 hours PRN agitation  fluPHENAZine  Injectable 1 milliGRAM(s) IntraMuscular once PRN agitation  fluPHENAZine  Injectable 1 milliGRAM(s) IntraMuscular once PRN agitation  fluPHENAZine  Injectable 1 milliGRAM(s) IntraMuscular once PRN agitation  magnesium hydroxide Suspension 30 milliLiter(s) Oral at bedtime PRN constipation  mineral oil enema 133 milliLiter(s) Rectal daily PRN hard stool  saline laxative (FLEET) Rectal Enema 1 Enema Rectal daily PRN hard stool  triamcinolone 0.1% Cream 1 Application(s) Topical two times a day PRN pruritis/rash

## 2022-02-24 NOTE — BH INPATIENT PSYCHIATRY PROGRESS NOTE - NSBHMETABOLIC_PSY_ALL_CORE_FT
BMI: BMI (kg/m2): 31.6 (11-29-21 @ 16:35)  HbA1c: A1C with Estimated Average Glucose Result: 5.4 % (11-04-21 @ 06:58)    Glucose: POCT Blood Glucose.: 78 mg/dL (12-30-21 @ 07:34)    BP: 123/96 (02-23-22 @ 14:00) (123/96 - 123/96)  Lipid Panel:

## 2022-02-24 NOTE — BH INPATIENT PSYCHIATRY PROGRESS NOTE - NSBHCHARTREVIEWVS_PSY_A_CORE FT
Vital Signs Last 24 Hrs  T(C): 36.7 (02-24-22 @ 07:48), Max: 36.7 (02-24-22 @ 07:48)  T(F): 98.1 (02-24-22 @ 07:48), Max: 98.1 (02-24-22 @ 07:48)  HR: --  BP: --  BP(mean): --  RR: --  SpO2: --    Orthostatic VS  02-24-22 @ 07:48  Lying BP: --/-- HR: --  Sitting BP: 133/54 HR: 64  Standing BP: --/-- HR: --  Site: upper left arm  Mode: electronic  Orthostatic VS  02-23-22 @ 20:33  Lying BP: --/-- HR: --  Sitting BP: 118/54 HR: 75  Standing BP: --/-- HR: --  Site: --  Mode: --  Orthostatic VS  02-23-22 @ 08:05  Lying BP: --/-- HR: --  Sitting BP: 131/44 HR: 67  Standing BP: --/-- HR: --  Site: upper left arm  Mode: electronic  Orthostatic VS  02-22-22 @ 19:56  Lying BP: --/-- HR: --  Sitting BP: 137/62 HR: 80  Standing BP: --/-- HR: --  Site: --  Mode: --

## 2022-02-24 NOTE — BH INPATIENT PSYCHIATRY PROGRESS NOTE - MSE UNSTRUCTURED FT
Patient is awake and alert. Affect is bright, inappropriate.  Stands too close to examiner, overly familiar. Speech is loud, childlike repeats phrases in different voice registers. TP concrete, disjointed. Mild hand tremor, not worse with dose increments, no rigidity. Poor insight. No suicidal ideations. oriented to self only.

## 2022-02-24 NOTE — BH INPATIENT PSYCHIATRY PROGRESS NOTE - NSBHASSESSSUMMFT_PSY_ALL_CORE
66 year-old with TBI and dementia with impulsive, aggressive behavior    2/22 Remains labile, intermittently agitated, will increase risperidone to 1.5mg tid  2/23 Calmer, less labile, appears clearer. COnt risperdal plan titration to 2mg tid  2/24 Some improvement, plan cont titration of risperidone, will get EKG to f/u Qtc in AM

## 2022-02-25 PROCEDURE — 99232 SBSQ HOSP IP/OBS MODERATE 35: CPT

## 2022-02-25 RX ADMIN — DIVALPROEX SODIUM 750 MILLIGRAM(S): 500 TABLET, DELAYED RELEASE ORAL at 21:02

## 2022-02-25 RX ADMIN — DIVALPROEX SODIUM 750 MILLIGRAM(S): 500 TABLET, DELAYED RELEASE ORAL at 09:00

## 2022-02-25 RX ADMIN — Medication 5 MILLIGRAM(S): at 21:04

## 2022-02-25 RX ADMIN — RISPERIDONE 1.5 MILLIGRAM(S): 4 TABLET ORAL at 13:12

## 2022-02-25 RX ADMIN — RISPERIDONE 2 MILLIGRAM(S): 4 TABLET ORAL at 08:59

## 2022-02-25 RX ADMIN — Medication 20 MILLIGRAM(S): at 09:00

## 2022-02-25 RX ADMIN — RISPERIDONE 1.5 MILLIGRAM(S): 4 TABLET ORAL at 21:25

## 2022-02-25 RX ADMIN — SENNA PLUS 1 TABLET(S): 8.6 TABLET ORAL at 21:04

## 2022-02-25 RX ADMIN — Medication 1 APPLICATION(S): at 21:23

## 2022-02-25 RX ADMIN — Medication 1 APPLICATION(S): at 09:04

## 2022-02-26 PROCEDURE — 93010 ELECTROCARDIOGRAM REPORT: CPT

## 2022-02-26 RX ADMIN — SENNA PLUS 1 TABLET(S): 8.6 TABLET ORAL at 22:04

## 2022-02-26 RX ADMIN — DIVALPROEX SODIUM 750 MILLIGRAM(S): 500 TABLET, DELAYED RELEASE ORAL at 22:05

## 2022-02-26 RX ADMIN — Medication 5 MILLIGRAM(S): at 22:05

## 2022-02-26 RX ADMIN — Medication 1 APPLICATION(S): at 09:29

## 2022-02-26 RX ADMIN — RISPERIDONE 1.5 MILLIGRAM(S): 4 TABLET ORAL at 22:04

## 2022-02-26 RX ADMIN — RISPERIDONE 1.5 MILLIGRAM(S): 4 TABLET ORAL at 12:23

## 2022-02-26 RX ADMIN — Medication 1 APPLICATION(S): at 22:40

## 2022-02-26 RX ADMIN — RISPERIDONE 2 MILLIGRAM(S): 4 TABLET ORAL at 09:30

## 2022-02-26 RX ADMIN — Medication 20 MILLIGRAM(S): at 09:30

## 2022-02-26 RX ADMIN — DIVALPROEX SODIUM 750 MILLIGRAM(S): 500 TABLET, DELAYED RELEASE ORAL at 09:29

## 2022-02-27 RX ADMIN — DIVALPROEX SODIUM 750 MILLIGRAM(S): 500 TABLET, DELAYED RELEASE ORAL at 22:02

## 2022-02-27 RX ADMIN — Medication 650 MILLIGRAM(S): at 11:30

## 2022-02-27 RX ADMIN — RISPERIDONE 1.5 MILLIGRAM(S): 4 TABLET ORAL at 22:05

## 2022-02-27 RX ADMIN — RISPERIDONE 2 MILLIGRAM(S): 4 TABLET ORAL at 10:33

## 2022-02-27 RX ADMIN — SENNA PLUS 1 TABLET(S): 8.6 TABLET ORAL at 22:03

## 2022-02-27 RX ADMIN — Medication 5 MILLIGRAM(S): at 22:02

## 2022-02-27 RX ADMIN — DIVALPROEX SODIUM 750 MILLIGRAM(S): 500 TABLET, DELAYED RELEASE ORAL at 10:32

## 2022-02-27 RX ADMIN — Medication 650 MILLIGRAM(S): at 10:33

## 2022-02-27 RX ADMIN — Medication 1 APPLICATION(S): at 09:53

## 2022-02-27 RX ADMIN — RISPERIDONE 1.5 MILLIGRAM(S): 4 TABLET ORAL at 13:09

## 2022-02-27 RX ADMIN — Medication 20 MILLIGRAM(S): at 10:34

## 2022-02-27 RX ADMIN — Medication 1 APPLICATION(S): at 22:05

## 2022-02-27 NOTE — BH CHART NOTE - NSEVENTNOTEFT_PSY_ALL_CORE
Patient seen for follow up of fall from standing. Ms Walshdreyer was reportedly walking to the bathroom with assistance when she lost her balance and fell backward. Her fall was partially controlled by a mental health worker, who was holding the patient by her arm, but the patient's weight was unsupportable and the patient landed on her buttocks on the floor. There was no head strike and no LOC. The patient reports some lower back discomfort, but denies any other weakness, CP, SOB. On evaluation Ms. Walshdreyer was seated comfortably on the floor in no obvious distress. She had some lumbar back pain bilaterally and in the midline that was tender to palpation. There was no obvious deformity or step off. No signs of hip or other lower extremity injury. There was normal range of motion of the ankle, knee and hip bilaterally without pain. The patient was able to stand with a 2 person assist and then was able to walk with minimal assistance to the toilet and sit. She reported minimal lower back pain while walking. Given the minimal signs of injury and the patient was able to bear weight and ambulate, there is no indication for further workup at the present time. Case discussed with nursing staff who will monitor the patient and report any change in the patient's ambulation, activity or clinical status.

## 2022-02-28 PROCEDURE — 99232 SBSQ HOSP IP/OBS MODERATE 35: CPT

## 2022-02-28 RX ORDER — RISPERIDONE 4 MG/1
1.5 TABLET ORAL
Refills: 0 | Status: DISCONTINUED | OUTPATIENT
Start: 2022-02-28 | End: 2022-04-26

## 2022-02-28 RX ORDER — RISPERIDONE 4 MG/1
1.5 TABLET ORAL DAILY
Refills: 0 | Status: DISCONTINUED | OUTPATIENT
Start: 2022-02-28 | End: 2022-02-28

## 2022-02-28 RX ADMIN — DIVALPROEX SODIUM 750 MILLIGRAM(S): 500 TABLET, DELAYED RELEASE ORAL at 21:11

## 2022-02-28 RX ADMIN — DIVALPROEX SODIUM 750 MILLIGRAM(S): 500 TABLET, DELAYED RELEASE ORAL at 09:13

## 2022-02-28 RX ADMIN — Medication 5 MILLIGRAM(S): at 21:10

## 2022-02-28 RX ADMIN — RISPERIDONE 1.5 MILLIGRAM(S): 4 TABLET ORAL at 09:13

## 2022-02-28 RX ADMIN — SENNA PLUS 1 TABLET(S): 8.6 TABLET ORAL at 21:12

## 2022-02-28 RX ADMIN — RISPERIDONE 1.5 MILLIGRAM(S): 4 TABLET ORAL at 14:02

## 2022-02-28 RX ADMIN — Medication 1 APPLICATION(S): at 21:15

## 2022-02-28 RX ADMIN — Medication 1 APPLICATION(S): at 12:37

## 2022-02-28 RX ADMIN — Medication 20 MILLIGRAM(S): at 09:14

## 2022-02-28 RX ADMIN — RISPERIDONE 1.5 MILLIGRAM(S): 4 TABLET ORAL at 21:10

## 2022-02-28 NOTE — BH INPATIENT PSYCHIATRY PROGRESS NOTE - MSE UNSTRUCTURED FT
Patient is awake and alert. Affect is bright, inappropriate.  Stands too close to examiner, overly familiar. Speech is loud, childlike repeats phrases in different voice registers. TP concrete, disjointed. Mild hand tremor, not worse with dose increments, no rigidity. Poor insight. No suicidal ideations. oriented to self only. Does recall incident few hours with prompt

## 2022-02-28 NOTE — BH INPATIENT PSYCHIATRY PROGRESS NOTE - NSBHMETABOLIC_PSY_ALL_CORE_FT
BMI: BMI (kg/m2): 31.6 (11-29-21 @ 16:35)  HbA1c: A1C with Estimated Average Glucose Result: 5.4 % (11-04-21 @ 06:58)    Glucose: POCT Blood Glucose.: 78 mg/dL (12-30-21 @ 07:34)    BP: 114/93 (02-27-22 @ 09:13) (105/56 - 114/93)  Lipid Panel:

## 2022-02-28 NOTE — BH INPATIENT PSYCHIATRY PROGRESS NOTE - CURRENT MEDICATION
MEDICATIONS  (STANDING):  AQUAPHOR (petrolatum Ointment) 1 Application(s) Topical three times a day  diVALproex Sprinkle 750 milliGRAM(s) Oral two times a day  furosemide    Tablet 20 milliGRAM(s) Oral daily  melatonin. 5 milliGRAM(s) Oral at bedtime  propranolol 30 milliGRAM(s) Oral three times a day  risperiDONE   Tablet 1.5 milliGRAM(s) Oral <User Schedule>  senna 1 Tablet(s) Oral at bedtime    MEDICATIONS  (PRN):  acetaminophen     Tablet .. 650 milliGRAM(s) Oral every 6 hours PRN Temp greater or equal to 38C (100.4F), Mild Pain (1 - 3), Moderate Pain (4 - 6)  aluminum hydroxide/magnesium hydroxide/simethicone Suspension 30 milliLiter(s) Oral every 4 hours PRN Dyspepsia  fluPHENAZine 1 milliGRAM(s) Oral every 6 hours PRN agitation  fluPHENAZine  Injectable 1 milliGRAM(s) IntraMuscular once PRN agitation  fluPHENAZine  Injectable 1 milliGRAM(s) IntraMuscular once PRN agitation  fluPHENAZine  Injectable 1 milliGRAM(s) IntraMuscular once PRN agitation  magnesium hydroxide Suspension 30 milliLiter(s) Oral at bedtime PRN constipation  mineral oil enema 133 milliLiter(s) Rectal daily PRN hard stool  saline laxative (FLEET) Rectal Enema 1 Enema Rectal daily PRN hard stool  triamcinolone 0.1% Cream 1 Application(s) Topical two times a day PRN pruritis/rash
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
## 2022-02-28 NOTE — BH INPATIENT PSYCHIATRY PROGRESS NOTE - NSBHASSESSSUMMFT_PSY_ALL_CORE
2/28 Patient improved as evidenced by not escalating when scratched except raising voice. Will treat with bacitracin reviewed with medicine no need for tetanus or infection us disease screen panel for peer. Given fall will reduce risperdal and add parameters. Will gets pelvic and ls spine xray despite absence of findings.

## 2022-02-28 NOTE — BH INPATIENT PSYCHIATRY PROGRESS NOTE - NSBHCHARTREVIEWVS_PSY_A_CORE FT
Vital Signs Last 24 Hrs  T(C): --  T(F): --  HR: --  BP: --  BP(mean): --  RR: --  SpO2: --    Orthostatic VS  02-27-22 @ 21:02  Lying BP: --/-- HR: --  Sitting BP: 152/62 HR: 87  Standing BP: 132/70 HR: 85  Site: --  Mode: --  Orthostatic VS  02-27-22 @ 10:21  Lying BP: --/-- HR: --  Sitting BP: 108/62 HR: 76  Standing BP: 118/68 HR: 68  Site: upper right arm  Mode: auscultated w/ stethoscope  Orthostatic VS  02-27-22 @ 08:09  Lying BP: --/-- HR: --  Sitting BP: 132/69 HR: 90  Standing BP: --/-- HR: --  Site: --  Mode: --  Orthostatic VS  02-26-22 @ 20:38  Lying BP: --/-- HR: --  Sitting BP: 98/58 HR: 73  Standing BP: --/-- HR: --  Site: --  Mode: --

## 2022-03-01 PROCEDURE — 99232 SBSQ HOSP IP/OBS MODERATE 35: CPT

## 2022-03-01 PROCEDURE — 72100 X-RAY EXAM L-S SPINE 2/3 VWS: CPT | Mod: 26

## 2022-03-01 PROCEDURE — 72170 X-RAY EXAM OF PELVIS: CPT | Mod: 26

## 2022-03-01 RX ORDER — BACITRACIN ZINC 500 UNIT/G
1 OINTMENT IN PACKET (EA) TOPICAL
Refills: 0 | Status: DISCONTINUED | OUTPATIENT
Start: 2022-03-01 | End: 2022-03-10

## 2022-03-01 RX ADMIN — DIVALPROEX SODIUM 750 MILLIGRAM(S): 500 TABLET, DELAYED RELEASE ORAL at 21:21

## 2022-03-01 RX ADMIN — RISPERIDONE 1 MILLIGRAM(S): 4 TABLET ORAL at 15:59

## 2022-03-01 RX ADMIN — SENNA PLUS 1 TABLET(S): 8.6 TABLET ORAL at 21:21

## 2022-03-01 RX ADMIN — RISPERIDONE 1.5 MILLIGRAM(S): 4 TABLET ORAL at 11:11

## 2022-03-01 RX ADMIN — Medication 1 APPLICATION(S): at 11:12

## 2022-03-01 RX ADMIN — Medication 20 MILLIGRAM(S): at 11:11

## 2022-03-01 RX ADMIN — RISPERIDONE 1.5 MILLIGRAM(S): 4 TABLET ORAL at 21:22

## 2022-03-01 RX ADMIN — Medication 5 MILLIGRAM(S): at 21:22

## 2022-03-01 RX ADMIN — DIVALPROEX SODIUM 750 MILLIGRAM(S): 500 TABLET, DELAYED RELEASE ORAL at 11:11

## 2022-03-01 NOTE — BH INPATIENT PSYCHIATRY PROGRESS NOTE - NSBHASSESSSUMMFT_PSY_ALL_CORE
2/28 Patient improved as evidenced by not escalating when scratched except raising voice. Will treat with bacitracin reviewed with medicine no need for tetanus or infection us disease screen panel for peer. Given fall will reduce risperdal and add parameters. Will gets pelvic and ls spine xray despite absence of findings.   3/1 still loud but less agitated, cont rx, medicine to see for possible conjunctivitis. Awaiting xray non urgently as patient asymptomatic

## 2022-03-01 NOTE — BH INPATIENT PSYCHIATRY PROGRESS NOTE - NSBHMETABOLIC_PSY_ALL_CORE_FT
BMI: BMI (kg/m2): 31.6 (11-29-21 @ 16:35)  HbA1c: A1C with Estimated Average Glucose Result: 5.4 % (11-04-21 @ 06:58)    Glucose: POCT Blood Glucose.: 78 mg/dL (12-30-21 @ 07:34)    BP: 114/93 (02-27-22 @ 09:13) (114/93 - 114/93)  Lipid Panel:

## 2022-03-02 PROCEDURE — 99232 SBSQ HOSP IP/OBS MODERATE 35: CPT

## 2022-03-02 RX ORDER — LIDOCAINE 4 G/100G
2 CREAM TOPICAL DAILY
Refills: 0 | Status: DISCONTINUED | OUTPATIENT
Start: 2022-03-02 | End: 2022-05-18

## 2022-03-02 RX ADMIN — SENNA PLUS 1 TABLET(S): 8.6 TABLET ORAL at 21:28

## 2022-03-02 RX ADMIN — Medication 5 MILLIGRAM(S): at 21:28

## 2022-03-02 RX ADMIN — Medication 1 APPLICATION(S): at 21:31

## 2022-03-02 RX ADMIN — Medication 1 APPLICATION(S): at 08:35

## 2022-03-02 RX ADMIN — RISPERIDONE 1.5 MILLIGRAM(S): 4 TABLET ORAL at 12:33

## 2022-03-02 RX ADMIN — Medication 1 APPLICATION(S): at 08:31

## 2022-03-02 RX ADMIN — RISPERIDONE 1.5 MILLIGRAM(S): 4 TABLET ORAL at 08:34

## 2022-03-02 RX ADMIN — RISPERIDONE 1.5 MILLIGRAM(S): 4 TABLET ORAL at 21:28

## 2022-03-02 RX ADMIN — DIVALPROEX SODIUM 750 MILLIGRAM(S): 500 TABLET, DELAYED RELEASE ORAL at 21:28

## 2022-03-02 RX ADMIN — DIVALPROEX SODIUM 750 MILLIGRAM(S): 500 TABLET, DELAYED RELEASE ORAL at 08:35

## 2022-03-02 RX ADMIN — Medication 20 MILLIGRAM(S): at 08:34

## 2022-03-02 NOTE — BH INPATIENT PSYCHIATRY PROGRESS NOTE - NSBHCHARTREVIEWVS_PSY_A_CORE FT
Vital Signs Last 24 Hrs  T(C): 36.7 (03-02-22 @ 07:46), Max: 36.7 (03-02-22 @ 07:46)  T(F): 98 (03-02-22 @ 07:46), Max: 98 (03-02-22 @ 07:46)  HR: --  BP: --  BP(mean): --  RR: 17 (03-01-22 @ 20:30) (17 - 17)  SpO2: --    Orthostatic VS  03-02-22 @ 07:46  Lying BP: --/-- HR: --  Sitting BP: 115/57 HR: 68  Standing BP: --/-- HR: --  Site: upper right arm  Mode: electronic  Orthostatic VS  03-01-22 @ 20:30  Lying BP: --/-- HR: --  Sitting BP: 119/61 HR: 74  Standing BP: --/-- HR: --  Site: --  Mode: --  Orthostatic VS  03-01-22 @ 07:36  Lying BP: --/-- HR: --  Sitting BP: 133/51 HR: 78  Standing BP: --/-- HR: --  Site: --  Mode: --  Orthostatic VS  02-28-22 @ 20:41  Lying BP: --/-- HR: --  Sitting BP: 137/52 HR: 81  Standing BP: --/-- HR: --  Site: upper left arm  Mode: electronic

## 2022-03-02 NOTE — BH INPATIENT PSYCHIATRY PROGRESS NOTE - NSBHASSESSSUMMFT_PSY_ALL_CORE
2/28 Patient improved as evidenced by not escalating when scratched except raising voice. Will treat with bacitracin reviewed with medicine no need for tetanus or infection us disease screen panel for peer. Given fall will reduce risperdal and add parameters. Will gets pelvic and ls spine xray despite absence of findings.   3/1 still loud but less agitated, cont rx, medicine to see for possible conjunctivitis. Awaiting xray non urgently as patient asymptomatic  3/2 Patient generally improved, patient loud with occasional outbursts but not aggressive. Cont current treatment. Medicine saw patient about back no specific treatment except mild pain meds as needed.

## 2022-03-02 NOTE — BH INPATIENT PSYCHIATRY PROGRESS NOTE - MSE UNSTRUCTURED FT
Patient is awake and alert. Affect is bright, inappropriate. Relates in childlike manner Stands too close to examiner, overly familiar. Speech is loud, exaggerated prosody.. TP concrete, disjointed. Has hand tremor which is highly variable, not worse with dose increments, no rigidity. Poor insight. No suicidal ideations.No HI. No paranoia. No nunez.  Oriented to self only. Remembers words to songs.

## 2022-03-02 NOTE — BH INPATIENT PSYCHIATRY PROGRESS NOTE - NSBHFUPINTERVALHXFT_PSY_A_CORE
The patient is seen for TBI related dementia and behavioral disturbances. Rivera fall over weekend landed on her behind was eased to ground by staff  but could not stop her due to weight. Patient denying pain with walking ,today had some diffuse mild tenderness on palpation. Xray with compression deformity of indeterminate age no acute fx.  Scratch on face drying up. No aggression. Some loudness especially during ADL's toileting but not aggressive.

## 2022-03-02 NOTE — BH INPATIENT PSYCHIATRY PROGRESS NOTE - CURRENT MEDICATION
MEDICATIONS  (STANDING):  AQUAPHOR (petrolatum Ointment) 1 Application(s) Topical three times a day  BACItracin   Ointment 1 Application(s) Topical two times a day  diVALproex Sprinkle 750 milliGRAM(s) Oral two times a day  furosemide    Tablet 20 milliGRAM(s) Oral daily  melatonin. 5 milliGRAM(s) Oral at bedtime  propranolol 30 milliGRAM(s) Oral three times a day  risperiDONE   Tablet 1.5 milliGRAM(s) Oral <User Schedule>  senna 1 Tablet(s) Oral at bedtime    MEDICATIONS  (PRN):  acetaminophen     Tablet .. 650 milliGRAM(s) Oral every 6 hours PRN Temp greater or equal to 38C (100.4F), Mild Pain (1 - 3), Moderate Pain (4 - 6)  aluminum hydroxide/magnesium hydroxide/simethicone Suspension 30 milliLiter(s) Oral every 4 hours PRN Dyspepsia  fluPHENAZine 1 milliGRAM(s) Oral every 6 hours PRN agitation  fluPHENAZine  Injectable 1 milliGRAM(s) IntraMuscular once PRN agitation  fluPHENAZine  Injectable 1 milliGRAM(s) IntraMuscular once PRN agitation  fluPHENAZine  Injectable 1 milliGRAM(s) IntraMuscular once PRN agitation  magnesium hydroxide Suspension 30 milliLiter(s) Oral at bedtime PRN constipation  mineral oil enema 133 milliLiter(s) Rectal daily PRN hard stool  saline laxative (FLEET) Rectal Enema 1 Enema Rectal daily PRN hard stool  triamcinolone 0.1% Cream 1 Application(s) Topical two times a day PRN pruritis/rash

## 2022-03-03 PROCEDURE — 99232 SBSQ HOSP IP/OBS MODERATE 35: CPT

## 2022-03-03 RX ADMIN — SENNA PLUS 1 TABLET(S): 8.6 TABLET ORAL at 20:43

## 2022-03-03 RX ADMIN — DIVALPROEX SODIUM 750 MILLIGRAM(S): 500 TABLET, DELAYED RELEASE ORAL at 09:14

## 2022-03-03 RX ADMIN — Medication 20 MILLIGRAM(S): at 09:14

## 2022-03-03 RX ADMIN — RISPERIDONE 1.5 MILLIGRAM(S): 4 TABLET ORAL at 20:43

## 2022-03-03 RX ADMIN — DIVALPROEX SODIUM 750 MILLIGRAM(S): 500 TABLET, DELAYED RELEASE ORAL at 20:43

## 2022-03-03 RX ADMIN — Medication 1 APPLICATION(S): at 09:13

## 2022-03-03 RX ADMIN — Medication 1 APPLICATION(S): at 20:46

## 2022-03-03 RX ADMIN — Medication 1 APPLICATION(S): at 09:15

## 2022-03-03 RX ADMIN — Medication 5 MILLIGRAM(S): at 20:43

## 2022-03-03 RX ADMIN — RISPERIDONE 1.5 MILLIGRAM(S): 4 TABLET ORAL at 12:42

## 2022-03-03 RX ADMIN — RISPERIDONE 1.5 MILLIGRAM(S): 4 TABLET ORAL at 09:13

## 2022-03-03 NOTE — BH INPATIENT PSYCHIATRY PROGRESS NOTE - NSBHFUPINTERVALHXFT_PSY_A_CORE
The patient is seen for TBI related dementia and behavioral disturbances. Had fall over weekend landed on her behind was eased to ground by staff  but could not stop her due to weight. Patient denying pain with walking ,today had some diffuse mild tenderness on palpation. Xray with compression deformity of indeterminate age no acute fx.  Scratch on face drying up. No aggression. Some loudness especially during ADL's toileting but not aggressive.

## 2022-03-03 NOTE — BH INPATIENT PSYCHIATRY PROGRESS NOTE - NSBHCHARTREVIEWVS_PSY_A_CORE FT
Vital Signs Last 24 Hrs  T(C): 36.7 (03-03-22 @ 09:09), Max: 36.7 (03-03-22 @ 09:09)  T(F): 98.1 (03-03-22 @ 09:09), Max: 98.1 (03-03-22 @ 09:09)  HR: --  BP: --  BP(mean): --  RR: 17 (03-02-22 @ 20:17) (17 - 17)  SpO2: --    Orthostatic VS  03-03-22 @ 09:09  Lying BP: --/-- HR: --  Sitting BP: 120/62 HR: 70  Standing BP: --/-- HR: --  Site: upper right arm  Mode: electronic  Orthostatic VS  03-02-22 @ 20:17  Lying BP: --/-- HR: --  Sitting BP: 131/62 HR: 77  Standing BP: --/-- HR: --  Site: --  Mode: --  Orthostatic VS  03-02-22 @ 07:46  Lying BP: --/-- HR: --  Sitting BP: 115/57 HR: 68  Standing BP: --/-- HR: --  Site: upper right arm  Mode: electronic  Orthostatic VS  03-01-22 @ 20:30  Lying BP: --/-- HR: --  Sitting BP: 119/61 HR: 74  Standing BP: --/-- HR: --  Site: --  Mode: --

## 2022-03-03 NOTE — BH INPATIENT PSYCHIATRY PROGRESS NOTE - CURRENT MEDICATION
MEDICATIONS  (STANDING):  AQUAPHOR (petrolatum Ointment) 1 Application(s) Topical three times a day  BACItracin   Ointment 1 Application(s) Topical two times a day  diVALproex Sprinkle 750 milliGRAM(s) Oral two times a day  furosemide    Tablet 20 milliGRAM(s) Oral daily  melatonin. 5 milliGRAM(s) Oral at bedtime  propranolol 30 milliGRAM(s) Oral three times a day  risperiDONE   Tablet 1.5 milliGRAM(s) Oral <User Schedule>  senna 1 Tablet(s) Oral at bedtime    MEDICATIONS  (PRN):  acetaminophen     Tablet .. 650 milliGRAM(s) Oral every 6 hours PRN Temp greater or equal to 38C (100.4F), Mild Pain (1 - 3), Moderate Pain (4 - 6)  aluminum hydroxide/magnesium hydroxide/simethicone Suspension 30 milliLiter(s) Oral every 4 hours PRN Dyspepsia  fluPHENAZine 1 milliGRAM(s) Oral every 6 hours PRN agitation  fluPHENAZine  Injectable 1 milliGRAM(s) IntraMuscular once PRN agitation  fluPHENAZine  Injectable 1 milliGRAM(s) IntraMuscular once PRN agitation  fluPHENAZine  Injectable 1 milliGRAM(s) IntraMuscular once PRN agitation  lidocaine   4% Patch 2 Patch Transdermal daily PRN back pain  magnesium hydroxide Suspension 30 milliLiter(s) Oral at bedtime PRN constipation  mineral oil enema 133 milliLiter(s) Rectal daily PRN hard stool  saline laxative (FLEET) Rectal Enema 1 Enema Rectal daily PRN hard stool  triamcinolone 0.1% Cream 1 Application(s) Topical two times a day PRN pruritis/rash

## 2022-03-03 NOTE — BH INPATIENT PSYCHIATRY PROGRESS NOTE - PRN MEDS
MEDICATIONS  (PRN):  acetaminophen     Tablet .. 650 milliGRAM(s) Oral every 6 hours PRN Temp greater or equal to 38C (100.4F), Mild Pain (1 - 3), Moderate Pain (4 - 6)  aluminum hydroxide/magnesium hydroxide/simethicone Suspension 30 milliLiter(s) Oral every 4 hours PRN Dyspepsia  fluPHENAZine 1 milliGRAM(s) Oral every 6 hours PRN agitation  fluPHENAZine  Injectable 1 milliGRAM(s) IntraMuscular once PRN agitation  fluPHENAZine  Injectable 1 milliGRAM(s) IntraMuscular once PRN agitation  fluPHENAZine  Injectable 1 milliGRAM(s) IntraMuscular once PRN agitation  lidocaine   4% Patch 2 Patch Transdermal daily PRN back pain  magnesium hydroxide Suspension 30 milliLiter(s) Oral at bedtime PRN constipation  mineral oil enema 133 milliLiter(s) Rectal daily PRN hard stool  saline laxative (FLEET) Rectal Enema 1 Enema Rectal daily PRN hard stool  triamcinolone 0.1% Cream 1 Application(s) Topical two times a day PRN pruritis/rash

## 2022-03-03 NOTE — BH INPATIENT PSYCHIATRY PROGRESS NOTE - NSBHASSESSSUMMFT_PSY_ALL_CORE
2/28 Patient improved as evidenced by not escalating when scratched except raising voice. Will treat with bacitracin reviewed with medicine no need for tetanus or infection us disease screen panel for peer. Given fall will reduce risperdal and add parameters. Will gets pelvic and ls spine xray despite absence of findings.   3/1 still loud but less agitated, cont rx, medicine to see for possible conjunctivitis. Awaiting xray non urgently as patient asymptomatic  3/2 Patient generally improved, patient loud with occasional outbursts but not aggressive. Cont current treatment. Medicine saw patient about back no specific treatment except mild pain meds as needed.   3/3 less volatile less agitated still reactive loud, low tolerance of stimuli but not aggressive. Tolerating reduced risperidone. Cont current treatment

## 2022-03-04 PROCEDURE — 99231 SBSQ HOSP IP/OBS SF/LOW 25: CPT

## 2022-03-04 RX ADMIN — Medication 1 APPLICATION(S): at 08:26

## 2022-03-04 RX ADMIN — RISPERIDONE 1.5 MILLIGRAM(S): 4 TABLET ORAL at 08:25

## 2022-03-04 RX ADMIN — DIVALPROEX SODIUM 750 MILLIGRAM(S): 500 TABLET, DELAYED RELEASE ORAL at 08:26

## 2022-03-04 RX ADMIN — SENNA PLUS 1 TABLET(S): 8.6 TABLET ORAL at 21:33

## 2022-03-04 RX ADMIN — Medication 5 MILLIGRAM(S): at 21:33

## 2022-03-04 RX ADMIN — RISPERIDONE 1.5 MILLIGRAM(S): 4 TABLET ORAL at 21:32

## 2022-03-04 RX ADMIN — Medication 20 MILLIGRAM(S): at 08:25

## 2022-03-04 RX ADMIN — DIVALPROEX SODIUM 750 MILLIGRAM(S): 500 TABLET, DELAYED RELEASE ORAL at 21:33

## 2022-03-04 RX ADMIN — Medication 1 APPLICATION(S): at 13:05

## 2022-03-04 RX ADMIN — Medication 1 APPLICATION(S): at 21:33

## 2022-03-04 RX ADMIN — Medication 1 APPLICATION(S): at 21:14

## 2022-03-04 RX ADMIN — RISPERIDONE 1.5 MILLIGRAM(S): 4 TABLET ORAL at 12:59

## 2022-03-04 NOTE — BH INPATIENT PSYCHIATRY PROGRESS NOTE - MSE UNSTRUCTURED FT
Patient is awake and alert. Affect is bright, inappropriate. Relates in childlike manner Stands too close to examiner, overly familiar. Speech is loud, exaggerated prosody. TP concrete, disjointed. Has hand tremor which is highly variable, not worse with dose increments, no rigidity. Poor insight. No suicidal ideation.No HI. No paranoia. No nunez.  Oriented to self only. Remembers words to songs.

## 2022-03-04 NOTE — BH INPATIENT PSYCHIATRY PROGRESS NOTE - NSBHFUPINTERVALHXFT_PSY_A_CORE
The patient is seen for TBI related dementia and behavioral disturbances. No overnight events, eating sleeping okay. No prns. VSS. Occasionally loud but no aggressive behavior

## 2022-03-04 NOTE — BH INPATIENT PSYCHIATRY PROGRESS NOTE - NSBHASSESSSUMMFT_PSY_ALL_CORE
2/28 Patient improved as evidenced by not escalating when scratched except raising voice. Will treat with bacitracin reviewed with medicine no need for tetanus or infection us disease screen panel for peer. Given fall will reduce risperdal and add parameters. Will gets pelvic and ls spine xray despite absence of findings.   3/1 still loud but less agitated, cont rx, medicine to see for possible conjunctivitis. Awaiting xray non urgently as patient asymptomatic  3/2 Patient generally improved, patient loud with occasional outbursts but not aggressive. Cont current treatment. Medicine saw patient about back no specific treatment except mild pain meds as needed.   3/3 less volatile less agitated still reactive loud, low tolerance of stimuli but not aggressive. Tolerating reduced risperidone. Cont current treatment  3/4 Appess to show some consident improvement will cont current regimen, will not increase dose risperdal

## 2022-03-04 NOTE — BH INPATIENT PSYCHIATRY PROGRESS NOTE - NSBHCHARTREVIEWVS_PSY_A_CORE FT
Vital Signs Last 24 Hrs  T(C): 36.4 (03-04-22 @ 07:47), Max: 36.4 (03-04-22 @ 07:47)  T(F): 97.6 (03-04-22 @ 07:47), Max: 97.6 (03-04-22 @ 07:47)  HR: --  BP: --  BP(mean): --  RR: 17 (03-03-22 @ 20:39) (17 - 17)  SpO2: --    Orthostatic VS  03-04-22 @ 07:47  Lying BP: --/-- HR: --  Sitting BP: 116/62 HR: 72  Standing BP: --/-- HR: --  Site: upper right arm  Mode: electronic  Orthostatic VS  03-03-22 @ 20:39  Lying BP: --/-- HR: --  Sitting BP: 130/61 HR: 72  Standing BP: --/-- HR: --  Site: --  Mode: --  Orthostatic VS  03-03-22 @ 09:09  Lying BP: --/-- HR: --  Sitting BP: 120/62 HR: 70  Standing BP: --/-- HR: --  Site: upper right arm  Mode: electronic  Orthostatic VS  03-02-22 @ 20:17  Lying BP: --/-- HR: --  Sitting BP: 131/62 HR: 77  Standing BP: --/-- HR: --  Site: --  Mode: --

## 2022-03-05 PROCEDURE — 99231 SBSQ HOSP IP/OBS SF/LOW 25: CPT

## 2022-03-05 RX ADMIN — Medication 20 MILLIGRAM(S): at 08:39

## 2022-03-05 RX ADMIN — Medication 1 APPLICATION(S): at 08:39

## 2022-03-05 RX ADMIN — Medication 5 MILLIGRAM(S): at 20:40

## 2022-03-05 RX ADMIN — RISPERIDONE 1.5 MILLIGRAM(S): 4 TABLET ORAL at 12:59

## 2022-03-05 RX ADMIN — SENNA PLUS 1 TABLET(S): 8.6 TABLET ORAL at 20:41

## 2022-03-05 RX ADMIN — RISPERIDONE 1.5 MILLIGRAM(S): 4 TABLET ORAL at 20:40

## 2022-03-05 RX ADMIN — RISPERIDONE 1.5 MILLIGRAM(S): 4 TABLET ORAL at 08:41

## 2022-03-05 RX ADMIN — DIVALPROEX SODIUM 750 MILLIGRAM(S): 500 TABLET, DELAYED RELEASE ORAL at 08:38

## 2022-03-05 RX ADMIN — DIVALPROEX SODIUM 750 MILLIGRAM(S): 500 TABLET, DELAYED RELEASE ORAL at 20:41

## 2022-03-05 NOTE — BH INPATIENT PSYCHIATRY PROGRESS NOTE - NSBHMETABOLIC_PSY_ALL_CORE_FT
BMI: BMI (kg/m2): 31.6 (11-29-21 @ 16:35)  HbA1c: A1C with Estimated Average Glucose Result: 5.4 % (11-04-21 @ 06:58)    Glucose: POCT Blood Glucose.: 78 mg/dL (12-30-21 @ 07:34)    BP: --  Lipid Panel:  continue home meds

## 2022-03-05 NOTE — BH INPATIENT PSYCHIATRY PROGRESS NOTE - NSBHFUPINTERVALHXFT_PSY_A_CORE
The patient is seen for TBI related dementia and behavioral disturbances. No overnight events, eating sleeping okay. No prns. VSS. Occasionally loud but no aggressive behavior  3/5: Pt pleasant on approach this morning. Denies any complaints. Able to answer some questions before derailing. No overnight events. No PRNs needed.

## 2022-03-05 NOTE — BH INPATIENT PSYCHIATRY PROGRESS NOTE - NSBHASSESSSUMMFT_PSY_ALL_CORE
2/28 Patient improved as evidenced by not escalating when scratched except raising voice. Will treat with bacitracin reviewed with medicine no need for tetanus or infection us disease screen panel for peer. Given fall will reduce risperdal and add parameters. Will gets pelvic and ls spine xray despite absence of findings.   3/1 still loud but less agitated, cont rx, medicine to see for possible conjunctivitis. Awaiting xray non urgently as patient asymptomatic  3/2 Patient generally improved, patient loud with occasional outbursts but not aggressive. Cont current treatment. Medicine saw patient about back no specific treatment except mild pain meds as needed.   3/3 less volatile less agitated still reactive loud, low tolerance of stimuli but not aggressive. Tolerating reduced risperidone. Cont current treatment  3/4 Appess to show some consident improvement will cont current regimen, will not increase dose risperdal 2/28 Patient improved as evidenced by not escalating when scratched except raising voice. Will treat with bacitracin reviewed with medicine no need for tetanus or infection us disease screen panel for peer. Given fall will reduce risperdal and add parameters. Will gets pelvic and ls spine xray despite absence of findings.   3/1 still loud but less agitated, cont rx, medicine to see for possible conjunctivitis. Awaiting xray non urgently as patient asymptomatic  3/2 Patient generally improved, patient loud with occasional outbursts but not aggressive. Cont current treatment. Medicine saw patient about back no specific treatment except mild pain meds as needed.   3/3 less volatile less agitated still reactive loud, low tolerance of stimuli but not aggressive. Tolerating reduced risperidone. Cont current treatment  3/4 Appess to show some consident improvement will cont current regimen, will not increase dose risperdal  3/5: Pt pleasant on approach this morning. Denies any complaints. Able to answer some questions before derailing. No overnight events. No PRNs needed.

## 2022-03-05 NOTE — BH INPATIENT PSYCHIATRY PROGRESS NOTE - NSBHCHARTREVIEWVS_PSY_A_CORE FT
Vital Signs Last 24 Hrs  T(C): --  T(F): --  HR: 74 (03-04-22 @ 20:28) (74 - 74)  BP: --  BP(mean): --  RR: --  SpO2: --    Orthostatic VS  03-04-22 @ 20:28  Lying BP: --/-- HR: --  Sitting BP: 140/59 HR: 74  Standing BP: --/-- HR: --  Site: --  Mode: --  Orthostatic VS  03-04-22 @ 07:47  Lying BP: --/-- HR: --  Sitting BP: 116/62 HR: 72  Standing BP: --/-- HR: --  Site: upper right arm  Mode: electronic  Orthostatic VS  03-03-22 @ 20:39  Lying BP: --/-- HR: --  Sitting BP: 130/61 HR: 72  Standing BP: --/-- HR: --  Site: --  Mode: --

## 2022-03-06 RX ADMIN — DIVALPROEX SODIUM 750 MILLIGRAM(S): 500 TABLET, DELAYED RELEASE ORAL at 09:03

## 2022-03-06 RX ADMIN — RISPERIDONE 1.5 MILLIGRAM(S): 4 TABLET ORAL at 09:06

## 2022-03-06 RX ADMIN — Medication 5 MILLIGRAM(S): at 20:41

## 2022-03-06 RX ADMIN — Medication 20 MILLIGRAM(S): at 09:06

## 2022-03-06 RX ADMIN — DIVALPROEX SODIUM 750 MILLIGRAM(S): 500 TABLET, DELAYED RELEASE ORAL at 20:41

## 2022-03-06 RX ADMIN — SENNA PLUS 1 TABLET(S): 8.6 TABLET ORAL at 20:40

## 2022-03-06 RX ADMIN — RISPERIDONE 1.5 MILLIGRAM(S): 4 TABLET ORAL at 20:41

## 2022-03-06 RX ADMIN — Medication 1 APPLICATION(S): at 09:03

## 2022-03-06 RX ADMIN — Medication 1 APPLICATION(S): at 12:08

## 2022-03-07 PROCEDURE — 99232 SBSQ HOSP IP/OBS MODERATE 35: CPT

## 2022-03-07 RX ADMIN — Medication 20 MILLIGRAM(S): at 10:34

## 2022-03-07 RX ADMIN — Medication 1 APPLICATION(S): at 13:00

## 2022-03-07 RX ADMIN — DIVALPROEX SODIUM 750 MILLIGRAM(S): 500 TABLET, DELAYED RELEASE ORAL at 21:31

## 2022-03-07 RX ADMIN — Medication 1 APPLICATION(S): at 10:40

## 2022-03-07 RX ADMIN — RISPERIDONE 1.5 MILLIGRAM(S): 4 TABLET ORAL at 21:33

## 2022-03-07 RX ADMIN — RISPERIDONE 1.5 MILLIGRAM(S): 4 TABLET ORAL at 10:34

## 2022-03-07 RX ADMIN — RISPERIDONE 1.5 MILLIGRAM(S): 4 TABLET ORAL at 13:00

## 2022-03-07 RX ADMIN — Medication 650 MILLIGRAM(S): at 00:38

## 2022-03-07 RX ADMIN — Medication 5 MILLIGRAM(S): at 21:31

## 2022-03-07 RX ADMIN — SENNA PLUS 1 TABLET(S): 8.6 TABLET ORAL at 21:32

## 2022-03-07 RX ADMIN — Medication 1 APPLICATION(S): at 21:32

## 2022-03-07 RX ADMIN — DIVALPROEX SODIUM 750 MILLIGRAM(S): 500 TABLET, DELAYED RELEASE ORAL at 10:35

## 2022-03-07 RX ADMIN — Medication 1 APPLICATION(S): at 21:37

## 2022-03-07 RX ADMIN — Medication 1 APPLICATION(S): at 10:36

## 2022-03-07 NOTE — BH INPATIENT PSYCHIATRY PROGRESS NOTE - NSBHFUPINTERVALHXFT_PSY_A_CORE
The patient is seen for TBI related dementia and behavioral disturbances. No overnight events, eating sleeping okay. No prns. BP low today awaiting manual recheck Occasionally loud but no aggressive behavior  3/5: Pt pleasant on approach this morning. Denies pain or dizziness. No overnight events. No PRNs needed.

## 2022-03-07 NOTE — BH INPATIENT PSYCHIATRY PROGRESS NOTE - NSBHASSESSSUMMFT_PSY_ALL_CORE
2/28 Patient improved as evidenced by not escalating when scratched except raising voice. Will treat with bacitracin reviewed with medicine no need for tetanus or infection us disease screen panel for peer. Given fall will reduce risperdal and add parameters. Will gets pelvic and ls spine xray despite absence of findings.   3/1 still loud but less agitated, cont rx, medicine to see for possible conjunctivitis. Awaiting xray non urgently as patient asymptomatic  3/2 Patient generally improved, patient loud with occasional outbursts but not aggressive. Cont current treatment. Medicine saw patient about back no specific treatment except mild pain meds as needed.   3/3 less volatile less agitated still reactive loud, low tolerance of stimuli but not aggressive. Tolerating reduced risperidone. Cont current treatment  3/4 Appess to show some consident improvement will cont current regimen, will not increase dose risperdal  3/5: Pt pleasant on approach this morning. Denies any complaints. Able to answer some questions before derailing. No overnight events. No PRNs needed.   3/7: Improved, will cont current meds unless BP is in which case will decrease Inderal as initially though helpful but symptoms broke through when she was on it as monotherapy. Asked medicine to see her to review le edema. Also requested podiatry consult.

## 2022-03-07 NOTE — BH INPATIENT PSYCHIATRY PROGRESS NOTE - NSBHMETABOLIC_PSY_ALL_CORE_FT
BMI: BMI (kg/m2): 31.6 (11-29-21 @ 16:35)  HbA1c: A1C with Estimated Average Glucose Result: 5.4 % (11-04-21 @ 06:58)    Glucose: POCT Blood Glucose.: 78 mg/dL (12-30-21 @ 07:34)    BP: 120/91 (03-06-22 @ 21:00) (106/61 - 120/91)  Lipid Panel:

## 2022-03-07 NOTE — BH INPATIENT PSYCHIATRY PROGRESS NOTE - MSE UNSTRUCTURED FT
Patient is awake and alert. Affect is bright, inappropriate. Relates in childlike manner Stands too close to examiner, overly familiar. Speech is loud, exaggerated prosody. TP concrete, disjointed. Has hand tremor which is highly variable, not worse with dose increments, no rigidity. Poor insight. No suicidal ideation.No HI. No paranoia. No nunez.  Oriented to self only able to name spouse

## 2022-03-07 NOTE — BH INPATIENT PSYCHIATRY PROGRESS NOTE - NSBHCHARTREVIEWVS_PSY_A_CORE FT
Vital Signs Last 24 Hrs  T(C): 36.6 (03-07-22 @ 07:43), Max: 36.6 (03-07-22 @ 07:43)  T(F): 97.9 (03-07-22 @ 07:43), Max: 97.9 (03-07-22 @ 07:43)  HR: 81 (03-06-22 @ 21:00) (81 - 81)  BP: 120/91 (03-06-22 @ 21:00) (120/91 - 120/91)  BP(mean): --  RR: --  SpO2: --    Orthostatic VS  03-07-22 @ 07:43  Lying BP: --/-- HR: --  Sitting BP: 93/58 HR: 66  Standing BP: --/-- HR: --  Site: upper right arm  Mode: electronic  Orthostatic VS  03-06-22 @ 07:46  Lying BP: --/-- HR: --  Sitting BP: --/-- HR: --  Standing BP: 123/86 HR: 95  Site: --  Mode: --  Orthostatic VS  03-05-22 @ 21:30  Lying BP: --/-- HR: --  Sitting BP: 133/48 HR: 77  Standing BP: --/-- HR: --  Site: upper left arm  Mode: electronic

## 2022-03-08 PROCEDURE — 99232 SBSQ HOSP IP/OBS MODERATE 35: CPT

## 2022-03-08 RX ADMIN — RISPERIDONE 1.5 MILLIGRAM(S): 4 TABLET ORAL at 21:33

## 2022-03-08 RX ADMIN — Medication 5 MILLIGRAM(S): at 21:11

## 2022-03-08 RX ADMIN — DIVALPROEX SODIUM 750 MILLIGRAM(S): 500 TABLET, DELAYED RELEASE ORAL at 10:08

## 2022-03-08 RX ADMIN — RISPERIDONE 1.5 MILLIGRAM(S): 4 TABLET ORAL at 09:44

## 2022-03-08 RX ADMIN — Medication 1 APPLICATION(S): at 09:34

## 2022-03-08 RX ADMIN — SENNA PLUS 1 TABLET(S): 8.6 TABLET ORAL at 21:11

## 2022-03-08 RX ADMIN — Medication 1 APPLICATION(S): at 21:24

## 2022-03-08 RX ADMIN — Medication 20 MILLIGRAM(S): at 09:42

## 2022-03-08 RX ADMIN — DIVALPROEX SODIUM 750 MILLIGRAM(S): 500 TABLET, DELAYED RELEASE ORAL at 21:10

## 2022-03-08 RX ADMIN — Medication 1 APPLICATION(S): at 21:11

## 2022-03-08 RX ADMIN — RISPERIDONE 1.5 MILLIGRAM(S): 4 TABLET ORAL at 13:46

## 2022-03-08 RX ADMIN — Medication 1 APPLICATION(S): at 09:42

## 2022-03-08 NOTE — BH INPATIENT PSYCHIATRY PROGRESS NOTE - NSBHFUPINTERVALHXFT_PSY_A_CORE
The patient is seen for TBI related dementia and behavioral disturbances. No overnight events, eating sleeping okay. No prns.VSS Occasionally loud but no aggressive behavior. Was loud during being changed

## 2022-03-08 NOTE — BH INPATIENT PSYCHIATRY PROGRESS NOTE - NSBHASSESSSUMMFT_PSY_ALL_CORE
2/28 Patient improved as evidenced by not escalating when scratched except raising voice. Will treat with bacitracin reviewed with medicine no need for tetanus or infection us disease screen panel for peer. Given fall will reduce risperdal and add parameters. Will gets pelvic and ls spine xray despite absence of findings.   3/1 still loud but less agitated, cont rx, medicine to see for possible conjunctivitis. Awaiting xray non urgently as patient asymptomatic  3/2 Patient generally improved, patient loud with occasional outbursts but not aggressive. Cont current treatment. Medicine saw patient about back no specific treatment except mild pain meds as needed.   3/3 less volatile less agitated still reactive loud, low tolerance of stimuli but not aggressive. Tolerating reduced risperidone. Cont current treatment  3/4 Appess to show some consident improvement will cont current regimen, will not increase dose risperdal  3/5: Pt pleasant on approach this morning. Denies any complaints. Able to answer some questions before derailing. No overnight events. No PRNs needed.   3/7: Improved, will cont current meds unless BP is in which case will decrease Inderal as initially though helpful but symptoms broke through when she was on it as monotherapy. Asked medicine to see her to review le edema. Also requested podiatry consult.   3/8 Improving still loud but not angry, agitated, no aggression. Cont rachna trx, seen by medicine, no recommendation for change in diuretic rx

## 2022-03-08 NOTE — BH INPATIENT PSYCHIATRY PROGRESS NOTE - NSBHCHARTREVIEWVS_PSY_A_CORE FT
Vital Signs Last 24 Hrs  T(C): 36.8 (03-08-22 @ 06:57), Max: 36.8 (03-08-22 @ 06:57)  T(F): 98.3 (03-08-22 @ 06:57), Max: 98.3 (03-08-22 @ 06:57)  HR: --  BP: --  BP(mean): --  RR: --  SpO2: --    Orthostatic VS  03-08-22 @ 06:57  Lying BP: --/-- HR: --  Sitting BP: 130/66 HR: 75  Standing BP: --/-- HR: --  Site: --  Mode: --  Orthostatic VS  03-07-22 @ 20:43  Lying BP: --/-- HR: --  Sitting BP: 152/76 HR: 73  Standing BP: --/-- HR: --  Site: --  Mode: --  Orthostatic VS  03-07-22 @ 18:15  Lying BP: --/-- HR: --  Sitting BP: --/-- HR: --  Standing BP: 105/63 HR: 74  Site: --  Mode: --  Orthostatic VS  03-07-22 @ 07:43  Lying BP: --/-- HR: --  Sitting BP: 93/58 HR: 66  Standing BP: --/-- HR: --  Site: upper right arm  Mode: electronic

## 2022-03-09 PROCEDURE — 99232 SBSQ HOSP IP/OBS MODERATE 35: CPT

## 2022-03-09 RX ADMIN — DIVALPROEX SODIUM 750 MILLIGRAM(S): 500 TABLET, DELAYED RELEASE ORAL at 21:30

## 2022-03-09 RX ADMIN — RISPERIDONE 1.5 MILLIGRAM(S): 4 TABLET ORAL at 21:35

## 2022-03-09 RX ADMIN — SENNA PLUS 1 TABLET(S): 8.6 TABLET ORAL at 21:35

## 2022-03-09 RX ADMIN — Medication 20 MILLIGRAM(S): at 13:29

## 2022-03-09 RX ADMIN — RISPERIDONE 1.5 MILLIGRAM(S): 4 TABLET ORAL at 13:30

## 2022-03-09 RX ADMIN — Medication 5 MILLIGRAM(S): at 21:35

## 2022-03-09 NOTE — BH INPATIENT PSYCHIATRY PROGRESS NOTE - NSBHASSESSSUMMFT_PSY_ALL_CORE
2/28 Patient improved as evidenced by not escalating when scratched except raising voice. Will treat with bacitracin reviewed with medicine no need for tetanus or infection us disease screen panel for peer. Given fall will reduce risperdal and add parameters. Will gets pelvic and ls spine xray despite absence of findings.   3/1 still loud but less agitated, cont rx, medicine to see for possible conjunctivitis. Awaiting xray non urgently as patient asymptomatic  3/2 Patient generally improved, patient loud with occasional outbursts but not aggressive. Cont current treatment. Medicine saw patient about back no specific treatment except mild pain meds as needed.   3/3 less volatile less agitated still reactive loud, low tolerance of stimuli but not aggressive. Tolerating reduced risperidone. Cont current treatment  3/4 Appess to show some consident improvement will cont current regimen, will not increase dose risperdal  3/5: Pt pleasant on approach this morning. Denies any complaints. Able to answer some questions before derailing. No overnight events. No PRNs needed.   3/7: Improved, will cont current meds unless BP is in which case will decrease Inderal as initially though helpful but symptoms broke through when she was on it as monotherapy. Asked medicine to see her to review le edema. Also requested podiatry consult.   3/8 Improving still loud but not angry, agitated, no aggression. Cont rachna trx, seen by medicine, no recommendation for change in diuretic rx  3/9 Patient holing on to gains, still loud, labile but less and not agitated aggressive. Cont current risperidone trial

## 2022-03-09 NOTE — BH INPATIENT PSYCHIATRY PROGRESS NOTE - NSBHCHARTREVIEWVS_PSY_A_CORE FT
Vital Signs Last 24 Hrs  T(C): --  T(F): --  HR: --  BP: --  BP(mean): --  RR: --  SpO2: --    Orthostatic VS  03-09-22 @ 13:18  Lying BP: --/-- HR: --  Sitting BP: 128/62 HR: 78  Standing BP: --/-- HR: --  Site: --  Mode: --  Orthostatic VS  03-08-22 @ 20:26  Lying BP: --/-- HR: --  Sitting BP: 124/60 HR: 76  Standing BP: --/-- HR: --  Site: --  Mode: --  Orthostatic VS  03-08-22 @ 06:57  Lying BP: --/-- HR: --  Sitting BP: 130/66 HR: 75  Standing BP: --/-- HR: --  Site: --  Mode: --  Orthostatic VS  03-07-22 @ 20:43  Lying BP: --/-- HR: --  Sitting BP: 152/76 HR: 73  Standing BP: --/-- HR: --  Site: --  Mode: --  Orthostatic VS  03-07-22 @ 18:15  Lying BP: --/-- HR: --  Sitting BP: --/-- HR: --  Standing BP: 105/63 HR: 74  Site: --  Mode: --

## 2022-03-09 NOTE — BH INPATIENT PSYCHIATRY PROGRESS NOTE - NSBHFUPINTERVALHXFT_PSY_A_CORE
The patient is seen for TBI related dementia and behavioral disturbances. No overnight events,slept poorly last night due to noisy roommate then napped during morning, now alert No prns.VSS Occasionally loud but no aggressive behavior. Was loud during being changed> VSS

## 2022-03-09 NOTE — BH INPATIENT PSYCHIATRY PROGRESS NOTE - NSBHMETABOLIC_PSY_ALL_CORE_FT
BMI: BMI (kg/m2): 31.6 (11-29-21 @ 16:35)  HbA1c: A1C with Estimated Average Glucose Result: 5.4 % (11-04-21 @ 06:58)    Glucose: POCT Blood Glucose.: 78 mg/dL (12-30-21 @ 07:34)    BP: 120/91 (03-06-22 @ 21:00) (120/91 - 120/91)  Lipid Panel:

## 2022-03-10 PROCEDURE — 99231 SBSQ HOSP IP/OBS SF/LOW 25: CPT

## 2022-03-10 RX ADMIN — DIVALPROEX SODIUM 750 MILLIGRAM(S): 500 TABLET, DELAYED RELEASE ORAL at 09:56

## 2022-03-10 RX ADMIN — Medication 1 APPLICATION(S): at 09:56

## 2022-03-10 RX ADMIN — RISPERIDONE 1.5 MILLIGRAM(S): 4 TABLET ORAL at 22:40

## 2022-03-10 RX ADMIN — SENNA PLUS 1 TABLET(S): 8.6 TABLET ORAL at 21:39

## 2022-03-10 RX ADMIN — Medication 5 MILLIGRAM(S): at 21:39

## 2022-03-10 RX ADMIN — Medication 20 MILLIGRAM(S): at 09:56

## 2022-03-10 RX ADMIN — RISPERIDONE 1.5 MILLIGRAM(S): 4 TABLET ORAL at 12:43

## 2022-03-10 RX ADMIN — Medication 1 APPLICATION(S): at 21:42

## 2022-03-10 RX ADMIN — RISPERIDONE 1.5 MILLIGRAM(S): 4 TABLET ORAL at 09:57

## 2022-03-10 RX ADMIN — DIVALPROEX SODIUM 750 MILLIGRAM(S): 500 TABLET, DELAYED RELEASE ORAL at 21:38

## 2022-03-10 NOTE — BH INPATIENT PSYCHIATRY PROGRESS NOTE - CURRENT MEDICATION
MEDICATIONS  (STANDING):  AQUAPHOR (petrolatum Ointment) 1 Application(s) Topical three times a day  diVALproex Sprinkle 750 milliGRAM(s) Oral two times a day  furosemide    Tablet 20 milliGRAM(s) Oral daily  melatonin. 5 milliGRAM(s) Oral at bedtime  propranolol 30 milliGRAM(s) Oral three times a day  risperiDONE   Tablet 1.5 milliGRAM(s) Oral <User Schedule>  senna 1 Tablet(s) Oral at bedtime    MEDICATIONS  (PRN):  acetaminophen     Tablet .. 650 milliGRAM(s) Oral every 6 hours PRN Temp greater or equal to 38C (100.4F), Mild Pain (1 - 3), Moderate Pain (4 - 6)  aluminum hydroxide/magnesium hydroxide/simethicone Suspension 30 milliLiter(s) Oral every 4 hours PRN Dyspepsia  fluPHENAZine 1 milliGRAM(s) Oral every 6 hours PRN agitation  fluPHENAZine  Injectable 1 milliGRAM(s) IntraMuscular once PRN agitation  fluPHENAZine  Injectable 1 milliGRAM(s) IntraMuscular once PRN agitation  fluPHENAZine  Injectable 1 milliGRAM(s) IntraMuscular once PRN agitation  lidocaine   4% Patch 2 Patch Transdermal daily PRN back pain  magnesium hydroxide Suspension 30 milliLiter(s) Oral at bedtime PRN constipation  mineral oil enema 133 milliLiter(s) Rectal daily PRN hard stool  saline laxative (FLEET) Rectal Enema 1 Enema Rectal daily PRN hard stool  triamcinolone 0.1% Cream 1 Application(s) Topical two times a day PRN pruritis/rash

## 2022-03-10 NOTE — BH INPATIENT PSYCHIATRY PROGRESS NOTE - NSBHFUPINTERVALHXFT_PSY_A_CORE
The patient is seen for TBI related dementia and behavioral disturbances. Chart reviewed and case discussed with nursing staff. No overnight events, better last night. Per staff, patient occasionally complains of foot pain but ambulating well. On encounter, patient is calm and states she is "okay". Eating adequately. Patient noted to have overgrown toe nails; podiatry consult request already made by primary team. Vitals stable.

## 2022-03-10 NOTE — BH INPATIENT PSYCHIATRY PROGRESS NOTE - MSE UNSTRUCTURED FT
Patient is awake and alert. Relates in a childlike manner, overly familiar. Has hand tremor which is highly variable, no rigidity. Speech is loud, exaggerated prosody. Reported mood "okay". Affect is bright, inappropriate. Thought process is disjointed. No paranoia. No suicidal or homicidal ideation. No hallucinations. Poor insight. Impulse control tenuous. Oriented to self only.

## 2022-03-10 NOTE — BH INPATIENT PSYCHIATRY PROGRESS NOTE - NSBHCHARTREVIEWVS_PSY_A_CORE FT
Vital Signs Last 24 Hrs  T(C): 36.7 (03-10-22 @ 07:57), Max: 36.7 (03-10-22 @ 07:57)  T(F): 98 (03-10-22 @ 07:57), Max: 98 (03-10-22 @ 07:57)  HR: 76 (03-09-22 @ 20:27) (76 - 76)  BP: 100/56 (03-09-22 @ 20:27) (100/56 - 100/56)  RR: 17 (03-09-22 @ 20:27) (17 - 17)    03-10-22 @ 07:57  Sitting BP: 123/58 HR: 74  Site: upper right arm  Mode: electronic

## 2022-03-10 NOTE — BH INPATIENT PSYCHIATRY PROGRESS NOTE - NSBHMETABOLIC_PSY_ALL_CORE_FT
BMI: BMI (kg/m2): 31.6 (11-29-21 @ 16:35)  HbA1c: A1C with Estimated Average Glucose Result: 5.4 % (11-04-21 @ 06:58)  Glucose: POCT Blood Glucose.: 78 mg/dL (12-30-21 @ 07:34)  BP: 100/56 (03-09-22 @ 20:27) (100/56 - 100/56)

## 2022-03-10 NOTE — BH INPATIENT PSYCHIATRY PROGRESS NOTE - NSBHASSESSSUMMFT_PSY_ALL_CORE
67-year-old female with TBI and dementia, previously residing at [facility] since 2010, admitted for management of impulsive and aggressive behavior. Patient now with improvement in impulsivity with occasional outbursts but no aggression.     03/10: Patient mostly stable on current management. No behavioral events or overnight psych PRNs. Occasionally c/o foot pain, noted to have overgrown toe nails, primary team has already placed podiatry consult request. Vitals stable    PLAN:  - Will continue Depakote, risperidone and propranolol at current dose  - Will continue current bowel regimen

## 2022-03-11 PROCEDURE — 99231 SBSQ HOSP IP/OBS SF/LOW 25: CPT

## 2022-03-11 RX ADMIN — DIVALPROEX SODIUM 750 MILLIGRAM(S): 500 TABLET, DELAYED RELEASE ORAL at 23:05

## 2022-03-11 RX ADMIN — Medication 20 MILLIGRAM(S): at 10:47

## 2022-03-11 RX ADMIN — SENNA PLUS 1 TABLET(S): 8.6 TABLET ORAL at 23:06

## 2022-03-11 RX ADMIN — Medication 5 MILLIGRAM(S): at 23:07

## 2022-03-11 RX ADMIN — RISPERIDONE 1.5 MILLIGRAM(S): 4 TABLET ORAL at 10:47

## 2022-03-11 RX ADMIN — RISPERIDONE 1.5 MILLIGRAM(S): 4 TABLET ORAL at 17:37

## 2022-03-11 RX ADMIN — RISPERIDONE 1.5 MILLIGRAM(S): 4 TABLET ORAL at 23:07

## 2022-03-11 RX ADMIN — DIVALPROEX SODIUM 750 MILLIGRAM(S): 500 TABLET, DELAYED RELEASE ORAL at 10:46

## 2022-03-11 NOTE — BH INPATIENT PSYCHIATRY PROGRESS NOTE - NSBHFUPINTERVALHXFT_PSY_A_CORE
The patient is seen for TBI related dementia and behavioral disturbances. Chart reviewed and case discussed with nursing staff. No overnight events, better last night. On encounter, patient is calm and states she is "okay okay". Eating adequately. Taking meds. Vitals stable.

## 2022-03-11 NOTE — BH INPATIENT PSYCHIATRY PROGRESS NOTE - MSE UNSTRUCTURED FT
Patient is awake and alert. Calm on exam, relates in a childlike manner. Has hand tremor which is highly variable, no rigidity. Speech is loud, exaggerated prosody. Reported mood "okay". Affect is bright, inappropriate. Thought process is disjointed. No paranoia. No suicidal or homicidal ideation. No hallucinations. Poor insight. Impulse control tenuous. Oriented to self only.

## 2022-03-11 NOTE — BH INPATIENT PSYCHIATRY PROGRESS NOTE - NSBHASSESSSUMMFT_PSY_ALL_CORE
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 Patient now with improvement in impulsivity with occasional outbursts but no aggression.     03/11: Patient mostly stable on current management with no behavioral events or overnight psych PRNs. Noted to have overgrown toe nails, primary team has already placed a podiatry consult request. Vitals stable. Taking meds    PLAN:  - Will continue Depakote, risperidone and propranolol at current dose  - Will continue current bowel regimen

## 2022-03-11 NOTE — BH INPATIENT PSYCHIATRY PROGRESS NOTE - NSBHCHARTREVIEWVS_PSY_A_CORE FT
Vital Signs Last 24 Hrs  T(C): 36.7 (03-11-22 @ 08:04), Max: 36.7 (03-11-22 @ 08:04)  T(F): 98.1 (03-11-22 @ 08:04), Max: 98.1 (03-11-22 @ 08:04)    03-11-22 @ 08:04  Standing BP: 124/87 HR: 92  Site: upper right arm  Mode: electronic

## 2022-03-12 RX ADMIN — Medication 1 APPLICATION(S): at 21:32

## 2022-03-12 RX ADMIN — DIVALPROEX SODIUM 750 MILLIGRAM(S): 500 TABLET, DELAYED RELEASE ORAL at 21:29

## 2022-03-12 RX ADMIN — Medication 20 MILLIGRAM(S): at 09:59

## 2022-03-12 RX ADMIN — Medication 1 APPLICATION(S): at 10:00

## 2022-03-12 RX ADMIN — Medication 5 MILLIGRAM(S): at 21:29

## 2022-03-12 RX ADMIN — RISPERIDONE 1.5 MILLIGRAM(S): 4 TABLET ORAL at 12:56

## 2022-03-12 RX ADMIN — RISPERIDONE 1.5 MILLIGRAM(S): 4 TABLET ORAL at 09:59

## 2022-03-12 RX ADMIN — DIVALPROEX SODIUM 750 MILLIGRAM(S): 500 TABLET, DELAYED RELEASE ORAL at 09:58

## 2022-03-12 RX ADMIN — SENNA PLUS 1 TABLET(S): 8.6 TABLET ORAL at 21:29

## 2022-03-13 RX ADMIN — DIVALPROEX SODIUM 750 MILLIGRAM(S): 500 TABLET, DELAYED RELEASE ORAL at 21:18

## 2022-03-13 RX ADMIN — Medication 20 MILLIGRAM(S): at 09:44

## 2022-03-13 RX ADMIN — DIVALPROEX SODIUM 750 MILLIGRAM(S): 500 TABLET, DELAYED RELEASE ORAL at 09:44

## 2022-03-13 RX ADMIN — SENNA PLUS 1 TABLET(S): 8.6 TABLET ORAL at 21:18

## 2022-03-13 RX ADMIN — Medication 1 APPLICATION(S): at 21:22

## 2022-03-13 RX ADMIN — RISPERIDONE 1.5 MILLIGRAM(S): 4 TABLET ORAL at 09:44

## 2022-03-13 RX ADMIN — RISPERIDONE 1.5 MILLIGRAM(S): 4 TABLET ORAL at 15:58

## 2022-03-13 RX ADMIN — RISPERIDONE 1.5 MILLIGRAM(S): 4 TABLET ORAL at 21:19

## 2022-03-13 RX ADMIN — Medication 5 MILLIGRAM(S): at 21:18

## 2022-03-14 PROCEDURE — 99231 SBSQ HOSP IP/OBS SF/LOW 25: CPT

## 2022-03-14 RX ADMIN — SENNA PLUS 1 TABLET(S): 8.6 TABLET ORAL at 21:54

## 2022-03-14 RX ADMIN — Medication 20 MILLIGRAM(S): at 08:58

## 2022-03-14 RX ADMIN — DIVALPROEX SODIUM 750 MILLIGRAM(S): 500 TABLET, DELAYED RELEASE ORAL at 21:55

## 2022-03-14 RX ADMIN — RISPERIDONE 1.5 MILLIGRAM(S): 4 TABLET ORAL at 12:50

## 2022-03-14 RX ADMIN — RISPERIDONE 1.5 MILLIGRAM(S): 4 TABLET ORAL at 21:56

## 2022-03-14 RX ADMIN — Medication 1 APPLICATION(S): at 21:58

## 2022-03-14 RX ADMIN — DIVALPROEX SODIUM 750 MILLIGRAM(S): 500 TABLET, DELAYED RELEASE ORAL at 08:58

## 2022-03-14 RX ADMIN — RISPERIDONE 1.5 MILLIGRAM(S): 4 TABLET ORAL at 08:58

## 2022-03-14 RX ADMIN — Medication 5 MILLIGRAM(S): at 21:54

## 2022-03-14 NOTE — BH INPATIENT PSYCHIATRY PROGRESS NOTE - NSBHCHARTREVIEWVS_PSY_A_CORE FT
Vital Signs Last 24 Hrs  T(C): 36.2 (03-14-22 @ 08:22), Max: 36.2 (03-14-22 @ 08:22)  T(F): 97.2 (03-14-22 @ 08:22), Max: 97.2 (03-14-22 @ 08:22)  HR: 82 (03-13-22 @ 16:10) (82 - 82)  BP: 128/82 (03-13-22 @ 16:10) (128/82 - 128/82)  BP(mean): --  RR: --  SpO2: --    Orthostatic VS  03-14-22 @ 08:22  Lying BP: --/-- HR: --  Sitting BP: 135/59 HR: 80  Standing BP: 125/63 HR: 70  Site: upper right arm  Mode: electronic  Orthostatic VS  03-13-22 @ 20:22  Lying BP: --/-- HR: --  Sitting BP: 128/68 HR: 70  Standing BP: --/-- HR: --  Site: --  Mode: --  Orthostatic VS  03-13-22 @ 09:31  Lying BP: --/-- HR: --  Sitting BP: 119/82 HR: 79  Standing BP: 110/84 HR: 76  Site: upper right arm  Mode: electronic  Orthostatic VS  03-12-22 @ 20:27  Lying BP: --/-- HR: --  Sitting BP: 102/54 HR: 75  Standing BP: --/-- HR: --  Site: --  Mode: --

## 2022-03-14 NOTE — BH INPATIENT PSYCHIATRY PROGRESS NOTE - NSBHFUPINTERVALHXFT_PSY_A_CORE
The patient is seen for TBI related dementia and behavioral disturbances. Chart reviewed and case discussed with nursing staff. No overnight events On encounter, patient is calm and states she is "okay okay". Eating adequately. Taking meds. Vitals stable. No orhtostasis or low BP

## 2022-03-14 NOTE — BH INPATIENT PSYCHIATRY PROGRESS NOTE - NSBHASSESSSUMMFT_PSY_ALL_CORE
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 Patient now with improvement in impulsivity with occasional outbursts but no aggression.     03/11: Patient mostly stable on current management with no behavioral events or overnight psych PRNs. Noted to have overgrown toe nails, primary team has already placed a podiatry consult request. Vitals stable. Taking meds    PLAN:  - Will continue Depakote, risperidone and propranolol at current dose  - Will continue current bowel regimen  3/14 Patient maintaining gains, cont current meds, requested podiatry consult

## 2022-03-14 NOTE — BH INPATIENT PSYCHIATRY PROGRESS NOTE - MSE UNSTRUCTURED FT
Patient is awake and alert. Calm on exam, relates in a childlike manner. Has hand tremor which is highly variable, no rigidity. Speech is loud, exaggerated prosody. Repeats phrases in different registersReported mood "okay". Affect is bright, inappropriate. Thought process is disjointed. No paranoia. No suicidal or homicidal ideation. No hallucinations. Poor insight. Impulse control tenuous. Oriented to self only.

## 2022-03-14 NOTE — BH INPATIENT PSYCHIATRY PROGRESS NOTE - NSBHMETABOLIC_PSY_ALL_CORE_FT
BMI: BMI (kg/m2): 31.6 (11-29-21 @ 16:35)  HbA1c: A1C with Estimated Average Glucose Result: 5.4 % (11-04-21 @ 06:58)    Glucose: POCT Blood Glucose.: 78 mg/dL (12-30-21 @ 07:34)    BP: 128/82 (03-13-22 @ 16:10) (122/66 - 128/82)  Lipid Panel:

## 2022-03-15 PROCEDURE — 99232 SBSQ HOSP IP/OBS MODERATE 35: CPT

## 2022-03-15 RX ADMIN — RISPERIDONE 1.5 MILLIGRAM(S): 4 TABLET ORAL at 08:59

## 2022-03-15 RX ADMIN — Medication 1 APPLICATION(S): at 09:23

## 2022-03-15 RX ADMIN — Medication 1 APPLICATION(S): at 17:49

## 2022-03-15 RX ADMIN — RISPERIDONE 1.5 MILLIGRAM(S): 4 TABLET ORAL at 13:00

## 2022-03-15 RX ADMIN — Medication 1 APPLICATION(S): at 21:59

## 2022-03-15 RX ADMIN — DIVALPROEX SODIUM 750 MILLIGRAM(S): 500 TABLET, DELAYED RELEASE ORAL at 21:55

## 2022-03-15 RX ADMIN — Medication 20 MILLIGRAM(S): at 09:00

## 2022-03-15 RX ADMIN — Medication 5 MILLIGRAM(S): at 21:55

## 2022-03-15 RX ADMIN — SENNA PLUS 1 TABLET(S): 8.6 TABLET ORAL at 21:55

## 2022-03-15 RX ADMIN — DIVALPROEX SODIUM 750 MILLIGRAM(S): 500 TABLET, DELAYED RELEASE ORAL at 09:00

## 2022-03-15 NOTE — BH INPATIENT PSYCHIATRY PROGRESS NOTE - NSBHASSESSSUMMFT_PSY_ALL_CORE
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 Patient now with improvement in impulsivity with occasional outbursts but no aggression.     03/11: Patient mostly stable on current management with no behavioral events or overnight psych PRNs. Noted to have overgrown toe nails, primary team has already placed a podiatry consult request. Vitals stable. Taking meds    PLAN:  - Will continue Depakote, risperidone and propranolol at current dose  - Will continue current bowel regimen  3/14 Patient maintaining gains, cont current meds, requested podiatry consult  3/15 Maintaining gains, cont risperdal aand Inderal, await podiatry   67-year-old female with TBI and dementia, previously residing at [facility] since 2010, admitted for management of impulsive and aggressive behavior. Patient now with improvement in impulsivity with occasional vocal disruptiveness which responds well to supportive redirection. Patient no longer showing aggressive behavior for considerable period of time. Patient compliant with medication and care.    3/15 Maintaining gains, cont risperdal and Inderal, no need for changes,  await podiatry consult

## 2022-03-15 NOTE — BH INPATIENT PSYCHIATRY PROGRESS NOTE - NSBHFUPINTERVALHXFT_PSY_A_CORE
The patient is seen for TBI related dementia and behavioral disturbances. Chart reviewed and case discussed with nursing staff. No overnight events On encounter, patient is calm and states she is " okay". Reported as noisy at times, no aggression.Eating adequately. Taking meds. Vitals stable. No orthostasis or low BP

## 2022-03-15 NOTE — BH INPATIENT PSYCHIATRY PROGRESS NOTE - NSICDXBHPRIMARYDX_PSY_ALL_CORE
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

## 2022-03-15 NOTE — BH INPATIENT PSYCHIATRY PROGRESS NOTE - NSBHCHARTREVIEWVS_PSY_A_CORE FT
Vital Signs Last 24 Hrs  T(C): 36.5 (03-15-22 @ 07:57), Max: 36.5 (03-15-22 @ 07:57)  T(F): 97.7 (03-15-22 @ 07:57), Max: 97.7 (03-15-22 @ 07:57)  HR: --  BP: --  BP(mean): --  RR: --  SpO2: --    Orthostatic VS  03-15-22 @ 07:57  Lying BP: --/-- HR: --  Sitting BP: 114/54 HR: 67  Standing BP: --/-- HR: --  Site: --  Mode: electronic  Orthostatic VS  03-14-22 @ 20:29  Lying BP: --/-- HR: --  Sitting BP: 128/56 HR: 76  Standing BP: --/-- HR: --  Site: --  Mode: --  Orthostatic VS  03-14-22 @ 08:22  Lying BP: --/-- HR: --  Sitting BP: 135/59 HR: 80  Standing BP: 125/63 HR: 70  Site: upper right arm  Mode: electronic  Orthostatic VS  03-13-22 @ 20:22  Lying BP: --/-- HR: --  Sitting BP: 128/68 HR: 70  Standing BP: --/-- HR: --  Site: --  Mode: --   Vital Signs Last 24 Hrs  T(C): 36.4 (03-16-22 @ 08:38), Max: 36.4 (03-16-22 @ 08:38)  T(F): 97.5 (03-16-22 @ 08:38), Max: 97.5 (03-16-22 @ 08:38)  HR: --  BP: --  BP(mean): --  RR: 17 (03-15-22 @ 20:21) (17 - 17)  SpO2: --    Orthostatic VS  03-16-22 @ 08:38  Lying BP: --/-- HR: --  Sitting BP: 117/55 HR: 69  Standing BP: --/-- HR: --  Site: --  Mode: --  Orthostatic VS  03-15-22 @ 20:21  Lying BP: --/-- HR: --  Sitting BP: 109/61 HR: 68  Standing BP: --/-- HR: --  Site: --  Mode: --  Orthostatic VS  03-15-22 @ 07:57  Lying BP: --/-- HR: --  Sitting BP: 114/54 HR: 67  Standing BP: --/-- HR: --  Site: --  Mode: electronic  Orthostatic VS  03-14-22 @ 20:29  Lying BP: --/-- HR: --  Sitting BP: 128/56 HR: 76  Standing BP: --/-- HR: --  Site: --  Mode: --

## 2022-03-15 NOTE — BH INPATIENT PSYCHIATRY PROGRESS NOTE - NSBHMETABOLIC_PSY_ALL_CORE_FT
BMI: BMI (kg/m2): 31.6 (11-29-21 @ 16:35)  HbA1c: A1C with Estimated Average Glucose Result: 5.4 % (11-04-21 @ 06:58)    Glucose: POCT Blood Glucose.: 78 mg/dL (12-30-21 @ 07:34)    BP: 128/82 (03-13-22 @ 16:10) (128/82 - 128/82)  Lipid Panel:

## 2022-03-15 NOTE — BH INPATIENT PSYCHIATRY PROGRESS NOTE - NSICDXBHSECONDARYDX_PSY_ALL_CORE
TBI (traumatic brain injury)   S06.9X9A   Seizure disorder   G40.909  HTN (hypertension)   I10   (ventriculoperitoneal) shunt status   Z98.2  Pedal edema   R60.0

## 2022-03-15 NOTE — BH INPATIENT PSYCHIATRY PROGRESS NOTE - MSE UNSTRUCTURED FT
Patient is awake and alert. Calm on exam, relates in a childlike manner. Has hand tremor which is highly variable, no rigidity. Speech is loud, exaggerated prosody. Repeats phrases in different registersReported mood "okay". Affect is bright, inappropriate. Thought process is disjointed. No paranoia. No suicidal or homicidal ideation. No hallucinations. Poor insight. Impulse control tenuous. Oriented to self only.  Patient is awake and alert. Calm on exam, relates in a childlike manner. Has hand tremor which is highly variable, no rigidity. Speech volume variable, exaggerated prosody. Repeats phrases in different registersReported mood "okay". Affect is bright. Thought process is disjointed. No paranoia. No suicidal or homicidal ideation. No hallucinations. Poor insight. Oriented to self only.

## 2022-03-16 DIAGNOSIS — Z98.2 PRESENCE OF CEREBROSPINAL FLUID DRAINAGE DEVICE: ICD-10-CM

## 2022-03-16 DIAGNOSIS — G40.909 EPILEPSY, UNSPECIFIED, NOT INTRACTABLE, WITHOUT STATUS EPILEPTICUS: ICD-10-CM

## 2022-03-16 DIAGNOSIS — I10 ESSENTIAL (PRIMARY) HYPERTENSION: ICD-10-CM

## 2022-03-16 DIAGNOSIS — R60.0 LOCALIZED EDEMA: ICD-10-CM

## 2022-03-16 PROCEDURE — 99232 SBSQ HOSP IP/OBS MODERATE 35: CPT

## 2022-03-16 RX ADMIN — Medication 5 MILLIGRAM(S): at 21:21

## 2022-03-16 RX ADMIN — DIVALPROEX SODIUM 750 MILLIGRAM(S): 500 TABLET, DELAYED RELEASE ORAL at 21:20

## 2022-03-16 RX ADMIN — DIVALPROEX SODIUM 750 MILLIGRAM(S): 500 TABLET, DELAYED RELEASE ORAL at 10:02

## 2022-03-16 RX ADMIN — RISPERIDONE 1.5 MILLIGRAM(S): 4 TABLET ORAL at 12:48

## 2022-03-16 RX ADMIN — Medication 1 APPLICATION(S): at 21:20

## 2022-03-16 RX ADMIN — Medication 20 MILLIGRAM(S): at 10:02

## 2022-03-16 RX ADMIN — SENNA PLUS 1 TABLET(S): 8.6 TABLET ORAL at 21:20

## 2022-03-16 RX ADMIN — RISPERIDONE 1.5 MILLIGRAM(S): 4 TABLET ORAL at 10:02

## 2022-03-16 RX ADMIN — RISPERIDONE 1.5 MILLIGRAM(S): 4 TABLET ORAL at 21:21

## 2022-03-16 RX ADMIN — Medication 1 APPLICATION(S): at 10:02

## 2022-03-16 NOTE — BH INPATIENT PSYCHIATRY PROGRESS NOTE - NSBHCHARTREVIEWVS_PSY_A_CORE FT
Vital Signs Last 24 Hrs  T(C): 36.4 (03-16-22 @ 08:38), Max: 36.4 (03-16-22 @ 08:38)  T(F): 97.5 (03-16-22 @ 08:38), Max: 97.5 (03-16-22 @ 08:38)  HR: --  BP: --  BP(mean): --  RR: 17 (03-15-22 @ 20:21) (17 - 17)  SpO2: --    Orthostatic VS  03-16-22 @ 08:38  Lying BP: --/-- HR: --  Sitting BP: 117/55 HR: 69  Standing BP: --/-- HR: --  Site: --  Mode: --  Orthostatic VS  03-15-22 @ 20:21  Lying BP: --/-- HR: --  Sitting BP: 109/61 HR: 68  Standing BP: --/-- HR: --  Site: --  Mode: --  Orthostatic VS  03-15-22 @ 07:57  Lying BP: --/-- HR: --  Sitting BP: 114/54 HR: 67  Standing BP: --/-- HR: --  Site: --  Mode: electronic  Orthostatic VS  03-14-22 @ 20:29  Lying BP: --/-- HR: --  Sitting BP: 128/56 HR: 76  Standing BP: --/-- HR: --  Site: --  Mode: --

## 2022-03-16 NOTE — BH INPATIENT PSYCHIATRY PROGRESS NOTE - NSICDXBHSECONDARYDX_PSY_ALL_CORE
Seizure disorder   G40.909  HTN (hypertension)   I10   (ventriculoperitoneal) shunt status   Z98.2  Pedal edema   R60.0

## 2022-03-16 NOTE — BH INPATIENT PSYCHIATRY PROGRESS NOTE - NSBHASSESSSUMMFT_PSY_ALL_CORE
67-year-old female with TBI and dementia, previously residing at [facility] since 2010, admitted for management of impulsive and aggressive behavior. Patient now with improvement in impulsivity with occasional vocal disruptiveness which responds well to supportive redirection. Patient no longer showing aggressive behavior for considerable period of time. Patient compliant with medication and care.    3/15 Maintaining gains, cont risperdal and Inderal, no need for changes,  await podiatry consult  3.16 Holding on to gains, If doses need to be held may need to slightly reduce standing dose.

## 2022-03-16 NOTE — BH INPATIENT PSYCHIATRY PROGRESS NOTE - MSE UNSTRUCTURED FT
Patient is awake and alert. Calm on exam, relates in a childlike manner. Has hand tremor which is highly variable, no rigidity. Speech volume variable, exaggerated prosody. Repeats phrases in different registersReported mood "okay". Affect is bright. Thought process is disjointed. No paranoia. No suicidal or homicidal ideation. No hallucinations. Poor insight. Oriented to self only.

## 2022-03-16 NOTE — BH INPATIENT PSYCHIATRY PROGRESS NOTE - NSBHFUPINTERVALHXFT_PSY_A_CORE
The patient is seen for TBI related dementia and behavioral disturbances. Chart reviewed and case discussed with nursing staff. No overnight events On encounter, patient is calm and states she is " okay". Reported as noisy at times, no aggression.Eating adequately. Taking meds. Vitals stable. No orthostasis or low BP. Last night had risperdal held for parameters

## 2022-03-17 PROCEDURE — 99232 SBSQ HOSP IP/OBS MODERATE 35: CPT

## 2022-03-17 RX ADMIN — Medication 20 MILLIGRAM(S): at 09:48

## 2022-03-17 RX ADMIN — SENNA PLUS 1 TABLET(S): 8.6 TABLET ORAL at 21:20

## 2022-03-17 RX ADMIN — Medication 1 APPLICATION(S): at 09:50

## 2022-03-17 RX ADMIN — DIVALPROEX SODIUM 750 MILLIGRAM(S): 500 TABLET, DELAYED RELEASE ORAL at 09:47

## 2022-03-17 RX ADMIN — RISPERIDONE 1.5 MILLIGRAM(S): 4 TABLET ORAL at 09:49

## 2022-03-17 RX ADMIN — RISPERIDONE 1.5 MILLIGRAM(S): 4 TABLET ORAL at 12:39

## 2022-03-17 RX ADMIN — RISPERIDONE 1.5 MILLIGRAM(S): 4 TABLET ORAL at 21:19

## 2022-03-17 RX ADMIN — DIVALPROEX SODIUM 750 MILLIGRAM(S): 500 TABLET, DELAYED RELEASE ORAL at 21:18

## 2022-03-17 RX ADMIN — Medication 5 MILLIGRAM(S): at 21:19

## 2022-03-17 NOTE — BH INPATIENT PSYCHIATRY PROGRESS NOTE - NSBHCHARTREVIEWVS_PSY_A_CORE FT
Vital Signs Last 24 Hrs  T(C): 36.6 (03-17-22 @ 08:04), Max: 36.6 (03-16-22 @ 21:39)  T(F): 97.9 (03-17-22 @ 08:04), Max: 97.9 (03-17-22 @ 08:04)  HR: 71 (03-16-22 @ 21:39) (71 - 71)  BP: 127/63 (03-16-22 @ 21:39) (127/63 - 127/63)  BP(mean): --  RR: --  SpO2: --    Orthostatic VS  03-17-22 @ 08:04  Lying BP: --/-- HR: --  Sitting BP: 112/59 HR: 72  Standing BP: 119/68 HR: 70  Site: upper left arm  Mode: electronic  Orthostatic VS  03-16-22 @ 08:38  Lying BP: --/-- HR: --  Sitting BP: 117/55 HR: 69  Standing BP: --/-- HR: --  Site: --  Mode: --  Orthostatic VS  03-15-22 @ 20:21  Lying BP: --/-- HR: --  Sitting BP: 109/61 HR: 68  Standing BP: --/-- HR: --  Site: --  Mode: --

## 2022-03-17 NOTE — PROGRESS NOTE ADULT - ASSESSMENT
Assessment/Plan:    Onychomycosis secondary to dermatophytes  Tyloma right foot  DM     aseptic debridemont of mycotic nails x10 with betadine applied  aseptic excisional debridemont with #15 blade of tyloma with bacitracin and dispersion padding dispensed  recommend continued routine dm foot care as outpatient  thank you for consultation

## 2022-03-17 NOTE — BH INPATIENT PSYCHIATRY PROGRESS NOTE - NSBHASSESSSUMMFT_PSY_ALL_CORE
67-year-old female with TBI and dementia, previously residing at [facility] since 2010, admitted for management of impulsive and aggressive behavior. Patient now with improvement in impulsivity with occasional vocal disruptiveness which responds well to supportive redirection. Patient no longer showing aggressive behavior for considerable period of time. Patient compliant with medication and care.    3/15 Maintaining gains, cont risperdal and Inderal, no need for changes,  await podiatry consult  3.16 Holding on to gains, If doses need to be held may need to slightly reduce standing dose.   3/17 Tolerating meds, cont to hold gains. Cont treatment. PAtient's 2PC expiring Patient not yet stable for d/c nor accepted in any facility so does not have safe d/c plan will therefore apply for further retention

## 2022-03-17 NOTE — BH INPATIENT PSYCHIATRY PROGRESS NOTE - NSBHFUPINTERVALHXFT_PSY_A_CORE
The patient is seen for TBI related dementia and behavioral disturbances. Chart reviewed and case discussed with nursing staff. No overnight events On encounter, patient is calm and states she is " okay". Reported as noisy at times, no aggression.Eating adequately. Taking meds. Vitals stable. No orthostasis or low BP. No need for holding of meds

## 2022-03-17 NOTE — BH INPATIENT PSYCHIATRY PROGRESS NOTE - NSBHMETABOLIC_PSY_ALL_CORE_FT
BMI: BMI (kg/m2): 31.6 (11-29-21 @ 16:35)  HbA1c: A1C with Estimated Average Glucose Result: 5.4 % (11-04-21 @ 06:58)    Glucose: POCT Blood Glucose.: 78 mg/dL (12-30-21 @ 07:34)    BP: 127/63 (03-16-22 @ 21:39) (127/63 - 127/63)  Lipid Panel:

## 2022-03-18 PROCEDURE — 99231 SBSQ HOSP IP/OBS SF/LOW 25: CPT

## 2022-03-18 RX ADMIN — DIVALPROEX SODIUM 750 MILLIGRAM(S): 500 TABLET, DELAYED RELEASE ORAL at 21:53

## 2022-03-18 RX ADMIN — DIVALPROEX SODIUM 750 MILLIGRAM(S): 500 TABLET, DELAYED RELEASE ORAL at 08:57

## 2022-03-18 RX ADMIN — Medication 1 APPLICATION(S): at 08:57

## 2022-03-18 RX ADMIN — RISPERIDONE 1.5 MILLIGRAM(S): 4 TABLET ORAL at 21:56

## 2022-03-18 RX ADMIN — Medication 5 MILLIGRAM(S): at 21:55

## 2022-03-18 RX ADMIN — RISPERIDONE 1.5 MILLIGRAM(S): 4 TABLET ORAL at 12:30

## 2022-03-18 RX ADMIN — RISPERIDONE 1.5 MILLIGRAM(S): 4 TABLET ORAL at 08:57

## 2022-03-18 RX ADMIN — SENNA PLUS 1 TABLET(S): 8.6 TABLET ORAL at 21:54

## 2022-03-18 RX ADMIN — Medication 20 MILLIGRAM(S): at 08:57

## 2022-03-18 NOTE — BH INPATIENT PSYCHIATRY PROGRESS NOTE - NSBHCHARTREVIEWVS_PSY_A_CORE FT
Vital Signs Last 24 Hrs  T(C): 36.7 (03-18-22 @ 07:29), Max: 36.7 (03-18-22 @ 07:29)  T(F): 98.1 (03-18-22 @ 07:29), Max: 98.1 (03-18-22 @ 07:29)  HR: --  BP: --  BP(mean): --  RR: 17 (03-17-22 @ 20:15) (17 - 17)  SpO2: --    Orthostatic VS  03-18-22 @ 07:29  Lying BP: --/-- HR: --  Sitting BP: 130/58 HR: 65  Standing BP: --/-- HR: --  Site: --  Mode: --  Orthostatic VS  03-17-22 @ 20:15  Lying BP: --/-- HR: --  Sitting BP: 107/60 HR: 64  Standing BP: --/-- HR: --  Site: --  Mode: --  Orthostatic VS  03-17-22 @ 08:04  Lying BP: --/-- HR: --  Sitting BP: 112/59 HR: 72  Standing BP: 119/68 HR: 70  Site: upper left arm  Mode: electronic

## 2022-03-18 NOTE — BH INPATIENT PSYCHIATRY PROGRESS NOTE - NSBHASSESSSUMMFT_PSY_ALL_CORE
67-year-old female with TBI and dementia, previously residing at [facility] since 2010, admitted for management of impulsive and aggressive behavior. Patient now with improvement in impulsivity with occasional vocal disruptiveness which responds well to supportive redirection. Patient no longer showing aggressive behavior for considerable period of time. Patient compliant with medication and care.    3/15 Maintaining gains, cont risperdal and Inderal, no need for changes,  await podiatry consult  3.16 Holding on to gains, If doses need to be held may need to slightly reduce standing dose.   3/17 Tolerating meds, cont to hold gains. Cont treatment. PAtient's 2PC expiring Patient not yet stable for d/c nor accepted in any facility so does not have safe d/c plan will therefore apply for further retention  3/18 Patient improved and sustaining gains. Cont current meds at current doses

## 2022-03-18 NOTE — BH INPATIENT PSYCHIATRY PROGRESS NOTE - NSBHFUPINTERVALHXFT_PSY_A_CORE
The patient is seen for TBI related dementia and behavioral disturbances. Chart reviewed and case discussed with nursing staff. No overnight events On encounter, patient is calm and states she is " okay". Reported quieter overall, no aggression.Eating adequately. Taking meds. Vitals stable. No orthostasis or low BP. No need for holding of meds

## 2022-03-19 RX ADMIN — Medication 20 MILLIGRAM(S): at 10:00

## 2022-03-19 RX ADMIN — Medication 5 MILLIGRAM(S): at 21:35

## 2022-03-19 RX ADMIN — RISPERIDONE 1.5 MILLIGRAM(S): 4 TABLET ORAL at 10:00

## 2022-03-19 RX ADMIN — RISPERIDONE 1.5 MILLIGRAM(S): 4 TABLET ORAL at 12:45

## 2022-03-19 RX ADMIN — DIVALPROEX SODIUM 750 MILLIGRAM(S): 500 TABLET, DELAYED RELEASE ORAL at 10:00

## 2022-03-19 RX ADMIN — Medication 1 APPLICATION(S): at 12:45

## 2022-03-19 RX ADMIN — SENNA PLUS 1 TABLET(S): 8.6 TABLET ORAL at 21:34

## 2022-03-19 RX ADMIN — DIVALPROEX SODIUM 750 MILLIGRAM(S): 500 TABLET, DELAYED RELEASE ORAL at 21:33

## 2022-03-19 RX ADMIN — RISPERIDONE 1.5 MILLIGRAM(S): 4 TABLET ORAL at 21:42

## 2022-03-20 RX ADMIN — DIVALPROEX SODIUM 750 MILLIGRAM(S): 500 TABLET, DELAYED RELEASE ORAL at 21:54

## 2022-03-20 RX ADMIN — SENNA PLUS 1 TABLET(S): 8.6 TABLET ORAL at 21:53

## 2022-03-20 RX ADMIN — RISPERIDONE 1.5 MILLIGRAM(S): 4 TABLET ORAL at 08:57

## 2022-03-20 RX ADMIN — Medication 5 MILLIGRAM(S): at 21:54

## 2022-03-20 RX ADMIN — Medication 20 MILLIGRAM(S): at 08:57

## 2022-03-20 RX ADMIN — RISPERIDONE 1.5 MILLIGRAM(S): 4 TABLET ORAL at 21:54

## 2022-03-20 RX ADMIN — RISPERIDONE 1.5 MILLIGRAM(S): 4 TABLET ORAL at 13:06

## 2022-03-20 RX ADMIN — DIVALPROEX SODIUM 750 MILLIGRAM(S): 500 TABLET, DELAYED RELEASE ORAL at 08:58

## 2022-03-21 PROCEDURE — 99231 SBSQ HOSP IP/OBS SF/LOW 25: CPT

## 2022-03-21 RX ADMIN — SENNA PLUS 1 TABLET(S): 8.6 TABLET ORAL at 22:27

## 2022-03-21 RX ADMIN — Medication 20 MILLIGRAM(S): at 10:27

## 2022-03-21 RX ADMIN — Medication 5 MILLIGRAM(S): at 22:28

## 2022-03-21 RX ADMIN — DIVALPROEX SODIUM 750 MILLIGRAM(S): 500 TABLET, DELAYED RELEASE ORAL at 10:27

## 2022-03-21 RX ADMIN — DIVALPROEX SODIUM 750 MILLIGRAM(S): 500 TABLET, DELAYED RELEASE ORAL at 22:27

## 2022-03-21 RX ADMIN — RISPERIDONE 1.5 MILLIGRAM(S): 4 TABLET ORAL at 22:28

## 2022-03-21 RX ADMIN — RISPERIDONE 1.5 MILLIGRAM(S): 4 TABLET ORAL at 18:34

## 2022-03-21 RX ADMIN — RISPERIDONE 1.5 MILLIGRAM(S): 4 TABLET ORAL at 10:27

## 2022-03-21 NOTE — BH INPATIENT PSYCHIATRY PROGRESS NOTE - NSBHMETABOLIC_PSY_ALL_CORE_FT
BMI: BMI (kg/m2): 31.6 (11-29-21 @ 16:35)  HbA1c: A1C with Estimated Average Glucose Result: 5.4 % (11-04-21 @ 06:58)    Glucose: POCT Blood Glucose.: 78 mg/dL (12-30-21 @ 07:34)    BP: 112/90 (03-20-22 @ 08:36) (112/90 - 112/90)  Lipid Panel:

## 2022-03-21 NOTE — BH INPATIENT PSYCHIATRY PROGRESS NOTE - NSBHASSESSSUMMFT_PSY_ALL_CORE
67-year-old female with TBI and dementia, previously residing at [facility] since 2010, admitted for management of impulsive and aggressive behavior. Patient now with improvement in impulsivity with occasional vocal disruptiveness which responds well to supportive redirection. Patient no longer showing aggressive behavior for considerable period of time. Patient compliant with medication and care.    3/15 Maintaining gains, cont risperdal and Inderal, no need for changes,  await podiatry consult  3.16 Holding on to gains, If doses need to be held may need to slightly reduce standing dose.   3/17 Tolerating meds, cont to hold gains. Cont treatment. PAtient's 2PC expiring Patient not yet stable for d/c nor accepted in any facility so does not have safe d/c plan will therefore apply for further retention  3/18 Patient improved and sustaining gains. Cont current meds at current doses  3/21 Sustaining gains from current regimen. Plan cont risperidone and propanolol at current doses

## 2022-03-21 NOTE — BH INPATIENT PSYCHIATRY PROGRESS NOTE - MSE UNSTRUCTURED FT
Patient is awake and alert. Calm on exam, relates in a childlike manner. Has hand tremor which is highly variable, no rigidity. Speech volume variable, exaggerated prosody though not as much. Reported mood "okay". Affect is bright. Thought process is disjointed. No paranoia. No suicidal or homicidal ideation. No hallucinations. Poor insight. Oriented to self only.

## 2022-03-21 NOTE — BH INPATIENT PSYCHIATRY PROGRESS NOTE - NSBHCHARTREVIEWVS_PSY_A_CORE FT
Vital Signs Last 24 Hrs  T(C): 36.6 (03-21-22 @ 07:52), Max: 36.6 (03-21-22 @ 07:52)  T(F): 97.9 (03-21-22 @ 07:52), Max: 97.9 (03-21-22 @ 07:52)  HR: --  BP: --  BP(mean): --  RR: --  SpO2: --    Orthostatic VS  03-21-22 @ 07:52  Lying BP: --/-- HR: --  Sitting BP: 121/56 HR: 67  Standing BP: --/-- HR: --  Site: upper left arm  Mode: electronic  Orthostatic VS  03-20-22 @ 20:42  Lying BP: --/-- HR: --  Sitting BP: 126/68 HR: 76  Standing BP: --/-- HR: --  Site: --  Mode: --  Orthostatic VS  03-19-22 @ 21:16  Lying BP: --/-- HR: --  Sitting BP: 112/65 HR: 100  Standing BP: --/-- HR: --  Site: --  Mode: --

## 2022-03-22 PROCEDURE — 99232 SBSQ HOSP IP/OBS MODERATE 35: CPT

## 2022-03-22 RX ADMIN — RISPERIDONE 1.5 MILLIGRAM(S): 4 TABLET ORAL at 21:31

## 2022-03-22 RX ADMIN — DIVALPROEX SODIUM 750 MILLIGRAM(S): 500 TABLET, DELAYED RELEASE ORAL at 09:17

## 2022-03-22 RX ADMIN — SENNA PLUS 1 TABLET(S): 8.6 TABLET ORAL at 21:28

## 2022-03-22 RX ADMIN — Medication 20 MILLIGRAM(S): at 09:16

## 2022-03-22 RX ADMIN — Medication 5 MILLIGRAM(S): at 21:28

## 2022-03-22 RX ADMIN — DIVALPROEX SODIUM 750 MILLIGRAM(S): 500 TABLET, DELAYED RELEASE ORAL at 21:28

## 2022-03-22 RX ADMIN — RISPERIDONE 1.5 MILLIGRAM(S): 4 TABLET ORAL at 17:25

## 2022-03-22 RX ADMIN — RISPERIDONE 1.5 MILLIGRAM(S): 4 TABLET ORAL at 09:16

## 2022-03-22 NOTE — BH INPATIENT PSYCHIATRY PROGRESS NOTE - NSBHFUPINTERVALHXFT_PSY_A_CORE
The patient is seen for TBI related dementia and behavioral disturbances. Chart reviewed and case discussed with nursing staff. No overnight events On encounter, patient is calm and states she is " okay". Reported quieter overall, no aggression.Eating adequately. Taking meds. Vitals stable.

## 2022-03-22 NOTE — BH INPATIENT PSYCHIATRY PROGRESS NOTE - NSBHASSESSSUMMFT_PSY_ALL_CORE
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 Patient now with improvement in impulsivity with occasional vocal disruptiveness which responds well to supportive redirection. Patient no longer showing aggressive behavior for considerable period of time. Patient compliant with medication and care.    3/15 Maintaining gains, cont risperdal and Inderal, no need for changes,  await podiatry consult  3.16 Holding on to gains, If doses need to be held may need to slightly reduce standing dose.   3/17 Tolerating meds, cont to hold gains. Cont treatment. PAtient's 2PC expiring Patient not yet stable for d/c nor accepted in any facility so does not have safe d/c plan will therefore apply for further retention  3/18 Patient improved and sustaining gains. Cont current meds at current doses  3/21 Sustaining gains from current regimen. Plan cont risperidone and propanolol at current doses  3/22 Sustaining gains, tolerating meds with stable vitals and variable tremor. Cont current rx while seeking d/c options

## 2022-03-22 NOTE — BH INPATIENT PSYCHIATRY PROGRESS NOTE - NSBHCHARTREVIEWVS_PSY_A_CORE FT
Vital Signs Last 24 Hrs  T(C): 36.7 (03-22-22 @ 08:31), Max: 36.7 (03-22-22 @ 08:31)  T(F): 98 (03-22-22 @ 08:31), Max: 98 (03-22-22 @ 08:31)  HR: --  BP: --  BP(mean): --  RR: --  SpO2: --    Orthostatic VS  03-22-22 @ 08:31  Lying BP: --/-- HR: --  Sitting BP: 113/58 HR: 62  Standing BP: --/-- HR: --  Site: --  Mode: --  Orthostatic VS  03-21-22 @ 20:31  Lying BP: --/-- HR: --  Sitting BP: 127/75 HR: 72  Standing BP: --/-- HR: --  Site: --  Mode: --  Orthostatic VS  03-21-22 @ 07:52  Lying BP: --/-- HR: --  Sitting BP: 121/56 HR: 67  Standing BP: --/-- HR: --  Site: upper left arm  Mode: electronic  Orthostatic VS  03-20-22 @ 20:42  Lying BP: --/-- HR: --  Sitting BP: 126/68 HR: 76  Standing BP: --/-- HR: --  Site: --  Mode: --

## 2022-03-23 PROCEDURE — 99232 SBSQ HOSP IP/OBS MODERATE 35: CPT

## 2022-03-23 RX ADMIN — RISPERIDONE 1.5 MILLIGRAM(S): 4 TABLET ORAL at 09:30

## 2022-03-23 RX ADMIN — Medication 5 MILLIGRAM(S): at 21:53

## 2022-03-23 RX ADMIN — Medication 1 APPLICATION(S): at 09:30

## 2022-03-23 RX ADMIN — DIVALPROEX SODIUM 750 MILLIGRAM(S): 500 TABLET, DELAYED RELEASE ORAL at 09:30

## 2022-03-23 RX ADMIN — RISPERIDONE 1.5 MILLIGRAM(S): 4 TABLET ORAL at 21:52

## 2022-03-23 RX ADMIN — Medication 20 MILLIGRAM(S): at 09:30

## 2022-03-23 RX ADMIN — SENNA PLUS 1 TABLET(S): 8.6 TABLET ORAL at 21:52

## 2022-03-23 RX ADMIN — DIVALPROEX SODIUM 750 MILLIGRAM(S): 500 TABLET, DELAYED RELEASE ORAL at 21:52

## 2022-03-23 NOTE — BH PATIENT CHARACTERISTICS SURVEY - NSPCSEMPLOYMENT_PSY_ALL_CORE
Not In Labor [employer]: unemployed but not looking for work, retired, homemaker, student, incarcerated, or psychiatric inpatient

## 2022-03-23 NOTE — BH INPATIENT PSYCHIATRY PROGRESS NOTE - NSBHFUPINTERVALHXFT_PSY_A_CORE
The patient is seen for TBI related dementia and behavioral disturbances. Chart reviewed and case discussed with nursing staff. No overnight events On encounter, patient is calm and states she is " okay". Reported quieter overall, no aggression.Eating adequately. Occasionally loud.Taking meds. BP okay in Am but just below hold parameters in afternoon.

## 2022-03-23 NOTE — BH INPATIENT PSYCHIATRY PROGRESS NOTE - NSBHASSESSSUMMFT_PSY_ALL_CORE
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 Patient now with improvement in impulsivity with occasional vocal disruptiveness which responds well to supportive redirection. Patient no longer showing aggressive behavior for considerable period of time. Patient compliant with medication and care.    3/15 Maintaining gains, cont risperdal and Inderal, no need for changes,  await podiatry consult  3.16 Holding on to gains, If doses need to be held may need to slightly reduce standing dose.   3/17 Tolerating meds, cont to hold gains. Cont treatment. PAtient's 2PC expiring Patient not yet stable for d/c nor accepted in any facility so does not have safe d/c plan will therefore apply for further retention  3/18 Patient improved and sustaining gains. Cont current meds at current doses  3/21 Sustaining gains from current regimen. Plan cont risperidone and propanolol at current doses  3/22 Sustaining gains, tolerating meds with stable vitals and variable tremor. Cont current rx while seeking d/c options  3/23 sustaining improvement, if having meds held will consider reduced Inderal rather than risperidone as risperidone has been most effective though cannot r/o synergistic effect

## 2022-03-23 NOTE — BH INPATIENT PSYCHIATRY PROGRESS NOTE - NSBHMETABOLIC_PSY_ALL_CORE_FT
BMI: BMI (kg/m2): 31.6 (11-29-21 @ 16:35)  HbA1c: A1C with Estimated Average Glucose Result: 5.4 % (11-04-21 @ 06:58)    Glucose: POCT Blood Glucose.: 78 mg/dL (12-30-21 @ 07:34)    BP: 102/64 (03-23-22 @ 12:59) (102/64 - 102/64)  Lipid Panel:

## 2022-03-23 NOTE — BH INPATIENT PSYCHIATRY PROGRESS NOTE - NSBHCHARTREVIEWVS_PSY_A_CORE FT
Vital Signs Last 24 Hrs  T(C): 36.5 (03-23-22 @ 08:03), Max: 36.5 (03-23-22 @ 08:03)  T(F): 97.7 (03-23-22 @ 08:03), Max: 97.7 (03-23-22 @ 08:03)  HR: 68 (03-23-22 @ 12:59) (68 - 68)  BP: 102/64 (03-23-22 @ 12:59) (102/64 - 102/64)  BP(mean): --  RR: 17 (03-22-22 @ 20:22) (17 - 17)  SpO2: --    Orthostatic VS  03-23-22 @ 08:03  Lying BP: --/-- HR: --  Sitting BP: 143/54 HR: 105  Standing BP: --/-- HR: --  Site: upper left arm  Mode: electronic  Orthostatic VS  03-22-22 @ 20:22  Lying BP: --/-- HR: --  Sitting BP: 144/69 HR: 71  Standing BP: --/-- HR: --  Site: --  Mode: --  Orthostatic VS  03-22-22 @ 08:31  Lying BP: --/-- HR: --  Sitting BP: 113/58 HR: 62  Standing BP: --/-- HR: --  Site: --  Mode: --  Orthostatic VS  03-21-22 @ 20:31  Lying BP: --/-- HR: --  Sitting BP: 127/75 HR: 72  Standing BP: --/-- HR: --  Site: --  Mode: --

## 2022-03-24 PROCEDURE — 99232 SBSQ HOSP IP/OBS MODERATE 35: CPT

## 2022-03-24 RX ADMIN — RISPERIDONE 1.5 MILLIGRAM(S): 4 TABLET ORAL at 13:57

## 2022-03-24 RX ADMIN — SENNA PLUS 1 TABLET(S): 8.6 TABLET ORAL at 20:15

## 2022-03-24 RX ADMIN — Medication 5 MILLIGRAM(S): at 20:14

## 2022-03-24 RX ADMIN — RISPERIDONE 1.5 MILLIGRAM(S): 4 TABLET ORAL at 20:14

## 2022-03-24 RX ADMIN — DIVALPROEX SODIUM 750 MILLIGRAM(S): 500 TABLET, DELAYED RELEASE ORAL at 20:14

## 2022-03-24 RX ADMIN — Medication 20 MILLIGRAM(S): at 08:31

## 2022-03-24 RX ADMIN — RISPERIDONE 1.5 MILLIGRAM(S): 4 TABLET ORAL at 08:31

## 2022-03-24 RX ADMIN — DIVALPROEX SODIUM 750 MILLIGRAM(S): 500 TABLET, DELAYED RELEASE ORAL at 08:30

## 2022-03-24 NOTE — BH INPATIENT PSYCHIATRY PROGRESS NOTE - NSBHCHARTREVIEWVS_PSY_A_CORE FT
Vital Signs Last 24 Hrs  T(C): 36.9 (03-24-22 @ 06:25), Max: 36.9 (03-24-22 @ 06:25)  T(F): 98.5 (03-24-22 @ 06:25), Max: 98.5 (03-24-22 @ 06:25)  HR: --  BP: --  BP(mean): --  RR: 17 (03-23-22 @ 20:25) (17 - 17)  SpO2: --    Orthostatic VS  03-24-22 @ 06:25  Lying BP: --/-- HR: --  Sitting BP: 118/77 HR: 63  Standing BP: --/-- HR: --  Site: --  Mode: --  Orthostatic VS  03-23-22 @ 20:25  Lying BP: --/-- HR: --  Sitting BP: 134/82 HR: 81  Standing BP: --/-- HR: --  Site: --  Mode: --  Orthostatic VS  03-23-22 @ 08:03  Lying BP: --/-- HR: --  Sitting BP: 143/54 HR: 105  Standing BP: --/-- HR: --  Site: upper left arm  Mode: electronic  Orthostatic VS  03-22-22 @ 20:22  Lying BP: --/-- HR: --  Sitting BP: 144/69 HR: 71  Standing BP: --/-- HR: --  Site: --  Mode: --

## 2022-03-24 NOTE — BH INPATIENT PSYCHIATRY PROGRESS NOTE - NSBHASSESSSUMMFT_PSY_ALL_CORE
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 Patient now with improvement in impulsivity with occasional vocal disruptiveness which responds well to supportive redirection. Patient no longer showing aggressive behavior for considerable period of time. Patient compliant with medication and care.    3/15 Maintaining gains, cont risperdal and Inderal, no need for changes,  await podiatry consult  3.16 Holding on to gains, If doses need to be held may need to slightly reduce standing dose.   3/17 Tolerating meds, cont to hold gains. Cont treatment. [facility]Atient's 2PC expiring Patient not yet stable for d/c nor accepted in any facility so does not have safe d/c plan will therefore apply for further retention  3/18 Patient improved and sustaining gains. Cont current meds at current doses  3/21 Sustaining gains from current regimen. Plan cont risperidone and propanolol at current doses  3/22 Sustaining gains, tolerating meds with stable vitals and variable tremor. Cont current rx while seeking d/c options  3/23 sustaining improvement, if having meds held will consider reduced Inderal rather than risperidone as risperidone has been most effective though cannot r/o synergistic effect  3/24 Maintaining gains, BP stable enough to allow her to stay on current regimen rather than risk worsening via dose reduction.

## 2022-03-25 RX ADMIN — DIVALPROEX SODIUM 750 MILLIGRAM(S): 500 TABLET, DELAYED RELEASE ORAL at 22:02

## 2022-03-25 RX ADMIN — SENNA PLUS 1 TABLET(S): 8.6 TABLET ORAL at 22:02

## 2022-03-25 RX ADMIN — RISPERIDONE 1.5 MILLIGRAM(S): 4 TABLET ORAL at 13:17

## 2022-03-25 RX ADMIN — Medication 5 MILLIGRAM(S): at 22:11

## 2022-03-25 RX ADMIN — RISPERIDONE 1.5 MILLIGRAM(S): 4 TABLET ORAL at 09:23

## 2022-03-25 RX ADMIN — Medication 20 MILLIGRAM(S): at 09:23

## 2022-03-25 RX ADMIN — DIVALPROEX SODIUM 750 MILLIGRAM(S): 500 TABLET, DELAYED RELEASE ORAL at 09:24

## 2022-03-25 RX ADMIN — RISPERIDONE 1.5 MILLIGRAM(S): 4 TABLET ORAL at 22:02

## 2022-03-25 RX ADMIN — Medication 1 APPLICATION(S): at 09:24

## 2022-03-25 NOTE — BH INPATIENT PSYCHIATRY PROGRESS NOTE - NSBHFUPINTERVALHXFT_PSY_A_CORE
The patient is seen for TBI related dementia and behavioral disturbances. Chart reviewed and case discussed with nursing staff. No overnight events On encounter, patient is calm and states she is " okay". Reported quieter overall, no aggression.Eating adequately. Occasionally loud durng some ADL's.Taking meds. VSS. Singing clapping during music group

## 2022-03-25 NOTE — BH INPATIENT PSYCHIATRY PROGRESS NOTE - NSBHCHARTREVIEWVS_PSY_A_CORE FT
Vital Signs Last 24 Hrs  T(C): 36.5 (03-25-22 @ 08:16), Max: 36.8 (03-24-22 @ 20:05)  T(F): 97.7 (03-25-22 @ 08:16), Max: 98.3 (03-24-22 @ 20:05)  HR: --  BP: --  BP(mean): --  RR: --  SpO2: --    Orthostatic VS  03-25-22 @ 08:16  Lying BP: --/-- HR: --  Sitting BP: 117/49 HR: 63  Standing BP: --/-- HR: --  Site: --  Mode: --  Orthostatic VS  03-24-22 @ 20:05  Lying BP: --/-- HR: --  Sitting BP: 103/75 HR: 73  Standing BP: --/-- HR: --  Site: upper left arm  Mode: electronic  Orthostatic VS  03-24-22 @ 06:25  Lying BP: --/-- HR: --  Sitting BP: 118/77 HR: 63  Standing BP: --/-- HR: --  Site: --  Mode: --  Orthostatic VS  03-23-22 @ 20:25  Lying BP: --/-- HR: --  Sitting BP: 134/82 HR: 81  Standing BP: --/-- HR: --  Site: --  Mode: --

## 2022-03-26 RX ADMIN — DIVALPROEX SODIUM 750 MILLIGRAM(S): 500 TABLET, DELAYED RELEASE ORAL at 20:54

## 2022-03-26 RX ADMIN — RISPERIDONE 1.5 MILLIGRAM(S): 4 TABLET ORAL at 20:54

## 2022-03-26 RX ADMIN — RISPERIDONE 1.5 MILLIGRAM(S): 4 TABLET ORAL at 09:48

## 2022-03-26 RX ADMIN — Medication 1 APPLICATION(S): at 20:59

## 2022-03-26 RX ADMIN — SENNA PLUS 1 TABLET(S): 8.6 TABLET ORAL at 20:57

## 2022-03-26 RX ADMIN — Medication 20 MILLIGRAM(S): at 09:49

## 2022-03-26 RX ADMIN — Medication 1 APPLICATION(S): at 12:57

## 2022-03-26 RX ADMIN — RISPERIDONE 1.5 MILLIGRAM(S): 4 TABLET ORAL at 12:54

## 2022-03-26 RX ADMIN — DIVALPROEX SODIUM 750 MILLIGRAM(S): 500 TABLET, DELAYED RELEASE ORAL at 09:48

## 2022-03-26 RX ADMIN — Medication 5 MILLIGRAM(S): at 20:55

## 2022-03-27 RX ADMIN — DIVALPROEX SODIUM 750 MILLIGRAM(S): 500 TABLET, DELAYED RELEASE ORAL at 20:57

## 2022-03-27 RX ADMIN — RISPERIDONE 1.5 MILLIGRAM(S): 4 TABLET ORAL at 13:32

## 2022-03-27 RX ADMIN — Medication 20 MILLIGRAM(S): at 08:24

## 2022-03-27 RX ADMIN — RISPERIDONE 1.5 MILLIGRAM(S): 4 TABLET ORAL at 08:24

## 2022-03-27 RX ADMIN — RISPERIDONE 1.5 MILLIGRAM(S): 4 TABLET ORAL at 20:58

## 2022-03-27 RX ADMIN — DIVALPROEX SODIUM 750 MILLIGRAM(S): 500 TABLET, DELAYED RELEASE ORAL at 08:24

## 2022-03-27 RX ADMIN — SENNA PLUS 1 TABLET(S): 8.6 TABLET ORAL at 20:58

## 2022-03-27 RX ADMIN — Medication 5 MILLIGRAM(S): at 20:58

## 2022-03-28 PROCEDURE — 99232 SBSQ HOSP IP/OBS MODERATE 35: CPT

## 2022-03-28 RX ADMIN — DIVALPROEX SODIUM 750 MILLIGRAM(S): 500 TABLET, DELAYED RELEASE ORAL at 09:30

## 2022-03-28 RX ADMIN — Medication 1 APPLICATION(S): at 09:37

## 2022-03-28 RX ADMIN — Medication 5 MILLIGRAM(S): at 21:09

## 2022-03-28 RX ADMIN — RISPERIDONE 1.5 MILLIGRAM(S): 4 TABLET ORAL at 21:09

## 2022-03-28 RX ADMIN — RISPERIDONE 1.5 MILLIGRAM(S): 4 TABLET ORAL at 09:30

## 2022-03-28 RX ADMIN — RISPERIDONE 1.5 MILLIGRAM(S): 4 TABLET ORAL at 14:37

## 2022-03-28 RX ADMIN — SENNA PLUS 1 TABLET(S): 8.6 TABLET ORAL at 21:08

## 2022-03-28 RX ADMIN — Medication 20 MILLIGRAM(S): at 09:30

## 2022-03-28 RX ADMIN — DIVALPROEX SODIUM 750 MILLIGRAM(S): 500 TABLET, DELAYED RELEASE ORAL at 21:07

## 2022-03-28 NOTE — BH INPATIENT PSYCHIATRY PROGRESS NOTE - NSBHMETABOLIC_PSY_ALL_CORE_FT
BMI: BMI (kg/m2): 31.6 (11-29-21 @ 16:35)  HbA1c: A1C with Estimated Average Glucose Result: 5.4 % (11-04-21 @ 06:58)    Glucose: POCT Blood Glucose.: 78 mg/dL (12-30-21 @ 07:34)    BP: 98/46 (03-26-22 @ 10:39) (98/46 - 98/46)  Lipid Panel:

## 2022-03-28 NOTE — BH INPATIENT PSYCHIATRY PROGRESS NOTE - NSBHFUPINTERVALHXFT_PSY_A_CORE
The patient is seen for TBI related dementia and behavioral disturbances. Chart reviewed and case discussed with nursing staff. No overnight events On encounter, patient is calm and states she is " okay". Reported quieter overall, no aggression.Eating adequately. Occasionally loud during some ADL's.Taking meds. VSS.

## 2022-03-28 NOTE — BH INPATIENT PSYCHIATRY PROGRESS NOTE - NSBHASSESSSUMMFT_PSY_ALL_CORE
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 Patient now with improvement in impulsivity with occasional vocal disruptiveness which responds well to supportive redirection. Patient no longer showing aggressive behavior for considerable period of time. Patient compliant with medication and care.    3/15 Maintaining gains, cont risperdal and Inderal, no need for changes,  await podiatry consult  3.16 Holding on to gains, If doses need to be held may need to slightly reduce standing dose.   3/17 Tolerating meds, cont to hold gains. Cont treatment. [facility]'s 2PC expiring Patient not yet stable for d/c nor accepted in any facility so does not have safe d/c plan will therefore apply for further retention  3/18 Patient improved and sustaining gains. Cont current meds at current doses  3/21 Sustaining gains from current regimen. Plan cont risperidone and propanolol at current doses  3/22 Sustaining gains, tolerating meds with stable vitals and variable tremor. Cont current rx while seeking d/c options  3/23 sustaining improvement, if having meds held will consider reduced Inderal rather than risperidone as risperidone has been most effective though cannot r/o synergistic effect  3/24 Maintaining gains, BP stable enough to allow her to stay on current regimen rather than risk worsening via dose reduction.   3/28 Patient holding on to gains, no need to reduce meds for low BP, ocnt current rx and d/c planning

## 2022-03-28 NOTE — BH INPATIENT PSYCHIATRY PROGRESS NOTE - NSBHCHARTREVIEWVS_PSY_A_CORE FT
Vital Signs Last 24 Hrs  T(C): 36.7 (03-28-22 @ 07:51), Max: 36.7 (03-28-22 @ 07:51)  T(F): 98.1 (03-28-22 @ 07:51), Max: 98.1 (03-28-22 @ 07:51)  HR: --  BP: --  BP(mean): --  RR: 17 (03-28-22 @ 07:51) (17 - 17)  SpO2: --    Orthostatic VS  03-28-22 @ 07:51  Lying BP: --/-- HR: --  Sitting BP: 116/68 HR: 63  Standing BP: --/-- HR: --  Site: --  Mode: --  Orthostatic VS  03-27-22 @ 20:16  Lying BP: --/-- HR: --  Sitting BP: 135/68 HR: 69  Standing BP: --/-- HR: --  Site: --  Mode: --  Orthostatic VS  03-27-22 @ 08:06  Lying BP: --/-- HR: --  Sitting BP: 100/80 HR: 90  Standing BP: --/-- HR: --  Site: --  Mode: --  Orthostatic VS  03-26-22 @ 20:17  Lying BP: --/-- HR: --  Sitting BP: 115/66 HR: 83  Standing BP: --/-- HR: --  Site: --  Mode: --

## 2022-03-29 PROCEDURE — 99231 SBSQ HOSP IP/OBS SF/LOW 25: CPT

## 2022-03-29 RX ADMIN — SENNA PLUS 1 TABLET(S): 8.6 TABLET ORAL at 22:35

## 2022-03-29 RX ADMIN — RISPERIDONE 1.5 MILLIGRAM(S): 4 TABLET ORAL at 12:51

## 2022-03-29 RX ADMIN — DIVALPROEX SODIUM 750 MILLIGRAM(S): 500 TABLET, DELAYED RELEASE ORAL at 21:02

## 2022-03-29 RX ADMIN — RISPERIDONE 1.5 MILLIGRAM(S): 4 TABLET ORAL at 21:02

## 2022-03-29 RX ADMIN — Medication 5 MILLIGRAM(S): at 21:04

## 2022-03-29 RX ADMIN — DIVALPROEX SODIUM 750 MILLIGRAM(S): 500 TABLET, DELAYED RELEASE ORAL at 08:20

## 2022-03-29 RX ADMIN — Medication 20 MILLIGRAM(S): at 08:20

## 2022-03-29 RX ADMIN — RISPERIDONE 1.5 MILLIGRAM(S): 4 TABLET ORAL at 08:21

## 2022-03-29 NOTE — BH INPATIENT PSYCHIATRY PROGRESS NOTE - NSBHCHARTREVIEWVS_PSY_A_CORE FT
Vital Signs Last 24 Hrs  T(C): 36.9 (03-29-22 @ 08:05), Max: 36.9 (03-29-22 @ 08:05)  T(F): 98.4 (03-29-22 @ 08:05), Max: 98.4 (03-29-22 @ 08:05)  HR: --  BP: --  BP(mean): --  RR: --  SpO2: --    Orthostatic VS  03-29-22 @ 08:05  Lying BP: --/-- HR: --  Sitting BP: 123/68 HR: 87  Standing BP: --/-- HR: --  Site: upper left arm  Mode: electronic  Orthostatic VS  03-28-22 @ 20:38  Lying BP: --/-- HR: --  Sitting BP: 120/54 HR: 72  Standing BP: --/-- HR: --  Site: --  Mode: --  Orthostatic VS  03-28-22 @ 13:51  Lying BP: --/-- HR: --  Sitting BP: 115/51 HR: 73  Standing BP: --/-- HR: --  Site: --  Mode: --  Orthostatic VS  03-28-22 @ 07:51  Lying BP: --/-- HR: --  Sitting BP: 116/68 HR: 63  Standing BP: --/-- HR: --  Site: --  Mode: --  Orthostatic VS  03-27-22 @ 20:16  Lying BP: --/-- HR: --  Sitting BP: 135/68 HR: 69  Standing BP: --/-- HR: --  Site: --  Mode: --

## 2022-03-29 NOTE — BH INPATIENT PSYCHIATRY PROGRESS NOTE - NSBHASSESSSUMMFT_PSY_ALL_CORE
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 Patient now with improvement in impulsivity with occasional vocal disruptiveness which responds well to supportive redirection. Patient no longer showing aggressive behavior for considerable period of time. Patient compliant with medication and care.    3/15 Maintaining gains, cont risperdal and Inderal, no need for changes,  await podiatry consult  3.16 Holding on to gains, If doses need to be held may need to slightly reduce standing dose.   3/17 Tolerating meds, cont to hold gains. Cont treatment. P[facility]'s 2PC expiring Patient not yet stable for d/c nor accepted in any facility so does not have safe d/c plan will therefore apply for further retention  3/18 Patient improved and sustaining gains. Cont current meds at current doses  3/21 Sustaining gains from current regimen. Plan cont risperidone and propanolol at current doses  3/22 Sustaining gains, tolerating meds with stable vitals and variable tremor. Cont current rx while seeking d/c options  3/23 sustaining improvement, if having meds held will consider reduced Inderal rather than risperidone as risperidone has been most effective though cannot r/o synergistic effect  3/24 Maintaining gains, BP stable enough to allow her to stay on current regimen rather than risk worsening via dose reduction.   3/28 Patient holding on to gains, no need to reduce meds for low BP, current rx and d/c planning  3/29 No events today except loud during ADL's cont current treatment.

## 2022-03-29 NOTE — BH INPATIENT PSYCHIATRY PROGRESS NOTE - NSBHFUPINTERVALHXFT_PSY_A_CORE
The patient is seen for TBI related dementia and behavioral disturbances. Chart reviewed and case discussed with nursing staff. No overnight events On encounter, patient is calm and states she is " okay". Reported quieter overall.Eating adequately. Occasionally loud during some ADL's such as showers.Taking meds. VSS.

## 2022-03-30 RX ADMIN — DIVALPROEX SODIUM 750 MILLIGRAM(S): 500 TABLET, DELAYED RELEASE ORAL at 08:15

## 2022-03-30 RX ADMIN — RISPERIDONE 1.5 MILLIGRAM(S): 4 TABLET ORAL at 13:51

## 2022-03-30 RX ADMIN — Medication 1 APPLICATION(S): at 21:43

## 2022-03-30 RX ADMIN — RISPERIDONE 1.5 MILLIGRAM(S): 4 TABLET ORAL at 08:17

## 2022-03-30 RX ADMIN — DIVALPROEX SODIUM 750 MILLIGRAM(S): 500 TABLET, DELAYED RELEASE ORAL at 20:42

## 2022-03-30 RX ADMIN — Medication 5 MILLIGRAM(S): at 20:41

## 2022-03-30 RX ADMIN — SENNA PLUS 1 TABLET(S): 8.6 TABLET ORAL at 20:41

## 2022-03-30 RX ADMIN — Medication 20 MILLIGRAM(S): at 08:20

## 2022-03-30 RX ADMIN — RISPERIDONE 1.5 MILLIGRAM(S): 4 TABLET ORAL at 21:51

## 2022-03-30 RX ADMIN — Medication 1 APPLICATION(S): at 13:51

## 2022-03-30 RX ADMIN — Medication 1 APPLICATION(S): at 08:20

## 2022-03-30 NOTE — BH INPATIENT PSYCHIATRY PROGRESS NOTE - NSBHMETABOLIC_PSY_ALL_CORE_FT
BMI: BMI (kg/m2): 31.6 (11-29-21 @ 16:35)  HbA1c: A1C with Estimated Average Glucose Result: 5.4 % (11-04-21 @ 06:58)    Glucose: POCT Blood Glucose.: 78 mg/dL (12-30-21 @ 07:34)    BP: 108/52 (03-30-22 @ 06:36) (108/52 - 108/52)  Lipid Panel:

## 2022-03-30 NOTE — BH INPATIENT PSYCHIATRY PROGRESS NOTE - NSBHASSESSSUMMFT_PSY_ALL_CORE
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 Patient now with improvement in impulsivity with occasional vocal disruptiveness which responds well to supportive redirection. Patient no longer showing aggressive behavior for considerable period of time. Patient compliant with medication and care.    3/15 Maintaining gains, cont risperdal and Inderal, no need for changes,  await podiatry consult  3.16 Holding on to gains, If doses need to be held may need to slightly reduce standing dose.   3/17 Tolerating meds, cont to hold gains. Cont treatment. PAtient's 2PC expiring Patient not yet stable for d/c nor accepted in any facility so does not have safe d/c plan will therefore apply for further retention  3/18 Patient improved and sustaining gains. Cont current meds at current doses  3/21 Sustaining gains from current regimen. Plan cont risperidone and propanolol at current doses  3/22 Sustaining gains, tolerating meds with stable vitals and variable tremor. Cont current rx while seeking d/c options  3/23 sustaining improvement, if having meds held will consider reduced Inderal rather than risperidone as risperidone has been most effective though cannot r/o synergistic effect  3/24 Maintaining gains, BP stable enough to allow her to stay on current regimen rather than risk worsening via dose reduction.   3/28 Patient holding on to gains, no need to reduce meds for low BP, current rx and d/c planning  3/29 No events today except loud during ADL's cont current treatment.   3/30 No behvioral events, loud during ADL's will cont current meds at current doses

## 2022-03-30 NOTE — BH INPATIENT PSYCHIATRY PROGRESS NOTE - NSBHFUPINTERVALHXFT_PSY_A_CORE
The patient is seen for TBI related dementia and behavioral disturbances. Chart reviewed and case discussed with nursing staff. No overnight events On encounter, patient is calm and states she is " okay". Reported quieter overall. Eating adequately. Occasionally loud during some ADL's such as showers.Taking meds. VSS.

## 2022-03-30 NOTE — BH INPATIENT PSYCHIATRY PROGRESS NOTE - NSBHCHARTREVIEWVS_PSY_A_CORE FT
Vital Signs Last 24 Hrs  T(C): 36.6 (03-30-22 @ 06:36), Max: 36.8 (03-29-22 @ 20:32)  T(F): 97.9 (03-30-22 @ 06:36), Max: 98.3 (03-29-22 @ 20:32)  HR: 66 (03-30-22 @ 06:36) (66 - 66)  BP: 108/52 (03-30-22 @ 06:36) (108/52 - 108/52)  BP(mean): --  RR: --  SpO2: --    Orthostatic VS  03-29-22 @ 20:32  Lying BP: --/-- HR: --  Sitting BP: 132/76 HR: 91  Standing BP: --/-- HR: --  Site: --  Mode: --  Orthostatic VS  03-29-22 @ 08:05  Lying BP: --/-- HR: --  Sitting BP: 123/68 HR: 87  Standing BP: --/-- HR: --  Site: upper left arm  Mode: electronic  Orthostatic VS  03-28-22 @ 20:38  Lying BP: --/-- HR: --  Sitting BP: 120/54 HR: 72  Standing BP: --/-- HR: --  Site: --  Mode: --  Orthostatic VS  03-28-22 @ 13:51  Lying BP: --/-- HR: --  Sitting BP: 115/51 HR: 73  Standing BP: --/-- HR: --  Site: --  Mode: --

## 2022-03-31 PROCEDURE — 99231 SBSQ HOSP IP/OBS SF/LOW 25: CPT

## 2022-03-31 RX ADMIN — RISPERIDONE 1.5 MILLIGRAM(S): 4 TABLET ORAL at 09:49

## 2022-03-31 RX ADMIN — DIVALPROEX SODIUM 750 MILLIGRAM(S): 500 TABLET, DELAYED RELEASE ORAL at 21:24

## 2022-03-31 RX ADMIN — Medication 20 MILLIGRAM(S): at 09:49

## 2022-03-31 RX ADMIN — SENNA PLUS 1 TABLET(S): 8.6 TABLET ORAL at 21:24

## 2022-03-31 RX ADMIN — Medication 1 APPLICATION(S): at 21:23

## 2022-03-31 RX ADMIN — DIVALPROEX SODIUM 750 MILLIGRAM(S): 500 TABLET, DELAYED RELEASE ORAL at 09:49

## 2022-03-31 RX ADMIN — RISPERIDONE 1.5 MILLIGRAM(S): 4 TABLET ORAL at 21:24

## 2022-03-31 RX ADMIN — Medication 5 MILLIGRAM(S): at 21:24

## 2022-03-31 RX ADMIN — RISPERIDONE 1.5 MILLIGRAM(S): 4 TABLET ORAL at 13:09

## 2022-03-31 NOTE — BH INPATIENT PSYCHIATRY PROGRESS NOTE - NSBHCHARTREVIEWVS_PSY_A_CORE FT
Vital Signs Last 24 Hrs  T(C): 36.8 (03-31-22 @ 07:41), Max: 36.8 (03-31-22 @ 07:41)  T(F): 98.3 (03-31-22 @ 07:41), Max: 98.3 (03-31-22 @ 07:41)  HR: 87 (03-30-22 @ 13:30) (87 - 87)  BP: 95/52 (03-30-22 @ 13:30) (95/52 - 95/52)  BP(mean): --  RR: --  SpO2: --    Orthostatic VS  03-31-22 @ 07:41  Lying BP: --/-- HR: --  Sitting BP: 124/46 HR: 66  Standing BP: 126/57 HR: 25  Site: upper right arm  Mode: electronic  Orthostatic VS  03-30-22 @ 20:22  Lying BP: --/-- HR: --  Sitting BP: 122/64 HR: 79  Standing BP: --/-- HR: --  Site: --  Mode: --  Orthostatic VS  03-29-22 @ 20:32  Lying BP: --/-- HR: --  Sitting BP: 132/76 HR: 91  Standing BP: --/-- HR: --  Site: --  Mode: --

## 2022-03-31 NOTE — BH INPATIENT PSYCHIATRY PROGRESS NOTE - NSBHFUPINTERVALHXFT_PSY_A_CORE
The patient is seen for TBI related dementia and behavioral disturbances. Chart reviewed and case discussed with nursing staff. No overnight events , no prns On encounter, patient is calm and states she is " good". Reported quieter overall. Eating well, sleep ias good. Occasionally loud during some ADL's such as showers.Taking meds. VSS.

## 2022-03-31 NOTE — BH INPATIENT PSYCHIATRY PROGRESS NOTE - MSE UNSTRUCTURED FT
Patient is awake and alert. Calm on exam, relates in a childlike manner. Has hand tremor which is highly variable, no rigidity. Speech volume variable, exaggerated prosody though not as much. Reported mood "good. Affect is bright. Thought process is disjointed. No paranoia. No suicidal or homicidal ideation. No hallucinations. Poor insight. Oriented to self only.

## 2022-03-31 NOTE — BH INPATIENT PSYCHIATRY PROGRESS NOTE - NSBHMETABOLIC_PSY_ALL_CORE_FT
BMI: BMI (kg/m2): 31.6 (11-29-21 @ 16:35)  HbA1c: A1C with Estimated Average Glucose Result: 5.4 % (11-04-21 @ 06:58)    Glucose: POCT Blood Glucose.: 78 mg/dL (12-30-21 @ 07:34)    BP: 95/52 (03-30-22 @ 13:30) (95/52 - 108/52)  Lipid Panel:

## 2022-04-01 PROCEDURE — 99231 SBSQ HOSP IP/OBS SF/LOW 25: CPT

## 2022-04-01 RX ADMIN — RISPERIDONE 1.5 MILLIGRAM(S): 4 TABLET ORAL at 12:42

## 2022-04-01 RX ADMIN — Medication 5 MILLIGRAM(S): at 21:48

## 2022-04-01 RX ADMIN — DIVALPROEX SODIUM 750 MILLIGRAM(S): 500 TABLET, DELAYED RELEASE ORAL at 21:36

## 2022-04-01 RX ADMIN — Medication 1 APPLICATION(S): at 09:53

## 2022-04-01 RX ADMIN — MAGNESIUM HYDROXIDE 30 MILLILITER(S): 400 TABLET, CHEWABLE ORAL at 21:45

## 2022-04-01 RX ADMIN — RISPERIDONE 1.5 MILLIGRAM(S): 4 TABLET ORAL at 09:59

## 2022-04-01 RX ADMIN — Medication 20 MILLIGRAM(S): at 09:52

## 2022-04-01 RX ADMIN — DIVALPROEX SODIUM 750 MILLIGRAM(S): 500 TABLET, DELAYED RELEASE ORAL at 09:52

## 2022-04-01 RX ADMIN — RISPERIDONE 1.5 MILLIGRAM(S): 4 TABLET ORAL at 21:39

## 2022-04-01 RX ADMIN — SENNA PLUS 1 TABLET(S): 8.6 TABLET ORAL at 21:38

## 2022-04-01 NOTE — BH INPATIENT PSYCHIATRY PROGRESS NOTE - NSBHCHARTREVIEWVS_PSY_A_CORE FT
Vital Signs Last 24 Hrs  T(C): 36.6 (04-01-22 @ 07:57), Max: 36.6 (04-01-22 @ 07:57)  T(F): 97.9 (04-01-22 @ 07:57), Max: 97.9 (04-01-22 @ 07:57)  HR: --  BP: --  BP(mean): --  RR: --  SpO2: --    Orthostatic VS  04-01-22 @ 07:57  Lying BP: --/-- HR: --  Sitting BP: 110/84 HR: 90  Standing BP: 116/63 HR: 75  Site: upper right arm  Mode: electronic  Orthostatic VS  03-31-22 @ 20:21  Lying BP: --/-- HR: --  Sitting BP: 118/90 HR: 77  Standing BP: --/-- HR: --  Site: --  Mode: --  Orthostatic VS  03-31-22 @ 07:41  Lying BP: --/-- HR: --  Sitting BP: 124/46 HR: 66  Standing BP: 126/57 HR: 25  Site: upper right arm  Mode: electronic  Orthostatic VS  03-30-22 @ 20:22  Lying BP: --/-- HR: --  Sitting BP: 122/64 HR: 79  Standing BP: --/-- HR: --  Site: --  Mode: --

## 2022-04-01 NOTE — BH INPATIENT PSYCHIATRY PROGRESS NOTE - MSE UNSTRUCTURED FT
Patient is awake and alert. Calm on exam, relates in a childlike manner. Has hand tremor which is highly variable, no rigidity. Speech volume variable, exaggerated prosody   Reported mood "good. Affect is bright. Thought process is disjointed. No paranoia. No suicidal or homicidal ideation. No hallucinations. Poor insight. Oriented to self only.

## 2022-04-01 NOTE — BH INPATIENT PSYCHIATRY PROGRESS NOTE - NSBHASSESSSUMMFT_PSY_ALL_CORE
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 Patient now with improvement in impulsivity with occasional vocal disruptiveness which responds well to supportive redirection. Patient no longer showing aggressive behavior for considerable period of time. Patient compliant with medication and care.    3/15 Maintaining gains, cont risperdal and Inderal, no need for changes,  await podiatry consult  3.16 Holding on to gains, If doses need to be held may need to slightly reduce standing dose.   3/17 Tolerating meds, cont to hold gains. Cont treatment. PAtient's 2PC expiring Patient not yet stable for d/c nor accepted in any facility so does not have safe d/c plan will therefore apply for further retention  3/18 Patient improved and sustaining gains. Cont current meds at current doses  3/21 Sustaining gains from current regimen. Plan cont risperidone and propanolol at current doses  3/22 Sustaining gains, tolerating meds with stable vitals and variable tremor. Cont current rx while seeking d/c options  3/23 sustaining improvement, if having meds held will consider reduced Inderal rather than risperidone as risperidone has been most effective though cannot r/o synergistic effect  3/24 Maintaining gains, BP stable enough to allow her to stay on current regimen rather than risk worsening via dose reduction.   3/28 Patient holding on to gains, no need to reduce meds for low BP, current rx and d/c planning  3/29 No events today except loud during ADL's cont current treatment.   3/30 No behvioral events, loud during ADL's will cont current meds at current doses  4/1: Patient maintaining gains cont current meds. If necesaary may keep smale dsoe but spread out  for more consistency

## 2022-04-01 NOTE — BH INPATIENT PSYCHIATRY PROGRESS NOTE - NSBHFUPINTERVALHXFT_PSY_A_CORE
The patient is seen for TBI related dementia and behavioral disturbances. Chart reviewed and case discussed with nursing staff. No overnight events , no prns On encounter, patient is calm and states she is " good". Reported quieter overall. Eating well, sleep is good. Occasionally loud during some ADL's such as showers.Taking meds. VSS.

## 2022-04-01 NOTE — BH TREATMENT PLAN - NSTXCOPEINTERPR_PSY_ALL_CORE
Patient will identify and utilize two coping skills daily to manage her anxiety and agitation within seven days.
Patient will identify and utilize two coping skills daily to manage her anxiety and agitation within seven days.
Pt will work with psych rehab staff on identifying and utilizing 2 coping skills daily to decrease agitation/ aggression and increase ability to engage in structure activities.
Patient will identify and utilize two coping skills daily to manage her anxiety and agitation within seven days.
Rehab activities will be utilized daily to increase patient's tolerance to structure and decrease patient's restlessness within seven days.

## 2022-04-02 RX ADMIN — RISPERIDONE 1.5 MILLIGRAM(S): 4 TABLET ORAL at 08:22

## 2022-04-02 RX ADMIN — SENNA PLUS 1 TABLET(S): 8.6 TABLET ORAL at 20:42

## 2022-04-02 RX ADMIN — DIVALPROEX SODIUM 750 MILLIGRAM(S): 500 TABLET, DELAYED RELEASE ORAL at 20:40

## 2022-04-02 RX ADMIN — RISPERIDONE 1.5 MILLIGRAM(S): 4 TABLET ORAL at 20:41

## 2022-04-02 RX ADMIN — DIVALPROEX SODIUM 750 MILLIGRAM(S): 500 TABLET, DELAYED RELEASE ORAL at 08:22

## 2022-04-02 RX ADMIN — Medication 5 MILLIGRAM(S): at 20:42

## 2022-04-02 RX ADMIN — Medication 20 MILLIGRAM(S): at 08:21

## 2022-04-02 RX ADMIN — MAGNESIUM HYDROXIDE 30 MILLILITER(S): 400 TABLET, CHEWABLE ORAL at 20:40

## 2022-04-03 RX ADMIN — RISPERIDONE 1.5 MILLIGRAM(S): 4 TABLET ORAL at 09:30

## 2022-04-03 RX ADMIN — MAGNESIUM HYDROXIDE 30 MILLILITER(S): 400 TABLET, CHEWABLE ORAL at 20:52

## 2022-04-03 RX ADMIN — DIVALPROEX SODIUM 750 MILLIGRAM(S): 500 TABLET, DELAYED RELEASE ORAL at 20:46

## 2022-04-03 RX ADMIN — SENNA PLUS 1 TABLET(S): 8.6 TABLET ORAL at 20:51

## 2022-04-03 RX ADMIN — Medication 1 APPLICATION(S): at 09:31

## 2022-04-03 RX ADMIN — Medication 20 MILLIGRAM(S): at 09:30

## 2022-04-03 RX ADMIN — RISPERIDONE 1.5 MILLIGRAM(S): 4 TABLET ORAL at 20:47

## 2022-04-03 RX ADMIN — Medication 1 APPLICATION(S): at 13:32

## 2022-04-03 RX ADMIN — Medication 5 MILLIGRAM(S): at 20:49

## 2022-04-03 RX ADMIN — DIVALPROEX SODIUM 750 MILLIGRAM(S): 500 TABLET, DELAYED RELEASE ORAL at 09:31

## 2022-04-03 RX ADMIN — RISPERIDONE 1.5 MILLIGRAM(S): 4 TABLET ORAL at 13:33

## 2022-04-04 PROCEDURE — 99231 SBSQ HOSP IP/OBS SF/LOW 25: CPT

## 2022-04-04 RX ORDER — DIVALPROEX SODIUM 500 MG/1
6 TABLET, DELAYED RELEASE ORAL
Qty: 0 | Refills: 0 | DISCHARGE
Start: 2022-04-04

## 2022-04-04 RX ORDER — SENNA PLUS 8.6 MG/1
2 TABLET ORAL
Qty: 0 | Refills: 0 | DISCHARGE

## 2022-04-04 RX ORDER — PROPRANOLOL HCL 160 MG
3 CAPSULE, EXTENDED RELEASE 24HR ORAL
Qty: 0 | Refills: 0 | DISCHARGE
Start: 2022-04-04

## 2022-04-04 RX ORDER — RISPERIDONE 4 MG/1
3 TABLET ORAL
Qty: 0 | Refills: 0 | DISCHARGE
Start: 2022-04-04

## 2022-04-04 RX ORDER — FUROSEMIDE 40 MG
1 TABLET ORAL
Qty: 0 | Refills: 0 | DISCHARGE
Start: 2022-04-04

## 2022-04-04 RX ORDER — SENNA PLUS 8.6 MG/1
1 TABLET ORAL
Qty: 0 | Refills: 0 | DISCHARGE
Start: 2022-04-04

## 2022-04-04 RX ORDER — PETROLATUM,WHITE
1 JELLY (GRAM) TOPICAL
Qty: 0 | Refills: 0 | DISCHARGE
Start: 2022-04-04

## 2022-04-04 RX ORDER — LANOLIN ALCOHOL/MO/W.PET/CERES
1 CREAM (GRAM) TOPICAL
Qty: 0 | Refills: 0 | DISCHARGE
Start: 2022-04-04

## 2022-04-04 RX ORDER — METFORMIN HYDROCHLORIDE 850 MG/1
1 TABLET ORAL
Qty: 0 | Refills: 0 | DISCHARGE

## 2022-04-04 RX ORDER — MAGNESIUM HYDROXIDE 400 MG/1
30 TABLET, CHEWABLE ORAL
Qty: 0 | Refills: 0 | DISCHARGE

## 2022-04-04 RX ADMIN — SENNA PLUS 1 TABLET(S): 8.6 TABLET ORAL at 21:09

## 2022-04-04 RX ADMIN — DIVALPROEX SODIUM 750 MILLIGRAM(S): 500 TABLET, DELAYED RELEASE ORAL at 09:33

## 2022-04-04 RX ADMIN — RISPERIDONE 1.5 MILLIGRAM(S): 4 TABLET ORAL at 21:08

## 2022-04-04 RX ADMIN — Medication 1 APPLICATION(S): at 22:59

## 2022-04-04 RX ADMIN — Medication 5 MILLIGRAM(S): at 21:09

## 2022-04-04 RX ADMIN — Medication 1 APPLICATION(S): at 09:27

## 2022-04-04 RX ADMIN — Medication 1 APPLICATION(S): at 13:47

## 2022-04-04 RX ADMIN — RISPERIDONE 1.5 MILLIGRAM(S): 4 TABLET ORAL at 13:47

## 2022-04-04 RX ADMIN — Medication 20 MILLIGRAM(S): at 09:33

## 2022-04-04 RX ADMIN — RISPERIDONE 1.5 MILLIGRAM(S): 4 TABLET ORAL at 09:35

## 2022-04-04 RX ADMIN — DIVALPROEX SODIUM 750 MILLIGRAM(S): 500 TABLET, DELAYED RELEASE ORAL at 21:10

## 2022-04-04 NOTE — BH INPATIENT PSYCHIATRY PROGRESS NOTE - NSBHCHARTREVIEWVS_PSY_A_CORE FT
Vital Signs Last 24 Hrs  T(C): 36.3 (04-04-22 @ 08:32), Max: 36.3 (04-04-22 @ 08:32)  T(F): 97.4 (04-04-22 @ 08:32), Max: 97.4 (04-04-22 @ 08:32)  HR: --  BP: --  BP(mean): --  RR: 18 (04-03-22 @ 20:28) (18 - 18)  SpO2: --    Orthostatic VS  04-04-22 @ 09:40  Lying BP: --/-- HR: --  Sitting BP: 114/64 HR: 62  Standing BP: --/-- HR: --  Site: upper left arm  Mode: electronic  Orthostatic VS  04-04-22 @ 08:32  Lying BP: --/-- HR: --  Sitting BP: 104/57 HR: 58  Standing BP: --/-- HR: --  Site: upper left arm  Mode: electronic  Orthostatic VS  04-03-22 @ 20:28  Lying BP: --/-- HR: --  Sitting BP: 101/76 HR: 84  Standing BP: --/-- HR: --  Site: upper right arm  Mode: electronic  Orthostatic VS  04-02-22 @ 20:00  Lying BP: --/-- HR: --  Sitting BP: 131/58 HR: 77  Standing BP: --/-- HR: --  Site: upper left arm  Mode: electronic

## 2022-04-04 NOTE — BH INPATIENT PSYCHIATRY PROGRESS NOTE - MSE UNSTRUCTURED FT
Patient is awake and alert. Calm on exam, relates in a childlike manner. Has hand tremor which is highly variable, no rigidity. Speech volume variable, exaggerated prosody   Reported mood "good. Affect is bright. Thought process is disjointed. No paranoia. No suicidal or homicidal ideation. No hallucinations. Poor insight. Oriented to self only. No references to adopted child

## 2022-04-04 NOTE — BH INPATIENT PSYCHIATRY PROGRESS NOTE - NSBHFUPINTERVALHXFT_PSY_A_CORE
The patient is seen for TBI related dementia and behavioral disturbances. Chart reviewed and case discussed with nursing staff. No overnight events , no prns On encounter, patient is calm and states she is " good". Reported quieter overall. Eating well, sleep is good. Occasionally loud during some ADL's such as showers.Taking meds. VSS. Staff states she occ makes reference to child dhe had via surrogate that she had to give up for adoption

## 2022-04-04 NOTE — BH INPATIENT PSYCHIATRY PROGRESS NOTE - NSBHASSESSSUMMFT_PSY_ALL_CORE
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 Patient now with improvement in impulsivity with occasional vocal disruptiveness which responds well to supportive redirection. Patient no longer showing aggressive behavior for considerable period of time. Patient compliant with medication and care.    3/15 Maintaining gains, cont risperdal and Inderal, no need for changes,  await podiatry consult  3.16 Holding on to gains, If doses need to be held may need to slightly reduce standing dose.   3/17 Tolerating meds, cont to hold gains. Cont treatment. PAtient's 2PC expiring Patient not yet stable for d/c nor accepted in any facility so does not have safe d/c plan will therefore apply for further retention  3/18 Patient improved and sustaining gains. Cont current meds at current doses  3/21 Sustaining gains from current regimen. Plan cont risperidone and propanolol at current doses  3/22 Sustaining gains, tolerating meds with stable vitals and variable tremor. Cont current rx while seeking d/c options  3/23 sustaining improvement, if having meds held will consider reduced Inderal rather than risperidone as risperidone has been most effective though cannot r/o synergistic effect  3/24 Maintaining gains, BP stable enough to allow her to stay on current regimen rather than risk worsening via dose reduction.   3/28 Patient holding on to gains, no need to reduce meds for low BP, current rx and d/c planning  3/29 No events today except loud during ADL's cont current treatment.   3/30 No behvioral events, loud during ADL's will cont current meds at current doses  4/1: Patient maintaining gains cont current meds. If necesaary may keep small dose but spread out  for more consistency  4/4 Patient improved and sustaining gains. no agitation

## 2022-04-05 PROCEDURE — 99231 SBSQ HOSP IP/OBS SF/LOW 25: CPT

## 2022-04-05 RX ADMIN — RISPERIDONE 1.5 MILLIGRAM(S): 4 TABLET ORAL at 21:56

## 2022-04-05 RX ADMIN — RISPERIDONE 1.5 MILLIGRAM(S): 4 TABLET ORAL at 09:35

## 2022-04-05 RX ADMIN — DIVALPROEX SODIUM 750 MILLIGRAM(S): 500 TABLET, DELAYED RELEASE ORAL at 21:53

## 2022-04-05 RX ADMIN — DIVALPROEX SODIUM 750 MILLIGRAM(S): 500 TABLET, DELAYED RELEASE ORAL at 09:34

## 2022-04-05 RX ADMIN — Medication 1 APPLICATION(S): at 21:56

## 2022-04-05 RX ADMIN — RISPERIDONE 1.5 MILLIGRAM(S): 4 TABLET ORAL at 12:05

## 2022-04-05 RX ADMIN — SENNA PLUS 1 TABLET(S): 8.6 TABLET ORAL at 21:55

## 2022-04-05 RX ADMIN — Medication 5 MILLIGRAM(S): at 21:54

## 2022-04-05 RX ADMIN — Medication 20 MILLIGRAM(S): at 09:35

## 2022-04-05 NOTE — BH INPATIENT PSYCHIATRY PROGRESS NOTE - NSBHASSESSSUMMFT_PSY_ALL_CORE
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 Patient now with improvement in impulsivity with occasional vocal disruptiveness which responds well to supportive redirection. Patient no longer showing aggressive behavior for considerable period of time. Patient compliant with medication and care.    3/15 Maintaining gains, cont risperdal and Inderal, no need for changes,  await podiatry consult  3.16 Holding on to gains, If doses need to be held may need to slightly reduce standing dose.   3/17 Tolerating meds, cont to hold gains. Cont treatment. PAtient's 2PC expiring Patient not yet stable for d/c nor accepted in any facility so does not have safe d/c plan will therefore apply for further retention  3/18 Patient improved and sustaining gains. Cont current meds at current doses  3/21 Sustaining gains from current regimen. Plan cont risperidone and propanolol at current doses  3/22 Sustaining gains, tolerating meds with stable vitals and variable tremor. Cont current rx while seeking d/c options  3/23 sustaining improvement, if having meds held will consider reduced Inderal rather than risperidone as risperidone has been most effective though cannot r/o synergistic effect  3/24 Maintaining gains, BP stable enough to allow her to stay on current regimen rather than risk worsening via dose reduction.   3/28 Patient holding on to gains, no need to reduce meds for low BP, current rx and d/c planning  3/29 No events today except loud during ADL's cont current treatment.   3/30 No behvioral events, loud during ADL's will cont current meds at current doses  4/1: Patient maintaining gains cont current meds. If necesaary may keep small dose but spread out  for more consistency  4/4 Patient improved and sustaining gains. no agitation  4/5 Maintaining gains. Cont current med regimen

## 2022-04-05 NOTE — BH INPATIENT PSYCHIATRY PROGRESS NOTE - NSBHCHARTREVIEWVS_PSY_A_CORE FT
Vital Signs Last 24 Hrs  T(C): 36.6 (04-05-22 @ 07:39), Max: 36.6 (04-05-22 @ 07:39)  T(F): 97.9 (04-05-22 @ 07:39), Max: 97.9 (04-05-22 @ 07:39)  HR: 80 (04-04-22 @ 20:46) (80 - 80)  BP: --  BP(mean): --  RR: --  SpO2: --    Orthostatic VS  04-05-22 @ 07:39  Lying BP: --/-- HR: --  Sitting BP: 127/58 HR: 69  Standing BP: --/-- HR: --  Site: --  Mode: --  Orthostatic VS  04-04-22 @ 20:35  Lying BP: --/-- HR: --  Sitting BP: 112/68 HR: 113  Standing BP: --/-- HR: --  Site: --  Mode: --  Orthostatic VS  04-04-22 @ 09:40  Lying BP: --/-- HR: --  Sitting BP: 114/64 HR: 62  Standing BP: --/-- HR: --  Site: upper left arm  Mode: electronic  Orthostatic VS  04-04-22 @ 08:32  Lying BP: --/-- HR: --  Sitting BP: 104/57 HR: 58  Standing BP: --/-- HR: --  Site: upper left arm  Mode: electronic  Orthostatic VS  04-03-22 @ 20:28  Lying BP: --/-- HR: --  Sitting BP: 101/76 HR: 84  Standing BP: --/-- HR: --  Site: upper right arm  Mode: electronic

## 2022-04-05 NOTE — BH INPATIENT PSYCHIATRY PROGRESS NOTE - NSBHFUPINTERVALHXFT_PSY_A_CORE
The patient is seen for TBI related dementia and behavioral disturbances. Chart reviewed and case discussed with nursing staff. No overnight events , no prns On encounter, patient is calm and states she is " good". Reported quieter overall. Eating well, sleep is good. Occasionally loud during some ADL's such as showers.Taking meds. VSS.Had large BM

## 2022-04-06 PROCEDURE — 99231 SBSQ HOSP IP/OBS SF/LOW 25: CPT

## 2022-04-06 RX ADMIN — DIVALPROEX SODIUM 750 MILLIGRAM(S): 500 TABLET, DELAYED RELEASE ORAL at 21:35

## 2022-04-06 RX ADMIN — RISPERIDONE 1.5 MILLIGRAM(S): 4 TABLET ORAL at 21:36

## 2022-04-06 RX ADMIN — RISPERIDONE 1.5 MILLIGRAM(S): 4 TABLET ORAL at 12:55

## 2022-04-06 RX ADMIN — SENNA PLUS 1 TABLET(S): 8.6 TABLET ORAL at 21:35

## 2022-04-06 RX ADMIN — Medication 5 MILLIGRAM(S): at 21:36

## 2022-04-06 RX ADMIN — Medication 20 MILLIGRAM(S): at 10:00

## 2022-04-06 RX ADMIN — DIVALPROEX SODIUM 750 MILLIGRAM(S): 500 TABLET, DELAYED RELEASE ORAL at 10:01

## 2022-04-06 RX ADMIN — RISPERIDONE 1.5 MILLIGRAM(S): 4 TABLET ORAL at 10:00

## 2022-04-06 NOTE — BH INPATIENT PSYCHIATRY PROGRESS NOTE - NSBHCHARTREVIEWVS_PSY_A_CORE FT
Vital Signs Last 24 Hrs  T(C): 36.8 (04-06-22 @ 08:05), Max: 36.8 (04-06-22 @ 08:05)  T(F): 98.2 (04-06-22 @ 08:05), Max: 98.2 (04-06-22 @ 08:05)  HR: 70 (04-06-22 @ 08:05) (70 - 70)  BP: 100/61 (04-06-22 @ 08:05) (100/61 - 110/58)  BP(mean): 64 (04-05-22 @ 21:58) (64 - 64)  RR: --  SpO2: --    Orthostatic VS  04-05-22 @ 20:49  Lying BP: --/-- HR: --  Sitting BP: 111/48 HR: 58  Standing BP: --/-- HR: --  Site: --  Mode: --  Orthostatic VS  04-05-22 @ 07:39  Lying BP: --/-- HR: --  Sitting BP: 127/58 HR: 69  Standing BP: --/-- HR: --  Site: --  Mode: --  Orthostatic VS  04-04-22 @ 20:35  Lying BP: --/-- HR: --  Sitting BP: 112/68 HR: 113  Standing BP: --/-- HR: --  Site: --  Mode: --

## 2022-04-06 NOTE — BH INPATIENT PSYCHIATRY PROGRESS NOTE - NSBHASSESSSUMMFT_PSY_ALL_CORE
67-year-old female with TBI and dementia, previously residing at [facility] since 2010, admitted for management of impulsive and aggressive behavior. Patient now with improvement in impulsivity with occasional vocal disruptiveness which responds well to supportive redirection. Patient no longer showing aggressive behavior for considerable period of time. Patient compliant with medication and care.    3/15 Maintaining gains, cont risperdal and Inderal, no need for changes,  await podiatry consult  3.16 Holding on to gains, If doses need to be held may need to slightly reduce standing dose.   3/17 Tolerating meds, cont to hold gains. Cont treatment. PAtient's 2PC expiring Patient not yet stable for d/c nor accepted in any facility so does not have safe d/c plan will therefore apply for further retention  3/18 Patient improved and sustaining gains. Cont current meds at current doses  3/21 Sustaining gains from current regimen. Plan cont risperidone and propanolol at current doses  3/22 Sustaining gains, tolerating meds with stable vitals and variable tremor. Cont current rx while seeking d/c options  3/23 sustaining improvement, if having meds held will consider reduced Inderal rather than risperidone as risperidone has been most effective though cannot r/o synergistic effect  3/24 Maintaining gains, BP stable enough to allow her to stay on current regimen rather than risk worsening via dose reduction.   3/28 Patient holding on to gains, no need to reduce meds for low BP, current rx and d/c planning  3/29 No events today except loud during ADL's cont current treatment.   3/30 No behvioral events, loud during ADL's will cont current meds at current doses  4/1: Patient maintaining gains cont current meds. If necesaary may keep small dose but spread out  for more consistency  4/4 Patient improved and sustaining gains. no agitation  4/5 Maintaining gains. Cont current med regimen  4/6 Patient  holding on to gains, tolerating meds w/o need to alter, monitor BP for any further drop in BP

## 2022-04-06 NOTE — BH INPATIENT PSYCHIATRY PROGRESS NOTE - NSBHFUPINTERVALHXFT_PSY_A_CORE
The patient is seen for TBI related dementia and behavioral disturbances. Chart reviewed and case discussed with nursing staff. No overnight events , no prns On encounter, patient is calm and states she is " good". Reported quieter overall. Eating well, sleep is good. Occasionally loud during some ADL's such as showers.Taking meds. VSS though in low nl range

## 2022-04-06 NOTE — BH INPATIENT PSYCHIATRY PROGRESS NOTE - NSBHMETABOLIC_PSY_ALL_CORE_FT
BMI: BMI (kg/m2): 31.6 (11-29-21 @ 16:35)  HbA1c: A1C with Estimated Average Glucose Result: 5.4 % (11-04-21 @ 06:58)    Glucose: POCT Blood Glucose.: 78 mg/dL (12-30-21 @ 07:34)    BP: 100/61 (04-06-22 @ 08:05) (100/61 - 110/58)  Lipid Panel:

## 2022-04-07 PROCEDURE — 99231 SBSQ HOSP IP/OBS SF/LOW 25: CPT

## 2022-04-07 RX ADMIN — DIVALPROEX SODIUM 750 MILLIGRAM(S): 500 TABLET, DELAYED RELEASE ORAL at 21:31

## 2022-04-07 RX ADMIN — MAGNESIUM HYDROXIDE 30 MILLILITER(S): 400 TABLET, CHEWABLE ORAL at 21:31

## 2022-04-07 RX ADMIN — RISPERIDONE 1.5 MILLIGRAM(S): 4 TABLET ORAL at 21:33

## 2022-04-07 RX ADMIN — Medication 1 APPLICATION(S): at 09:52

## 2022-04-07 RX ADMIN — DIVALPROEX SODIUM 750 MILLIGRAM(S): 500 TABLET, DELAYED RELEASE ORAL at 10:01

## 2022-04-07 RX ADMIN — RISPERIDONE 1.5 MILLIGRAM(S): 4 TABLET ORAL at 13:22

## 2022-04-07 RX ADMIN — Medication 1 APPLICATION(S): at 13:23

## 2022-04-07 RX ADMIN — RISPERIDONE 1.5 MILLIGRAM(S): 4 TABLET ORAL at 09:52

## 2022-04-07 RX ADMIN — Medication 20 MILLIGRAM(S): at 09:52

## 2022-04-07 RX ADMIN — SENNA PLUS 1 TABLET(S): 8.6 TABLET ORAL at 21:33

## 2022-04-07 RX ADMIN — Medication 5 MILLIGRAM(S): at 21:34

## 2022-04-07 NOTE — BH INPATIENT PSYCHIATRY PROGRESS NOTE - NSBHCHARTREVIEWVS_PSY_A_CORE FT
Vital Signs Last 24 Hrs  T(C): 37 (04-07-22 @ 06:39), Max: 37 (04-07-22 @ 06:39)  T(F): 98.6 (04-07-22 @ 06:39), Max: 98.6 (04-07-22 @ 06:39)  HR: 74 (04-06-22 @ 20:48) (74 - 74)  BP: 112/51 (04-06-22 @ 20:48) (112/51 - 112/51)  BP(mean): --  RR: 17 (04-06-22 @ 20:48) (17 - 17)  SpO2: --    Orthostatic VS  04-07-22 @ 06:39  Lying BP: --/-- HR: --  Sitting BP: 110/57 HR: 63  Standing BP: --/-- HR: --  Site: --  Mode: --  Orthostatic VS  04-05-22 @ 20:49  Lying BP: --/-- HR: --  Sitting BP: 111/48 HR: 58  Standing BP: --/-- HR: --  Site: --  Mode: --

## 2022-04-07 NOTE — BH INPATIENT PSYCHIATRY PROGRESS NOTE - NSBHFUPINTERVALHXFT_PSY_A_CORE
The patient is seen for TBI related dementia and behavioral disturbances. Chart reviewed and case discussed with nursing staff. No overnight events , no prns On encounter, patient is calm and states she is " good". Reported quieter overall. Eating well, sleep is good. Occasionally loud during some ADL's such as showers.Taking meds. VSS

## 2022-04-07 NOTE — BH INPATIENT PSYCHIATRY PROGRESS NOTE - NSBHASSESSSUMMFT_PSY_ALL_CORE
67-year-old female with TBI and dementia, previously residing at [facility] since 2010, admitted for management of impulsive and aggressive behavior. Patient now with improvement in impulsivity with occasional vocal disruptiveness which responds well to supportive redirection. Patient no longer showing aggressive behavior for considerable period of time. Patient compliant with medication and care.    3/15 Maintaining gains, cont risperdal and Inderal, no need for changes,  await podiatry consult  3.16 Holding on to gains, If doses need to be held may need to slightly reduce standing dose.   3/17 Tolerating meds, cont to hold gains. Cont treatment. PAtient's 2PC expiring Patient not yet stable for d/c nor accepted in any facility so does not have safe d/c plan will therefore apply for further retention  3/18 Patient improved and sustaining gains. Cont current meds at current doses  3/21 Sustaining gains from current regimen. Plan cont risperidone and propanolol at current doses  3/22 Sustaining gains, tolerating meds with stable vitals and variable tremor. Cont current rx while seeking d/c options  3/23 sustaining improvement, if having meds held will consider reduced Inderal rather than risperidone as risperidone has been most effective though cannot r/o synergistic effect  3/24 Maintaining gains, BP stable enough to allow her to stay on current regimen rather than risk worsening via dose reduction.   3/28 Patient holding on to gains, no need to reduce meds for low BP, current rx and d/c planning  3/29 No events today except loud during ADL's cont current treatment.   3/30 No behvioral events, loud during ADL's will cont current meds at current doses  4/1: Patient maintaining gains cont current meds. If necesaary may keep small dose but spread out  for more consistency  4/4 Patient improved and sustaining gains. no agitation  4/5 Maintaining gains. Cont current med regimen  4/6 Patient  holding on to gains, tolerating meds w/o need to alter, monitor BP for any further drop in BP   4/7 Patient maintaining gains. Does seem sl more irritable if has not had BM will reveal regimen

## 2022-04-07 NOTE — BH INPATIENT PSYCHIATRY PROGRESS NOTE - NSBHMETABOLIC_PSY_ALL_CORE_FT
BMI: BMI (kg/m2): 31.6 (11-29-21 @ 16:35)  HbA1c: A1C with Estimated Average Glucose Result: 5.4 % (11-04-21 @ 06:58)    Glucose: POCT Blood Glucose.: 78 mg/dL (12-30-21 @ 07:34)    BP: 112/51 (04-06-22 @ 20:48) (100/61 - 112/51)  Lipid Panel:

## 2022-04-08 PROCEDURE — 99231 SBSQ HOSP IP/OBS SF/LOW 25: CPT

## 2022-04-08 RX ADMIN — DIVALPROEX SODIUM 750 MILLIGRAM(S): 500 TABLET, DELAYED RELEASE ORAL at 13:39

## 2022-04-08 RX ADMIN — Medication 1 APPLICATION(S): at 20:59

## 2022-04-08 RX ADMIN — Medication 20 MILLIGRAM(S): at 13:39

## 2022-04-08 RX ADMIN — SENNA PLUS 1 TABLET(S): 8.6 TABLET ORAL at 20:58

## 2022-04-08 RX ADMIN — RISPERIDONE 1.5 MILLIGRAM(S): 4 TABLET ORAL at 20:58

## 2022-04-08 RX ADMIN — Medication 5 MILLIGRAM(S): at 20:58

## 2022-04-08 RX ADMIN — DIVALPROEX SODIUM 750 MILLIGRAM(S): 500 TABLET, DELAYED RELEASE ORAL at 20:57

## 2022-04-08 NOTE — BH INPATIENT PSYCHIATRY PROGRESS NOTE - NSBHASSESSSUMMFT_PSY_ALL_CORE
67-year-old female with TBI and dementia, previously residing at [facility] since 2010, admitted for management of impulsive and aggressive behavior. Patient now with improvement in impulsivity with occasional vocal disruptiveness which responds well to supportive redirection. Patient no longer showing aggressive behavior for considerable period of time. Patient compliant with medication and care.    3/15 Maintaining gains, cont risperdal and Inderal, no need for changes,  await podiatry consult  3.16 Holding on to gains, If doses need to be held may need to slightly reduce standing dose.   3/17 Tolerating meds, cont to hold gains. Cont treatment. PAtient's 2PC expiring Patient not yet stable for d/c nor accepted in any facility so does not have safe d/c plan will therefore apply for further retention  3/18 Patient improved and sustaining gains. Cont current meds at current doses  3/21 Sustaining gains from current regimen. Plan cont risperidone and propanolol at current doses  3/22 Sustaining gains, tolerating meds with stable vitals and variable tremor. Cont current rx while seeking d/c options  3/23 sustaining improvement, if having meds held will consider reduced Inderal rather than risperidone as risperidone has been most effective though cannot r/o synergistic effect  3/24 Maintaining gains, BP stable enough to allow her to stay on current regimen rather than risk worsening via dose reduction.   3/28 Patient holding on to gains, no need to reduce meds for low BP, current rx and d/c planning  3/29 No events today except loud during ADL's cont current treatment.   3/30 No behvioral events, loud during ADL's will cont current meds at current doses  4/1: Patient maintaining gains cont current meds. If necesaary may keep small dose but spread out  for more consistency  4/4 Patient improved and sustaining gains. no agitation  4/5 Maintaining gains. Cont current med regimen  4/6 Patient  holding on to gains, tolerating meds w/o need to alter, monitor BP for any further drop in BP   4/7 Patient maintaining gains. Does seem sl more irritable if has not had BM will reveal regimen  4/8 Maintaining gains, cont current rx watch for changes in behavior after incident. Family notified.

## 2022-04-08 NOTE — BH INPATIENT PSYCHIATRY PROGRESS NOTE - NSBHCHARTREVIEWVS_PSY_A_CORE FT
Vital Signs Last 24 Hrs  T(C): 36.4 (04-08-22 @ 07:48), Max: 36.4 (04-08-22 @ 07:48)  T(F): 97.6 (04-08-22 @ 07:48), Max: 97.6 (04-08-22 @ 07:48)  HR: --  BP: --  BP(mean): --  RR: 18 (04-07-22 @ 20:21) (18 - 18)  SpO2: 100% (04-07-22 @ 20:21) (100% - 100%)    Orthostatic VS  04-08-22 @ 07:48  Lying BP: --/-- HR: --  Sitting BP: 98/46 HR: 54  Standing BP: --/-- HR: --  Site: upper left arm  Mode: electronic  Orthostatic VS  04-07-22 @ 20:21  Lying BP: --/-- HR: --  Sitting BP: 115/71 HR: 93  Standing BP: --/-- HR: --  Site: --  Mode: --  Orthostatic VS  04-07-22 @ 06:39  Lying BP: --/-- HR: --  Sitting BP: 110/57 HR: 63  Standing BP: --/-- HR: --  Site: --  Mode: --
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
## 2022-04-09 RX ADMIN — RISPERIDONE 1.5 MILLIGRAM(S): 4 TABLET ORAL at 09:20

## 2022-04-09 RX ADMIN — DIVALPROEX SODIUM 750 MILLIGRAM(S): 500 TABLET, DELAYED RELEASE ORAL at 09:19

## 2022-04-09 RX ADMIN — Medication 20 MILLIGRAM(S): at 09:20

## 2022-04-09 RX ADMIN — SENNA PLUS 1 TABLET(S): 8.6 TABLET ORAL at 21:37

## 2022-04-09 RX ADMIN — RISPERIDONE 1.5 MILLIGRAM(S): 4 TABLET ORAL at 12:48

## 2022-04-09 RX ADMIN — Medication 1 APPLICATION(S): at 21:35

## 2022-04-09 RX ADMIN — DIVALPROEX SODIUM 750 MILLIGRAM(S): 500 TABLET, DELAYED RELEASE ORAL at 21:36

## 2022-04-09 RX ADMIN — Medication 5 MILLIGRAM(S): at 21:36

## 2022-04-09 NOTE — DIETITIAN INITIAL EVALUATION ADULT. - REASON FOR ADMISSION
[facility] T[facility] chart review completed. Noted PT and OT evals with recs in place. PT rec outpatient cardiac rehab and OT recs HH with family. Plan as prior for dc to home with HH and [facility] with family support. However, received VOALTE from Mario ALBERTO that there are apparently now concerns from family with regards to ability to care for pt. She does not appear to have SNF/placement needs as issues are primarily cognitive (rather than physical/functional in nature), though would note MDs have spoke with family for consideration of group home vs. Memory care at this time. TCN will continue to follow and collaborate with discharge planning team as additional post acute needs arise. Thank you.    Previously completed:  - Orders received for: PT and OT; see above   - Choice forms: HH  - [facility] introduced (Y), referral (sent).    Dementia w/ behavioral disturbances

## 2022-04-10 RX ADMIN — RISPERIDONE 1.5 MILLIGRAM(S): 4 TABLET ORAL at 13:05

## 2022-04-10 RX ADMIN — Medication 5 MILLIGRAM(S): at 20:59

## 2022-04-10 RX ADMIN — RISPERIDONE 1.5 MILLIGRAM(S): 4 TABLET ORAL at 21:01

## 2022-04-10 RX ADMIN — Medication 20 MILLIGRAM(S): at 09:36

## 2022-04-10 RX ADMIN — DIVALPROEX SODIUM 750 MILLIGRAM(S): 500 TABLET, DELAYED RELEASE ORAL at 20:58

## 2022-04-10 RX ADMIN — SENNA PLUS 1 TABLET(S): 8.6 TABLET ORAL at 20:59

## 2022-04-10 RX ADMIN — RISPERIDONE 1.5 MILLIGRAM(S): 4 TABLET ORAL at 09:36

## 2022-04-10 RX ADMIN — Medication 1 APPLICATION(S): at 21:27

## 2022-04-10 RX ADMIN — DIVALPROEX SODIUM 750 MILLIGRAM(S): 500 TABLET, DELAYED RELEASE ORAL at 09:36

## 2022-04-11 PROCEDURE — 99231 SBSQ HOSP IP/OBS SF/LOW 25: CPT

## 2022-04-11 RX ADMIN — SENNA PLUS 1 TABLET(S): 8.6 TABLET ORAL at 21:07

## 2022-04-11 RX ADMIN — Medication 20 MILLIGRAM(S): at 09:28

## 2022-04-11 RX ADMIN — DIVALPROEX SODIUM 750 MILLIGRAM(S): 500 TABLET, DELAYED RELEASE ORAL at 09:28

## 2022-04-11 RX ADMIN — Medication 1 APPLICATION(S): at 13:27

## 2022-04-11 RX ADMIN — RISPERIDONE 1.5 MILLIGRAM(S): 4 TABLET ORAL at 09:28

## 2022-04-11 RX ADMIN — RISPERIDONE 1.5 MILLIGRAM(S): 4 TABLET ORAL at 13:24

## 2022-04-11 RX ADMIN — Medication 5 MILLIGRAM(S): at 21:07

## 2022-04-11 RX ADMIN — DIVALPROEX SODIUM 750 MILLIGRAM(S): 500 TABLET, DELAYED RELEASE ORAL at 21:07

## 2022-04-11 RX ADMIN — Medication 1 APPLICATION(S): at 09:12

## 2022-04-11 NOTE — BH INPATIENT PSYCHIATRY PROGRESS NOTE - NSBHCHARTREVIEWVS_PSY_A_CORE FT
Vital Signs Last 24 Hrs  T(C): 36.7 (04-11-22 @ 07:54), Max: 36.7 (04-11-22 @ 07:54)  T(F): 98 (04-11-22 @ 07:54), Max: 98 (04-11-22 @ 07:54)  HR: 73 (04-10-22 @ 20:22) (73 - 73)  BP: 123/58 (04-10-22 @ 20:22) (123/58 - 123/58)  BP(mean): --  RR: --  SpO2: --    Orthostatic VS  04-11-22 @ 07:54  Lying BP: --/-- HR: --  Sitting BP: 105/60 HR: 68  Standing BP: --/-- HR: --  Site: --  Mode: --  Orthostatic VS  04-10-22 @ 09:28  Lying BP: --/-- HR: --  Sitting BP: --/-- HR: 82  Standing BP: --/-- HR: --  Site: --  Mode: --  Orthostatic VS  04-10-22 @ 08:20  Lying BP: --/-- HR: --  Sitting BP: 115/61 HR: 123  Standing BP: --/-- HR: --  Site: upper left arm  Mode: electronic  Orthostatic VS  04-09-22 @ 20:48  Lying BP: --/-- HR: --  Sitting BP: 104/85 HR: 87  Standing BP: --/-- HR: --  Site: --  Mode: --

## 2022-04-11 NOTE — BH INPATIENT PSYCHIATRY PROGRESS NOTE - MSE UNSTRUCTURED FT
Patient is awake and alert. Calm on exam, relates in a childlike manner. Has hand tremor which is highly variable, no rigidity. Speech volume variable, poorly modulated loud at times even when not clearly upset, exaggerated prosody   Reported mood "good. Affect is bright. Thought process is disjointed. No paranoia. No suicidal or homicidal ideation. No hallucinations. Poor insight. Oriented to self only.

## 2022-04-11 NOTE — BH INPATIENT PSYCHIATRY PROGRESS NOTE - NSBHFUPINTERVALHXFT_PSY_A_CORE
The patient is seen for TBI related dementia and behavioral disturbances. Chart reviewed and case discussed with nursing staff. No overnight events . VSS, no excess sedation.  On encounter, patient is calm and states she is " good". Reported quieter overall. Eating well, sleep is good. Occasionally loud during some ADL's such as showers.Taking meds.

## 2022-04-11 NOTE — BH INPATIENT PSYCHIATRY PROGRESS NOTE - NSBHASSESSSUMMFT_PSY_ALL_CORE
67-year-old female with TBI and dementia, previously residing at [facility] since 2010, admitted for management of impulsive and aggressive behavior. Patient now with improvement in impulsivity with occasional vocal disruptiveness which responds well to supportive redirection. Patient no longer showing aggressive behavior for considerable period of time. Patient compliant with medication and care.    3/15 Maintaining gains, cont risperdal and Inderal, no need for changes,  await podiatry consult  3.16 Holding on to gains, If doses need to be held may need to slightly reduce standing dose.   3/17 Tolerating meds, cont to hold gains. Cont treatment. PAtient's 2PC expiring Patient not yet stable for d/c nor accepted in any facility so does not have safe d/c plan will therefore apply for further retention  3/18 Patient improved and sustaining gains. Cont current meds at current doses  3/21 Sustaining gains from current regimen. Plan cont risperidone and propanolol at current doses  3/22 Sustaining gains, tolerating meds with stable vitals and variable tremor. Cont current rx while seeking d/c options  3/23 sustaining improvement, if having meds held will consider reduced Inderal rather than risperidone as risperidone has been most effective though cannot r/o synergistic effect  3/24 Maintaining gains, BP stable enough to allow her to stay on current regimen rather than risk worsening via dose reduction.   3/28 Patient holding on to gains, no need to reduce meds for low BP, current rx and d/c planning  3/29 No events today except loud during ADL's cont current treatment.   3/30 No behvioral events, loud during ADL's will cont current meds at current doses  4/1: Patient maintaining gains cont current meds. If necesaary may keep small dose but spread out  for more consistency  4/4 Patient improved and sustaining gains. no agitation  4/5 Maintaining gains. Cont current med regimen  4/6 Patient  holding on to gains, tolerating meds w/o need to alter, monitor BP for any further drop in BP   4/7 Patient maintaining gains. Does seem sl more irritable if has not had BM will reveal regimen  4/8 Maintaining gains, cont current rx watch for changes in behavior after incident. Family notified.   4/11 Patient holding on to gains even after incident. Cont current treatment plan .

## 2022-04-11 NOTE — BH INPATIENT PSYCHIATRY PROGRESS NOTE - NSBHMETABOLIC_PSY_ALL_CORE_FT
BMI: BMI (kg/m2): 31.6 (11-29-21 @ 16:35)  HbA1c: A1C with Estimated Average Glucose Result: 5.4 % (11-04-21 @ 06:58)    Glucose: POCT Blood Glucose.: 78 mg/dL (12-30-21 @ 07:34)    BP: 123/58 (04-10-22 @ 20:22) (112/68 - 123/58)  Lipid Panel:

## 2022-04-12 PROCEDURE — 99231 SBSQ HOSP IP/OBS SF/LOW 25: CPT

## 2022-04-12 RX ADMIN — DIVALPROEX SODIUM 750 MILLIGRAM(S): 500 TABLET, DELAYED RELEASE ORAL at 09:10

## 2022-04-12 RX ADMIN — SENNA PLUS 1 TABLET(S): 8.6 TABLET ORAL at 21:27

## 2022-04-12 RX ADMIN — Medication 20 MILLIGRAM(S): at 09:09

## 2022-04-12 RX ADMIN — RISPERIDONE 1.5 MILLIGRAM(S): 4 TABLET ORAL at 09:09

## 2022-04-12 RX ADMIN — DIVALPROEX SODIUM 750 MILLIGRAM(S): 500 TABLET, DELAYED RELEASE ORAL at 21:27

## 2022-04-12 RX ADMIN — RISPERIDONE 1.5 MILLIGRAM(S): 4 TABLET ORAL at 13:34

## 2022-04-12 RX ADMIN — RISPERIDONE 1.5 MILLIGRAM(S): 4 TABLET ORAL at 21:30

## 2022-04-12 RX ADMIN — Medication 1 APPLICATION(S): at 09:48

## 2022-04-12 RX ADMIN — Medication 5 MILLIGRAM(S): at 21:27

## 2022-04-12 NOTE — BH INPATIENT PSYCHIATRY PROGRESS NOTE - MSE UNSTRUCTURED FT
Patient is awake and alert. Calm on exam, relates in a childlike manner. Has hand tremor which is highly variable, no rigidity. Speech volume variable, poorly modulated loud at times even when not clearly upset, exaggerated prosody.   Reported mood "good". Affect is cheerful. Thought process is disjointed. No paranoia. No suicidal or homicidal ideation. No hallucinations. Poor insight. Oriented to self only.

## 2022-04-12 NOTE — BH INPATIENT PSYCHIATRY PROGRESS NOTE - NS ED [facility] REVIEW OF ED CHART AVAILABLE LABS REVIEWED
Yes

## 2022-04-12 NOTE — BH INPATIENT PSYCHIATRY PROGRESS NOTE - NSBHCHARTREVIEWINVESTIGATE_PSY_A_CORE FT
12 Lead ECG:   Ventricular Rate 75 BPM  Atrial Rate 75 BPM  P-R Interval 186 ms  QRS Duration 74 ms  Q-T Interval 406 ms  QTC Calculation(Bazett) 453 ms  P Axis 76 degrees  R Axis 67 degrees  T Axis 65 degrees  Diagnosis Line Normal sinus rhythm  Possible Left atrial enlargement  Borderline ECG (02-26-22 @ 13:15)

## 2022-04-12 NOTE — BH INPATIENT PSYCHIATRY PROGRESS NOTE - NSBHCHARTREVIEWLAB_PSY_A_CORE FT
CMP and CBC FORM 01/11 essentially unremarkable. 
12-08    146<H>  |  110<H>  |  22  ----------------------------<  77  4.2   |  26  |  0.61  
CMP and CBC FORM 01/11 essentially unremarkable. 
12-08    146<H>  |  110<H>  |  22  ----------------------------<  77  4.2   |  26  |  0.61  
CMP and CBC FORM 01/11 essentially unremarkable. 
CMP and CBC FORM 01/11 essentially unremarkable. 
12-08    146<H>  |  110<H>  |  22  ----------------------------<  77  4.2   |  26  |  0.61  
CMP and CBC FORM 01/11 essentially unremarkable. 
12-08    146<H>  |  110<H>  |  22  ----------------------------<  77  4.2   |  26  |  0.61  
12-08    146<H>  |  110<H>  |  22  ----------------------------<  77  4.2   |  26  |  0.61  
CMP and CBC FORM 01/11 essentially unremarkable. 
CMP and CBC FORM 01/11 essentially unremarkable. 
12-08    146<H>  |  110<H>  |  22  ----------------------------<  77  4.2   |  26  |  0.61  
12-08    146<H>  |  110<H>  |  22  ----------------------------<  77  4.2   |  26  |  0.61  
CMP and CBC FORM 01/11 essentially unremarkable. 
12-08    146<H>  |  110<H>  |  22  ----------------------------<  77  4.2   |  26  |  0.61  
CMP and CBC FORM 01/11 essentially unremarkable. 
12-08    146<H>  |  110<H>  |  22  ----------------------------<  77  4.2   |  26  |  0.61  
12-08    146<H>  |  110<H>  |  22  ----------------------------<  77  4.2   |  26  |  0.61  
CMP and CBC FORM 01/11 essentially unremarkable. 
12-08    146<H>  |  110<H>  |  22  ----------------------------<  77  4.2   |  26  |  0.61  
CMP and CBC FORM 01/11 essentially unremarkable. 
12-08    146<H>  |  110<H>  |  22  ----------------------------<  77  4.2   |  26  |  0.61  
CMP and CBC FORM 01/11 essentially unremarkable. 
12-08    146<H>  |  110<H>  |  22  ----------------------------<  77  4.2   |  26  |  0.61  
CMP and CBC FORM 01/11 essentially unremarkable. 
11-19    144  |  107  |  22  ----------------------------<  128<H>  3.6   |  26  |  0.62  
12-08    146<H>  |  110<H>  |  22  ----------------------------<  77  4.2   |  26  |  0.61  
CMP and CBC FORM 01/11 essentially unremarkable. 
CMP and CBC FORM 01/11 essentially unremarkable. 
12-08    146<H>  |  110<H>  |  22  ----------------------------<  77  4.2   |  26  |  0.61  
CMP and CBC FORM 01/11 essentially unremarkable. 
12-08    146<H>  |  110<H>  |  22  ----------------------------<  77  4.2   |  26  |  0.61  
11-19    144  |  107  |  22  ----------------------------<  128<H>  3.6   |  26  |  0.62

## 2022-04-12 NOTE — BH INPATIENT PSYCHIATRY PROGRESS NOTE - NSBHMETABOLIC_PSY_ALL_CORE_FT
BMI: BMI (kg/m2): 31.6 (11-29-21 @ 16:35)  HbA1c: A1C with Estimated Average Glucose Result: 5.4 % (11-04-21 @ 06:58)    Glucose: POCT Blood Glucose.: 78 mg/dL (12-30-21 @ 07:34)    BP: 124/68 (04-12-22 @ 07:05) (123/58 - 124/68)  Lipid Panel:

## 2022-04-12 NOTE — BH INPATIENT PSYCHIATRY PROGRESS NOTE - NSBHCHARTREVIEWVS_PSY_A_CORE FT
Vital Signs Last 24 Hrs  T(C): 36.6 (04-12-22 @ 07:05), Max: 36.6 (04-12-22 @ 07:05)  T(F): 97.9 (04-12-22 @ 07:05), Max: 97.9 (04-12-22 @ 07:05)  HR: 79 (04-12-22 @ 07:05) (79 - 79)  BP: 124/68 (04-12-22 @ 07:05) (124/68 - 124/68)  BP(mean): --  RR: --  SpO2: --    Orthostatic VS  04-11-22 @ 20:30  Lying BP: --/-- HR: --  Sitting BP: 107/56 HR: 69  Standing BP: --/-- HR: --  Site: --  Mode: --  Orthostatic VS  04-11-22 @ 07:54  Lying BP: --/-- HR: --  Sitting BP: 105/60 HR: 68  Standing BP: --/-- HR: --  Site: --  Mode: --

## 2022-04-12 NOTE — BH INPATIENT PSYCHIATRY PROGRESS NOTE - NSBHASSESSSUMMFT_PSY_ALL_CORE
67-year-old female with TBI and dementia, previously residing at [facility] since 2010, admitted for management of impulsive and aggressive behavior. Patient now with improvement in impulsivity with occasional vocal disruptiveness which responds well to supportive redirection. Patient no longer showing aggressive behavior for considerable period of time. Patient compliant with medication and care.    3/15 Maintaining gains, cont risperdal and Inderal, no need for changes,  await podiatry consult  3.16 Holding on to gains, If doses need to be held may need to slightly reduce standing dose.   3/17 Tolerating meds, cont to hold gains. Cont treatment. PAtient's 2PC expiring Patient not yet stable for d/c nor accepted in any facility so does not have safe d/c plan will therefore apply for further retention  3/18 Patient improved and sustaining gains. Cont current meds at current doses  3/21 Sustaining gains from current regimen. Plan cont risperidone and propanolol at current doses  3/22 Sustaining gains, tolerating meds with stable vitals and variable tremor. Cont current rx while seeking d/c options  3/23 sustaining improvement, if having meds held will consider reduced Inderal rather than risperidone as risperidone has been most effective though cannot r/o synergistic effect  3/24 Maintaining gains, BP stable enough to allow her to stay on current regimen rather than risk worsening via dose reduction.   3/28 Patient holding on to gains, no need to reduce meds for low BP, current rx and d/c planning  3/29 No events today except loud during ADL's cont current treatment.   3/30 No behvioral events, loud during ADL's will cont current meds at current doses  4/1: Patient maintaining gains cont current meds. If necesaary may keep small dose but spread out  for more consistency  4/4 Patient improved and sustaining gains. no agitation  4/5 Maintaining gains. Cont current med regimen  4/6 Patient  holding on to gains, tolerating meds w/o need to alter, monitor BP for any further drop in BP   4/7 Patient maintaining gains. Does seem sl more irritable if has not had BM will reveal regimen  4/8 Maintaining gains, cont current rx watch for changes in behavior after incident. Family notified.   4/11 Patient holding on to gains even after incident. Cont current treatment plan .   4/12 Patient holding on to gains. No need to hold meds recently for sedation or low BP. Cont meds, monitor bowel function as likely does not eat well when becomes constipated

## 2022-04-12 NOTE — BH INPATIENT PSYCHIATRY PROGRESS NOTE - NSBHANTIPSYCHOTIC_PSY_ALL_CORE
Yes...

## 2022-04-13 RX ADMIN — RISPERIDONE 1.5 MILLIGRAM(S): 4 TABLET ORAL at 09:24

## 2022-04-13 RX ADMIN — DIVALPROEX SODIUM 750 MILLIGRAM(S): 500 TABLET, DELAYED RELEASE ORAL at 09:23

## 2022-04-13 RX ADMIN — RISPERIDONE 1.5 MILLIGRAM(S): 4 TABLET ORAL at 13:09

## 2022-04-13 RX ADMIN — Medication 5 MILLIGRAM(S): at 20:58

## 2022-04-13 RX ADMIN — SENNA PLUS 1 TABLET(S): 8.6 TABLET ORAL at 20:58

## 2022-04-13 RX ADMIN — DIVALPROEX SODIUM 750 MILLIGRAM(S): 500 TABLET, DELAYED RELEASE ORAL at 20:57

## 2022-04-13 RX ADMIN — Medication 1 APPLICATION(S): at 09:23

## 2022-04-13 RX ADMIN — Medication 20 MILLIGRAM(S): at 09:23

## 2022-04-13 RX ADMIN — RISPERIDONE 1.5 MILLIGRAM(S): 4 TABLET ORAL at 20:58

## 2022-04-13 NOTE — BH INPATIENT PSYCHIATRY PROGRESS NOTE - NSBHCHARTREVIEWVS_PSY_A_CORE FT
Vital Signs Last 24 Hrs  T(C): 36.4 (04-13-22 @ 07:57), Max: 36.4 (04-13-22 @ 07:57)  T(F): 97.5 (04-13-22 @ 07:57), Max: 97.5 (04-13-22 @ 07:57)  HR: --  BP: --  BP(mean): --  RR: 17 (04-12-22 @ 20:19) (17 - 17)  SpO2: --    Orthostatic VS  04-13-22 @ 07:57  Lying BP: --/-- HR: --  Sitting BP: 108/60 HR: 61  Standing BP: --/-- HR: --  Site: --  Mode: --  Orthostatic VS  04-12-22 @ 20:19  Lying BP: --/-- HR: --  Sitting BP: 116/64 HR: 74  Standing BP: --/-- HR: --  Site: --  Mode: --  Orthostatic VS  04-11-22 @ 20:30  Lying BP: --/-- HR: --  Sitting BP: 107/56 HR: 69  Standing BP: --/-- HR: --  Site: --  Mode: --

## 2022-04-14 PROCEDURE — 99232 SBSQ HOSP IP/OBS MODERATE 35: CPT

## 2022-04-14 RX ADMIN — DIVALPROEX SODIUM 750 MILLIGRAM(S): 500 TABLET, DELAYED RELEASE ORAL at 09:15

## 2022-04-14 RX ADMIN — RISPERIDONE 1.5 MILLIGRAM(S): 4 TABLET ORAL at 09:18

## 2022-04-14 RX ADMIN — Medication 20 MILLIGRAM(S): at 09:15

## 2022-04-14 RX ADMIN — Medication 5 MILLIGRAM(S): at 20:53

## 2022-04-14 RX ADMIN — RISPERIDONE 1.5 MILLIGRAM(S): 4 TABLET ORAL at 20:52

## 2022-04-14 RX ADMIN — DIVALPROEX SODIUM 750 MILLIGRAM(S): 500 TABLET, DELAYED RELEASE ORAL at 20:52

## 2022-04-14 RX ADMIN — Medication 1 APPLICATION(S): at 09:16

## 2022-04-14 RX ADMIN — SENNA PLUS 1 TABLET(S): 8.6 TABLET ORAL at 20:53

## 2022-04-14 RX ADMIN — RISPERIDONE 1.5 MILLIGRAM(S): 4 TABLET ORAL at 13:02

## 2022-04-14 NOTE — BH INPATIENT PSYCHIATRY PROGRESS NOTE - NSBHFUPINTERVALHXFT_PSY_A_CORE
Patient is seen for TBI. No overnight events. Eating sleeping okay No prns or need to hold doses for sedation or low BP

## 2022-04-14 NOTE — BH INPATIENT PSYCHIATRY PROGRESS NOTE - NSBHCHARTREVIEWVS_PSY_A_CORE FT
Vital Signs Last 24 Hrs  T(C): 36.8 (04-14-22 @ 08:08), Max: 36.8 (04-14-22 @ 08:08)  T(F): 98.2 (04-14-22 @ 08:08), Max: 98.2 (04-14-22 @ 08:08)  HR: 81 (04-13-22 @ 20:20) (81 - 81)  BP: 149/64 (04-13-22 @ 20:20) (149/64 - 149/64)  BP(mean): --  RR: --  SpO2: --    Orthostatic VS  04-14-22 @ 09:12  Lying BP: --/-- HR: --  Sitting BP: 114/88 HR: 82  Standing BP: --/-- HR: --  Site: upper left arm  Mode: electronic  Orthostatic VS  04-14-22 @ 08:08  Lying BP: --/-- HR: --  Sitting BP: 104/51 HR: 66  Standing BP: --/-- HR: --  Site: upper left arm  Mode: electronic  Orthostatic VS  04-13-22 @ 07:57  Lying BP: --/-- HR: --  Sitting BP: 108/60 HR: 61  Standing BP: --/-- HR: --  Site: --  Mode: --  Orthostatic VS  04-12-22 @ 20:19  Lying BP: --/-- HR: --  Sitting BP: 116/64 HR: 74  Standing BP: --/-- HR: --  Site: --  Mode: --

## 2022-04-14 NOTE — BH INPATIENT PSYCHIATRY PROGRESS NOTE - MSE UNSTRUCTURED FT
Patient is awake and alert. Calm on exam, relates in a childlike manner. Has hand tremor which is highly variable, no rigidity. Speech volume variable, poorly modulated loud at times even when not clearly upset, exaggerated prosody.   Reported mood "good". Affect is bright generally some lability. Thought process is disjointed. No paranoia. No suicidal or homicidal ideation. No hallucinations. Poor insight. Oriented to self only.

## 2022-04-14 NOTE — BH INPATIENT PSYCHIATRY PROGRESS NOTE - NSBHMETABOLIC_PSY_ALL_CORE_FT
BMI: BMI (kg/m2): 31.6 (11-29-21 @ 16:35)  HbA1c: A1C with Estimated Average Glucose Result: 5.4 % (11-04-21 @ 06:58)    Glucose: POCT Blood Glucose.: 78 mg/dL (12-30-21 @ 07:34)    BP: 149/64 (04-13-22 @ 20:20) (124/68 - 149/64)  Lipid Panel:

## 2022-04-15 PROCEDURE — 99232 SBSQ HOSP IP/OBS MODERATE 35: CPT

## 2022-04-15 RX ADMIN — Medication 5 MILLIGRAM(S): at 21:40

## 2022-04-15 RX ADMIN — Medication 20 MILLIGRAM(S): at 10:44

## 2022-04-15 RX ADMIN — DIVALPROEX SODIUM 750 MILLIGRAM(S): 500 TABLET, DELAYED RELEASE ORAL at 21:40

## 2022-04-15 RX ADMIN — DIVALPROEX SODIUM 750 MILLIGRAM(S): 500 TABLET, DELAYED RELEASE ORAL at 10:42

## 2022-04-15 RX ADMIN — Medication 1 APPLICATION(S): at 10:44

## 2022-04-15 RX ADMIN — RISPERIDONE 1.5 MILLIGRAM(S): 4 TABLET ORAL at 10:43

## 2022-04-15 RX ADMIN — RISPERIDONE 1.5 MILLIGRAM(S): 4 TABLET ORAL at 21:41

## 2022-04-15 RX ADMIN — SENNA PLUS 1 TABLET(S): 8.6 TABLET ORAL at 21:40

## 2022-04-15 NOTE — BH INPATIENT PSYCHIATRY PROGRESS NOTE - NSBHCHARTREVIEWVS_PSY_A_CORE FT
Vital Signs Last 24 Hrs  T(C): 36.5 (04-15-22 @ 10:26), Max: 36.7 (04-15-22 @ 07:56)  T(F): 97.7 (04-15-22 @ 10:26), Max: 98.1 (04-15-22 @ 07:56)  HR: 57 (04-15-22 @ 14:56) (57 - 57)  BP: 94/44 (04-15-22 @ 14:56) (94/44 - 94/44)  BP(mean): --  RR: --  SpO2: --    Orthostatic VS  04-15-22 @ 10:26  Lying BP: --/-- HR: --  Sitting BP: 110/66 HR: 82  Standing BP: --/-- HR: --  Site: --  Mode: --  Orthostatic VS  04-15-22 @ 07:56  Lying BP: --/-- HR: --  Sitting BP: 87/41 HR: 61  Standing BP: --/-- HR: --  Site: upper left arm  Mode: electronic  Orthostatic VS  04-14-22 @ 20:39  Lying BP: --/-- HR: --  Sitting BP: 132/88 HR: 91  Standing BP: --/-- HR: --  Site: upper left arm  Mode: electronic  Orthostatic VS  04-14-22 @ 09:12  Lying BP: --/-- HR: --  Sitting BP: 114/88 HR: 82  Standing BP: --/-- HR: --  Site: upper left arm  Mode: electronic  Orthostatic VS  04-14-22 @ 08:08  Lying BP: --/-- HR: --  Sitting BP: 104/51 HR: 66  Standing BP: --/-- HR: --  Site: upper left arm  Mode: electronic

## 2022-04-15 NOTE — BH INPATIENT PSYCHIATRY PROGRESS NOTE - NSBHASSESSSUMMFT_PSY_ALL_CORE
4/14 Patient maintaining gains, tolerating treatment. Cont current meds w/o change.   4/15 Maintaining gains with occasional med hold. Cont rx consider changing Inderal to 20mg tid 4/14 Patient maintaining gains, tolerating treatment. Cont current meds w/o change.   4/15 Maintaining gains with occasional med hold. Cont rx  will change Inderal to 20mg tid to minimize risk low BP

## 2022-04-15 NOTE — BH INPATIENT PSYCHIATRY PROGRESS NOTE - NSBHFUPINTERVALHXFT_PSY_A_CORE
Patient is seen for TBI. No overnight events. Eating sleeping okay No prns  did need to hold dose for low BP and sedation today

## 2022-04-15 NOTE — BH INPATIENT PSYCHIATRY PROGRESS NOTE - NSBHMETABOLIC_PSY_ALL_CORE_FT
BMI: BMI (kg/m2): 31.6 (11-29-21 @ 16:35)  HbA1c: A1C with Estimated Average Glucose Result: 5.4 % (11-04-21 @ 06:58)    Glucose: POCT Blood Glucose.: 78 mg/dL (12-30-21 @ 07:34)    BP: 94/44 (04-15-22 @ 14:56) (94/44 - 149/64)  Lipid Panel:

## 2022-04-16 RX ADMIN — RISPERIDONE 1.5 MILLIGRAM(S): 4 TABLET ORAL at 13:21

## 2022-04-16 RX ADMIN — Medication 1 APPLICATION(S): at 13:20

## 2022-04-16 RX ADMIN — DIVALPROEX SODIUM 750 MILLIGRAM(S): 500 TABLET, DELAYED RELEASE ORAL at 11:08

## 2022-04-16 RX ADMIN — Medication 20 MILLIGRAM(S): at 11:08

## 2022-04-16 RX ADMIN — RISPERIDONE 1.5 MILLIGRAM(S): 4 TABLET ORAL at 20:06

## 2022-04-16 RX ADMIN — DIVALPROEX SODIUM 750 MILLIGRAM(S): 500 TABLET, DELAYED RELEASE ORAL at 20:04

## 2022-04-16 RX ADMIN — SENNA PLUS 1 TABLET(S): 8.6 TABLET ORAL at 20:07

## 2022-04-16 RX ADMIN — Medication 5 MILLIGRAM(S): at 20:07

## 2022-04-17 RX ADMIN — Medication 20 MILLIGRAM(S): at 09:04

## 2022-04-17 RX ADMIN — RISPERIDONE 1.5 MILLIGRAM(S): 4 TABLET ORAL at 13:21

## 2022-04-17 RX ADMIN — Medication 1 APPLICATION(S): at 13:24

## 2022-04-17 RX ADMIN — DIVALPROEX SODIUM 750 MILLIGRAM(S): 500 TABLET, DELAYED RELEASE ORAL at 09:03

## 2022-04-17 RX ADMIN — RISPERIDONE 1.5 MILLIGRAM(S): 4 TABLET ORAL at 09:04

## 2022-04-17 RX ADMIN — Medication 5 MILLIGRAM(S): at 20:28

## 2022-04-17 RX ADMIN — DIVALPROEX SODIUM 750 MILLIGRAM(S): 500 TABLET, DELAYED RELEASE ORAL at 20:28

## 2022-04-17 RX ADMIN — SENNA PLUS 1 TABLET(S): 8.6 TABLET ORAL at 20:29

## 2022-04-17 RX ADMIN — RISPERIDONE 1.5 MILLIGRAM(S): 4 TABLET ORAL at 20:29

## 2022-04-18 PROCEDURE — 99232 SBSQ HOSP IP/OBS MODERATE 35: CPT

## 2022-04-18 RX ADMIN — DIVALPROEX SODIUM 750 MILLIGRAM(S): 500 TABLET, DELAYED RELEASE ORAL at 09:55

## 2022-04-18 RX ADMIN — Medication 5 MILLIGRAM(S): at 22:17

## 2022-04-18 RX ADMIN — RISPERIDONE 1.5 MILLIGRAM(S): 4 TABLET ORAL at 09:54

## 2022-04-18 RX ADMIN — Medication 20 MILLIGRAM(S): at 09:55

## 2022-04-18 RX ADMIN — SENNA PLUS 1 TABLET(S): 8.6 TABLET ORAL at 22:17

## 2022-04-18 RX ADMIN — RISPERIDONE 1.5 MILLIGRAM(S): 4 TABLET ORAL at 13:29

## 2022-04-18 RX ADMIN — RISPERIDONE 1.5 MILLIGRAM(S): 4 TABLET ORAL at 22:17

## 2022-04-18 RX ADMIN — DIVALPROEX SODIUM 750 MILLIGRAM(S): 500 TABLET, DELAYED RELEASE ORAL at 22:16

## 2022-04-18 NOTE — BH INPATIENT PSYCHIATRY PROGRESS NOTE - NSBHCHARTREVIEWVS_PSY_A_CORE FT
Vital Signs Last 24 Hrs  T(C): 36.8 (04-18-22 @ 08:04), Max: 36.8 (04-18-22 @ 08:04)  T(F): 98.3 (04-18-22 @ 08:04), Max: 98.3 (04-18-22 @ 08:04)  HR: --  BP: --  BP(mean): --  RR: 18 (04-17-22 @ 19:27) (18 - 18)  SpO2: 100% (04-17-22 @ 19:27) (100% - 100%)    Orthostatic VS  04-18-22 @ 09:50  Lying BP: --/-- HR: --  Sitting BP: 116/55 HR: 64  Standing BP: --/-- HR: --  Site: upper left arm  Mode: electronic  Orthostatic VS  04-18-22 @ 08:04  Lying BP: --/-- HR: --  Sitting BP: 101/59 HR: 67  Standing BP: --/-- HR: --  Site: --  Mode: --  Orthostatic VS  04-17-22 @ 19:27  Lying BP: --/-- HR: --  Sitting BP: 125/60 HR: 72  Standing BP: --/-- HR: --  Site: upper left arm  Mode: electronic  Orthostatic VS  04-17-22 @ 13:00  Lying BP: --/-- HR: --  Sitting BP: 135/62 HR: 72  Standing BP: --/-- HR: --  Site: --  Mode: --  Orthostatic VS  04-16-22 @ 20:12  Lying BP: --/-- HR: --  Sitting BP: 120/88 HR: 78  Standing BP: --/-- HR: --  Site: upper left arm  Mode: electronic

## 2022-04-18 NOTE — BH INPATIENT PSYCHIATRY PROGRESS NOTE - NSBHFUPINTERVALHXFT_PSY_A_CORE
Patient is seen for TBI. No overnight events. Eating sleeping okay No prns  . VSS, no need for holding meds today

## 2022-04-18 NOTE — BH INPATIENT PSYCHIATRY PROGRESS NOTE - NSBHASSESSSUMMFT_PSY_ALL_CORE
4/14 Patient maintaining gains, tolerating treatment. Cont current meds w/o change.   4/15 Maintaining gains with occasional med hold. Cont rx  will change Inderal to 20mg tid to minimize risk low BP  4/18 Maintaiing gains despite decreased Inderal. Cont med, keep in mind tremor maybe affecting BP so should verify abn BP's

## 2022-04-18 NOTE — BH INPATIENT PSYCHIATRY PROGRESS NOTE - CURRENT MEDICATION
MEDICATIONS  (STANDING):  AQUAPHOR (petrolatum Ointment) 1 Application(s) Topical three times a day  diVALproex Sprinkle 750 milliGRAM(s) Oral two times a day  furosemide    Tablet 20 milliGRAM(s) Oral daily  melatonin. 5 milliGRAM(s) Oral at bedtime  propranolol 20 milliGRAM(s) Oral three times a day  risperiDONE   Tablet 1.5 milliGRAM(s) Oral <User Schedule>  senna 1 Tablet(s) Oral at bedtime    MEDICATIONS  (PRN):  acetaminophen     Tablet .. 650 milliGRAM(s) Oral every 6 hours PRN Temp greater or equal to 38C (100.4F), Mild Pain (1 - 3), Moderate Pain (4 - 6)  aluminum hydroxide/magnesium hydroxide/simethicone Suspension 30 milliLiter(s) Oral every 4 hours PRN Dyspepsia  fluPHENAZine 1 milliGRAM(s) Oral every 6 hours PRN agitation  fluPHENAZine  Injectable 1 milliGRAM(s) IntraMuscular once PRN agitation  fluPHENAZine  Injectable 1 milliGRAM(s) IntraMuscular once PRN agitation  fluPHENAZine  Injectable 1 milliGRAM(s) IntraMuscular once PRN agitation  lidocaine   4% Patch 2 Patch Transdermal daily PRN back pain  magnesium hydroxide Suspension 30 milliLiter(s) Oral at bedtime PRN constipation  mineral oil enema 133 milliLiter(s) Rectal daily PRN hard stool  saline laxative (FLEET) Rectal Enema 1 Enema Rectal daily PRN hard stool  triamcinolone 0.1% Cream 1 Application(s) Topical two times a day PRN pruritis/rash

## 2022-04-19 PROCEDURE — 99232 SBSQ HOSP IP/OBS MODERATE 35: CPT

## 2022-04-19 RX ADMIN — RISPERIDONE 1.5 MILLIGRAM(S): 4 TABLET ORAL at 22:12

## 2022-04-19 RX ADMIN — Medication 5 MILLIGRAM(S): at 22:11

## 2022-04-19 RX ADMIN — DIVALPROEX SODIUM 750 MILLIGRAM(S): 500 TABLET, DELAYED RELEASE ORAL at 22:10

## 2022-04-19 RX ADMIN — SENNA PLUS 1 TABLET(S): 8.6 TABLET ORAL at 22:46

## 2022-04-19 RX ADMIN — Medication 20 MILLIGRAM(S): at 09:36

## 2022-04-19 RX ADMIN — Medication 1 APPLICATION(S): at 09:36

## 2022-04-19 RX ADMIN — RISPERIDONE 1.5 MILLIGRAM(S): 4 TABLET ORAL at 09:37

## 2022-04-19 RX ADMIN — DIVALPROEX SODIUM 750 MILLIGRAM(S): 500 TABLET, DELAYED RELEASE ORAL at 09:37

## 2022-04-19 NOTE — BH INPATIENT PSYCHIATRY PROGRESS NOTE - NSBHASSESSSUMMFT_PSY_ALL_CORE
4/14 Patient maintaining gains, tolerating treatment. Cont current meds w/o change.   4/15 Maintaining gains with occasional med hold. Cont rx  will change Inderal to 20mg tid to minimize risk low BP  4/18 Maintaining gains despite decreased Inderal. Cont med, keep in mind tremor maybe affecting BP so should verify abn BP's  4/19: Patient maintaining gains despite reduced Inderal. Cont rx at current doses. If BP's still low could reduce again

## 2022-04-19 NOTE — BH INPATIENT PSYCHIATRY PROGRESS NOTE - NSBHMETABOLIC_PSY_ALL_CORE_FT
BMI: BMI (kg/m2): 31.6 (11-29-21 @ 16:35)  HbA1c: A1C with Estimated Average Glucose Result: 5.4 % (11-04-21 @ 06:58)    Glucose: POCT Blood Glucose.: 78 mg/dL (12-30-21 @ 07:34)    BP: 115/58 (04-17-22 @ 09:54) (115/58 - 115/58)  Lipid Panel:

## 2022-04-19 NOTE — BH INPATIENT PSYCHIATRY PROGRESS NOTE - MSE UNSTRUCTURED FT
Patient is awake and alert. Calm on exam, relates in a childlike manner. Has hand tremor which is highly variable, no rigidity. Speech volume variable, poorly modulated loud at times even when not clearly upset, exaggerated prosody.   Reported mood "good". Affect is bright generally some lability. Thought process is disjointed. No paranoia. No suicidal or homicidal ideation. Focused on wanting to rearrange some chairs. No hallucinations. Poor insight. Oriented to self only.

## 2022-04-19 NOTE — BH INPATIENT PSYCHIATRY PROGRESS NOTE - NSBHFUPINTERVALHXFT_PSY_A_CORE
Patient is seen for TBI. No overnight events. Eating sleeping okay No prns  . VSS, no need for holding meds today so far

## 2022-04-20 PROCEDURE — 99231 SBSQ HOSP IP/OBS SF/LOW 25: CPT

## 2022-04-20 RX ADMIN — RISPERIDONE 1.5 MILLIGRAM(S): 4 TABLET ORAL at 09:51

## 2022-04-20 RX ADMIN — Medication 20 MILLIGRAM(S): at 09:50

## 2022-04-20 RX ADMIN — DIVALPROEX SODIUM 750 MILLIGRAM(S): 500 TABLET, DELAYED RELEASE ORAL at 09:50

## 2022-04-20 RX ADMIN — SENNA PLUS 1 TABLET(S): 8.6 TABLET ORAL at 21:07

## 2022-04-20 RX ADMIN — Medication 5 MILLIGRAM(S): at 21:07

## 2022-04-20 RX ADMIN — Medication 1 APPLICATION(S): at 22:41

## 2022-04-20 RX ADMIN — Medication 1 APPLICATION(S): at 09:51

## 2022-04-20 RX ADMIN — RISPERIDONE 1.5 MILLIGRAM(S): 4 TABLET ORAL at 21:07

## 2022-04-20 RX ADMIN — DIVALPROEX SODIUM 750 MILLIGRAM(S): 500 TABLET, DELAYED RELEASE ORAL at 21:06

## 2022-04-20 RX ADMIN — RISPERIDONE 1.5 MILLIGRAM(S): 4 TABLET ORAL at 12:41

## 2022-04-20 NOTE — BH INPATIENT PSYCHIATRY PROGRESS NOTE - NSBHCHARTREVIEWVS_PSY_A_CORE FT
Vital Signs Last 24 Hrs  T(C): 36.2 (04-20-22 @ 08:46), Max: 36.2 (04-20-22 @ 08:46)  T(F): 97.2 (04-20-22 @ 08:46), Max: 97.2 (04-20-22 @ 08:46)  HR: 60 (04-19-22 @ 14:56) (60 - 60)  BP: 103/49 (04-19-22 @ 14:56) (103/49 - 103/49)  BP(mean): --  RR: 17 (04-19-22 @ 20:17) (17 - 17)  SpO2: --    Orthostatic VS  04-20-22 @ 08:46  Lying BP: --/-- HR: --  Sitting BP: 114/63 HR: 61  Standing BP: --/-- HR: --  Site: --  Mode: --  Orthostatic VS  04-19-22 @ 20:17  Lying BP: --/-- HR: --  Sitting BP: 135/61 HR: 73  Standing BP: --/-- HR: --  Site: --  Mode: --  Orthostatic VS  04-19-22 @ 07:39  Lying BP: --/-- HR: --  Sitting BP: 103/62 HR: 68  Standing BP: --/-- HR: --  Site: upper left arm  Mode: electronic  Orthostatic VS  04-18-22 @ 20:22  Lying BP: --/-- HR: --  Sitting BP: 134/64 HR: 85  Standing BP: --/-- HR: --  Site: --  Mode: --

## 2022-04-20 NOTE — BH INPATIENT PSYCHIATRY PROGRESS NOTE - NSBHASSESSSUMMFT_PSY_ALL_CORE
4/14 Patient maintaining gains, tolerating treatment. Cont current meds w/o change.   4/15 Maintaining gains with occasional med hold. Cont rx  will change Inderal to 20mg tid to minimize risk low BP  4/18 Maintaining gains despite decreased Inderal. Cont med, keep in mind tremor maybe affecting BP so should verify abn BP's  4/19: Patient maintaining gains despite reduced Inderal. Cont rx at current doses. If BP's still low could reduce again  4/20 Mainting gains depsite reduced Inderal and BPs higher cont current rx

## 2022-04-20 NOTE — BH INPATIENT PSYCHIATRY PROGRESS NOTE - NSBHMETABOLIC_PSY_ALL_CORE_FT
BMI: BMI (kg/m2): 31.6 (11-29-21 @ 16:35)  HbA1c: A1C with Estimated Average Glucose Result: 5.4 % (11-04-21 @ 06:58)    Glucose: POCT Blood Glucose.: 78 mg/dL (12-30-21 @ 07:34)    BP: 103/49 (04-19-22 @ 14:56) (103/49 - 103/49)  Lipid Panel:

## 2022-04-20 NOTE — BH INPATIENT PSYCHIATRY PROGRESS NOTE - MSE UNSTRUCTURED FT
Patient is awake and alert. Calm on exam, relates in a childlike manner. Has hand tremor which is highly variable, no rigidity. Speech volume variable, poorly modulated loud at times even when not clearly upset, exaggerated prosody.   Reported mood "good". Affect is bright generally some lability. Thought process is disjointed. No paranoia. No suicidal or homicidal ideation. Asking to see mother.  No hallucinations. Poor insight. Oriented to self only.

## 2022-04-21 PROCEDURE — 99231 SBSQ HOSP IP/OBS SF/LOW 25: CPT

## 2022-04-21 RX ADMIN — Medication 20 MILLIGRAM(S): at 09:41

## 2022-04-21 RX ADMIN — Medication 1 APPLICATION(S): at 12:46

## 2022-04-21 RX ADMIN — DIVALPROEX SODIUM 750 MILLIGRAM(S): 500 TABLET, DELAYED RELEASE ORAL at 09:40

## 2022-04-21 RX ADMIN — RISPERIDONE 1.5 MILLIGRAM(S): 4 TABLET ORAL at 21:55

## 2022-04-21 RX ADMIN — RISPERIDONE 1.5 MILLIGRAM(S): 4 TABLET ORAL at 09:41

## 2022-04-21 RX ADMIN — SENNA PLUS 1 TABLET(S): 8.6 TABLET ORAL at 21:56

## 2022-04-21 RX ADMIN — DIVALPROEX SODIUM 750 MILLIGRAM(S): 500 TABLET, DELAYED RELEASE ORAL at 21:56

## 2022-04-21 RX ADMIN — Medication 1 APPLICATION(S): at 21:56

## 2022-04-21 RX ADMIN — Medication 1 APPLICATION(S): at 09:41

## 2022-04-21 RX ADMIN — RISPERIDONE 1.5 MILLIGRAM(S): 4 TABLET ORAL at 12:42

## 2022-04-21 RX ADMIN — Medication 5 MILLIGRAM(S): at 21:55

## 2022-04-21 NOTE — BH INPATIENT PSYCHIATRY PROGRESS NOTE - NSBHCHARTREVIEWVS_PSY_A_CORE FT
Vital Signs Last 24 Hrs  T(C): 36.2 (04-21-22 @ 08:21), Max: 36.4 (04-20-22 @ 20:50)  T(F): 97.1 (04-21-22 @ 08:21), Max: 97.6 (04-20-22 @ 20:50)  HR: 92 (04-20-22 @ 21:03) (92 - 110)  BP: 133/78 (04-20-22 @ 20:50) (133/78 - 133/78)  BP(mean): --  RR: 18 (04-20-22 @ 20:50) (18 - 18)  SpO2: 99% (04-20-22 @ 20:50) (99% - 99%)    Orthostatic VS  04-21-22 @ 08:21  Lying BP: --/-- HR: --  Sitting BP: 127/71 HR: 100  Standing BP: 128/74 HR: 104  Site: --  Mode: --  Orthostatic VS  04-20-22 @ 08:46  Lying BP: --/-- HR: --  Sitting BP: 114/63 HR: 61  Standing BP: --/-- HR: --  Site: --  Mode: --  Orthostatic VS  04-19-22 @ 20:17  Lying BP: --/-- HR: --  Sitting BP: 135/61 HR: 73  Standing BP: --/-- HR: --  Site: --  Mode: --

## 2022-04-21 NOTE — BH INPATIENT PSYCHIATRY PROGRESS NOTE - NSBHMETABOLIC_PSY_ALL_CORE_FT
BMI: BMI (kg/m2): 31.6 (11-29-21 @ 16:35)  HbA1c: A1C with Estimated Average Glucose Result: 5.4 % (11-04-21 @ 06:58)    Glucose: POCT Blood Glucose.: 78 mg/dL (12-30-21 @ 07:34)    BP: 133/78 (04-20-22 @ 20:50) (103/49 - 133/78)  Lipid Panel:

## 2022-04-21 NOTE — BH INPATIENT PSYCHIATRY PROGRESS NOTE - NSBHASSESSSUMMFT_PSY_ALL_CORE
4/14 Patient maintaining gains, tolerating treatment. Cont current meds w/o change.   4/15 Maintaining gains with occasional med hold. Cont rx  will change Inderal to 20mg tid to minimize risk low BP  4/18 Maintaining gains despite decreased Inderal. Cont med, keep in mind tremor maybe affecting BP so should verify abn BP's  4/19: Patient maintaining gains despite reduced Inderal. Cont rx at current doses. If BP's still low could reduce again  4/20 Mainting gains depsite reduced Inderal and BPs higher cont current rx  4/21 Maintaining gains, cont current rx while attempting to find placement

## 2022-04-22 PROCEDURE — 99231 SBSQ HOSP IP/OBS SF/LOW 25: CPT

## 2022-04-22 RX ADMIN — DIVALPROEX SODIUM 750 MILLIGRAM(S): 500 TABLET, DELAYED RELEASE ORAL at 20:37

## 2022-04-22 RX ADMIN — Medication 1 APPLICATION(S): at 20:38

## 2022-04-22 RX ADMIN — Medication 5 MILLIGRAM(S): at 20:37

## 2022-04-22 RX ADMIN — Medication 1 APPLICATION(S): at 09:42

## 2022-04-22 RX ADMIN — RISPERIDONE 1.5 MILLIGRAM(S): 4 TABLET ORAL at 09:43

## 2022-04-22 RX ADMIN — SENNA PLUS 1 TABLET(S): 8.6 TABLET ORAL at 20:37

## 2022-04-22 RX ADMIN — DIVALPROEX SODIUM 750 MILLIGRAM(S): 500 TABLET, DELAYED RELEASE ORAL at 09:43

## 2022-04-22 RX ADMIN — Medication 20 MILLIGRAM(S): at 09:43

## 2022-04-22 RX ADMIN — RISPERIDONE 1.5 MILLIGRAM(S): 4 TABLET ORAL at 13:12

## 2022-04-22 NOTE — BH INPATIENT PSYCHIATRY PROGRESS NOTE - NSBHCHARTREVIEWVS_PSY_A_CORE FT
Vital Signs Last 24 Hrs  T(C): 36.6 (04-22-22 @ 07:55), Max: 36.6 (04-22-22 @ 07:55)  T(F): 97.9 (04-22-22 @ 07:55), Max: 97.9 (04-22-22 @ 07:55)  HR: 104 (04-21-22 @ 21:38) (104 - 104)  BP: 122/54 (04-21-22 @ 21:38) (122/54 - 122/54)  BP(mean): --  RR: --  SpO2: --    Orthostatic VS  04-22-22 @ 07:55  Lying BP: --/-- HR: --  Sitting BP: 123/60 HR: 56  Standing BP: --/-- HR: --  Site: --  Mode: --  Orthostatic VS  04-21-22 @ 08:21  Lying BP: --/-- HR: --  Sitting BP: 127/71 HR: 100  Standing BP: 128/74 HR: 104  Site: --  Mode: --

## 2022-04-22 NOTE — BH INPATIENT PSYCHIATRY PROGRESS NOTE - NSBHASSESSSUMMFT_PSY_ALL_CORE
4/14 Patient maintaining gains, tolerating treatment. Cont current meds w/o change.   4/15 Maintaining gains with occasional med hold. Cont rx  will change Inderal to 20mg tid to minimize risk low BP  4/18 Maintaining gains despite decreased Inderal. Cont med, keep in mind tremor maybe affecting BP so should verify abn BP's  4/19: Patient maintaining gains despite reduced Inderal. Cont rx at current doses. If BP's still low could reduce again  4/20 Mainting gains depsite reduced Inderal and BPs higher cont current rx  4/21 Maintaining gains, cont current rx while attempting to find placement  4/22 Patient maintaining gains. Cont current meds at current doses

## 2022-04-22 NOTE — BH INPATIENT PSYCHIATRY PROGRESS NOTE - NSBHMETABOLIC_PSY_ALL_CORE_FT
BMI: BMI (kg/m2): 31.6 (11-29-21 @ 16:35)  HbA1c: A1C with Estimated Average Glucose Result: 5.4 % (11-04-21 @ 06:58)    Glucose: POCT Blood Glucose.: 78 mg/dL (12-30-21 @ 07:34)    BP: 122/54 (04-21-22 @ 21:38) (103/49 - 133/78)  Lipid Panel:

## 2022-04-23 RX ADMIN — RISPERIDONE 1.5 MILLIGRAM(S): 4 TABLET ORAL at 21:07

## 2022-04-23 RX ADMIN — RISPERIDONE 1.5 MILLIGRAM(S): 4 TABLET ORAL at 13:25

## 2022-04-23 RX ADMIN — SENNA PLUS 1 TABLET(S): 8.6 TABLET ORAL at 21:07

## 2022-04-23 RX ADMIN — DIVALPROEX SODIUM 750 MILLIGRAM(S): 500 TABLET, DELAYED RELEASE ORAL at 09:15

## 2022-04-23 RX ADMIN — Medication 5 MILLIGRAM(S): at 21:08

## 2022-04-23 RX ADMIN — RISPERIDONE 1.5 MILLIGRAM(S): 4 TABLET ORAL at 09:16

## 2022-04-23 RX ADMIN — Medication 1 APPLICATION(S): at 09:16

## 2022-04-23 RX ADMIN — Medication 20 MILLIGRAM(S): at 09:16

## 2022-04-23 RX ADMIN — DIVALPROEX SODIUM 750 MILLIGRAM(S): 500 TABLET, DELAYED RELEASE ORAL at 21:05

## 2022-04-24 RX ADMIN — RISPERIDONE 1.5 MILLIGRAM(S): 4 TABLET ORAL at 09:43

## 2022-04-24 RX ADMIN — RISPERIDONE 1.5 MILLIGRAM(S): 4 TABLET ORAL at 21:23

## 2022-04-24 RX ADMIN — Medication 1 APPLICATION(S): at 12:56

## 2022-04-24 RX ADMIN — Medication 5 MILLIGRAM(S): at 21:23

## 2022-04-24 RX ADMIN — RISPERIDONE 1.5 MILLIGRAM(S): 4 TABLET ORAL at 12:52

## 2022-04-24 RX ADMIN — Medication 20 MILLIGRAM(S): at 09:43

## 2022-04-24 RX ADMIN — DIVALPROEX SODIUM 750 MILLIGRAM(S): 500 TABLET, DELAYED RELEASE ORAL at 09:43

## 2022-04-24 RX ADMIN — SENNA PLUS 1 TABLET(S): 8.6 TABLET ORAL at 21:23

## 2022-04-24 RX ADMIN — DIVALPROEX SODIUM 750 MILLIGRAM(S): 500 TABLET, DELAYED RELEASE ORAL at 21:22

## 2022-04-24 RX ADMIN — Medication 1 APPLICATION(S): at 09:44

## 2022-04-25 PROCEDURE — 99231 SBSQ HOSP IP/OBS SF/LOW 25: CPT

## 2022-04-25 RX ORDER — HYDROCORTISONE 1 %
1 OINTMENT (GRAM) TOPICAL
Refills: 0 | Status: DISCONTINUED | OUTPATIENT
Start: 2022-04-25 | End: 2022-05-18

## 2022-04-25 RX ADMIN — Medication 5 MILLIGRAM(S): at 21:47

## 2022-04-25 RX ADMIN — RISPERIDONE 1.5 MILLIGRAM(S): 4 TABLET ORAL at 21:47

## 2022-04-25 RX ADMIN — Medication 1 APPLICATION(S): at 09:54

## 2022-04-25 RX ADMIN — DIVALPROEX SODIUM 750 MILLIGRAM(S): 500 TABLET, DELAYED RELEASE ORAL at 21:45

## 2022-04-25 RX ADMIN — Medication 1 APPLICATION(S): at 21:48

## 2022-04-25 RX ADMIN — DIVALPROEX SODIUM 750 MILLIGRAM(S): 500 TABLET, DELAYED RELEASE ORAL at 09:54

## 2022-04-25 RX ADMIN — RISPERIDONE 1.5 MILLIGRAM(S): 4 TABLET ORAL at 12:46

## 2022-04-25 RX ADMIN — RISPERIDONE 1.5 MILLIGRAM(S): 4 TABLET ORAL at 09:55

## 2022-04-25 RX ADMIN — Medication 1 APPLICATION(S): at 13:07

## 2022-04-25 RX ADMIN — SENNA PLUS 1 TABLET(S): 8.6 TABLET ORAL at 21:47

## 2022-04-25 RX ADMIN — Medication 20 MILLIGRAM(S): at 09:55

## 2022-04-25 NOTE — BH INPATIENT PSYCHIATRY PROGRESS NOTE - NSBHASSESSSUMMFT_PSY_ALL_CORE
4/14 Patient maintaining gains, tolerating treatment. Cont current meds w/o change.   4/15 Maintaining gains with occasional med hold. Cont rx  will change Inderal to 20mg tid to minimize risk low BP  4/18 Maintaining gains despite decreased Inderal. Cont med, keep in mind tremor maybe affecting BP so should verify abn BP's  4/19: Patient maintaining gains despite reduced Inderal. Cont rx at current doses. If BP's still low could reduce again  4/20 Mainting gains depsite reduced Inderal and BPs higher cont current rx  4/21 Maintaining gains, cont current rx while attempting to find placement  4/22 Patient maintaining gains. Cont current meds at current doses  4/25 Patient stable, holding on to gains. Cont meds, will ask medicine  to see hands

## 2022-04-25 NOTE — BH INPATIENT PSYCHIATRY PROGRESS NOTE - NSBHFUPINTERVALHXFT_PSY_A_CORE
Patient is seen for TBI. No overnight events. Eating sleeping okay No prns  . VSS. Staff reports some reddened irritated areas on hands

## 2022-04-25 NOTE — BH INPATIENT PSYCHIATRY PROGRESS NOTE - NSBHCHARTREVIEWVS_PSY_A_CORE FT
Vital Signs Last 24 Hrs  T(C): 36.9 (04-25-22 @ 08:21), Max: 36.9 (04-25-22 @ 08:21)  T(F): 98.4 (04-25-22 @ 08:21), Max: 98.4 (04-25-22 @ 08:21)  HR: 70 (04-24-22 @ 20:45) (70 - 70)  BP: 141/56 (04-24-22 @ 20:45) (141/56 - 141/56)  BP(mean): --  RR: --  SpO2: 100% (04-24-22 @ 20:45) (100% - 100%)    Orthostatic VS  04-25-22 @ 08:21  Lying BP: --/-- HR: --  Sitting BP: 118/67 HR: 104  Standing BP: --/-- HR: --  Site: upper left arm  Mode: electronic  Orthostatic VS  04-24-22 @ 09:38  Lying BP: --/-- HR: --  Sitting BP: 112/63 HR: 73  Standing BP: --/-- HR: --  Site: upper left arm  Mode: electronic  Orthostatic VS  04-23-22 @ 20:49  Lying BP: --/-- HR: --  Sitting BP: 123/61 HR: 107  Standing BP: --/-- HR: --  Site: --  Mode: --

## 2022-04-26 PROCEDURE — 99231 SBSQ HOSP IP/OBS SF/LOW 25: CPT

## 2022-04-26 RX ORDER — RISPERIDONE 4 MG/1
1.5 TABLET ORAL
Refills: 0 | Status: DISCONTINUED | OUTPATIENT
Start: 2022-04-26 | End: 2022-05-18

## 2022-04-26 RX ADMIN — Medication 1 APPLICATION(S): at 21:52

## 2022-04-26 RX ADMIN — DIVALPROEX SODIUM 750 MILLIGRAM(S): 500 TABLET, DELAYED RELEASE ORAL at 08:31

## 2022-04-26 RX ADMIN — DIVALPROEX SODIUM 750 MILLIGRAM(S): 500 TABLET, DELAYED RELEASE ORAL at 21:48

## 2022-04-26 RX ADMIN — RISPERIDONE 1.5 MILLIGRAM(S): 4 TABLET ORAL at 13:26

## 2022-04-26 RX ADMIN — Medication 5 MILLIGRAM(S): at 21:49

## 2022-04-26 RX ADMIN — Medication 20 MILLIGRAM(S): at 08:31

## 2022-04-26 RX ADMIN — RISPERIDONE 1.5 MILLIGRAM(S): 4 TABLET ORAL at 21:49

## 2022-04-26 RX ADMIN — Medication 1 APPLICATION(S): at 11:39

## 2022-04-26 RX ADMIN — SENNA PLUS 1 TABLET(S): 8.6 TABLET ORAL at 21:49

## 2022-04-26 RX ADMIN — RISPERIDONE 1.5 MILLIGRAM(S): 4 TABLET ORAL at 08:30

## 2022-04-26 RX ADMIN — Medication 1 APPLICATION(S): at 11:40

## 2022-04-26 NOTE — BH INPATIENT PSYCHIATRY PROGRESS NOTE - NSBHCHARTREVIEWVS_PSY_A_CORE FT
Vital Signs Last 24 Hrs  T(C): 36.6 (04-26-22 @ 08:13), Max: 36.6 (04-26-22 @ 08:13)  T(F): 97.8 (04-26-22 @ 08:13), Max: 97.8 (04-26-22 @ 08:13)  HR: --  BP: --  BP(mean): --  RR: --  SpO2: --    Orthostatic VS  04-26-22 @ 08:13  Lying BP: --/-- HR: --  Sitting BP: 117/64 HR: 70  Standing BP: --/-- HR: --  Site: upper left arm  Mode: electronic  Orthostatic VS  04-25-22 @ 20:29  Lying BP: --/-- HR: --  Sitting BP: 138/81 HR: 80  Standing BP: --/-- HR: --  Site: --  Mode: --  Orthostatic VS  04-25-22 @ 08:21  Lying BP: --/-- HR: --  Sitting BP: 118/67 HR: 104  Standing BP: --/-- HR: --  Site: upper left arm  Mode: electronic

## 2022-04-26 NOTE — BH INPATIENT PSYCHIATRY PROGRESS NOTE - NSBHMETABOLIC_PSY_ALL_CORE_FT
BMI: BMI (kg/m2): 31.6 (11-29-21 @ 16:35)  HbA1c: A1C with Estimated Average Glucose Result: 5.4 % (11-04-21 @ 06:58)    Glucose: POCT Blood Glucose.: 78 mg/dL (12-30-21 @ 07:34)    BP: 141/56 (04-24-22 @ 20:45) (141/56 - 141/56)  Lipid Panel:

## 2022-04-26 NOTE — BH INPATIENT PSYCHIATRY PROGRESS NOTE - CURRENT MEDICATION
MEDICATIONS  (STANDING):  AQUAPHOR (petrolatum Ointment) 1 Application(s) Topical three times a day  diVALproex Sprinkle 750 milliGRAM(s) Oral two times a day  furosemide    Tablet 20 milliGRAM(s) Oral daily  hydrocortisone 1% Cream 1 Application(s) Topical two times a day  melatonin. 5 milliGRAM(s) Oral at bedtime  propranolol 20 milliGRAM(s) Oral three times a day  risperiDONE   Tablet 1.5 milliGRAM(s) Oral <User Schedule>  senna 1 Tablet(s) Oral at bedtime    MEDICATIONS  (PRN):  acetaminophen     Tablet .. 650 milliGRAM(s) Oral every 6 hours PRN Temp greater or equal to 38C (100.4F), Mild Pain (1 - 3), Moderate Pain (4 - 6)  aluminum hydroxide/magnesium hydroxide/simethicone Suspension 30 milliLiter(s) Oral every 4 hours PRN Dyspepsia  fluPHENAZine 1 milliGRAM(s) Oral every 6 hours PRN agitation  fluPHENAZine  Injectable 1 milliGRAM(s) IntraMuscular once PRN agitation  fluPHENAZine  Injectable 1 milliGRAM(s) IntraMuscular once PRN agitation  fluPHENAZine  Injectable 1 milliGRAM(s) IntraMuscular once PRN agitation  lidocaine   4% Patch 2 Patch Transdermal daily PRN back pain  magnesium hydroxide Suspension 30 milliLiter(s) Oral at bedtime PRN constipation  mineral oil enema 133 milliLiter(s) Rectal daily PRN hard stool  saline laxative (FLEET) Rectal Enema 1 Enema Rectal daily PRN hard stool  triamcinolone 0.1% Cream 1 Application(s) Topical two times a day PRN pruritis/rash

## 2022-04-26 NOTE — BH INPATIENT PSYCHIATRY PROGRESS NOTE - MSE UNSTRUCTURED FT
Patient is awake and alert. Calm on exam, relates in a childlike manner.  Holding on to a towel. Has hand tremor which is highly variable, no rigidity. Speech volume variable, poorly modulated loud at times even when not clearly upset, exaggerated prosody.   Reported mood "good". Affect is bright generally some lability. Thought process is disjointed. No paranoia. No suicidal or homicidal ideation.   No hallucinations. Poor insight. Oriented to self only.

## 2022-04-26 NOTE — BH INPATIENT PSYCHIATRY PROGRESS NOTE - NSBHASSESSSUMMFT_PSY_ALL_CORE
4/14 Patient maintaining gains, tolerating treatment. Cont current meds w/o change.   4/15 Maintaining gains with occasional med hold. Cont rx  will change Inderal to 20mg tid to minimize risk low BP  4/18 Maintaining gains despite decreased Inderal. Cont med, keep in mind tremor maybe affecting BP so should verify abn BP's  4/19: Patient maintaining gains despite reduced Inderal. Cont rx at current doses. If BP's still low could reduce again  4/20 Mainting gains depsite reduced Inderal and BPs higher cont current rx  4/21 Maintaining gains, cont current rx while attempting to find placement  4/22 Patient maintaining gains. Cont current meds at current doses  4/25 Patient stable, holding on to gains. Cont meds, will ask medicine  to see hands  4/26 Patient's stable, holding on to gains. No agitation. COnt meds, medicine to see hands, they look less reddened today

## 2022-04-27 PROCEDURE — 99231 SBSQ HOSP IP/OBS SF/LOW 25: CPT

## 2022-04-27 RX ADMIN — Medication 1 APPLICATION(S): at 09:39

## 2022-04-27 RX ADMIN — Medication 1 APPLICATION(S): at 21:21

## 2022-04-27 RX ADMIN — Medication 1 APPLICATION(S): at 13:05

## 2022-04-27 RX ADMIN — DIVALPROEX SODIUM 750 MILLIGRAM(S): 500 TABLET, DELAYED RELEASE ORAL at 21:19

## 2022-04-27 RX ADMIN — Medication 5 MILLIGRAM(S): at 21:20

## 2022-04-27 RX ADMIN — SENNA PLUS 1 TABLET(S): 8.6 TABLET ORAL at 21:20

## 2022-04-27 RX ADMIN — DIVALPROEX SODIUM 750 MILLIGRAM(S): 500 TABLET, DELAYED RELEASE ORAL at 09:39

## 2022-04-27 RX ADMIN — RISPERIDONE 1.5 MILLIGRAM(S): 4 TABLET ORAL at 13:08

## 2022-04-27 RX ADMIN — RISPERIDONE 1.5 MILLIGRAM(S): 4 TABLET ORAL at 09:40

## 2022-04-27 RX ADMIN — RISPERIDONE 1.5 MILLIGRAM(S): 4 TABLET ORAL at 21:19

## 2022-04-27 RX ADMIN — Medication 20 MILLIGRAM(S): at 09:40

## 2022-04-27 NOTE — BH INPATIENT PSYCHIATRY PROGRESS NOTE - NSBHMETABOLIC_PSY_ALL_CORE_FT
BMI: BMI (kg/m2): 31.6 (11-29-21 @ 16:35)  HbA1c: A1C with Estimated Average Glucose Result: 5.4 % (11-04-21 @ 06:58)    Glucose: POCT Blood Glucose.: 78 mg/dL (12-30-21 @ 07:34)    BP: 117/69 (04-27-22 @ 07:37) (117/69 - 141/56)  Lipid Panel:

## 2022-04-27 NOTE — BH INPATIENT PSYCHIATRY PROGRESS NOTE - NSBHASSESSSUMMFT_PSY_ALL_CORE
4/14 Patient maintaining gains, tolerating treatment. Cont current meds w/o change.   4/15 Maintaining gains with occasional med hold. Cont rx  will change Inderal to 20mg tid to minimize risk low BP  4/18 Maintaining gains despite decreased Inderal. Cont med, keep in mind tremor maybe affecting BP so should verify abn BP's  4/19: Patient maintaining gains despite reduced Inderal. Cont rx at current doses. If BP's still low could reduce again  4/20 Mainting gains depsite reduced Inderal and BPs higher cont current rx  4/21 Maintaining gains, cont current rx while attempting to find placement  4/22 Patient maintaining gains. Cont current meds at current doses  4/25 Patient stable, holding on to gains. Cont meds, will ask medicine  to see hands  4/26 Patient's stable, holding on to gains. No agitation. COnt meds, medicine to see hands, they look less reddened today  4/27 Patient currently stable, rejected by another facility. ordered podiatry consult

## 2022-04-27 NOTE — BH INPATIENT PSYCHIATRY PROGRESS NOTE - NSBHCHARTREVIEWVS_PSY_A_CORE FT
Vital Signs Last 24 Hrs  T(C): 36.6 (04-27-22 @ 07:37), Max: 36.6 (04-27-22 @ 07:37)  T(F): 97.9 (04-27-22 @ 07:37), Max: 97.9 (04-27-22 @ 07:37)  HR: 74 (04-27-22 @ 07:37) (74 - 74)  BP: 117/69 (04-27-22 @ 07:37) (117/69 - 117/69)  BP(mean): --  RR: 17 (04-26-22 @ 20:21) (17 - 17)  SpO2: --    Orthostatic VS  04-26-22 @ 20:21  Lying BP: --/-- HR: --  Sitting BP: 132/74 HR: 86  Standing BP: --/-- HR: --  Site: --  Mode: --  Orthostatic VS  04-26-22 @ 13:16  Lying BP: --/-- HR: --  Sitting BP: 108/67 HR: 76  Standing BP: 113/56 HR: 86  Site: --  Mode: --  Orthostatic VS  04-26-22 @ 08:13  Lying BP: --/-- HR: --  Sitting BP: 117/64 HR: 70  Standing BP: --/-- HR: --  Site: upper left arm  Mode: electronic  Orthostatic VS  04-25-22 @ 20:29  Lying BP: --/-- HR: --  Sitting BP: 138/81 HR: 80  Standing BP: --/-- HR: --  Site: --  Mode: --

## 2022-04-27 NOTE — BH INPATIENT PSYCHIATRY PROGRESS NOTE - NSBHFUPINTERVALHXFT_PSY_A_CORE
Patient is seen for TBI. No overnight events. Eating sleeping okay No prns  . VSS. Staff reports some reddened irritated areas on hands, better with steroid cream. Has avulsed toenail

## 2022-04-28 PROCEDURE — 99231 SBSQ HOSP IP/OBS SF/LOW 25: CPT

## 2022-04-28 RX ADMIN — RISPERIDONE 1.5 MILLIGRAM(S): 4 TABLET ORAL at 21:43

## 2022-04-28 RX ADMIN — DIVALPROEX SODIUM 750 MILLIGRAM(S): 500 TABLET, DELAYED RELEASE ORAL at 21:41

## 2022-04-28 RX ADMIN — Medication 1 APPLICATION(S): at 09:36

## 2022-04-28 RX ADMIN — Medication 1 APPLICATION(S): at 21:38

## 2022-04-28 RX ADMIN — DIVALPROEX SODIUM 750 MILLIGRAM(S): 500 TABLET, DELAYED RELEASE ORAL at 09:35

## 2022-04-28 RX ADMIN — SENNA PLUS 1 TABLET(S): 8.6 TABLET ORAL at 21:42

## 2022-04-28 RX ADMIN — RISPERIDONE 1.5 MILLIGRAM(S): 4 TABLET ORAL at 13:25

## 2022-04-28 RX ADMIN — RISPERIDONE 1.5 MILLIGRAM(S): 4 TABLET ORAL at 09:36

## 2022-04-28 RX ADMIN — Medication 5 MILLIGRAM(S): at 21:42

## 2022-04-28 RX ADMIN — Medication 20 MILLIGRAM(S): at 09:36

## 2022-04-28 RX ADMIN — Medication 1 APPLICATION(S): at 21:40

## 2022-04-28 NOTE — BH INPATIENT PSYCHIATRY PROGRESS NOTE - CURRENT MEDICATION
MEDICATIONS  (STANDING):  AQUAPHOR (petrolatum Ointment) 1 Application(s) Topical three times a day  diVALproex Sprinkle 750 milliGRAM(s) Oral two times a day  furosemide    Tablet 20 milliGRAM(s) Oral daily  hydrocortisone 1% Cream 1 Application(s) Topical two times a day  melatonin. 5 milliGRAM(s) Oral at bedtime  propranolol 20 milliGRAM(s) Oral two times a day  risperiDONE   Tablet 1.5 milliGRAM(s) Oral <User Schedule>  senna 1 Tablet(s) Oral at bedtime    MEDICATIONS  (PRN):  acetaminophen     Tablet .. 650 milliGRAM(s) Oral every 6 hours PRN Temp greater or equal to 38C (100.4F), Mild Pain (1 - 3), Moderate Pain (4 - 6)  aluminum hydroxide/magnesium hydroxide/simethicone Suspension 30 milliLiter(s) Oral every 4 hours PRN Dyspepsia  fluPHENAZine 1 milliGRAM(s) Oral every 6 hours PRN agitation  fluPHENAZine  Injectable 1 milliGRAM(s) IntraMuscular once PRN agitation  fluPHENAZine  Injectable 1 milliGRAM(s) IntraMuscular once PRN agitation  fluPHENAZine  Injectable 1 milliGRAM(s) IntraMuscular once PRN agitation  lidocaine   4% Patch 2 Patch Transdermal daily PRN back pain  magnesium hydroxide Suspension 30 milliLiter(s) Oral at bedtime PRN constipation  mineral oil enema 133 milliLiter(s) Rectal daily PRN hard stool  saline laxative (FLEET) Rectal Enema 1 Enema Rectal daily PRN hard stool  triamcinolone 0.1% Cream 1 Application(s) Topical two times a day PRN pruritis/rash

## 2022-04-28 NOTE — BH INPATIENT PSYCHIATRY PROGRESS NOTE - MSE UNSTRUCTURED FT
Patient is awake and alert. Calm on exam, relates in a childlike manner.  Holding on to two stuffed animals. Has hand tremor which is highly variable, no rigidity. Speech volume variable, poorly modulated loud at times even when not clearly upset, exaggerated prosody.   Reported mood "good". Affect is bright generally some lability. Thought process is disjointed. No paranoia. No suicidal or homicidal ideation.   No hallucinations. Poor insight. Oriented to self only.

## 2022-04-28 NOTE — BH INPATIENT PSYCHIATRY PROGRESS NOTE - NSBHASSESSSUMMFT_PSY_ALL_CORE
4/14 Patient maintaining gains, tolerating treatment. Cont current meds w/o change.   4/15 Maintaining gains with occasional med hold. Cont rx  will change Inderal to 20mg tid to minimize risk low BP  4/18 Maintaining gains despite decreased Inderal. Cont med, keep in mind tremor maybe affecting BP so should verify abn BP's  4/19: Patient maintaining gains despite reduced Inderal. Cont rx at current doses. If BP's still low could reduce again  4/20 Mainting gains depsite reduced Inderal and BPs higher cont current rx  4/21 Maintaining gains, cont current rx while attempting to find placement  4/22 Patient maintaining gains. Cont current meds at current doses  4/25 Patient stable, holding on to gains. Cont meds, will ask medicine  to see hands  4/26 Patient's stable, holding on to gains. No agitation. COnt meds, medicine to see hands, they look less reddened today  4/27 Patient currently stable, rejected by another facility. ordered podiatry consult  4/28 Maintaining gains, will modestly reduce Inderal to avoid low BPs, podiatry consult ordered

## 2022-04-28 NOTE — BH INPATIENT PSYCHIATRY PROGRESS NOTE - NSBHFUPINTERVALHXFT_PSY_A_CORE
Patient is seen for TBI. No overnight events. Eating sleeping okay No prns. VSS but prone to be in low nl range Staff reports some reddened irritated areas on hands, better with steroid cream. Has avulsed toenail, awaiting podiatry to see

## 2022-04-28 NOTE — BH INPATIENT PSYCHIATRY PROGRESS NOTE - NSBHMETABOLIC_PSY_ALL_CORE_FT
BMI: BMI (kg/m2): 31.6 (11-29-21 @ 16:35)  HbA1c: A1C with Estimated Average Glucose Result: 5.4 % (11-04-21 @ 06:58)    Glucose: POCT Blood Glucose.: 78 mg/dL (12-30-21 @ 07:34)    BP: 117/69 (04-27-22 @ 07:37) (117/69 - 117/69)  Lipid Panel:

## 2022-04-28 NOTE — BH INPATIENT PSYCHIATRY PROGRESS NOTE - NSBHCHARTREVIEWVS_PSY_A_CORE FT
Vital Signs Last 24 Hrs  T(C): 36.4 (04-28-22 @ 07:56), Max: 36.4 (04-28-22 @ 07:56)  T(F): 97.5 (04-28-22 @ 07:56), Max: 97.5 (04-28-22 @ 07:56)  HR: --  BP: --  BP(mean): --  RR: --  SpO2: --    Orthostatic VS  04-28-22 @ 13:23  Lying BP: --/-- HR: --  Sitting BP: 119/74 HR: 83  Standing BP: --/-- HR: --  Site: upper left arm  Mode: electronic  Orthostatic VS  04-28-22 @ 07:56  Lying BP: --/-- HR: --  Sitting BP: 102/57 HR: 71  Standing BP: --/-- HR: --  Site: --  Mode: --  Orthostatic VS  04-27-22 @ 20:37  Lying BP: --/-- HR: --  Sitting BP: 121/88 HR: 68  Standing BP: --/-- HR: --  Site: --  Mode: --  Orthostatic VS  04-26-22 @ 20:21  Lying BP: --/-- HR: --  Sitting BP: 132/74 HR: 86  Standing BP: --/-- HR: --  Site: --  Mode: --

## 2022-04-29 PROCEDURE — 99231 SBSQ HOSP IP/OBS SF/LOW 25: CPT

## 2022-04-29 RX ADMIN — Medication 5 MILLIGRAM(S): at 20:53

## 2022-04-29 RX ADMIN — Medication 1 APPLICATION(S): at 12:02

## 2022-04-29 RX ADMIN — DIVALPROEX SODIUM 750 MILLIGRAM(S): 500 TABLET, DELAYED RELEASE ORAL at 12:01

## 2022-04-29 RX ADMIN — RISPERIDONE 1.5 MILLIGRAM(S): 4 TABLET ORAL at 12:01

## 2022-04-29 RX ADMIN — Medication 20 MILLIGRAM(S): at 12:01

## 2022-04-29 RX ADMIN — RISPERIDONE 1.5 MILLIGRAM(S): 4 TABLET ORAL at 16:32

## 2022-04-29 RX ADMIN — DIVALPROEX SODIUM 750 MILLIGRAM(S): 500 TABLET, DELAYED RELEASE ORAL at 20:53

## 2022-04-29 RX ADMIN — RISPERIDONE 1.5 MILLIGRAM(S): 4 TABLET ORAL at 20:53

## 2022-04-29 RX ADMIN — SENNA PLUS 1 TABLET(S): 8.6 TABLET ORAL at 20:53

## 2022-04-29 NOTE — BH INPATIENT PSYCHIATRY PROGRESS NOTE - NSBHMETABOLIC_PSY_ALL_CORE_FT
ANDREY FROM RADIOLOGY DEPT CAME TO  PT FOR XRAY OF SMALL BOWEL PER WHEELCHAIR. 
PROVIDED WITH TICKET TO RIDE. NO SOB NOTED. VERBALIZED PAIN SUBSIDING FROM PREVIOUSLY GIVEN 
PAIN MED. BMI: BMI (kg/m2): 31.6 (11-29-21 @ 16:35)  HbA1c: A1C with Estimated Average Glucose Result: 5.4 % (11-04-21 @ 06:58)    Glucose: POCT Blood Glucose.: 78 mg/dL (12-30-21 @ 07:34)    BP: 129/58 (04-29-22 @ 08:21) (117/69 - 129/58)  Lipid Panel:

## 2022-04-29 NOTE — BH INPATIENT PSYCHIATRY PROGRESS NOTE - NSBHCHARTREVIEWVS_PSY_A_CORE FT
Vital Signs Last 24 Hrs  T(C): 36.4 (04-29-22 @ 08:21), Max: 36.4 (04-29-22 @ 08:21)  T(F): 97.5 (04-29-22 @ 08:21), Max: 97.5 (04-29-22 @ 08:21)  HR: 68 (04-29-22 @ 08:21) (68 - 68)  BP: 129/58 (04-29-22 @ 08:21) (129/58 - 129/58)  BP(mean): --  RR: 16 (04-28-22 @ 21:00) (16 - 16)  SpO2: --    Orthostatic VS  04-28-22 @ 21:00  Lying BP: --/-- HR: --  Sitting BP: 128/68 HR: 80  Standing BP: --/-- HR: --  Site: --  Mode: --  Orthostatic VS  04-28-22 @ 13:23  Lying BP: --/-- HR: --  Sitting BP: 119/74 HR: 83  Standing BP: --/-- HR: --  Site: upper left arm  Mode: electronic  Orthostatic VS  04-28-22 @ 07:56  Lying BP: --/-- HR: --  Sitting BP: 102/57 HR: 71  Standing BP: --/-- HR: --  Site: --  Mode: --  Orthostatic VS  04-27-22 @ 20:37  Lying BP: --/-- HR: --  Sitting BP: 121/88 HR: 68  Standing BP: --/-- HR: --  Site: --  Mode: --

## 2022-04-29 NOTE — BH INPATIENT PSYCHIATRY PROGRESS NOTE - NSBHFUPINTERVALHXFT_PSY_A_CORE
Patient is seen for TBI. No overnight events. Eating sleeping okay No prns. VSS , higher todayStaff reports some reddened irritated areas on hands, better with steroid cream. Has avulsed toenail, awaiting podiatry to see.

## 2022-04-29 NOTE — BH INPATIENT PSYCHIATRY PROGRESS NOTE - NSBHASSESSSUMMFT_PSY_ALL_CORE
4/14 Patient maintaining gains, tolerating treatment. Cont current meds w/o change.   4/15 Maintaining gains with occasional med hold. Cont rx  will change Inderal to 20mg tid to minimize risk low BP  4/18 Maintaining gains despite decreased Inderal. Cont med, keep in mind tremor maybe affecting BP so should verify abn BP's  4/19: Patient maintaining gains despite reduced Inderal. Cont rx at current doses. If BP's still low could reduce again  4/20 Mainting gains depsite reduced Inderal and BPs higher cont current rx  4/21 Maintaining gains, cont current rx while attempting to find placement  4/22 Patient maintaining gains. Cont current meds at current doses  4/25 Patient stable, holding on to gains. Cont meds, will ask medicine  to see hands  4/26 Patient's stable, holding on to gains. No agitation. COnt meds, medicine to see hands, they look less reddened today  4/27 Patient currently stable, rejected by another facility. ordered podiatry consult  4/28 Maintaining gains, will modestly reduce Inderal to avoid low BPs, podiatry consult ordered  4/29 Holding gains with reduced Inderal and BP better, cont current treatment and placement efforts

## 2022-04-30 RX ADMIN — Medication 1 APPLICATION(S): at 21:03

## 2022-04-30 RX ADMIN — RISPERIDONE 1.5 MILLIGRAM(S): 4 TABLET ORAL at 20:49

## 2022-04-30 RX ADMIN — RISPERIDONE 1.5 MILLIGRAM(S): 4 TABLET ORAL at 08:47

## 2022-04-30 RX ADMIN — Medication 5 MILLIGRAM(S): at 20:49

## 2022-04-30 RX ADMIN — DIVALPROEX SODIUM 750 MILLIGRAM(S): 500 TABLET, DELAYED RELEASE ORAL at 20:48

## 2022-04-30 RX ADMIN — RISPERIDONE 1.5 MILLIGRAM(S): 4 TABLET ORAL at 12:56

## 2022-04-30 RX ADMIN — DIVALPROEX SODIUM 750 MILLIGRAM(S): 500 TABLET, DELAYED RELEASE ORAL at 08:47

## 2022-04-30 RX ADMIN — SENNA PLUS 1 TABLET(S): 8.6 TABLET ORAL at 20:49

## 2022-04-30 RX ADMIN — Medication 20 MILLIGRAM(S): at 08:47

## 2022-05-01 RX ADMIN — SENNA PLUS 1 TABLET(S): 8.6 TABLET ORAL at 21:58

## 2022-05-01 RX ADMIN — Medication 5 MILLIGRAM(S): at 21:59

## 2022-05-01 RX ADMIN — DIVALPROEX SODIUM 750 MILLIGRAM(S): 500 TABLET, DELAYED RELEASE ORAL at 09:28

## 2022-05-01 RX ADMIN — RISPERIDONE 1.5 MILLIGRAM(S): 4 TABLET ORAL at 09:28

## 2022-05-01 RX ADMIN — RISPERIDONE 1.5 MILLIGRAM(S): 4 TABLET ORAL at 12:16

## 2022-05-01 RX ADMIN — DIVALPROEX SODIUM 750 MILLIGRAM(S): 500 TABLET, DELAYED RELEASE ORAL at 21:58

## 2022-05-01 RX ADMIN — RISPERIDONE 1.5 MILLIGRAM(S): 4 TABLET ORAL at 21:58

## 2022-05-01 RX ADMIN — Medication 20 MILLIGRAM(S): at 09:34

## 2022-05-02 PROCEDURE — 99231 SBSQ HOSP IP/OBS SF/LOW 25: CPT

## 2022-05-02 RX ADMIN — SENNA PLUS 1 TABLET(S): 8.6 TABLET ORAL at 21:02

## 2022-05-02 RX ADMIN — Medication 1 APPLICATION(S): at 08:56

## 2022-05-02 RX ADMIN — RISPERIDONE 1.5 MILLIGRAM(S): 4 TABLET ORAL at 08:56

## 2022-05-02 RX ADMIN — Medication 5 MILLIGRAM(S): at 21:02

## 2022-05-02 RX ADMIN — Medication 1 APPLICATION(S): at 08:55

## 2022-05-02 RX ADMIN — Medication 20 MILLIGRAM(S): at 08:57

## 2022-05-02 RX ADMIN — RISPERIDONE 1.5 MILLIGRAM(S): 4 TABLET ORAL at 21:01

## 2022-05-02 RX ADMIN — DIVALPROEX SODIUM 750 MILLIGRAM(S): 500 TABLET, DELAYED RELEASE ORAL at 21:01

## 2022-05-02 RX ADMIN — Medication 1 APPLICATION(S): at 22:05

## 2022-05-02 RX ADMIN — RISPERIDONE 1.5 MILLIGRAM(S): 4 TABLET ORAL at 13:20

## 2022-05-02 RX ADMIN — DIVALPROEX SODIUM 750 MILLIGRAM(S): 500 TABLET, DELAYED RELEASE ORAL at 08:56

## 2022-05-02 NOTE — BH INPATIENT PSYCHIATRY PROGRESS NOTE - NSBHCHARTREVIEWVS_PSY_A_CORE FT
Vital Signs Last 24 Hrs  T(C): 36.8 (05-01-22 @ 20:54), Max: 36.8 (05-01-22 @ 20:54)  T(F): 98.3 (05-01-22 @ 20:54), Max: 98.3 (05-01-22 @ 20:54)  HR: 79 (05-01-22 @ 21:23) (79 - 79)  BP: --  BP(mean): --  RR: 16 (05-01-22 @ 21:23) (16 - 16)  SpO2: 99% (05-01-22 @ 21:23) (99% - 99%)    Orthostatic VS  05-01-22 @ 20:54  Lying BP: --/-- HR: --  Sitting BP: 120/100 HR: --  Standing BP: --/-- HR: --  Site: --  Mode: --  Orthostatic VS  05-01-22 @ 08:30  Lying BP: --/-- HR: --  Sitting BP: 120/70 HR: 72  Standing BP: --/-- HR: --  Site: upper left arm  Mode: electronic  Orthostatic VS  04-30-22 @ 20:47  Lying BP: --/-- HR: --  Sitting BP: 133/64 HR: 86  Standing BP: --/-- HR: --  Site: --  Mode: --

## 2022-05-02 NOTE — BH INPATIENT PSYCHIATRY PROGRESS NOTE - MSE UNSTRUCTURED FT
Patient is awake and alert. Calm on exam, relates in a childlike manner.  Holding on to stuffed animals. Has hand tremor which is highly variable, no rigidity. Speech volume variable, poorly modulated loud at times even when not clearly upset, exaggerated prosody.   Reported mood "good". Affect is bright generally some lability. Thought process is disorganized, loose. No clear delusions. No suicidal or homicidal ideation.   No hallucinations. Poor insight. Oriented to self only.

## 2022-05-02 NOTE — BH INPATIENT PSYCHIATRY PROGRESS NOTE - NSBHASSESSSUMMFT_PSY_ALL_CORE
4/14 Patient maintaining gains, tolerating treatment. Cont current meds w/o change.   4/15 Maintaining gains with occasional med hold. Cont rx  will change Inderal to 20mg tid to minimize risk low BP  4/18 Maintaining gains despite decreased Inderal. Cont med, keep in mind tremor maybe affecting BP so should verify abn BP's  4/19: Patient maintaining gains despite reduced Inderal. Cont rx at current doses. If BP's still low could reduce again  4/20 Mainting gains depsite reduced Inderal and BPs higher cont current rx  4/21 Maintaining gains, cont current rx while attempting to find placement  4/22 Patient maintaining gains. Cont current meds at current doses  4/25 Patient stable, holding on to gains. Cont meds, will ask medicine  to see hands  4/26 Patient's stable, holding on to gains. No agitation. COnt meds, medicine to see hands, they look less reddened today  4/27 Patient currently stable, rejected by another facility. ordered podiatry consult  4/28 Maintaining gains, will modestly reduce Inderal to avoid low BPs, podiatry consult ordered  4/29 Holding gains with reduced Inderal and BP better, cont current treatment and placement efforts  5/2 Maintaining gains  VSS.  Continue current medications.

## 2022-05-02 NOTE — BH INPATIENT PSYCHIATRY PROGRESS NOTE - NSBHFUPINTERVALHXFT_PSY_A_CORE
Patient is seen for follow up of TBI. Discussed with treatment team.  No overnight events. Eating, sleeping okay No prns. The patient remains disorganized, loose in thoughts.  He is pleasant during conversation this morning.  Difficult to follow thought process.  She has been loud at times on the unit, but behavior generally controlled.  Tolerating medications well.

## 2022-05-03 PROCEDURE — 99231 SBSQ HOSP IP/OBS SF/LOW 25: CPT

## 2022-05-03 RX ADMIN — SENNA PLUS 1 TABLET(S): 8.6 TABLET ORAL at 20:56

## 2022-05-03 RX ADMIN — Medication 5 MILLIGRAM(S): at 21:51

## 2022-05-03 RX ADMIN — RISPERIDONE 1.5 MILLIGRAM(S): 4 TABLET ORAL at 12:53

## 2022-05-03 RX ADMIN — RISPERIDONE 1.5 MILLIGRAM(S): 4 TABLET ORAL at 20:58

## 2022-05-03 RX ADMIN — DIVALPROEX SODIUM 750 MILLIGRAM(S): 500 TABLET, DELAYED RELEASE ORAL at 20:56

## 2022-05-03 RX ADMIN — Medication 20 MILLIGRAM(S): at 09:23

## 2022-05-03 RX ADMIN — Medication 1 APPLICATION(S): at 20:58

## 2022-05-03 RX ADMIN — Medication 1 APPLICATION(S): at 09:24

## 2022-05-03 RX ADMIN — DIVALPROEX SODIUM 750 MILLIGRAM(S): 500 TABLET, DELAYED RELEASE ORAL at 09:24

## 2022-05-03 RX ADMIN — Medication 1 APPLICATION(S): at 20:57

## 2022-05-03 RX ADMIN — Medication 1 APPLICATION(S): at 09:25

## 2022-05-03 NOTE — BH INPATIENT PSYCHIATRY PROGRESS NOTE - NSBHMETABOLIC_PSY_ALL_CORE_FT
BMI: BMI (kg/m2): 31.6 (11-29-21 @ 16:35)  HbA1c: A1C with Estimated Average Glucose Result: 5.4 % (11-04-21 @ 06:58)    Glucose: POCT Blood Glucose.: 78 mg/dL (12-30-21 @ 07:34)    BP: 101/44 (05-03-22 @ 07:59) (101/44 - 101/44)  Lipid Panel:

## 2022-05-03 NOTE — BH INPATIENT PSYCHIATRY PROGRESS NOTE - NSBHCHARTREVIEWVS_PSY_A_CORE FT
Vital Signs Last 24 Hrs  T(C): 36.7 (05-03-22 @ 07:59), Max: 36.7 (05-03-22 @ 07:59)  T(F): 98 (05-03-22 @ 07:59), Max: 98 (05-03-22 @ 07:59)  HR: 65 (05-03-22 @ 07:59) (65 - 65)  BP: 101/44 (05-03-22 @ 07:59) (101/44 - 101/44)  BP(mean): --  RR: --  SpO2: --    Orthostatic VS  05-02-22 @ 20:25  Lying BP: --/-- HR: --  Sitting BP: 112/60 HR: 73  Standing BP: --/-- HR: --  Site: upper left arm  Mode: electronic  Orthostatic VS  05-02-22 @ 08:02  Lying BP: --/-- HR: --  Sitting BP: 114/74 HR: 71  Standing BP: --/-- HR: --  Site: --  Mode: --  Orthostatic VS  05-01-22 @ 20:54  Lying BP: --/-- HR: --  Sitting BP: 120/100 HR: --  Standing BP: --/-- HR: --  Site: --  Mode: --

## 2022-05-03 NOTE — BH INPATIENT PSYCHIATRY PROGRESS NOTE - MSE UNSTRUCTURED FT
Patient is awake and alert. Calm on exam, relates in a childlike manner.  Holding on to stuffed animals. Has B hand tremor which is highly variable, no rigidity. Speech volume variable, poorly modulated loud at times even when not clearly upset, exaggerated prosody.   Reported mood "okay". Affect is bright generally some lability. Thought process is disorganized, loose. No clear delusions. No suicidal or homicidal ideation.   No hallucinations. Poor insight. Oriented to self only.

## 2022-05-03 NOTE — BH INPATIENT PSYCHIATRY PROGRESS NOTE - NSBHATTENDATTEST_PSY_ALL_CORE
I have personally seen, examined and participated in the care of this patient. I have reviewed all pertinent clinical information, including history, physical exam, plan and the Medical/PA/NP Student’s note and agree except as noted.

## 2022-05-03 NOTE — BH INPATIENT PSYCHIATRY PROGRESS NOTE - NSBHFUPINTERVALHXFT_PSY_A_CORE
Patient is seen for follow up of TBI. Discussed with treatment team.  No overnight events. Largely unchanged.  She is eating, sleeping okay No prns. The patient remains disorganized, loose in thoughts.  She is pleasant during conversation.  Difficult to follow thought process.  She has been loud at times on the unit, but behavior generally controlled.  Tolerating medications well.

## 2022-05-04 PROCEDURE — 99231 SBSQ HOSP IP/OBS SF/LOW 25: CPT

## 2022-05-04 RX ADMIN — RISPERIDONE 1.5 MILLIGRAM(S): 4 TABLET ORAL at 08:54

## 2022-05-04 RX ADMIN — Medication 1 APPLICATION(S): at 09:43

## 2022-05-04 RX ADMIN — DIVALPROEX SODIUM 750 MILLIGRAM(S): 500 TABLET, DELAYED RELEASE ORAL at 20:44

## 2022-05-04 RX ADMIN — SENNA PLUS 1 TABLET(S): 8.6 TABLET ORAL at 20:31

## 2022-05-04 RX ADMIN — Medication 1 APPLICATION(S): at 20:30

## 2022-05-04 RX ADMIN — DIVALPROEX SODIUM 750 MILLIGRAM(S): 500 TABLET, DELAYED RELEASE ORAL at 08:54

## 2022-05-04 RX ADMIN — RISPERIDONE 1.5 MILLIGRAM(S): 4 TABLET ORAL at 13:42

## 2022-05-04 RX ADMIN — RISPERIDONE 1.5 MILLIGRAM(S): 4 TABLET ORAL at 20:30

## 2022-05-04 RX ADMIN — Medication 5 MILLIGRAM(S): at 20:31

## 2022-05-04 RX ADMIN — Medication 20 MILLIGRAM(S): at 08:54

## 2022-05-04 NOTE — BH INPATIENT PSYCHIATRY PROGRESS NOTE - NSBHMETABOLIC_PSY_ALL_CORE_FT
BMI: BMI (kg/m2): 31.6 (11-29-21 @ 16:35)  HbA1c: A1C with Estimated Average Glucose Result: 5.4 % (11-04-21 @ 06:58)    Glucose: POCT Blood Glucose.: 78 mg/dL (12-30-21 @ 07:34)    BP: 112/75 (05-03-22 @ 20:47) (101/44 - 112/75)  Lipid Panel:

## 2022-05-04 NOTE — BH INPATIENT PSYCHIATRY PROGRESS NOTE - NSBHCHARTREVIEWVS_PSY_A_CORE FT
Vital Signs Last 24 Hrs  T(C): 36.9 (05-04-22 @ 07:57), Max: 36.9 (05-04-22 @ 07:57)  T(F): 98.4 (05-04-22 @ 07:57), Max: 98.4 (05-04-22 @ 07:57)  HR: 100 (05-03-22 @ 20:47) (100 - 100)  BP: 112/75 (05-03-22 @ 20:47) (112/75 - 112/75)  BP(mean): --  RR: --  SpO2: --    Orthostatic VS  05-04-22 @ 07:57  Lying BP: --/-- HR: --  Sitting BP: 116/51 HR: 66  Standing BP: --/-- HR: --  Site: upper left arm  Mode: electronic  Orthostatic VS  05-03-22 @ 12:51  Lying BP: --/-- HR: --  Sitting BP: --/-- HR: --  Standing BP: 128/74 HR: 106  Site: upper left arm  Mode: electronic  Orthostatic VS  05-02-22 @ 20:25  Lying BP: --/-- HR: --  Sitting BP: 112/60 HR: 73  Standing BP: --/-- HR: --  Site: upper left arm  Mode: electronic

## 2022-05-04 NOTE — BH INPATIENT PSYCHIATRY PROGRESS NOTE - NSBHASSESSSUMMFT_PSY_ALL_CORE
4/14 Patient maintaining gains, tolerating treatment. Cont current meds w/o change.   4/15 Maintaining gains with occasional med hold. Cont rx  will change Inderal to 20mg tid to minimize risk low BP  4/18 Maintaining gains despite decreased Inderal. Cont med, keep in mind tremor maybe affecting BP so should verify abn BP's  4/19: Patient maintaining gains despite reduced Inderal. Cont rx at current doses. If BP's still low could reduce again  4/20 Mainting gains depsite reduced Inderal and BPs higher cont current rx  4/21 Maintaining gains, cont current rx while attempting to find placement  4/22 Patient maintaining gains. Cont current meds at current doses  4/25 Patient stable, holding on to gains. Cont meds, will ask medicine  to see hands  4/26 Patient's stable, holding on to gains. No agitation. COnt meds, medicine to see hands, they look less reddened today  4/27 Patient currently stable, rejected by another facility. ordered podiatry consult  4/28 Maintaining gains, will modestly reduce Inderal to avoid low BPs, podiatry consult ordered  4/29 Holding gains with reduced Inderal and BP better, cont current treatment and placement efforts  5/2 Maintaining gains  VSS.  Continue current medications.  5/4: Remains stable, confused, but pleasant, in behavioral control.

## 2022-05-04 NOTE — BH INPATIENT PSYCHIATRY PROGRESS NOTE - NSBHFUPINTERVALHXFT_PSY_A_CORE
Patient is seen for follow up of TBI. Discussed with treatment team.  No overnight events. The patient is largely unchanged.  She is eating, sleeping well.  No prns. The patient remains disorganized, loose in thoughts.  She is pleasant during conversation.  Difficult to follow thought process.  She has been loud at times on the unit, but behavior generally controlled.  Tolerating medications well.

## 2022-05-05 PROCEDURE — 99231 SBSQ HOSP IP/OBS SF/LOW 25: CPT

## 2022-05-05 RX ADMIN — SENNA PLUS 1 TABLET(S): 8.6 TABLET ORAL at 23:09

## 2022-05-05 RX ADMIN — RISPERIDONE 1.5 MILLIGRAM(S): 4 TABLET ORAL at 23:12

## 2022-05-05 RX ADMIN — DIVALPROEX SODIUM 750 MILLIGRAM(S): 500 TABLET, DELAYED RELEASE ORAL at 23:08

## 2022-05-05 RX ADMIN — RISPERIDONE 1.5 MILLIGRAM(S): 4 TABLET ORAL at 14:35

## 2022-05-05 RX ADMIN — Medication 1 APPLICATION(S): at 14:35

## 2022-05-05 RX ADMIN — DIVALPROEX SODIUM 750 MILLIGRAM(S): 500 TABLET, DELAYED RELEASE ORAL at 10:41

## 2022-05-05 RX ADMIN — Medication 5 MILLIGRAM(S): at 23:08

## 2022-05-05 RX ADMIN — Medication 1 APPLICATION(S): at 10:41

## 2022-05-05 RX ADMIN — RISPERIDONE 1.5 MILLIGRAM(S): 4 TABLET ORAL at 10:40

## 2022-05-05 NOTE — PROGRESS NOTE ADULT - ASSESSMENT
assessment/plan:    Onychomycosis secondary to dermatophytes  Tylomas bilateral foot  Diabetes mellitus    Debridement of mycotic nails 10 with Betadine applied  Aseptic excisional debridement with 15 blade of tyloma bilateral bacitracin dispersion expense.  Recommend continue routine diabetic foot care as an outpatient  Thank you for consultation

## 2022-05-05 NOTE — BH INPATIENT PSYCHIATRY PROGRESS NOTE - NSBHFUPINTERVALHXFT_PSY_A_CORE
Patient is seen for follow up of TBI. Discussed with treatment team.  No overnight events. The patient is largely unchanged.  She is eating, sleeping well.  No prns. The patient is pleasant on approach, remains disorganized, loose in thoughts.  Difficult to follow thought process.  She has been loud at times on the unit, but behavior generally controlled.  Tolerating medications well.

## 2022-05-05 NOTE — BH INPATIENT PSYCHIATRY PROGRESS NOTE - NSBHCHARTREVIEWVS_PSY_A_CORE FT
Vital Signs Last 24 Hrs  T(C): 36.8 (05-05-22 @ 07:56), Max: 37 (05-04-22 @ 20:38)  T(F): 98.2 (05-05-22 @ 07:56), Max: 98.6 (05-04-22 @ 20:38)  HR: 80 (05-05-22 @ 07:56) (80 - 80)  BP: 96/55 (05-05-22 @ 07:56) (96/55 - 96/55)  BP(mean): --  RR: --  SpO2: --    Orthostatic VS  05-05-22 @ 10:03  Lying BP: --/-- HR: --  Sitting BP: 118/68 HR: 68  Standing BP: 116/72 HR: 72  Site: --  Mode: --  Orthostatic VS  05-04-22 @ 20:38  Lying BP: --/-- HR: --  Sitting BP: 121/91 HR: 101  Standing BP: --/-- HR: --  Site: upper left arm  Mode: electronic  Orthostatic VS  05-04-22 @ 07:57  Lying BP: --/-- HR: --  Sitting BP: 116/51 HR: 66  Standing BP: --/-- HR: --  Site: upper left arm  Mode: electronic  Orthostatic VS  05-03-22 @ 12:51  Lying BP: --/-- HR: --  Sitting BP: --/-- HR: --  Standing BP: 128/74 HR: 106  Site: upper left arm  Mode: electronic

## 2022-05-05 NOTE — BH INPATIENT PSYCHIATRY PROGRESS NOTE - NSBHMETABOLIC_PSY_ALL_CORE_FT
BMI: BMI (kg/m2): 31.6 (11-29-21 @ 16:35)  HbA1c: A1C with Estimated Average Glucose Result: 5.4 % (11-04-21 @ 06:58)    Glucose: POCT Blood Glucose.: 78 mg/dL (12-30-21 @ 07:34)    BP: 96/55 (05-05-22 @ 07:56) (96/55 - 112/75)  Lipid Panel:

## 2022-05-05 NOTE — BH TREATMENT PLAN - NSTXCOPEGOALOTHER_PSY_ALL_CORE
Patient will exhibit increased tolerance to group structure and increased frustration tolerance within seven days.
Patient will exhibit increased tolerance to group structure and increased frustration tolerance within seven days.

## 2022-05-05 NOTE — BH TREATMENT PLAN - NSTXCONFGOALOTHER_PSY_ALL_CORE
Patient will exhibit increased tolerance to structured activities and decreased restlessness within seven days.
Patient will tolerate 1:1 sessions for longer periods and will exhibit increase frustration tolerance within seven days.
Patient will exhibit decreased restleness and decreased agitated behavior within seven days.
Patient will exhibit increase frustration tolerance and decreased agitated behavior within seven days.
Patient will demonstrate increase frustration tolerance and decresae irritability within seven days.

## 2022-05-05 NOTE — BH TREATMENT PLAN - NSTXPLANSTARTDATE_PSY_ALL_CORE
10-Dec-2021 14:20
29-Apr-2022 06:49
17-Dec-2021 07:29
18-Mar-2022 12:36
11-Feb-2022 12:55
26-Nov-2021 14:20
05-May-2022 13:42
01-Apr-2022 10:47
03-Dec-2021 14:20

## 2022-05-06 PROCEDURE — 99231 SBSQ HOSP IP/OBS SF/LOW 25: CPT

## 2022-05-06 RX ADMIN — Medication 1 APPLICATION(S): at 09:07

## 2022-05-06 RX ADMIN — DIVALPROEX SODIUM 750 MILLIGRAM(S): 500 TABLET, DELAYED RELEASE ORAL at 22:05

## 2022-05-06 RX ADMIN — RISPERIDONE 1.5 MILLIGRAM(S): 4 TABLET ORAL at 09:08

## 2022-05-06 RX ADMIN — Medication 1 APPLICATION(S): at 09:06

## 2022-05-06 RX ADMIN — Medication 20 MILLIGRAM(S): at 09:08

## 2022-05-06 RX ADMIN — Medication 1 APPLICATION(S): at 22:49

## 2022-05-06 RX ADMIN — Medication 5 MILLIGRAM(S): at 22:47

## 2022-05-06 RX ADMIN — SENNA PLUS 1 TABLET(S): 8.6 TABLET ORAL at 22:06

## 2022-05-06 RX ADMIN — DIVALPROEX SODIUM 750 MILLIGRAM(S): 500 TABLET, DELAYED RELEASE ORAL at 09:07

## 2022-05-06 RX ADMIN — RISPERIDONE 1.5 MILLIGRAM(S): 4 TABLET ORAL at 22:05

## 2022-05-06 RX ADMIN — RISPERIDONE 1.5 MILLIGRAM(S): 4 TABLET ORAL at 13:02

## 2022-05-06 NOTE — BH INPATIENT PSYCHIATRY PROGRESS NOTE - NSBHFUPINTERVALHXFT_PSY_A_CORE
Patient is seen for follow up of TBI. Discussed with treatment team.  No overnight events. The patient is unchanged.  She continue to be eating, sleeping well.  No prns. The patient is pleasant on approach, but remains disorganized, loose in thoughts.  Difficult to follow her thought process.  She has been loud at times on the unit, but behavior generally controlled.  Tolerating medications well.

## 2022-05-06 NOTE — BH INPATIENT PSYCHIATRY PROGRESS NOTE - NSBHASSESSSUMMFT_PSY_ALL_CORE
4/14 Patient maintaining gains, tolerating treatment. Cont current meds w/o change.   4/15 Maintaining gains with occasional med hold. Cont rx  will change Inderal to 20mg tid to minimize risk low BP  4/18 Maintaining gains despite decreased Inderal. Cont med, keep in mind tremor maybe affecting BP so should verify abn BP's  4/19: Patient maintaining gains despite reduced Inderal. Cont rx at current doses. If BP's still low could reduce again  4/20 Mainting gains depsite reduced Inderal and BPs higher cont current rx  4/21 Maintaining gains, cont current rx while attempting to find placement  4/22 Patient maintaining gains. Cont current meds at current doses  4/25 Patient stable, holding on to gains. Cont meds, will ask medicine  to see hands  4/26 Patient's stable, holding on to gains. No agitation. COnt meds, medicine to see hands, they look less reddened today  4/27 Patient currently stable, rejected by another facility. ordered podiatry consult  4/28 Maintaining gains, will modestly reduce Inderal to avoid low BPs, podiatry consult ordered  4/29 Holding gains with reduced Inderal and BP better, cont current treatment and placement efforts  5/2 Maintaining gains  VSS.  Continue current medications.  5/4: Remains stable, confused, but pleasant, in behavioral control.  5/6: The patient is maintaining gains.  She is peasant, but disorganized.  Compliant with treatment.

## 2022-05-06 NOTE — BH INPATIENT PSYCHIATRY PROGRESS NOTE - NSBHCHARTREVIEWVS_PSY_A_CORE FT
Vital Signs Last 24 Hrs  T(C): 36.9 (05-06-22 @ 08:09), Max: 36.9 (05-06-22 @ 08:09)  T(F): 98.4 (05-06-22 @ 08:09), Max: 98.4 (05-06-22 @ 08:09)  HR: --  BP: --  BP(mean): --  RR: --  SpO2: --    Orthostatic VS  05-06-22 @ 08:09  Lying BP: --/-- HR: --  Sitting BP: 156/95 HR: 87  Standing BP: --/-- HR: --  Site: upper left arm  Mode: electronic  Orthostatic VS  05-05-22 @ 20:27  Lying BP: --/-- HR: --  Sitting BP: 105/68 HR: 76  Standing BP: --/-- HR: --  Site: --  Mode: --  Orthostatic VS  05-05-22 @ 10:03  Lying BP: --/-- HR: --  Sitting BP: 118/68 HR: 68  Standing BP: 116/72 HR: 72  Site: --  Mode: --  Orthostatic VS  05-04-22 @ 20:38  Lying BP: --/-- HR: --  Sitting BP: 121/91 HR: 101  Standing BP: --/-- HR: --  Site: upper left arm  Mode: electronic

## 2022-05-07 RX ADMIN — RISPERIDONE 1.5 MILLIGRAM(S): 4 TABLET ORAL at 21:13

## 2022-05-07 RX ADMIN — Medication 1 APPLICATION(S): at 13:28

## 2022-05-07 RX ADMIN — RISPERIDONE 1.5 MILLIGRAM(S): 4 TABLET ORAL at 09:43

## 2022-05-07 RX ADMIN — Medication 1 APPLICATION(S): at 09:41

## 2022-05-07 RX ADMIN — Medication 20 MILLIGRAM(S): at 09:42

## 2022-05-07 RX ADMIN — RISPERIDONE 1.5 MILLIGRAM(S): 4 TABLET ORAL at 13:27

## 2022-05-07 RX ADMIN — SENNA PLUS 1 TABLET(S): 8.6 TABLET ORAL at 21:12

## 2022-05-07 RX ADMIN — Medication 1 APPLICATION(S): at 21:12

## 2022-05-07 RX ADMIN — DIVALPROEX SODIUM 750 MILLIGRAM(S): 500 TABLET, DELAYED RELEASE ORAL at 09:42

## 2022-05-07 RX ADMIN — DIVALPROEX SODIUM 750 MILLIGRAM(S): 500 TABLET, DELAYED RELEASE ORAL at 21:10

## 2022-05-07 RX ADMIN — Medication 5 MILLIGRAM(S): at 21:13

## 2022-05-08 RX ADMIN — Medication 20 MILLIGRAM(S): at 09:35

## 2022-05-08 RX ADMIN — SENNA PLUS 1 TABLET(S): 8.6 TABLET ORAL at 21:34

## 2022-05-08 RX ADMIN — Medication 5 MILLIGRAM(S): at 21:34

## 2022-05-08 RX ADMIN — RISPERIDONE 1.5 MILLIGRAM(S): 4 TABLET ORAL at 13:08

## 2022-05-08 RX ADMIN — Medication 1 APPLICATION(S): at 21:46

## 2022-05-08 RX ADMIN — RISPERIDONE 1.5 MILLIGRAM(S): 4 TABLET ORAL at 09:35

## 2022-05-08 RX ADMIN — DIVALPROEX SODIUM 750 MILLIGRAM(S): 500 TABLET, DELAYED RELEASE ORAL at 21:35

## 2022-05-08 RX ADMIN — DIVALPROEX SODIUM 750 MILLIGRAM(S): 500 TABLET, DELAYED RELEASE ORAL at 09:34

## 2022-05-08 RX ADMIN — Medication 1 APPLICATION(S): at 21:47

## 2022-05-08 RX ADMIN — Medication 1 APPLICATION(S): at 09:36

## 2022-05-08 RX ADMIN — RISPERIDONE 1.5 MILLIGRAM(S): 4 TABLET ORAL at 21:34

## 2022-05-09 PROCEDURE — 99232 SBSQ HOSP IP/OBS MODERATE 35: CPT

## 2022-05-09 RX ADMIN — RISPERIDONE 1.5 MILLIGRAM(S): 4 TABLET ORAL at 21:53

## 2022-05-09 RX ADMIN — Medication 1 APPLICATION(S): at 22:13

## 2022-05-09 RX ADMIN — Medication 1 APPLICATION(S): at 22:15

## 2022-05-09 RX ADMIN — Medication 5 MILLIGRAM(S): at 21:53

## 2022-05-09 RX ADMIN — Medication 20 MILLIGRAM(S): at 10:28

## 2022-05-09 RX ADMIN — DIVALPROEX SODIUM 750 MILLIGRAM(S): 500 TABLET, DELAYED RELEASE ORAL at 21:53

## 2022-05-09 RX ADMIN — RISPERIDONE 1.5 MILLIGRAM(S): 4 TABLET ORAL at 10:27

## 2022-05-09 RX ADMIN — Medication 1 APPLICATION(S): at 10:29

## 2022-05-09 RX ADMIN — SENNA PLUS 1 TABLET(S): 8.6 TABLET ORAL at 21:53

## 2022-05-09 RX ADMIN — DIVALPROEX SODIUM 750 MILLIGRAM(S): 500 TABLET, DELAYED RELEASE ORAL at 10:29

## 2022-05-09 NOTE — BH INPATIENT PSYCHIATRY PROGRESS NOTE - NSBHCHARTREVIEWVS_PSY_A_CORE FT
Vital Signs Last 24 Hrs  T(C): 36.7 (05-09-22 @ 07:59), Max: 36.7 (05-09-22 @ 07:59)  T(F): 98.1 (05-09-22 @ 07:59), Max: 98.1 (05-09-22 @ 07:59)  HR: 75 (05-08-22 @ 20:06) (75 - 75)  BP: 114/64 (05-08-22 @ 20:06) (114/64 - 114/64)  BP(mean): --  RR: --  SpO2: --    Orthostatic VS  05-09-22 @ 07:59  Lying BP: --/-- HR: --  Sitting BP: 126/85 HR: 79  Standing BP: --/-- HR: --  Site: upper left arm  Mode: electronic

## 2022-05-09 NOTE — BH INPATIENT PSYCHIATRY PROGRESS NOTE - NSBHASSESSSUMMFT_PSY_ALL_CORE
4/14 Patient maintaining gains, tolerating treatment. Cont current meds w/o change.   4/15 Maintaining gains with occasional med hold. Cont rx  will change Inderal to 20mg tid to minimize risk low BP  4/18 Maintaining gains despite decreased Inderal. Cont med, keep in mind tremor maybe affecting BP so should verify abn BP's  4/19: Patient maintaining gains despite reduced Inderal. Cont rx at current doses. If BP's still low could reduce again  4/20 Mainting gains depsite reduced Inderal and BPs higher cont current rx  4/21 Maintaining gains, cont current rx while attempting to find placement  4/22 Patient maintaining gains. Cont current meds at current doses  4/25 Patient stable, holding on to gains. Cont meds, will ask medicine  to see hands  4/26 Patient's stable, holding on to gains. No agitation. COnt meds, medicine to see hands, they look less reddened today  4/27 Patient currently stable, rejected by another facility. ordered podiatry consult  4/28 Maintaining gains, will modestly reduce Inderal to avoid low BPs, podiatry consult ordered  4/29 Holding gains with reduced Inderal and BP better, cont current treatment and placement efforts  5/2 Maintaining gains  VSS.  Continue current medications.  5/4: Remains stable, confused, but pleasant, in behavioral control.  5/6: The patient is maintaining gains.  She is peasant, but disorganized.  Compliant with treatment.  5/9 Maintaining gains. BP more stable on reduced Inderal , cont current treatment

## 2022-05-09 NOTE — BH INPATIENT PSYCHIATRY PROGRESS NOTE - NSBHMETABOLIC_PSY_ALL_CORE_FT
BMI: BMI (kg/m2): 31.6 (11-29-21 @ 16:35)  HbA1c: A1C with Estimated Average Glucose Result: 5.4 % (11-04-21 @ 06:58)    Glucose: POCT Blood Glucose.: 78 mg/dL (12-30-21 @ 07:34)    BP: 114/64 (05-08-22 @ 20:06) (103/72 - 122/64)  Lipid Panel:

## 2022-05-10 PROCEDURE — 99231 SBSQ HOSP IP/OBS SF/LOW 25: CPT

## 2022-05-10 RX ADMIN — Medication 1 APPLICATION(S): at 22:32

## 2022-05-10 RX ADMIN — RISPERIDONE 1.5 MILLIGRAM(S): 4 TABLET ORAL at 21:09

## 2022-05-10 RX ADMIN — DIVALPROEX SODIUM 750 MILLIGRAM(S): 500 TABLET, DELAYED RELEASE ORAL at 21:09

## 2022-05-10 RX ADMIN — DIVALPROEX SODIUM 750 MILLIGRAM(S): 500 TABLET, DELAYED RELEASE ORAL at 09:05

## 2022-05-10 RX ADMIN — Medication 5 MILLIGRAM(S): at 21:08

## 2022-05-10 RX ADMIN — SENNA PLUS 1 TABLET(S): 8.6 TABLET ORAL at 21:08

## 2022-05-10 RX ADMIN — Medication 1 APPLICATION(S): at 22:25

## 2022-05-10 RX ADMIN — RISPERIDONE 1.5 MILLIGRAM(S): 4 TABLET ORAL at 09:05

## 2022-05-10 RX ADMIN — RISPERIDONE 1.5 MILLIGRAM(S): 4 TABLET ORAL at 12:49

## 2022-05-10 RX ADMIN — Medication 20 MILLIGRAM(S): at 09:05

## 2022-05-10 NOTE — BH INPATIENT PSYCHIATRY PROGRESS NOTE - NSBHFUPINTERVALHXFT_PSY_A_CORE
Patient is seen for follow up of TBI. Discussed with treatment team.  No overnight events. One dose of risperidone held yesterday due to sedation. Not sedated today.   She continue to be eating, sleeping well.  No prns.   She has been loud rarely, but behavior well controlled.  Tolerating medications well.VSS

## 2022-05-10 NOTE — BH INPATIENT PSYCHIATRY PROGRESS NOTE - NSBHASSESSSUMMFT_PSY_ALL_CORE
4/14 Patient maintaining gains, tolerating treatment. Cont current meds w/o change.   4/15 Maintaining gains with occasional med hold. Cont rx  will change Inderal to 20mg tid to minimize risk low BP  4/18 Maintaining gains despite decreased Inderal. Cont med, keep in mind tremor maybe affecting BP so should verify abn BP's  4/19: Patient maintaining gains despite reduced Inderal. Cont rx at current doses. If BP's still low could reduce again  4/20 Mainting gains depsite reduced Inderal and BPs higher cont current rx  4/21 Maintaining gains, cont current rx while attempting to find placement  4/22 Patient maintaining gains. Cont current meds at current doses  4/25 Patient stable, holding on to gains. Cont meds, will ask medicine  to see hands  4/26 Patient's stable, holding on to gains. No agitation. COnt meds, medicine to see hands, they look less reddened today  4/27 Patient currently stable, rejected by another facility. ordered podiatry consult  4/28 Maintaining gains, will modestly reduce Inderal to avoid low BPs, podiatry consult ordered  4/29 Holding gains with reduced Inderal and BP better, cont current treatment and placement efforts  5/2 Maintaining gains  VSS.  Continue current medications.  5/4: Remains stable, confused, but pleasant, in behavioral control.  5/6: The patient is maintaining gains.  She is peasant, but disorganized.  Compliant with treatment.  5/9 Maintaining gains. BP more stable on reduced Inderal , cont current treatment  5/10 Maintaining gais COnt meds, check blood work for CBC hydration

## 2022-05-10 NOTE — BH INPATIENT PSYCHIATRY PROGRESS NOTE - NSBHMETABOLIC_PSY_ALL_CORE_FT
BMI: BMI (kg/m2): 31.6 (11-29-21 @ 16:35)  HbA1c: A1C with Estimated Average Glucose Result: 5.4 % (11-04-21 @ 06:58)    Glucose: POCT Blood Glucose.: 78 mg/dL (12-30-21 @ 07:34)    BP: 100/58 (05-10-22 @ 07:58) (100/58 - 114/64)  Lipid Panel:

## 2022-05-10 NOTE — BH INPATIENT PSYCHIATRY PROGRESS NOTE - MSE UNSTRUCTURED FT
Patient is awake and alert. Calm on exam, relates in a childlike manner.  Holding on to stuffed animals. Has  hand tremor which is highly variable, no rigidity. Speech volume variable, poorly modulated loud at times even when not clearly upset, exaggerated prosody child like.   Reported mood "okay". Affect is bright generally some lability. Thought process is disorganized. No clear delusions. No suicidal or homicidal ideation.   No hallucinations. Poor insight. Oriented to self only.

## 2022-05-10 NOTE — BH INPATIENT PSYCHIATRY PROGRESS NOTE - NSBHCHARTREVIEWVS_PSY_A_CORE FT
Vital Signs Last 24 Hrs  T(C): 36.8 (05-10-22 @ 07:58), Max: 36.8 (05-10-22 @ 07:58)  T(F): 98.2 (05-10-22 @ 07:58), Max: 98.2 (05-10-22 @ 07:58)  HR: 78 (05-10-22 @ 07:58) (78 - 78)  BP: 100/58 (05-10-22 @ 07:58) (100/58 - 100/58)  BP(mean): --  RR: --  SpO2: --    Orthostatic VS  05-09-22 @ 20:30  Lying BP: --/-- HR: --  Sitting BP: 105/59 HR: 70  Standing BP: --/-- HR: --  Site: --  Mode: --  Orthostatic VS  05-09-22 @ 07:59  Lying BP: --/-- HR: --  Sitting BP: 126/85 HR: 79  Standing BP: --/-- HR: --  Site: upper left arm  Mode: electronic

## 2022-05-11 LAB
ALBUMIN SERPL ELPH-MCNC: 3.8 G/DL — SIGNIFICANT CHANGE UP (ref 3.3–5)
ALP SERPL-CCNC: 45 U/L — SIGNIFICANT CHANGE UP (ref 40–120)
ALT FLD-CCNC: 9 U/L — SIGNIFICANT CHANGE UP (ref 4–33)
ANION GAP SERPL CALC-SCNC: 10 MMOL/L — SIGNIFICANT CHANGE UP (ref 7–14)
AST SERPL-CCNC: 10 U/L — SIGNIFICANT CHANGE UP (ref 4–32)
BASOPHILS # BLD AUTO: 0.03 K/UL — SIGNIFICANT CHANGE UP (ref 0–0.2)
BASOPHILS NFR BLD AUTO: 0.5 % — SIGNIFICANT CHANGE UP (ref 0–2)
BILIRUB SERPL-MCNC: 0.3 MG/DL — SIGNIFICANT CHANGE UP (ref 0.2–1.2)
BUN SERPL-MCNC: 20 MG/DL — SIGNIFICANT CHANGE UP (ref 7–23)
CALCIUM SERPL-MCNC: 9.4 MG/DL — SIGNIFICANT CHANGE UP (ref 8.4–10.5)
CHLORIDE SERPL-SCNC: 106 MMOL/L — SIGNIFICANT CHANGE UP (ref 98–107)
CO2 SERPL-SCNC: 27 MMOL/L — SIGNIFICANT CHANGE UP (ref 22–31)
CREAT SERPL-MCNC: 0.61 MG/DL — SIGNIFICANT CHANGE UP (ref 0.5–1.3)
EGFR: 98 ML/MIN/1.73M2 — SIGNIFICANT CHANGE UP
EOSINOPHIL # BLD AUTO: 0.09 K/UL — SIGNIFICANT CHANGE UP (ref 0–0.5)
EOSINOPHIL NFR BLD AUTO: 1.4 % — SIGNIFICANT CHANGE UP (ref 0–6)
GLUCOSE SERPL-MCNC: 89 MG/DL — SIGNIFICANT CHANGE UP (ref 70–99)
HCT VFR BLD CALC: 41.2 % — SIGNIFICANT CHANGE UP (ref 34.5–45)
HGB BLD-MCNC: 13.1 G/DL — SIGNIFICANT CHANGE UP (ref 11.5–15.5)
IANC: 3.17 K/UL — SIGNIFICANT CHANGE UP (ref 1.8–7.4)
IMM GRANULOCYTES NFR BLD AUTO: 0.5 % — SIGNIFICANT CHANGE UP (ref 0–1.5)
LYMPHOCYTES # BLD AUTO: 2.24 K/UL — SIGNIFICANT CHANGE UP (ref 1–3.3)
LYMPHOCYTES # BLD AUTO: 36 % — SIGNIFICANT CHANGE UP (ref 13–44)
MCHC RBC-ENTMCNC: 30.3 PG — SIGNIFICANT CHANGE UP (ref 27–34)
MCHC RBC-ENTMCNC: 31.8 GM/DL — LOW (ref 32–36)
MCV RBC AUTO: 95.4 FL — SIGNIFICANT CHANGE UP (ref 80–100)
MONOCYTES # BLD AUTO: 0.66 K/UL — SIGNIFICANT CHANGE UP (ref 0–0.9)
MONOCYTES NFR BLD AUTO: 10.6 % — SIGNIFICANT CHANGE UP (ref 2–14)
NEUTROPHILS # BLD AUTO: 3.17 K/UL — SIGNIFICANT CHANGE UP (ref 1.8–7.4)
NEUTROPHILS NFR BLD AUTO: 51 % — SIGNIFICANT CHANGE UP (ref 43–77)
NRBC # BLD: 0 /100 WBCS — SIGNIFICANT CHANGE UP
NRBC # FLD: 0 K/UL — SIGNIFICANT CHANGE UP
PLATELET # BLD AUTO: 153 K/UL — SIGNIFICANT CHANGE UP (ref 150–400)
POTASSIUM SERPL-MCNC: 3.8 MMOL/L — SIGNIFICANT CHANGE UP (ref 3.5–5.3)
POTASSIUM SERPL-SCNC: 3.8 MMOL/L — SIGNIFICANT CHANGE UP (ref 3.5–5.3)
PROT SERPL-MCNC: 7.3 G/DL — SIGNIFICANT CHANGE UP (ref 6–8.3)
RBC # BLD: 4.32 M/UL — SIGNIFICANT CHANGE UP (ref 3.8–5.2)
RBC # FLD: 14.7 % — HIGH (ref 10.3–14.5)
SODIUM SERPL-SCNC: 143 MMOL/L — SIGNIFICANT CHANGE UP (ref 135–145)
WBC # BLD: 6.22 K/UL — SIGNIFICANT CHANGE UP (ref 3.8–10.5)
WBC # FLD AUTO: 6.22 K/UL — SIGNIFICANT CHANGE UP (ref 3.8–10.5)

## 2022-05-11 PROCEDURE — 99232 SBSQ HOSP IP/OBS MODERATE 35: CPT

## 2022-05-11 RX ADMIN — SENNA PLUS 1 TABLET(S): 8.6 TABLET ORAL at 21:31

## 2022-05-11 RX ADMIN — Medication 5 MILLIGRAM(S): at 21:30

## 2022-05-11 RX ADMIN — Medication 1 APPLICATION(S): at 09:56

## 2022-05-11 RX ADMIN — DIVALPROEX SODIUM 750 MILLIGRAM(S): 500 TABLET, DELAYED RELEASE ORAL at 09:56

## 2022-05-11 RX ADMIN — RISPERIDONE 1.5 MILLIGRAM(S): 4 TABLET ORAL at 14:53

## 2022-05-11 RX ADMIN — Medication 20 MILLIGRAM(S): at 09:56

## 2022-05-11 RX ADMIN — RISPERIDONE 1.5 MILLIGRAM(S): 4 TABLET ORAL at 21:30

## 2022-05-11 RX ADMIN — DIVALPROEX SODIUM 750 MILLIGRAM(S): 500 TABLET, DELAYED RELEASE ORAL at 21:30

## 2022-05-11 RX ADMIN — RISPERIDONE 1.5 MILLIGRAM(S): 4 TABLET ORAL at 09:56

## 2022-05-11 RX ADMIN — Medication 1 APPLICATION(S): at 14:53

## 2022-05-11 RX ADMIN — Medication 1 APPLICATION(S): at 09:55

## 2022-05-11 NOTE — BH INPATIENT PSYCHIATRY PROGRESS NOTE - NSBHFUPINTERVALHXFT_PSY_A_CORE
Patient is seen for follow up of TBI. Discussed with treatment team.  No overnight events. No sedationNot sedated today.   She continue to be eating, sleeping well. Singing in activity group today.   No prns.   She has been loud rarely, but behavior well controlled.  Tolerating medications well.VSS except some SBP's in low 100 range

## 2022-05-11 NOTE — BH INPATIENT PSYCHIATRY PROGRESS NOTE - NSBHASSESSSUMMFT_PSY_ALL_CORE
4/14 Patient maintaining gains, tolerating treatment. Cont current meds w/o change.   4/15 Maintaining gains with occasional med hold. Cont rx  will change Inderal to 20mg tid to minimize risk low BP  4/18 Maintaining gains despite decreased Inderal. Cont med, keep in mind tremor maybe affecting BP so should verify abn BP's  4/19: Patient maintaining gains despite reduced Inderal. Cont rx at current doses. If BP's still low could reduce again  4/20 Mainting gains depsite reduced Inderal and BPs higher cont current rx  4/21 Maintaining gains, cont current rx while attempting to find placement  4/22 Patient maintaining gains. Cont current meds at current doses  4/25 Patient stable, holding on to gains. Cont meds, will ask medicine  to see hands  4/26 Patient's stable, holding on to gains. No agitation. COnt meds, medicine to see hands, they look less reddened today  4/27 Patient currently stable, rejected by another facility. ordered podiatry consult  4/28 Maintaining gains, will modestly reduce Inderal to avoid low BPs, podiatry consult ordered  4/29 Holding gains with reduced Inderal and BP better, cont current treatment and placement efforts  5/2 Maintaining gains  VSS.  Continue current medications.  5/4: Remains stable, confused, but pleasant, in behavioral control.  5/6: The patient is maintaining gains.  She is peasant, but disorganized.  Compliant with treatment.  5/9 Maintaining gains. BP more stable on reduced Inderal , cont current treatment  5/10 Maintaining gais COnt meds, check blood work for CBC hydration  5/11 Doing will will sl lower Inderal in response to low nl BP's. Labs unremarkable.

## 2022-05-11 NOTE — BH INPATIENT PSYCHIATRY PROGRESS NOTE - CURRENT MEDICATION
MEDICATIONS  (STANDING):  AQUAPHOR (petrolatum Ointment) 1 Application(s) Topical three times a day  diVALproex Sprinkle 750 milliGRAM(s) Oral two times a day  furosemide    Tablet 20 milliGRAM(s) Oral daily  hydrocortisone 1% Cream 1 Application(s) Topical two times a day  melatonin. 5 milliGRAM(s) Oral at bedtime  propranolol 10 milliGRAM(s) Oral three times a day  risperiDONE   Tablet 1.5 milliGRAM(s) Oral <User Schedule>  senna 1 Tablet(s) Oral at bedtime    MEDICATIONS  (PRN):  acetaminophen     Tablet .. 650 milliGRAM(s) Oral every 6 hours PRN Temp greater or equal to 38C (100.4F), Mild Pain (1 - 3), Moderate Pain (4 - 6)  aluminum hydroxide/magnesium hydroxide/simethicone Suspension 30 milliLiter(s) Oral every 4 hours PRN Dyspepsia  fluPHENAZine 1 milliGRAM(s) Oral every 6 hours PRN agitation  fluPHENAZine  Injectable 1 milliGRAM(s) IntraMuscular once PRN agitation  fluPHENAZine  Injectable 1 milliGRAM(s) IntraMuscular once PRN agitation  fluPHENAZine  Injectable 1 milliGRAM(s) IntraMuscular once PRN agitation  lidocaine   4% Patch 2 Patch Transdermal daily PRN back pain  magnesium hydroxide Suspension 30 milliLiter(s) Oral at bedtime PRN constipation  mineral oil enema 133 milliLiter(s) Rectal daily PRN hard stool  saline laxative (FLEET) Rectal Enema 1 Enema Rectal daily PRN hard stool  triamcinolone 0.1% Cream 1 Application(s) Topical two times a day PRN pruritis/rash

## 2022-05-11 NOTE — BH INPATIENT PSYCHIATRY PROGRESS NOTE - NSBHCHARTREVIEWVS_PSY_A_CORE FT
Vital Signs Last 24 Hrs  T(C): 36.8 (05-11-22 @ 07:56), Max: 36.8 (05-11-22 @ 07:56)  T(F): 98.2 (05-11-22 @ 07:56), Max: 98.2 (05-11-22 @ 07:56)  HR: --  BP: --  BP(mean): --  RR: --  SpO2: --    Orthostatic VS  05-11-22 @ 14:10  Lying BP: --/-- HR: --  Sitting BP: 116/77 HR: 79  Standing BP: --/-- HR: --  Site: --  Mode: --  Orthostatic VS  05-11-22 @ 07:56  Lying BP: --/-- HR: --  Sitting BP: 104/53 HR: 54  Standing BP: --/-- HR: --  Site: --  Mode: --  Orthostatic VS  05-10-22 @ 20:54  Lying BP: 105/59 HR: 70  Sitting BP: --/-- HR: --  Standing BP: --/-- HR: --  Site: --  Mode: --  Orthostatic VS  05-09-22 @ 20:30  Lying BP: --/-- HR: --  Sitting BP: 105/59 HR: 70  Standing BP: --/-- HR: --  Site: --  Mode: --

## 2022-05-12 PROCEDURE — 99231 SBSQ HOSP IP/OBS SF/LOW 25: CPT

## 2022-05-12 RX ADMIN — DIVALPROEX SODIUM 750 MILLIGRAM(S): 500 TABLET, DELAYED RELEASE ORAL at 09:18

## 2022-05-12 RX ADMIN — RISPERIDONE 1.5 MILLIGRAM(S): 4 TABLET ORAL at 21:25

## 2022-05-12 RX ADMIN — Medication 1 APPLICATION(S): at 21:26

## 2022-05-12 RX ADMIN — Medication 20 MILLIGRAM(S): at 09:17

## 2022-05-12 RX ADMIN — Medication 1 APPLICATION(S): at 09:17

## 2022-05-12 RX ADMIN — RISPERIDONE 1.5 MILLIGRAM(S): 4 TABLET ORAL at 09:17

## 2022-05-12 RX ADMIN — Medication 5 MILLIGRAM(S): at 21:25

## 2022-05-12 RX ADMIN — Medication 1 APPLICATION(S): at 09:16

## 2022-05-12 RX ADMIN — SENNA PLUS 1 TABLET(S): 8.6 TABLET ORAL at 21:25

## 2022-05-12 RX ADMIN — RISPERIDONE 1.5 MILLIGRAM(S): 4 TABLET ORAL at 12:43

## 2022-05-12 RX ADMIN — DIVALPROEX SODIUM 750 MILLIGRAM(S): 500 TABLET, DELAYED RELEASE ORAL at 21:25

## 2022-05-12 NOTE — BH INPATIENT PSYCHIATRY PROGRESS NOTE - NSTXCOPEGOALOTHER_PSY_ALL_CORE
Patient will exhibit increased tolerance to group structure and increased frustration tolerance within seven days.
Patient will tolerate and participate in rehab groups daily and will exhbit increased frustration tolerance within seven days.
Patient will exhibit increased tolerance to group structure and increased frustration tolerance within seven days.
Patient will tolerate and participate in rehab groups daily and will exhbit increased frustration tolerance within seven days.
Patient will exhibit increased tolerance to group structure and increased frustration tolerance within seven days.
Patient will tolerate and participate in rehab groups daily and will exhbit increased frustration tolerance within seven days.
Patient will exhibit increased tolerance to group structure and increased frustration tolerance within seven days.
Patient will tolerate and participate in rehab groups daily and will exhbit increased frustration tolerance within seven days.
Patient will tolerate and participate in rehab groups daily and will exhbit increased frustration tolerance within seven days.
Patient will exhibit increased tolerance to group structure and increased frustration tolerance within seven days.
Patient will tolerate and participate in rehab groups daily and will exhbit increased frustration tolerance within seven days.

## 2022-05-12 NOTE — BH INPATIENT PSYCHIATRY PROGRESS NOTE - NSBHFUPINTERVALHXFT_PSY_A_CORE
Patient is seen for follow up of TBI. Discussed with treatment team.  No overnight events. No sedationShe continue to be eating, sleeping well. Singing in activity group today.   No prns.   She has been loud rarely, but behavior well controlled.  Tolerating medications well.VSS

## 2022-05-12 NOTE — BH INPATIENT PSYCHIATRY PROGRESS NOTE - NSBHASSESSSUMMFT_PSY_ALL_CORE
4/14 Patient maintaining gains, tolerating treatment. Cont current meds w/o change.   4/15 Maintaining gains with occasional med hold. Cont rx  will change Inderal to 20mg tid to minimize risk low BP  4/18 Maintaining gains despite decreased Inderal. Cont med, keep in mind tremor maybe affecting BP so should verify abn BP's  4/19: Patient maintaining gains despite reduced Inderal. Cont rx at current doses. If BP's still low could reduce again  4/20 Mainting gains depsite reduced Inderal and BPs higher cont current rx  4/21 Maintaining gains, cont current rx while attempting to find placement  4/22 Patient maintaining gains. Cont current meds at current doses  4/25 Patient stable, holding on to gains. Cont meds, will ask medicine  to see hands  4/26 Patient's stable, holding on to gains. No agitation. COnt meds, medicine to see hands, they look less reddened today  4/27 Patient currently stable, rejected by another facility. ordered podiatry consult  4/28 Maintaining gains, will modestly reduce Inderal to avoid low BPs, podiatry consult ordered  4/29 Holding gains with reduced Inderal and BP better, cont current treatment and placement efforts  5/2 Maintaining gains  VSS.  Continue current medications.  5/4: Remains stable, confused, but pleasant, in behavioral control.  5/6: The patient is maintaining gains.  She is peasant, but disorganized.  Compliant with treatment.  5/9 Maintaining gains. BP more stable on reduced Inderal , cont current treatment  5/10 Maintaining gais COnt meds, check blood work for CBC hydration  5/11 Doing will will sl lower Inderal in response to low nl BP's. Labs unremarkable.   5/12 Patient maintaining gains. Cont current regimen, Inderal recently decreased due to low BP's

## 2022-05-12 NOTE — BH INPATIENT PSYCHIATRY PROGRESS NOTE - NSBHMETABOLIC_PSY_ALL_CORE_FT
BMI: BMI (kg/m2): 31.6 (11-29-21 @ 16:35)  HbA1c: A1C with Estimated Average Glucose Result: 5.4 % (11-04-21 @ 06:58)    Glucose: POCT Blood Glucose.: 78 mg/dL (12-30-21 @ 07:34)    BP: 100/58 (05-10-22 @ 07:58) (100/58 - 100/58)  Lipid Panel:

## 2022-05-12 NOTE — BH INPATIENT PSYCHIATRY PROGRESS NOTE - NSBHCHARTREVIEWVS_PSY_A_CORE FT
Vital Signs Last 24 Hrs  T(C): 36.3 (05-12-22 @ 07:49), Max: 36.3 (05-12-22 @ 07:49)  T(F): 97.4 (05-12-22 @ 07:49), Max: 97.4 (05-12-22 @ 07:49)  HR: --  BP: --  BP(mean): --  RR: --  SpO2: --    Orthostatic VS  05-12-22 @ 07:49  Lying BP: --/-- HR: --  Sitting BP: 120/90 HR: 77  Standing BP: --/-- HR: --  Site: --  Mode: --  Orthostatic VS  05-11-22 @ 21:24  Lying BP: --/-- HR: --  Sitting BP: 114/70 HR: 68  Standing BP: --/-- HR: --  Site: --  Mode: --  Orthostatic VS  05-11-22 @ 14:10  Lying BP: --/-- HR: --  Sitting BP: 116/77 HR: 79  Standing BP: --/-- HR: --  Site: --  Mode: --  Orthostatic VS  05-11-22 @ 07:56  Lying BP: --/-- HR: --  Sitting BP: 104/53 HR: 54  Standing BP: --/-- HR: --  Site: --  Mode: --  Orthostatic VS  05-10-22 @ 20:54  Lying BP: 105/59 HR: 70  Sitting BP: --/-- HR: --  Standing BP: --/-- HR: --  Site: --  Mode: --

## 2022-05-13 PROCEDURE — 99231 SBSQ HOSP IP/OBS SF/LOW 25: CPT

## 2022-05-13 RX ADMIN — SENNA PLUS 1 TABLET(S): 8.6 TABLET ORAL at 20:51

## 2022-05-13 RX ADMIN — Medication 1 APPLICATION(S): at 13:39

## 2022-05-13 RX ADMIN — Medication 1 APPLICATION(S): at 10:11

## 2022-05-13 RX ADMIN — Medication 20 MILLIGRAM(S): at 10:10

## 2022-05-13 RX ADMIN — Medication 1 APPLICATION(S): at 22:41

## 2022-05-13 RX ADMIN — RISPERIDONE 1.5 MILLIGRAM(S): 4 TABLET ORAL at 10:10

## 2022-05-13 RX ADMIN — RISPERIDONE 1.5 MILLIGRAM(S): 4 TABLET ORAL at 20:56

## 2022-05-13 RX ADMIN — Medication 5 MILLIGRAM(S): at 20:50

## 2022-05-13 RX ADMIN — DIVALPROEX SODIUM 750 MILLIGRAM(S): 500 TABLET, DELAYED RELEASE ORAL at 10:10

## 2022-05-13 RX ADMIN — DIVALPROEX SODIUM 750 MILLIGRAM(S): 500 TABLET, DELAYED RELEASE ORAL at 20:50

## 2022-05-13 NOTE — BH TREATMENT PLAN - NSTXCOPEINTERMD_PSY_ALL_CORE
Supportive psychotherapy

## 2022-05-13 NOTE — BH TREATMENT PLAN - NSTXIMPULSGOAL_PSY_ALL_CORE
Will identify 1 thought that precedes an impulsive acts
Will identify 1 reason or reinforcement for impulsive behavior
Will identify 1 thought that precedes an impulsive acts
Will identify 1 reason or reinforcement for impulsive behavior
Will identify 1 strategy to interrupt impulsive thoughts
Will identify 1 reason or reinforcement for impulsive behavior
Will identify 1 reason or reinforcement for impulsive behavior

## 2022-05-13 NOTE — BH TREATMENT PLAN - NSTXPATIENTAGREEMENT_PSY_ALL_CORE
Yes
Patient unable to participate
Yes
Patient unable to participate
Yes

## 2022-05-13 NOTE — BH TREATMENT PLAN - NSTXCOPEPROGRES_PSY_ALL_CORE
No Change
No Change
Improving
Improving
No Change
No Change
Improving
No Change
Improving
Met - goal discontinued

## 2022-05-13 NOTE — BH TREATMENT PLAN - NSTXIMPULSPROGRES_PSY_ALL_CORE
Improving
Met - goal discontinued
Improving
Met - goal discontinued
Improving
Met - goal discontinued
Improving
Met - goal discontinued

## 2022-05-13 NOTE — BH TREATMENT PLAN - NSTXCONFINTERPR_PSY_ALL_CORE
Rehab activities will be utilized daily to increase patient's frustration tolerance and decrease paitent's irritability within seven days.
Rehab activities will be utilized daily to increase patient's frustration tolerance and decrease patient's irritability within seven days.
Rehab activities will be utilized daily to decrease patient's agitated behavior and increase her tolerance to structured activities within seven days.
Rehab activities will be utilized daily to increase pateint's tolerance to structured activities and decrease restlessness within seven days.
Rehab activities will be utilized daily to increase patient's tolerance to 1:1 session and to increase frustration tolerance within seven days.
Rehab activities will be utilized daily to decrease patient's restlessness and decrease patient's agitated behavior within seven days.
Patient has demonstrated some progress towards psychiatric rehabilitation goal of exhibiting increased frustration tolerance and decreased restlessness within seven days. Psychiatric rehabilitation recommends patient continue to attend groups, engage in individual sessions, and participate in activities in the milieu to continue exploring strategies to increase frustration tolerance.
Rehab activities will be utilized daily to manage patient's agitated beavhior and to increase her frustation tolerance within seven days.

## 2022-05-13 NOTE — BH TREATMENT PLAN - NSTXIMPULSDATETRGT_PSY_ALL_CORE
16-Dec-2021
03-Dec-2021
10-Mar-2022
20-May-2022
10-Mar-2022
16-Dec-2021
10-Dec-2021
16-Dec-2021
16-Dec-2021
10-Mar-2022
10-Mar-2022

## 2022-05-13 NOTE — BH TREATMENT PLAN - NSBHPRIMARYDX_PSY_ALL_CORE
Dementia with behavioral problem    
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    
Dementia with behavioral problem    
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    
Dementia with behavioral problem

## 2022-05-13 NOTE — BH TREATMENT PLAN - NSTXCAREGIVERPARTICIPATE_PSY_P_CORE
Family/Caregiver participated in identification of needs/problems/goals for treatment
Family/Caregiver participated in identification of needs/problems/goals for treatment/Family/Caregiver participated in defining interventions/Family/Caregiver participated in development of after care plan
Family/Caregiver participated in identification of needs/problems/goals for treatment
Family/Caregiver participated in identification of needs/problems/goals for treatment/Family/Caregiver participated in defining interventions/Family/Caregiver participated in development of after care plan
Family/Caregiver participated in identification of needs/problems/goals for treatment/Family/Caregiver participated in defining interventions/Family/Caregiver participated in development of after care plan
Family/Caregiver participated in identification of needs/problems/goals for treatment

## 2022-05-13 NOTE — BH TREATMENT PLAN - NSTXPROBCONF_PSY_ALL_CORE
CONFUSION, ACUTE/CHRONIC

## 2022-05-13 NOTE — BH TREATMENT PLAN - NSTXDCOTHRDATEEST_PSY_ALL_CORE
07-Dec-2021
02-May-2022
13-Dec-2021
14-Mar-2022
09-May-2022
25-Apr-2022
03-Jan-2022
07-Feb-2022
21-Mar-2022
22-Nov-2021
01-Dec-2021

## 2022-05-13 NOTE — BH TREATMENT PLAN - NSTXIMPULSINTERMD_PSY_ALL_CORE
Depakote and Zyprexa titration

## 2022-05-13 NOTE — BH TREATMENT PLAN - NSTXIMPULSDATEEST_PSY_ALL_CORE
13-May-2022
26-Nov-2021
03-Mar-2022
03-Mar-2022
26-Nov-2021
09-Dec-2021
09-Dec-2021
03-Mar-2022
09-Dec-2021
09-Dec-2021
03-Mar-2022

## 2022-05-13 NOTE — BH TREATMENT PLAN - NSTXDCOTHRDATETRGT_PSY_ALL_CORE
21-Mar-2022
10-Dec-2021
09-May-2022
10-Morgan-2022
29-Nov-2021
28-Mar-2022
02-May-2022
14-Dec-2021
16-May-2022
14-Feb-2022
20-Dec-2021

## 2022-05-13 NOTE — BH INPATIENT PSYCHIATRY PROGRESS NOTE - NSBHFUPINTERVALHXFT_PSY_A_CORE
Patient is seen for follow up of TBI. Discussed with treatment team.  No overnight events. No sedationShe continue to be eating, sleeping well. Singing in activity groups  No prns.   She has been loud rarely, but behavior well controlled.  Tolerating medications well.VSS

## 2022-05-13 NOTE — BH TREATMENT PLAN - NSTXCOPEDATEEST_PSY_ALL_CORE
28-Mar-2022
09-Dec-2021
17-Mar-2022
02-Dec-2021
04-Apr-2022
09-Dec-2021
13-May-2022
09-Dec-2021
25-Nov-2021
04-Apr-2022
09-Dec-2021

## 2022-05-13 NOTE — BH TREATMENT PLAN - NSTXCOPEGOAL_PSY_ALL_CORE
Identify and utilize 2 coping skills that meet their needs
Other...
Identify and utilize 2 coping skills that meet their needs
Other...
Identify and utilize 2 coping skills that meet their needs
Identify and utilize 2 coping skills that meet their needs

## 2022-05-13 NOTE — BH TREATMENT PLAN - NSTXDCOTHRINTERSW_PSY_ALL_CORE
SW will provide pt support and redirection as needed. SW will maintain contact with the pt's , providing treatment updates and addressing discharge planning issues. SW will continue to extend SNF placement efforts to addtional facitilites.
SW will provide pt support and redirection as needed. SW will maintain contact with the pt's , providing treatment updates and addressing discharge planning issues. SW will refer the pt back to her prior SNF for LTC when stable.
SW will provide pt support and redirection as needed. SW will maintain contact with the pt's , providing treatment updates, gaining his perspective, and addressing discharge plan of pt return to [facility] for her long term care when stable. SW will maintain contact with [facility] regarding the pt's discharge readiness.
SW provides support and redirection as needed. SW maintains contact with pt's , providing treatment updates. SW will initiate referral back to [facility] for pt's LTC.
SW will provide the pt support and redirection as needed. SW will extend SNF placement efforts to multiple additional facilities.
SW will provide pt support and redirection as needed. SW will continue to try to reach pt's  to provide treatment updates. SW will maintain contact with the pt's prior SNF, [facility], regarding the pt's discharge readiness.
SW will provide supportive contacts and redirection as needed. CAMILO will maintain contact with the pt's  who is currently hospitalized, providing treatment updates and obtaining alternate emergency contacts if needed. CAMILO will confer with [facility] regarding the pt's discharge readiness.
SW will provide support and redirection as needed. SW will maintain contact with pt's , providing treatment updates and addressing discharge planning issues. SW will focus on extending SNF placement efforts.
SW will provide pt support and redirection as needed. SW will maintain  contact with the pt's , providing treatment updates. SW will maintain contact with [facility], addressing the pt's treatment response and discharge readiness.
SW will provide the pt supportive contacts and redirection as needed. SW will maintain contact with the pt's , providng treatment updates. SW will maintain contact with [facility] regarding the pt's discharge readiness.
SW will provides pt support and redirection as needed. SW maintains contact with pt's , providing treatment updates and addressing discharge planning issues. SW will extend SNF placement efforts to additional facilities.

## 2022-05-13 NOTE — BH TREATMENT PLAN - NSTXCONFDATETRGT_PSY_ALL_CORE
14-Feb-2022
24-Mar-2022
02-May-2022
30-Nov-2021
09-May-2022
10-Dec-2021
20-May-2022
16-Dec-2021
23-Dec-2021
24-Mar-2022
12-Jan-2022

## 2022-05-13 NOTE — BH TREATMENT PLAN - NSTXPLANTHERAPYSESSIONSFT_PSY_ALL_CORE
05-02-22  Type of therapy: Coping skills,Creative arts therapy,Health and fitness,Spirituality,Stress management  Type of session: Group  Level of patient participation: Participated with encouragement  Duration of participation: 45 minutes  Therapy conducted by: Psych rehab  Therapy Summary: In response to the COVID-19 outbreak there has been a shift in hospital wide policies and protocols. As a result it should be noted the unit activities and structure have been temporarily modified to promote safety of patients and staff. Patient over the past week made gains towards her rehab goals. Patient is less restless and is able to sit with the community for longer periods. Patient is responding better to verbal redirection and is compliant with her medications. Patient with moderate encouragment and redirection participated in most of the morning and afternoon rehab groups. During activities patient is generally pleassant, cooperative and social. Patient responds to the exercise, music, meditation and art groups. However, patient remains confused, and disorganized. Patient requires assistance with her hygiene and grooming. Patient requires a structured supervised environment.  
  01-05-22  Type of therapy: Coping skills,Creative arts therapy,Health and fitness,Spirituality,Stress management  Type of session: Individual  Level of patient participation: Participated with encouragement  Duration of participation: 15 minutes  Therapy conducted by: Psych rehab  Therapy Summary: In respnose to the COVID-19 outbreak there has been a shift in hospital wide policies and protocols. As a result it should be noted the unit activities and structure have been temporarily modified to promote safety of patients and staff. Patient over the past week made some gains towwards her rehab goals. Patient is less irritable, more pleasant and appears less anxious. Patient is compliant with her medications. Patient with moderate redirection has tolerated 1:1's with wrtier for fifteen to twenty minutes. Patinet respsonds well to music, and art activities. However, patient remains confused, disorganized and at times becomes agitated. Patient continues to require a structured environment. Patient requires asssistance with her hygiene and grooming.  
  03-28-22  Type of therapy: Coping skills,Creative arts therapy,Health and fitness,Spirituality,Stress management  Type of session: Group  Level of patient participation: Participated with encouragement  Duration of participation: 45 minutes  Therapy conducted by: Psych rehab  Therapy Summary: In response to the COVID-19 outbreak there has been a shift in hospital wide policies and protocols. As a result is should be noted the unit activities and structure have been temporarily modified to promote safety of patients and staff. Patient over the past week made gains towards her rehab goals. Patient is less restless and is able to sit for longer periods. Patient responds well to verbal redirection and supportive encouragement. Patient remains compliant with her medicatioins and eats her meals with the community. Patient with minimal encouragement participates in most of the morning and afternoon rehab activities. During activities patient is generally pleasant, cooperative and verbal. Patient is able to stay task focused with minimal to moderate redirection. Patient responds well to the exercise, music, art and some of the meditation groups. However, patient remains confused, disorganized and at times moderately irritable. Patient requires assistance with her hygiene and grooming.  
  04-25-22  Type of therapy: Coping skills,Creative arts therapy,Health and fitness,Spirituality,Stress management  Type of session: Group  Level of patient participation: Participated with encouragement  Duration of participation: 45 minutes  Therapy conducted by: Psych rehab  Therapy Summary: In response to the COVID-19 outbreak there has been a shift in hospital wide policies and protocols. As a result it should be noted the unit activities and structure have been temporarily modified to promote safety of patients and staff. Patient over the past week made some gains towards her rehab goal. Patient is less irritable and is demonstrating greater frustration tolerance. Patient is less distracted and disturb by environmental stimulation. Patient is generally pleasant, social and redirectable. Patient is compliant with her medications and eats her meals with her peers. Patient with minimal to moderate encouragement participates in most of the morning and afternoon rehab groups. During activities patient is verbal, pleasant and is able to stay task focused with moderate redirection. Patient responds to the exercise, art, music and social groups. However, patient remains confused, disorganized, moderately restless and at times irritable. Patient continues to require a structured supervised environment.  
  05-09-22  Type of therapy: Coping skills,Creative arts therapy,Health and fitness,Spirituality,Stress management  Type of session: Group  Level of patient participation: Participated with encouragement  Duration of participation: 45 minutes  Therapy conducted by: Psych rehab  Therapy Summary: In response to the COVID-19 outbreak there has been a shift in hospital wide policies and protocols. As a result it should be noted the unit activities and structure have been temporarily modified to promote safety of patients and staff. Patient over the past week did not make any major gains towards her rehab goals. However, patient is sustaining gains made in the previous weeks. Patient remains compliant with her medications, eats her meals with the community and ambulates independently. Patient with minimal encouragement participates in most of the morning and afternoon rehab groups. During activities patient is generally pleasant, social and cooperative. Patient is able to stay task focused with moderate redirection. Patient responds to the music, exercise, art and meditation groups. However, patient remains confused, and disorganized. Patient requires assistance with her hygiene and grooming. Patient  at times becomes irritable, and restless. Patient requires a structured supervised environment.  
  11-25-21  Type of therapy: Leisure development,Peer advocate,Spirituality,Symptom management  Type of session: Individual  Level of patient participation: Quiet  Duration of participation: Less than 15 minutes  Therapy conducted by: Psych rehab  Therapy Summary: Patient over the past week made some moderate gains towards her rehab goals. Patient with maximum prompting and redirection participated in several of the rehab exercise and music activities. During activities patient is pleasant and verbal. Patient is responding better to verbal redirection and supportive encouragement. However, patient can become easily agitated, verbally abusive, intrusive and restless. Patient continues to display symptoms of forgetfulness and confusion.  Patient requires assistance with her hygiene and grooming.  
  12-09-21  Type of therapy: Coping skills,Creative arts therapy,Health and fitness,Spirituality,Stress management  Type of session: Group  Level of patient participation: Participated with encouragement  Duration of participation: 45 minutes  Therapy conducted by: Psych rehab  Therapy Summary: In response to the COVID-19 outbreak there has been a shift in hospital wide policies and protocols. As a result it should be noted the unit activities and structure have been temporarily modified to promote safety of patients and staff.  Patient over the past week made some gains towards her rehab goals. Patient with maximum prompting and redirection participated in several of the morning and afternoon rehab groups. During activities patient can be pleasant, and verbal. Patient responds to the exercise, music and art groups. Patient is complying with her medications. However, patient remains confused, disorganized and resistive to assistance with her ADL's. Patient continues to become agitated, verbally abusive and restless. Patient continues to require a structured supervised environment.  
  02-07-22  Type of therapy: Coping skills,Creative arts therapy,Health and fitness,Spirituality,Stress management  Type of session: Group  Level of patient participation: Participated with encouragement  Duration of participation: 45 minutes  Therapy conducted by: Psych rehab  Therapy Summary: In response to the COVID-19 outbreak there has been a shift in hospital wide policies and protocols. As a result it should be noted the unit activities and structure have been temporarily modified to promote safety of patients and staff. Patient over the past week made some gains towards her rehab goals. Patient with minimal encouragement attended most of the morning and afternoon rehab groups. During activities patient is generally pleasant, social and cooperative. Patient requires moderate redirection to stay task focused. Patient enjoys the music, exercise and some of the art groups. Patient is complying with her medications. However, patient at times becomes very agitated, and verbally abusive. Patient continues to display symptoms of confusion and forgetfulness.  
  03-14-22  Type of therapy: Coping skills,Health and fitness,Leisure development,Peer advocate  Type of session: Individual  Level of patient participation: Engaged  Duration of participation: 15 minutes  Therapy conducted by: Psych rehab  Therapy Summary: Writer met with patient for an individual session in order to review progress towards psychiatric rehabilitation goals. Patient was receptive to meeting with writer though she was unable to engage in conversation surrounding current presentation and progress towards treatment goals. Patient presented as pleasant with disorganized thought process. Patient has demonstrated minimal improvement in psychiatric symptoms. Patient remains confused, restless, and at times agitated. Overall, she is pleasant and cooperative. Patient has been adherent to medication and eats meals in the community. Due to the COVID-19 pandemic, unit structure and activities are re-evaluated on a consistent basis in effort to maintain the safety of patients and staff. Patient attends most psychiatric rehabilitation groups and appears to enjoy exercise, art, music, and some meditation groups. Patient is visible on the unit and social with peers and staff. Patient requires assistance with ADLs and grooming. Patient continues to require a structured and supervised environment.  
  12-02-21  Type of therapy: Coping skills,Creative arts therapy,Health and fitness,Spirituality,Stress management  Type of session: Group  Level of patient participation: Participated with encouragement  Duration of participation: 45 minutes  Therapy conducted by: Psych rehab  Therapy Summary: In response to the COVID-19 outbreak there has been a shift in hospital wide policies and protocols. As a result is should be noted the unit activities and structure have been temporarily modified to promote safety of patients and staff. Patient over the past week made some gains towards her rehab goals. Patient with moderate encouragement participated in several of the morning and afternoon rehab groups. During activities patient is generally pleasant, and social. Patient is able to stay task focused with moderate to maximum redirection. Patient responds to the exercise, music and leisure activities. However, patient continues to display symptoms of confusion, disorganization and restlessness. Patient at times becomes easily agitated, anxious and verbally abusive. Patient requires assistance with her hygiene and grooming. Patient continues to require a structured supervised environment.  
  12-14-21  Type of therapy: Other,Physical therapy  Type of session: Individual  Level of patient participation: Participates  --  Therapy conducted by: Other (specify),Physical therapy  Therapy Summary: Patient participated in gait training.    12-16-21  Type of therapy: Coping skills,Creative arts therapy,Health and fitness,Spirituality,Stress management  Type of session: Group  Level of patient participation: Participated with encouragement  Duration of participation: 30 minutes  Therapy conducted by: Psych rehab  Therapy Summary: In response to the COVID-19 outbreak there has been a shift in hospital wide policies and protocols. As a result it should be noted the unit activities and structure have been temporarily modified to promote safety of patients and staff.  Patient over the past week made some gains towards her rehab goals. Patient with moderate to maximum prompting participated in several of the morning and afternoon rehab groups. During activities patient is generally pleasant, and social. However, patient require moderate to maximum redirection to stay task focused. Patient responds to the exercise, music, art and social groups. Patient is compliant with her medications. However, patient remains very confused, and disorganized. Patient at times becomes restless, impulsive and agitated. Patient at times becomes verbally abusive towards her peers and staff. Patient is resistive to assistance with her ADL's.

## 2022-05-13 NOTE — BH INPATIENT PSYCHIATRY PROGRESS NOTE - NSBHMETABOLIC_PSY_ALL_CORE_FT
BMI: BMI (kg/m2): 31.6 (11-29-21 @ 16:35)  HbA1c: A1C with Estimated Average Glucose Result: 5.4 % (11-04-21 @ 06:58)    Glucose: POCT Blood Glucose.: 78 mg/dL (12-30-21 @ 07:34)    BP: 105/51 (05-12-22 @ 20:54) (105/51 - 105/51)  Lipid Panel:

## 2022-05-13 NOTE — BH TREATMENT PLAN - NSTXCONFDATEEST_PSY_ALL_CORE
07-Feb-2022
25-Apr-2022
02-May-2022
09-Dec-2021
17-Mar-2022
16-Dec-2021
17-Mar-2022
25-Nov-2021
05-Jan-2022
13-May-2022
25-Nov-2021

## 2022-05-13 NOTE — BH TREATMENT PLAN - NSTXCONFINTERMD_PSY_ALL_CORE
Reorientation and redirection

## 2022-05-13 NOTE — BH TREATMENT PLAN - NSTXCONFGOAL_PSY_ALL_CORE
Other...
Will ask for assistance as required
Other...
Will ask for assistance as required
Other...
Other...
Will be able to identify when to ask for assistance
Will ask for assistance as required
Other...

## 2022-05-13 NOTE — BH TREATMENT PLAN - NSTXDCOTHRINTERMD_PSY_ALL_CORE
Collaboration with MD at nursing home

## 2022-05-13 NOTE — BH TREATMENT PLAN - NSTXDCOTHRGOAL_PSY_ALL_CORE
The pt will show resolution of acute symptoms and return to baseline functioning, maintain behavioral control, and be calm enough to be cared for in her prior SNF.
The pt will maintain good behavioral control and be calm enough to be cared for in her prior SNF.
The pt will maintain behavioral control and be calm enough to be cared for in an SNF for LTC.
The pt will show resolution of acute symptoms and return to baseline functioning. The pt will maintain behavioral control and be calm enough to be cared for in her prior SNF.
The pt will maintain behavioral control and be calm enough to be cared for in an SNF for her long term care.
The pt will maintain behavioral control and be calm enough to be cared for in an SNF for her long term care.
The pt will show resolution of acute symptoms and return to baseline functioning. The pt will be calm enough to return to her prior SNF, [facility], for her long term care.
The pt will maintain good behavioral control and be calm enought to be cared for in an SNF for her LTC.
The pt will show resolution of acute symptoms and return to baseline functioining and be calm enough to be cared for in her prior SNF.
The pt will show resolution of acute symptoms, maintain behavioral control, return to baseline functioning, and be calm enough to be cared for in her prior SNF, [facility].
The pt will show resolution of acute symptoms, maintain behavioral control, return to baseline functioning, and be calm enough to be cared for in her prior nursing home, [facility].

## 2022-05-13 NOTE — BH INPATIENT PSYCHIATRY PROGRESS NOTE - NSBHCHARTREVIEWVS_PSY_A_CORE FT
Vital Signs Last 24 Hrs  T(C): 36.8 (05-13-22 @ 08:07), Max: 36.8 (05-13-22 @ 08:07)  T(F): 98.2 (05-13-22 @ 08:07), Max: 98.2 (05-13-22 @ 08:07)  HR: 67 (05-12-22 @ 20:54) (67 - 67)  BP: 105/51 (05-12-22 @ 20:54) (105/51 - 105/51)  BP(mean): --  RR: --  SpO2: --    Orthostatic VS  05-13-22 @ 08:07  Lying BP: --/-- HR: --  Sitting BP: 129/64 HR: 98  Standing BP: --/-- HR: --  Site: upper left arm  Mode: electronic  Orthostatic VS  05-12-22 @ 07:49  Lying BP: --/-- HR: --  Sitting BP: 120/90 HR: 77  Standing BP: --/-- HR: --  Site: --  Mode: --  Orthostatic VS  05-11-22 @ 21:24  Lying BP: --/-- HR: --  Sitting BP: 114/70 HR: 68  Standing BP: --/-- HR: --  Site: --  Mode: --  Orthostatic VS  05-11-22 @ 14:10  Lying BP: --/-- HR: --  Sitting BP: 116/77 HR: 79  Standing BP: --/-- HR: --  Site: --  Mode: --

## 2022-05-13 NOTE — BH TREATMENT PLAN - NSTXCOPEDATETRGT_PSY_ALL_CORE
16-Dec-2021
30-Nov-2021
11-Apr-2022
16-Dec-2021
04-Apr-2022
16-Dec-2021
24-Mar-2022
20-May-2022
10-Dec-2021
11-Apr-2022
16-Dec-2021

## 2022-05-13 NOTE — BH INPATIENT PSYCHIATRY PROGRESS NOTE - NSBHASSESSSUMMFT_PSY_ALL_CORE
4/14 Patient maintaining gains, tolerating treatment. Cont current meds w/o change.   4/15 Maintaining gains with occasional med hold. Cont rx  will change Inderal to 20mg tid to minimize risk low BP  4/18 Maintaining gains despite decreased Inderal. Cont med, keep in mind tremor maybe affecting BP so should verify abn BP's  4/19: Patient maintaining gains despite reduced Inderal. Cont rx at current doses. If BP's still low could reduce again  4/20 Mainting gains depsite reduced Inderal and BPs higher cont current rx  4/21 Maintaining gains, cont current rx while attempting to find placement  4/22 Patient maintaining gains. Cont current meds at current doses  4/25 Patient stable, holding on to gains. Cont meds, will ask medicine  to see hands  4/26 Patient's stable, holding on to gains. No agitation. COnt meds, medicine to see hands, they look less reddened today  4/27 Patient currently stable, rejected by another facility. ordered podiatry consult  4/28 Maintaining gains, will modestly reduce Inderal to avoid low BPs, podiatry consult ordered  4/29 Holding gains with reduced Inderal and BP better, cont current treatment and placement efforts  5/2 Maintaining gains  VSS.  Continue current medications.  5/4: Remains stable, confused, but pleasant, in behavioral control.  5/6: The patient is maintaining gains.  She is peasant, but disorganized.  Compliant with treatment.  5/9 Maintaining gains. BP more stable on reduced Inderal , cont current treatment  5/10 Maintaining gais COnt meds, check blood work for CBC hydration  5/11 Doing will will sl lower Inderal in response to low nl BP's. Labs unremarkable.   5/12 Patient maintaining gains. Cont current regimen, Inderal recently decreased due to low BP's  5/13 Maintaining gains. BP's stable not hypotensive. Cont current plan

## 2022-05-14 RX ADMIN — Medication 1 APPLICATION(S): at 09:11

## 2022-05-14 RX ADMIN — Medication 20 MILLIGRAM(S): at 09:11

## 2022-05-14 RX ADMIN — DIVALPROEX SODIUM 750 MILLIGRAM(S): 500 TABLET, DELAYED RELEASE ORAL at 09:12

## 2022-05-14 RX ADMIN — RISPERIDONE 1.5 MILLIGRAM(S): 4 TABLET ORAL at 12:39

## 2022-05-14 RX ADMIN — SENNA PLUS 1 TABLET(S): 8.6 TABLET ORAL at 22:07

## 2022-05-14 RX ADMIN — RISPERIDONE 1.5 MILLIGRAM(S): 4 TABLET ORAL at 22:10

## 2022-05-14 RX ADMIN — Medication 5 MILLIGRAM(S): at 22:07

## 2022-05-14 RX ADMIN — RISPERIDONE 1.5 MILLIGRAM(S): 4 TABLET ORAL at 09:12

## 2022-05-14 RX ADMIN — DIVALPROEX SODIUM 750 MILLIGRAM(S): 500 TABLET, DELAYED RELEASE ORAL at 22:07

## 2022-05-15 RX ADMIN — Medication 5 MILLIGRAM(S): at 20:59

## 2022-05-15 RX ADMIN — Medication 1 APPLICATION(S): at 22:43

## 2022-05-15 RX ADMIN — Medication 20 MILLIGRAM(S): at 09:30

## 2022-05-15 RX ADMIN — RISPERIDONE 1.5 MILLIGRAM(S): 4 TABLET ORAL at 22:42

## 2022-05-15 RX ADMIN — Medication 1 APPLICATION(S): at 22:42

## 2022-05-15 RX ADMIN — RISPERIDONE 1.5 MILLIGRAM(S): 4 TABLET ORAL at 12:35

## 2022-05-15 RX ADMIN — Medication 1 APPLICATION(S): at 09:30

## 2022-05-15 RX ADMIN — DIVALPROEX SODIUM 750 MILLIGRAM(S): 500 TABLET, DELAYED RELEASE ORAL at 20:59

## 2022-05-15 RX ADMIN — DIVALPROEX SODIUM 750 MILLIGRAM(S): 500 TABLET, DELAYED RELEASE ORAL at 09:29

## 2022-05-15 RX ADMIN — RISPERIDONE 1.5 MILLIGRAM(S): 4 TABLET ORAL at 09:30

## 2022-05-15 RX ADMIN — SENNA PLUS 1 TABLET(S): 8.6 TABLET ORAL at 20:59

## 2022-05-16 PROCEDURE — 99231 SBSQ HOSP IP/OBS SF/LOW 25: CPT

## 2022-05-16 RX ADMIN — DIVALPROEX SODIUM 750 MILLIGRAM(S): 500 TABLET, DELAYED RELEASE ORAL at 23:20

## 2022-05-16 RX ADMIN — RISPERIDONE 1.5 MILLIGRAM(S): 4 TABLET ORAL at 23:21

## 2022-05-16 RX ADMIN — DIVALPROEX SODIUM 750 MILLIGRAM(S): 500 TABLET, DELAYED RELEASE ORAL at 09:54

## 2022-05-16 RX ADMIN — Medication 1 APPLICATION(S): at 09:48

## 2022-05-16 RX ADMIN — SENNA PLUS 1 TABLET(S): 8.6 TABLET ORAL at 23:21

## 2022-05-16 RX ADMIN — Medication 20 MILLIGRAM(S): at 09:55

## 2022-05-16 RX ADMIN — Medication 5 MILLIGRAM(S): at 23:20

## 2022-05-16 RX ADMIN — RISPERIDONE 1.5 MILLIGRAM(S): 4 TABLET ORAL at 13:28

## 2022-05-16 RX ADMIN — RISPERIDONE 1.5 MILLIGRAM(S): 4 TABLET ORAL at 09:55

## 2022-05-16 RX ADMIN — Medication 1 APPLICATION(S): at 13:51

## 2022-05-16 RX ADMIN — Medication 1 APPLICATION(S): at 23:20

## 2022-05-16 NOTE — BH INPATIENT PSYCHIATRY PROGRESS NOTE - NSBHCHARTREVIEWVS_PSY_A_CORE FT
Vital Signs Last 24 Hrs  T(C): 36.7 (05-16-22 @ 06:52), Max: 36.7 (05-16-22 @ 06:52)  T(F): 98 (05-16-22 @ 06:52), Max: 98 (05-16-22 @ 06:52)  HR: 78 (05-16-22 @ 06:52) (76 - 78)  BP: 108/68 (05-16-22 @ 06:52) (108/68 - 109/67)  BP(mean): --  RR: --  SpO2: --

## 2022-05-16 NOTE — BH INPATIENT PSYCHIATRY PROGRESS NOTE - NSBHASSESSSUMMFT_PSY_ALL_CORE
4/14 Patient maintaining gains, tolerating treatment. Cont current meds w/o change.   4/15 Maintaining gains with occasional med hold. Cont rx  will change Inderal to 20mg tid to minimize risk low BP  4/18 Maintaining gains despite decreased Inderal. Cont med, keep in mind tremor maybe affecting BP so should verify abn BP's  4/19: Patient maintaining gains despite reduced Inderal. Cont rx at current doses. If BP's still low could reduce again  4/20 Mainting gains depsite reduced Inderal and BPs higher cont current rx  4/21 Maintaining gains, cont current rx while attempting to find placement  4/22 Patient maintaining gains. Cont current meds at current doses  4/25 Patient stable, holding on to gains. Cont meds, will ask medicine  to see hands  4/26 Patient's stable, holding on to gains. No agitation. COnt meds, medicine to see hands, they look less reddened today  4/27 Patient currently stable, rejected by another facility. ordered podiatry consult  4/28 Maintaining gains, will modestly reduce Inderal to avoid low BPs, podiatry consult ordered  4/29 Holding gains with reduced Inderal and BP better, cont current treatment and placement efforts  5/2 Maintaining gains  VSS.  Continue current medications.  5/4: Remains stable, confused, but pleasant, in behavioral control.  5/6: The patient is maintaining gains.  She is peasant, but disorganized.  Compliant with treatment.  5/9 Maintaining gains. BP more stable on reduced Inderal , cont current treatment  5/10 Maintaining gais COnt meds, check blood work for CBC hydration  5/11 Doing will will sl lower Inderal in response to low nl BP's. Labs unremarkable.   5/12 Patient maintaining gains. Cont current regimen, Inderal recently decreased due to low BP's  5/13 Maintaining gains. BP's stable not hypotensive. Cont current plan  5/16 Patient sustaining gains , cont current meds and d/c efforts

## 2022-05-16 NOTE — BH INPATIENT PSYCHIATRY PROGRESS NOTE - NSBHMETABOLIC_PSY_ALL_CORE_FT
BMI: BMI (kg/m2): 31.6 (11-29-21 @ 16:35)  HbA1c: A1C with Estimated Average Glucose Result: 5.4 % (11-04-21 @ 06:58)    Glucose: POCT Blood Glucose.: 78 mg/dL (12-30-21 @ 07:34)    BP: 108/68 (05-16-22 @ 06:52) (108/68 - 114/73)  Lipid Panel:

## 2022-05-17 PROCEDURE — 99231 SBSQ HOSP IP/OBS SF/LOW 25: CPT

## 2022-05-17 RX ADMIN — Medication 1 APPLICATION(S): at 22:26

## 2022-05-17 RX ADMIN — RISPERIDONE 1.5 MILLIGRAM(S): 4 TABLET ORAL at 09:47

## 2022-05-17 RX ADMIN — Medication 5 MILLIGRAM(S): at 22:00

## 2022-05-17 RX ADMIN — Medication 20 MILLIGRAM(S): at 09:47

## 2022-05-17 RX ADMIN — SENNA PLUS 1 TABLET(S): 8.6 TABLET ORAL at 22:26

## 2022-05-17 RX ADMIN — Medication 1 APPLICATION(S): at 09:51

## 2022-05-17 RX ADMIN — DIVALPROEX SODIUM 750 MILLIGRAM(S): 500 TABLET, DELAYED RELEASE ORAL at 09:48

## 2022-05-17 RX ADMIN — Medication 1 APPLICATION(S): at 09:48

## 2022-05-17 RX ADMIN — DIVALPROEX SODIUM 750 MILLIGRAM(S): 500 TABLET, DELAYED RELEASE ORAL at 21:59

## 2022-05-17 RX ADMIN — RISPERIDONE 1.5 MILLIGRAM(S): 4 TABLET ORAL at 21:59

## 2022-05-17 NOTE — BH INPATIENT PSYCHIATRY PROGRESS NOTE - NSBHMETABOLIC_PSY_ALL_CORE_FT
BMI: BMI (kg/m2): 31.6 (11-29-21 @ 16:35)  HbA1c: A1C with Estimated Average Glucose Result: 5.4 % (11-04-21 @ 06:58)    Glucose: POCT Blood Glucose.: 78 mg/dL (12-30-21 @ 07:34)    BP: 98/76 (05-17-22 @ 13:34) (98/76 - 113/77)  Lipid Panel:

## 2022-05-17 NOTE — BH INPATIENT PSYCHIATRY PROGRESS NOTE - NSBHCHARTREVIEWVS_PSY_A_CORE FT
Vital Signs Last 24 Hrs  T(C): 36.6 (05-17-22 @ 06:25), Max: 36.6 (05-17-22 @ 06:25)  T(F): 97.9 (05-17-22 @ 06:25), Max: 97.9 (05-17-22 @ 06:25)  HR: 80 (05-17-22 @ 13:34) (72 - 80)  BP: 98/76 (05-17-22 @ 13:34) (98/76 - 110/63)  BP(mean): --  RR: --  SpO2: --    Orthostatic VS  05-16-22 @ 20:32  Lying BP: --/-- HR: --  Sitting BP: 122/67 HR: 71  Standing BP: --/-- HR: --  Site: --  Mode: --

## 2022-05-17 NOTE — BH INPATIENT PSYCHIATRY PROGRESS NOTE - CURRENT MEDICATION
MEDICATIONS  (STANDING):  AQUAPHOR (petrolatum Ointment) 1 Application(s) Topical three times a day  diVALproex Sprinkle 750 milliGRAM(s) Oral two times a day  furosemide    Tablet 20 milliGRAM(s) Oral daily  hydrocortisone 1% Cream 1 Application(s) Topical two times a day  melatonin. 5 milliGRAM(s) Oral at bedtime  propranolol 10 milliGRAM(s) Oral two times a day  risperiDONE   Tablet 1.5 milliGRAM(s) Oral <User Schedule>  senna 1 Tablet(s) Oral at bedtime    MEDICATIONS  (PRN):  acetaminophen     Tablet .. 650 milliGRAM(s) Oral every 6 hours PRN Temp greater or equal to 38C (100.4F), Mild Pain (1 - 3), Moderate Pain (4 - 6)  aluminum hydroxide/magnesium hydroxide/simethicone Suspension 30 milliLiter(s) Oral every 4 hours PRN Dyspepsia  fluPHENAZine 1 milliGRAM(s) Oral every 6 hours PRN agitation  fluPHENAZine  Injectable 1 milliGRAM(s) IntraMuscular once PRN agitation  fluPHENAZine  Injectable 1 milliGRAM(s) IntraMuscular once PRN agitation  fluPHENAZine  Injectable 1 milliGRAM(s) IntraMuscular once PRN agitation  lidocaine   4% Patch 2 Patch Transdermal daily PRN back pain  magnesium hydroxide Suspension 30 milliLiter(s) Oral at bedtime PRN constipation  mineral oil enema 133 milliLiter(s) Rectal daily PRN hard stool  saline laxative (FLEET) Rectal Enema 1 Enema Rectal daily PRN hard stool  triamcinolone 0.1% Cream 1 Application(s) Topical two times a day PRN pruritis/rash

## 2022-05-17 NOTE — BH INPATIENT PSYCHIATRY PROGRESS NOTE - NSBHFUPINTERVALHXFT_PSY_A_CORE
Patient is seen for follow up of TBI. Discussed with treatment team.  No overnight events. No sedationShe continue to be eating, sleeping well. Singing in activity groups  No prns.   She has been loud rarely, but behavior well controlled.  Tolerating medications well.BP low in afternoon.

## 2022-05-17 NOTE — BH INPATIENT PSYCHIATRY PROGRESS NOTE - NSBHASSESSSUMMFT_PSY_ALL_CORE
4/14 Patient maintaining gains, tolerating treatment. Cont current meds w/o change.   4/15 Maintaining gains with occasional med hold. Cont rx  will change Inderal to 20mg tid to minimize risk low BP  4/18 Maintaining gains despite decreased Inderal. Cont med, keep in mind tremor maybe affecting BP so should verify abn BP's  4/19: Patient maintaining gains despite reduced Inderal. Cont rx at current doses. If BP's still low could reduce again  4/20 Mainting gains depsite reduced Inderal and BPs higher cont current rx  4/21 Maintaining gains, cont current rx while attempting to find placement  4/22 Patient maintaining gains. Cont current meds at current doses  4/25 Patient stable, holding on to gains. Cont meds, will ask medicine  to see hands  4/26 Patient's stable, holding on to gains. No agitation. COnt meds, medicine to see hands, they look less reddened today  4/27 Patient currently stable, rejected by another facility. ordered podiatry consult  4/28 Maintaining gains, will modestly reduce Inderal to avoid low BPs, podiatry consult ordered  4/29 Holding gains with reduced Inderal and BP better, cont current treatment and placement efforts  5/2 Maintaining gains  VSS.  Continue current medications.  5/4: Remains stable, confused, but pleasant, in behavioral control.  5/6: The patient is maintaining gains.  She is peasant, but disorganized.  Compliant with treatment.  5/9 Maintaining gains. BP more stable on reduced Inderal , cont current treatment  5/10 Maintaining gais COnt meds, check blood work for CBC hydration  5/11 Doing will will sl lower Inderal in response to low nl BP's. Labs unremarkable.   5/12 Patient maintaining gains. Cont current regimen, Inderal recently decreased due to low BP's  5/13 Maintaining gains. BP's stable not hypotensive. Cont current plan  5/16 Patient sustaining gains , cont current meds and d/c efforts  5/17 Maintaining gains, will cont risperidone, reduce Inderal to avoid hypotension

## 2022-05-18 ENCOUNTER — INPATIENT (INPATIENT)
Facility: HOSPITAL | Age: 67
LOS: 0 days | Discharge: PSYCHIATRIC OR SUBSTANCE USE FACILITY | End: 2022-05-19
Attending: SURGERY | Admitting: SURGERY
Payer: MEDICARE

## 2022-05-18 VITALS — RESPIRATION RATE: 16 BRPM | TEMPERATURE: 98 F

## 2022-05-18 VITALS
HEIGHT: 60 IN | HEART RATE: 83 BPM | RESPIRATION RATE: 18 BRPM | SYSTOLIC BLOOD PRESSURE: 127 MMHG | OXYGEN SATURATION: 96 % | TEMPERATURE: 98 F | DIASTOLIC BLOOD PRESSURE: 67 MMHG

## 2022-05-18 DIAGNOSIS — S06.9X9A UNSPECIFIED INTRACRANIAL INJURY WITH LOSS OF CONSCIOUSNESS OF UNSPECIFIED DURATION, INITIAL ENCOUNTER: ICD-10-CM

## 2022-05-18 DIAGNOSIS — K56.609 UNSPECIFIED INTESTINAL OBSTRUCTION, UNSPECIFIED AS TO PARTIAL VERSUS COMPLETE OBSTRUCTION: ICD-10-CM

## 2022-05-18 DIAGNOSIS — Z98.2 PRESENCE OF CEREBROSPINAL FLUID DRAINAGE DEVICE: Chronic | ICD-10-CM

## 2022-05-18 LAB
ALBUMIN SERPL ELPH-MCNC: 4.3 G/DL — SIGNIFICANT CHANGE UP (ref 3.3–5)
ALBUMIN SERPL ELPH-MCNC: 4.4 G/DL — SIGNIFICANT CHANGE UP (ref 3.3–5)
ALP SERPL-CCNC: 63 U/L — SIGNIFICANT CHANGE UP (ref 40–120)
ALP SERPL-CCNC: 64 U/L — SIGNIFICANT CHANGE UP (ref 40–120)
ALT FLD-CCNC: 11 U/L — SIGNIFICANT CHANGE UP (ref 4–33)
ALT FLD-CCNC: 14 U/L — SIGNIFICANT CHANGE UP (ref 4–33)
AMYLASE P1 CFR SERPL: 27 U/L — SIGNIFICANT CHANGE UP (ref 25–125)
ANION GAP SERPL CALC-SCNC: 15 MMOL/L — HIGH (ref 7–14)
ANION GAP SERPL CALC-SCNC: 16 MMOL/L — HIGH (ref 7–14)
AST SERPL-CCNC: 12 U/L — SIGNIFICANT CHANGE UP (ref 4–32)
AST SERPL-CCNC: 28 U/L — SIGNIFICANT CHANGE UP (ref 4–32)
BASOPHILS # BLD AUTO: 0.02 K/UL — SIGNIFICANT CHANGE UP (ref 0–0.2)
BASOPHILS # BLD AUTO: 0.12 K/UL — SIGNIFICANT CHANGE UP (ref 0–0.2)
BASOPHILS NFR BLD AUTO: 0.2 % — SIGNIFICANT CHANGE UP (ref 0–2)
BASOPHILS NFR BLD AUTO: 0.9 % — SIGNIFICANT CHANGE UP (ref 0–2)
BILIRUB SERPL-MCNC: 0.4 MG/DL — SIGNIFICANT CHANGE UP (ref 0.2–1.2)
BILIRUB SERPL-MCNC: 0.4 MG/DL — SIGNIFICANT CHANGE UP (ref 0.2–1.2)
BUN SERPL-MCNC: 19 MG/DL — SIGNIFICANT CHANGE UP (ref 7–23)
BUN SERPL-MCNC: 21 MG/DL — SIGNIFICANT CHANGE UP (ref 7–23)
CALCIUM SERPL-MCNC: 10.1 MG/DL — SIGNIFICANT CHANGE UP (ref 8.4–10.5)
CALCIUM SERPL-MCNC: 10.1 MG/DL — SIGNIFICANT CHANGE UP (ref 8.4–10.5)
CHLORIDE SERPL-SCNC: 103 MMOL/L — SIGNIFICANT CHANGE UP (ref 98–107)
CHLORIDE SERPL-SCNC: 104 MMOL/L — SIGNIFICANT CHANGE UP (ref 98–107)
CO2 SERPL-SCNC: 22 MMOL/L — SIGNIFICANT CHANGE UP (ref 22–31)
CO2 SERPL-SCNC: 25 MMOL/L — SIGNIFICANT CHANGE UP (ref 22–31)
CREAT SERPL-MCNC: 0.61 MG/DL — SIGNIFICANT CHANGE UP (ref 0.5–1.3)
CREAT SERPL-MCNC: 0.65 MG/DL — SIGNIFICANT CHANGE UP (ref 0.5–1.3)
EGFR: 96 ML/MIN/1.73M2 — SIGNIFICANT CHANGE UP
EGFR: 98 ML/MIN/1.73M2 — SIGNIFICANT CHANGE UP
EOSINOPHIL # BLD AUTO: 0 K/UL — SIGNIFICANT CHANGE UP (ref 0–0.5)
EOSINOPHIL # BLD AUTO: 0 K/UL — SIGNIFICANT CHANGE UP (ref 0–0.5)
EOSINOPHIL NFR BLD AUTO: 0 % — SIGNIFICANT CHANGE UP (ref 0–6)
EOSINOPHIL NFR BLD AUTO: 0 % — SIGNIFICANT CHANGE UP (ref 0–6)
GIANT PLATELETS BLD QL SMEAR: PRESENT — SIGNIFICANT CHANGE UP
GLUCOSE BLDC GLUCOMTR-MCNC: 103 MG/DL — HIGH (ref 70–99)
GLUCOSE BLDC GLUCOMTR-MCNC: 107 MG/DL — HIGH (ref 70–99)
GLUCOSE SERPL-MCNC: 135 MG/DL — HIGH (ref 70–99)
GLUCOSE SERPL-MCNC: 150 MG/DL — HIGH (ref 70–99)
HCT VFR BLD CALC: 47.4 % — HIGH (ref 34.5–45)
HCT VFR BLD CALC: 49.3 % — HIGH (ref 34.5–45)
HGB BLD-MCNC: 15.1 G/DL — SIGNIFICANT CHANGE UP (ref 11.5–15.5)
HGB BLD-MCNC: 15.7 G/DL — HIGH (ref 11.5–15.5)
IANC: 10.07 K/UL — HIGH (ref 1.8–7.4)
IANC: 9.02 K/UL — HIGH (ref 1.8–7.4)
IMM GRANULOCYTES NFR BLD AUTO: 0.3 % — SIGNIFICANT CHANGE UP (ref 0–1.5)
LIDOCAIN IGE QN: 14 U/L — SIGNIFICANT CHANGE UP (ref 7–60)
LIDOCAIN IGE QN: 17 U/L — SIGNIFICANT CHANGE UP (ref 7–60)
LYMPHOCYTES # BLD AUTO: 0.57 K/UL — LOW (ref 1–3.3)
LYMPHOCYTES # BLD AUTO: 1.34 K/UL — SIGNIFICANT CHANGE UP (ref 1–3.3)
LYMPHOCYTES # BLD AUTO: 11.5 % — LOW (ref 13–44)
LYMPHOCYTES # BLD AUTO: 4.4 % — LOW (ref 13–44)
MACROCYTES BLD QL: SIGNIFICANT CHANGE UP
MANUAL SMEAR VERIFICATION: SIGNIFICANT CHANGE UP
MCHC RBC-ENTMCNC: 29.7 PG — SIGNIFICANT CHANGE UP (ref 27–34)
MCHC RBC-ENTMCNC: 30 PG — SIGNIFICANT CHANGE UP (ref 27–34)
MCHC RBC-ENTMCNC: 31.8 GM/DL — LOW (ref 32–36)
MCHC RBC-ENTMCNC: 31.9 GM/DL — LOW (ref 32–36)
MCV RBC AUTO: 93.4 FL — SIGNIFICANT CHANGE UP (ref 80–100)
MCV RBC AUTO: 94.2 FL — SIGNIFICANT CHANGE UP (ref 80–100)
MICROCYTES BLD QL: SLIGHT — SIGNIFICANT CHANGE UP
MONOCYTES # BLD AUTO: 1.02 K/UL — HIGH (ref 0–0.9)
MONOCYTES # BLD AUTO: 1.19 K/UL — HIGH (ref 0–0.9)
MONOCYTES NFR BLD AUTO: 10.2 % — SIGNIFICANT CHANGE UP (ref 2–14)
MONOCYTES NFR BLD AUTO: 7.9 % — SIGNIFICANT CHANGE UP (ref 2–14)
NEUTROPHILS # BLD AUTO: 10.65 K/UL — HIGH (ref 1.8–7.4)
NEUTROPHILS # BLD AUTO: 9.02 K/UL — HIGH (ref 1.8–7.4)
NEUTROPHILS NFR BLD AUTO: 77.8 % — HIGH (ref 43–77)
NEUTROPHILS NFR BLD AUTO: 79.8 % — HIGH (ref 43–77)
NEUTS BAND # BLD: 2.6 % — SIGNIFICANT CHANGE UP (ref 0–6)
NRBC # BLD: 0 /100 WBCS — SIGNIFICANT CHANGE UP
NRBC # FLD: 0 K/UL — SIGNIFICANT CHANGE UP
OVALOCYTES BLD QL SMEAR: SLIGHT — SIGNIFICANT CHANGE UP
PLAT MORPH BLD: NORMAL — SIGNIFICANT CHANGE UP
PLATELET # BLD AUTO: 167 K/UL — SIGNIFICANT CHANGE UP (ref 150–400)
PLATELET # BLD AUTO: 188 K/UL — SIGNIFICANT CHANGE UP (ref 150–400)
PLATELET COUNT - ESTIMATE: NORMAL — SIGNIFICANT CHANGE UP
POTASSIUM SERPL-MCNC: 4.3 MMOL/L — SIGNIFICANT CHANGE UP (ref 3.5–5.3)
POTASSIUM SERPL-MCNC: 5.1 MMOL/L — SIGNIFICANT CHANGE UP (ref 3.5–5.3)
POTASSIUM SERPL-SCNC: 4.3 MMOL/L — SIGNIFICANT CHANGE UP (ref 3.5–5.3)
POTASSIUM SERPL-SCNC: 5.1 MMOL/L — SIGNIFICANT CHANGE UP (ref 3.5–5.3)
PROT SERPL-MCNC: 8 G/DL — SIGNIFICANT CHANGE UP (ref 6–8.3)
PROT SERPL-MCNC: 8.2 G/DL — SIGNIFICANT CHANGE UP (ref 6–8.3)
RBC # BLD: 5.03 M/UL — SIGNIFICANT CHANGE UP (ref 3.8–5.2)
RBC # BLD: 5.28 M/UL — HIGH (ref 3.8–5.2)
RBC # FLD: 14.7 % — HIGH (ref 10.3–14.5)
RBC # FLD: 14.8 % — HIGH (ref 10.3–14.5)
RBC BLD AUTO: NORMAL — SIGNIFICANT CHANGE UP
SARS-COV-2 RNA SPEC QL NAA+PROBE: SIGNIFICANT CHANGE UP
SARS-COV-2 RNA SPEC QL NAA+PROBE: SIGNIFICANT CHANGE UP
SODIUM SERPL-SCNC: 142 MMOL/L — SIGNIFICANT CHANGE UP (ref 135–145)
SODIUM SERPL-SCNC: 143 MMOL/L — SIGNIFICANT CHANGE UP (ref 135–145)
VARIANT LYMPHS # BLD: 4.4 % — SIGNIFICANT CHANGE UP (ref 0–6)
WBC # BLD: 11.61 K/UL — HIGH (ref 3.8–10.5)
WBC # BLD: 12.93 K/UL — HIGH (ref 3.8–10.5)
WBC # FLD AUTO: 11.61 K/UL — HIGH (ref 3.8–10.5)
WBC # FLD AUTO: 12.93 K/UL — HIGH (ref 3.8–10.5)

## 2022-05-18 PROCEDURE — 99231 SBSQ HOSP IP/OBS SF/LOW 25: CPT

## 2022-05-18 PROCEDURE — 99222 1ST HOSP IP/OBS MODERATE 55: CPT

## 2022-05-18 PROCEDURE — 74176 CT ABD & PELVIS W/O CONTRAST: CPT | Mod: 26,MA

## 2022-05-18 PROCEDURE — 71045 X-RAY EXAM CHEST 1 VIEW: CPT | Mod: 26

## 2022-05-18 PROCEDURE — 74018 RADEX ABDOMEN 1 VIEW: CPT | Mod: 26

## 2022-05-18 PROCEDURE — 74018 RADEX ABDOMEN 1 VIEW: CPT | Mod: 26,77

## 2022-05-18 PROCEDURE — 70250 X-RAY EXAM OF SKULL: CPT | Mod: 26

## 2022-05-18 PROCEDURE — 99285 EMERGENCY DEPT VISIT HI MDM: CPT

## 2022-05-18 PROCEDURE — 70450 CT HEAD/BRAIN W/O DYE: CPT | Mod: 26,MA

## 2022-05-18 RX ORDER — SODIUM CHLORIDE 9 MG/ML
1000 INJECTION INTRAMUSCULAR; INTRAVENOUS; SUBCUTANEOUS ONCE
Refills: 0 | Status: COMPLETED | OUTPATIENT
Start: 2022-05-18 | End: 2022-05-18

## 2022-05-18 RX ORDER — ACETAMINOPHEN 500 MG
1000 TABLET ORAL ONCE
Refills: 0 | Status: COMPLETED | OUTPATIENT
Start: 2022-05-18 | End: 2022-05-18

## 2022-05-18 RX ORDER — ENOXAPARIN SODIUM 100 MG/ML
40 INJECTION SUBCUTANEOUS EVERY 24 HOURS
Refills: 0 | Status: DISCONTINUED | OUTPATIENT
Start: 2022-05-18 | End: 2022-05-19

## 2022-05-18 RX ORDER — FUROSEMIDE 40 MG
5 TABLET ORAL DAILY
Refills: 0 | Status: DISCONTINUED | OUTPATIENT
Start: 2022-05-18 | End: 2022-05-19

## 2022-05-18 RX ORDER — LIDOCAINE 4 G/100G
2 CREAM TOPICAL EVERY 24 HOURS
Refills: 0 | Status: DISCONTINUED | OUTPATIENT
Start: 2022-05-18 | End: 2022-05-19

## 2022-05-18 RX ORDER — VALPROIC ACID (AS SODIUM SALT) 250 MG/5ML
375 SOLUTION, ORAL ORAL EVERY 6 HOURS
Refills: 0 | Status: DISCONTINUED | OUTPATIENT
Start: 2022-05-18 | End: 2022-05-19

## 2022-05-18 RX ADMIN — Medication 1000 MILLIGRAM(S): at 18:52

## 2022-05-18 RX ADMIN — Medication 400 MILLIGRAM(S): at 14:42

## 2022-05-18 RX ADMIN — SODIUM CHLORIDE 1000 MILLILITER(S): 9 INJECTION INTRAMUSCULAR; INTRAVENOUS; SUBCUTANEOUS at 13:04

## 2022-05-18 NOTE — BH INPATIENT PSYCHIATRY PROGRESS NOTE - NSTXCONFGOAL_PSY_ALL_CORE
Other...
This is a recent snapshot of the patient's [facility] medical record.  For current drug dose and complete information and questions, call [phone] or [facility], [facility] ([id number])  CSN Number:  [id number]    
Other...
Will ask for assistance as required
Other...
Will ask for assistance as required
Other...
Will ask for assistance as required
Other...
Other...
Will ask for assistance as required
Other...
Will ask for assistance as required
Will ask for assistance as required
Other...
Will ask for assistance as required
Other...
Other...
Will ask for assistance as required
Other...
Will ask for assistance as required
Will ask for assistance as required
Other...
Other...
Will ask for assistance as required
Other...
Will ask for assistance as required
Other...
Other...
Will ask for assistance as required
Other...
Will ask for assistance as required
Other...
Will ask for assistance as required
Other...
Will ask for assistance as required
Other...
Will ask for assistance as required
Other...
Will ask for assistance as required
Other...
Will ask for assistance as required
Will be able to identify when to ask for assistance
Other...
Will ask for assistance as required
Other...
Will ask for assistance as required
Other...
Other...
Will ask for assistance as required
Other...
Will ask for assistance as required
Other...
Will ask for assistance as required
Other...
Will ask for assistance as required
Other...
Other...
Will ask for assistance as required
Other...
Will ask for assistance as required
Other...
Will ask for assistance as required
Will ask for assistance as required
Other...
Will ask for assistance as required
Other...

## 2022-05-18 NOTE — BH INPATIENT PSYCHIATRY PROGRESS NOTE - NSTXIMPULSPROGRES_PSY_ALL_CORE
Met - goal discontinued
Improving
Met - goal discontinued
Improving
Met - goal discontinued
Improving
Met - goal discontinued
Met - goal discontinued
Improving
Met - goal discontinued
Met - goal discontinued
Improving
Met - goal discontinued
Met - goal discontinued
Improving
Met - goal discontinued
Met - goal discontinued
Improving
Met - goal discontinued
Improving
Met - goal discontinued
Improving
Met - goal discontinued
Improving
Met - goal discontinued
Met - goal discontinued
Improving
Met - goal discontinued
Met - goal discontinued
Improving
Met - goal discontinued
Improving
Met - goal discontinued
Improving
Met - goal discontinued
Met - goal discontinued
Improving
Met - goal discontinued
Improving
Met - goal discontinued
Improving
Met - goal discontinued
Improving
Met - goal discontinued
Improving
Improving
Met - goal discontinued
Improving
Met - goal discontinued
Improving
Improving
Met - goal discontinued
Improving
Met - goal discontinued
Met - goal discontinued
No Change
Improving
Met - goal discontinued
Improving

## 2022-05-18 NOTE — BH INPATIENT PSYCHIATRY PROGRESS NOTE - NSBHCHARTREVIEWVS_PSY_A_CORE FT
Vital Signs Last 24 Hrs  T(C): 36.7 (05-18-22 @ 11:26), Max: 36.8 (05-17-22 @ 19:27)  T(F): 98.1 (05-18-22 @ 11:26), Max: 98.3 (05-17-22 @ 19:27)  HR: 83 (05-18-22 @ 11:26) (72 - 83)  BP: 127/67 (05-18-22 @ 11:26) (127/67 - 148/72)  BP(mean): --  RR: 18 (05-18-22 @ 11:26) (16 - 18)  SpO2: 96% (05-18-22 @ 11:26) (96% - 96%)    Orthostatic VS  05-18-22 @ 09:58  Lying BP: --/-- HR: --  Sitting BP: 137/87 HR: 93  Standing BP: --/-- HR: --  Site: --  Mode: --  Orthostatic VS  05-18-22 @ 06:40  Lying BP: --/-- HR: --  Sitting BP: 128/67 HR: 84  Standing BP: --/-- HR: --  Site: --  Mode: --  Orthostatic VS  05-17-22 @ 20:39  Lying BP: --/-- HR: --  Sitting BP: 129/68 HR: 77  Standing BP: --/-- HR: --  Site: --  Mode: --  Orthostatic VS  05-16-22 @ 20:32  Lying BP: --/-- HR: --  Sitting BP: 122/67 HR: 71  Standing BP: --/-- HR: --  Site: --  Mode: --

## 2022-05-18 NOTE — ED ADULT NURSE NOTE - OBJECTIVE STATEMENT
Pt received to rm 4 presents from [facility] with aide for multiple episodes of n/v this morning. pt a&ox2, calm and cooperative, skin intact, respirations even and unlabored. 22G Iv placed in rt arm, labs drawn and sent, pt medicated as per ordered, will continue to monitor.

## 2022-05-18 NOTE — ED PROVIDER NOTE - CLINICAL SUMMARY MEDICAL DECISION MAKING FREE TEXT BOX
66yo F w/ PMHx of dementia, NPH, DM, epilepsy, HTN reporting to the ED with vomiting. This patient was staffed with supervising physician, Dr. Davis; medical record was reviewed. Upon arrival to ED, patient promptly evaluated, NAD. DDx consisted but not limited to shunt malfunction, shunt fx, infection, intra-abdominal process. 66yo F w/ PMHx of dementia, NPH, DM, epilepsy, HTN reporting to the ED with vomiting. This patient was staffed with supervising physician, Dr. Davis; medical record was reviewed. Upon arrival to ED, patient promptly evaluated, NAD. DDx consisted but not limited to shunt malfunction, shunt fx, infection, intra-abdominal process. CT head reassuring, shunt survey without any evidence of fx. CMP and CBC reassuring. CT abdomen and pelvis with evidence of SBO. Surgery consulted, ED team holding on NG tube as patient is not vomiting. Surgery evaluated patient, placed NG tube. 66yo F w/ PMHx of dementia, NPH, DM, epilepsy, HTN reporting to the ED with vomiting. This patient was staffed with supervising physician, Dr. Davis; medical record was reviewed. Upon arrival to ED, patient promptly evaluated, NAD. DDx consisted but not limited to shunt malfunction, shunt fx, infection, intra-abdominal process. CT head reassuring, shunt survey without any evidence of fx. CMP and CBC reassuring. CT abdomen and pelvis with evidence of SBO. Surgery consulted, ED team holding on NG tube as patient is not vomiting. Surgery evaluated patient, placed NG tube. Admitted to Dr. Omi Hernandez. Update provided to patient's  via phone. All questions answered, admitted in stable condition.

## 2022-05-18 NOTE — BH INPATIENT PSYCHIATRY PROGRESS NOTE - NSTXPROBDCOTHR_PSY_ALL_CORE
DISCHARGE ISSUE - OTHER

## 2022-05-18 NOTE — BH INPATIENT PSYCHIATRY PROGRESS NOTE - NSTXCONFDATEEST_PSY_ALL_CORE
16-Dec-2021
16-Dec-2021
16-May-2022
17-Mar-2022
05-Jan-2022
07-Feb-2022
16-Dec-2021
25-Apr-2022
14-Apr-2022
14-Feb-2022
17-Feb-2022
19-Nov-2021
25-Jan-2022
05-Jan-2022
16-Dec-2021
29-Dec-2021
07-Feb-2022
09-May-2022
16-Dec-2021
03-Mar-2022
10-Mar-2022
16-Dec-2021
17-Mar-2022
25-Nov-2021
18-Apr-2022
10-Mar-2022
03-Feb-2022
09-May-2022
25-Apr-2022
25-Nov-2021
01-Feb-2022
11-Apr-2022
11-Apr-2022
12-Jan-2022
23-Dec-2021
02-May-2022
12-Jan-2022
05-Jan-2022
09-Dec-2021
12-Jan-2022
17-Feb-2022
21-Feb-2022
16-Dec-2021
17-Mar-2022
07-Feb-2022
17-Mar-2022
17-Mar-2022
07-Feb-2022
09-May-2022
12-Jan-2022
17-Mar-2022
25-Apr-2022
12-Jan-2022
18-Apr-2022
09-May-2022
28-Feb-2022
05-Jan-2022
11-Apr-2022
12-Jan-2022
17-Mar-2022
19-Nov-2021
25-Nov-2021
19-Nov-2021
25-Nov-2021
28-Feb-2022
12-Jan-2022
07-Mar-2022
13-May-2022
16-May-2022
17-Mar-2022
21-Feb-2022
25-Nov-2021
07-Feb-2022
09-Dec-2021
17-Mar-2022
05-Jan-2022
16-Dec-2021
17-Mar-2022
23-Dec-2021
25-Jan-2022
14-Feb-2022
25-Nov-2021
03-Feb-2022
25-Nov-2021
07-Feb-2022
07-Mar-2022
03-Feb-2022
07-Mar-2022
18-Apr-2022
19-Nov-2021
25-Apr-2022
01-Feb-2022
25-Nov-2021
09-Dec-2021
17-Mar-2022
29-Dec-2021
07-Mar-2022
21-Feb-2022
23-Dec-2021
28-Feb-2022
29-Dec-2021
03-Mar-2022
16-May-2022
28-Feb-2022
18-Apr-2022
25-Apr-2022
07-Feb-2022
25-Jan-2022
02-May-2022
21-Feb-2022
25-Jan-2022
25-Nov-2021
03-Mar-2022
11-Apr-2022
29-Dec-2021
02-May-2022
17-Mar-2022
17-Mar-2022
18-Apr-2022
23-Dec-2021
02-May-2022
17-Feb-2022
02-May-2022
09-Dec-2021
25-Jan-2022
21-Feb-2022
14-Feb-2022
17-Mar-2022
29-Dec-2021
29-Dec-2021
07-Mar-2022
29-Dec-2021
09-Dec-2021
25-Nov-2021
19-Nov-2021
09-Dec-2021
09-Dec-2021
25-Nov-2021

## 2022-05-18 NOTE — H&P ADULT - ATTENDING COMMENTS
Adhesive small bowel obstruction    a.  Admit to B surgery - TAMARA SEWELL  b.  Keep nil per OS  c.  Maintain IVF resuscitation  d.  May place NGT to low wall suction  e.  Obtain CBC, BMP q 24 and as needed  f.  Maintain DVT prophylaxis with Venodyne / heparin SQ/ lovenox  G.  CT of abdomen and pelvis/plain abdominal films reviewed  f.  Once decompressed, may perform water soluble contrast challenge per ACS protocol  g.  Strict I and O's

## 2022-05-18 NOTE — BH INPATIENT PSYCHIATRY PROGRESS NOTE - NSTXCOPEPROGRES_PSY_ALL_CORE
Met - goal discontinued
No Change
Improving
No Change
Improving
No Change
Improving
Improving
Met - goal discontinued
No Change
No Change
Improving
Improving
No Change
Improving
Improving
Met - goal discontinued
Met - goal discontinued
No Change
Improving
No Change
No Change
Improving
Met - goal discontinued
No Change
Improving
No Change
Improving
No Change
No Change
Improving
No Change
No Change
Met - goal discontinued
Met - goal discontinued
No Change
Improving
Met - goal discontinued
No Change
Improving
No Change
No Change
Improving
Met - goal discontinued
No Change
No Change
Improving
No Change
No Change
Improving
No Change
Improving
Improving
No Change
Improving
Improving
Met - goal discontinued
No Change
No Change
Improving
No Change
Improving
Met - goal discontinued
No Change
Improving
Met - goal discontinued
No Change
No Change
Improving
No Change
Improving
No Change
Improving
No Change
Improving
Improving
No Change
Met - goal discontinued
No Change
Met - goal discontinued
Improving
Improving
No Change
Improving
Improving
No Change
Met - goal discontinued
No Change
Improving
Met - goal discontinued
No Change
Improving
Improving
No Change

## 2022-05-18 NOTE — BH INPATIENT PSYCHIATRY PROGRESS NOTE - NSCGIIMPROVESXSCORE_CAL_PSY_ALL_CORE
3

## 2022-05-18 NOTE — BH INPATIENT PSYCHIATRY PROGRESS NOTE - NSTXDCOTHRPROGRES_PSY_ALL_CORE
Improving
No Change
No Change
Improving
No Change
Improving
No Change
Improving
No Change
Improving
No Change
Improving
No Change
No Change
Improving
No Change
No Change
Improving
Worsening
Improving
No Change
No Change
Worsening
No Change
Improving
Improving
No Change
Worsening
Improving
Worsening
Improving
No Change
Improving
Worsening
No Change
Improving
Improving
No Change
Worsening
Improving
Improving
No Change
Worsening
Improving
Worsening
Improving
Improving
No Change
Worsening
Improving
No Change
Improving
No Change
Improving
No Change
Improving
No Change
No Change
Improving
No Change
No Change
Improving
No Change
Improving
Worsening
Improving
Improving
No Change
Improving
No Change
Improving
No Change
Improving
No Change

## 2022-05-18 NOTE — BH INPATIENT PSYCHIATRY PROGRESS NOTE - NSDCCRITERIA_PSY_ALL_CORE
CGI <= 2

## 2022-05-18 NOTE — BH INPATIENT PSYCHIATRY DISCHARGE NOTE - HOSPITAL COURSE
Patient had protracted course for may reasons. She presented with TBI related affective lability, iirritability, aggression towards peers. She had trial olanzapine with no benefit up to 20mg/d. She had trial Inderal up to 90mg/d as monotherapy which seemed promising but she became aggressive again. Risperidone was added up to 1.5mg tid with dramatic beneift , she was much calmer, less labile, less vocally disruptive, no longer aggressive. She however had periods of low BP and Inderal had to be reduced down to 10mg bid despite this she remained calm.  She became a placement issue neither her home SNF nor 150 other facilities would accept her. While awaiting a bed she had vomiting  and was sent to ER admitted for small bowel obstruction.

## 2022-05-18 NOTE — BH INPATIENT PSYCHIATRY PROGRESS NOTE - NSBHLEGALSTATUSCHANGE_PSY_ALL_CORE
No

## 2022-05-18 NOTE — H&P ADULT - ASSESSMENT
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 NGT placed at bedside with immediate return of 500cc bilious OP.     PLAN:   - Admit to B Team Surgery, Dr. Gavin  - Diet: NPO  - NGT to LCWS  - IVF: LR @ 125  - No standing pain meds / abdominal exam prior to pain medication  - Serial abdominal exams  - VTE ppx: Lovenox  - Home meds: ******   - AM labs     Discussed with Dr. 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 NGT placed at bedside with immediate return of 500cc bilious OP.     PLAN:   - Admit to B Team Surgery, Dr. Gavin  - Diet: NPO  - NGT to LCWS  - IVF: LR @ 125  - No standing pain meds / abdominal exam prior to pain medication  - Serial abdominal exams  - VTE ppx: Lovenox  - Home meds (per IP Psych note 5/18): divalproex sprinkles, risperidone, home bowel regimen HELD   - Home meds: triamciclone, lidocaine patch restarted  - Valproate IV while patient NPO  - AM labs     Discussed with Dr. Ruelas, PGY-2  B Team Surgery  #[id number]

## 2022-05-18 NOTE — H&P ADULT - NSHPPHYSICALEXAM_GEN_ALL_CORE
GEN: resting in bed comfortably in NAD  NEURO: awake, alert  HEENT: normocephalic, atraumatic, (-) scleral icterus  CV: warm, well-perfused  CHEST: bilateral equal chest wall rise  RESP: no increased WOB  ABD: soft, non-distended, non-tender to palpation without rebound tenderness or guarding  EXTR: no gross deformities; spontaneous movement in b/l U/L extrem   SKIN: non-jaundiced

## 2022-05-18 NOTE — BH INPATIENT PSYCHIATRY PROGRESS NOTE - NSTXCOPEINTERMD_PSY_ALL_CORE
Supportive psychotherapy

## 2022-05-18 NOTE — BH INPATIENT PSYCHIATRY PROGRESS NOTE - NSTXCOPEGOAL_PSY_ALL_CORE
Identify and utilize 2 coping skills that meet their needs
Other...
Identify and utilize 2 coping skills that meet their needs
Identify and utilize 2 coping skills that meet their needs
Other...
Identify and utilize 2 coping skills that meet their needs
Other...
Identify and utilize 2 coping skills that meet their needs
Other...
Other...
Identify and utilize 2 coping skills that meet their needs
Identify and utilize 2 coping skills that meet their needs
Other...
Identify and utilize 2 coping skills that meet their needs
Other...
Other...
Identify and utilize 2 coping skills that meet their needs
Other...
Identify and utilize 2 coping skills that meet their needs
Other...
Other...
Identify and utilize 2 coping skills that meet their needs
Other...
Other...
Identify and utilize 2 coping skills that meet their needs
Other...
Other...
Identify and utilize 2 coping skills that meet their needs
Other...
Identify and utilize 2 coping skills that meet their needs
Identify and utilize 2 coping skills that meet their needs
Other...
Other...
Identify and utilize 2 coping skills that meet their needs
Other...
Identify and utilize 2 coping skills that meet their needs
Other...
Identify and utilize 2 coping skills that meet their needs
Other...
Identify and utilize 2 coping skills that meet their needs
Other...
Other...
Identify and utilize 2 coping skills that meet their needs
Other...
Identify and utilize 2 coping skills that meet their needs
Other...
Identify and utilize 2 coping skills that meet their needs
Other...
Other...
Identify and utilize 2 coping skills that meet their needs
Other...
Identify and utilize 2 coping skills that meet their needs
Other...
Identify and utilize 2 coping skills that meet their needs
Other...
Identify and utilize 2 coping skills that meet their needs
Other...
Other...
Identify and utilize 2 coping skills that meet their needs
Other...
Identify and utilize 2 coping skills that meet their needs

## 2022-05-18 NOTE — BH INPATIENT PSYCHIATRY PROGRESS NOTE - NSTREATMENTCERTY_PSY_ALL_CORE

## 2022-05-18 NOTE — BH INPATIENT PSYCHIATRY DISCHARGE NOTE - NSDCCPCAREPLAN_GEN_ALL_CORE_FT
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

## 2022-05-18 NOTE — BH INPATIENT PSYCHIATRY PROGRESS NOTE - NSTXCONFDATETRGT_PSY_ALL_CORE
26-Nov-2021
12-Jan-2022
14-Feb-2022
14-Mar-2022
16-Dec-2021
18-Apr-2022
23-Dec-2021
24-Mar-2022
24-Mar-2022
28-Feb-2022
12-Jan-2022
14-Feb-2022
19-Jan-2022
21-Apr-2022
26-Nov-2021
23-Dec-2021
24-Mar-2022
25-Apr-2022
24-Mar-2022
26-Jan-2022
30-Nov-2021
26-Jan-2022
28-Feb-2022
10-Mar-2022
24-Mar-2022
09-May-2022
25-Apr-2022
26-Nov-2021
24-Mar-2022
30-Nov-2021
02-May-2022
07-Mar-2022
10-Feb-2022
16-May-2022
10-Mar-2022
16-Dec-2021
18-Apr-2022
28-Feb-2022
07-Mar-2022
12-Jan-2022
19-Jan-2022
23-Dec-2021
30-Nov-2021
23-May-2022
24-Mar-2022
28-Feb-2022
24-Mar-2022
05-Jan-2022
10-Feb-2022
14-Feb-2022
24-Mar-2022
30-Dec-2021
02-May-2022
09-May-2022
05-Jan-2022
10-Dec-2021
24-Feb-2022
24-Mar-2022
30-Nov-2021
18-Apr-2022
23-May-2022
24-Mar-2022
30-Nov-2021
08-Feb-2022
16-May-2022
23-Dec-2021
24-Feb-2022
26-Nov-2021
30-Nov-2021
02-May-2022
12-Jan-2022
17-Mar-2022
25-Apr-2022
01-Feb-2022
12-Jan-2022
26-Jan-2022
09-May-2022
25-Apr-2022
26-Nov-2021
19-Jan-2022
28-Feb-2022
30-Dec-2021
30-Nov-2021
14-Feb-2022
16-May-2022
24-Mar-2022
24-Mar-2022
07-Mar-2022
21-Feb-2022
07-Mar-2022
16-Dec-2021
17-Mar-2022
23-Dec-2021
26-Jan-2022
10-Dec-2021
30-Nov-2021
16-May-2022
21-Mar-2022
25-Apr-2022
10-Feb-2022
21-Feb-2022
21-Mar-2022
24-Mar-2022
02-May-2022
24-Mar-2022
08-Feb-2022
16-Dec-2021
20-May-2022
30-Nov-2021
01-Feb-2022
16-Dec-2021
23-Dec-2021
30-Dec-2021
05-Jan-2022
10-Mar-2022
23-May-2022
14-Feb-2022
14-Mar-2022
21-Feb-2022
24-Mar-2022
01-Feb-2022
23-Dec-2021
24-Feb-2022
30-Nov-2021
01-Feb-2022
09-May-2022
05-Jan-2022
14-Feb-2022
23-Dec-2021
30-Dec-2021
01-Feb-2022
09-May-2022
14-Feb-2022
30-Nov-2021
05-Jan-2022
24-Mar-2022
02-May-2022
05-Jan-2022
05-Jan-2022
18-Apr-2022
14-Mar-2022
16-Dec-2021
26-Nov-2021
16-Dec-2021

## 2022-05-18 NOTE — BH INPATIENT PSYCHIATRY PROGRESS NOTE - NSBHCONTPROVIDER_PSY_ALL_CORE
Yes...
No...
Yes...
No...
Yes...

## 2022-05-18 NOTE — BH INPATIENT PSYCHIATRY PROGRESS NOTE - NSTXCONFINTERMD_PSY_ALL_CORE
Reorientation and redirection

## 2022-05-18 NOTE — PROGRESS NOTE ADULT - SUBJECTIVE AND OBJECTIVE BOX
CC/Reason for Consult: Rash    SUBJECTIVE / OVERNIGHT EVENTS:  The patient was seen and evaluated at bedside this AM. No o/n events. Denies any SOB/CP/NV. States that she has abdominal and left forearm rash that has been itchy.  She was also noted to have a left foot rash on dorsum w/ scratch marks. No f/c, NV. Spoke w/ nursing staff, no report of diaper rashes or other concerning lesions in the groin area.     MEDICATIONS  (STANDING):  clonazePAM  Tablet 0.25 milliGRAM(s) Oral at bedtime  clotrimazole 1% Cream 1 Application(s) Topical two times a day  diVALproex Sprinkle 750 milliGRAM(s) Oral two times a day  furosemide    Tablet 20 milliGRAM(s) Oral daily  melatonin. 3 milliGRAM(s) Oral at bedtime  metoprolol tartrate 25 milliGRAM(s) Oral two times a day  OLANZapine Disintegrating Tablet 5 milliGRAM(s) Oral <User Schedule>  polyethylene glycol 3350 8.5 Gram(s) Oral daily  senna 2 Tablet(s) Oral at bedtime    MEDICATIONS  (PRN):  acetaminophen     Tablet .. 650 milliGRAM(s) Oral every 6 hours PRN Temp greater or equal to 38C (100.4F), Mild Pain (1 - 3), Moderate Pain (4 - 6)  aluminum hydroxide/magnesium hydroxide/simethicone Suspension 30 milliLiter(s) Oral every 4 hours PRN Dyspepsia  magnesium hydroxide Suspension 30 milliLiter(s) Oral at bedtime PRN constipation  OLANZapine 2.5 milliGRAM(s) Oral every 6 hours PRN severe agitation  OLANZapine Injectable 2.5 milliGRAM(s) IntraMuscular once PRN severe agitation      Vital Signs Last 24 Hrs  T(C): 36.6 (24 Nov 2021 07:53), Max: 36.6 (24 Nov 2021 07:53)  T(F): 97.8 (24 Nov 2021 07:53), Max: 97.8 (24 Nov 2021 07:53)  94/47, 54      PHYSICAL EXAM:  GENERAL: NAD, well-developed  HEAD:  Atraumatic, Normocephalic  EYES: EOMI, conjunctiva and sclera clear  NECK: Supple, No JVD  CHEST/LUNG: Clear to auscultation bilaterally; No wheeze  HEART: Regular rate and rhythm; No murmurs, rubs, or gallops. LLE pedal edema.   ABDOMEN: Soft, Nontender, Nondistended; Bowel sounds present  EXTREMITIES:  2+ Peripheral Pulses, No clubbing, cyanosis. neg calf tenderness.  PSYCH: AAOx1 (self)  NEUROLOGY: non-focal  SKIN: Multiple diffuse red papules across the abdomen. No blistering/discharge/palpable fluctuance. Similar appear papules along the left forearm and left dorsum of foot. No inframmary rashes noted. Groin was not assessed.    LABS:  No labs.      RADIOLOGY & ADDITIONAL TESTS:    Imaging Personally Reviewed:    Consultant(s) Notes Reviewed:      Care Discussed with Consultants/Other Providers:  
[facility]  Division of [facility] Medicine    Patient is a 66y old  Female who presents with a chief complaint of Dementia w/ behavioral disturbances (24 Nov 2021 14:17)    SUBJECTIVE / OVERNIGHT EVENTS: Patient w/ worsening lower extremity edema. Medicine consulted for management and possible up-titration of lasix. Patient states her legs are swollen but not painful. States she is able to ambulate.     ADDITIONAL REVIEW OF SYSTEMS: limited by dementia however unremarkable.     MEDICATIONS  (STANDING):  AQUAPHOR (petrolatum Ointment) 1 Application(s) Topical three times a day  diVALproex Sprinkle 750 milliGRAM(s) Oral two times a day  furosemide    Tablet 20 milliGRAM(s) Oral daily  haloperidol     Tablet 0.5 milliGRAM(s) Oral <User Schedule>  melatonin. 3 milliGRAM(s) Oral at bedtime  propranolol 30 milliGRAM(s) Oral three times a day    MEDICATIONS  (PRN):  acetaminophen     Tablet .. 650 milliGRAM(s) Oral every 6 hours PRN Temp greater or equal to 38C (100.4F), Mild Pain (1 - 3), Moderate Pain (4 - 6)  aluminum hydroxide/magnesium hydroxide/simethicone Suspension 30 milliLiter(s) Oral every 4 hours PRN Dyspepsia  magnesium hydroxide Suspension 30 milliLiter(s) Oral at bedtime PRN constipation  mineral oil enema 133 milliLiter(s) Rectal daily PRN hard stool  OLANZapine 2.5 milliGRAM(s) Oral every 6 hours PRN severe agitation  OLANZapine Injectable 2.5 milliGRAM(s) IntraMuscular once PRN severe agitation  saline laxative (FLEET) Rectal Enema 1 Enema Rectal daily PRN hard stool  triamcinolone 0.1% Cream 1 Application(s) Topical two times a day PRN pruritis/rash    PHYSICAL EXAM:  Vital Signs Last 24 Hrs  T(C): 36.4 (30 Dec 2021 15:48), Max: 36.4 (30 Dec 2021 15:48)  T(F): 97.5 (30 Dec 2021 15:48), Max: 97.5 (30 Dec 2021 15:48)  HR: 65-> 70   BP: 124/63 -> 116/80   BP(mean): --  RR: --  SpO2: --    GENERAL: Elderly woman in NAD, well-developed  EYES: EOMI, conjunctiva and sclera clear  NECK: Supple, No JVD  CHEST/LUNG: Clear to auscultation bilaterally; No wheeze  HEART: Regular rate and rhythm; No murmurs, rubs, or gallops. 2+  bilateral pedal edema symmetric w/ excoriations from scratching. Dry.   ABDOMEN: Soft, Nontender, Nondistended; Bowel sounds present  EXTREMITIES: No clubbing, cyanosis. no calf tenderness   PSYCH: AAOx1 (self)  NEUROLOGY: non-focal  SKIN: multiple excoriations on lower extremities from scratching. Skin dry.     LABS:                         13.8   11.13 )-----------( 222      ( 30 Dec 2021 12:49 )             43.6     12-30    144  |  107  |  22  ----------------------------<  84  4.0   |  25  |  0.61    Ca    9.4      30 Dec 2021 12:49  Phos  3.2     12-30  Mg     2.10     12-30                    RADIOLOGY, EKG & ADDITIONAL TESTS: Reviewed.       
CC/Reason for Consult:     SUBJECTIVE / OVERNIGHT EVENTS: Nausea/vomiting    ***INCOMPLETE***    MEDICATIONS  (STANDING):  AQUAPHOR (petrolatum Ointment) 1 Application(s) Topical three times a day  diVALproex Sprinkle 750 milliGRAM(s) Oral two times a day  furosemide    Tablet 20 milliGRAM(s) Oral daily  hydrocortisone 1% Cream 1 Application(s) Topical two times a day  melatonin. 5 milliGRAM(s) Oral at bedtime  propranolol 10 milliGRAM(s) Oral two times a day  risperiDONE   Tablet 1.5 milliGRAM(s) Oral <User Schedule>  senna 1 Tablet(s) Oral at bedtime    MEDICATIONS  (PRN):  acetaminophen     Tablet .. 650 milliGRAM(s) Oral every 6 hours PRN Temp greater or equal to 38C (100.4F), Mild Pain (1 - 3), Moderate Pain (4 - 6)  aluminum hydroxide/magnesium hydroxide/simethicone Suspension 30 milliLiter(s) Oral every 4 hours PRN Dyspepsia  fluPHENAZine 1 milliGRAM(s) Oral every 6 hours PRN agitation  fluPHENAZine  Injectable 1 milliGRAM(s) IntraMuscular once PRN agitation  fluPHENAZine  Injectable 1 milliGRAM(s) IntraMuscular once PRN agitation  fluPHENAZine  Injectable 1 milliGRAM(s) IntraMuscular once PRN agitation  lidocaine   4% Patch 2 Patch Transdermal daily PRN back pain  magnesium hydroxide Suspension 30 milliLiter(s) Oral at bedtime PRN constipation  mineral oil enema 133 milliLiter(s) Rectal daily PRN hard stool  saline laxative (FLEET) Rectal Enema 1 Enema Rectal daily PRN hard stool  triamcinolone 0.1% Cream 1 Application(s) Topical two times a day PRN pruritis/rash      Vital Signs Last 24 Hrs  T(C): 36.6 (18 May 2022 06:40), Max: 36.8 (17 May 2022 19:27)  T(F): 97.8 (18 May 2022 06:40), Max: 98.3 (17 May 2022 19:27)  HR: 72 (17 May 2022 19:27) (72 - 80)  BP: 148/72 (17 May 2022 19:27) (98/76 - 148/72)  BP(mean): --  RR: 16 (18 May 2022 06:40) (16 - 16)  SpO2: --  CAPILLARY BLOOD GLUCOSE            PHYSICAL EXAM:  GENERAL: NAD, well-developed  HEAD:  Atraumatic, Normocephalic  EYES: EOMI, conjunctiva and sclera clear  NECK: Supple, No JVD  CHEST/LUNG: Clear to auscultation bilaterally; No wheeze  HEART: Regular rate and rhythm; No murmurs, rubs, or gallops  ABDOMEN: Soft, Nontender, Nondistended; Bowel sounds present  EXTREMITIES:  2+ Peripheral Pulses, No clubbing, cyanosis, or edema  PSYCH: AAOx3  NEUROLOGY: non-focal  SKIN: No rashes or lesions    LABS:                        15.1   11.61 )-----------( 188      ( 18 May 2022 09:30 )             47.4                     RADIOLOGY & ADDITIONAL TESTS:    Imaging Personally Reviewed:    Consultant(s) Notes Reviewed:      Care Discussed with Consultants/Other Providers:  
[facility]  Division of Hospital Medicine  Heidy Golden MD  Pager: [phone]      Patient is a 66y old  Female who presents with a chief complaint of Dementia w/ behavioral disturbances (24 Nov 2021 14:17)      SUBJECTIVE / OVERNIGHT EVENTS:Called by primary team about rash. Patient is poor historian. Patient started on anti-fungal  clotrimazole BID last week without improvement  .  ADDITIONAL REVIEW OF SYSTEMS:    MEDICATIONS  (STANDING):  clotrimazole 1% Cream 1 Application(s) Topical two times a day  diVALproex Sprinkle 750 milliGRAM(s) Oral two times a day  furosemide    Tablet 20 milliGRAM(s) Oral daily  melatonin. 3 milliGRAM(s) Oral at bedtime  metoprolol tartrate 25 milliGRAM(s) Oral two times a day  OLANZapine 2.5 milliGRAM(s) Oral <User Schedule>  polyethylene glycol 3350 8.5 Gram(s) Oral daily  senna 2 Tablet(s) Oral at bedtime    MEDICATIONS  (PRN):  acetaminophen     Tablet .. 650 milliGRAM(s) Oral every 6 hours PRN Temp greater or equal to 38C (100.4F), Mild Pain (1 - 3), Moderate Pain (4 - 6)  aluminum hydroxide/magnesium hydroxide/simethicone Suspension 30 milliLiter(s) Oral every 4 hours PRN Dyspepsia  magnesium hydroxide Suspension 30 milliLiter(s) Oral at bedtime PRN constipation  OLANZapine 2.5 milliGRAM(s) Oral every 6 hours PRN severe agitation  OLANZapine Injectable 2.5 milliGRAM(s) IntraMuscular once PRN severe agitation      CAPILLARY BLOOD GLUCOSE        I&O's Summary      PHYSICAL EXAM:  Vital Signs Last 24 Hrs  T(C): 36.6 (29 Nov 2021 08:10), Max: 36.6 (29 Nov 2021 08:10)  T(F): 97.8 (29 Nov 2021 08:10), Max: 97.8 (29 Nov 2021 08:10)  HR: --  BP: --  BP(mean): --  RR: --  SpO2: --  GENERAL: Middle age woman in NAD, well-developed  HEAD:  Atraumatic, Normocephalic  EYES: EOMI, conjunctiva and sclera clear  NECK: Supple, No JVD  CHEST/LUNG: Clear to auscultation bilaterally; No wheeze  HEART: Regular rate and rhythm; No murmurs, rubs, or gallops. +1 LLE pedal edema and lower extremity .   ABDOMEN: Soft, Nontender, Nondistended; Bowel sounds present  EXTREMITIES:  2+ Peripheral Pulses, No clubbing, cyanosis. Left  calf tenderness.  PSYCH: AAOx1 (self)  NEUROLOGY: non-focal  SKIN: Multiple diffuse red papules on bilateral forearms . No blistering/discharge/palpable fluctuance.      LABS:                      RADIOLOGY & ADDITIONAL TESTS:  Results Reviewed:   Imaging Personally Reviewed:  Electrocardiogram Personally Reviewed:    COORDINATION OF CARE:  Care Discussed with Consultants/Other Providers [Y/N]:  Prior or Outpatient Records Reviewed [Y/N]:  
Podiatry pager #: [phone]/ [phone]    Patient is a 67y old  Female who presents with a chief complaint of Dementia w/ behavioral disturbances (24 Nov 2021 14:17) she reconsult for diabetic painful tender thickened toenails and skin lesions aggravated by shoe gear and ambulation and palpation      HPI:      PAST MEDICAL & SURGICAL HISTORY:  TBI (traumatic brain injury)    Epilepsy    Dementia    Diabetes mellitus    Hypertension    Normal pressure hydrocephalus    Type 2 diabetes mellitus    Hypertension    Normal pressure hydrocephalus    Dementia without behavioral disturbance    Mood disorder    No significant past surgical history     (ventriculoperitoneal) shunt status        MEDICATIONS  (STANDING):  AQUAPHOR (petrolatum Ointment) 1 Application(s) Topical three times a day  diVALproex Sprinkle 750 milliGRAM(s) Oral two times a day  furosemide    Tablet 20 milliGRAM(s) Oral daily  hydrocortisone 1% Cream 1 Application(s) Topical two times a day  melatonin. 5 milliGRAM(s) Oral at bedtime  propranolol 20 milliGRAM(s) Oral two times a day  risperiDONE   Tablet 1.5 milliGRAM(s) Oral <User Schedule>  senna 1 Tablet(s) Oral at bedtime    MEDICATIONS  (PRN):  acetaminophen     Tablet .. 650 milliGRAM(s) Oral every 6 hours PRN Temp greater or equal to 38C (100.4F), Mild Pain (1 - 3), Moderate Pain (4 - 6)  aluminum hydroxide/magnesium hydroxide/simethicone Suspension 30 milliLiter(s) Oral every 4 hours PRN Dyspepsia  fluPHENAZine 1 milliGRAM(s) Oral every 6 hours PRN agitation  fluPHENAZine  Injectable 1 milliGRAM(s) IntraMuscular once PRN agitation  fluPHENAZine  Injectable 1 milliGRAM(s) IntraMuscular once PRN agitation  fluPHENAZine  Injectable 1 milliGRAM(s) IntraMuscular once PRN agitation  lidocaine   4% Patch 2 Patch Transdermal daily PRN back pain  magnesium hydroxide Suspension 30 milliLiter(s) Oral at bedtime PRN constipation  mineral oil enema 133 milliLiter(s) Rectal daily PRN hard stool  saline laxative (FLEET) Rectal Enema 1 Enema Rectal daily PRN hard stool  triamcinolone 0.1% Cream 1 Application(s) Topical two times a day PRN pruritis/rash      Allergies    latex (Other (Mild))  latex (Unknown)  Originally Entered as [Severe Hives] reaction to [Pineapple] (Unknown)  penicillin (Other)  penicillins (Unknown)  Rubber (Unknown)    Intolerances        VITALS:    Vital Signs Last 24 Hrs  T(C): 36.8 (05 May 2022 07:56), Max: 37 (04 May 2022 20:38)  T(F): 98.2 (05 May 2022 07:56), Max: 98.6 (04 May 2022 20:38)  HR: 80 (05 May 2022 07:56) (80 - 80)  BP: 96/55 (05 May 2022 07:56) (96/55 - 96/55)  BP(mean): --  RR: --  SpO2: --    LABS:    Vasular: DP/PT 1_/4, B/L, CFT <2_ seconds B/L, Temperature gradient wnl_, B/L.   Neuro: Epicritic sensation _diminished to the level of _toes, B/L.  Musculoskeletal/Ortho: bunion/hammertoes bilateral  Skin: Positive dystrophic, hypertrophic, brittle toenails with subungual debris x10 toes. Positive tyloma medial aspect of right hallux IPJ with subdermal hemmorraghes and pretrophic in nature. No active ulcerations or clinical signs of infection              CAPILLARY BLOOD GLUCOSE              LOWER EXTREMITY PHYSICAL EXAM:    RADIOLOGY & ADDITIONAL STUDIES:    
Podiatry pager #: [phone]/ [phone]    Patient is a 67y old  Female who presents with a chief complaint of Dementia w/ behavioral disturbances (24 Nov 2021 14:17) Podiatry consulted for dm painful tender toenails and skin lesions aggrevated by shoegear ambulation and palpation      HPI:      PAST MEDICAL & SURGICAL HISTORY:  TBI (traumatic brain injury)    Epilepsy    Dementia    Diabetes mellitus    Hypertension    Normal pressure hydrocephalus    Type 2 diabetes mellitus    Hypertension    Normal pressure hydrocephalus    Dementia without behavioral disturbance    Mood disorder    No significant past surgical history     (ventriculoperitoneal) shunt status        MEDICATIONS  (STANDING):  AQUAPHOR (petrolatum Ointment) 1 Application(s) Topical three times a day  diVALproex Sprinkle 750 milliGRAM(s) Oral two times a day  furosemide    Tablet 20 milliGRAM(s) Oral daily  melatonin. 5 milliGRAM(s) Oral at bedtime  propranolol 30 milliGRAM(s) Oral three times a day  risperiDONE   Tablet 1.5 milliGRAM(s) Oral <User Schedule>  senna 1 Tablet(s) Oral at bedtime    MEDICATIONS  (PRN):  acetaminophen     Tablet .. 650 milliGRAM(s) Oral every 6 hours PRN Temp greater or equal to 38C (100.4F), Mild Pain (1 - 3), Moderate Pain (4 - 6)  aluminum hydroxide/magnesium hydroxide/simethicone Suspension 30 milliLiter(s) Oral every 4 hours PRN Dyspepsia  fluPHENAZine 1 milliGRAM(s) Oral every 6 hours PRN agitation  fluPHENAZine  Injectable 1 milliGRAM(s) IntraMuscular once PRN agitation  fluPHENAZine  Injectable 1 milliGRAM(s) IntraMuscular once PRN agitation  fluPHENAZine  Injectable 1 milliGRAM(s) IntraMuscular once PRN agitation  lidocaine   4% Patch 2 Patch Transdermal daily PRN back pain  magnesium hydroxide Suspension 30 milliLiter(s) Oral at bedtime PRN constipation  mineral oil enema 133 milliLiter(s) Rectal daily PRN hard stool  saline laxative (FLEET) Rectal Enema 1 Enema Rectal daily PRN hard stool  triamcinolone 0.1% Cream 1 Application(s) Topical two times a day PRN pruritis/rash      Allergies    latex (Other (Mild))  latex (Unknown)  Originally Entered as [Severe Hives] reaction to [Pineapple] (Unknown)  penicillin (Other)  penicillins (Unknown)  Rubber (Unknown)    Intolerances        VITALS:    Vital Signs Last 24 Hrs  T(C): --  T(F): --  HR: --  BP: --  BP(mean): --  RR: 17 (18 Mar 2022 20:25) (17 - 17)  SpO2: --    LABS:                CAPILLARY BLOOD GLUCOSE              LOWER EXTREMITY PHYSICAL EXAM:    Vasular: DP/PT 1_/4, B/L, CFT <2_ seconds B/L, Temperature gradient wnl_, B/L.   Neuro: Epicritic sensation _diminished to the level of _toes, B/L.  Musculoskeletal/Ortho: bunion/hammertoes bilateral  Skin: Positive dystrophic, hypertrophic, brittle toenails with subungual debris x10 toes. Positive tyloma medial aspect of right hallux IPJ with subdermal hemmorraghes and pretrophic in nature. No active ulcerations or clinical signs of infection      RADIOLOGY & ADDITIONAL STUDIES:

## 2022-05-18 NOTE — BH CONSULTATION LIAISON ASSESSMENT NOTE - CASE SUMMARY
Met with the patient on 5/19, impression and plan discussed with Sandra Lisa NP, impression and plan discussed and agreed upon.  Discussed case with surgery team [id number]  Surgically cleared for d/c today. Tolerating PO intake.   Discussed case with [address] [place] [address] psychiatrist Dr Pat today. Discussed surgical clearance. Agrees that patient to come back to [address] [place] [address].    ****PLEASE ENSURE BELOW MEDICATIONS ORDERED AS BELOW****    Risperidone 1.5mg TID PO---- 9AM+ 1PM + 9PM  Propranolol 10mg BID PO---- HOLD FOR SBP<100, HR<50  Depakote 750mg BID PO. Monitor Platelets.    Coordinated with [facility] [id number]

## 2022-05-18 NOTE — BH INPATIENT PSYCHIATRY PROGRESS NOTE - NSTREATMENTCERT_PSY_ALL_CORE
.

## 2022-05-18 NOTE — BH INPATIENT PSYCHIATRY PROGRESS NOTE - NSTXPROBCOPE_PSY_ALL_CORE
COPING, INEFFECTIVE

## 2022-05-18 NOTE — BH CONSULTATION LIAISON ASSESSMENT NOTE - DETAILS
admitted to [facility] 11/21 email sent to inform Dr. Pat of admittance to [facility] for SBO

## 2022-05-18 NOTE — BH INPATIENT PSYCHIATRY PROGRESS NOTE - NSTXDCOTHRGOAL_PSY_ALL_CORE
The pt will show resolution of acute symptoms and return to baseline functioining. The pt will maintain behavioral control and be calm enough to be cared for in her prior SNF.
The pt will maintain behavioral control and be calm enough to be cared for in her prior SNF for her LTC.
The pt will show resolution of acute symptoms and return to baseline functioning. The pt will be calm enough to be cared for in her prior SNF, [facility], for her long term care.
The pt will show resolution of acute symptoms and return to baseline functioning. The pt will be calm enough to be cared for in her prior SNF, [facility].
The pt will show resolution of acute symptoms and return to baseline functioning. The pt will maintain behavioral control and be calm enough to be cared for in her prior SNF.
The pt will maintain behavioral control and be calm enough to be cared for in an SNF for her long term care.
The pt will maintain good behavioral control and be calm enought to be cared for in an SNF for her LTC.
The pt will show resolution of acute symptoms, maintain behavioral control, return to baseline functioning, and be calm enough to be cared for in her prior nursing home, [facility].
The pt will show resolution of acute symptoms, maintain behavioral control, return to baseline functioning, and be calm enough to be cared for in her prior nursing home, [facility].
The pt will show resolution of acute symptoms and return to baseline functioning. The pt will maintain behavioral control and be calm enough to be cared for in her prior SNF.
The pt will maintain good behavioral control and be calm enought to be cared for in an SNF for her LTC.
The pt will show resolution of acute symptoms and return to baseline functioning. The pt will be calm enough to return to her prior SNF, [facility], for her long term care.
The patient will maintain behavioral control and be calm enough to return to her prior SNF for her long term care.
The pt will maintain good behavioral control and be calm enough to be cared for in her prior SNF.
The pt will show resolution of acute symptoms and return to baseline functioning. The pt will be calm enough to be cared for in her prior SNF, [facility].
The patient will maintain behavioral control and be calm enough to return to her prior SNF for her long term care.
The pt will maintain behavioral control and be calm enough to be cared for in an SNF for her long term care.
The pt will maintain behavioral control and be calm enough to be cared for in an SNF for her long term care.
The pt will maintain behavioral control and be calm enough to be cared for in her prior SNF for her LTC.
The pt will show resolution of acute symptoms and return to baseline functioining and be calm enough to be cared for in her prior SNF.
The pt will maintain behavioral control and be calm enough to be cared for in an SNF for her long term care.
The pt will maintain behavioral control and be calm enough to be cared for in an SNF for her long term care.
The pt will show resolution of acute symptoms and return to baseline functioning. The pt will be calm enough to return to her prior SNF, [facility], for her long term care.
The patient will maintain behavioral control and be calm enough to return to her prior SNF for her long term care.
The pt will maintain behavioral control and be calm enough to be cared for in an SNF for her long term care.
The pt will maintain good behavioral control and be calm enought to be cared for in an SNF for her LTC.
The pt will maintain behavioral control and be calm enough to be cared for in an SNF for her long term care.
The pt will show resolution of acute symptoms and return to baseline functioning. The pt will be calm enough to be cared for in her prior SNF, [facility], for her long term care.
The pt will maintain good behavioral control and be calm enought to be cared for in an SNF for her LTC.
The pt will show resolution of acute symptoms and return to baseline functioining and be calm enough to be cared for in her prior SNF.
The pt will show resolution of acute symptoms and return to baseline functioning, maintain behavioral control, and be calm enough to be cared for in her prior SNF.
The pt will show resolution of acute symptoms and return to baseline functioning, maintain behavioral control, and be calm enough to be cared for in her prior SNF.
The pt will show resolution of acute symptoms and return to baseline functioning. The pt will be calm enough to return to her prior SNF, [facility], for her long term care.
The patient will maintain behavioral control and be calm enough to return to her prior SNF for her long term care.
The pt will show resolution of acute symptoms and return to baseline functioning. The pt will be calm enough to be cared for in her prior SNF, [facility].
The pt will show resolution of acute symptoms, maintain behavioral control, return to baseline functioning, and be calm enough to be cared for in her prior SNF, [facility].
The patient will maintain behavioral control and be calm enough to return to her prior SNF for her long term care.
The pt will show resolution of acute symptoms and return to baseline functioning. The pt will be calm enough to be cared for in her prior SNF, [facility], for her long term care.
The pt will show resolution of acute symptoms and return to baseline functioning, maintain behavioral control, and be calm enough to be cared for in her prior SNF.
The pt will show resolution of acute symptoms and return to baseline functioining. The pt will maintain behavioral control and be calm enough to be cared for in prior SNF.
The pt will show resolution of acute symptoms and return to baseline functioning, maintain behavioral control, and be calm enough to be cared for in her prior SNF.
The pt will show resolution of acute symptoms and return to baseline functioning. The pt will maintain behavioral control and be calm enough to be cared for in her prior SNF.
The pt will maintain behavioral control and be calm enough to be cared for in an SNF for her long term care.
The pt will maintain behavioral control and be calm enough to be cared for in an SNF for her long term care.
The pt will show resolution of acute symptoms and return to baseline functioning. The pt will be calm enough to return to her prior residence, [facility], for her long term care.
The pt will maintain behavioral control and be calm enough to return to her prior SNF for her long term care.
The pt will maintain behavioral control and be calm enough to be cared for in an SNF for her long term care.
The pt will show resolution of acute symptoms and return to baseline functioning. The pt will be calm enough to be cared for in her prior SNF for her long term care.
The pt will show resolution of acute symptoms and return to baseline functioning. The pt will be calm enough to be cared for in her prior SNF, [facility].
The pt will show resolution of acute symptoms and return to baseline functioning. The pt will maintain behavioral control and be calm enough to be cared for in her prior SNF.
The pt will show resolution of acute symptoms and return to baseline functioining. The pt will maintain behavioral control and be calm enough to be cared for in her prior SNF.
The pt will show resolution of acute symptoms and return to baseline functioining. The pt will maintain behavioral control and be calm enough to be cared for in her prior SNF.
The pt will show resolution of acute symptoms and return to baseline functioning, maintain behavioral control, and be calm enough to be cared for in her prior SNF.
The pt will show resolution of acute symptoms and return to baseline functioning. The pt will be calm enough to be cared for in her prior SNF, [facility].
The pt will maintain behavioral control and be calm enough to return to her prior SNF for her long term care.
The pt will maintain good behavioral control and be calm enought to be cared for in an SNF for her LTC.
The pt will show resolution of acute symptoms and return to baseline functioining. The pt will maintain behavioral control and be calm enough to be cared for in her prior SNF.
The pt will show resolution of acute symptoms and return to baseline functioning. The pt will be calm enough to return to her prior SNF, [facility], for her long term care.
The pt will show resolution of acute symptoms and return to baseline functioning. The pt will be calm enough to return to her prior residence, [facility], for her long term care.
The pt will show resolution of acute symptoms and return to baseline functioning, maintain behavioral control, and be calm enough to be cared for in her prior SNF.
The patient will maintain behavioral control and be calm enough to return to her prior SNF for her long term care.
The pt will maintain behavioral control and be calm enough to be cared for in an SNF for her long term care.
The pt will maintain behavioral control and be calm enough to be cared for in an SNF for her long term care.
The pt will maintain behavioral control and be calm enough to be cared for in her prior SNF for her LTC.
The pt will show resolution of acute symptoms and return to baseline functioining. The pt will maintain behavioral control and be calm enough to be cared for in her prior SNF.
The pt will maintain behavioral control and be calm enough to be cared for in an SNF for LTC.
The pt will show resolution of acute symptoms and return to baseline functioning. The pt will be calm enough to be cared for in her prior SNF, [facility].
The pt will show resolution of acute symptoms and return to baseline functioning. The pt will be calm enough to be cared for in her prior SNF, [facility].
The pt will maintain behavioral control and be calm enough to return to her prior SNF for her long term care.
The pt will maintain behavioral control and be calm enough to be cared for in an SNF for LTC.
The pt will maintain behavioral control and be calm enough to return to her prior SNF for her long term care.
The pt will show resolution of acute symptoms and return to baseline functioning. The pt will maintain behavioral control and be calm enough to be cared for in her prior SNF.
The pt will show resolution of acute symptoms, maintain behavioral control, return to baseline functioning, and be calm enough to be cared for in her prior SNF, [facility].
The pt will maintain behavioral control and be calm enough to be cared for in an SNF for her long term care.
The pt will show resolution of acute symptoms and return to baseline functioining. The pt will maintain behavioral control and be calm enough to be cared for in prior SNF.
The pt will show resolution of acute symptoms, maintain behavioral control, return to baseline functioning, and be calm enough to be cared for in her prior SNF, [facility].
The pt will maintain behavioral control and be calm enough to be cared for in an SNF for her long term care.
The pt will show resolution of acute symptoms and return to baseline functioning, maintain behavioral control, and be calm enough to be cared for in her prior SNF.
The pt will show resolution of acute symptoms and return to baseline functioning. The pt will be calm enough to be cared for in her prior SNF for her long term care.
The pt will show resolution of acute symptoms, maintain behavioral control, return to baseline functioning, and be calm enough to be cared for in her prior SNF, [facility].
The pt will maintain behavioral control and be calm enough to be cared for in an SNF for LTC.
The pt will maintain good behavioral control and be calm enough to be cared for in her prior SNF.
The pt will show resolution of acute symptoms and return to baseline functioining. The pt will maintain behavioral control and be calm enough to be cared for in prior SNF.
The pt will maintain behavioral control and be calm enough to return to her prior SNF for her long term care.
The pt will maintain good behavioral control and be calm enought to be cared for in an SNF for her LTC.
The pt will maintain behavioral control and be calm enough to be cared for in an SNF for LTC.
The pt will show resolution of acute symptoms and return to baseline functioining. The pt will maintain behavioral control and be calm enough to be cared for in her prior SNF.
The pt will maintain behavioral control and be calm enough to be cared for in an SNF for her long term care.
The pt will show resolution of acute symptoms and return to baseline functioning. The pt will be calm enough to return to her prior residence, [facility], for her long term care.
The pt will maintain behavioral control and be calm enough to be cared for in an SNF for her long term care.
The pt will maintain good behavioral control and be calm enough to be cared for in her prior SNF.
The pt will show resolution of acute symptoms and return to baseline functioning. The pt will be calm enough to be cared for in her prior SNF, [facility], for her long term care.
The pt will show resolution of acute symptoms, maintain behavioral control, return to baseline functioning, and be calm enough to be cared for in her prior nursing home, [facility].
The pt will maintain behavioral control and be calm enough to be cared for in an SNF for her long term care.
The pt will show resolution of acute symptoms and return to baseline functioning. The pt will demonstrate behavioral control and be calm enough to be cared for in an SNF for LTC.
The pt will maintain behavioral control and be calm enough to be cared for in an SNF for her long term care.
The pt will show resolution of acute symptoms, maintain behavioral control, return to baseline functioning, and be calm enough to be cared for in her prior nursing home, [facility].
The pt will maintain behavioral control and be calm enough to be cared for in an SNF for her long term care.
The pt will maintain good behavioral control and be calm enough to be cared for in her prior SNF.
The pt will show resolution of acute symptoms and return to baseline functioining. The pt will maintain behavioral control and be calm enough to be cared for in her prior SNF.
The pt will show resolution of acute symptoms and return to baseline functioning. The pt will be calm enough to be cared for in her prior SNF, [facility].
The pt will show resolution of acute symptoms and return to baseline functioning. The pt will be calm enough to be cared for in her prior SNF, [facility], for her long term care.
The pt will show resolution of acute symptoms, maintain behavioral control, return to baseline functioning, and be calm enough to be cared for in her prior SNF, [facility].
The pt will show resolution of acute symptoms and return to baseline functioining and be calm enough to be cared for in her prior SNF.
The pt will show resolution of acute symptoms and return to baseline functioning. The pt will be calm enough to be cared for in her prior SNF for her long term care.
The pt will show resolution of acute symptoms and return to baseline functioning. The pt will maintain behavioral control and be calm enough to be cared for in her prior SNF.
The pt will show resolution of acute symptoms, maintain behavioral control, return to baseline functioning, and be calm enough to be cared for in her prior SNF, [facility].
The pt will show resolution of acute symptoms and return to baseline functioining and be calm enough to be cared for in her prior SNF.
The pt will maintain behavioral control and be calm enough to be cared for in an SNF for her long term care.
The pt will show resolution of acute symptoms and return to baseline functioning. The pt will be calm enough to return to her prior residence, [facility], for her long term care.
The pt will show resolution of acute symptoms and return to baseline functioning. The pt will demonstrate behavioral control and be calm enough to be cared for in an SNF for LTC.
The pt will show resolution of acute symptoms and return to baseline functioning. The pt will demonstrate behavioral control and be calm enough to be cared for in an SNF for LTC.
The pt will show resolution of acute symptoms and return to baseline functioning. The pt will maintain behavioral control and be calm enough to be cared for in her prior SNF.
The pt will maintain good behavioral control and be calm enought to be cared for in an SNF for her LTC.
The pt will maintain good behavioral control and be calm enought to be cared for in an SNF for her LTC.
The pt will show resolution of acute symptoms and return to baseline functioining. The pt will maintain behavioral control and be calm enough to be cared for in her prior SNF.
The pt will show resolution of acute symptoms and return to baseline functioining. The pt will maintain behavioral control and be calm enough to be cared for in prior SNF.
The pt will show resolution of acute symptoms and return to baseline functioning, maintain behavioral control, and be calm enough to be cared for in her prior SNF.
The pt will show resolution of acute symptoms and return to baseline functioning. The pt will be calm enough to be cared for in her prior SNF, [facility], for her long term care.
The pt will show resolution of acute symptoms and return to baseline functioning. The pt will be calm enough to be cared for in her prior SNF, [facility], for her long term care.
The pt will show resolution of acute symptoms and return to baseline functioning. The pt will be calm enough to be cared for in her prior SNF, [facility], for her long term care.
The pt will maintain behavioral control and be calm enough to be cared for in an SNF for LTC.
The pt will maintain good behavioral control and be calm enought to be cared for in an SNF for her LTC.
The pt will maintain behavioral control and be calm enough to be cared for in an SNF for her long term care.
The pt will maintain behavioral control and be calm enough to be cared for in her prior SNF for her LTC.
The pt will maintain good behavioral control and be calm enough to be cared for in her prior SNF.
The pt will show resolution of acute symptoms and return to baseline functioning, maintain behavioral control, and be calm enough to be cared for in her prior SNF.
The pt will maintain good behavioral control and be calm enought to be cared for in an SNF for her LTC.
The pt will show resolution of acute symptoms, maintain behavioral control, return to baseline functioning, and be calm enough to be cared for in her prior SNF, [facility].
The pt will show resolution of acute symptoms and return to baseline functioining and be calm enough to be cared for in her prior SNF.
The pt will show resolution of acute symptoms and return to baseline functioning. The pt will be calm enough to return to her prior SNF, [facility], for her long term care.
The pt will show resolution of acute symptoms, maintain behavioral control, return to baseline functioning, and be calm enough to be cared for in her prior nursing home, [facility].
The pt will show resolution of acute symptoms, maintain behavioral control, return to baseline functioning, and be calm enough to be cared for in her prior SNF, [facility].
The pt will show resolution of acute symptoms, maintain behavioral control, return to baseline functioning, and be calm enough to be cared for in her prior SNF, [facility].
The pt will show resolution of acute symptoms, maintain behavioral control, return to baseline functioning, and be calm enough to be cared for in her prior SNF, [facility].
The pt will show resolution of acute symptoms, maintain behavioral control, return to baseline functioning, and be calm enough to be cared for in her prior nursing home, [facility].
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
## 2022-05-18 NOTE — BH INPATIENT PSYCHIATRY PROGRESS NOTE - NSTXIMPULSINTERMD_PSY_ALL_CORE
Depakote and Zyprexa titration

## 2022-05-18 NOTE — BH INPATIENT PSYCHIATRY PROGRESS NOTE - NSTXIMPULSDATETRGT_PSY_ALL_CORE
20-May-2022
16-Dec-2021
24-Feb-2022
10-Mar-2022
16-Dec-2021
16-Dec-2021
03-Dec-2021
10-Mar-2022
16-Dec-2021
03-Dec-2021
10-Mar-2022
10-Mar-2022
16-Dec-2021
10-Mar-2022
10-Mar-2022
03-Dec-2021
16-Dec-2021
10-Mar-2022
16-Dec-2021
03-Dec-2021
10-Mar-2022
16-Dec-2021
16-Dec-2021
10-Mar-2022
10-Mar-2022
16-Dec-2021
10-Mar-2022
10-Mar-2022
16-Dec-2021
16-Dec-2021
24-Feb-2022
10-Mar-2022
16-Dec-2021
16-Dec-2021
10-Mar-2022
16-Dec-2021
16-Dec-2021
24-Feb-2022
10-Mar-2022
24-Feb-2022
10-Mar-2022
16-Dec-2021
24-Feb-2022
10-Mar-2022
10-Mar-2022
16-Dec-2021
10-Mar-2022
10-Mar-2022
16-Dec-2021
16-Dec-2021
10-Mar-2022
24-Feb-2022
10-Dec-2021
10-Mar-2022
03-Dec-2021
10-Mar-2022
10-Dec-2021
10-Mar-2022
10-Mar-2022
16-Dec-2021
24-Feb-2022
10-Mar-2022
16-Dec-2021
16-Dec-2021
10-Mar-2022
10-Dec-2021
10-Mar-2022
16-Dec-2021
16-Dec-2021
20-May-2022
03-Dec-2021
16-Dec-2021
10-Mar-2022
16-Dec-2021
10-Mar-2022
16-Dec-2021
16-Dec-2021
10-Mar-2022
24-Feb-2022
24-Feb-2022
10-Mar-2022
16-Dec-2021
10-Mar-2022
10-Mar-2022
03-Dec-2021
16-Dec-2021
10-Mar-2022
10-Mar-2022
16-Dec-2021
16-Dec-2021
10-Mar-2022
20-May-2022
16-Dec-2021
24-Feb-2022
10-Mar-2022
16-Dec-2021
24-Feb-2022
10-Mar-2022
16-Dec-2021
16-Dec-2021
10-Mar-2022
16-Dec-2021
20-May-2022
24-Feb-2022
10-Mar-2022
03-Dec-2021
16-Dec-2021
16-Dec-2021
03-Dec-2021
03-Dec-2021

## 2022-05-18 NOTE — BH CONSULTATION LIAISON ASSESSMENT NOTE - NSBHCHARTREVIEWVS_PSY_A_CORE FT
Vital Signs Last 24 Hrs  T(C): 36.7 (18 May 2022 11:26), Max: 36.8 (17 May 2022 19:27)  T(F): 98.1 (18 May 2022 11:26), Max: 98.3 (17 May 2022 19:27)  HR: 83 (18 May 2022 11:26) (72 - 83)  BP: 127/67 (18 May 2022 11:26) (127/67 - 148/72)  BP(mean): --  RR: 18 (18 May 2022 11:26) (16 - 18)  SpO2: 96% (18 May 2022 11:26) (96% - 96%)

## 2022-05-18 NOTE — BH INPATIENT PSYCHIATRY PROGRESS NOTE - NSBHATTESTSEENBY_PSY_A_CORE
attending Psychiatrist without NP/Trainee
Attending Psychiatrist supervising NP/Trainee, meeting pt...
attending Psychiatrist without NP/Trainee
Attending Psychiatrist supervising NP/Trainee, meeting pt...
attending Psychiatrist without NP/Trainee
Attending Psychiatrist supervising NP/Trainee, meeting pt...

## 2022-05-18 NOTE — ED ADULT TRIAGE NOTE - CHIEF COMPLAINT QUOTE
Pt AOX2  BIBA from [facility] for 3x episodes of vomiting, unable to tolerate PO according to report from EMS; arrives with aide from [facility]; calm and cooperative in triage, denies abdominal pain but seems confused  Hx of dementia TBI w/  shunt; DM type 2 fs glu 100  fs 129

## 2022-05-18 NOTE — BH INPATIENT PSYCHIATRY PROGRESS NOTE - NSCGIIMPROVESX_PSY_ALL_CORE
3 = Minimally improved - slightly better with little or no clinically meaningful reduction of symptoms.  Represents very little change in basic clinical status, level of care, or functional capacity.

## 2022-05-18 NOTE — ED PROVIDER NOTE - ATTENDING CONTRIBUTION TO CARE
66yo F transferred from [facility] due to vomiting, pt unable to provide history per note: Patient with dementia due to TBI, ho  shunt since last night had vomiting x3. Patient due to dementia not great at reporting symptoms. Has had some constipation intermittently had hard BM yesterday. Has shunt for hydrocephalus. PAtient is DNR/DNI  pt well appearing, no distress, abd nontneder, neuro grossly intact, tremor in right hand  will c malik labs, ct head, ct abd

## 2022-05-18 NOTE — BH INPATIENT PSYCHIATRY PROGRESS NOTE - NSBHROSSYSTEMS_PSY_ALL_CORE
Psychiatric
Neurological.../Psychiatric
Psychiatric

## 2022-05-18 NOTE — BH INPATIENT PSYCHIATRY PROGRESS NOTE - NSCGISEVERILLNESS_PSY_ALL_CORE
5 = Markedly ill - intrusive symptoms that distinctly impair social/occupational function or cause intrusive levels of distress

## 2022-05-18 NOTE — BH CONSULTATION LIAISON ASSESSMENT NOTE - RISK ASSESSMENT
Risk Factors: acute medical condition, dementia with behavioral disturbance r/t TBI, awaiting placement, hx aggression/agitation  Protective Factors: residential stability, supportive family, positive therapeutic relationship, no access to firearms

## 2022-05-18 NOTE — BH CONSULTATION LIAISON ASSESSMENT NOTE - NSBHCHARTREVIEWLAB_PSY_A_CORE FT
CBC Full  -  ( 18 May 2022 13:07 )  WBC Count : 12.93 K/uL  RBC Count : 5.28 M/uL  Hemoglobin : 15.7 g/dL  Hematocrit : 49.3 %  Platelet Count - Automated : 167 K/uL  Mean Cell Volume : 93.4 fL  Mean Cell Hemoglobin : 29.7 pg  Mean Cell Hemoglobin Concentration : 31.8 gm/dL  Auto Neutrophil # : 10.65 K/uL  Auto Lymphocyte # : 0.57 K/uL  Auto Monocyte # : 1.02 K/uL  Auto Eosinophil # : 0.00 K/uL  Auto Basophil # : 0.12 K/uL  Auto Neutrophil % : 79.8 %  Auto Lymphocyte % : 4.4 %  Auto Monocyte % : 7.9 %  Auto Eosinophil % : 0.0 %  Auto Basophil % : 0.9 %  05-18    143  |  103  |  21  ----------------------------<  135<H>  5.1   |  25  |  0.65    Ca    10.1      18 May 2022 13:07    TPro  8.2  /  Alb  4.3  /  TBili  0.4  /  DBili  x   /  AST  28  /  ALT  14  /  AlkPhos  63  05-18

## 2022-05-18 NOTE — BH INPATIENT PSYCHIATRY PROGRESS NOTE - NSBHMETABOLIC_PSY_ALL_CORE_FT
BMI: BMI (kg/m2): 31.6 (05-18-22 @ 11:26)  HbA1c: A1C with Estimated Average Glucose Result: 5.4 % (11-04-21 @ 06:58)    Glucose: POCT Blood Glucose.: 129 mg/dL (05-18-22 @ 11:28)    BP: 148/72 (05-17-22 @ 19:27) (98/76 - 148/72)  Lipid Panel:

## 2022-05-18 NOTE — H&P ADULT - HISTORY OF PRESENT ILLNESS
66yo F with Hx dementia 2/2 head trauma, seizure disorder, HTN, VPS who presented from [facility] with emesis. Per report from [facility], patient had 3 episodes of emesis yesterday evening and continued have emesis this morning, for which she was transferred to the ED. On presentation, patient was afebrile, hemodynamically stable. Labs significant for leukocytosis to 12.93. CTH negative for intracranial pathology. XR shunt series WNL. CT AP significant for SBO with transition point in RLQ.

## 2022-05-18 NOTE — BH INPATIENT PSYCHIATRY PROGRESS NOTE - MSE UNSTRUCTURED FT
Patient is somewhat sedated Calm on exam, relates in a childlike manner.   Has  hand tremor which is highly variable, no rigidity. Speech volume variable, poorly modulated loud at times even when not clearly upset, exaggerated prosody child like.   Reported mood "okay". Affect is constricted Thought process is disorganized. No clear delusions. No suicidal or homicidal ideation.   No hallucinations. Poor insight. Oriented to self only.

## 2022-05-18 NOTE — CHART NOTE - NSCHARTNOTEFT_GEN_A_CORE
Patient with lower extremity edema (bilateral), receiving diuresis w/ lasix 20 mg. SBP 90-100s yesterday (3/7), so would be hesitant to increase lasix dose, d/w Dr. Pat, encouraged patient to elevate legs as much as possible. Given psychiatric condition, compression stockings may not be an option.
asked by Dr. Pat to evaluate patient for eye discharges per nursing staff.   on examination. patient's eyes were clear, no discharges or collections. no signs of infection or irritation.   continue to monitor. ensure good eye hygiene.
[facility] Inpatient to ED Transfer Summary    Reason for Transfer/Medical Summary: Patient with dementia due to TBI, since last night had vomiting x3. Patient due to dementia not great at reporting symptoms. Has had some constipation intermittently had hard BM yesterday. Has shunt for hydrocephalus. PAtient is DNR/DNI      PAST MEDICAL & SURGICAL HISTORY:  TBI (traumatic brain injury)      Epilepsy      Dementia      Diabetes mellitus      Hypertension      Normal pressure hydrocephalus      Type 2 diabetes mellitus      Hypertension      Normal pressure hydrocephalus      Dementia without behavioral disturbance      Mood disorder      No significant past surgical history       (ventriculoperitoneal) shunt status          Allergies    latex (Other (Mild))  latex (Unknown)  Originally Entered as [Severe Hives] reaction to [Pineapple] (Unknown)  penicillin (Other)  penicillins (Unknown)  Rubber (Unknown)    Intolerances        MEDICATIONS  (STANDING):  AQUAPHOR (petrolatum Ointment) 1 Application(s) Topical three times a day  diVALproex Sprinkle 750 milliGRAM(s) Oral two times a day  furosemide    Tablet 20 milliGRAM(s) Oral daily  hydrocortisone 1% Cream 1 Application(s) Topical two times a day  melatonin. 5 milliGRAM(s) Oral at bedtime  propranolol 10 milliGRAM(s) Oral two times a day  risperiDONE   Tablet 1.5 milliGRAM(s) Oral <User Schedule>  senna 1 Tablet(s) Oral at bedtime    MEDICATIONS  (PRN):  acetaminophen     Tablet .. 650 milliGRAM(s) Oral every 6 hours PRN Temp greater or equal to 38C (100.4F), Mild Pain (1 - 3), Moderate Pain (4 - 6)  aluminum hydroxide/magnesium hydroxide/simethicone Suspension 30 milliLiter(s) Oral every 4 hours PRN Dyspepsia  fluPHENAZine 1 milliGRAM(s) Oral every 6 hours PRN agitation  fluPHENAZine  Injectable 1 milliGRAM(s) IntraMuscular once PRN agitation  fluPHENAZine  Injectable 1 milliGRAM(s) IntraMuscular once PRN agitation  fluPHENAZine  Injectable 1 milliGRAM(s) IntraMuscular once PRN agitation  lidocaine   4% Patch 2 Patch Transdermal daily PRN back pain  magnesium hydroxide Suspension 30 milliLiter(s) Oral at bedtime PRN constipation  mineral oil enema 133 milliLiter(s) Rectal daily PRN hard stool  saline laxative (FLEET) Rectal Enema 1 Enema Rectal daily PRN hard stool  triamcinolone 0.1% Cream 1 Application(s) Topical two times a day PRN pruritis/rash        CAPILLARY BLOOD GLUCOSE            PHYSICAL EXAM:  GENERAL: NAD, well-developed  HEAD:  Atraumatic, Normocephalic  EYES: EOMI, PERRLA, conjunctiva and sclera clear  NECK: Supple, No JVD  CHEST/LUNG: Clear to auscultation bilaterally; No wheeze  HEART: Regular rate and rhythm; No murmurs, rubs, or gallops  ABDOMEN: Soft, Nontender, Nondistended; Bowel sounds present  EXTREMITIES:  2+ Peripheral Pulses, No clubbing, cyanosis, or edema  PSYCH: AAOx3  NEUROLOGY: non-focal  SKIN: No rashes or lesions    LABS:                        15.1   11.61 )-----------( 188      ( 18 May 2022 09:30 )             47.4     05-18    142  |  104  |  19  ----------------------------<  150<H>  4.3   |  22  |  0.61    Ca    10.1      18 May 2022 09:30    TPro  8.0  /  Alb  4.4  /  TBili  0.4  /  DBili  x   /  AST  12  /  ALT  11  /  AlkPhos  64  05-18              Psychiatry Section:  Psychiatric Summary/[facility] admitting diagnosis: Patient with aggression due to TBI, finally after several trials stabilized with risperidone 1.5mg tid and Inderal 10mg bid which was higher in past but lowered due to low BP. Patient childlike can be loud but has no longer been aggressive or agitated. Was waiting for placement  . Patient has expressive language issues  that make her appear more dementied than actually  Psychiatric Recommendations: Cont meds if medically stable and can take po and BP okay may need to reduce risperdal or Inderal but hope not to disrupt regimen too much as has been hard to manage. Would give haldol 1-2mg po?IM/IV prn agitation    Observation status (check one):   ( ) Constant Observation  ( ) Enhanced care  (x ) Routine checks    Risk Status (check all that apply if present):  ( ) at risk for suicide/self-injury  ( x) at risk for aggressive behavior  ( ) at risk for elopement  ( x) other risk: wandering, falls
Asked to see patient for "goopy eye", concern for conjunctivitis. Patient unable to provide history d/t psychiatric condition, unable to answer if having eye pain, visual loss etc. Per RN patient had thick secretions and right eye was closed. Patient was cleaned prior to encounter so no discharge was noted at the time of my exam, (and none had reaccumulated yet in the interval period between cleaning and exam). Did not see any scleral injection, no obvious lid erythema either... R eye open and EOMI grossly intact. No apparent concurrent respiratory/sinus symptoms, is afebrile, non-toxic appearing. Would continue to monitor R eye for scleral injection/discharge, clean as needed,, encourage good hand hygiene, hold off on antibiotic drops at this time, reassess...

## 2022-05-18 NOTE — PROGRESS NOTE ADULT - ASSESSMENT
66 year old woman w/ dementia w/ behavioral disturbance, NPH sp  shunt, DM2 medicine re-consulted for nausea and vomiting. Prior consult for lower extremity edema. Symmetric edema, low suspicion for DVT. Likely chronic venostasis.      # Nausea/vomiting      #Pedal edema  -Could be 2' venous insufficiency. Edema symmetric. Low suspicion for DVT.   -Keep legs elevated at this time. Compression stockings if patient can tolerate.   -duplex negative 11/29 for DVT   - would hold off on up-titration of lasix given leukocytosis of unclear significance.     #Leukocytosis  - patient w/ mild leukocytosis of unclear significance  - send UA and Ucx, low suspicion of cellulitis mild erythema of lower extremities likely venous stasis changes   - monitor temperature curve    #Dementia w/ behavioral disturbances  -No agitation.  -Further optimization as per psych.    #NPH s/p  shunt  -Stable. Imaging showed no hydro. Recent shunt series was normal.  -Cont supportive measures.    #DM2  -A1c normal.   -No need for FS/insulin coverage  while at [facility]. Cont same management.

## 2022-05-18 NOTE — ED ADULT NURSE NOTE - CHIEF COMPLAINT QUOTE
Pt AOX2  BIBA from [facility] for 3x episodes of vomiting, unable to tolerate PO according to report from EMS; arrives with aide from [facility]; calm and cooperative in triage, denies abdominal pain but seems confused  Hx of dementia TBI w/  shunt; DM type 2 fs glu 100  fs 129

## 2022-05-18 NOTE — ED PROVIDER NOTE - NEUROLOGICAL, MLM
Alert and oriented x1, grossly intact strength and sensation in the bilateral upper and lower extremities

## 2022-05-18 NOTE — BH INPATIENT PSYCHIATRY PROGRESS NOTE - NSBHFUPINTERVALHXFT_PSY_A_CORE
Patient is seen for follow up of TBI. Discussed with treatment team.  Has had three episodes of vomiting since last night. Vomiting bilious material. Appears tired, somewhat sedated
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
## 2022-05-18 NOTE — BH CONSULTATION LIAISON ASSESSMENT NOTE - MSE UNSTRUCTURED FT
Unable to assess MMSE as patient speaking nonsensically and not engaged in interview at this time. Patient with overt bilateral tremors.

## 2022-05-18 NOTE — BH INPATIENT PSYCHIATRY PROGRESS NOTE - NSBHASSESSSUMMFT_PSY_ALL_CORE
4/14 Patient maintaining gains, tolerating treatment. Cont current meds w/o change.   4/15 Maintaining gains with occasional med hold. Cont rx  will change Inderal to 20mg tid to minimize risk low BP  4/18 Maintaining gains despite decreased Inderal. Cont med, keep in mind tremor maybe affecting BP so should verify abn BP's  4/19: Patient maintaining gains despite reduced Inderal. Cont rx at current doses. If BP's still low could reduce again  4/20 Mainting gains depsite reduced Inderal and BPs higher cont current rx  4/21 Maintaining gains, cont current rx while attempting to find placement  4/22 Patient maintaining gains. Cont current meds at current doses  4/25 Patient stable, holding on to gains. Cont meds, will ask medicine  to see hands  4/26 Patient's stable, holding on to gains. No agitation. COnt meds, medicine to see hands, they look less reddened today  4/27 Patient currently stable, rejected by another facility. ordered podiatry consult  4/28 Maintaining gains, will modestly reduce Inderal to avoid low BPs, podiatry consult ordered  4/29 Holding gains with reduced Inderal and BP better, cont current treatment and placement efforts  5/2 Maintaining gains  VSS.  Continue current medications.  5/4: Remains stable, confused, but pleasant, in behavioral control.  5/6: The patient is maintaining gains.  She is peasant, but disorganized.  Compliant with treatment.  5/9 Maintaining gains. BP more stable on reduced Inderal , cont current treatment  5/10 Maintaining gais COnt meds, check blood work for CBC hydration  5/11 Doing will will sl lower Inderal in response to low nl BP's. Labs unremarkable.   5/12 Patient maintaining gains. Cont current regimen, Inderal recently decreased due to low BP's  5/13 Maintaining gains. BP's stable not hypotensive. Cont current plan  5/16 Patient sustaining gains , cont current meds and d/c efforts  5/17 Maintaining gains, will cont risperidone, reduce Inderal to avoid hypotension  5/18 Patient with unexplained vomiting x3, elevated WBC and some dehydration. Have transferred to ER for evaluation including assess shunt fx

## 2022-05-18 NOTE — BH INPATIENT PSYCHIATRY PROGRESS NOTE - NSTXIMPULSGOAL_PSY_ALL_CORE
Will identify 1 reason or reinforcement for impulsive behavior
Will identify 1 strategy to interrupt impulsive thoughts
Will identify 1 reason or reinforcement for impulsive behavior
Will identify 1 thought that precedes an impulsive acts
Will identify 1 reason or reinforcement for impulsive behavior
Will identify 1 thought that precedes an impulsive acts
Will identify 1 reason or reinforcement for impulsive behavior
Will identify 1 reason or reinforcement for impulsive behavior
Will identify 1 thought that precedes an impulsive acts
Will identify 1 reason or reinforcement for impulsive behavior
Will identify 1 strategy to interrupt impulsive thoughts
Will identify 1 reason or reinforcement for impulsive behavior
Will identify 1 strategy to interrupt impulsive thoughts
Will identify 1 reason or reinforcement for impulsive behavior
Will identify 1 thought that precedes an impulsive acts
Will identify 1 strategy to interrupt impulsive thoughts
Will identify 1 reason or reinforcement for impulsive behavior
Will identify 1 thought that precedes an impulsive acts
Will identify 1 reason or reinforcement for impulsive behavior
Will identify 1 thought that precedes an impulsive acts
Will identify 1 reason or reinforcement for impulsive behavior
Will identify 1 thought that precedes an impulsive acts
Will identify 1 reason or reinforcement for impulsive behavior
Will identify 1 thought that precedes an impulsive acts
Will identify 1 thought that precedes an impulsive acts
Will identify 1 reason or reinforcement for impulsive behavior
Will identify 1 thought that precedes an impulsive acts

## 2022-05-18 NOTE — BH INPATIENT PSYCHIATRY PROGRESS NOTE - NSTXCOPEDATETRGT_PSY_ALL_CORE
16-Dec-2021
24-Feb-2022
10-Mar-2022
16-Dec-2021
27-Nov-2021
28-Mar-2022
11-Apr-2022
16-Dec-2021
16-Dec-2021
28-Mar-2022
11-Apr-2022
16-Dec-2021
11-Apr-2022
16-Dec-2021
16-Dec-2021
24-Feb-2022
30-Nov-2021
10-Mar-2022
16-Dec-2021
10-Mar-2022
11-Apr-2022
16-Dec-2021
16-Dec-2021
17-Mar-2022
16-Dec-2021
24-Feb-2022
28-Mar-2022
16-Dec-2021
04-Apr-2022
11-Apr-2022
16-Dec-2021
11-Apr-2022
16-Dec-2021
24-Feb-2022
11-Apr-2022
24-Feb-2022
16-Dec-2021
20-May-2022
16-Dec-2021
16-Dec-2021
30-Nov-2021
11-Apr-2022
11-Apr-2022
16-Dec-2021
16-Dec-2021
27-Nov-2021
28-Mar-2022
09-Dec-2021
10-Mar-2022
16-Dec-2021
20-May-2022
24-Feb-2022
16-Dec-2021
16-Dec-2021
24-Feb-2022
03-Dec-2021
11-Apr-2022
16-Dec-2021
16-Dec-2021
28-Mar-2022
11-Apr-2022
16-Dec-2021
27-Nov-2021
09-Dec-2021
11-Apr-2022
16-Dec-2021
10-Mar-2022
16-Dec-2021
16-Dec-2021
17-Mar-2022
16-Dec-2021
24-Feb-2022
04-Apr-2022
11-Apr-2022
11-Apr-2022
17-Mar-2022
24-Feb-2022
27-Nov-2021
16-Dec-2021
20-May-2022
20-May-2022
10-Mar-2022
30-Nov-2021
11-Apr-2022
28-Mar-2022
04-Apr-2022
24-Mar-2022
16-Dec-2021
24-Feb-2022
11-Apr-2022
16-Dec-2021
11-Apr-2022
16-Dec-2021
04-Apr-2022
16-Dec-2021
10-Dec-2021
16-Dec-2021
17-Mar-2022
11-Apr-2022
16-Dec-2021
11-Apr-2022
24-Mar-2022
30-Nov-2021
30-Nov-2021
10-Dec-2021
30-Nov-2021
09-Dec-2021
16-Dec-2021
09-Dec-2021
09-Dec-2021
16-Dec-2021
10-Dec-2021

## 2022-05-18 NOTE — BH INPATIENT PSYCHIATRY PROGRESS NOTE - NS ED BHA REVIEW OF ED CHART VITAL SIGNS REVIEWED
Yes

## 2022-05-18 NOTE — BH INPATIENT PSYCHIATRY PROGRESS NOTE - MSE OPTIONS
Unstructured MSE
Structured MSE
Unstructured MSE
Structured MSE
Unstructured MSE
Structured MSE
Unstructured MSE
Structured MSE
Unstructured MSE
Structured MSE
Unstructured MSE
Structured MSE
Structured MSE
Unstructured MSE
Structured MSE
Unstructured MSE
Structured MSE
Unstructured MSE
Structured MSE
Unstructured MSE
Structured MSE
Unstructured MSE
Structured MSE
Unstructured MSE
Structured MSE
Unstructured MSE
Unstructured MSE
Structured MSE

## 2022-05-18 NOTE — BH INPATIENT PSYCHIATRY PROGRESS NOTE - NSTXPROBIMPULS_PSY_ALL_CORE
IMPULSIVITY/AGITATION

## 2022-05-18 NOTE — BH INPATIENT PSYCHIATRY PROGRESS NOTE - NSBHCONSULTIPREASON_PSY_A_CORE
danger to self; mental illness expected to respond to inpatient care

## 2022-05-18 NOTE — BH INPATIENT PSYCHIATRY DISCHARGE NOTE - NSDCMRMEDTOKEN_GEN_ALL_CORE_FT
divalproex sodium 125 mg oral delayed release capsule: 6 cap(s) orally 2 times a day  docusate sodium 100 mg oral capsule: 1 cap(s) orally 2 times a day  furosemide 20 mg oral tablet: 1 tab(s) orally once a day  Melatonin 5 mg oral tablet: 1 tab(s) orally once a day (at bedtime)  petrolatum topical ointment: 1 application topically 3 times a day  propranolol 10 mg oral tablet: 3 tab(s) orally 3 times a day  risperiDONE 0.5 mg oral tablet: 3 tab(s) orally 3 times a day  senna oral tablet: 1 tab(s) orally once a day (at bedtime)  triamcinolone 0.1% topical cream: 1 application topically 2 times a day, As needed, pruritis/rash

## 2022-05-18 NOTE — BH INPATIENT PSYCHIATRY DISCHARGE NOTE - NSBHDCMEDICALFT_PSY_A_CORE
as above. Also early in course she had intense pruritis and was treated with steroids and for scabies though derm was not clear on diagnosis.

## 2022-05-18 NOTE — BH INPATIENT PSYCHIATRY PROGRESS NOTE - NSTXCONFGOALOTHER_PSY_ALL_CORE
0816-Pt in PACU from OR, report from OR RN and Dr. Shah. O2 monitoring only per Dr. Shah. VSS.    0827-Birth mother and foster mother at bedside.     0835-Father at bedside.     0900-Pt in PACU 2.    0905-Pt discharged with foster mother.     
Patient will exhibit decreased restlessness and increase tolerance to group structure within seven days.
Patient will exhibit less restlessness and less agitation within seven days.
Patient will demonstrate increased frustration tolerance and decreased irritability within seven days.
Patient will exhibit increased frustration tolerance and decreased agitation within seven days.
Patient will participate in rehab groups daily and will demonstrate increased frustration tolerance wtihin seven days.
Patient will demonstrate increase frustration tolerance and decresae irritability within seven days.
Patient will exhibit increased frustration tolerance and increased tolerance to structured activities within seven days.
Patient will exhibit less restlessness and less agitation within seven days.
Patient will exhibit less restlessness and less agitation within seven days.
Patient will tolerate 1:1 sessions for longer periods and will exhibit increase frustration tolerance within seven days.
Patient will exhibit increased frustration tolerance and decreased restlessness within seven days
Patient will exhibit increased frustration tolerance and will exhibit less agitated behavior within seven days.
Patient will exhibit increased tolerance to structure and decreased restlessness within seven days.
Patient will tolerate rehab groups three times daily for 45 minutes and will exhibit increase frustration tolerance within seven days.
Patient will exhibit decreased restleness and decreased agitated behavior within seven days.
Patient will participate in rehab groups daily and will demonstrate increased frustration tolerance wtihin seven days.
Patient will exhibit decreased restleness and decreased agitated behavior within seven days.
Patient will exhibit increased frustration tolerance and decreased restlessness within seven days
Patient will exhibit increased frustration tolerance and decreased agitation within seven days.
Patient will exhibit less restlessness and less agitation within seven days.
Patient will exhibit decreased restlessness and increase tolerance to group structure within seven days.
Patient will demonstrate increased frustration tolerance and decreased irritability within seven days.
Patient will exhibit decreased restlessness and decreased irritability within seven days.
Patient will exhibit decreased restlessness and increase tolerance to group structure within seven days.
Patient will tolerate rehab activities twice daily, will exhibit increased frustration tolerance and decreased agitation within seven days.
Patient will demonstrate increase frustration tolerance and decresae irritability within seven days.
Patient will demonstrate increased frustation tolerance and decreased irritability within seven days.
Patient will demonstrate increased frustration tolerance and decreased irritability within seven days.
Patient will exhibit decreased restlessness and decreased irritability within seven days.
Patient will exhibit decreased restlessness and increase tolerance to group structure within seven days.
Patient will exhibit increased tolerance to structure and decreased restlessness within seven days.
Patient will exhibit increased frustration tolerance and decreased agitation within seven days.
Patient will tolerate rehab activities twice daily, will exhibit increased frustration tolerance and decreased agitation within seven days.
Patient will tolerate rehab groups three times daily for 45 minutes and will exhibit increase frustration tolerance within seven days.
Patient will exhibit increased frustration tolerance and decreased agitation within seven days.
Patient will exhibit increased frustration tolerance and decreased restlessness within seven days.
Patient will exhibit increased tolerance to structured activities and decreased restlessness within seven days.
Patient will tolerate 1:1 sessions for longer periods and will exhibit increase frustration tolerance within seven days.
Patient will demonstrate increase frustration tolerance and decresae irritability within seven days.
Patient will demonstrate increased frustration tolerance and decreased irritability within seven days.
Patient will exhibit decreased restleness and decreased agitated behavior within seven days.
Patient will exhibit increased frustration tolerance and decreased restlessness within seven days
Patient will exhibit decreased restleness and decreased agitated behavior within seven days.
Patient will exhibit decreased restlessness and decreased irritability within seven days.
Patient will tolerate rehab groups three times daily for 45 minutes and will exhibit increase frustration tolerance within seven days.
Patient will exhibit decreased restleness and decreased agitated behavior within seven days.
Patient will exhibit increased frustration tolerance and decreased restlessness within seven days
Patient will exhibit increased tolerance to structured activities and decreased restlessness within seven days.
Patient will exhibit increased tolerance to structured activities and decreased restlessness within seven days.
Patient will demonstrate increased frustration tolerance and decreased irritability within seven days.
Patient will exhibit increased frustration tolerance and decreased agitation within seven days.
Patient will exhibit less restlessness and less agitation within seven days.
Patient will participate in rehab groups daily and will demonstrate increased frustration tolerance wtihin seven days.
Patient will exhibit increased frustration tolerance and increased tolerance to structured activities within seven days.
Patient will exhibit increased frustration tolerance and increased tolerance to structured activities within seven days.
Patient will participate in rehab groups daily and will demonstrate increased frustration tolerance wtihin seven days.
Patient will tolerate 1:1 sessions for longer periods and will exhibit increase frustration tolerance within seven days.
Patient will tolerate rehab groups three times daily for 45 minutes and will exhibit increase frustration tolerance within seven days.
Patient will demonstrate increased frustation tolerance and decreased irritability within seven days.
Patient will demonstrate increased frustration tolerance and decreased irritability within seven days.
Patient will exhibit increased frustration tolerance and decreased agitation within seven days.
Patient will exhibit increase frustration tolerance and decreased agitated behavior within seven days.
Patient will demonstrate increased frustration tolerance and decreased irritability within seven days.
Patient will exhibit increased tolerance to structure and decreased restlessness within seven days.
Patient will exhibit increase frustration tolerance and decreased agitated behavior within seven days.
Patient will exhibit increased frustration tolerance and decreased restlessness within seven days.
Patient will exhibit increased frustration tolerance and will exhibit less agitated behavior within seven days.
Patient will exhibit decreased restleness and decreased agitated behavior within seven days.
Patient will participate in rehab groups daily and will demonstrate increased frustration tolerance wtihin seven days.
Patient will tolerate 1:1 sessions for longer periods and will exhibit increase frustration tolerance within seven days.
Patient will exhibit increased tolerance to structure and decreased restlessness within seven days.
Patient will exhibit increased frustration tolerance and will exhibit less agitated behavior within seven days.
Patient will exhibit decreased restlessness and increase tolerance to group structure within seven days.
Patient will exhibit decreased restlessness and increase tolerance to group structure within seven days.
Patient will exhibit increased frustration tolerance and decreased agitation within seven days.
Patient will exhibit less restlessness and less agitation within seven days.
Patient will demonstrate increased frustation tolerance and decreased irritability within seven days.
Patient will exhibit increased frustration tolerance and decreased restlessness within seven days.
Patient will exhibit decreased restlessness and decreased irritability within seven days.
Patient will exhibit increased frustration tolerance and will exhibit less agitated behavior within seven days.
Patient will exhibit increase frustration tolerance and decreased agitated behavior within seven days.
Patient will exhibit decreased restlessness and increase tolerance to group structure within seven days.
Patient will tolerate rehab activities twice daily, will exhibit increased frustration tolerance and decreased agitation within seven days.
Patient will exhibit decreased restlessness and decreased irritability within seven days.
Patient will exhibit decreased restleness and decreased agitated behavior within seven days.
Patient will demonstrate increase frustration tolerance and decresae irritability within seven days.
Patient will exhibit increase frustration tolerance and decreased agitated behavior within seven days.
Patient will exhibit less agitated behavior and will tolerate structured group activities for 45 minutes within seven days.
Patient will exhibit increase frustration tolerance and decreased agitated behavior within seven days.
Patient will exhibit increased frustration tolerance and will exhibit less agitated behavior within seven days.
Patient will exhibit increased tolerance to structure and decreased restlessness within seven days.
Patient will tolerate rehab activities twice daily, will exhibit increased frustration tolerance and decreased agitation within seven days.
Patient will exhibit increased tolerance to structured activities and decreased restlessness within seven days.
Patient will demonstrate increased frustation tolerance and decreased irritability within seven days.
Patient will exhibit increased tolerance to structured activities and decreased restlessness within seven days.
Patient will exhibit increase frustration tolerance and decreased agitated behavior within seven days.
Patient will demonstrate increase frustration tolerance and decresae irritability within seven days.
Patient will tolerate 1:1 sessions for longer periods and will exhibit increase frustration tolerance within seven days.
Patient will tolerate rehab groups three times daily for 45 minutes and will exhibit increase frustration tolerance within seven days.
Patient will exhibit increase frustration tolerance and decreased agitated behavior within seven days.

## 2022-05-18 NOTE — BH INPATIENT PSYCHIATRY PROGRESS NOTE - NSTXPROBCONF_PSY_ALL_CORE
CONFUSION, ACUTE/CHRONIC

## 2022-05-18 NOTE — BH INPATIENT PSYCHIATRY PROGRESS NOTE - NSTXDCOTHRINTERMD_PSY_ALL_CORE
Collaboration with MD at nursing home

## 2022-05-18 NOTE — BH INPATIENT PSYCHIATRY PROGRESS NOTE - NS ED [facility] MED ROS PSYCHIATRIC
See HPI

## 2022-05-18 NOTE — BH INPATIENT PSYCHIATRY PROGRESS NOTE - NSTXDCOTHRDATEEST_PSY_ALL_CORE
22-Feb-2022
14-Mar-2022
21-Mar-2022
28-Feb-2022
09-May-2022
14-Feb-2022
14-Feb-2022
20-Dec-2021
28-Feb-2022
07-Feb-2022
13-Dec-2021
14-Mar-2022
31-Jan-2022
07-Feb-2022
22-Feb-2022
24-Jan-2022
24-Jan-2022
28-Feb-2022
07-Dec-2021
07-Feb-2022
09-May-2022
11-Apr-2022
14-Feb-2022
07-Feb-2022
13-Dec-2021
21-Mar-2022
10-Morgan-2022
13-Dec-2021
25-Apr-2022
21-Mar-2022
02-May-2022
13-Dec-2021
27-Dec-2021
11-Apr-2022
24-Jan-2022
24-Jan-2022
27-Dec-2021
04-Apr-2022
07-Dec-2021
11-Apr-2022
21-Mar-2022
21-Mar-2022
24-Jan-2022
27-Dec-2021
31-Jan-2022
16-May-2022
28-Feb-2022
09-May-2022
25-Apr-2022
02-May-2022
07-Feb-2022
09-May-2022
10-Morgan-2022
28-Feb-2022
03-Jan-2022
04-Apr-2022
18-Jan-2022
21-Mar-2022
22-Nov-2021
27-Dec-2021
04-Apr-2022
11-Apr-2022
14-Mar-2022
18-Apr-2022
18-Jan-2022
14-Mar-2022
18-Apr-2022
18-Jan-2022
03-Jan-2022
10-Morgan-2022
25-Apr-2022
03-Jan-2022
24-Jan-2022
28-Feb-2022
31-Jan-2022
22-Nov-2021
03-Jan-2022
22-Nov-2021
28-Feb-2022
14-Feb-2022
18-Apr-2022
22-Nov-2021
13-Dec-2021
28-Feb-2022
14-Feb-2022
20-Dec-2021
02-May-2022
18-Apr-2022
21-Mar-2022
03-Jan-2022
07-Dec-2021
13-Dec-2021
14-Feb-2022
21-Mar-2022
18-Jan-2022
22-Nov-2021
01-Dec-2021
25-Apr-2022
01-Dec-2021
07-Feb-2022
13-Dec-2021
14-Mar-2022
25-Apr-2022
28-Feb-2022
14-Feb-2022
18-Apr-2022
24-Jan-2022
16-May-2022
28-Feb-2022
07-Feb-2022
13-Dec-2021
14-Feb-2022
21-Mar-2022
18-Apr-2022
01-Dec-2021
04-Apr-2022
22-Feb-2022
22-Nov-2021
28-Feb-2022
20-Dec-2021
24-Jan-2022
02-May-2022
04-Apr-2022
13-Dec-2021
20-Dec-2021
22-Feb-2022
16-May-2022
21-Mar-2022
22-Nov-2021
02-May-2022
07-Dec-2021
22-Nov-2021
27-Dec-2021
27-Dec-2021
01-Dec-2021
22-Nov-2021
07-Dec-2021
01-Dec-2021
01-Dec-2021
22-Nov-2021

## 2022-05-18 NOTE — H&P ADULT - NSHPLABSRESULTS_GEN_ALL_CORE
LABS:             15.7   12.93 )-----------( 167      ( 18 May 2022 13:07 )             49.3     143  |  103  |  21  ----------------------------<  135<H>  5.1   |  25  |  0.65    Ca    10.1      18 May 2022 13:07    TPro  8.2  /  Alb  4.3  /  TBili  0.4  /  DBili  x   /  AST  28  /  ALT  14  /  AlkPhos  63  05-18      IMAGING:  < from: CT Abdomen and Pelvis No Cont (05.18.22 @ 13:51) >    FINDINGS:  LOWER CHEST: Within normal limits.    LIVER: Within normal limits.  BILE DUCTS: Normal caliber.  GALLBLADDER: Within normal limits.  SPLEEN: Within normal limits.  PANCREAS: Within normal limits.  ADRENALS: Within normal limits.  KIDNEYS/URETERS: Within normal limits.    BLADDER: Within normal limits.  REPRODUCTIVE ORGANS: Uterus and adnexa within normal limits.    BOWEL: Distended stomach with multiple fluid-filled and dilated loops of   small bowel with transition in the right lower quadrant (2, 88). Mild   mesenteric engorgement and trace ascites. Appendix is normal.  PERITONEUM: No free air. Left  shunt catheter courses in the left   anterior abdominal wall with tip coiled in left lower quadrant. No   associated collection.  VESSELS: Trace atherosclerotic calcifications. IVC filter in place with   proximal tip at the level of left renal vein.  RETROPERITONEUM/LYMPH NODES: No lymphadenopathy.  ABDOMINAL WALL: Within normal limits.  BONES: Uncertain age of compression fracture of L3.    IMPRESSION:  Small bowel obstruction with transition in the right lower quadrant.   shunt catheter with tip coiled in the left lower quadrant. No   associated fluid collection.  Uncertain age of L3 compression fracture. Correlate for focal pain in   this region.    < end of copied text >
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
## 2022-05-18 NOTE — BH INPATIENT PSYCHIATRY PROGRESS NOTE - NSCGISEVERITYSCORE_CAL_PSY_ALL_CORE
5

## 2022-05-18 NOTE — BH INPATIENT PSYCHIATRY PROGRESS NOTE - NSTXDCOTHRDATETRGT_PSY_ALL_CORE
01-Mar-2022
21-Feb-2022
03-Jan-2022
18-Apr-2022
21-Mar-2022
28-Mar-2022
11-Apr-2022
11-Apr-2022
21-Feb-2022
29-Nov-2021
21-Feb-2022
21-Mar-2022
29-Nov-2021
31-Jan-2022
01-Mar-2022
20-Dec-2021
21-Mar-2022
31-Jan-2022
25-Jan-2022
29-Nov-2021
11-Apr-2022
14-Feb-2022
20-Dec-2021
21-Mar-2022
21-Mar-2022
29-Nov-2021
31-Jan-2022
20-Dec-2021
21-Mar-2022
25-Apr-2022
28-Mar-2022
21-Feb-2022
23-May-2022
20-Dec-2021
01-Mar-2022
07-Mar-2022
25-Apr-2022
25-Apr-2022
16-May-2022
20-Dec-2021
11-Apr-2022
28-Mar-2022
01-Mar-2022
03-Jan-2022
07-Mar-2022
21-Mar-2022
07-Mar-2022
25-Jan-2022
02-May-2022
07-Mar-2022
16-May-2022
31-Jan-2022
07-Mar-2022
25-Jan-2022
31-Jan-2022
10-Morgan-2022
25-Jan-2022
28-Mar-2022
20-Dec-2021
20-Dec-2021
11-Apr-2022
14-Feb-2022
18-Apr-2022
25-Apr-2022
09-May-2022
10-Morgan-2022
10-Morgan-2022
28-Mar-2022
31-Jan-2022
09-May-2022
14-Feb-2022
27-Dec-2021
29-Nov-2021
02-May-2022
10-Morgan-2022
18-Apr-2022
29-Nov-2021
14-Feb-2022
21-Feb-2022
21-Mar-2022
23-May-2022
29-Nov-2021
14-Feb-2022
16-May-2022
20-Dec-2021
21-Feb-2022
28-Mar-2022
28-Mar-2022
09-May-2022
28-Mar-2022
09-May-2022
25-Apr-2022
27-Dec-2021
08-Dec-2021
14-Feb-2022
02-May-2022
21-Mar-2022
02-May-2022
07-Mar-2022
10-Dec-2021
17-Jan-2022
03-Jan-2022
18-Apr-2022
21-Feb-2022
03-Jan-2022
03-Jan-2022
17-Jan-2022
23-May-2022
25-Apr-2022
07-Mar-2022
21-Feb-2022
28-Mar-2022
03-Jan-2022
07-Mar-2022
10-Morgan-2022
03-Jan-2022
09-May-2022
21-Mar-2022
28-Mar-2022
29-Nov-2021
16-May-2022
17-Jan-2022
27-Dec-2021
31-Jan-2022
03-Jan-2022
21-Mar-2022
02-May-2022
14-Feb-2022
27-Dec-2021
31-Jan-2022
14-Dec-2021
20-Dec-2021
08-Dec-2021
29-Nov-2021
08-Dec-2021
14-Dec-2021
08-Dec-2021
08-Dec-2021
29-Nov-2021
14-Dec-2021
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
## 2022-05-18 NOTE — BH CONSULTATION LIAISON ASSESSMENT NOTE - NSBHCONSULTPRIMARYDISCUSSYES_PSY_A_CORE FT
writer spoke with ED attending in person who stated she would alert hospitalist to BH assessment for recommendations

## 2022-05-18 NOTE — BH CONSULTATION LIAISON ASSESSMENT NOTE - SUICIDE ATTEMPT:
[facility] Note    CC: GERD    HPI 60 y.o. female presents for follow up of GERD    Past Medical History:   Diagnosis Date    Acute hepatitis B     Anxiety     Arthritis     Chronic cough     Hypertension     Pneumonia 9/2012    recent x-ray negative    Sleep apnea     Uses C-Pap    Thyroid disease        Allergies and Medications reviewed     Review of Systems  General ROS: negative for - chills, fever or weight loss  Cardiovascular ROS: no chest pain or dyspnea on exertion  Gastrointestinal ROS: + GERD    Physical Examination  LMP 09/09/2011   General appearance: alert, cooperative, no distress  HENT: Normocephalic, atraumatic, neck symmetrical, no nasal discharge, sclera anicteric   Lungs: clear to auscultation bilaterally, symmetric chest wall expansion bilaterally  Heart: regular rate and rhythm without rub; no displacement of the PMI   Abdomen: soft  Extremities: extremities symmetric; no clubbing, cyanosis, or edema        Assessment:   60 y.o. female presents for follow up of GERD    Plan:  -Proceed to colonoscopy     [facility]  [address], [address]  [place] [address]  Office: [phone]  Fax: [phone]  
Unable to assess

## 2022-05-18 NOTE — BH INPATIENT PSYCHIATRY PROGRESS NOTE - NSTXIMPULSDATEEST_PSY_ALL_CORE
17-Feb-2022
03-Mar-2022
03-Mar-2022
09-Dec-2021
03-Mar-2022
03-Mar-2022
17-Feb-2022
09-Dec-2021
03-Mar-2022
09-Dec-2021
13-May-2022
03-Mar-2022
09-Dec-2021
26-Nov-2021
26-Nov-2021
03-Mar-2022
09-Dec-2021
09-Dec-2021
17-Feb-2022
03-Mar-2022
03-Mar-2022
09-Dec-2021
09-Dec-2021
03-Mar-2022
09-Dec-2021
03-Mar-2022
09-Dec-2021
03-Mar-2022
03-Mar-2022
09-Dec-2021
09-Dec-2021
26-Nov-2021
09-Dec-2021
17-Feb-2022
03-Mar-2022
09-Dec-2021
03-Mar-2022
03-Mar-2022
09-Dec-2021
09-Dec-2021
03-Mar-2022
09-Dec-2021
13-May-2022
17-Feb-2022
03-Mar-2022
03-Mar-2022
09-Dec-2021
03-Mar-2022
09-Dec-2021
03-Mar-2022
03-Mar-2022
09-Dec-2021
17-Feb-2022
03-Mar-2022
13-May-2022
13-May-2022
09-Dec-2021
09-Dec-2021
03-Mar-2022
09-Dec-2021
03-Mar-2022
09-Dec-2021
03-Mar-2022
09-Dec-2021
09-Dec-2021
17-Feb-2022
26-Nov-2021
26-Nov-2021
09-Dec-2021
03-Mar-2022
09-Dec-2021
03-Mar-2022
09-Dec-2021
26-Nov-2021
09-Dec-2021
26-Nov-2021
09-Dec-2021
09-Dec-2021
17-Feb-2022
03-Mar-2022
09-Dec-2021
03-Mar-2022
09-Dec-2021
17-Feb-2022
09-Dec-2021
03-Mar-2022
03-Mar-2022
17-Feb-2022
17-Feb-2022
03-Mar-2022
09-Dec-2021
09-Dec-2021
03-Mar-2022
03-Mar-2022
09-Dec-2021
03-Mar-2022
26-Nov-2021
26-Nov-2021
09-Dec-2021
09-Dec-2021
26-Nov-2021
09-Dec-2021
26-Nov-2021

## 2022-05-18 NOTE — ED PROVIDER NOTE - OBJECTIVE STATEMENT
68yo F w/ PMHx of dementia, NPH, DM, epilepsy, HTN reporting to the ED with vomiting. Patient arrives from [facility] where staff reports she has been vomiting earlier today. They report she has a  shunt in place. Additional history of present illness limited by acute medical condition.

## 2022-05-18 NOTE — BH INPATIENT PSYCHIATRY PROGRESS NOTE - NSBHPROGSUIC_PSY_A_CORE
no

## 2022-05-18 NOTE — BH INPATIENT PSYCHIATRY PROGRESS NOTE - NSTXCONFPROGRES_PSY_ALL_CORE
Improving
No Change
Improving
No Change
Improving
No Change
No Change
Improving
No Change
Improving
No Change
No Change
Improving
Improving
No Change
Improving
No Change
No Change
Improving
No Change
Improving
Improving
No Change
Improving
No Change
Improving
No Change
Improving
No Change
Improving
No Change
Improving
Improving
No Change
Improving
No Change
Improving
No Change
No Change
Improving
No Change
Improving
No Change
No Change
Improving
Improving
No Change
Improving
No Change
Improving
No Change

## 2022-05-18 NOTE — BH INPATIENT PSYCHIATRY PROGRESS NOTE - NSTXCOPEDATEEST_PSY_ALL_CORE
09-Dec-2021
10-Mar-2022
28-Mar-2022
17-Feb-2022
03-Mar-2022
09-Dec-2021
04-Apr-2022
09-Dec-2021
10-Mar-2022
20-Nov-2021
21-Mar-2022
09-Dec-2021
09-Dec-2021
21-Mar-2022
04-Apr-2022
09-Dec-2021
17-Feb-2022
04-Apr-2022
04-Apr-2022
09-Dec-2021
02-Dec-2021
09-Dec-2021
17-Mar-2022
25-Nov-2021
03-Mar-2022
09-Dec-2021
09-Dec-2021
20-Nov-2021
04-Apr-2022
09-Dec-2021
09-Dec-2021
17-Feb-2022
04-Apr-2022
03-Mar-2022
04-Apr-2022
04-Apr-2022
09-Dec-2021
04-Apr-2022
09-Dec-2021
28-Mar-2022
28-Mar-2022
04-Apr-2022
09-Dec-2021
02-Dec-2021
02-Dec-2021
04-Apr-2022
17-Feb-2022
17-Feb-2022
25-Nov-2021
02-Dec-2021
09-Dec-2021
04-Apr-2022
09-Dec-2021
25-Nov-2021
09-Dec-2021
20-Nov-2021
02-Dec-2021
04-Apr-2022
13-May-2022
09-Dec-2021
25-Nov-2021
03-Mar-2022
04-Apr-2022
09-Dec-2021
03-Mar-2022
25-Nov-2021
09-Dec-2021
21-Mar-2022
09-Dec-2021
09-Dec-2021
04-Apr-2022
10-Mar-2022
13-May-2022
04-Apr-2022
20-Nov-2021
21-Mar-2022
04-Apr-2022
09-Dec-2021
17-Feb-2022
17-Feb-2022
09-Dec-2021
13-May-2022
20-Nov-2021
21-Mar-2022
17-Mar-2022
03-Mar-2022
09-Dec-2021
04-Apr-2022
09-Dec-2021
04-Apr-2022
04-Apr-2022
09-Dec-2021
17-Feb-2022
28-Mar-2022
09-Dec-2021
25-Nov-2021
02-Dec-2021
02-Dec-2021
10-Mar-2022
09-Dec-2021
09-Dec-2021
21-Mar-2022
09-Dec-2021
02-Dec-2021
09-Dec-2021

## 2022-05-19 ENCOUNTER — TRANSCRIPTION ENCOUNTER (OUTPATIENT)
Age: 67
End: 2022-05-19

## 2022-05-19 ENCOUNTER — INPATIENT (INPATIENT)
Facility: HOSPITAL | Age: 67
LOS: 48 days | Discharge: NURSING, REHAB OR LONG-TERM CARE | End: 2022-07-07
Attending: PSYCHIATRY & NEUROLOGY | Admitting: PSYCHIATRY & NEUROLOGY
Payer: MEDICARE

## 2022-05-19 VITALS
OXYGEN SATURATION: 99 % | SYSTOLIC BLOOD PRESSURE: 111 MMHG | HEART RATE: 95 BPM | RESPIRATION RATE: 17 BRPM | DIASTOLIC BLOOD PRESSURE: 56 MMHG | TEMPERATURE: 100 F

## 2022-05-19 VITALS
HEART RATE: 88 BPM | RESPIRATION RATE: 17 BRPM | TEMPERATURE: 98 F | DIASTOLIC BLOOD PRESSURE: 68 MMHG | SYSTOLIC BLOOD PRESSURE: 109 MMHG | OXYGEN SATURATION: 99 %

## 2022-05-19 DIAGNOSIS — F02.80 DEMENTIA IN OTHER DISEASES CLASSIFIED ELSEWHERE, UNSPECIFIED SEVERITY, WITHOUT BEHAVIORAL DISTURBANCE, PSYCHOTIC DISTURBANCE, MOOD DISTURBANCE, AND ANXIETY: ICD-10-CM

## 2022-05-19 DIAGNOSIS — Z98.2 PRESENCE OF CEREBROSPINAL FLUID DRAINAGE DEVICE: Chronic | ICD-10-CM

## 2022-05-19 LAB
GLUCOSE BLDC GLUCOMTR-MCNC: 102 MG/DL — HIGH (ref 70–99)
GLUCOSE BLDC GLUCOMTR-MCNC: 90 MG/DL — SIGNIFICANT CHANGE UP (ref 70–99)

## 2022-05-19 PROCEDURE — 90792 PSYCH DIAG EVAL W/MED SRVCS: CPT

## 2022-05-19 PROCEDURE — 99232 SBSQ HOSP IP/OBS MODERATE 35: CPT | Mod: GC

## 2022-05-19 PROCEDURE — 99222 1ST HOSP IP/OBS MODERATE 55: CPT

## 2022-05-19 RX ORDER — FUROSEMIDE 40 MG
20 TABLET ORAL DAILY
Refills: 0 | Status: DISCONTINUED | OUTPATIENT
Start: 2022-05-19 | End: 2022-05-20

## 2022-05-19 RX ORDER — LANOLIN ALCOHOL/MO/W.PET/CERES
5 CREAM (GRAM) TOPICAL AT BEDTIME
Refills: 0 | Status: DISCONTINUED | OUTPATIENT
Start: 2022-05-19 | End: 2022-07-07

## 2022-05-19 RX ORDER — DIVALPROEX SODIUM 500 MG/1
750 TABLET, DELAYED RELEASE ORAL
Refills: 0 | Status: DISCONTINUED | OUTPATIENT
Start: 2022-05-19 | End: 2022-05-19

## 2022-05-19 RX ORDER — FUROSEMIDE 40 MG
20 TABLET ORAL DAILY
Refills: 0 | Status: DISCONTINUED | OUTPATIENT
Start: 2022-05-19 | End: 2022-05-19

## 2022-05-19 RX ORDER — FLUPHENAZINE HYDROCHLORIDE 1 MG/1
1 TABLET, FILM COATED ORAL ONCE
Refills: 0 | Status: DISCONTINUED | OUTPATIENT
Start: 2022-05-19 | End: 2022-07-07

## 2022-05-19 RX ORDER — POLYETHYLENE GLYCOL 3350 17 G/17G
17 POWDER, FOR SOLUTION ORAL DAILY
Refills: 0 | Status: DISCONTINUED | OUTPATIENT
Start: 2022-05-20 | End: 2022-05-22

## 2022-05-19 RX ORDER — DIVALPROEX SODIUM 500 MG/1
750 TABLET, DELAYED RELEASE ORAL
Refills: 0 | Status: DISCONTINUED | OUTPATIENT
Start: 2022-05-19 | End: 2022-07-07

## 2022-05-19 RX ORDER — RISPERIDONE 4 MG/1
1.5 TABLET ORAL
Refills: 0 | Status: DISCONTINUED | OUTPATIENT
Start: 2022-05-19 | End: 2022-05-19

## 2022-05-19 RX ORDER — SENNA PLUS 8.6 MG/1
1 TABLET ORAL AT BEDTIME
Refills: 0 | Status: DISCONTINUED | OUTPATIENT
Start: 2022-05-19 | End: 2022-05-23

## 2022-05-19 RX ORDER — RISPERIDONE 4 MG/1
1.5 TABLET ORAL THREE TIMES A DAY
Refills: 0 | Status: DISCONTINUED | OUTPATIENT
Start: 2022-05-19 | End: 2022-05-20

## 2022-05-19 RX ORDER — FLUPHENAZINE HYDROCHLORIDE 1 MG/1
1 TABLET, FILM COATED ORAL EVERY 6 HOURS
Refills: 0 | Status: DISCONTINUED | OUTPATIENT
Start: 2022-05-19 | End: 2022-07-07

## 2022-05-19 RX ORDER — RISPERIDONE 4 MG/1
1.5 TABLET ORAL THREE TIMES A DAY
Refills: 0 | Status: DISCONTINUED | OUTPATIENT
Start: 2022-05-19 | End: 2022-05-19

## 2022-05-19 RX ADMIN — DIVALPROEX SODIUM 750 MILLIGRAM(S): 500 TABLET, DELAYED RELEASE ORAL at 21:33

## 2022-05-19 RX ADMIN — Medication 20 MILLIGRAM(S): at 13:55

## 2022-05-19 RX ADMIN — Medication 5 MILLIGRAM(S): at 21:32

## 2022-05-19 RX ADMIN — RISPERIDONE 1.5 MILLIGRAM(S): 4 TABLET ORAL at 15:44

## 2022-05-19 RX ADMIN — RISPERIDONE 1.5 MILLIGRAM(S): 4 TABLET ORAL at 21:33

## 2022-05-19 RX ADMIN — Medication 5 MILLIGRAM(S): at 07:03

## 2022-05-19 RX ADMIN — ENOXAPARIN SODIUM 40 MILLIGRAM(S): 100 INJECTION SUBCUTANEOUS at 13:54

## 2022-05-19 RX ADMIN — Medication 53.75 MILLIGRAM(S): at 07:03

## 2022-05-19 RX ADMIN — SENNA PLUS 1 TABLET(S): 8.6 TABLET ORAL at 21:32

## 2022-05-19 RX ADMIN — Medication 53.75 MILLIGRAM(S): at 02:00
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
## 2022-05-19 NOTE — BH DISCHARGE NOTE NURSING/SOCIAL WORK/PSYCH REHAB - NSDCVIVACCINE_GEN_ALL_CORE_FT
Tdap; 20-May-2020 12:35; Hussein Haywood (RN); [employer]; [id number] (Exp. Date: 19-Mar-2022); IntraMuscular; Deltoid Left.; 0.5 milliLiter(s); VIS (VIS Published: 09-May-2013, VIS Presented: 20-May-2020);

## 2022-05-19 NOTE — BH DISCHARGE NOTE NURSING/SOCIAL WORK/PSYCH REHAB - NSCDUDCCRISIS_PSY_A_CORE
[facility] Well  [phone] [facility]-WELL [phone])  Text "WELL" to [phone]  Website: www.[employer]/.[employer] 1 [phone] 481-PPNX (3261) Website: www.safehorizon.org/.National Suicide Prevention Lifeline 6 (411) 952-4313/.  [employer]  1 (881) LIFENET (138-6250)/.  Health system’s [facility] Crisis [facility]  [phone] [place] [address] [phone]   Hours:  Monday through Friday from 9 AM to 3 PM/.  [place] [employer]  [employer] - Veterans Crisis Line  [phone], Option 1
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
## 2022-05-19 NOTE — BH INPATIENT PSYCHIATRY ASSESSMENT NOTE - NSBHMETABOLIC_PSY_ALL_CORE_FT
BMI: BMI (kg/m2): 31.6 (05-18-22 @ 11:26)  HbA1c: A1C with Estimated Average Glucose Result: 5.4 % (11-04-21 @ 06:58)    Glucose: POCT Blood Glucose.: 102 mg/dL (05-19-22 @ 11:51)    BP: 109/68 (05-19-22 @ 18:35) (109/68 - 109/68)  Lipid Panel:

## 2022-05-19 NOTE — DISCHARGE NOTE PROVIDER - NSDCFUADDAPPT_GEN_ALL_CORE_FT
Please follow up with a Gastroenterologist to have either a Colonoscopy or Capsule Endoscopy as part of routine health care maintenance administration.

## 2022-05-19 NOTE — BH DISCHARGE NOTE NURSING/SOCIAL WORK/PSYCH REHAB - NSDCPRGOAL_PSY_ALL_CORE
Due to medical issues patient was transferred to [facility]. Patient initially presented with symptoms of confusion, agitation, restlessness, anxiety and aggressive behavior. Patient's initial goals included increasing patient's frustration tolerance, decreasing aggressive behavior and decreasing anxiety. Patient made gains towards her rehab goals as evidenced by patient's brighter affect, increased frustration tolerance, decreased agitation and decreased anxiety. Patient also demonstrated daily medication compliance and daily participation in rehab groups.    ***Patient did not complete a self-rating or a Press Ganey survey.

## 2022-05-19 NOTE — BH INPATIENT PSYCHIATRY ASSESSMENT NOTE - MODIFICATIONS
none; pt also seen by attending; A&P drafted by resident MD with attending- long acting invega might benefit pt, ease dosing regimen of TID risperidone; pt pleasant, says her mood is good, and bright affect noted in NAD.

## 2022-05-19 NOTE — PATIENT PROFILE ADULT - FALL HARM RISK - HARM RISK INTERVENTIONS

## 2022-05-19 NOTE — PROGRESS NOTE ADULT - SUBJECTIVE AND OBJECTIVE BOX
SURGERY PROGRESS NOTE  Hospital Day #1      SUBJECTIVE  Pt seen and examined at bedside. No complaints.  No acute events overnight.         OBJECTIVE:    PHYSICAL EXAM   General Appearance: Appears well, NAD  Resp: Patent airway, non-labored breathing  CV: RRR  Abdomen: Soft, Nontender, Nondistended      Vital Signs Last 24 Hrs  T(C): 36.9 (18 May 2022 20:58), Max: 36.9 (18 May 2022 18:47)  T(F): 98.4 (18 May 2022 20:58), Max: 98.4 (18 May 2022 18:47)  HR: 86 (18 May 2022 20:58) (78 - 86)  BP: 122/56 (18 May 2022 20:58) (122/56 - 134/62)  BP(mean): --  RR: 16 (18 May 2022 20:58) (16 - 18)  SpO2: 100% (18 May 2022 20:58) (96% - 100%)      LAB/STUDIES                        15.7   12.93 )-----------( 167      ( 18 May 2022 13:07 )             49.3     143  |  103  |  21  ----------------------------<  135<H>  5.1   |  25  |  0.65    Ca    10.1      18 May 2022 13:07    TPro  8.2  /  Alb  4.3  /  TBili  0.4  /  DBili  x   /  AST  28  /  ALT  14  /  AlkPhos  63  05-18      LIVER FUNCTIONS - ( 18 May 2022 13:07 )  Alb: 4.3 g/dL / Pro: 8.2 g/dL / ALK PHOS: 63 U/L / ALT: 14 U/L / AST: 28 U/L / GGT: x            SURGERY PROGRESS NOTE  Hospital Day #1      SUBJECTIVE  Pt seen and examined at bedside. No complaints.  No acute events overnight. Pt with min NGT output        OBJECTIVE:    PHYSICAL EXAM   General Appearance: Appears well, NAD  Resp: Patent airway, non-labored breathing  CV: RRR  Abdomen: Soft, Nontender, Nondistended      Vital Signs Last 24 Hrs  T(C): 36.9 (18 May 2022 20:58), Max: 36.9 (18 May 2022 18:47)  T(F): 98.4 (18 May 2022 20:58), Max: 98.4 (18 May 2022 18:47)  HR: 86 (18 May 2022 20:58) (78 - 86)  BP: 122/56 (18 May 2022 20:58) (122/56 - 134/62)  BP(mean): --  RR: 16 (18 May 2022 20:58) (16 - 18)  SpO2: 100% (18 May 2022 20:58) (96% - 100%)      LAB/STUDIES                        15.7   12.93 )-----------( 167      ( 18 May 2022 13:07 )             49.3     143  |  103  |  21  ----------------------------<  135<H>  5.1   |  25  |  0.65    Ca    10.1      18 May 2022 13:07    TPro  8.2  /  Alb  4.3  /  TBili  0.4  /  DBili  x   /  AST  28  /  ALT  14  /  AlkPhos  63  05-18      LIVER FUNCTIONS - ( 18 May 2022 13:07 )  Alb: 4.3 g/dL / Pro: 8.2 g/dL / ALK PHOS: 63 U/L / ALT: 14 U/L / AST: 28 U/L / GGT: x

## 2022-05-19 NOTE — BH PATIENT PROFILE - HOME MEDICATIONS
Melatonin 5 mg oral tablet , 1 tab(s) orally once a day (at bedtime)  senna oral tablet , 1 tab(s) orally once a day (at bedtime)  furosemide 20 mg oral tablet , 1 tab(s) orally once a day  triamcinolone 0.1% topical cream , 1 application topically 2 times a day, As needed, pruritis/rash  risperiDONE 0.5 mg oral tablet , 3 tab(s) orally 3 times a day  propranolol 10 mg oral tablet , 3 tab(s) orally 3 times a day  divalproex sodium 125 mg oral delayed release capsule , 6 cap(s) orally 2 times a day  docusate sodium 100 mg oral capsule , 1 cap(s) orally 2 times a day

## 2022-05-19 NOTE — SOCIAL WORK POST DISCHARGE FOLLOW UP NOTE - NSBHSWFOLLOWUP_PSY_ALL_CORE_FT
The pt had 3 episodes of vomiting on 5/18/22 and was transferred to [facility] ED and admitted to [facility]. The pt's  was notified of the pt's transfer.
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
## 2022-05-19 NOTE — BH INPATIENT PSYCHIATRY ASSESSMENT NOTE - NSBHCHARTREVIEWVS_PSY_A_CORE FT
Vital Signs Last 24 Hrs  T(C): 36.8 (05-19-22 @ 18:35), Max: 37.7 (05-19-22 @ 17:00)  T(F): 98.2 (05-19-22 @ 18:35), Max: 99.8 (05-19-22 @ 17:00)  HR: 88 (05-19-22 @ 18:35) (80 - 95)  BP: 109/68 (05-19-22 @ 18:35) (108/73 - 128/97)  BP(mean): --  RR: 17 (05-19-22 @ 18:35) (16 - 17)  SpO2: 99% (05-19-22 @ 18:35) (99% - 100%)    Orthostatic VS  05-18-22 @ 09:58  Lying BP: --/-- HR: --  Sitting BP: 137/87 HR: 93  Standing BP: --/-- HR: --  Site: --  Mode: --  Orthostatic VS  05-18-22 @ 06:40  Lying BP: --/-- HR: --  Sitting BP: 128/67 HR: 84  Standing BP: --/-- HR: --  Site: --  Mode: --  Orthostatic VS  05-17-22 @ 20:39  Lying BP: --/-- HR: --  Sitting BP: 129/68 HR: 77  Standing BP: --/-- HR: --  Site: --  Mode: --   Vital Signs Last 24 Hrs  T(C): 36.8 (05-19-22 @ 18:35), Max: 37.7 (05-19-22 @ 17:00)  T(F): 98.2 (05-19-22 @ 18:35), Max: 99.8 (05-19-22 @ 17:00)  HR: 88 (05-19-22 @ 18:35) (80 - 95)  BP: 109/68 (05-19-22 @ 18:35) (108/73 - 128/97)  BP(mean): --  RR: 17 (05-19-22 @ 18:35) (16 - 17)  SpO2: 99% (05-19-22 @ 18:35) (99% - 100%)    Orthostatic VS  05-18-22 @ 09:58  Lying BP: --/-- HR: --  Sitting BP: 137/87 HR: 93  Standing BP: --/-- HR: --  Site: --  Mode: --  Orthostatic VS  05-18-22 @ 06:40  Lying BP: --/-- HR: --  Sitting BP: 128/67 HR: 84  Standing BP: --/-- HR: --  Site: --  Mode: --

## 2022-05-19 NOTE — PROGRESS NOTE ADULT - ATTENDING COMMENTS
SBO i/s/o terminal ileal inflammation vs adhesive SBO  -will need colonoscopy and possible capsule endoscopy as outpatient - workup per GI  -No acute general surgical intervention planned. IF this represents a malignancy the above outlined workup should be complete prior to surgery   -ADAT   -resume psych meds     Shamar Toribio MD  Acute and Critical Care Surgery    The Acute Care Surgery (B Team) Attending Group Practice:  Dr. Shaila Beltrán, Dr. Omi Gavin, Dr. Shamar Toribio,  Dr. Jamie Seymour and Dr. Ina Moreira     Urgent issues - spectra [phone] or [phone]  Nonurgent issues - [phone]  Patient appointments or after hours - [phone]

## 2022-05-19 NOTE — BH SAFETY PLAN - INTERNAL COPING STRATEGY 1
Patient responds well to supportive encouragement, low environmental stimulation and supportive encouragement.

## 2022-05-19 NOTE — BH SAFETY PLAN - WARNING SIGN 1
Due to medical issues patient was transferred to [facility]. Patient did not complete a safety rating. However, patient initially presented with symptoms of agitation, aggression, confusion, restlessness and anxiety.

## 2022-05-19 NOTE — DISCHARGE NOTE PROVIDER - NSDCMRMEDTOKEN_GEN_ALL_CORE_FT
divalproex sodium 125 mg oral delayed release capsule: 6 cap(s) orally 2 times a day  docusate sodium 100 mg oral capsule: 1 cap(s) orally 2 times a day  furosemide 20 mg oral tablet: 1 tab(s) orally once a day  Melatonin 5 mg oral tablet: 1 tab(s) orally once a day (at bedtime)  propranolol 10 mg oral tablet: 3 tab(s) orally 3 times a day  risperiDONE 0.5 mg oral tablet: 3 tab(s) orally 3 times a day  senna oral tablet: 1 tab(s) orally once a day (at bedtime)  triamcinolone 0.1% topical cream: 1 application topically 2 times a day, As needed, pruritis/rash

## 2022-05-19 NOTE — BH DISCHARGE NOTE NURSING/SOCIAL WORK/PSYCH REHAB - PATIENT PORTAL LINK FT
You can access the FollowMyHealth Patient Portal offered by [facility] by registering at the following website: [email or web address]. By joining [employer]’s FollowMyHealth portal, you will also be able to view your health information using other applications (apps) compatible with our system.
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
## 2022-05-19 NOTE — BH INPATIENT PSYCHIATRY ASSESSMENT NOTE - CURRENT MEDICATION
MEDICATIONS  (STANDING):    MEDICATIONS  (PRN):   MEDICATIONS  (STANDING):  diVALproex Sprinkle 750 milliGRAM(s) Oral two times a day  furosemide    Tablet 20 milliGRAM(s) Oral daily  melatonin. 5 milliGRAM(s) Oral at bedtime  propranolol 10 milliGRAM(s) Oral three times a day  risperiDONE   Tablet 1.5 milliGRAM(s) Oral three times a day  senna 1 Tablet(s) Oral at bedtime    MEDICATIONS  (PRN):  fluPHENAZine 1 milliGRAM(s) Oral every 6 hours PRN agitation  fluPHENAZine  Injectable 1 milliGRAM(s) IntraMuscular once PRN agitation  triamcinolone 0.1% Oral Paste 1 Application(s) Topical two times a day PRN pruritis/rash   MEDICATIONS  (STANDING):  diVALproex Sprinkle 750 milliGRAM(s) Oral two times a day  furosemide    Tablet 20 milliGRAM(s) Oral daily  melatonin. 5 milliGRAM(s) Oral at bedtime  propranolol 10 milliGRAM(s) Oral three times a day  risperiDONE   Tablet 1.5 milliGRAM(s) Oral three times a day  senna 1 Tablet(s) Oral at bedtime  triamcinolone 0.1% Cream 1 Application(s) Topical two times a day    MEDICATIONS  (PRN):  fluPHENAZine 1 milliGRAM(s) Oral every 6 hours PRN agitation  fluPHENAZine  Injectable 1 milliGRAM(s) IntraMuscular once PRN agitation   MEDICATIONS  (STANDING):  diVALproex Sprinkle 750 milliGRAM(s) Oral two times a day  furosemide    Tablet 20 milliGRAM(s) Oral daily  melatonin. 5 milliGRAM(s) Oral at bedtime  propranolol 10 milliGRAM(s) Oral two times a day  risperiDONE   Tablet 1.5 milliGRAM(s) Oral three times a day  senna 1 Tablet(s) Oral at bedtime  triamcinolone 0.1% Cream 1 Application(s) Topical two times a day    MEDICATIONS  (PRN):  fluPHENAZine 1 milliGRAM(s) Oral every 6 hours PRN agitation  fluPHENAZine  Injectable 1 milliGRAM(s) IntraMuscular once PRN agitation

## 2022-05-19 NOTE — BH DISCHARGE NOTE NURSING/SOCIAL WORK/PSYCH REHAB - NSPROEXTENSIONSOFSELF_GEN_A_NUR
----- Message from Mita Barrios, DO sent at 1/11/2021  7:05 AM EST -----  Please let pt know that echo shows a slight worsening of his aneurysm, recommend a f/u CT angiogram of the chest to look at this closer. Let me know if questions, thanks!
LMOM to call back at earliest convenience . ** Order for CT w/o in phone 2 tray. Pt  straight medicare A. B. will not need a P. A. please ask which hospital pt wants for test. If pt wants [facility] Please transfer to central scheduling option 1 to schedule.
Pt is asking for more details on the results of echo and what the slight worsening in the aneurysm. Pt is asking for  to call him personally. I did inform pt  is seeing pt's, he verbally understood.
Referral placed at the 
none

## 2022-05-19 NOTE — DISCHARGE NOTE NURSING/CASE MANAGEMENT/SOCIAL WORK - NSDCVIVACCINE_GEN_ALL_CORE_FT
Addended by: DAWNA OLIVEIRA on: 10/8/2019 12:09 PM     Modules accepted: Orders     Tdap; 20-May-2020 12:35; Hussein Haywood (RN); [employer]; [id number] (Exp. Date: 19-Mar-2022); IntraMuscular; Deltoid Left.; 0.5 milliLiter(s); VIS (VIS Published: 09-May-2013, VIS Presented: 20-May-2020);

## 2022-05-19 NOTE — DISCHARGE NOTE PROVIDER - NSFOLLOWUPCLINICS_GEN_ALL_ED_FT
Our  will be calling you to set up your appointment    COVID-19 testing prior to procedure:  · New COVID-19 guidelines require patients scheduled for surgeries and procedures scheduled with anesthesia must be tested for COVID-19 between 36-48 hours prior to your planned EP procedure.   · Please be aware that you must quarantine at home for the time you have been tested until your scheduled surgery/procedure. Your surgery/procedure may be rescheduled based on test results. Our EP office staff will contact you if your test is positive, expect at least a 24 hour turn around time.    Your pre-procedure instructions are as follows:    · Use the Hibiclens soap for three days prior to the procedure. Use one packet daily on 2 days prior, 1 day prior and morning of your procedure. Please see the attached instructional sheet.  COVID-19 testing prior to procedure per protocol  Take only 10 units (half your normal dose) of insulin glargine (LANTUS SOLOSTAR) (long acting insulin) the evening prior to procedure.   Do not take ANY oral diabetic medications [glimepiride (AMARYL)] morning of procedure.   NPO after midnight (nothing to eat or drink after midnight.  May take all other prescription medications with a sip of water the morning of the procedure.   · Do not take any Vitamins or Supplements the morning of the procedure.  · If you have had any recent medication changes, especially to diabetic medications or blood thinners, please call our office to insure the accuracy of your pre-procedure instructions.  · DUE TO COVID-19 you and your accompanying family member will be screened for symptoms PRIOR to being allowed into the hospital. PLEASE CALL OUR OFFICE PRIOR TO YOU PROCEDURE IF YOU ARE EXPERIENCING RESPIRATORY SYMPTOMS (ex/ SHORTNESS OF BREATH OR A DRY COUGH), FEVER GREATER THAN 100 F OR IF YOU HAVE BEEN DIRECTLY EXPOSED TO ANYONE WHO HAS BEEN POSITIVE FOR COVID-19, even if you have already been tested. At this  time 1 family member or support person is allowed to enter the hospital with you at the time of your procedure, but please be aware this is subject to change at any time.   · You are tentatively scheduled to discharged from the hospital the same day. However, please plan for both the possibility of an overnight stay in the hospital and for the possibility of going home the same day by having a ride available both on the day of your procedure and on the following day. After your procedure, Dr Dex Mckee MD will evaluate the necessity of an overnight stay and may make the decision to send you home the same day if appropriate. Due to the sedation you will receive for your procedure, you will not be able to drive for 48 hours. If you live alone, please plan to have someone available to either stay with you the night of your procedure or to be easily reachable by phone.     *A nurse from pre/post cardiac services will be giving you a call prior (typically 1-3 days before) to your procedure to review these instructions with you.     On the day of the procedure:     • Please bring your insurance card(s) and a list of your medications and/or vitamins supplements. Please include the dosage information for each medication and/or vitamin. Please do not bring your actual pill bottles.   • IF you are receiving a device implant: If you have a smart phone, please bring your smart phone with you to the hospital as you may need it to pair with your device. Please make sure you have e-mail access available on your phone and your device's lauren store password. You will need this because you will be asked to download the appropriate lauren and sign up for account access for you device.   • If you have sleep apnea, please bring your CPAP/BIPAP machine with you.     [employer] Stay Connected Service    The phone number for the Stay Connected Service is [phone].    [employer] is launching a new service, Stay Connected, to  [facility]  Gastroenterology  [address] [place] [address]  Phone: [phone]  Fax:   Follow Up Time: 1 week     provide a dedicated team for remote monitor support and troubleshooting.  This service is staffed by a team of monitor experts to deliver fast and direct support to patients and health care professionals.   The service will provide:    · A direct line for fast service  · Monitor troubleshooting support  · An online system to request a call from [employer]  · Sign up at [email or web address] to receive reminders about transmissions and access to education about your monitor and device

## 2022-05-19 NOTE — DISCHARGE NOTE NURSING/CASE MANAGEMENT/SOCIAL WORK - NSDCPEFALRISK_GEN_ALL_CORE
For information on Fall & Injury Prevention, visit: [email or web address] OR  [email or web address] OR  https://www.cdc.gov/steadi/patient.html

## 2022-05-19 NOTE — DISCHARGE NOTE NURSING/CASE MANAGEMENT/SOCIAL WORK - PATIENT PORTAL LINK FT
You can access the FollowMyHealth Patient Portal offered by [facility] by registering at the following website: [email or web address]. By joining [employer]’s FollowMyHealth portal, you will also be able to view your health information using other applications (apps) compatible with our system.

## 2022-05-19 NOTE — DISCHARGE NOTE PROVIDER - CARE PROVIDER_API CALL
Vinayak Pat)  Geriatric Psychiatry; Psychiatry  [address] [address]  Phone: [phone]  Fax: [phone]  Follow Up Time: 2 weeks

## 2022-05-19 NOTE — DISCHARGE NOTE PROVIDER - NSDCCPCAREPLAN_GEN_ALL_CORE_FT
PRINCIPAL DISCHARGE DIAGNOSIS  Diagnosis: SBO (small bowel obstruction)  Assessment and Plan of Treatment: BATHING: Please do not submerge wound underwater. You may shower and/or sponge bathe.  ACTIVITY: No heavy lifting anything more than 10-15lbs or straining. Otherwise, you may return to your usual level of physical activity. If you are taking narcotic pain medication (such as Percocet), do NOT drive a car, operate machinery or make important decisions.  DIET: Low fiber diet  NOTIFY YOUR SURGEON IF: You have any bleeding that does not stop, any pus draining from your wound, any fever (over 100.4 F) or chills, persistent nausea/vomiting with inability to tolerate food or liquids, persistent diarrhea, or if your pain is not controlled on your discharge pain medications.  FOLLOW-UP:  1. Please call to make a follow-up appointment WITH YOUR GASTROENTEROLOGIST 1 WEEK AFTER YOUR DISCHARGE   2. Please follow up with your PRIMARY CARE DOCTOR 1 WEEK AFTER YOUR DISCHARGE

## 2022-05-19 NOTE — BH INPATIENT PSYCHIATRY ASSESSMENT NOTE - CASE SUMMARY
as above; A&P drafted by resident MD with attending; pt mildly tachy- if this persists, consider replacing inderal BID with metroprolol ER 25-50 mg qhs;  DNR/DNI status needs finalizing in chart as MOLST form missing.

## 2022-05-19 NOTE — CHART NOTE - NSCHARTNOTEFT_GEN_A_CORE
Discussed case with surgery team [id number]  Surgically cleared for d/c today. Tolerating PO intake.   Discussed case with [facility] psychiatrist Dr Pat. Discussed surgical clearance. Agrees that patient to come back to [address] [place] [address].    ****PLEASE ENSURE BELOW MEDICATIONS ORDERED AS BELOW****    Risperidone 1.5mg TID PO---- 9AM+ 1PM + 9PM  Propranolol 10mg BID PO---- HOLD FOR SBP<100, HR<50  Depakote 750mg BID PO. Monitor Platelets.    Coordinated with [facility] [id number]     Traci Leon MD  Attending psychiatrist

## 2022-05-19 NOTE — BH PATIENT PROFILE - FALL HARM RISK - HARM RISK INTERVENTIONS
Assistance with ambulation/Assistance OOB with selected safe patient handling equipment/Communicate Risk of Fall with Harm to all staff/Discuss with provider need for PT consult/Monitor gait and stability/Reinforce activity limits and safety measures with patient and family/Tailored Fall Risk Interventions/Visual Cue: Yellow wristband and red socks/Instruct patient to call for assistance before getting out of bed or chair/Non-slip footwear when patient is out of bed/Physically safe environment - no spills, clutter or unnecessary equipment/Purposeful Proactive Rounding/Room/bathroom lighting operational, light cord in reach

## 2022-05-20 PROCEDURE — 99232 SBSQ HOSP IP/OBS MODERATE 35: CPT

## 2022-05-20 PROCEDURE — 99223 1ST HOSP IP/OBS HIGH 75: CPT

## 2022-05-20 RX ORDER — RISPERIDONE 4 MG/1
0.5 TABLET ORAL
Refills: 0 | Status: DISCONTINUED | OUTPATIENT
Start: 2022-05-20 | End: 2022-05-20

## 2022-05-20 RX ORDER — RISPERIDONE 4 MG/1
0.5 TABLET ORAL THREE TIMES A DAY
Refills: 0 | Status: DISCONTINUED | OUTPATIENT
Start: 2022-05-20 | End: 2022-05-20

## 2022-05-20 RX ADMIN — Medication 1 APPLICATION(S): at 12:33

## 2022-05-20 RX ADMIN — POLYETHYLENE GLYCOL 3350 17 GRAM(S): 17 POWDER, FOR SOLUTION ORAL at 09:17

## 2022-05-20 RX ADMIN — DIVALPROEX SODIUM 750 MILLIGRAM(S): 500 TABLET, DELAYED RELEASE ORAL at 21:07

## 2022-05-20 RX ADMIN — Medication 1 APPLICATION(S): at 21:08

## 2022-05-20 RX ADMIN — SENNA PLUS 1 TABLET(S): 8.6 TABLET ORAL at 21:07

## 2022-05-20 RX ADMIN — RISPERIDONE 1.5 MILLIGRAM(S): 4 TABLET ORAL at 12:49

## 2022-05-20 RX ADMIN — Medication 20 MILLIGRAM(S): at 09:16

## 2022-05-20 RX ADMIN — Medication 5 MILLIGRAM(S): at 21:07

## 2022-05-20 RX ADMIN — DIVALPROEX SODIUM 750 MILLIGRAM(S): 500 TABLET, DELAYED RELEASE ORAL at 09:17
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
## 2022-05-20 NOTE — PHYSICAL THERAPY INITIAL EVALUATION ADULT - GENERAL OBSERVATIONS, REHAB EVAL
Patient received sitting in day room in no apparent distress.------------------------------------------

## 2022-05-20 NOTE — DIETITIAN INITIAL EVALUATION ADULT - OTHER INFO
Patient transferred from [facility] where dx: SBO S/P NGT compression and passing flatus, currently tolerating Clear liquids. Patient sitting in day area holding on to 2 Claudia Mouse stuffed animals. Unable to interview d/t dementia. Staff reports patient is consuming and tolerating Clear liquids. Documented allergy to pineapple. No reported height and weight; as per [facility] records, height 60",  11/19/21 162#. Discussed with MD re: implementing Ensure Clear x3/day.

## 2022-05-20 NOTE — PSYCHIATRIC REHAB INITIAL EVALUATION - NSBHPRRECOMMEND_PSY_ALL_CORE
Patient was initially admitted on 11/19/2021 and was transferred to [facility] on 05/18/2022 due to medical issues. Patient is well known to writer from her previous admission. Patient remains too confused to provide any meaningful information. Patient was initially admitted due to symptoms of confusion, agitation, restlessness, anxiety and aggressive behavior. Patient requires assistance with her hygiene and grooming.
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
## 2022-05-20 NOTE — BH INPATIENT PSYCHIATRY PROGRESS NOTE - NSBHFUPINTERVALHXFT_PSY_A_CORE
Patient seen for dementia due to TBI with agitation. Patient returned to unit after SBO Patient on unit has appeared weak, tired or sedated though now more alert. Had large BM with enema. Eating fair. No agitation.
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
## 2022-05-20 NOTE — BH SOCIAL WORK INITIAL PSYCHOSOCIAL EVALUATION - NSBHFINANCE_PSY_ALL_CORE
The pt has Medicare primary, [facility] secondary, and Medicaid [id number] tertiary./Social Security Income (SSI)

## 2022-05-20 NOTE — DIETITIAN INITIAL EVALUATION ADULT - PERTINENT LABORATORY DATA
05-18    143  |  103  |  21  ----------------------------<  135<H>  5.1   |  25  |  0.65    Ca    10.1      18 May 2022 13:07    TPro  8.2  /  Alb  4.3  /  TBili  0.4  /  DBili  x   /  AST  28  /  ALT  14  /  AlkPhos  63  05-18  POCT Blood Glucose.: 102 mg/dL (05-19-22 @ 11:51)  A1C with Estimated Average Glucose Result: 5.4 % (11-04-21 @ 06:58)
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
## 2022-05-20 NOTE — BH SAFETY PLAN - INTERNAL COPING STRATEGY 1
Patient responds to supportive encouragement and structure. Patient enjoys music, singing, art, meditation and exercise groups. Patient can be very pleasant and social.

## 2022-05-20 NOTE — BH SAFETY PLAN - WARNING SIGN 1
Patient remains too disorganized to complete a safety plan. However, patient initially presented with symptoms of confusion, agitation, aggressive behavior, restlessness and anxiety.

## 2022-05-20 NOTE — DIETITIAN INITIAL EVALUATION ADULT - PERTINENT MEDS FT
MEDICATIONS  (STANDING):  diVALproex Sprinkle 750 milliGRAM(s) Oral two times a day  furosemide    Tablet 20 milliGRAM(s) Oral daily  melatonin. 5 milliGRAM(s) Oral at bedtime  polyethylene glycol 3350 17 Gram(s) Oral daily  propranolol 10 milliGRAM(s) Oral two times a day  risperiDONE   Tablet 1.5 milliGRAM(s) Oral three times a day  senna 1 Tablet(s) Oral at bedtime  triamcinolone 0.1% Cream 1 Application(s) Topical two times a day    MEDICATIONS  (PRN):  fluPHENAZine 1 milliGRAM(s) Oral every 6 hours PRN agitation  fluPHENAZine  Injectable 1 milliGRAM(s) IntraMuscular once PRN agitation

## 2022-05-20 NOTE — BH SOCIAL WORK INITIAL PSYCHOSOCIAL EVALUATION - FAMILY HISTORY OF PSYCHIATRIC ILLNESS / SUICIDALITY
76 yo female who presented to the ED today with dyspnea, progressively worsening over the last one week.  Symptoms worsened since yesterday late afternoon. She complained of pressure in the chest for the last 3-4 days but worse today. She also has had diarrhea.    In the ED she Dx with a NSTEMI and heart failure.  Seen by cardio and deemed not a candidate for the cath lab.  ECHO done showing an EF in the 40s.  She was placed on NIV in the ED and eventually had to be intubated.  She was started on pressors in the ED and was too unstable to go for a CT C/A/P.  She was taken to the CCU and had a CVL placed and had a second pressor started.    She became bradycardiac and required transcutaneous pacing.  She then went into asystole and never regained a sustainable rhythm.  She passed with family at bedside.      ME and organ donation notified None known

## 2022-05-20 NOTE — BH SOCIAL WORK INITIAL PSYCHOSOCIAL EVALUATION - NSBHHOUSERETURN_PSY_ALL_CORE
Pt's  hopes the pt will stabilize and be able to be return to [facility] for her long term care./Unknown
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
## 2022-05-21 LAB
ALBUMIN SERPL ELPH-MCNC: 3.4 G/DL — SIGNIFICANT CHANGE UP (ref 3.3–5)
ALP SERPL-CCNC: 56 U/L — SIGNIFICANT CHANGE UP (ref 40–120)
ALT FLD-CCNC: 13 U/L — SIGNIFICANT CHANGE UP (ref 4–33)
ANION GAP SERPL CALC-SCNC: 13 MMOL/L — SIGNIFICANT CHANGE UP (ref 7–14)
AST SERPL-CCNC: 16 U/L — SIGNIFICANT CHANGE UP (ref 4–32)
BASOPHILS # BLD AUTO: 0.02 K/UL — SIGNIFICANT CHANGE UP (ref 0–0.2)
BASOPHILS NFR BLD AUTO: 0.2 % — SIGNIFICANT CHANGE UP (ref 0–2)
BILIRUB SERPL-MCNC: 0.3 MG/DL — SIGNIFICANT CHANGE UP (ref 0.2–1.2)
BUN SERPL-MCNC: 11 MG/DL — SIGNIFICANT CHANGE UP (ref 7–23)
CALCIUM SERPL-MCNC: 9.2 MG/DL — SIGNIFICANT CHANGE UP (ref 8.4–10.5)
CHLORIDE SERPL-SCNC: 103 MMOL/L — SIGNIFICANT CHANGE UP (ref 98–107)
CO2 SERPL-SCNC: 25 MMOL/L — SIGNIFICANT CHANGE UP (ref 22–31)
CREAT SERPL-MCNC: 0.53 MG/DL — SIGNIFICANT CHANGE UP (ref 0.5–1.3)
EGFR: 101 ML/MIN/1.73M2 — SIGNIFICANT CHANGE UP
EOSINOPHIL # BLD AUTO: 0.07 K/UL — SIGNIFICANT CHANGE UP (ref 0–0.5)
EOSINOPHIL NFR BLD AUTO: 0.8 % — SIGNIFICANT CHANGE UP (ref 0–6)
GLUCOSE SERPL-MCNC: 102 MG/DL — HIGH (ref 70–99)
HCT VFR BLD CALC: 39.1 % — SIGNIFICANT CHANGE UP (ref 34.5–45)
HGB BLD-MCNC: 12.5 G/DL — SIGNIFICANT CHANGE UP (ref 11.5–15.5)
IANC: 5.75 K/UL — SIGNIFICANT CHANGE UP (ref 1.8–7.4)
IMM GRANULOCYTES NFR BLD AUTO: 0.5 % — SIGNIFICANT CHANGE UP (ref 0–1.5)
LYMPHOCYTES # BLD AUTO: 1.37 K/UL — SIGNIFICANT CHANGE UP (ref 1–3.3)
LYMPHOCYTES # BLD AUTO: 16.2 % — SIGNIFICANT CHANGE UP (ref 13–44)
MCHC RBC-ENTMCNC: 29.9 PG — SIGNIFICANT CHANGE UP (ref 27–34)
MCHC RBC-ENTMCNC: 32 GM/DL — SIGNIFICANT CHANGE UP (ref 32–36)
MCV RBC AUTO: 93.5 FL — SIGNIFICANT CHANGE UP (ref 80–100)
MONOCYTES # BLD AUTO: 1.19 K/UL — HIGH (ref 0–0.9)
MONOCYTES NFR BLD AUTO: 14.1 % — HIGH (ref 2–14)
NEUTROPHILS # BLD AUTO: 5.75 K/UL — SIGNIFICANT CHANGE UP (ref 1.8–7.4)
NEUTROPHILS NFR BLD AUTO: 68.2 % — SIGNIFICANT CHANGE UP (ref 43–77)
NRBC # BLD: 0 /100 WBCS — SIGNIFICANT CHANGE UP
NRBC # FLD: 0 K/UL — SIGNIFICANT CHANGE UP
PLATELET # BLD AUTO: 147 K/UL — LOW (ref 150–400)
POTASSIUM SERPL-MCNC: 3.5 MMOL/L — SIGNIFICANT CHANGE UP (ref 3.5–5.3)
POTASSIUM SERPL-SCNC: 3.5 MMOL/L — SIGNIFICANT CHANGE UP (ref 3.5–5.3)
PROT SERPL-MCNC: 6.9 G/DL — SIGNIFICANT CHANGE UP (ref 6–8.3)
RBC # BLD: 4.18 M/UL — SIGNIFICANT CHANGE UP (ref 3.8–5.2)
RBC # FLD: 14.7 % — HIGH (ref 10.3–14.5)
SODIUM SERPL-SCNC: 141 MMOL/L — SIGNIFICANT CHANGE UP (ref 135–145)
WBC # BLD: 8.44 K/UL — SIGNIFICANT CHANGE UP (ref 3.8–10.5)
WBC # FLD AUTO: 8.44 K/UL — SIGNIFICANT CHANGE UP (ref 3.8–10.5)

## 2022-05-21 PROCEDURE — 99231 SBSQ HOSP IP/OBS SF/LOW 25: CPT

## 2022-05-21 RX ADMIN — DIVALPROEX SODIUM 750 MILLIGRAM(S): 500 TABLET, DELAYED RELEASE ORAL at 08:39

## 2022-05-21 RX ADMIN — DIVALPROEX SODIUM 750 MILLIGRAM(S): 500 TABLET, DELAYED RELEASE ORAL at 21:15

## 2022-05-21 RX ADMIN — Medication 5 MILLIGRAM(S): at 21:15

## 2022-05-21 RX ADMIN — Medication 1 APPLICATION(S): at 21:15

## 2022-05-21 RX ADMIN — SENNA PLUS 1 TABLET(S): 8.6 TABLET ORAL at 21:15

## 2022-05-21 RX ADMIN — POLYETHYLENE GLYCOL 3350 17 GRAM(S): 17 POWDER, FOR SOLUTION ORAL at 08:39

## 2022-05-22 PROCEDURE — 99232 SBSQ HOSP IP/OBS MODERATE 35: CPT

## 2022-05-22 PROCEDURE — 99231 SBSQ HOSP IP/OBS SF/LOW 25: CPT

## 2022-05-22 RX ORDER — LACTULOSE 10 G/15ML
20 SOLUTION ORAL DAILY
Refills: 0 | Status: DISCONTINUED | OUTPATIENT
Start: 2022-05-22 | End: 2022-05-23

## 2022-05-22 RX ADMIN — DIVALPROEX SODIUM 750 MILLIGRAM(S): 500 TABLET, DELAYED RELEASE ORAL at 10:03

## 2022-05-22 RX ADMIN — Medication 1 APPLICATION(S): at 10:03

## 2022-05-22 RX ADMIN — SENNA PLUS 1 TABLET(S): 8.6 TABLET ORAL at 22:04

## 2022-05-22 RX ADMIN — Medication 5 MILLIGRAM(S): at 22:04

## 2022-05-22 RX ADMIN — DIVALPROEX SODIUM 750 MILLIGRAM(S): 500 TABLET, DELAYED RELEASE ORAL at 22:03

## 2022-05-22 RX ADMIN — Medication 1 APPLICATION(S): at 22:03

## 2022-05-22 RX ADMIN — LACTULOSE 20 GRAM(S): 10 SOLUTION ORAL at 10:02
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
## 2022-05-23 PROCEDURE — 99232 SBSQ HOSP IP/OBS MODERATE 35: CPT

## 2022-05-23 RX ORDER — SENNA PLUS 8.6 MG/1
2 TABLET ORAL AT BEDTIME
Refills: 0 | Status: DISCONTINUED | OUTPATIENT
Start: 2022-05-23 | End: 2022-07-07

## 2022-05-23 RX ORDER — POLYETHYLENE GLYCOL 3350 17 G/17G
17 POWDER, FOR SOLUTION ORAL DAILY
Refills: 0 | Status: DISCONTINUED | OUTPATIENT
Start: 2022-05-23 | End: 2022-07-07

## 2022-05-23 RX ORDER — RISPERIDONE 4 MG/1
0.5 TABLET ORAL
Refills: 0 | Status: DISCONTINUED | OUTPATIENT
Start: 2022-05-23 | End: 2022-05-24

## 2022-05-23 RX ADMIN — DIVALPROEX SODIUM 750 MILLIGRAM(S): 500 TABLET, DELAYED RELEASE ORAL at 20:29

## 2022-05-23 RX ADMIN — SENNA PLUS 2 TABLET(S): 8.6 TABLET ORAL at 20:29

## 2022-05-23 RX ADMIN — Medication 1 APPLICATION(S): at 20:31

## 2022-05-23 RX ADMIN — Medication 5 MILLIGRAM(S): at 20:29

## 2022-05-23 RX ADMIN — RISPERIDONE 0.5 MILLIGRAM(S): 4 TABLET ORAL at 20:29

## 2022-05-23 NOTE — BH INPATIENT PSYCHIATRY PROGRESS NOTE - NSBHFUPINTERVALHXFT_PSY_A_CORE
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 No overnight events. Patient having BM's, eating well, ambulation is stronger, no longer appears weak, fatigue. VSS.

## 2022-05-23 NOTE — BH INPATIENT PSYCHIATRY PROGRESS NOTE - NSBHASSESSSUMMFT_PSY_ALL_CORE
5/23 Patient recovering from recent SBO she has not shown return of agitation of risperidone.Will restart  back and substantially reduced dose as is unlikely she will cont to do well off psych meds. [facility] Dr. [facility]pp [facility] will recheck labs, advance diet, change laxative
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
## 2022-05-24 LAB
ANION GAP SERPL CALC-SCNC: 12 MMOL/L — SIGNIFICANT CHANGE UP (ref 7–14)
BUN SERPL-MCNC: 11 MG/DL — SIGNIFICANT CHANGE UP (ref 7–23)
CALCIUM SERPL-MCNC: 9.7 MG/DL — SIGNIFICANT CHANGE UP (ref 8.4–10.5)
CHLORIDE SERPL-SCNC: 103 MMOL/L — SIGNIFICANT CHANGE UP (ref 98–107)
CO2 SERPL-SCNC: 27 MMOL/L — SIGNIFICANT CHANGE UP (ref 22–31)
CREAT SERPL-MCNC: 0.61 MG/DL — SIGNIFICANT CHANGE UP (ref 0.5–1.3)
EGFR: 98 ML/MIN/1.73M2 — SIGNIFICANT CHANGE UP
GLUCOSE SERPL-MCNC: 95 MG/DL — SIGNIFICANT CHANGE UP (ref 70–99)
MAGNESIUM SERPL-MCNC: 2.2 MG/DL — SIGNIFICANT CHANGE UP (ref 1.6–2.6)
PHOSPHATE SERPL-MCNC: 3.5 MG/DL — SIGNIFICANT CHANGE UP (ref 2.5–4.5)
POTASSIUM SERPL-MCNC: 3.5 MMOL/L — SIGNIFICANT CHANGE UP (ref 3.5–5.3)
POTASSIUM SERPL-SCNC: 3.5 MMOL/L — SIGNIFICANT CHANGE UP (ref 3.5–5.3)
SODIUM SERPL-SCNC: 142 MMOL/L — SIGNIFICANT CHANGE UP (ref 135–145)

## 2022-05-24 PROCEDURE — 99232 SBSQ HOSP IP/OBS MODERATE 35: CPT

## 2022-05-24 RX ORDER — RISPERIDONE 4 MG/1
0.5 TABLET ORAL
Refills: 0 | Status: DISCONTINUED | OUTPATIENT
Start: 2022-05-24 | End: 2022-05-31

## 2022-05-24 RX ADMIN — RISPERIDONE 0.5 MILLIGRAM(S): 4 TABLET ORAL at 09:43

## 2022-05-24 RX ADMIN — Medication 5 MILLIGRAM(S): at 21:36

## 2022-05-24 RX ADMIN — POLYETHYLENE GLYCOL 3350 17 GRAM(S): 17 POWDER, FOR SOLUTION ORAL at 09:43

## 2022-05-24 RX ADMIN — Medication 1 APPLICATION(S): at 13:41

## 2022-05-24 RX ADMIN — DIVALPROEX SODIUM 750 MILLIGRAM(S): 500 TABLET, DELAYED RELEASE ORAL at 21:35

## 2022-05-24 RX ADMIN — Medication 1 APPLICATION(S): at 21:44

## 2022-05-24 RX ADMIN — RISPERIDONE 0.5 MILLIGRAM(S): 4 TABLET ORAL at 21:36

## 2022-05-24 RX ADMIN — DIVALPROEX SODIUM 750 MILLIGRAM(S): 500 TABLET, DELAYED RELEASE ORAL at 09:43

## 2022-05-24 RX ADMIN — SENNA PLUS 2 TABLET(S): 8.6 TABLET ORAL at 21:35

## 2022-05-24 NOTE — BH INPATIENT PSYCHIATRY PROGRESS NOTE - NSBHASSESSSUMMFT_PSY_ALL_CORE
5/23 Patient recovering from recent SBO she has not shown return of agitation of risperidone.Will restart  back and substantially reduced dose as is unlikely she will cont to do well off psych meds. Reviewed with Dr. Tripp [facility] medicine will recheck labs, advance diet, change laxative  5/24 Tolerating restart of risperdal, monitor for se, check labs, re-eval for need for diuretic

## 2022-05-24 NOTE — BH INPATIENT PSYCHIATRY PROGRESS NOTE - NSBHFUPINTERVALHXFT_PSY_A_CORE
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 No overnight events. Patient having BM's, eating well, ambulation is stronger, no longer appears weak, fatigue. VSS no low BP

## 2022-05-25 PROCEDURE — 99232 SBSQ HOSP IP/OBS MODERATE 35: CPT

## 2022-05-25 RX ADMIN — Medication 1 APPLICATION(S): at 09:31

## 2022-05-25 RX ADMIN — SENNA PLUS 2 TABLET(S): 8.6 TABLET ORAL at 20:10

## 2022-05-25 RX ADMIN — RISPERIDONE 0.5 MILLIGRAM(S): 4 TABLET ORAL at 09:31

## 2022-05-25 RX ADMIN — Medication 5 MILLIGRAM(S): at 20:10

## 2022-05-25 RX ADMIN — DIVALPROEX SODIUM 750 MILLIGRAM(S): 500 TABLET, DELAYED RELEASE ORAL at 09:31

## 2022-05-25 RX ADMIN — DIVALPROEX SODIUM 750 MILLIGRAM(S): 500 TABLET, DELAYED RELEASE ORAL at 20:10

## 2022-05-25 RX ADMIN — RISPERIDONE 0.5 MILLIGRAM(S): 4 TABLET ORAL at 20:10

## 2022-05-25 RX ADMIN — POLYETHYLENE GLYCOL 3350 17 GRAM(S): 17 POWDER, FOR SOLUTION ORAL at 09:31
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
## 2022-05-25 NOTE — BH INPATIENT PSYCHIATRY PROGRESS NOTE - NSTXCONFGOALOTHER_PSY_ALL_CORE
Patient will tolerate two rehab groups daily and will exhibit increased frustration tolerance within seven days.
Patient will tolerate two rehab groups daily and will exhibit increased frustration tolerance within seven days.
Patient will exhibit increased tolerance to structured activities and display decreased irritability within seven days.
Patient will tolerate two rehab groups daily and will exhibit increased frustration tolerance within seven days.

## 2022-05-25 NOTE — BH INPATIENT PSYCHIATRY PROGRESS NOTE - NSBHFUPINTERVALHXFT_PSY_A_CORE
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 No overnight events. Patient having BM's, last one last night.  She has been eating well, ambulation is stronger, no longer appears weak, fatigued.  VSS.  The patient remains disorganized, but pleasant.  Some yelling out at times.  Behavior well controlled.

## 2022-05-26 PROCEDURE — 99231 SBSQ HOSP IP/OBS SF/LOW 25: CPT

## 2022-05-26 RX ADMIN — DIVALPROEX SODIUM 750 MILLIGRAM(S): 500 TABLET, DELAYED RELEASE ORAL at 22:02

## 2022-05-26 RX ADMIN — POLYETHYLENE GLYCOL 3350 17 GRAM(S): 17 POWDER, FOR SOLUTION ORAL at 09:38

## 2022-05-26 RX ADMIN — RISPERIDONE 0.5 MILLIGRAM(S): 4 TABLET ORAL at 22:02

## 2022-05-26 RX ADMIN — DIVALPROEX SODIUM 750 MILLIGRAM(S): 500 TABLET, DELAYED RELEASE ORAL at 09:37

## 2022-05-26 RX ADMIN — Medication 5 MILLIGRAM(S): at 22:41

## 2022-05-26 RX ADMIN — Medication 1 APPLICATION(S): at 09:36

## 2022-05-26 RX ADMIN — Medication 1 APPLICATION(S): at 22:03

## 2022-05-26 RX ADMIN — RISPERIDONE 0.5 MILLIGRAM(S): 4 TABLET ORAL at 09:37

## 2022-05-26 RX ADMIN — SENNA PLUS 2 TABLET(S): 8.6 TABLET ORAL at 22:02

## 2022-05-26 NOTE — BH INPATIENT PSYCHIATRY PROGRESS NOTE - NSBHFUPINTERVALHXFT_PSY_A_CORE
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 No overnight events. The patient is little changed.  She is pleasant this morning, eating breakfast.  She has been eating well, ambulation is stronger, no longer appears weak, fatigued.  VSS.  The patient remains disorganized, tangential and loose.  Some yelling out at times, but no agitation.  Behavior well controlled.

## 2022-05-26 NOTE — BH TREATMENT PLAN - NSTXCONFINTERPR_PSY_ALL_CORE
Rehab activites will be utilized daily to increase patient's tolerance to structured activities and decreased irritability within seven days.

## 2022-05-26 NOTE — BH INPATIENT PSYCHIATRY PROGRESS NOTE - NSBHASSESSSUMMFT_PSY_ALL_CORE
5/23 Patient recovering from recent SBO she has not shown return of agitation of risperidone.Will restart  back and substantially reduced dose as is unlikely she will cont to do well off psych meds. Reviewed with Dr. Tripp [facility] medicine will recheck labs, advance diet, change laxative  5/24 Tolerating restart of risperdal, monitor for se, check labs, re-eval for need for diuretic  5/25: Patient having BMs.  Remains relatively stable, pleasant, disorganized.  Tolerating medications well.  5/26: The patient continues to do well on the unit.  She is disorganized, but pleasant, behavior well controlled.  Tolerating medications well.

## 2022-05-27 PROCEDURE — 99231 SBSQ HOSP IP/OBS SF/LOW 25: CPT

## 2022-05-27 RX ORDER — PSYLLIUM SEED (WITH DEXTROSE)
1 POWDER (GRAM) ORAL DAILY
Refills: 0 | Status: DISCONTINUED | OUTPATIENT
Start: 2022-05-27 | End: 2022-07-07

## 2022-05-27 RX ADMIN — POLYETHYLENE GLYCOL 3350 17 GRAM(S): 17 POWDER, FOR SOLUTION ORAL at 09:03

## 2022-05-27 RX ADMIN — RISPERIDONE 0.5 MILLIGRAM(S): 4 TABLET ORAL at 21:12

## 2022-05-27 RX ADMIN — Medication 5 MILLIGRAM(S): at 21:14

## 2022-05-27 RX ADMIN — DIVALPROEX SODIUM 750 MILLIGRAM(S): 500 TABLET, DELAYED RELEASE ORAL at 21:12

## 2022-05-27 RX ADMIN — RISPERIDONE 0.5 MILLIGRAM(S): 4 TABLET ORAL at 09:02

## 2022-05-27 RX ADMIN — DIVALPROEX SODIUM 750 MILLIGRAM(S): 500 TABLET, DELAYED RELEASE ORAL at 09:02

## 2022-05-27 RX ADMIN — SENNA PLUS 2 TABLET(S): 8.6 TABLET ORAL at 21:12

## 2022-05-27 RX ADMIN — Medication 1 APPLICATION(S): at 09:10

## 2022-05-27 NOTE — CHART NOTE - NSCHARTNOTEFT_GEN_A_CORE
Informed patient has not had a BM since Tuesday   Can give 1 Bisacodyl suppository now   Add daily Metamucil for increased fiber   Bisacodyl PO PRN no BM 1 day Informed patient has not had a BM since Tuesday   Can give 1 Bisacodyl suppository now   Add daily Metamucil for increased fiber   Bisacodyl PO PRN no BM 1 day    Saw patient in dining room- patient denies any abdominal pain, N/V, has been tolerating PO intake.   Cont as above   Cont to hold Lasix, edema is stable and patient still constipated.

## 2022-05-27 NOTE — BH INPATIENT PSYCHIATRY PROGRESS NOTE - NSBHFUPINTERVALHXFT_PSY_A_CORE
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 No overnight events. The patient is little changed.  She remains pleasant on approach and denies any problems.  She has been eating and sleeping well, ambulation is stronger, no longer appears weak, fatigued.  VSS.  The patient remains disorganized, tangential and loose.  Some yelling out at times, but no agitation.  Behavior well controlled.  Staff report no BM since Tuesday.  Good effect from dulcolax suppository on Tuesday, will give again today.

## 2022-05-27 NOTE — BH INPATIENT PSYCHIATRY PROGRESS NOTE - NSBHASSESSSUMMFT_PSY_ALL_CORE
5/23 Patient recovering from recent SBO she has not shown return of agitation of risperidone.Will restart  back and substantially reduced dose as is unlikely she will cont to do well off psych meds. Reviewed with Dr. Tripp [facility] medicine will recheck labs, advance diet, change laxative  5/24 Tolerating restart of risperdal, monitor for se, check labs, re-eval for need for diuretic  5/25: Patient having BMs.  Remains relatively stable, pleasant, disorganized.  Tolerating medications well.  5/26: The patient continues to do well on the unit.  She is disorganized, but pleasant, behavior well controlled.  Tolerating medications well.  5/27: The patient continues to do well on the unit.  She is pleasant but disorganized.  Will give dulcolax suppository again today, no BM since Tuesday.  Tolerating medications well.

## 2022-05-28 RX ADMIN — DIVALPROEX SODIUM 750 MILLIGRAM(S): 500 TABLET, DELAYED RELEASE ORAL at 09:00

## 2022-05-28 RX ADMIN — SENNA PLUS 2 TABLET(S): 8.6 TABLET ORAL at 21:45

## 2022-05-28 RX ADMIN — DIVALPROEX SODIUM 750 MILLIGRAM(S): 500 TABLET, DELAYED RELEASE ORAL at 21:45

## 2022-05-28 RX ADMIN — Medication 1 APPLICATION(S): at 21:52

## 2022-05-28 RX ADMIN — POLYETHYLENE GLYCOL 3350 17 GRAM(S): 17 POWDER, FOR SOLUTION ORAL at 09:00

## 2022-05-28 RX ADMIN — Medication 10 MILLIGRAM(S): at 06:31

## 2022-05-28 RX ADMIN — Medication 5 MILLIGRAM(S): at 21:45

## 2022-05-28 RX ADMIN — Medication 1 PACKET(S): at 09:00

## 2022-05-28 RX ADMIN — RISPERIDONE 0.5 MILLIGRAM(S): 4 TABLET ORAL at 21:45

## 2022-05-28 RX ADMIN — RISPERIDONE 0.5 MILLIGRAM(S): 4 TABLET ORAL at 08:59

## 2022-05-29 RX ADMIN — RISPERIDONE 0.5 MILLIGRAM(S): 4 TABLET ORAL at 09:49

## 2022-05-29 RX ADMIN — POLYETHYLENE GLYCOL 3350 17 GRAM(S): 17 POWDER, FOR SOLUTION ORAL at 09:49

## 2022-05-29 RX ADMIN — RISPERIDONE 0.5 MILLIGRAM(S): 4 TABLET ORAL at 21:19

## 2022-05-29 RX ADMIN — Medication 1 APPLICATION(S): at 10:29

## 2022-05-29 RX ADMIN — DIVALPROEX SODIUM 750 MILLIGRAM(S): 500 TABLET, DELAYED RELEASE ORAL at 09:48

## 2022-05-29 RX ADMIN — SENNA PLUS 2 TABLET(S): 8.6 TABLET ORAL at 21:19

## 2022-05-29 RX ADMIN — Medication 1 PACKET(S): at 09:49

## 2022-05-29 RX ADMIN — DIVALPROEX SODIUM 750 MILLIGRAM(S): 500 TABLET, DELAYED RELEASE ORAL at 21:16

## 2022-05-29 RX ADMIN — Medication 5 MILLIGRAM(S): at 21:18

## 2022-05-30 RX ORDER — FLUPHENAZINE HYDROCHLORIDE 1 MG/1
1 TABLET, FILM COATED ORAL ONCE
Refills: 0 | Status: COMPLETED | OUTPATIENT
Start: 2022-05-30 | End: 2022-05-30

## 2022-05-30 RX ADMIN — RISPERIDONE 0.5 MILLIGRAM(S): 4 TABLET ORAL at 09:42

## 2022-05-30 RX ADMIN — RISPERIDONE 0.5 MILLIGRAM(S): 4 TABLET ORAL at 20:36

## 2022-05-30 RX ADMIN — FLUPHENAZINE HYDROCHLORIDE 1 MILLIGRAM(S): 1 TABLET, FILM COATED ORAL at 11:46

## 2022-05-30 RX ADMIN — POLYETHYLENE GLYCOL 3350 17 GRAM(S): 17 POWDER, FOR SOLUTION ORAL at 09:43

## 2022-05-30 RX ADMIN — DIVALPROEX SODIUM 750 MILLIGRAM(S): 500 TABLET, DELAYED RELEASE ORAL at 09:43

## 2022-05-30 RX ADMIN — DIVALPROEX SODIUM 750 MILLIGRAM(S): 500 TABLET, DELAYED RELEASE ORAL at 20:36

## 2022-05-30 RX ADMIN — SENNA PLUS 2 TABLET(S): 8.6 TABLET ORAL at 20:37

## 2022-05-30 RX ADMIN — Medication 5 MILLIGRAM(S): at 20:36

## 2022-05-30 RX ADMIN — Medication 1 PACKET(S): at 09:43

## 2022-05-30 RX ADMIN — Medication 1 APPLICATION(S): at 21:08

## 2022-05-31 RX ORDER — RISPERIDONE 4 MG/1
0.5 TABLET ORAL THREE TIMES A DAY
Refills: 0 | Status: DISCONTINUED | OUTPATIENT
Start: 2022-05-31 | End: 2022-06-15

## 2022-05-31 RX ADMIN — DIVALPROEX SODIUM 750 MILLIGRAM(S): 500 TABLET, DELAYED RELEASE ORAL at 09:31

## 2022-05-31 RX ADMIN — Medication 1 PACKET(S): at 09:31

## 2022-05-31 RX ADMIN — DIVALPROEX SODIUM 750 MILLIGRAM(S): 500 TABLET, DELAYED RELEASE ORAL at 20:55

## 2022-05-31 RX ADMIN — SENNA PLUS 2 TABLET(S): 8.6 TABLET ORAL at 20:56

## 2022-05-31 RX ADMIN — RISPERIDONE 0.5 MILLIGRAM(S): 4 TABLET ORAL at 09:31

## 2022-05-31 RX ADMIN — Medication 1 APPLICATION(S): at 21:00

## 2022-05-31 RX ADMIN — Medication 5 MILLIGRAM(S): at 22:12

## 2022-05-31 RX ADMIN — POLYETHYLENE GLYCOL 3350 17 GRAM(S): 17 POWDER, FOR SOLUTION ORAL at 09:30

## 2022-05-31 RX ADMIN — RISPERIDONE 0.5 MILLIGRAM(S): 4 TABLET ORAL at 20:55
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
## 2022-06-01 PROCEDURE — 99231 SBSQ HOSP IP/OBS SF/LOW 25: CPT

## 2022-06-01 RX ADMIN — Medication 1 APPLICATION(S): at 20:09

## 2022-06-01 RX ADMIN — SENNA PLUS 2 TABLET(S): 8.6 TABLET ORAL at 20:08

## 2022-06-01 RX ADMIN — RISPERIDONE 0.5 MILLIGRAM(S): 4 TABLET ORAL at 20:08

## 2022-06-01 RX ADMIN — RISPERIDONE 0.5 MILLIGRAM(S): 4 TABLET ORAL at 08:43

## 2022-06-01 RX ADMIN — Medication 1 PACKET(S): at 08:43

## 2022-06-01 RX ADMIN — RISPERIDONE 0.5 MILLIGRAM(S): 4 TABLET ORAL at 16:10

## 2022-06-01 RX ADMIN — DIVALPROEX SODIUM 750 MILLIGRAM(S): 500 TABLET, DELAYED RELEASE ORAL at 20:08

## 2022-06-01 RX ADMIN — DIVALPROEX SODIUM 750 MILLIGRAM(S): 500 TABLET, DELAYED RELEASE ORAL at 08:43

## 2022-06-01 RX ADMIN — POLYETHYLENE GLYCOL 3350 17 GRAM(S): 17 POWDER, FOR SOLUTION ORAL at 08:43

## 2022-06-01 RX ADMIN — Medication 5 MILLIGRAM(S): at 20:08

## 2022-06-01 NOTE — BH INPATIENT PSYCHIATRY PROGRESS NOTE - NSBHFUPINTERVALHXFT_PSY_A_CORE
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 No overnight events.  Not aggressive towards peers. Eating sleeping well. VSS.  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 No overnight events.  Not aggressive towards peers. Eating sleeping well. VSS. Had BM
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
## 2022-06-02 RX ADMIN — Medication 1 APPLICATION(S): at 08:46

## 2022-06-02 RX ADMIN — SENNA PLUS 2 TABLET(S): 8.6 TABLET ORAL at 21:38

## 2022-06-02 RX ADMIN — RISPERIDONE 0.5 MILLIGRAM(S): 4 TABLET ORAL at 21:38

## 2022-06-02 RX ADMIN — DIVALPROEX SODIUM 750 MILLIGRAM(S): 500 TABLET, DELAYED RELEASE ORAL at 08:43

## 2022-06-02 RX ADMIN — RISPERIDONE 0.5 MILLIGRAM(S): 4 TABLET ORAL at 08:44

## 2022-06-02 RX ADMIN — DIVALPROEX SODIUM 750 MILLIGRAM(S): 500 TABLET, DELAYED RELEASE ORAL at 21:39

## 2022-06-02 RX ADMIN — POLYETHYLENE GLYCOL 3350 17 GRAM(S): 17 POWDER, FOR SOLUTION ORAL at 08:44

## 2022-06-02 RX ADMIN — Medication 1 PACKET(S): at 08:44

## 2022-06-02 RX ADMIN — Medication 1 APPLICATION(S): at 21:39

## 2022-06-02 RX ADMIN — Medication 5 MILLIGRAM(S): at 21:38

## 2022-06-02 RX ADMIN — RISPERIDONE 0.5 MILLIGRAM(S): 4 TABLET ORAL at 12:17
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
## 2022-06-02 NOTE — PROGRESS NOTE ADULT - ASSESSMENT
· Assessment	  assessment/plan:    Onychomycosis secondary to dermatophytes  Tylomas bilateral foot  Diabetes mellitus    Debridement of mycotic nails 10 with Betadine applied  Aseptic excisional debridement with 15 blade of tyloma bilateral bacitracin dispersion expense.  Recommend continue routine diabetic foot care as an outpatient  Thank you for consultation

## 2022-06-02 NOTE — BH INPATIENT PSYCHIATRY PROGRESS NOTE - NSBHFUPINTERVALHXFT_PSY_A_CORE
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 No overnight events.  Not aggressive towards peers. Eating sleeping well. VSS. Perhaps somewhat more vocal than recently

## 2022-06-03 PROCEDURE — 99232 SBSQ HOSP IP/OBS MODERATE 35: CPT

## 2022-06-03 RX ADMIN — DIVALPROEX SODIUM 750 MILLIGRAM(S): 500 TABLET, DELAYED RELEASE ORAL at 09:52

## 2022-06-03 RX ADMIN — Medication 1 PACKET(S): at 09:51

## 2022-06-03 RX ADMIN — RISPERIDONE 0.5 MILLIGRAM(S): 4 TABLET ORAL at 21:57

## 2022-06-03 RX ADMIN — Medication 5 MILLIGRAM(S): at 21:58

## 2022-06-03 RX ADMIN — DIVALPROEX SODIUM 750 MILLIGRAM(S): 500 TABLET, DELAYED RELEASE ORAL at 21:58

## 2022-06-03 RX ADMIN — RISPERIDONE 0.5 MILLIGRAM(S): 4 TABLET ORAL at 09:51

## 2022-06-03 RX ADMIN — Medication 1 APPLICATION(S): at 21:59

## 2022-06-03 RX ADMIN — Medication 1 APPLICATION(S): at 09:51

## 2022-06-03 RX ADMIN — RISPERIDONE 0.5 MILLIGRAM(S): 4 TABLET ORAL at 12:54

## 2022-06-03 RX ADMIN — POLYETHYLENE GLYCOL 3350 17 GRAM(S): 17 POWDER, FOR SOLUTION ORAL at 09:51

## 2022-06-03 RX ADMIN — SENNA PLUS 2 TABLET(S): 8.6 TABLET ORAL at 21:57

## 2022-06-03 NOTE — BH INPATIENT PSYCHIATRY PROGRESS NOTE - NSBHFUPINTERVALHXFT_PSY_A_CORE
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 No overnight events.  Not aggressive towards peers. Eating sleeping well. VSS. Perhaps somewhat more vocal than recently but no aggressive behavior

## 2022-06-03 NOTE — CHART NOTE - NSCHARTNOTEFT_GEN_A_CORE
Patient examined in dayroom with nursing. Per discussion with RN - edema stable. Will hold off re-starting lasix given stable exam and pt at risk for overdiuresis.
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
## 2022-06-04 PROCEDURE — 99232 SBSQ HOSP IP/OBS MODERATE 35: CPT

## 2022-06-04 RX ADMIN — SENNA PLUS 2 TABLET(S): 8.6 TABLET ORAL at 20:05

## 2022-06-04 RX ADMIN — POLYETHYLENE GLYCOL 3350 17 GRAM(S): 17 POWDER, FOR SOLUTION ORAL at 09:22

## 2022-06-04 RX ADMIN — DIVALPROEX SODIUM 750 MILLIGRAM(S): 500 TABLET, DELAYED RELEASE ORAL at 20:05

## 2022-06-04 RX ADMIN — DIVALPROEX SODIUM 750 MILLIGRAM(S): 500 TABLET, DELAYED RELEASE ORAL at 09:21

## 2022-06-04 RX ADMIN — RISPERIDONE 0.5 MILLIGRAM(S): 4 TABLET ORAL at 20:06

## 2022-06-04 RX ADMIN — Medication 1 APPLICATION(S): at 20:07

## 2022-06-04 RX ADMIN — Medication 1 APPLICATION(S): at 09:22

## 2022-06-04 RX ADMIN — RISPERIDONE 0.5 MILLIGRAM(S): 4 TABLET ORAL at 12:28

## 2022-06-04 RX ADMIN — Medication 5 MILLIGRAM(S): at 20:06

## 2022-06-04 RX ADMIN — RISPERIDONE 0.5 MILLIGRAM(S): 4 TABLET ORAL at 09:21

## 2022-06-04 RX ADMIN — Medication 1 PACKET(S): at 09:21

## 2022-06-04 NOTE — BH INPATIENT PSYCHIATRY PROGRESS NOTE - NSBHFUPINTERVALHXFT_PSY_A_CORE
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 No overnight events.  Not aggressive towards peers. Eating sleeping well. VSS. Perhaps somewhat more vocal than recently but no aggressive behavior    6/4: No significant overnight events, no PRNs needed. Pleasant on encounter this morning, denies any complaints. No aggressive with peers. Eating and sleeping well.
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
## 2022-06-05 RX ADMIN — DIVALPROEX SODIUM 750 MILLIGRAM(S): 500 TABLET, DELAYED RELEASE ORAL at 09:12

## 2022-06-05 RX ADMIN — DIVALPROEX SODIUM 750 MILLIGRAM(S): 500 TABLET, DELAYED RELEASE ORAL at 21:26

## 2022-06-05 RX ADMIN — Medication 1 PACKET(S): at 09:11

## 2022-06-05 RX ADMIN — RISPERIDONE 0.5 MILLIGRAM(S): 4 TABLET ORAL at 21:27

## 2022-06-05 RX ADMIN — Medication 1 APPLICATION(S): at 21:28

## 2022-06-05 RX ADMIN — RISPERIDONE 0.5 MILLIGRAM(S): 4 TABLET ORAL at 09:12

## 2022-06-05 RX ADMIN — Medication 1 APPLICATION(S): at 09:13

## 2022-06-05 RX ADMIN — Medication 5 MILLIGRAM(S): at 21:27

## 2022-06-05 RX ADMIN — POLYETHYLENE GLYCOL 3350 17 GRAM(S): 17 POWDER, FOR SOLUTION ORAL at 09:11

## 2022-06-05 RX ADMIN — RISPERIDONE 0.5 MILLIGRAM(S): 4 TABLET ORAL at 12:47

## 2022-06-05 RX ADMIN — SENNA PLUS 2 TABLET(S): 8.6 TABLET ORAL at 21:26

## 2022-06-06 PROCEDURE — 99231 SBSQ HOSP IP/OBS SF/LOW 25: CPT

## 2022-06-06 RX ADMIN — POLYETHYLENE GLYCOL 3350 17 GRAM(S): 17 POWDER, FOR SOLUTION ORAL at 08:41

## 2022-06-06 RX ADMIN — DIVALPROEX SODIUM 750 MILLIGRAM(S): 500 TABLET, DELAYED RELEASE ORAL at 08:41

## 2022-06-06 RX ADMIN — RISPERIDONE 0.5 MILLIGRAM(S): 4 TABLET ORAL at 08:41

## 2022-06-06 RX ADMIN — RISPERIDONE 0.5 MILLIGRAM(S): 4 TABLET ORAL at 22:02

## 2022-06-06 RX ADMIN — SENNA PLUS 2 TABLET(S): 8.6 TABLET ORAL at 22:02

## 2022-06-06 RX ADMIN — RISPERIDONE 0.5 MILLIGRAM(S): 4 TABLET ORAL at 14:53

## 2022-06-06 RX ADMIN — Medication 1 APPLICATION(S): at 22:04

## 2022-06-06 RX ADMIN — Medication 5 MILLIGRAM(S): at 14:53

## 2022-06-06 RX ADMIN — DIVALPROEX SODIUM 750 MILLIGRAM(S): 500 TABLET, DELAYED RELEASE ORAL at 22:02

## 2022-06-06 RX ADMIN — Medication 1 PACKET(S): at 08:41

## 2022-06-06 RX ADMIN — Medication 5 MILLIGRAM(S): at 22:02
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
## 2022-06-06 NOTE — BH INPATIENT PSYCHIATRY PROGRESS NOTE - NSBHFUPINTERVALHXFT_PSY_A_CORE
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 No overnight events.  The patient continues to do fairly well.  She is pleasant on approach, and denies any problems.  No aggression towards peers. Eating and sleeping well. VSS.  Yelling out at times.  She remains disorganized.  She has been compliant with medications, tolerating them well.

## 2022-06-07 PROCEDURE — 99231 SBSQ HOSP IP/OBS SF/LOW 25: CPT

## 2022-06-07 RX ADMIN — POLYETHYLENE GLYCOL 3350 17 GRAM(S): 17 POWDER, FOR SOLUTION ORAL at 08:07

## 2022-06-07 RX ADMIN — DIVALPROEX SODIUM 750 MILLIGRAM(S): 500 TABLET, DELAYED RELEASE ORAL at 08:08

## 2022-06-07 RX ADMIN — SENNA PLUS 2 TABLET(S): 8.6 TABLET ORAL at 21:00

## 2022-06-07 RX ADMIN — Medication 1 PACKET(S): at 08:07

## 2022-06-07 RX ADMIN — DIVALPROEX SODIUM 750 MILLIGRAM(S): 500 TABLET, DELAYED RELEASE ORAL at 21:00

## 2022-06-07 RX ADMIN — RISPERIDONE 0.5 MILLIGRAM(S): 4 TABLET ORAL at 08:07

## 2022-06-07 RX ADMIN — RISPERIDONE 0.5 MILLIGRAM(S): 4 TABLET ORAL at 12:45

## 2022-06-07 RX ADMIN — Medication 5 MILLIGRAM(S): at 21:00

## 2022-06-07 RX ADMIN — RISPERIDONE 0.5 MILLIGRAM(S): 4 TABLET ORAL at 21:00
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
## 2022-06-08 PROCEDURE — 99231 SBSQ HOSP IP/OBS SF/LOW 25: CPT

## 2022-06-08 RX ADMIN — RISPERIDONE 0.5 MILLIGRAM(S): 4 TABLET ORAL at 21:15

## 2022-06-08 RX ADMIN — DIVALPROEX SODIUM 750 MILLIGRAM(S): 500 TABLET, DELAYED RELEASE ORAL at 09:53

## 2022-06-08 RX ADMIN — DIVALPROEX SODIUM 750 MILLIGRAM(S): 500 TABLET, DELAYED RELEASE ORAL at 21:15

## 2022-06-08 RX ADMIN — Medication 1 APPLICATION(S): at 21:16

## 2022-06-08 RX ADMIN — POLYETHYLENE GLYCOL 3350 17 GRAM(S): 17 POWDER, FOR SOLUTION ORAL at 10:44

## 2022-06-08 RX ADMIN — RISPERIDONE 0.5 MILLIGRAM(S): 4 TABLET ORAL at 09:53

## 2022-06-08 RX ADMIN — Medication 1 PACKET(S): at 10:44

## 2022-06-08 RX ADMIN — Medication 5 MILLIGRAM(S): at 21:15

## 2022-06-08 RX ADMIN — RISPERIDONE 0.5 MILLIGRAM(S): 4 TABLET ORAL at 13:36

## 2022-06-08 RX ADMIN — SENNA PLUS 2 TABLET(S): 8.6 TABLET ORAL at 21:15

## 2022-06-08 NOTE — BH INPATIENT PSYCHIATRY PROGRESS NOTE - NSBHASSESSSUMMFT_PSY_ALL_CORE
5/23 Patient recovering from recent SBO she has not shown return of agitation of risperidone.Will restart  back and substantially reduced dose as is unlikely she will cont to do well off psych meds. [facility] will recheck labs, advance diet, change laxative  5/24 Tolerating restart of risperdal, monitor for se, check labs, re-eval for need for diuretic  5/25: Patient having BMs.  Remains relatively stable, pleasant, disorganized.  Tolerating medications well.  5/26: The patient continues to do well on the unit.  She is disorganized, but pleasant, behavior well controlled.  Tolerating medications well.  5/27: The patient continues to do well on the unit.  She is pleasant but disorganized.  Will give dulcolax suppository again today, no BM since Tuesday.  Tolerating medications well.  5/31 Calm today, recently grabbed peer. Plan increase risperdal to 0.5 mg tid. No injury from peer  6/1 Improved, no agitation, cont current meds.Cont to monitor bowel fx  6/2 Manageable no sig behavioral issues. Cont current meds, asked medciine to eval if she should go back on lasix for edema. Having BM's regularly  6/3 TBI with agitation. PAtient doing fairly well on risperdal at lower dose than prior to transfer to [facility] for SBO. Cont current meds, if any increased agitation could increase risperidone to 1mg bid at 9-5. Tremor has been worse since off Inderal. If tremor interfering with functioning such as ability to feed self could re introduce Inderal 10mg bid. Monitor for BM's .   6/4: No significant overnight events, no PRNs needed. Pleasant on encounter this morning, denies any complaints. No aggressive with peers. Eating and sleeping well.  6/6: The patient continues to be pleasant on the unit, without agitation.  She has been eating and sleeping well.  Compliant with medications.  6/7: The patient continues to do well, pleasant and without any agitation.  She has been eating and sleeping well.  Will continue current medications.  6/8: The patient continues to do well.  She is calm, pleasant, eating and sleeping well.  Will continue current medications.

## 2022-06-08 NOTE — BH INPATIENT PSYCHIATRY PROGRESS NOTE - NSBHFUPINTERVALHXFT_PSY_A_CORE
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 No overnight events.  She is largely unchanged.  The patient continues to do fairly well on the unit.  She is pleasant on approach, smiles, denies any problems.  No agitation or aggression towards peers. Eating and sleeping well. VSS.  Yells out on occasion, but in no distress.  She remains disorganized, but able to answer some questions.  She has been compliant with medications, tolerating them well.

## 2022-06-09 PROCEDURE — 99231 SBSQ HOSP IP/OBS SF/LOW 25: CPT

## 2022-06-09 RX ADMIN — Medication 1 APPLICATION(S): at 10:03

## 2022-06-09 RX ADMIN — DIVALPROEX SODIUM 750 MILLIGRAM(S): 500 TABLET, DELAYED RELEASE ORAL at 09:56

## 2022-06-09 RX ADMIN — Medication 1 APPLICATION(S): at 21:15

## 2022-06-09 RX ADMIN — RISPERIDONE 0.5 MILLIGRAM(S): 4 TABLET ORAL at 20:55

## 2022-06-09 RX ADMIN — RISPERIDONE 0.5 MILLIGRAM(S): 4 TABLET ORAL at 09:55

## 2022-06-09 RX ADMIN — RISPERIDONE 0.5 MILLIGRAM(S): 4 TABLET ORAL at 12:20

## 2022-06-09 RX ADMIN — DIVALPROEX SODIUM 750 MILLIGRAM(S): 500 TABLET, DELAYED RELEASE ORAL at 20:54

## 2022-06-09 RX ADMIN — POLYETHYLENE GLYCOL 3350 17 GRAM(S): 17 POWDER, FOR SOLUTION ORAL at 09:55

## 2022-06-09 RX ADMIN — Medication 1 PACKET(S): at 09:55

## 2022-06-09 RX ADMIN — SENNA PLUS 2 TABLET(S): 8.6 TABLET ORAL at 20:55

## 2022-06-09 RX ADMIN — Medication 5 MILLIGRAM(S): at 20:54

## 2022-06-09 NOTE — BH INPATIENT PSYCHIATRY PROGRESS NOTE - NSBHASSESSSUMMFT_PSY_ALL_CORE
5/23 Patient recovering from recent SBO she has not shown return of agitation of risperidone.Will restart  back and substantially reduced dose as is unlikely she will cont to do well off psych meds. Reviewed with Dr. Tripp [facility] medicine will recheck labs, advance diet, change laxative  5/24 Tolerating restart of risperdal, monitor for se, check labs, re-eval for need for diuretic  5/25: Patient having BMs.  Remains relatively stable, pleasant, disorganized.  Tolerating medications well.  5/26: The patient continues to do well on the unit.  She is disorganized, but pleasant, behavior well controlled.  Tolerating medications well.  5/27: The patient continues to do well on the unit.  She is pleasant but disorganized.  Will give dulcolax suppository again today, no BM since Tuesday.  Tolerating medications well.  5/31 Calm today, recently grabbed peer. Plan increase risperdal to 0.5 mg tid. No injury from peer  6/1 Improved, no agitation, cont current meds.Cont to monitor bowel fx  6/2 Manageable no sig behavioral issues. Cont current meds, asked medciine to eval if she should go back on lasix for edema. Having BM's regularly  6/3 TBI with agitation. PAtient doing fairly well on risperdal at lower dose than prior to transfer to [facility] for SBO. Cont current meds, if any increased agitation could increase risperidone to 1mg bid at 9-5. Tremor has been worse since off Inderal. If tremor interfering with functioning such as ability to feed self could re introduce Inderal 10mg bid. Monitor for BM's .   6/4: No significant overnight events, no PRNs needed. Pleasant on encounter this morning, denies any complaints. No aggressive with peers. Eating and sleeping well.  6/6: The patient continues to be pleasant on the unit, without agitation.  She has been eating and sleeping well.  Compliant with medications.  6/7: The patient continues to do well, pleasant and without any agitation.  She has been eating and sleeping well.  Will continue current medications.  6/8: The patient continues to do well.  She is calm, pleasant, eating and sleeping well.  Will continue current medications.  6/9: The patient continues to do well on the unit.  She is pleasant, calm, eating and sleeping well.  Will continue current medications.

## 2022-06-09 NOTE — BH INPATIENT PSYCHIATRY PROGRESS NOTE - NSBHFUPINTERVALHXFT_PSY_A_CORE
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 No overnight events.  She is largely unchanged.  She continues to do well on the unit.  She is pleasant on approach, smiles, denies any problems.  Tells a story, but is disorganized and loose.  No agitation or aggression towards peers. Eating and sleeping well. VSS.  She remains disorganized, but able to answer some questions.  She has been compliant with medications, tolerating them well.

## 2022-06-10 PROCEDURE — 99231 SBSQ HOSP IP/OBS SF/LOW 25: CPT

## 2022-06-10 RX ADMIN — Medication 5 MILLIGRAM(S): at 21:43

## 2022-06-10 RX ADMIN — POLYETHYLENE GLYCOL 3350 17 GRAM(S): 17 POWDER, FOR SOLUTION ORAL at 10:27

## 2022-06-10 RX ADMIN — Medication 1 APPLICATION(S): at 10:40

## 2022-06-10 RX ADMIN — DIVALPROEX SODIUM 750 MILLIGRAM(S): 500 TABLET, DELAYED RELEASE ORAL at 10:27

## 2022-06-10 RX ADMIN — RISPERIDONE 0.5 MILLIGRAM(S): 4 TABLET ORAL at 13:57

## 2022-06-10 RX ADMIN — DIVALPROEX SODIUM 750 MILLIGRAM(S): 500 TABLET, DELAYED RELEASE ORAL at 21:42

## 2022-06-10 RX ADMIN — Medication 1 PACKET(S): at 10:26

## 2022-06-10 RX ADMIN — RISPERIDONE 0.5 MILLIGRAM(S): 4 TABLET ORAL at 10:27

## 2022-06-10 RX ADMIN — SENNA PLUS 2 TABLET(S): 8.6 TABLET ORAL at 21:42

## 2022-06-10 NOTE — BH INPATIENT PSYCHIATRY PROGRESS NOTE - NSBHFUPINTERVALHXFT_PSY_A_CORE
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 No overnight events.  She is largely unchanged.  She continues to do well on the unit.  She is pleasant on approach, denies any problems.  No agitation or aggression towards peers. Eating and sleeping well. VSS.  She remains disorganized, but able to answer some questions.  She has been compliant with medications, tolerating them well.

## 2022-06-10 NOTE — BH INPATIENT PSYCHIATRY PROGRESS NOTE - NSBHASSESSSUMMFT_PSY_ALL_CORE
5/23 Patient recovering from recent SBO she has not shown return of agitation of risperidone.Will restart  back and substantially reduced dose as is unlikely she will cont to do well off psych meds. Reviewed with Dr. Tripp [facility] medicine will recheck labs, advance diet, change laxative  5/24 Tolerating restart of risperdal, monitor for se, check labs, re-eval for need for diuretic  5/25: Patient having BMs.  Remains relatively stable, pleasant, disorganized.  Tolerating medications well.  5/26: The patient continues to do well on the unit.  She is disorganized, but pleasant, behavior well controlled.  Tolerating medications well.  5/27: The patient continues to do well on the unit.  She is pleasant but disorganized.  Will give dulcolax suppository again today, no BM since Tuesday.  Tolerating medications well.  5/31 Calm today, recently grabbed peer. Plan increase risperdal to 0.5 mg tid. No injury from peer  6/1 Improved, no agitation, cont current meds.Cont to monitor bowel fx  6/2 Manageable no sig behavioral issues. Cont current meds, asked medciine to eval if she should go back on lasix for edema. Having BM's regularly  6/3 TBI with agitation. PAtient doing fairly well on risperdal at lower dose than prior to transfer to [facility] for SBO. Cont current meds, if any increased agitation could increase risperidone to 1mg bid at 9-5. Tremor has been worse since off Inderal. If tremor interfering with functioning such as ability to feed self could re introduce Inderal 10mg bid. Monitor for BM's .   6/4: No significant overnight events, no PRNs needed. Pleasant on encounter this morning, denies any complaints. No aggressive with peers. Eating and sleeping well.  6/6: The patient continues to be pleasant on the unit, without agitation.  She has been eating and sleeping well.  Compliant with medications.  6/7: The patient continues to do well, pleasant and without any agitation.  She has been eating and sleeping well.  Will continue current medications.  6/8: The patient continues to do well.  She is calm, pleasant, eating and sleeping well.  Will continue current medications.  6/10: The patient continues to do well on the unit.  She is pleasant, calm, eating and sleeping well.  Will continue current medications.

## 2022-06-11 RX ADMIN — SENNA PLUS 2 TABLET(S): 8.6 TABLET ORAL at 21:36

## 2022-06-11 RX ADMIN — Medication 1 APPLICATION(S): at 21:54

## 2022-06-11 RX ADMIN — POLYETHYLENE GLYCOL 3350 17 GRAM(S): 17 POWDER, FOR SOLUTION ORAL at 09:40

## 2022-06-11 RX ADMIN — RISPERIDONE 0.5 MILLIGRAM(S): 4 TABLET ORAL at 21:36

## 2022-06-11 RX ADMIN — DIVALPROEX SODIUM 750 MILLIGRAM(S): 500 TABLET, DELAYED RELEASE ORAL at 21:37

## 2022-06-11 RX ADMIN — RISPERIDONE 0.5 MILLIGRAM(S): 4 TABLET ORAL at 13:13

## 2022-06-11 RX ADMIN — DIVALPROEX SODIUM 750 MILLIGRAM(S): 500 TABLET, DELAYED RELEASE ORAL at 09:40

## 2022-06-11 RX ADMIN — RISPERIDONE 0.5 MILLIGRAM(S): 4 TABLET ORAL at 09:40

## 2022-06-11 RX ADMIN — Medication 1 APPLICATION(S): at 09:41

## 2022-06-11 RX ADMIN — Medication 1 PACKET(S): at 09:40

## 2022-06-11 RX ADMIN — Medication 5 MILLIGRAM(S): at 21:36

## 2022-06-12 RX ADMIN — DIVALPROEX SODIUM 750 MILLIGRAM(S): 500 TABLET, DELAYED RELEASE ORAL at 21:02

## 2022-06-12 RX ADMIN — RISPERIDONE 0.5 MILLIGRAM(S): 4 TABLET ORAL at 12:25

## 2022-06-12 RX ADMIN — DIVALPROEX SODIUM 750 MILLIGRAM(S): 500 TABLET, DELAYED RELEASE ORAL at 09:29

## 2022-06-12 RX ADMIN — Medication 5 MILLIGRAM(S): at 21:02

## 2022-06-12 RX ADMIN — POLYETHYLENE GLYCOL 3350 17 GRAM(S): 17 POWDER, FOR SOLUTION ORAL at 09:30

## 2022-06-12 RX ADMIN — RISPERIDONE 0.5 MILLIGRAM(S): 4 TABLET ORAL at 09:29

## 2022-06-12 RX ADMIN — SENNA PLUS 2 TABLET(S): 8.6 TABLET ORAL at 21:02

## 2022-06-12 RX ADMIN — RISPERIDONE 0.5 MILLIGRAM(S): 4 TABLET ORAL at 21:02

## 2022-06-12 RX ADMIN — Medication 1 PACKET(S): at 09:30

## 2022-06-13 RX ADMIN — Medication 5 MILLIGRAM(S): at 21:44

## 2022-06-13 RX ADMIN — POLYETHYLENE GLYCOL 3350 17 GRAM(S): 17 POWDER, FOR SOLUTION ORAL at 08:51

## 2022-06-13 RX ADMIN — DIVALPROEX SODIUM 750 MILLIGRAM(S): 500 TABLET, DELAYED RELEASE ORAL at 08:51

## 2022-06-13 RX ADMIN — DIVALPROEX SODIUM 750 MILLIGRAM(S): 500 TABLET, DELAYED RELEASE ORAL at 21:45

## 2022-06-13 RX ADMIN — RISPERIDONE 0.5 MILLIGRAM(S): 4 TABLET ORAL at 21:44

## 2022-06-13 RX ADMIN — SENNA PLUS 2 TABLET(S): 8.6 TABLET ORAL at 21:45

## 2022-06-13 RX ADMIN — RISPERIDONE 0.5 MILLIGRAM(S): 4 TABLET ORAL at 08:51

## 2022-06-13 RX ADMIN — RISPERIDONE 0.5 MILLIGRAM(S): 4 TABLET ORAL at 12:42

## 2022-06-13 RX ADMIN — Medication 1 PACKET(S): at 08:51

## 2022-06-13 RX ADMIN — Medication 1 APPLICATION(S): at 08:50

## 2022-06-13 NOTE — BH INPATIENT PSYCHIATRY PROGRESS NOTE - NSBHASSESSSUMMFT_PSY_ALL_CORE
5/23 Patient recovering from recent SBO she has not shown return of agitation of risperidone.Will restart  back and substantially reduced dose as is unlikely she will cont to do well off psych meds. Reviewed with Dr. Tripp [facility] medicine will recheck labs, advance diet, change laxative  5/24 Tolerating restart of risperdal, monitor for se, check labs, re-eval for need for diuretic  5/25: Patient having BMs.  Remains relatively stable, pleasant, disorganized.  Tolerating medications well.  5/26: The patient continues to do well on the unit.  She is disorganized, but pleasant, behavior well controlled.  Tolerating medications well.  5/27: The patient continues to do well on the unit.  She is pleasant but disorganized.  Will give dulcolax suppository again today, no BM since Tuesday.  Tolerating medications well.  5/31 Calm today, recently grabbed peer. Plan increase risperdal to 0.5 mg tid. No injury from peer  6/1 Improved, no agitation, cont current meds.Cont to monitor bowel fx  6/2 Manageable no sig behavioral issues. Cont current meds, asked medciine to eval if she should go back on lasix for edema. Having BM's regularly  6/3 TBI with agitation. PAtient doing fairly well on risperdal at lower dose than prior to transfer to [facility] for SBO. Cont current meds, if any increased agitation could increase risperidone to 1mg bid at 9-5. Tremor has been worse since off Inderal. If tremor interfering with functioning such as ability to feed self could re introduce Inderal 10mg bid. Monitor for BM's .   6/4: No significant overnight events, no PRNs needed. Pleasant on encounter this morning, denies any complaints. No aggressive with peers. Eating and sleeping well.  6/6: The patient continues to be pleasant on the unit, without agitation.  She has been eating and sleeping well.  Compliant with medications.  6/7: The patient continues to do well, pleasant and without any agitation.  She has been eating and sleeping well.  Will continue current medications.  6/8: The patient continues to do well.  She is calm, pleasant, eating and sleeping well.  Will continue current medications.  6/10: The patient continues to do well on the unit.  She is pleasant, calm, eating and sleeping well.  Will continue current medications.  6/13  No major behavioral issues, tremor more since Inderal d/markel Cont current rx, if BP's higher may restart Inderal at low dose

## 2022-06-13 NOTE — BH INPATIENT PSYCHIATRY PROGRESS NOTE - NSBHFUPINTERVALHXFT_PSY_A_CORE
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 No overnight events.  She is largely unchanged.  She continues to do well on the unit.  She is pleasant on approach, denies any problems.  No agitation or aggression towards peers. Eating and sleeping well. VSS though in low nl range.  She remains disorganized, but able to answer some questions.  She has been compliant with medications, tolerating them well.

## 2022-06-14 PROCEDURE — 99232 SBSQ HOSP IP/OBS MODERATE 35: CPT

## 2022-06-14 PROCEDURE — 99233 SBSQ HOSP IP/OBS HIGH 50: CPT

## 2022-06-14 RX ORDER — CARBAMIDE PEROXIDE 81.86 MG/ML
2 SOLUTION/ DROPS AURICULAR (OTIC)
Refills: 0 | Status: COMPLETED | OUTPATIENT
Start: 2022-06-14 | End: 2022-06-18

## 2022-06-14 RX ADMIN — DIVALPROEX SODIUM 750 MILLIGRAM(S): 500 TABLET, DELAYED RELEASE ORAL at 09:29

## 2022-06-14 RX ADMIN — Medication 1 PACKET(S): at 09:29

## 2022-06-14 RX ADMIN — Medication 5 MILLIGRAM(S): at 21:51

## 2022-06-14 RX ADMIN — SENNA PLUS 2 TABLET(S): 8.6 TABLET ORAL at 21:51

## 2022-06-14 RX ADMIN — RISPERIDONE 0.5 MILLIGRAM(S): 4 TABLET ORAL at 09:29

## 2022-06-14 RX ADMIN — POLYETHYLENE GLYCOL 3350 17 GRAM(S): 17 POWDER, FOR SOLUTION ORAL at 09:28

## 2022-06-14 RX ADMIN — DIVALPROEX SODIUM 750 MILLIGRAM(S): 500 TABLET, DELAYED RELEASE ORAL at 21:51

## 2022-06-14 RX ADMIN — CARBAMIDE PEROXIDE 2 DROP(S): 81.86 SOLUTION/ DROPS AURICULAR (OTIC) at 21:15

## 2022-06-14 RX ADMIN — RISPERIDONE 0.5 MILLIGRAM(S): 4 TABLET ORAL at 21:51

## 2022-06-14 RX ADMIN — RISPERIDONE 0.5 MILLIGRAM(S): 4 TABLET ORAL at 13:43

## 2022-06-14 NOTE — BH INPATIENT PSYCHIATRY PROGRESS NOTE - NSBHASSESSSUMMFT_PSY_ALL_CORE
5/23 Patient recovering from recent SBO she has not shown return of agitation of risperidone.Will restart  back and substantially reduced dose as is unlikely she will cont to do well off psych meds. Reviewed with Dr. Tripp [facility] medicine will recheck labs, advance diet, change laxative  5/24 Tolerating restart of risperdal, monitor for se, check labs, re-eval for need for diuretic  5/25: Patient having BMs.  Remains relatively stable, pleasant, disorganized.  Tolerating medications well.  5/26: The patient continues to do well on the unit.  She is disorganized, but pleasant, behavior well controlled.  Tolerating medications well.  5/27: The patient continues to do well on the unit.  She is pleasant but disorganized.  Will give dulcolax suppository again today, no BM since Tuesday.  Tolerating medications well.  5/31 Calm today, recently grabbed peer. Plan increase risperdal to 0.5 mg tid. No injury from peer  6/1 Improved, no agitation, cont current meds.Cont to monitor bowel fx  6/2 Manageable no sig behavioral issues. Cont current meds, asked medciine to eval if she should go back on lasix for edema. Having BM's regularly  6/3 TBI with agitation. PAtient doing fairly well on risperdal at lower dose than prior to transfer to [facility] for SBO. Cont current meds, if any increased agitation could increase risperidone to 1mg bid at 9-5. Tremor has been worse since off Inderal. If tremor interfering with functioning such as ability to feed self could re introduce Inderal 10mg bid. Monitor for BM's .   6/4: No significant overnight events, no PRNs needed. Pleasant on encounter this morning, denies any complaints. No aggressive with peers. Eating and sleeping well.  6/6: The patient continues to be pleasant on the unit, without agitation.  She has been eating and sleeping well.  Compliant with medications.  6/7: The patient continues to do well, pleasant and without any agitation.  She has been eating and sleeping well.  Will continue current medications.  6/8: The patient continues to do well.  She is calm, pleasant, eating and sleeping well.  Will continue current medications.  6/10: The patient continues to do well on the unit.  She is pleasant, calm, eating and sleeping well.  Will continue current medications.  6/13  No major behavioral issues, tremor more since Inderal d/markel Cont current rx, if BP's higher may restart Inderal at low dose  6/14 Holding gains, will readd Inderal low dose as it helped tremor. Will place patient on regular schedule of twice a week suppository as using it as prn less reliable

## 2022-06-14 NOTE — BH INPATIENT PSYCHIATRY PROGRESS NOTE - NSBHFUPINTERVALHXFT_PSY_A_CORE
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 No overnight events.  She is largely unchanged.  She continues to do well on the unit.  She is pleasant on approach, denies any problems.  No agitation or aggression towards peers. Eating and sleeping well. VSS no low BP or orthostasis She remains disorganized, but able to answer some questions.  She has been compliant with medications, tolerating them well.

## 2022-06-14 NOTE — UM REPORT PROGRESS NOTE - NSUMSWNOTE_GEN_A_CORE FT
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 The pt had resided in [facility] for the past 11 years. The pt was initially hospitalized on 2S on 11/19/21, then briefly hospitalized at [facility] due to small bowel obstruction, and returned to  on 5/19/22. The pt has been stable and in good behavioral control on her current medication regimen. Referral was made to [facility] during the pt's prior 2S stay but rejected by the facility, stating the pt's psychiatric needs exceeded their capacity and that the pt no longer had a bed there. In light of [facility] refusal to accept the pt back,  sent referrals to over 120 SNFs with no acceptances received. Since pt's return to , referral was made again to [facility] and immediately rejected despite request for discussion with their medical and nursing directors.  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 The pt had resided in [facility] for the past 11 years. The pt was initially hospitalized on 2S on 11/19/21, then briefly hospitalized at [facility] due to small bowel obstruction, and returned to  on 5/19/22. The pt has been stable and in good behavioral control on her current medication regimen. Referral was made to [facility] during the pt's prior 2S stay but rejected by the facility, stating the pt's psychiatric needs exceeded their capacity and that the pt no longer had a bed there. In light of [facility] refusal to accept the pt back,  sent referrals to over 120 SNFs with no acceptances received. Since pt's return to , referral was made again to [facility] and immediately rejected despite request for discussion with their medical and nursing directors. CAMILO continues to extend SNF placement efforts to facilities in [facility]. Additionally [facility] administration will pursue legal's involvement towards pt's return to [facility]. 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 The pt had resided in [facility] for the past 11 years. The pt was initially hospitalized on 2S on 11/19/21, then briefly hospitalized at [facility] due to small bowel obstruction, and returned to  on 5/19/22. The pt has been stable and in good behavioral control on her current medication regimen. Referral was made to [facility] during the pt's prior 2S stay but rejected by the facility, stating the pt's psychiatric needs exceeded their capacity and that the pt no longer had a bed there. In light of [facility] refusal to accept the pt back,  sent referrals to over 120 SNFs with no acceptances received. Since pt's return to , referral was made again to [facility] and immediately rejected despite request for discussion with their medical and nursing directors. [facility] administration decided not to pursue legal involvement towards pt's return to [facility]. In light of that, SNF placement efforts were extended to facilities outside [facility]. The pt was accepted by [facility] in [place] but was unable to be discharged due to covid exposure on the unit.

## 2022-06-14 NOTE — UM REPORT PROGRESS NOTE - NSUMSWACTION_GEN_A_CORE FT
CAMILO maintains contact with the pt's  who is hopeful that the pt will be accepted back to [facility] for her LTC. CAMILO advised the SNF admission office that the hospital expectation is for the pt to return to their prior residence when stable. CAMILO addressed this disposition issue with SW director with plan for legal involvement. CAMILO maintains contact with the pt's  who is hopeful that the pt will be accepted back to [facility] for her LTC. CAMILO advised the SNF admission office that the hospital expectation is for the pt to return to their prior residence when stable. CAMILO addressed this disposition issue with SW director with plan for legal involvement. CAMILO will also continue to extend SNF placement efforts to additional facilities. SW maintains contact with the pt's  who was upset that the pt was not accepted back to [facility] but accepted placement in [facility]. SW will activate placement when the covid exposure is over and pt is clinically stable.

## 2022-06-14 NOTE — PROGRESS NOTE ADULT - ASSESSMENT
67yoF with PMH of dementia 2/2 head trauma, seizure disorder,  shunt, hospitalized at [facility] for SBO s/p NGT, then transferred back to [facility], course c/b constipation. Now with concern for ear cerumen impaction.    #Cerumen impaction:   -evaluated with otoscope; b/l ears with impaction  -initiate debrox 2 drops b/l BID x 4 days      # Constipation-  -No BM in 2 days  -continue Senna, Miralax  -PRN suppository       # Dementia  # Seizure disorder   - As per Psych    # NPH s/p  shunt  - Shunt appropriately positioned as imaged on 5/18/22    #Chronic Lower Extremity Edema  [ ] leg elevation and compression stockings as tolerated, continue to hold Lasix until PO intake improved

## 2022-06-15 PROCEDURE — 99231 SBSQ HOSP IP/OBS SF/LOW 25: CPT

## 2022-06-15 RX ORDER — RISPERIDONE 4 MG/1
1 TABLET ORAL
Refills: 0 | Status: DISCONTINUED | OUTPATIENT
Start: 2022-06-15 | End: 2022-07-07

## 2022-06-15 RX ADMIN — Medication 1 PACKET(S): at 09:05

## 2022-06-15 RX ADMIN — RISPERIDONE 0.5 MILLIGRAM(S): 4 TABLET ORAL at 12:41

## 2022-06-15 RX ADMIN — SENNA PLUS 2 TABLET(S): 8.6 TABLET ORAL at 20:54

## 2022-06-15 RX ADMIN — Medication 1 APPLICATION(S): at 21:27

## 2022-06-15 RX ADMIN — DIVALPROEX SODIUM 750 MILLIGRAM(S): 500 TABLET, DELAYED RELEASE ORAL at 09:04

## 2022-06-15 RX ADMIN — POLYETHYLENE GLYCOL 3350 17 GRAM(S): 17 POWDER, FOR SOLUTION ORAL at 09:05

## 2022-06-15 RX ADMIN — DIVALPROEX SODIUM 750 MILLIGRAM(S): 500 TABLET, DELAYED RELEASE ORAL at 20:53

## 2022-06-15 RX ADMIN — RISPERIDONE 1 MILLIGRAM(S): 4 TABLET ORAL at 20:54

## 2022-06-15 RX ADMIN — RISPERIDONE 0.5 MILLIGRAM(S): 4 TABLET ORAL at 09:04

## 2022-06-15 RX ADMIN — CARBAMIDE PEROXIDE 2 DROP(S): 81.86 SOLUTION/ DROPS AURICULAR (OTIC) at 21:26

## 2022-06-15 RX ADMIN — CARBAMIDE PEROXIDE 2 DROP(S): 81.86 SOLUTION/ DROPS AURICULAR (OTIC) at 09:04

## 2022-06-15 RX ADMIN — Medication 5 MILLIGRAM(S): at 20:54

## 2022-06-15 RX ADMIN — Medication 1 APPLICATION(S): at 09:03

## 2022-06-15 NOTE — BH INPATIENT PSYCHIATRY PROGRESS NOTE - NSBHASSESSSUMMFT_PSY_ALL_CORE
5/23 Patient recovering from recent SBO she has not shown return of agitation of risperidone.Will restart  back and substantially reduced dose as is unlikely she will cont to do well off psych meds. Reviewed with Dr. Tripp [facility] medicine will recheck labs, advance diet, change laxative  5/24 Tolerating restart of risperdal, monitor for se, check labs, re-eval for need for diuretic  5/25: Patient having BMs.  Remains relatively stable, pleasant, disorganized.  Tolerating medications well.  5/26: The patient continues to do well on the unit.  She is disorganized, but pleasant, behavior well controlled.  Tolerating medications well.  5/27: The patient continues to do well on the unit.  She is pleasant but disorganized.  Will give dulcolax suppository again today, no BM since Tuesday.  Tolerating medications well.  5/31 Calm today, recently grabbed peer. Plan increase risperdal to 0.5 mg tid. No injury from peer  6/1 Improved, no agitation, cont current meds.Cont to monitor bowel fx  6/2 Manageable no sig behavioral issues. Cont current meds, asked medciine to eval if she should go back on lasix for edema. Having BM's regularly  6/3 TBI with agitation. PAtient doing fairly well on risperdal at lower dose than prior to transfer to [facility] for SBO. Cont current meds, if any increased agitation could increase risperidone to 1mg bid at 9-5. Tremor has been worse since off Inderal. If tremor interfering with functioning such as ability to feed self could re introduce Inderal 10mg bid. Monitor for BM's .   6/4: No significant overnight events, no PRNs needed. Pleasant on encounter this morning, denies any complaints. No aggressive with peers. Eating and sleeping well.  6/6: The patient continues to be pleasant on the unit, without agitation.  She has been eating and sleeping well.  Compliant with medications.  6/7: The patient continues to do well, pleasant and without any agitation.  She has been eating and sleeping well.  Will continue current medications.  6/8: The patient continues to do well.  She is calm, pleasant, eating and sleeping well.  Will continue current medications.  6/10: The patient continues to do well on the unit.  She is pleasant, calm, eating and sleeping well.  Will continue current medications.  6/13  No major behavioral issues, tremor more since Inderal d/markel Cont current rx, if BP's higher may restart Inderal at low dose  6/14 Holding gains, will readd Inderal low dose as it helped tremor. Will place patient on regular schedule of twice a week suppository as using it as prn less reliable  6/15 Patient maintaining gains. COnt rx but will increase risperidone in order to attempt to re-titrate back to prior effect dose. Patient had BM before given suppository will cont biw suppository with hold option

## 2022-06-16 PROCEDURE — 99231 SBSQ HOSP IP/OBS SF/LOW 25: CPT

## 2022-06-16 RX ADMIN — RISPERIDONE 1 MILLIGRAM(S): 4 TABLET ORAL at 09:33

## 2022-06-16 RX ADMIN — Medication 1 PACKET(S): at 09:32

## 2022-06-16 RX ADMIN — Medication 1 APPLICATION(S): at 22:54

## 2022-06-16 RX ADMIN — POLYETHYLENE GLYCOL 3350 17 GRAM(S): 17 POWDER, FOR SOLUTION ORAL at 09:32

## 2022-06-16 RX ADMIN — RISPERIDONE 1 MILLIGRAM(S): 4 TABLET ORAL at 21:50

## 2022-06-16 RX ADMIN — CARBAMIDE PEROXIDE 2 DROP(S): 81.86 SOLUTION/ DROPS AURICULAR (OTIC) at 09:31

## 2022-06-16 RX ADMIN — Medication 1 APPLICATION(S): at 09:32

## 2022-06-16 RX ADMIN — DIVALPROEX SODIUM 750 MILLIGRAM(S): 500 TABLET, DELAYED RELEASE ORAL at 09:33

## 2022-06-16 RX ADMIN — Medication 5 MILLIGRAM(S): at 21:50

## 2022-06-16 RX ADMIN — DIVALPROEX SODIUM 750 MILLIGRAM(S): 500 TABLET, DELAYED RELEASE ORAL at 21:49

## 2022-06-16 RX ADMIN — SENNA PLUS 2 TABLET(S): 8.6 TABLET ORAL at 21:49

## 2022-06-16 RX ADMIN — CARBAMIDE PEROXIDE 2 DROP(S): 81.86 SOLUTION/ DROPS AURICULAR (OTIC) at 22:54

## 2022-06-16 NOTE — BH INPATIENT PSYCHIATRY PROGRESS NOTE - NSBHASSESSSUMMFT_PSY_ALL_CORE
5/23 Patient recovering from recent SBO she has not shown return of agitation of risperidone.Will restart  back and substantially reduced dose as is unlikely she will cont to do well off psych meds. Reviewed with Dr. Tripp [facility] medicine will recheck labs, advance diet, change laxative  5/24 Tolerating restart of risperdal, monitor for se, check labs, re-eval for need for diuretic  5/25: Patient having BMs.  Remains relatively stable, pleasant, disorganized.  Tolerating medications well.  5/26: The patient continues to do well on the unit.  She is disorganized, but pleasant, behavior well controlled.  Tolerating medications well.  5/27: The patient continues to do well on the unit.  She is pleasant but disorganized.  Will give dulcolax suppository again today, no BM since Tuesday.  Tolerating medications well.  5/31 Calm today, recently grabbed peer. Plan increase risperdal to 0.5 mg tid. No injury from peer  6/1 Improved, no agitation, cont current meds.Cont to monitor bowel fx  6/2 Manageable no sig behavioral issues. Cont current meds, asked medciine to eval if she should go back on lasix for edema. Having BM's regularly  6/3 TBI with agitation. PAtient doing fairly well on risperdal at lower dose than prior to transfer to [facility] for SBO. Cont current meds, if any increased agitation could increase risperidone to 1mg bid at 9-5. Tremor has been worse since off Inderal. If tremor interfering with functioning such as ability to feed self could re introduce Inderal 10mg bid. Monitor for BM's .   6/4: No significant overnight events, no PRNs needed. Pleasant on encounter this morning, denies any complaints. No aggressive with peers. Eating and sleeping well.  6/6: The patient continues to be pleasant on the unit, without agitation.  She has been eating and sleeping well.  Compliant with medications.  6/7: The patient continues to do well, pleasant and without any agitation.  She has been eating and sleeping well.  Will continue current medications.  6/8: The patient continues to do well.  She is calm, pleasant, eating and sleeping well.  Will continue current medications.  6/10: The patient continues to do well on the unit.  She is pleasant, calm, eating and sleeping well.  Will continue current medications.  6/13  No major behavioral issues, tremor more since Inderal d/markel Cont current rx, if BP's higher may restart Inderal at low dose  6/14 Holding gains, will readd Inderal low dose as it helped tremor. Will place patient on regular schedule of twice a week suppository as using it as prn less reliable  6/15 Patient maintaining gains. COnt rx but will increase risperidone in order to attempt to re-titrate back to prior effect dose. Patient had BM before given suppository will cont biw suppository with hold option  6/16 Maintaining gains. Cont meds have just partially re titrated risperidone.

## 2022-06-17 PROCEDURE — 99231 SBSQ HOSP IP/OBS SF/LOW 25: CPT

## 2022-06-17 RX ADMIN — Medication 1 APPLICATION(S): at 21:53

## 2022-06-17 RX ADMIN — Medication 5 MILLIGRAM(S): at 21:52

## 2022-06-17 RX ADMIN — RISPERIDONE 1 MILLIGRAM(S): 4 TABLET ORAL at 21:52

## 2022-06-17 RX ADMIN — SENNA PLUS 2 TABLET(S): 8.6 TABLET ORAL at 21:52

## 2022-06-17 RX ADMIN — POLYETHYLENE GLYCOL 3350 17 GRAM(S): 17 POWDER, FOR SOLUTION ORAL at 09:56

## 2022-06-17 RX ADMIN — DIVALPROEX SODIUM 750 MILLIGRAM(S): 500 TABLET, DELAYED RELEASE ORAL at 09:56

## 2022-06-17 RX ADMIN — Medication 1 PACKET(S): at 09:56

## 2022-06-17 RX ADMIN — DIVALPROEX SODIUM 750 MILLIGRAM(S): 500 TABLET, DELAYED RELEASE ORAL at 21:52

## 2022-06-17 RX ADMIN — Medication 10 MILLIGRAM(S): at 17:18

## 2022-06-17 NOTE — BH INPATIENT PSYCHIATRY PROGRESS NOTE - NSBHFUPINTERVALHXFT_PSY_A_CORE
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 No overnight events.  She is largely unchanged.  She continues to do well on the unit.  She is pleasant on approach, denies any problems.  No agitation or aggression towards peers. Eating and sleeping well. VSS though low nl She remains disorganized, but able to answer some questions.  She has been compliant with medications, tolerating them well.

## 2022-06-17 NOTE — BH INPATIENT PSYCHIATRY PROGRESS NOTE - NSBHASSESSSUMMFT_PSY_ALL_CORE
5/23 Patient recovering from recent SBO she has not shown return of agitation of risperidone.Will restart  back and substantially reduced dose as is unlikely she will cont to do well off psych meds. Reviewed with Dr. Tripp [facility] medicine will recheck labs, advance diet, change laxative  5/24 Tolerating restart of risperdal, monitor for se, check labs, re-eval for need for diuretic  5/25: Patient having BMs.  Remains relatively stable, pleasant, disorganized.  Tolerating medications well.  5/26: The patient continues to do well on the unit.  She is disorganized, but pleasant, behavior well controlled.  Tolerating medications well.  5/27: The patient continues to do well on the unit.  She is pleasant but disorganized.  Will give dulcolax suppository again today, no BM since Tuesday.  Tolerating medications well.  5/31 Calm today, recently grabbed peer. Plan increase risperdal to 0.5 mg tid. No injury from peer  6/1 Improved, no agitation, cont current meds.Cont to monitor bowel fx  6/2 Manageable no sig behavioral issues. Cont current meds, asked medciine to eval if she should go back on lasix for edema. Having BM's regularly  6/3 TBI with agitation. PAtient doing fairly well on risperdal at lower dose than prior to transfer to [facility] for SBO. Cont current meds, if any increased agitation could increase risperidone to 1mg bid at 9-5. Tremor has been worse since off Inderal. If tremor interfering with functioning such as ability to feed self could re introduce Inderal 10mg bid. Monitor for BM's .   6/4: No significant overnight events, no PRNs needed. Pleasant on encounter this morning, denies any complaints. No aggressive with peers. Eating and sleeping well.  6/6: The patient continues to be pleasant on the unit, without agitation.  She has been eating and sleeping well.  Compliant with medications.  6/7: The patient continues to do well, pleasant and without any agitation.  She has been eating and sleeping well.  Will continue current medications.  6/8: The patient continues to do well.  She is calm, pleasant, eating and sleeping well.  Will continue current medications.  6/10: The patient continues to do well on the unit.  She is pleasant, calm, eating and sleeping well.  Will continue current medications.  6/13  No major behavioral issues, tremor more since Inderal d/markel Cont current rx, if BP's higher may restart Inderal at low dose  6/14 Holding gains, will readd Inderal low dose as it helped tremor. Will place patient on regular schedule of twice a week suppository as using it as prn less reliable  6/15 Patient maintaining gains. COnt rx but will increase risperidone in order to attempt to re-titrate back to prior effect dose. Patient had BM before given suppository will cont biw suppository with hold option  6/16 Maintaining gains. Cont meds have just partially re titrated risperidone.   6/17 Patient stable. As BP low nl will d.c Inderal as may make it difficult to maintain nl BP especially if need to increase risperidone

## 2022-06-18 PROCEDURE — 99231 SBSQ HOSP IP/OBS SF/LOW 25: CPT

## 2022-06-18 RX ADMIN — POLYETHYLENE GLYCOL 3350 17 GRAM(S): 17 POWDER, FOR SOLUTION ORAL at 09:55

## 2022-06-18 RX ADMIN — RISPERIDONE 1 MILLIGRAM(S): 4 TABLET ORAL at 09:55

## 2022-06-18 RX ADMIN — CARBAMIDE PEROXIDE 2 DROP(S): 81.86 SOLUTION/ DROPS AURICULAR (OTIC) at 09:57

## 2022-06-18 RX ADMIN — Medication 5 MILLIGRAM(S): at 22:03

## 2022-06-18 RX ADMIN — Medication 1 APPLICATION(S): at 22:05

## 2022-06-18 RX ADMIN — DIVALPROEX SODIUM 750 MILLIGRAM(S): 500 TABLET, DELAYED RELEASE ORAL at 22:03

## 2022-06-18 RX ADMIN — RISPERIDONE 1 MILLIGRAM(S): 4 TABLET ORAL at 22:03

## 2022-06-18 RX ADMIN — DIVALPROEX SODIUM 750 MILLIGRAM(S): 500 TABLET, DELAYED RELEASE ORAL at 09:55

## 2022-06-18 RX ADMIN — Medication 1 APPLICATION(S): at 09:56

## 2022-06-18 RX ADMIN — SENNA PLUS 2 TABLET(S): 8.6 TABLET ORAL at 22:03

## 2022-06-18 RX ADMIN — Medication 1 PACKET(S): at 09:55

## 2022-06-18 NOTE — BH INPATIENT PSYCHIATRY PROGRESS NOTE - NSBHASSESSSUMMFT_PSY_ALL_CORE
5/23 Patient recovering from recent SBO she has not shown return of agitation of risperidone.Will restart  back and substantially reduced dose as is unlikely she will cont to do well off psych meds. Reviewed with Dr. Tripp [facility] medicine will recheck labs, advance diet, change laxative  5/24 Tolerating restart of risperdal, monitor for se, check labs, re-eval for need for diuretic  5/25: Patient having BMs.  Remains relatively stable, pleasant, disorganized.  Tolerating medications well.  5/26: The patient continues to do well on the unit.  She is disorganized, but pleasant, behavior well controlled.  Tolerating medications well.  5/27: The patient continues to do well on the unit.  She is pleasant but disorganized.  Will give dulcolax suppository again today, no BM since Tuesday.  Tolerating medications well.  5/31 Calm today, recently grabbed peer. Plan increase risperdal to 0.5 mg tid. No injury from peer  6/1 Improved, no agitation, cont current meds.Cont to monitor bowel fx  6/2 Manageable no sig behavioral issues. Cont current meds, asked medciine to eval if she should go back on lasix for edema. Having BM's regularly  6/3 TBI with agitation. PAtient doing fairly well on risperdal at lower dose than prior to transfer to [facility] for SBO. Cont current meds, if any increased agitation could increase risperidone to 1mg bid at 9-5. Tremor has been worse since off Inderal. If tremor interfering with functioning such as ability to feed self could re introduce Inderal 10mg bid. Monitor for BM's .   6/4: No significant overnight events, no PRNs needed. Pleasant on encounter this morning, denies any complaints. No aggressive with peers. Eating and sleeping well.  6/6: The patient continues to be pleasant on the unit, without agitation.  She has been eating and sleeping well.  Compliant with medications.  6/7: The patient continues to do well, pleasant and without any agitation.  She has been eating and sleeping well.  Will continue current medications.  6/8: The patient continues to do well.  She is calm, pleasant, eating and sleeping well.  Will continue current medications.  6/10: The patient continues to do well on the unit.  She is pleasant, calm, eating and sleeping well.  Will continue current medications.  6/13  No major behavioral issues, tremor more since Inderal d/markel Cont current rx, if BP's higher may restart Inderal at low dose  6/14 Holding gains, will readd Inderal low dose as it helped tremor. Will place patient on regular schedule of twice a week suppository as using it as prn less reliable  6/15 Patient maintaining gains. COnt rx but will increase risperidone in order to attempt to re-titrate back to prior effect dose. Patient had BM before given suppository will cont biw suppository with hold option  6/16 Maintaining gains. Cont meds have just partially re titrated risperidone.   6/17 Patient stable. As BP low nl will d.c Inderal as may make it difficult to maintain nl BP especially if need to increase risperidone  6/18 Patient remains confused, disorganized, not agitated , AVSS

## 2022-06-18 NOTE — BH INPATIENT PSYCHIATRY PROGRESS NOTE - NSBHFUPINTERVALHXFT_PSY_A_CORE
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 AVSS, not worse since d/c beta-blocker.

## 2022-06-19 RX ORDER — LANOLIN ALCOHOL/MO/W.PET/CERES
1 CREAM (GRAM) TOPICAL
Qty: 0 | Refills: 0 | DISCHARGE
Start: 2022-06-19

## 2022-06-19 RX ADMIN — POLYETHYLENE GLYCOL 3350 17 GRAM(S): 17 POWDER, FOR SOLUTION ORAL at 09:29

## 2022-06-19 RX ADMIN — Medication 1 APPLICATION(S): at 09:29

## 2022-06-19 RX ADMIN — Medication 1 APPLICATION(S): at 21:27

## 2022-06-19 RX ADMIN — DIVALPROEX SODIUM 750 MILLIGRAM(S): 500 TABLET, DELAYED RELEASE ORAL at 21:20

## 2022-06-19 RX ADMIN — Medication 5 MILLIGRAM(S): at 21:21

## 2022-06-19 RX ADMIN — RISPERIDONE 1 MILLIGRAM(S): 4 TABLET ORAL at 21:20

## 2022-06-19 RX ADMIN — Medication 1 PACKET(S): at 09:29

## 2022-06-19 RX ADMIN — SENNA PLUS 2 TABLET(S): 8.6 TABLET ORAL at 21:20

## 2022-06-19 RX ADMIN — Medication 10 MILLIGRAM(S): at 22:50

## 2022-06-19 RX ADMIN — RISPERIDONE 1 MILLIGRAM(S): 4 TABLET ORAL at 09:29

## 2022-06-19 RX ADMIN — DIVALPROEX SODIUM 750 MILLIGRAM(S): 500 TABLET, DELAYED RELEASE ORAL at 09:30

## 2022-06-20 PROCEDURE — 99232 SBSQ HOSP IP/OBS MODERATE 35: CPT

## 2022-06-20 RX ADMIN — RISPERIDONE 1 MILLIGRAM(S): 4 TABLET ORAL at 09:18

## 2022-06-20 RX ADMIN — POLYETHYLENE GLYCOL 3350 17 GRAM(S): 17 POWDER, FOR SOLUTION ORAL at 09:18

## 2022-06-20 RX ADMIN — Medication 5 MILLIGRAM(S): at 21:21

## 2022-06-20 RX ADMIN — Medication 1 PACKET(S): at 09:18

## 2022-06-20 RX ADMIN — Medication 1 APPLICATION(S): at 09:25

## 2022-06-20 RX ADMIN — DIVALPROEX SODIUM 750 MILLIGRAM(S): 500 TABLET, DELAYED RELEASE ORAL at 09:18

## 2022-06-20 RX ADMIN — DIVALPROEX SODIUM 750 MILLIGRAM(S): 500 TABLET, DELAYED RELEASE ORAL at 20:43

## 2022-06-20 RX ADMIN — SENNA PLUS 2 TABLET(S): 8.6 TABLET ORAL at 20:50

## 2022-06-20 RX ADMIN — RISPERIDONE 1 MILLIGRAM(S): 4 TABLET ORAL at 21:15

## 2022-06-20 RX ADMIN — Medication 1 APPLICATION(S): at 20:42

## 2022-06-20 NOTE — BH INPATIENT PSYCHIATRY PROGRESS NOTE - NSBHINPTBILLING_PSY_ALL_CORE
[id number] - Inpatient Moderate Complexity
[id number] - Inpatient Moderate Complexity
14073 - Inpatient Moderate Complexity
61313 - Inpatient Low Complexity
04977 - Inpatient Low Complexity
39675 - Inpatient Low Complexity
56331 - Inpatient Moderate Complexity
83790 - Inpatient Moderate Complexity
87319 - Inpatient Moderate Complexity
06708 - Inpatient Low Complexity
52419 - Inpatient Low Complexity
65880 - Inpatient Low Complexity
46578 - Inpatient Low Complexity
16348 - Inpatient Low Complexity
74310 - Inpatient Moderate Complexity
89790 - Inpatient Moderate Complexity
26734 - Inpatient Moderate Complexity
05441 - Inpatient Low Complexity
23853 - Inpatient Low Complexity
71342 - Inpatient Low Complexity
76902 - Inpatient Low Complexity
44251 - Inpatient Low Complexity
65009 - Inpatient Low Complexity
74327 - Inpatient Moderate Complexity
86839 - Inpatient Low Complexity

## 2022-06-20 NOTE — BH INPATIENT PSYCHIATRY PROGRESS NOTE - NSBHFUPINTERVALHXFT_PSY_A_CORE
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 VSS, not worse since d/c beta-blocker.

## 2022-06-20 NOTE — BH INPATIENT PSYCHIATRY PROGRESS NOTE - NSBHASSESSSUMMFT_PSY_ALL_CORE
5/23 Patient recovering from recent SBO she has not shown return of agitation of risperidone.Will restart  back and substantially reduced dose as is unlikely she will cont to do well off psych meds. Reviewed with Dr. Tripp [facility] medicine will recheck labs, advance diet, change laxative  5/24 Tolerating restart of risperdal, monitor for se, check labs, re-eval for need for diuretic  5/25: Patient having BMs.  Remains relatively stable, pleasant, disorganized.  Tolerating medications well.  5/26: The patient continues to do well on the unit.  She is disorganized, but pleasant, behavior well controlled.  Tolerating medications well.  5/27: The patient continues to do well on the unit.  She is pleasant but disorganized.  Will give dulcolax suppository again today, no BM since Tuesday.  Tolerating medications well.  5/31 Calm today, recently grabbed peer. Plan increase risperdal to 0.5 mg tid. No injury from peer  6/1 Improved, no agitation, cont current meds.Cont to monitor bowel fx  6/2 Manageable no sig behavioral issues. Cont current meds, asked medciine to eval if she should go back on lasix for edema. Having BM's regularly  6/3 TBI with agitation. PAtient doing fairly well on risperdal at lower dose than prior to transfer to [facility] for SBO. Cont current meds, if any increased agitation could increase risperidone to 1mg bid at 9-5. Tremor has been worse since off Inderal. If tremor interfering with functioning such as ability to feed self could re introduce Inderal 10mg bid. Monitor for BM's .   6/4: No significant overnight events, no PRNs needed. Pleasant on encounter this morning, denies any complaints. No aggressive with peers. Eating and sleeping well.  6/6: The patient continues to be pleasant on the unit, without agitation.  She has been eating and sleeping well.  Compliant with medications.  6/7: The patient continues to do well, pleasant and without any agitation.  She has been eating and sleeping well.  Will continue current medications.  6/8: The patient continues to do well.  She is calm, pleasant, eating and sleeping well.  Will continue current medications.  6/10: The patient continues to do well on the unit.  She is pleasant, calm, eating and sleeping well.  Will continue current medications.  6/13  No major behavioral issues, tremor more since Inderal d/markel Cont current rx, if BP's higher may restart Inderal at low dose  6/14 Holding gains, will readd Inderal low dose as it helped tremor. Will place patient on regular schedule of twice a week suppository as using it as prn less reliable  6/15 Patient maintaining gains. COnt rx but will increase risperidone in order to attempt to re-titrate back to prior effect dose. Patient had BM before given suppository will cont biw suppository with hold option  6/16 Maintaining gains. Cont meds have just partially re titrated risperidone.   6/17 Patient stable. As BP low nl will d.c Inderal as may make it difficult to maintain nl BP especially if need to increase risperidone  6/18 Patient remains confused, disorganized, not agitated , AVSS  6/20 Patient calm, not agitated or aggressive. VSS. Plan cont meds, eval increasing risperdal for purpose of approximating past effective dose. Reviewed bowel regimen with medicine who recommending cont three laxatives and biw suppository as seems effective.

## 2022-06-21 PROCEDURE — 99231 SBSQ HOSP IP/OBS SF/LOW 25: CPT

## 2022-06-21 RX ADMIN — Medication 5 MILLIGRAM(S): at 21:57

## 2022-06-21 RX ADMIN — Medication 1 APPLICATION(S): at 21:57

## 2022-06-21 RX ADMIN — Medication 1 PACKET(S): at 09:41

## 2022-06-21 RX ADMIN — RISPERIDONE 1 MILLIGRAM(S): 4 TABLET ORAL at 09:42

## 2022-06-21 RX ADMIN — Medication 1 APPLICATION(S): at 10:09

## 2022-06-21 RX ADMIN — DIVALPROEX SODIUM 750 MILLIGRAM(S): 500 TABLET, DELAYED RELEASE ORAL at 09:42

## 2022-06-21 RX ADMIN — Medication 10 MILLIGRAM(S): at 10:09

## 2022-06-21 RX ADMIN — POLYETHYLENE GLYCOL 3350 17 GRAM(S): 17 POWDER, FOR SOLUTION ORAL at 09:42

## 2022-06-21 RX ADMIN — Medication 5 MILLIGRAM(S): at 09:42

## 2022-06-21 RX ADMIN — DIVALPROEX SODIUM 750 MILLIGRAM(S): 500 TABLET, DELAYED RELEASE ORAL at 21:55

## 2022-06-21 RX ADMIN — SENNA PLUS 2 TABLET(S): 8.6 TABLET ORAL at 21:55

## 2022-06-21 RX ADMIN — RISPERIDONE 1 MILLIGRAM(S): 4 TABLET ORAL at 21:55

## 2022-06-21 NOTE — BH INPATIENT PSYCHIATRY PROGRESS NOTE - NSBHFUPINTERVALHXFT_PSY_A_CORE
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 VSS, not worse since d/c beta-blocker. Patient eating sleeping okay. Had BM after last suppository on 6/19

## 2022-06-21 NOTE — BH INPATIENT PSYCHIATRY PROGRESS NOTE - NSBHASSESSSUMMFT_PSY_ALL_CORE
5/23 Patient recovering from recent SBO she has not shown return of agitation of risperidone.Will restart  back and substantially reduced dose as is unlikely she will cont to do well off psych meds. Reviewed with Dr. Tripp [facility] medicine will recheck labs, advance diet, change laxative  5/24 Tolerating restart of risperdal, monitor for se, check labs, re-eval for need for diuretic  5/25: Patient having BMs.  Remains relatively stable, pleasant, disorganized.  Tolerating medications well.  5/26: The patient continues to do well on the unit.  She is disorganized, but pleasant, behavior well controlled.  Tolerating medications well.  5/27: The patient continues to do well on the unit.  She is pleasant but disorganized.  Will give dulcolax suppository again today, no BM since Tuesday.  Tolerating medications well.  5/31 Calm today, recently grabbed peer. Plan increase risperdal to 0.5 mg tid. No injury from peer  6/1 Improved, no agitation, cont current meds.Cont to monitor bowel fx  6/2 Manageable no sig behavioral issues. Cont current meds, asked medciine to eval if she should go back on lasix for edema. Having BM's regularly  6/3 TBI with agitation. PAtient doing fairly well on risperdal at lower dose than prior to transfer to [facility] for SBO. Cont current meds, if any increased agitation could increase risperidone to 1mg bid at 9-5. Tremor has been worse since off Inderal. If tremor interfering with functioning such as ability to feed self could re introduce Inderal 10mg bid. Monitor for BM's .   6/4: No significant overnight events, no PRNs needed. Pleasant on encounter this morning, denies any complaints. No aggressive with peers. Eating and sleeping well.  6/6: The patient continues to be pleasant on the unit, without agitation.  She has been eating and sleeping well.  Compliant with medications.  6/7: The patient continues to do well, pleasant and without any agitation.  She has been eating and sleeping well.  Will continue current medications.  6/8: The patient continues to do well.  She is calm, pleasant, eating and sleeping well.  Will continue current medications.  6/10: The patient continues to do well on the unit.  She is pleasant, calm, eating and sleeping well.  Will continue current medications.  6/13  No major behavioral issues, tremor more since Inderal d/markel Cont current rx, if BP's higher may restart Inderal at low dose  6/14 Holding gains, will readd Inderal low dose as it helped tremor. Will place patient on regular schedule of twice a week suppository as using it as prn less reliable  6/15 Patient maintaining gains. COnt rx but will increase risperidone in order to attempt to re-titrate back to prior effect dose. Patient had BM before given suppository will cont biw suppository with hold option  6/16 Maintaining gains. Cont meds have just partially re titrated risperidone.   6/17 Patient stable. As BP low nl will d.c Inderal as may make it difficult to maintain nl BP especially if need to increase risperidone  6/18 Patient remains confused, disorganized, not agitated , AVSS  6/20 Patient calm, not agitated or aggressive. VSS. Plan cont meds, eval increasing risperdal for purpose of approximating past effective dose. Reviewed bowel regimen with medicine who recommending cont three laxatives and biw suppository as seems effective.   6/21 Patient holding gains, cont current meds, cont to re-eval need to uptitrate to prior effective dose to minimize risk of any recurrence of behaviors

## 2022-06-22 PROCEDURE — 99231 SBSQ HOSP IP/OBS SF/LOW 25: CPT

## 2022-06-22 RX ADMIN — RISPERIDONE 1 MILLIGRAM(S): 4 TABLET ORAL at 21:45

## 2022-06-22 RX ADMIN — Medication 5 MILLIGRAM(S): at 21:45

## 2022-06-22 RX ADMIN — POLYETHYLENE GLYCOL 3350 17 GRAM(S): 17 POWDER, FOR SOLUTION ORAL at 08:11

## 2022-06-22 RX ADMIN — RISPERIDONE 1 MILLIGRAM(S): 4 TABLET ORAL at 08:12

## 2022-06-22 RX ADMIN — Medication 1 PACKET(S): at 08:11

## 2022-06-22 RX ADMIN — DIVALPROEX SODIUM 750 MILLIGRAM(S): 500 TABLET, DELAYED RELEASE ORAL at 08:11

## 2022-06-22 RX ADMIN — DIVALPROEX SODIUM 750 MILLIGRAM(S): 500 TABLET, DELAYED RELEASE ORAL at 21:45

## 2022-06-22 RX ADMIN — SENNA PLUS 2 TABLET(S): 8.6 TABLET ORAL at 21:46

## 2022-06-22 NOTE — BH INPATIENT PSYCHIATRY PROGRESS NOTE - NSBHFUPINTERVALHXFT_PSY_A_CORE
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 VSS, not worse since d/c beta-blocker. Patient eating sleeping okay. Had BM  6/20

## 2022-06-22 NOTE — BH INPATIENT PSYCHIATRY PROGRESS NOTE - NSBHASSESSSUMMFT_PSY_ALL_CORE
5/23 Patient recovering from recent SBO she has not shown return of agitation of risperidone.Will restart  back and substantially reduced dose as is unlikely she will cont to do well off psych meds. Reviewed with Dr. Tripp [facility] medicine will recheck labs, advance diet, change laxative  5/24 Tolerating restart of risperdal, monitor for se, check labs, re-eval for need for diuretic  5/25: Patient having BMs.  Remains relatively stable, pleasant, disorganized.  Tolerating medications well.  5/26: The patient continues to do well on the unit.  She is disorganized, but pleasant, behavior well controlled.  Tolerating medications well.  5/27: The patient continues to do well on the unit.  She is pleasant but disorganized.  Will give dulcolax suppository again today, no BM since Tuesday.  Tolerating medications well.  5/31 Calm today, recently grabbed peer. Plan increase risperdal to 0.5 mg tid. No injury from peer  6/1 Improved, no agitation, cont current meds.Cont to monitor bowel fx  6/2 Manageable no sig behavioral issues. Cont current meds, asked medciine to eval if she should go back on lasix for edema. Having BM's regularly  6/3 TBI with agitation. PAtient doing fairly well on risperdal at lower dose than prior to transfer to [facility] for SBO. Cont current meds, if any increased agitation could increase risperidone to 1mg bid at 9-5. Tremor has been worse since off Inderal. If tremor interfering with functioning such as ability to feed self could re introduce Inderal 10mg bid. Monitor for BM's .   6/4: No significant overnight events, no PRNs needed. Pleasant on encounter this morning, denies any complaints. No aggressive with peers. Eating and sleeping well.  6/6: The patient continues to be pleasant on the unit, without agitation.  She has been eating and sleeping well.  Compliant with medications.  6/7: The patient continues to do well, pleasant and without any agitation.  She has been eating and sleeping well.  Will continue current medications.  6/8: The patient continues to do well.  She is calm, pleasant, eating and sleeping well.  Will continue current medications.  6/10: The patient continues to do well on the unit.  She is pleasant, calm, eating and sleeping well.  Will continue current medications.  6/13  No major behavioral issues, tremor more since Inderal d/markel Cont current rx, if BP's higher may restart Inderal at low dose  6/14 Holding gains, will readd Inderal low dose as it helped tremor. Will place patient on regular schedule of twice a week suppository as using it as prn less reliable  6/15 Patient maintaining gains. COnt rx but will increase risperidone in order to attempt to re-titrate back to prior effect dose. Patient had BM before given suppository will cont biw suppository with hold option  6/16 Maintaining gains. Cont meds have just partially re titrated risperidone.   6/17 Patient stable. As BP low nl will d.c Inderal as may make it difficult to maintain nl BP especially if need to increase risperidone  6/18 Patient remains confused, disorganized, not agitated , AVSS  6/20 Patient calm, not agitated or aggressive. VSS. Plan cont meds, eval increasing risperdal for purpose of approximating past effective dose. Reviewed bowel regimen with medicine who recommending cont three laxatives and biw suppository as seems effective.   6/21 Patient holding gains, cont current meds, cont to re-eval need to uptitrate to prior effective dose to minimize risk of any recurrence of behaviors  6/22 Maintaining gains. Cont  current meds

## 2022-06-23 RX ADMIN — RISPERIDONE 1 MILLIGRAM(S): 4 TABLET ORAL at 08:44

## 2022-06-23 RX ADMIN — Medication 5 MILLIGRAM(S): at 10:34

## 2022-06-23 RX ADMIN — Medication 1 APPLICATION(S): at 08:34

## 2022-06-23 RX ADMIN — SENNA PLUS 2 TABLET(S): 8.6 TABLET ORAL at 21:23

## 2022-06-23 RX ADMIN — POLYETHYLENE GLYCOL 3350 17 GRAM(S): 17 POWDER, FOR SOLUTION ORAL at 08:45

## 2022-06-23 RX ADMIN — DIVALPROEX SODIUM 750 MILLIGRAM(S): 500 TABLET, DELAYED RELEASE ORAL at 21:24

## 2022-06-23 RX ADMIN — RISPERIDONE 1 MILLIGRAM(S): 4 TABLET ORAL at 21:23

## 2022-06-23 RX ADMIN — DIVALPROEX SODIUM 750 MILLIGRAM(S): 500 TABLET, DELAYED RELEASE ORAL at 08:44

## 2022-06-23 RX ADMIN — Medication 1 PACKET(S): at 08:45

## 2022-06-23 RX ADMIN — Medication 5 MILLIGRAM(S): at 21:23

## 2022-06-24 PROCEDURE — 99231 SBSQ HOSP IP/OBS SF/LOW 25: CPT

## 2022-06-24 RX ORDER — PSYLLIUM SEED (WITH DEXTROSE)
1 POWDER (GRAM) ORAL
Qty: 0 | Refills: 0 | DISCHARGE
Start: 2022-06-24

## 2022-06-24 RX ORDER — POLYETHYLENE GLYCOL 3350 17 G/17G
17 POWDER, FOR SOLUTION ORAL
Qty: 0 | Refills: 0 | DISCHARGE
Start: 2022-06-24

## 2022-06-24 RX ORDER — LANOLIN ALCOHOL/MO/W.PET/CERES
1 CREAM (GRAM) TOPICAL
Qty: 0 | Refills: 0 | DISCHARGE

## 2022-06-24 RX ORDER — DOCUSATE SODIUM 100 MG
1 CAPSULE ORAL
Qty: 0 | Refills: 0 | DISCHARGE

## 2022-06-24 RX ORDER — SENNA PLUS 8.6 MG/1
2 TABLET ORAL
Qty: 0 | Refills: 0 | DISCHARGE
Start: 2022-06-24

## 2022-06-24 RX ORDER — DIVALPROEX SODIUM 500 MG/1
6 TABLET, DELAYED RELEASE ORAL
Qty: 0 | Refills: 0 | DISCHARGE
Start: 2022-06-24

## 2022-06-24 RX ORDER — RISPERIDONE 4 MG/1
1 TABLET ORAL
Qty: 0 | Refills: 0 | DISCHARGE
Start: 2022-06-24

## 2022-06-24 RX ADMIN — Medication 1 PACKET(S): at 09:39

## 2022-06-24 RX ADMIN — RISPERIDONE 1 MILLIGRAM(S): 4 TABLET ORAL at 09:39

## 2022-06-24 RX ADMIN — RISPERIDONE 1 MILLIGRAM(S): 4 TABLET ORAL at 20:07

## 2022-06-24 RX ADMIN — Medication 1 APPLICATION(S): at 09:45

## 2022-06-24 RX ADMIN — DIVALPROEX SODIUM 750 MILLIGRAM(S): 500 TABLET, DELAYED RELEASE ORAL at 20:05

## 2022-06-24 RX ADMIN — Medication 5 MILLIGRAM(S): at 20:06

## 2022-06-24 RX ADMIN — POLYETHYLENE GLYCOL 3350 17 GRAM(S): 17 POWDER, FOR SOLUTION ORAL at 09:38

## 2022-06-24 RX ADMIN — DIVALPROEX SODIUM 750 MILLIGRAM(S): 500 TABLET, DELAYED RELEASE ORAL at 09:39

## 2022-06-24 RX ADMIN — SENNA PLUS 2 TABLET(S): 8.6 TABLET ORAL at 20:05

## 2022-06-24 NOTE — BH INPATIENT PSYCHIATRY PROGRESS NOTE - NSBHASSESSSUMMFT_PSY_ALL_CORE
5/23 Patient recovering from recent SBO she has not shown return of agitation of risperidone.Will restart  back and substantially reduced dose as is unlikely she will cont to do well off psych meds. Reviewed with Dr. Tripp [facility] medicine will recheck labs, advance diet, change laxative  5/24 Tolerating restart of risperdal, monitor for se, check labs, re-eval for need for diuretic  5/25: Patient having BMs.  Remains relatively stable, pleasant, disorganized.  Tolerating medications well.  5/26: The patient continues to do well on the unit.  She is disorganized, but pleasant, behavior well controlled.  Tolerating medications well.  5/27: The patient continues to do well on the unit.  She is pleasant but disorganized.  Will give dulcolax suppository again today, no BM since Tuesday.  Tolerating medications well.  5/31 Calm today, recently grabbed peer. Plan increase risperdal to 0.5 mg tid. No injury from peer  6/1 Improved, no agitation, cont current meds.Cont to monitor bowel fx  6/2 Manageable no sig behavioral issues. Cont current meds, asked medciine to eval if she should go back on lasix for edema. Having BM's regularly  6/3 TBI with agitation. PAtient doing fairly well on risperdal at lower dose than prior to transfer to [facility] for SBO. Cont current meds, if any increased agitation could increase risperidone to 1mg bid at 9-5. Tremor has been worse since off Inderal. If tremor interfering with functioning such as ability to feed self could re introduce Inderal 10mg bid. Monitor for BM's .   6/4: No significant overnight events, no PRNs needed. Pleasant on encounter this morning, denies any complaints. No aggressive with peers. Eating and sleeping well.  6/6: The patient continues to be pleasant on the unit, without agitation.  She has been eating and sleeping well.  Compliant with medications.  6/7: The patient continues to do well, pleasant and without any agitation.  She has been eating and sleeping well.  Will continue current medications.  6/8: The patient continues to do well.  She is calm, pleasant, eating and sleeping well.  Will continue current medications.  6/10: The patient continues to do well on the unit.  She is pleasant, calm, eating and sleeping well.  Will continue current medications.  6/13  No major behavioral issues, tremor more since Inderal d/markel Cont current rx, if BP's higher may restart Inderal at low dose  6/14 Holding gains, will readd Inderal low dose as it helped tremor. Will place patient on regular schedule of twice a week suppository as using it as prn less reliable  6/15 Patient maintaining gains. COnt rx but will increase risperidone in order to attempt to re-titrate back to prior effect dose. Patient had BM before given suppository will cont biw suppository with hold option  6/16 Maintaining gains. Cont meds have just partially re titrated risperidone.   6/17 Patient stable. As BP low nl will d.c Inderal as may make it difficult to maintain nl BP especially if need to increase risperidone  6/18 Patient remains confused, disorganized, not agitated , AVSS  6/20 Patient calm, not agitated or aggressive. VSS. Plan cont meds, eval increasing risperdal for purpose of approximating past effective dose. Reviewed bowel regimen with medicine who recommending cont three laxatives and biw suppository as seems effective.   6/21 Patient holding gains, cont current meds, cont to re-eval need to uptitrate to prior effective dose to minimize risk of any recurrence of behaviors  6/22 Maintaining gains. Cont  current meds  6/24 Patient improved on consistent basis. Minor vocalization during ADL's but has not been agitated aggressive, psychotic, is cooperative with care. Plan cont meds, for d/c 6/27 unless change occurs.

## 2022-06-24 NOTE — BH INPATIENT PSYCHIATRY DISCHARGE NOTE - NSDCCPCAREPLAN_GEN_ALL_CORE_FT
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

## 2022-06-24 NOTE — BH INPATIENT PSYCHIATRY DISCHARGE NOTE - NSDCRECOMMENDDIETFT_PSY_ALL_CORE
Show Eucerin Line: Yes Detail Level: Zone Action 3: Continue Continue Regimen: Tmc 0.1% topical cream bid for 2 weeks, moisturize. soft

## 2022-06-24 NOTE — BH INPATIENT PSYCHIATRY DISCHARGE NOTE - NSBHMETABOLIC_PSY_ALL_CORE_FT
BMI: BMI (kg/m2): 30.7 (05-29-22 @ 09:57)  HbA1c: A1C with Estimated Average Glucose Result: 5.4 % (11-04-21 @ 06:58)    Glucose: POCT Blood Glucose.: 102 mg/dL (05-19-22 @ 11:51)    BP: 108/69 (06-21-22 @ 19:56) (108/69 - 108/69)  Lipid Panel:

## 2022-06-24 NOTE — BH INPATIENT PSYCHIATRY DISCHARGE NOTE - NSDCMRMEDTOKEN_GEN_ALL_CORE_FT
bisacodyl 10 mg rectal suppository: 1 suppository(ies)  twice a week Tuesday and Thursday  divalproex sodium 125 mg oral delayed release capsule: 6 cap(s) orally 2 times a day  melatonin 3 mg oral tablet: 1 tab(s) orally once a day (at bedtime)  polyethylene glycol 3350 oral powder for reconstitution: 17 gram(s) orally once a day  psyllium 3.4 g/7 g oral powder for reconstitution: 1 pack in 8 onces fluid daily  risperiDONE 1 mg oral tablet: 1 tab(s) orally 2 times a day  senna leaf extract oral tablet: 2 tab(s) orally once a day (at bedtime)  triamcinolone 0.1% topical cream: 1 application topically 2 times a day

## 2022-06-24 NOTE — BH INPATIENT PSYCHIATRY PROGRESS NOTE - NSBHFUPINTERVALHXFT_PSY_A_CORE
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 VSS, not worse since d/c beta-blocker. Patient eating sleeping okay. Had BM  yesterday
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
## 2022-06-24 NOTE — BH INPATIENT PSYCHIATRY DISCHARGE NOTE - HOSPITAL COURSE
Patient was admitted from [facility] for half a year of worsening agitation, irritability, aggression due to late effect of TBI with dementia. She was stab stabilized on risperidone 1.5mg tid and Inderal 10mg tid after failing full Zyprexa trial but became problematic discharge problem despite improvement. She then had a SBO due to constipation and was admitted to medical floor briefly treated with enemas, IV fluids. Upon return she was weaker, lethargic so meds held then restarted. She only required risperdone 1mg bid for excellent behavioral control. Inderal  which was used to treat tremor was d/markel due to causing hypotension. she continued to have constipation even with metamucil, miralax and senna but she responded well to additional of Dulcolax suppository twice  a week. She doesn't like taking Metamucil due to gritty texture so if Benefiber or other soluble fiber is available that may be more palatable to her. She presents a childlike, friendly, has expressive aphasia and abnormal prosody. Due to language deficits she presents as more impaired than she actually is cognitively. LAnguage is better when she sings. She has She raises voice during toileting and personal care but is not aggressive . it helps to speak to her gently and in friendly manner. She likes Claudia Mouse and has several Claudia Mouse dolls and she should continue to have them available.  is very involved, caring but has osteogenica inperfecta and it is frial to visit in person. She recognizes him on phone and efforts should be made to bring her to phone. She has severe lymphedema of lower extremities but lasix offers little benefit and may cause side effects. her antipsychotic causes tremor but not stiffness and risk benefit is clearly positive. She potentially may need higher dose of risperidone during adjustment period but then may be able to have reduction down to baseline after adjustment occurs. Bowel movements need  to be monitored to prevent impaction.
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
## 2022-06-25 LAB — SARS-COV-2 RNA SPEC QL NAA+PROBE: SIGNIFICANT CHANGE UP

## 2022-06-25 RX ADMIN — DIVALPROEX SODIUM 750 MILLIGRAM(S): 500 TABLET, DELAYED RELEASE ORAL at 20:57

## 2022-06-25 RX ADMIN — Medication 5 MILLIGRAM(S): at 20:57

## 2022-06-25 RX ADMIN — Medication 1 PACKET(S): at 09:05

## 2022-06-25 RX ADMIN — RISPERIDONE 1 MILLIGRAM(S): 4 TABLET ORAL at 09:05

## 2022-06-25 RX ADMIN — SENNA PLUS 2 TABLET(S): 8.6 TABLET ORAL at 20:57

## 2022-06-25 RX ADMIN — RISPERIDONE 1 MILLIGRAM(S): 4 TABLET ORAL at 20:57

## 2022-06-25 RX ADMIN — POLYETHYLENE GLYCOL 3350 17 GRAM(S): 17 POWDER, FOR SOLUTION ORAL at 09:05

## 2022-06-25 RX ADMIN — DIVALPROEX SODIUM 750 MILLIGRAM(S): 500 TABLET, DELAYED RELEASE ORAL at 09:05

## 2022-06-26 RX ADMIN — DIVALPROEX SODIUM 750 MILLIGRAM(S): 500 TABLET, DELAYED RELEASE ORAL at 08:43

## 2022-06-26 RX ADMIN — DIVALPROEX SODIUM 750 MILLIGRAM(S): 500 TABLET, DELAYED RELEASE ORAL at 21:10

## 2022-06-26 RX ADMIN — POLYETHYLENE GLYCOL 3350 17 GRAM(S): 17 POWDER, FOR SOLUTION ORAL at 08:43

## 2022-06-26 RX ADMIN — Medication 1 PACKET(S): at 08:43

## 2022-06-26 RX ADMIN — Medication 5 MILLIGRAM(S): at 21:10

## 2022-06-26 RX ADMIN — SENNA PLUS 2 TABLET(S): 8.6 TABLET ORAL at 21:10

## 2022-06-26 RX ADMIN — RISPERIDONE 1 MILLIGRAM(S): 4 TABLET ORAL at 21:11

## 2022-06-26 RX ADMIN — RISPERIDONE 1 MILLIGRAM(S): 4 TABLET ORAL at 08:43

## 2022-06-27 PROCEDURE — 99231 SBSQ HOSP IP/OBS SF/LOW 25: CPT

## 2022-06-27 RX ADMIN — SENNA PLUS 2 TABLET(S): 8.6 TABLET ORAL at 21:24

## 2022-06-27 RX ADMIN — RISPERIDONE 1 MILLIGRAM(S): 4 TABLET ORAL at 21:24

## 2022-06-27 RX ADMIN — Medication 1 APPLICATION(S): at 09:16

## 2022-06-27 RX ADMIN — DIVALPROEX SODIUM 750 MILLIGRAM(S): 500 TABLET, DELAYED RELEASE ORAL at 21:24

## 2022-06-27 RX ADMIN — Medication 1 APPLICATION(S): at 22:14

## 2022-06-27 RX ADMIN — Medication 1 PACKET(S): at 09:17

## 2022-06-27 RX ADMIN — Medication 5 MILLIGRAM(S): at 21:24

## 2022-06-27 RX ADMIN — DIVALPROEX SODIUM 750 MILLIGRAM(S): 500 TABLET, DELAYED RELEASE ORAL at 09:17

## 2022-06-27 RX ADMIN — POLYETHYLENE GLYCOL 3350 17 GRAM(S): 17 POWDER, FOR SOLUTION ORAL at 09:17

## 2022-06-27 RX ADMIN — RISPERIDONE 1 MILLIGRAM(S): 4 TABLET ORAL at 09:16

## 2022-06-27 NOTE — BH INPATIENT PSYCHIATRY PROGRESS NOTE - NSBHASSESSSUMMFT_PSY_ALL_CORE
5/23 Patient recovering from recent SBO she has not shown return of agitation of risperidone.Will restart  back and substantially reduced dose as is unlikely she will cont to do well off psych meds. Reviewed with Dr. Tripp [facility] medicine will recheck labs, advance diet, change laxative  5/24 Tolerating restart of risperdal, monitor for se, check labs, re-eval for need for diuretic  5/25: Patient having BMs.  Remains relatively stable, pleasant, disorganized.  Tolerating medications well.  5/26: The patient continues to do well on the unit.  She is disorganized, but pleasant, behavior well controlled.  Tolerating medications well.  5/27: The patient continues to do well on the unit.  She is pleasant but disorganized.  Will give dulcolax suppository again today, no BM since Tuesday.  Tolerating medications well.  5/31 Calm today, recently grabbed peer. Plan increase risperdal to 0.5 mg tid. No injury from peer  6/1 Improved, no agitation, cont current meds.Cont to monitor bowel fx  6/2 Manageable no sig behavioral issues. Cont current meds, asked medciine to eval if she should go back on lasix for edema. Having BM's regularly  6/3 TBI with agitation. PAtient doing fairly well on risperdal at lower dose than prior to transfer to [facility] for SBO. Cont current meds, if any increased agitation could increase risperidone to 1mg bid at 9-5. Tremor has been worse since off Inderal. If tremor interfering with functioning such as ability to feed self could re introduce Inderal 10mg bid. Monitor for BM's .   6/4: No significant overnight events, no PRNs needed. Pleasant on encounter this morning, denies any complaints. No aggressive with peers. Eating and sleeping well.  6/6: The patient continues to be pleasant on the unit, without agitation.  She has been eating and sleeping well.  Compliant with medications.  6/7: The patient continues to do well, pleasant and without any agitation.  She has been eating and sleeping well.  Will continue current medications.  6/8: The patient continues to do well.  She is calm, pleasant, eating and sleeping well.  Will continue current medications.  6/10: The patient continues to do well on the unit.  She is pleasant, calm, eating and sleeping well.  Will continue current medications.  6/13  No major behavioral issues, tremor more since Inderal d/markel Cont current rx, if BP's higher may restart Inderal at low dose  6/14 Holding gains, will readd Inderal low dose as it helped tremor. Will place patient on regular schedule of twice a week suppository as using it as prn less reliable  6/15 Patient maintaining gains. COnt rx but will increase risperidone in order to attempt to re-titrate back to prior effect dose. Patient had BM before given suppository will cont biw suppository with hold option  6/16 Maintaining gains. Cont meds have just partially re titrated risperidone.   6/17 Patient stable. As BP low nl will d.c Inderal as may make it difficult to maintain nl BP especially if need to increase risperidone  6/18 Patient remains confused, disorganized, not agitated , AVSS  6/20 Patient calm, not agitated or aggressive. VSS. Plan cont meds, eval increasing risperdal for purpose of approximating past effective dose. Reviewed bowel regimen with medicine who recommending cont three laxatives and biw suppository as seems effective.   6/21 Patient holding gains, cont current meds, cont to re-eval need to uptitrate to prior effective dose to minimize risk of any recurrence of behaviors  6/22 Maintaining gains. Cont  current meds  6/24 Patient improved on consistent basis. Minor vocalization during ADL's but has not been agitated aggressive, psychotic, is cooperative with care. Plan cont meds, for d/c 6/27 unless change occurs.  6/27 Patient maintaining gains, d/c on hold as patient had covid exposure

## 2022-06-27 NOTE — BH INPATIENT PSYCHIATRY PROGRESS NOTE - NSBHFUPINTERVALHXFT_PSY_A_CORE
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 VSS, no hypotension Patient eating sleeping okay.

## 2022-06-28 PROCEDURE — 99231 SBSQ HOSP IP/OBS SF/LOW 25: CPT

## 2022-06-28 RX ORDER — LACTOBACILLUS ACIDOPHILUS 100MM CELL
1 CAPSULE ORAL THREE TIMES A DAY
Refills: 0 | Status: DISCONTINUED | OUTPATIENT
Start: 2022-06-28 | End: 2022-06-29

## 2022-06-28 RX ORDER — ACETAMINOPHEN 500 MG
650 TABLET ORAL EVERY 6 HOURS
Refills: 0 | Status: DISCONTINUED | OUTPATIENT
Start: 2022-06-28 | End: 2022-07-07

## 2022-06-28 RX ADMIN — RISPERIDONE 1 MILLIGRAM(S): 4 TABLET ORAL at 21:36

## 2022-06-28 RX ADMIN — Medication 1 TABLET(S): at 22:29

## 2022-06-28 RX ADMIN — SENNA PLUS 2 TABLET(S): 8.6 TABLET ORAL at 21:37

## 2022-06-28 RX ADMIN — RISPERIDONE 1 MILLIGRAM(S): 4 TABLET ORAL at 09:09

## 2022-06-28 RX ADMIN — DIVALPROEX SODIUM 750 MILLIGRAM(S): 500 TABLET, DELAYED RELEASE ORAL at 21:35

## 2022-06-28 RX ADMIN — Medication 5 MILLIGRAM(S): at 21:36

## 2022-06-28 RX ADMIN — Medication 450 MILLIGRAM(S): at 22:32

## 2022-06-28 RX ADMIN — Medication 10 MILLIGRAM(S): at 14:10

## 2022-06-28 RX ADMIN — DIVALPROEX SODIUM 750 MILLIGRAM(S): 500 TABLET, DELAYED RELEASE ORAL at 09:10

## 2022-06-28 RX ADMIN — Medication 1 APPLICATION(S): at 22:31

## 2022-06-28 RX ADMIN — POLYETHYLENE GLYCOL 3350 17 GRAM(S): 17 POWDER, FOR SOLUTION ORAL at 09:09

## 2022-06-28 RX ADMIN — Medication 1 APPLICATION(S): at 09:09

## 2022-06-28 RX ADMIN — Medication 1 PACKET(S): at 09:09

## 2022-06-28 NOTE — BH INPATIENT PSYCHIATRY PROGRESS NOTE - NSBHASSESSSUMMFT_PSY_ALL_CORE
5/23 Patient recovering from recent SBO she has not shown return of agitation of risperidone.Will restart  back and substantially reduced dose as is unlikely she will cont to do well off psych meds. Reviewed with Dr. Tripp [facility] medicine will recheck labs, advance diet, change laxative  5/24 Tolerating restart of risperdal, monitor for se, check labs, re-eval for need for diuretic  5/25: Patient having BMs.  Remains relatively stable, pleasant, disorganized.  Tolerating medications well.  5/26: The patient continues to do well on the unit.  She is disorganized, but pleasant, behavior well controlled.  Tolerating medications well.  5/27: The patient continues to do well on the unit.  She is pleasant but disorganized.  Will give dulcolax suppository again today, no BM since Tuesday.  Tolerating medications well.  5/31 Calm today, recently grabbed peer. Plan increase risperdal to 0.5 mg tid. No injury from peer  6/1 Improved, no agitation, cont current meds.Cont to monitor bowel fx  6/2 Manageable no sig behavioral issues. Cont current meds, asked medciine to eval if she should go back on lasix for edema. Having BM's regularly  6/3 TBI with agitation. PAtient doing fairly well on risperdal at lower dose than prior to transfer to [facility] for SBO. Cont current meds, if any increased agitation could increase risperidone to 1mg bid at 9-5. Tremor has been worse since off Inderal. If tremor interfering with functioning such as ability to feed self could re introduce Inderal 10mg bid. Monitor for BM's .   6/4: No significant overnight events, no PRNs needed. Pleasant on encounter this morning, denies any complaints. No aggressive with peers. Eating and sleeping well.  6/6: The patient continues to be pleasant on the unit, without agitation.  She has been eating and sleeping well.  Compliant with medications.  6/7: The patient continues to do well, pleasant and without any agitation.  She has been eating and sleeping well.  Will continue current medications.  6/8: The patient continues to do well.  She is calm, pleasant, eating and sleeping well.  Will continue current medications.  6/10: The patient continues to do well on the unit.  She is pleasant, calm, eating and sleeping well.  Will continue current medications.  6/13  No major behavioral issues, tremor more since Inderal d/markel Cont current rx, if BP's higher may restart Inderal at low dose  6/14 Holding gains, will readd Inderal low dose as it helped tremor. Will place patient on regular schedule of twice a week suppository as using it as prn less reliable  6/15 Patient maintaining gains. COnt rx but will increase risperidone in order to attempt to re-titrate back to prior effect dose. Patient had BM before given suppository will cont biw suppository with hold option  6/16 Maintaining gains. Cont meds have just partially re titrated risperidone.   6/17 Patient stable. As BP low nl will d.c Inderal as may make it difficult to maintain nl BP especially if need to increase risperidone  6/18 Patient remains confused, disorganized, not agitated , AVSS  6/20 Patient calm, not agitated or aggressive. VSS. Plan cont meds, eval increasing risperdal for purpose of approximating past effective dose. Reviewed bowel regimen with medicine who recommending cont three laxatives and biw suppository as seems effective.   6/21 Patient holding gains, cont current meds, cont to re-eval need to uptitrate to prior effective dose to minimize risk of any recurrence of behaviors  6/22 Maintaining gains. Cont  current meds  6/24 Patient improved on consistent basis. Minor vocalization during ADL's but has not been agitated aggressive, psychotic, is cooperative with care. Plan cont meds, for d/c 6/27 unless change occurs.  6/27 Patient maintaining gains, d/c on hold as patient had covid exposure  6/28 Maintaining gains cont current rx covid test tomorrow

## 2022-06-28 NOTE — CHART NOTE - NSCHARTNOTEFT_GEN_A_CORE
Pt noted with L LE erythema.  P.E.   General: NAD  Vitals: TMax = 98.1* oral, HR = 102b/ min, BP = 123/ 82   Lungs CTA b/l  CV: S1--S2, RRR  Abd: Positive BS, NT  Ext: B/L LE no pitting swelling. L LE with lateral erythema, warmth, anterior 0.5 cm indentation, NT to palpation. No discharge.     A/P   67F admitted to [facility] for dementia associated with other underlying disease without behavioral disturbance, now with L LE erythema, warmth and anterior 0.5 cm indentation. likely due to folliculitis/ cellulitis. Pt with unknown allergy to PCN.   - L LE Folliculitis/ cellulitis: Starting Clindamycin 450mg po Q8* X 5 days, Lactobacillus 1 tab po TID for noma isaiah protection. Tylenol po PRN.  - Monitor for improvement of erythema.

## 2022-06-29 LAB — SARS-COV-2 RNA SPEC QL NAA+PROBE: SIGNIFICANT CHANGE UP

## 2022-06-29 PROCEDURE — 99232 SBSQ HOSP IP/OBS MODERATE 35: CPT

## 2022-06-29 PROCEDURE — 99231 SBSQ HOSP IP/OBS SF/LOW 25: CPT

## 2022-06-29 RX ADMIN — DIVALPROEX SODIUM 750 MILLIGRAM(S): 500 TABLET, DELAYED RELEASE ORAL at 21:35

## 2022-06-29 RX ADMIN — POLYETHYLENE GLYCOL 3350 17 GRAM(S): 17 POWDER, FOR SOLUTION ORAL at 12:44

## 2022-06-29 RX ADMIN — RISPERIDONE 1 MILLIGRAM(S): 4 TABLET ORAL at 12:47

## 2022-06-29 RX ADMIN — Medication 1 PACKET(S): at 12:46

## 2022-06-29 RX ADMIN — SENNA PLUS 2 TABLET(S): 8.6 TABLET ORAL at 21:35

## 2022-06-29 RX ADMIN — RISPERIDONE 1 MILLIGRAM(S): 4 TABLET ORAL at 21:35

## 2022-06-29 RX ADMIN — DIVALPROEX SODIUM 750 MILLIGRAM(S): 500 TABLET, DELAYED RELEASE ORAL at 12:47

## 2022-06-29 RX ADMIN — Medication 5 MILLIGRAM(S): at 21:35
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
## 2022-06-29 NOTE — PROGRESS NOTE ADULT - SUBJECTIVE AND OBJECTIVE BOX
Patient is a 67y old  Female who presents with a chief complaint of agitation (24 Jun 2022 07:09)      SUBJECTIVE / OVERNIGHT EVENTS:    No events overnight. Asked to assess patient for leg redness.    Patient states she does not know when redness started. States her leg is not painful or itchy. No f/c/n/v/d/cp/sob.    MEDICATIONS  (STANDING):  bisacodyl Suppository 10 milliGRAM(s) Rectal <User Schedule>  diVALproex Sprinkle 750 milliGRAM(s) Oral two times a day  melatonin. 5 milliGRAM(s) Oral at bedtime  polyethylene glycol 3350 17 Gram(s) Oral daily  psyllium Powder 1 Packet(s) Oral daily  risperiDONE   Tablet 1 milliGRAM(s) Oral two times a day  senna 2 Tablet(s) Oral at bedtime    MEDICATIONS  (PRN):  acetaminophen     Tablet .. 650 milliGRAM(s) Oral every 6 hours PRN Temp greater or equal to 38C (100.4F), Mild Pain (1 - 3), Moderate Pain (4 - 6)  bisacodyl 5 milliGRAM(s) Oral every 12 hours PRN Constipation  bisacodyl Suppository 10 milliGRAM(s) Rectal daily PRN Constipation  fluPHENAZine 1 milliGRAM(s) Oral every 6 hours PRN agitation  fluPHENAZine  Injectable 1 milliGRAM(s) IntraMuscular once PRN agitation      PHYSICAL EXAM:  T(C): 36.4 (06-29-22 @ 07:53), Max: 36.7 (06-28-22 @ 15:25)  HR: --  BP: --  RR: --  SpO2: --  I&O's Summary    GENERAL: NAD, well-developed, seated in recliner asleep, awakes to voice  HEAD:  Atraumatic, Normocephalic, MMM  CHEST/LUNG: No use of accessory muscles, CTAB, breathing non-labored  COR: RR, no mrcg  ABD: Soft, ND/NT, +BS  PSYCH: AAOx3  NEUROLOGY: CN II-XII grossly intact, moving all extremities  SKIN: faint erythema of L anterior shin with central nodule, non-tender, no fluctuance, not warmer than surrounding skin  EXT: wwp, 1+ edema to shins bilaterally, no clubbing/cyanosis    LABS:  CAPILLARY BLOOD GLUCOSE                            RADIOLOGY & ADDITIONAL TESTS:    Telemetry Personally Reviewed -     Imaging Personally Reviewed -     Imaging Reviewed -     Consultant(s) Notes Reviewed -       Care Discussed with Consultants/Other Providers - 
Subjective: Patient denies any nausea, vomiting, abdominal pain, states she last moved her bowels a couple days ago. Denies poor PO intake. Confirmed w/ nursing last bowel movement day before yesterday. Patient remains on clear liquid diet being observed and awaiting BM. Patient currently on miralax, senna and fleet enemas. ROS otherwise negative.     Vital Signs Last 24 Hrs  T(C): 36.9 (22 May 2022 07:52), Max: 36.9 (22 May 2022 07:52)  T(F): 98.5 (22 May 2022 07:52), Max: 98.5 (22 May 2022 07:52)  HR: 95  BP: 125/70    PHYSICAL EXAM:  GENERAL: NAD, well-developed, sitting in chair   HEAD: MMM   EYES: EOM grossly intact  NECK: Supple, No JVD  CHEST/LUNG: Clear to auscultation bilaterally; No wheeze  HEART: Regular rate and rhythm; No murmurs, rubs, or gallops  ABDOMEN: Soft, Nontender to light and deep palpation, Nondistended; Bowel sounds present  EXTREMITIES:  2+ Peripheral Pulses, No clubbing, cyanosis, 2+ ankle edema (chronic)  SKIN: No rashes or lesions    LABS:                         12.5   8.44  )-----------( 147      ( 21 May 2022 08:59 )             39.1     05-21    141  |  103  |  11  ----------------------------<  102<H>  3.5   |  25  |  0.53    Ca    9.2      21 May 2022 08:59    TPro  6.9  /  Alb  3.4  /  TBili  0.3  /  DBili  x   /  AST  16  /  ALT  13  /  AlkPhos  56  05-21                  RADIOLOGY, EKG & ADDITIONAL TESTS: Reviewed.     Consultant(s) Notes Reviewed:      Care Discussed with Consultants/Other Providers: Psych  
CC/Reason for Consult:   Concern for ear cerumen    SUBJECTIVE / OVERNIGHT EVENTS:  Pt is sleeping in chair in common area. She arouses easily to light touch. Agreeable to otoscope exam. Does not respond to questions regarding ROS.     MEDICATIONS  (STANDING):  carbamide peroxide Otic Solution 2 Drop(s) Both Ears two times a day  diVALproex Sprinkle 750 milliGRAM(s) Oral two times a day  melatonin. 5 milliGRAM(s) Oral at bedtime  polyethylene glycol 3350 17 Gram(s) Oral daily  psyllium Powder 1 Packet(s) Oral daily  risperiDONE   Tablet 0.5 milliGRAM(s) Oral three times a day  senna 2 Tablet(s) Oral at bedtime  triamcinolone 0.1% Cream 1 Application(s) Topical two times a day    MEDICATIONS  (PRN):  bisacodyl 5 milliGRAM(s) Oral every 12 hours PRN Constipation  bisacodyl Suppository 10 milliGRAM(s) Rectal daily PRN Constipation  fluPHENAZine 1 milliGRAM(s) Oral every 6 hours PRN agitation  fluPHENAZine  Injectable 1 milliGRAM(s) IntraMuscular once PRN agitation      Vital Signs Last 24 Hrs  T(C): 36.7 (14 Jun 2022 08:45), Max: 36.7 (14 Jun 2022 08:45)  T(F): 98.1 (14 Jun 2022 08:45), Max: 98.1 (14 Jun 2022 08:45)  HR: 84 (13 Jun 2022 20:05) (84 - 84)  BP: 146/65 (13 Jun 2022 20:05) (146/65 - 146/65)  BP(mean): --  RR: 18 (14 Jun 2022 08:45) (18 - 18)  SpO2: --  CAPILLARY BLOOD GLUCOSE            PHYSICAL EXAM:  GENERAL: Frail, elderly woman  HEAD:  Atraumatic, Normocephalic  EYES: EOMI, conjunctiva and sclera clear  ENT: copious ear cerumen b/l -- unable to appreciate tympanic membranes  NECK: Supple, No JVD  CHEST/LUNG: Clear to auscultation bilaterally; No wheeze  HEART: Regular rate and rhythm; No murmurs, rubs, or gallops  ABDOMEN: Soft, Nontender, Nondistended; Bowel sounds present  EXTREMITIES:  2+ Peripheral Pulses, No clubbing, cyanosis, or edema  PSYCH: Somnolent  NEUROLOGY: Tremor noted   SKIN: No rashes or lesions    LABS:                    RADIOLOGY & ADDITIONAL TESTS:    Imaging Personally Reviewed:    Consultant(s) Notes Reviewed:      Care Discussed with Consultants/Other Providers:  
Podiatry pager #: [phone]/ [phone]    Patient is a 67y old  Female who presents with a chief complaint of Dementia w/ Psychosis (23 May 2022 14:01) Podiatry consult for painful tender ingrown toenails of both feet aggravated by shoe gear ambulation and palpation.      HPI:      PAST MEDICAL & SURGICAL HISTORY:  TBI (traumatic brain injury)      Epilepsy      Dementia      Diabetes mellitus      Hypertension      Normal pressure hydrocephalus      Type 2 diabetes mellitus      Hypertension      Normal pressure hydrocephalus      Dementia without behavioral disturbance      Mood disorder      No significant past surgical history       (ventriculoperitoneal) shunt status          MEDICATIONS  (STANDING):  diVALproex Sprinkle 750 milliGRAM(s) Oral two times a day  melatonin. 5 milliGRAM(s) Oral at bedtime  polyethylene glycol 3350 17 Gram(s) Oral daily  psyllium Powder 1 Packet(s) Oral daily  risperiDONE   Tablet 0.5 milliGRAM(s) Oral three times a day  senna 2 Tablet(s) Oral at bedtime  triamcinolone 0.1% Cream 1 Application(s) Topical two times a day    MEDICATIONS  (PRN):  bisacodyl 5 milliGRAM(s) Oral every 12 hours PRN Constipation  fluPHENAZine 1 milliGRAM(s) Oral every 6 hours PRN agitation  fluPHENAZine  Injectable 1 milliGRAM(s) IntraMuscular once PRN agitation      Allergies    latex (Other (Mild))  latex (Unknown)  penicillin (Other)  penicillins (Unknown)  Pineapple (Hives)  Rubber (Unknown)    Intolerances        VITALS:    Vital Signs Last 24 Hrs  T(C): 36.7 (02 Jun 2022 07:51), Max: 36.9 (01 Jun 2022 20:05)  T(F): 98 (02 Jun 2022 07:51), Max: 98.5 (01 Jun 2022 20:05)  HR: 92 (01 Jun 2022 20:05) (92 - 92)  BP: 127/83 (01 Jun 2022 20:05) (127/83 - 127/83)  BP(mean): --  RR: --  SpO2: --    LABS:                CAPILLARY BLOOD GLUCOSE              LOWER EXTREMITY PHYSICAL EXAM:    Vasular: DP/PT 1_/4, B/L, CFT <2_ seconds B/L, Temperature gradient wnl_, B/L.   Neuro: Epicritic sensation _diminished to the level of _toes, B/L.  Musculoskeletal/Ortho: bunion/hammertoes bilateral  Skin: Positive dystrophic, hypertrophic, brittle toenails with subungual debris x10 toes. Positive tyloma medial aspect of right hallux IPJ with subdermal hemmorraghes and pretrophic in nature. No active ulcerations or clinical signs of infectionVasular: DP/PT _/4, B/L,     RADIOLOGY & ADDITIONAL STUDIES:    
CC/Reason for Consult:     SUBJECTIVE / OVERNIGHT EVENTS: No overnight events   Per nursing- no BM today, no documentation as to last BM   Patient does not recall date of last BM   Feels well, denies any abdominal pain, N/V, has been tolerating all PO      MEDICATIONS  (STANDING):  bisacodyl Suppository 10 milliGRAM(s) Rectal once  diVALproex Sprinkle 750 milliGRAM(s) Oral two times a day  melatonin. 5 milliGRAM(s) Oral at bedtime  polyethylene glycol 3350 17 Gram(s) Oral daily  senna 2 Tablet(s) Oral at bedtime  triamcinolone 0.1% Cream 1 Application(s) Topical two times a day    MEDICATIONS  (PRN):  fluPHENAZine 1 milliGRAM(s) Oral every 6 hours PRN agitation  fluPHENAZine  Injectable 1 milliGRAM(s) IntraMuscular once PRN agitation      Vital Signs Last 24 Hrs  T(C): 36.6 (23 May 2022 06:32), Max: 36.6 (23 May 2022 06:32)  T(F): 97.8 (23 May 2022 06:32), Max: 97.8 (23 May 2022 06:32)  HR: 83  BP: --  BP(mean): -- 124/93  RR: 12  SpO2:   CAPILLARY BLOOD GLUCOSE            PHYSICAL EXAM:  GENERAL: NAD, well-developed elderly female   HEAD:  Atraumatic, Normocephalic  EYES: EOMI, conjunctiva and sclera clear  NECK: Supple, No JVD  CHEST/LUNG: Clear to auscultation bilaterally; No wheeze  HEART: Regular rate and rhythm; No murmurs, rubs, or gallops  ABDOMEN: Soft, Nontender, Nondistended; Bowel sounds present  EXTREMITIES:  2+ Peripheral Pulses, No clubbing, cyanosis. 1+ LE non-pitting edema  NEUROLOGY: non-focal; R hand tremor   SKIN: No rashes or lesions     LABS:                            RADIOLOGY & ADDITIONAL TESTS:    Imaging Personally Reviewed:    Consultant(s) Notes Reviewed:      Care Discussed with Consultants/Other Providers: Dr Pat

## 2022-06-29 NOTE — BH INPATIENT PSYCHIATRY PROGRESS NOTE - NSBHASSESSSUMMFT_PSY_ALL_CORE
5/23 Patient recovering from recent SBO she has not shown return of agitation of risperidone.Will restart  back and substantially reduced dose as is unlikely she will cont to do well off psych meds. Reviewed with Dr. Tripp [facility] medicine will recheck labs, advance diet, change laxative  5/24 Tolerating restart of risperdal, monitor for se, check labs, re-eval for need for diuretic  5/25: Patient having BMs.  Remains relatively stable, pleasant, disorganized.  Tolerating medications well.  5/26: The patient continues to do well on the unit.  She is disorganized, but pleasant, behavior well controlled.  Tolerating medications well.  5/27: The patient continues to do well on the unit.  She is pleasant but disorganized.  Will give dulcolax suppository again today, no BM since Tuesday.  Tolerating medications well.  5/31 Calm today, recently grabbed peer. Plan increase risperdal to 0.5 mg tid. No injury from peer  6/1 Improved, no agitation, cont current meds.Cont to monitor bowel fx  6/2 Manageable no sig behavioral issues. Cont current meds, asked medciine to eval if she should go back on lasix for edema. Having BM's regularly  6/3 TBI with agitation. PAtient doing fairly well on risperdal at lower dose than prior to transfer to [facility] for SBO. Cont current meds, if any increased agitation could increase risperidone to 1mg bid at 9-5. Tremor has been worse since off Inderal. If tremor interfering with functioning such as ability to feed self could re introduce Inderal 10mg bid. Monitor for BM's .   6/4: No significant overnight events, no PRNs needed. Pleasant on encounter this morning, denies any complaints. No aggressive with peers. Eating and sleeping well.  6/6: The patient continues to be pleasant on the unit, without agitation.  She has been eating and sleeping well.  Compliant with medications.  6/7: The patient continues to do well, pleasant and without any agitation.  She has been eating and sleeping well.  Will continue current medications.  6/8: The patient continues to do well.  She is calm, pleasant, eating and sleeping well.  Will continue current medications.  6/10: The patient continues to do well on the unit.  She is pleasant, calm, eating and sleeping well.  Will continue current medications.  6/13  No major behavioral issues, tremor more since Inderal d/markel Cont current rx, if BP's higher may restart Inderal at low dose  6/14 Holding gains, will readd Inderal low dose as it helped tremor. Will place patient on regular schedule of twice a week suppository as using it as prn less reliable  6/15 Patient maintaining gains. COnt rx but will increase risperidone in order to attempt to re-titrate back to prior effect dose. Patient had BM before given suppository will cont biw suppository with hold option  6/16 Maintaining gains. Cont meds have just partially re titrated risperidone.   6/17 Patient stable. As BP low nl will d.c Inderal as may make it difficult to maintain nl BP especially if need to increase risperidone  6/18 Patient remains confused, disorganized, not agitated , AVSS  6/20 Patient calm, not agitated or aggressive. VSS. Plan cont meds, eval increasing risperdal for purpose of approximating past effective dose. Reviewed bowel regimen with medicine who recommending cont three laxatives and biw suppository as seems effective.   6/21 Patient holding gains, cont current meds, cont to re-eval need to uptitrate to prior effective dose to minimize risk of any recurrence of behaviors  6/22 Maintaining gains. Cont  current meds  6/24 Patient improved on consistent basis. Minor vocalization during ADL's but has not been agitated aggressive, psychotic, is cooperative with care. Plan cont meds, for d/c 6/27 unless change occurs.  6/27 Patient maintaining gains, d/c on hold as patient had covid exposure  6/28 Maintaining gains cont current rx covid test tomorrow  6/29 Maintaining gains. Continue current meds. Can f/u with derm as outpatient as per medicine

## 2022-06-29 NOTE — BH INPATIENT PSYCHIATRY PROGRESS NOTE - NSBHFUPINTERVALHXFT_PSY_A_CORE
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 VSS, no hypotension Patient eating sleeping okay.  Patient calm, directable. Was seen by PA last night started on clindamycine for cellulitis but seen today by medicine and clinda d/markel as impression was patient did not have an infection

## 2022-06-30 PROCEDURE — 99231 SBSQ HOSP IP/OBS SF/LOW 25: CPT

## 2022-06-30 RX ADMIN — Medication 5 MILLIGRAM(S): at 21:13

## 2022-06-30 RX ADMIN — Medication 5 MILLIGRAM(S): at 16:18

## 2022-06-30 RX ADMIN — POLYETHYLENE GLYCOL 3350 17 GRAM(S): 17 POWDER, FOR SOLUTION ORAL at 09:49

## 2022-06-30 RX ADMIN — DIVALPROEX SODIUM 750 MILLIGRAM(S): 500 TABLET, DELAYED RELEASE ORAL at 21:13

## 2022-06-30 RX ADMIN — SENNA PLUS 2 TABLET(S): 8.6 TABLET ORAL at 21:13

## 2022-06-30 RX ADMIN — RISPERIDONE 1 MILLIGRAM(S): 4 TABLET ORAL at 21:13

## 2022-06-30 RX ADMIN — Medication 1 PACKET(S): at 09:49

## 2022-06-30 RX ADMIN — DIVALPROEX SODIUM 750 MILLIGRAM(S): 500 TABLET, DELAYED RELEASE ORAL at 09:49

## 2022-06-30 RX ADMIN — RISPERIDONE 1 MILLIGRAM(S): 4 TABLET ORAL at 09:49

## 2022-06-30 NOTE — BH INPATIENT PSYCHIATRY PROGRESS NOTE - NSBHASSESSSUMMFT_PSY_ALL_CORE
5/23 Patient recovering from recent SBO she has not shown return of agitation of risperidone.Will restart  back and substantially reduced dose as is unlikely she will cont to do well off psych meds. Reviewed with Dr. Tripp [facility] medicine will recheck labs, advance diet, change laxative  5/24 Tolerating restart of risperdal, monitor for se, check labs, re-eval for need for diuretic  5/25: Patient having BMs.  Remains relatively stable, pleasant, disorganized.  Tolerating medications well.  5/26: The patient continues to do well on the unit.  She is disorganized, but pleasant, behavior well controlled.  Tolerating medications well.  5/27: The patient continues to do well on the unit.  She is pleasant but disorganized.  Will give dulcolax suppository again today, no BM since Tuesday.  Tolerating medications well.  5/31 Calm today, recently grabbed peer. Plan increase risperdal to 0.5 mg tid. No injury from peer  6/1 Improved, no agitation, cont current meds.Cont to monitor bowel fx  6/2 Manageable no sig behavioral issues. Cont current meds, asked medciine to eval if she should go back on lasix for edema. Having BM's regularly  6/3 TBI with agitation. PAtient doing fairly well on risperdal at lower dose than prior to transfer to [facility] for SBO. Cont current meds, if any increased agitation could increase risperidone to 1mg bid at 9-5. Tremor has been worse since off Inderal. If tremor interfering with functioning such as ability to feed self could re introduce Inderal 10mg bid. Monitor for BM's .   6/4: No significant overnight events, no PRNs needed. Pleasant on encounter this morning, denies any complaints. No aggressive with peers. Eating and sleeping well.  6/6: The patient continues to be pleasant on the unit, without agitation.  She has been eating and sleeping well.  Compliant with medications.  6/7: The patient continues to do well, pleasant and without any agitation.  She has been eating and sleeping well.  Will continue current medications.  6/8: The patient continues to do well.  She is calm, pleasant, eating and sleeping well.  Will continue current medications.  6/10: The patient continues to do well on the unit.  She is pleasant, calm, eating and sleeping well.  Will continue current medications.  6/13  No major behavioral issues, tremor more since Inderal d/markel Cont current rx, if BP's higher may restart Inderal at low dose  6/14 Holding gains, will readd Inderal low dose as it helped tremor. Will place patient on regular schedule of twice a week suppository as using it as prn less reliable  6/15 Patient maintaining gains. COnt rx but will increase risperidone in order to attempt to re-titrate back to prior effect dose. Patient had BM before given suppository will cont biw suppository with hold option  6/16 Maintaining gains. Cont meds have just partially re titrated risperidone.   6/17 Patient stable. As BP low nl will d.c Inderal as may make it difficult to maintain nl BP especially if need to increase risperidone  6/18 Patient remains confused, disorganized, not agitated , AVSS  6/20 Patient calm, not agitated or aggressive. VSS. Plan cont meds, eval increasing risperdal for purpose of approximating past effective dose. Reviewed bowel regimen with medicine who recommending cont three laxatives and biw suppository as seems effective.   6/21 Patient holding gains, cont current meds, cont to re-eval need to uptitrate to prior effective dose to minimize risk of any recurrence of behaviors  6/22 Maintaining gains. Cont  current meds  6/24 Patient improved on consistent basis. Minor vocalization during ADL's but has not been agitated aggressive, psychotic, is cooperative with care. Plan cont meds, for d/c 6/27 unless change occurs.  6/27 Patient maintaining gains, d/c on hold as patient had covid exposure  6/28 Maintaining gains cont current rx covid test tomorrow  6/29 Maintaining gains. Continue current meds. Can f/u with derm as outpatient as per medicine  6/30 Patient maintaining gains. Cont current meds, monitor vitals, monitor BM pattern gets suppository biw Available

## 2022-06-30 NOTE — BH INPATIENT PSYCHIATRY PROGRESS NOTE - NSBHFUPINTERVALHXFT_PSY_A_CORE
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 Low AM BP but nl when  verified by nurse.  Patient eating sleeping okay.  Patient calm, directable. Was seen by PA  started on clindamycin for cellulitis but seen today by medicine and clinda d/markel as impression was patient did not have an infection

## 2022-07-01 PROCEDURE — 99231 SBSQ HOSP IP/OBS SF/LOW 25: CPT

## 2022-07-01 RX ADMIN — Medication 5 MILLIGRAM(S): at 21:33

## 2022-07-01 RX ADMIN — RISPERIDONE 1 MILLIGRAM(S): 4 TABLET ORAL at 21:30

## 2022-07-01 RX ADMIN — Medication 10 MILLIGRAM(S): at 15:28

## 2022-07-01 RX ADMIN — Medication 1 PACKET(S): at 08:22

## 2022-07-01 RX ADMIN — Medication 5 MILLIGRAM(S): at 08:22

## 2022-07-01 RX ADMIN — RISPERIDONE 1 MILLIGRAM(S): 4 TABLET ORAL at 08:22

## 2022-07-01 RX ADMIN — DIVALPROEX SODIUM 750 MILLIGRAM(S): 500 TABLET, DELAYED RELEASE ORAL at 08:22

## 2022-07-01 RX ADMIN — DIVALPROEX SODIUM 750 MILLIGRAM(S): 500 TABLET, DELAYED RELEASE ORAL at 21:30

## 2022-07-01 RX ADMIN — POLYETHYLENE GLYCOL 3350 17 GRAM(S): 17 POWDER, FOR SOLUTION ORAL at 08:22

## 2022-07-01 NOTE — BH TREATMENT PLAN - NSTXPLANTHERAPYSESSIONSFT_PSY_ALL_CORE
06-28-22  Type of therapy: Coping skills,Creative arts therapy,Health and fitness,Spirituality,Stress management  Type of session: Group  Level of patient participation: Participated with encouragement  Duration of participation: 45 minutes  Therapy conducted by: Psych rehab  Therapy Summary: In response to the COVID-19 outbreak there has been a shift in hospital wide policies and protocols. As a result it should be noted the unit activities and structure have been temporarily modified to promote safety of patients and staff. Patient over the past week made some gains towards her rehab goals. Patient with minimal encouragement participates in most of the morning and afternoon rehab groups. Duruing activities patient is generally pleasant, social and is able to stay task focused with minimal to moderate redirection. Patient responds well to the music, social, and some of the exercise groups. Patient is compliant with her medications and eats her meals with the community. However, patient remains disorganized, confused and requires assistance with her ADL's. Patient continues to require a structured supervised environment.    06-28-22  Type of therapy: Other,Physical therapy  Type of session: Individual  Level of patient participation: Participates  --  Therapy conducted by: Other (specify),Physical therapy  Therapy Summary: Patient participated in gait training and therapeutic activity.  
  06-21-22  Type of therapy: Coping skills,Creative arts therapy,Health and fitness,Spirituality,Stress management  Type of session: Group  Level of patient participation: Participated with encouragement  Duration of participation: 45 minutes  Therapy conducted by: Psych rehab  Therapy Summary: In response to the COVID-19 outbreak there has been a shift in hospital wide policies and protocols. As a result is should be noted the unit activities and structure have been temporarily modified to promote safety of patients and staff. Patient over the past week made some gains towards her rehab goals. Patient is less restless and is able to sit with the community for longer periods. Patient's is generally pleasant, cooperative and social. Patient is compliant with her medications and eats her meals with the community. Patient with minimal to moderate encouragement participates in most of the morning and afternoon rehab groups. During activities patient is pleasant, but requires minimal to moderate redirection to stay task focused. Patient responds to the music, exercise, meditation and social groups. However, patient remains confused, forgetful and disorganized. Patient requires assistance with her hygiene and grooming. Patient at times becomes moderately irritable.    06-23-22  Type of therapy: Other,Physical therapy  Type of session: Individual  Level of patient participation: Participates  --  Therapy conducted by: Other (specify),Physical therapy  Therapy Summary: Patient participated in ambulation on the unit  
  05-16-22  Type of therapy: Coping skills,Creative arts therapy,Health and fitness,Spirituality,Stress management  Type of session: Group  Level of patient participation: Participated with encouragement  Duration of participation: 45 minutes  Therapy conducted by: Psych rehab  Therapy Summary: In response to the COVID-19 outbreak there has been a shift in hospital wide policies and protocols. As a result it should be noted the unit activities and structure have been temporarily modified to promote safety of patients and staff. Patient over the past week did not make any major gains. Patient remains confused, forgetful and at times becomes agitated. Patient requires assistance with her hygiene and grooming. However, overall patient is pleasant, redirectable, and compliant with her medications. Patient with minimal to moderate encouragement participates in most of the rehab activities. Patient responds well to the music, art, exercise, and meditation groups. Patient ambulates independently. Patient eats her meals with the community.    05-16-22  Type of therapy: Other,Physical therapy  Type of session: Individual  Level of patient participation: Participates  --  Therapy conducted by: Other (specify),Physical therapy  Therapy Summary: Patient engaged in therapeutic activity and ambulation.  
  05-23-22  Type of therapy: Other,Physical therapy  Type of session: Individual  Level of patient participation: Participates  --  Therapy conducted by: Other (specify),Physical therapy  Therapy Summary: Patient engaged in therapeutic activity and ambulation.    05-25-22  Type of therapy: Coping skills,Creative arts therapy,Health and fitness,Spirituality,Stress management  Type of session: Group  Level of patient participation: Participated with encouragement  Duration of participation: 45 minutes  Therapy conducted by: Psych rehab  Therapy Summary: In response to the COVID-19 outbreak there has been a shift in hospital wide policies and protocols. As a result it should be noted the on unit activities and structure have been temporarily modified to promote safety of patient's and staff. Patient over the past week maintained gains made in the previous week. Patient is compliant with her medications and eats her meals with the community. Patient with minimal to moderate prompting participated in most of the morning and afternoon rehab groups. During groups patient is generally pleasant, cooperative and social. Patient is able to stay task focused with moderate redirection. Patient responds to the exercise, music, art and social groups. However, patient remains confused, forgetful and at times restless. Patient at times becomes irrtiable and requires assistance with her ADL's.  
  06-13-22  Type of therapy: Other,Physical therapy  Type of session: Individual  Level of patient participation: Participates  --  Therapy conducted by: Other (specify),Physical therapy  Therapy Summary: Patient participated in functional transfer and gait training    06-14-22  Type of therapy: Coping skills,Creative arts therapy,Health and fitness,Spirituality,Stress management  Type of session: Group  Level of patient participation: Participated with encouragement  Duration of participation: 45 minutes  Therapy conducted by: Psych rehab  Therapy Summary: In response to the COVID-19 outbreak there has been a shift in hospital wide policies and protocols. As a result it should be noted the unit activities and structure have been temporarily modified to promote safety of patients and staff. Patient is maintaining gains made in the previous week. Overall patient is very pleasant, social, and cooperative. Patient is compliant with her medications, eats her meals with the community and ambulates independently. Patient with minimal encouragement participates in most of the morning and afternoon rehab groups. During activities patient is social, and pleasant. Patient is able to stay task focused with moderate to maximum redirection. Patient responds to the music, social, exercise and meditation groups. However, patient continues to display symptoms of confusion, and disorganization. Patient requires assistance with her hygiene and grooming.  
  05-30-22  Type of therapy: Other,Physical therapy  Type of session: Individual  Level of patient participation: Participates  --  Therapy conducted by: Other (specify),Physical therapy  Therapy Summary: Patient participated in ambulation and fucntional transfer.    06-01-22  Type of therapy: Coping skills,Creative arts therapy,Health and fitness,Spirituality,Stress management  Type of session: Group  Level of patient participation: Participated with encouragement  Duration of participation: 45 minutes  Therapy conducted by: Psych rehab  Therapy Summary: In response to the COVID-19 outbreak there has been a shift in hospital wide policies and protocols. As a result it should be noted the unit activities and structure have been temporarily modified to promote safety of patients and staff. Patient over the past week did not make any major gains towards her rehab goals. However, patient is sustaining gains made in the previous weeks. Patient is generally pleasant, cooperative, and socila. Patient is compliant with her medications and eats her meals with the community. Patient with minimal to moderate encouragement participates in most of the morning and afternoon rehab groups. Patient appears to enjoy the music, exercise, leisure and social groups. However, patient at times becomes agitated, verbally abusive and restless. Patient remains confused and requires assistance with her hygiene and grooming. Patient continues to require a structured environment.    06-01-22  Type of therapy: Other,Physical therapy  Type of session: Individual  Level of patient participation: Participates  --  Therapy conducted by: Other (specify),Physical therapy  Therapy Summary: Patient participated in ambulation with supervision  
  06-08-22  Type of therapy: Coping skills,Creative arts therapy,Health and fitness,Spirituality,Stress management  Type of session: Group  Level of patient participation: Participated with encouragement  Duration of participation: 45 minutes  Therapy conducted by: Psych rehab  Therapy Summary: In response to the COVID-19 outbreak there has been a shift in hospital wide policies and protocols. As a result it should be noted the unit activities and structure have been temporarily modified to promote safety of patients and staff.  Patient is sustaining gains from the previous week. Overall patient is cooperative, pleasant and social. Patient is compliant with her medications and eats her meals with the community. Patient responds well to supportive encouragement and redirection. Patient with minimal encouragement participates in the exercise, music, art, leisure and social activities. However, patient remains confused and requires assistance with her ADL's. Patient at times  becomes briefly irritable. Patient requires a structured supervised environment.

## 2022-07-01 NOTE — BH INPATIENT PSYCHIATRY PROGRESS NOTE - NSBHASSESSSUMMFT_PSY_ALL_CORE
5/23 Patient recovering from recent SBO she has not shown return of agitation of risperidone.Will restart  back and substantially reduced dose as is unlikely she will cont to do well off psych meds. Reviewed with Dr. Tripp [facility] medicine will recheck labs, advance diet, change laxative  5/24 Tolerating restart of risperdal, monitor for se, check labs, re-eval for need for diuretic  5/25: Patient having BMs.  Remains relatively stable, pleasant, disorganized.  Tolerating medications well.  5/26: The patient continues to do well on the unit.  She is disorganized, but pleasant, behavior well controlled.  Tolerating medications well.  5/27: The patient continues to do well on the unit.  She is pleasant but disorganized.  Will give dulcolax suppository again today, no BM since Tuesday.  Tolerating medications well.  5/31 Calm today, recently grabbed peer. Plan increase risperdal to 0.5 mg tid. No injury from peer  6/1 Improved, no agitation, cont current meds.Cont to monitor bowel fx  6/2 Manageable no sig behavioral issues. Cont current meds, asked medciine to eval if she should go back on lasix for edema. Having BM's regularly  6/3 TBI with agitation. PAtient doing fairly well on risperdal at lower dose than prior to transfer to [facility] for SBO. Cont current meds, if any increased agitation could increase risperidone to 1mg bid at 9-5. Tremor has been worse since off Inderal. If tremor interfering with functioning such as ability to feed self could re introduce Inderal 10mg bid. Monitor for BM's .   6/4: No significant overnight events, no PRNs needed. Pleasant on encounter this morning, denies any complaints. No aggressive with peers. Eating and sleeping well.  6/6: The patient continues to be pleasant on the unit, without agitation.  She has been eating and sleeping well.  Compliant with medications.  6/7: The patient continues to do well, pleasant and without any agitation.  She has been eating and sleeping well.  Will continue current medications.  6/8: The patient continues to do well.  She is calm, pleasant, eating and sleeping well.  Will continue current medications.  6/10: The patient continues to do well on the unit.  She is pleasant, calm, eating and sleeping well.  Will continue current medications.  6/13  No major behavioral issues, tremor more since Inderal d/markel Cont current rx, if BP's higher may restart Inderal at low dose  6/14 Holding gains, will readd Inderal low dose as it helped tremor. Will place patient on regular schedule of twice a week suppository as using it as prn less reliable  6/15 Patient maintaining gains. COnt rx but will increase risperidone in order to attempt to re-titrate back to prior effect dose. Patient had BM before given suppository will cont biw suppository with hold option  6/16 Maintaining gains. Cont meds have just partially re titrated risperidone.   6/17 Patient stable. As BP low nl will d.c Inderal as may make it difficult to maintain nl BP especially if need to increase risperidone  6/18 Patient remains confused, disorganized, not agitated , AVSS  6/20 Patient calm, not agitated or aggressive. VSS. Plan cont meds, eval increasing risperdal for purpose of approximating past effective dose. Reviewed bowel regimen with medicine who recommending cont three laxatives and biw suppository as seems effective.   6/21 Patient holding gains, cont current meds, cont to re-eval need to uptitrate to prior effective dose to minimize risk of any recurrence of behaviors  6/22 Maintaining gains. Cont  current meds  6/24 Patient improved on consistent basis. Minor vocalization during ADL's but has not been agitated aggressive, psychotic, is cooperative with care. Plan cont meds, for d/c 6/27 unless change occurs.  6/27 Patient maintaining gains, d/c on hold as patient had covid exposure  6/28 Maintaining gains cont current rx covid test tomorrow  6/29 Maintaining gains. Continue current meds. Can f/u with derm as outpatient as per medicine  6/30 Patient maintaining gains. Cont current meds, monitor vitals, monitor BM pattern gets suppository biw  7/1 Patient holding on to gains. Cont current rx monitor for bowel pattern, Bp's

## 2022-07-01 NOTE — BH TREATMENT PLAN - NSTXDCOTHRGOAL_PSY_ALL_CORE
The pt will maintain behavioral control and be calm enough to be cared for in an SNF. The pt will be accept back to [facility] for resumption of her LTC.
The pt will maintain behavioral control and be calm enough to be discharged to an SNF for her LTC.
The pt will maintain behavioral control and be calm enough to be cared for in an SNF. The pt will be accept back to [facility] for resumption of her LTC.
The pt will maintain behavioral control and be calm enough to be cared for in her prior SNF, [facility], for her LTC.
The pt will maintain behavioral control and be calm enough to be cared for in an SNF on a dementia unit for her LTC. Efforts will be directed towards pt's return to her prior SNF, [facility].
The pt will maintain behavioral control and be calm enough to be cared for in an SNF on a dementia unit for her LTC. Efforts will be directed towards the pt's return to her prior SNF, [facility].

## 2022-07-01 NOTE — BH TREATMENT PLAN - NSTXCAREGIVERPARTICIPATE_PSY_P_CORE
Family/Caregiver participated in identification of needs/problems/goals for treatment
Family/Caregiver participated in identification of needs/problems/goals for treatment/Family/Caregiver participated in defining interventions
Family/Caregiver participated in identification of needs/problems/goals for treatment

## 2022-07-01 NOTE — BH TREATMENT PLAN - NSTXPROBCONF_PSY_ALL_CORE
CONFUSION, ACUTE/CHRONIC

## 2022-07-01 NOTE — BH TREATMENT PLAN - NSTXCOPEINTERPR_PSY_ALL_CORE
Rehab activities and 1:1 sessions will be utilized daily to increase patient's frustration tolerance and increase tolerance to structured activities within seven days.
Rehab activities will be utilized daily to increase patient's frustration tolerance and decrease irritability within seven days.
Rehab activities will be utilized daily to increase patient's frustration tolerance and decrease restlessness within seven days.
Rehab activities will be utilized daily to increase patient's tolerance to structured activities within seven days.
Rehab activities will be utilized daily to increase patient's tolerance to structured activities and decrease patient's restlessness within seven days.

## 2022-07-01 NOTE — BH TREATMENT PLAN - NSTXDCOTHRINTERSW_PSY_ALL_CORE
SW will provide pt suport and redirection as needed. SW will maintain contact with the pt's , providing treatment updates and addressing hospital efforts to pursue the pt's return to her prior residence, [facility]. SW will provide administration/legal information regarding pt's current status and address the appropriateness of the pt's return to [facility].
SW will provide pt support and redirection as needed. SW will maintain contact with the pt's , providing treatment updates and addressing discharge issues. CAMILO will activate discharge planning efforts and send clinical information to [facility]. CAMILO will inform SW admiinistration regarding the status of disposition efforts.
SW will provide pt support and redirection as needed. SW will maintain contact with the pt's , providing treatment updates and addresssing discharge plans. Discharge to an SNF today was put on hold due to pt having a covid exposure. SNF placement will be reactivated when quarantine period is over.
SW will provide pt support and redirection as needed. SW will maintain contact with pt's , providing treatment updates and addressing discharge planning issues. SNF placement efforts will be activated as the pt shows stabilization and directed towards pt's return to her prior SNF, [facility].
SW will provide the pt support and redirection as needed. SW will maintain contact with the pt's , providing treatment updates and addressing discharge planning issues. SW will activate SNF placement efforts as the pt shows stabilization with goal of pt returning to her prior SNF.
SW will provide pt support and redirection as needed. SW will maintain contact with pt's , providing treatment updates. SW will extend SNF placement efforts.

## 2022-07-01 NOTE — BH TREATMENT PLAN - NSTXPLANSTARTDATE_PSY_ALL_CORE
09-Jun-2022 11:25
20-May-2022 07:25
03-Jun-2022 14:15
17-Jun-2022 07:28
26-May-2022 10:49
24-Jun-2022 06:56
01-Jul-2022 07:20

## 2022-07-01 NOTE — BH TREATMENT PLAN - NSDCCRITERIA_PSY_ALL_CORE
Pending placement

## 2022-07-01 NOTE — BH TREATMENT PLAN - NSTXPATIENTAGREEMENT_PSY_ALL_CORE
Patient unable to participate

## 2022-07-01 NOTE — BH TREATMENT PLAN - NSBHPRIMARYDX_PSY_ALL_CORE
Dementia with psychosis    
Dementia following traumatic brain injury    
Dementia with psychosis    

## 2022-07-01 NOTE — BH INPATIENT PSYCHIATRY PROGRESS NOTE - NSBHFUPINTERVALHXFT_PSY_A_CORE
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 Low AM BP but nl when  verified by nurse.  Patient eating sleeping okay.  Patient calm, directable. Was seen by PA  started on clindamycin for cellulitis but seen today by medicine and clinda d/markel as impression was patient did not have an infection. BP tends to vary within nl range

## 2022-07-01 NOTE — BH TREATMENT PLAN - NSTXCONFGOAL_PSY_ALL_CORE
Will be able to identify when to ask for assistance
Will be able to identify when to ask for assistance
Will ask for assistance as required
Will be able to identify when to ask for assistance
Will be able to identify when to ask for assistance
Will ask for assistance as required

## 2022-07-01 NOTE — BH TREATMENT PLAN - NSTXPATIENTPARTICIPATE_PSY_ALL_CORE
No, patient unwilling to participate

## 2022-07-02 RX ADMIN — POLYETHYLENE GLYCOL 3350 17 GRAM(S): 17 POWDER, FOR SOLUTION ORAL at 09:17

## 2022-07-02 RX ADMIN — DIVALPROEX SODIUM 750 MILLIGRAM(S): 500 TABLET, DELAYED RELEASE ORAL at 20:44

## 2022-07-02 RX ADMIN — RISPERIDONE 1 MILLIGRAM(S): 4 TABLET ORAL at 20:47

## 2022-07-02 RX ADMIN — SENNA PLUS 2 TABLET(S): 8.6 TABLET ORAL at 20:45

## 2022-07-02 RX ADMIN — Medication 5 MILLIGRAM(S): at 20:46

## 2022-07-02 RX ADMIN — RISPERIDONE 1 MILLIGRAM(S): 4 TABLET ORAL at 09:16

## 2022-07-02 RX ADMIN — Medication 1 PACKET(S): at 09:17

## 2022-07-02 RX ADMIN — DIVALPROEX SODIUM 750 MILLIGRAM(S): 500 TABLET, DELAYED RELEASE ORAL at 09:16

## 2022-07-03 LAB — SARS-COV-2 RNA SPEC QL NAA+PROBE: SIGNIFICANT CHANGE UP

## 2022-07-03 RX ADMIN — Medication 5 MILLIGRAM(S): at 21:11

## 2022-07-03 RX ADMIN — POLYETHYLENE GLYCOL 3350 17 GRAM(S): 17 POWDER, FOR SOLUTION ORAL at 09:49

## 2022-07-03 RX ADMIN — Medication 1 PACKET(S): at 09:49

## 2022-07-03 RX ADMIN — DIVALPROEX SODIUM 750 MILLIGRAM(S): 500 TABLET, DELAYED RELEASE ORAL at 21:09

## 2022-07-03 RX ADMIN — SENNA PLUS 2 TABLET(S): 8.6 TABLET ORAL at 21:10

## 2022-07-03 RX ADMIN — Medication 10 MILLIGRAM(S): at 21:11

## 2022-07-03 RX ADMIN — RISPERIDONE 1 MILLIGRAM(S): 4 TABLET ORAL at 21:11

## 2022-07-03 RX ADMIN — DIVALPROEX SODIUM 750 MILLIGRAM(S): 500 TABLET, DELAYED RELEASE ORAL at 09:49

## 2022-07-03 RX ADMIN — RISPERIDONE 1 MILLIGRAM(S): 4 TABLET ORAL at 09:48

## 2022-07-04 RX ADMIN — POLYETHYLENE GLYCOL 3350 17 GRAM(S): 17 POWDER, FOR SOLUTION ORAL at 09:43

## 2022-07-04 RX ADMIN — Medication 1 PACKET(S): at 09:42

## 2022-07-04 RX ADMIN — SENNA PLUS 2 TABLET(S): 8.6 TABLET ORAL at 20:45

## 2022-07-04 RX ADMIN — RISPERIDONE 1 MILLIGRAM(S): 4 TABLET ORAL at 09:43

## 2022-07-04 RX ADMIN — DIVALPROEX SODIUM 750 MILLIGRAM(S): 500 TABLET, DELAYED RELEASE ORAL at 09:42

## 2022-07-04 RX ADMIN — DIVALPROEX SODIUM 750 MILLIGRAM(S): 500 TABLET, DELAYED RELEASE ORAL at 20:44

## 2022-07-04 RX ADMIN — Medication 5 MILLIGRAM(S): at 20:45

## 2022-07-04 RX ADMIN — RISPERIDONE 1 MILLIGRAM(S): 4 TABLET ORAL at 20:46

## 2022-07-05 PROCEDURE — 99231 SBSQ HOSP IP/OBS SF/LOW 25: CPT

## 2022-07-05 RX ADMIN — DIVALPROEX SODIUM 750 MILLIGRAM(S): 500 TABLET, DELAYED RELEASE ORAL at 21:11

## 2022-07-05 RX ADMIN — RISPERIDONE 1 MILLIGRAM(S): 4 TABLET ORAL at 21:10

## 2022-07-05 RX ADMIN — DIVALPROEX SODIUM 750 MILLIGRAM(S): 500 TABLET, DELAYED RELEASE ORAL at 09:40

## 2022-07-05 RX ADMIN — Medication 5 MILLIGRAM(S): at 21:11

## 2022-07-05 RX ADMIN — Medication 1 PACKET(S): at 09:40

## 2022-07-05 RX ADMIN — RISPERIDONE 1 MILLIGRAM(S): 4 TABLET ORAL at 09:40

## 2022-07-05 RX ADMIN — Medication 10 MILLIGRAM(S): at 16:45

## 2022-07-05 RX ADMIN — POLYETHYLENE GLYCOL 3350 17 GRAM(S): 17 POWDER, FOR SOLUTION ORAL at 09:39

## 2022-07-05 RX ADMIN — SENNA PLUS 2 TABLET(S): 8.6 TABLET ORAL at 21:11

## 2022-07-05 NOTE — BH INPATIENT PSYCHIATRY PROGRESS NOTE - NSBHFUPINTERVALHXFT_PSY_A_CORE
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 VSS  Patient eating sleeping okay.  Patient calm, directable..Eating sleeping well. Walking around unit c slowly . Talks in pleasant way with peers, staff

## 2022-07-05 NOTE — BH INPATIENT PSYCHIATRY PROGRESS NOTE - NSBHASSESSSUMMFT_PSY_ALL_CORE
5/23 Patient recovering from recent SBO she has not shown return of agitation of risperidone.Will restart  back and substantially reduced dose as is unlikely she will cont to do well off psych meds. Reviewed with Dr. Tripp Pending sale to [facility] medicine will recheck labs, advance diet, change laxative  5/24 Tolerating restart of risperdal, monitor for se, check labs, re-eval for need for diuretic  5/25: Patient having BMs.  Remains relatively stable, pleasant, disorganized.  Tolerating medications well.  5/26: The patient continues to do well on the unit.  She is disorganized, but pleasant, behavior well controlled.  Tolerating medications well.  5/27: The patient continues to do well on the unit.  She is pleasant but disorganized.  Will give dulcolax suppository again today, no BM since Tuesday.  Tolerating medications well.  5/31 Calm today, recently grabbed peer. Plan increase risperdal to 0.5 mg tid. No injury from peer  6/1 Improved, no agitation, cont current meds.Cont to monitor bowel fx  6/2 Manageable no sig behavioral issues. Cont current meds, asked medciine to eval if she should go back on lasix for edema. Having BM's regularly  6/3 TBI with agitation. PAtient doing fairly well on risperdal at lower dose than prior to transfer to [facility] for SBO. Cont current meds, if any increased agitation could increase risperidone to 1mg bid at 9-5. Tremor has been worse since off Inderal. If tremor interfering with functioning such as ability to feed self could re introduce Inderal 10mg bid. Monitor for BM's .   6/4: No significant overnight events, no PRNs needed. Pleasant on encounter this morning, denies any complaints. No aggressive with peers. Eating and sleeping well.  6/6: The patient continues to be pleasant on the unit, without agitation.  She has been eating and sleeping well.  Compliant with medications.  6/7: The patient continues to do well, pleasant and without any agitation.  She has been eating and sleeping well.  Will continue current medications.  6/8: The patient continues to do well.  She is calm, pleasant, eating and sleeping well.  Will continue current medications.  6/10: The patient continues to do well on the unit.  She is pleasant, calm, eating and sleeping well.  Will continue current medications.  6/13  No major behavioral issues, tremor more since Inderal d/markel Cont current rx, if BP's higher may restart Inderal at low dose  6/14 Holding gains, will readd Inderal low dose as it helped tremor. Will place patient on regular schedule of twice a week suppository as using it as prn less reliable  6/15 Patient maintaining gains. COnt rx but will increase risperidone in order to attempt to re-titrate back to prior effect dose. Patient had BM before given suppository will cont biw suppository with hold option  6/16 Maintaining gains. Cont meds have just partially re titrated risperidone.   6/17 Patient stable. As BP low nl will d.c Inderal as may make it difficult to maintain nl BP especially if need to increase risperidone  6/18 Patient remains confused, disorganized, not agitated , AVSS  6/20 Patient calm, not agitated or aggressive. VSS. Plan cont meds, eval increasing risperdal for purpose of approximating past effective dose. Reviewed bowel regimen with medicine who recommending cont three laxatives and biw suppository as seems effective.   6/21 Patient holding gains, cont current meds, cont to re-eval need to uptitrate to prior effective dose to minimize risk of any recurrence of behaviors  6/22 Maintaining gains. Cont  current meds  6/24 Patient improved on consistent basis. Minor vocalization during ADL's but has not been agitated aggressive, psychotic, is cooperative with care. Plan cont meds, for d/c 6/27 unless change occurs.  6/27 Patient maintaining gains, d/c on hold as patient had covid exposure  6/28 Maintaining gains cont current rx covid test tomorrow  6/29 Maintaining gains. Continue current meds. Can f/u with derm as outpatient as per medicine  6/30 Patient maintaining gains. Cont current meds, monitor vitals, monitor BM pattern gets suppository biw  7/1 Patient holding on to gains. Cont current rx monitor for bowel pattern, Bp's  7/5 Maintaining gains, cont current medication regimen tolerating well has tremor but not rigid, VSS

## 2022-07-06 LAB — SARS-COV-2 RNA SPEC QL NAA+PROBE: SIGNIFICANT CHANGE UP

## 2022-07-06 PROCEDURE — 99231 SBSQ HOSP IP/OBS SF/LOW 25: CPT

## 2022-07-06 RX ORDER — HALOPERIDOL DECANOATE 100 MG/ML
2 INJECTION INTRAMUSCULAR ONCE
Refills: 0 | Status: COMPLETED | OUTPATIENT
Start: 2022-07-07 | End: 2022-07-07

## 2022-07-06 RX ADMIN — RISPERIDONE 1 MILLIGRAM(S): 4 TABLET ORAL at 11:59

## 2022-07-06 RX ADMIN — DIVALPROEX SODIUM 750 MILLIGRAM(S): 500 TABLET, DELAYED RELEASE ORAL at 21:11

## 2022-07-06 RX ADMIN — DIVALPROEX SODIUM 750 MILLIGRAM(S): 500 TABLET, DELAYED RELEASE ORAL at 11:58

## 2022-07-06 RX ADMIN — Medication 5 MILLIGRAM(S): at 21:11

## 2022-07-06 RX ADMIN — RISPERIDONE 1 MILLIGRAM(S): 4 TABLET ORAL at 21:10

## 2022-07-06 RX ADMIN — SENNA PLUS 2 TABLET(S): 8.6 TABLET ORAL at 21:11

## 2022-07-06 RX ADMIN — Medication 1 PACKET(S): at 11:59

## 2022-07-06 RX ADMIN — POLYETHYLENE GLYCOL 3350 17 GRAM(S): 17 POWDER, FOR SOLUTION ORAL at 11:59

## 2022-07-06 NOTE — BH DISCHARGE NOTE NURSING/SOCIAL WORK/PSYCH REHAB - NSDCPRGOAL_PSY_ALL_CORE
Patient initially presented with symptoms of confusion, restlessness, agitation and aggressive behavior. Patient initial goals included increasing her frustration tolerance, decreasing agitated behavior and increasing tolerance to structured activities. Patient made gains towards her rehab goals as evidenced by patient's brighter affect, increased frustration tolerance, non-aggressive behavior and daily participation in rehab groups. Patient also demonstrated daily medication compliance. However, patient requires assistance with her hygiene and grooming. Patient requires a structured supervised environment.     ***Patient did not complete a Press Ganey due confusion.

## 2022-07-06 NOTE — BH DISCHARGE NOTE NURSING/SOCIAL WORK/PSYCH REHAB - DISCHARGE INSTRUCTIONS AFTERCARE APPOINTMENTS
In order to check the location, date, or time of your aftercare appointment, please refer to your Discharge Instructions Document given to you upon leaving the hospital.  If you have lost the instructions please call [phone]

## 2022-07-06 NOTE — BH DISCHARGE NOTE NURSING/SOCIAL WORK/PSYCH REHAB - NSDCPRRECOMMEND_PSY_ALL_CORE
Patient is scheduled to be discharged today and will reside at [facility]. Patient responds well to structured social and leisure activities.

## 2022-07-06 NOTE — BH DISCHARGE NOTE NURSING/SOCIAL WORK/PSYCH REHAB - NSDCVIVACCINE_GEN_ALL_CORE_FT
Tdap; 20-May-2020 12:35; Hussein Haywood (RN); [employer]; [id number] (Exp. Date: 19-Mar-2022); IntraMuscular; Deltoid Left.; 0.5 milliLiter(s); VIS (VIS Published: 09-May-2013, VIS Presented: 20-May-2020);

## 2022-07-06 NOTE — BH INPATIENT PSYCHIATRY PROGRESS NOTE - NSBHASSESSSUMMFT_PSY_ALL_CORE
5/23 Patient recovering from recent SBO she has not shown return of agitation of risperidone.Will restart  back and substantially reduced dose as is unlikely she will cont to do well off psych meds. Reviewed with Dr. Tirpp [facility] medicine will recheck labs, advance diet, change laxative  5/24 Tolerating restart of risperdal, monitor for se, check labs, re-eval for need for diuretic  5/25: Patient having BMs.  Remains relatively stable, pleasant, disorganized.  Tolerating medications well.  5/26: The patient continues to do well on the unit.  She is disorganized, but pleasant, behavior well controlled.  Tolerating medications well.  5/27: The patient continues to do well on the unit.  She is pleasant but disorganized.  Will give dulcolax suppository again today, no BM since Tuesday.  Tolerating medications well.  5/31 Calm today, recently grabbed peer. Plan increase risperdal to 0.5 mg tid. No injury from peer  6/1 Improved, no agitation, cont current meds.Cont to monitor bowel fx  6/2 Manageable no sig behavioral issues. Cont current meds, asked medciine to eval if she should go back on lasix for edema. Having BM's regularly  6/3 TBI with agitation. PAtient doing fairly well on risperdal at lower dose than prior to transfer to [facility] for SBO. Cont current meds, if any increased agitation could increase risperidone to 1mg bid at 9-5. Tremor has been worse since off Inderal. If tremor interfering with functioning such as ability to feed self could re introduce Inderal 10mg bid. Monitor for BM's .   6/4: No significant overnight events, no PRNs needed. Pleasant on encounter this morning, denies any complaints. No aggressive with peers. Eating and sleeping well.  6/6: The patient continues to be pleasant on the unit, without agitation.  She has been eating and sleeping well.  Compliant with medications.  6/7: The patient continues to do well, pleasant and without any agitation.  She has been eating and sleeping well.  Will continue current medications.  6/8: The patient continues to do well.  She is calm, pleasant, eating and sleeping well.  Will continue current medications.  6/10: The patient continues to do well on the unit.  She is pleasant, calm, eating and sleeping well.  Will continue current medications.  6/13  No major behavioral issues, tremor more since Inderal d/markel Cont current rx, if BP's higher may restart Inderal at low dose  6/14 Holding gains, will readd Inderal low dose as it helped tremor. Will place patient on regular schedule of twice a week suppository as using it as prn less reliable  6/15 Patient maintaining gains. COnt rx but will increase risperidone in order to attempt to re-titrate back to prior effect dose. Patient had BM before given suppository will cont biw suppository with hold option  6/16 Maintaining gains. Cont meds have just partially re titrated risperidone.   6/17 Patient stable. As BP low nl will d.c Inderal as may make it difficult to maintain nl BP especially if need to increase risperidone  6/18 Patient remains confused, disorganized, not agitated , AVSS  6/20 Patient calm, not agitated or aggressive. VSS. Plan cont meds, eval increasing risperdal for purpose of approximating past effective dose. Reviewed bowel regimen with medicine who recommending cont three laxatives and biw suppository as seems effective.   6/21 Patient holding gains, cont current meds, cont to re-eval need to uptitrate to prior effective dose to minimize risk of any recurrence of behaviors  6/22 Maintaining gains. Cont  current meds  6/24 Patient improved on consistent basis. Minor vocalization during ADL's but has not been agitated aggressive, psychotic, is cooperative with care. Plan cont meds, for d/c 6/27 unless change occurs.  6/27 Patient maintaining gains, d/c on hold as patient had covid exposure  6/28 Maintaining gains cont current rx covid test tomorrow  6/29 Maintaining gains. Continue current meds. Can f/u with derm as outpatient as per medicine  6/30 Patient maintaining gains. Cont current meds, monitor vitals, monitor BM pattern gets suppository biw  7/1 Patient holding on to gains. Cont current rx monitor for bowel pattern, Bp's  7/5 Maintaining gains, cont current medication regimen tolerating well has tremor but not rigid, VSS  7/6 Maintaining gains. Plan if no change for d/c in AM. will give haldol to reduce distress associated with ambulance ride and relocation

## 2022-07-06 NOTE — BH INPATIENT PSYCHIATRY PROGRESS NOTE - NSTXCOPEDATEEST_PSY_ALL_CORE
04-Jul-2022
01-Jun-2022
04-Jul-2022
14-Jun-2022
28-Jun-2022
09-Jun-2022
28-Jun-2022
14-Jun-2022
14-Jun-2022
26-May-2022
01-Jun-2022
09-Jun-2022
26-May-2022
28-Jun-2022
21-Jun-2022
26-May-2022
09-Jun-2022
14-Jun-2022
01-Jun-2022
01-Jun-2022
21-Jun-2022
28-Jun-2022
01-Jun-2022

## 2022-07-06 NOTE — BH DISCHARGE NOTE NURSING/SOCIAL WORK/PSYCH REHAB - NSCDUDCCRISIS_PSY_A_CORE
[facility] Well  1 (700) [facility]-WELL ([phone])  Text "WELL" to 81371  Website: www.[employer]/.[employer] 1 (331) 911-DOJF (9671) Website: www.safehorizon.org/.National Suicide Prevention Lifeline 9 (306) 351-1916/.  [employer]  1 (108) LIFENET (580-7444)/.  [facility]  [phone] [place] [address] [phone]   Hours:  Monday through Friday from 9 AM to 3 PM/.  [employer] - [facility] Crisis Line  4 [phone], Option 1

## 2022-07-06 NOTE — BH DISCHARGE NOTE NURSING/SOCIAL WORK/PSYCH REHAB - PATIENT PORTAL LINK FT
You can access the FollowMyHealth Patient Portal offered by [facility] by registering at the following website: [email or web address]. By joining [employer]’s FollowMyHealth portal, you will also be able to view your health information using other applications (apps) compatible with our system.

## 2022-07-07 VITALS — DIASTOLIC BLOOD PRESSURE: 80 MMHG | SYSTOLIC BLOOD PRESSURE: 120 MMHG

## 2022-07-07 PROCEDURE — 99232 SBSQ HOSP IP/OBS MODERATE 35: CPT

## 2022-07-07 RX ADMIN — HALOPERIDOL DECANOATE 2 MILLIGRAM(S): 100 INJECTION INTRAMUSCULAR at 09:58

## 2022-07-07 RX ADMIN — RISPERIDONE 1 MILLIGRAM(S): 4 TABLET ORAL at 09:58

## 2022-07-07 RX ADMIN — DIVALPROEX SODIUM 750 MILLIGRAM(S): 500 TABLET, DELAYED RELEASE ORAL at 09:59

## 2022-07-07 RX ADMIN — POLYETHYLENE GLYCOL 3350 17 GRAM(S): 17 POWDER, FOR SOLUTION ORAL at 09:58

## 2022-07-07 RX ADMIN — Medication 1 PACKET(S): at 09:58

## 2022-07-07 NOTE — BH INPATIENT PSYCHIATRY PROGRESS NOTE - NSTXCONFPROGRES_PSY_ALL_CORE
Improving
No Change
Improving
No Change
Improving
No Change
Improving
No Change
Improving
No Change

## 2022-07-07 NOTE — BH INPATIENT PSYCHIATRY PROGRESS NOTE - NSTXDCOTHRDATETRGT_PSY_ALL_CORE
27-May-2022
30-May-2022
30-May-2022
04-Jul-2022
14-Jun-2022
04-Jul-2022
20-Jun-2022
14-Jun-2022
27-May-2022
07-Jun-2022
20-Jun-2022
07-Jun-2022
04-Jul-2022
14-Jun-2022
20-Jun-2022
04-Jul-2022
27-May-2022
20-Jun-2022
07-Jun-2022
07-Jun-2022
20-Jun-2022
20-Jun-2022
30-May-2022
07-Jun-2022
27-May-2022
14-Jun-2022
14-Jun-2022
07-Jun-2022
20-Jun-2022
30-May-2022
20-Jun-2022
04-Jul-2022

## 2022-07-07 NOTE — BH INPATIENT PSYCHIATRY PROGRESS NOTE - NSBHFUPINTERVALCCFT_PSY_A_CORE
The pt. denies abdominal pain, dizziness
The pt. denies abdominal pain, dizziness.
The pt. denies abdominal pain, dizziness.
Patient denies feeling constipated no dizziness
The pt. denies abdominal pain, dizziness
The pt. denies abdominal pain, dizziness.
Patient remains disorganized, not aggressive, followed for TBI with agitation . Patient denies feeling constipated no dizziness
The pt. denies abdominal pain, dizziness.
The pt. denies abdominal pain, dizziness, mood good
Patient denies feeling constipated no dizziness, no leg pain
The pt. denies abdominal pain, dizziness.
Patient remains disorganized, not aggressive, followed for TBI with agitation 
The pt. denies abdominal pain, dizziness, mood good
Patient denies feeling constipated no dizziness, no leg pain
The pt. stated that she was fine and offered no complaints.
The pt. remains confused and was reaching out to touch me but able to be directed.
The pt. denies abdominal pain, dizziness, mood good
Patient denies  abdominal pain, dizziness
The pt. denies abdominal pain, dizziness
The pt. denies abdominal pain, dizziness
Patient remains disorganized, not aggressive, followed for TBI with agitation . Patient denies feeling constipated no dizziness
The pt. denies abdominal pain, dizziness.
The pt. denies abdominal pain, dizziness.
Patient denies feeling constipated no dizziness, no leg pain
Patient denies feeling constipated no dizziness, no leg pain
The pt. denies abdominal pain, dizziness.
Patient remains disorganized, not aggressive, followed for TBI with agitation . Patient denies feeling constipated no dizziness
Patient remains disorganized, not aggressive, followed for TBI with agitation . Patient denies feeling constipated no dizziness
The pt. denies abdominal pain, dizziness
The pt. denies abdominal pain, dizziness.
Patient denies feeling constipated no dizziness, no leg pain
Patient remains disorganized, not aggressive, followed for TBI with agitation 
The pt. denies abdominal pain, dizziness
The pt. denies abdominal pain, dizziness, mood good
Patient remains disorganized, not aggressive, followed for TBI with agitation . Patient denies feeling constipated no dizziness
The pt. denies abdominal pain, dizziness.

## 2022-07-07 NOTE — BH INPATIENT PSYCHIATRY PROGRESS NOTE - NSTXCONFDATEEST_PSY_ALL_CORE
09-Jun-2022
26-May-2022
20-May-2022
09-Jun-2022
26-May-2022
20-May-2022
09-Jun-2022
26-May-2022
25-May-2022
26-May-2022
20-May-2022
09-Jun-2022
09-Jun-2022
20-May-2022
26-May-2022
09-Jun-2022
20-May-2022
26-May-2022
09-Jun-2022
09-Jun-2022
26-May-2022
09-Jun-2022

## 2022-07-07 NOTE — BH INPATIENT PSYCHIATRY PROGRESS NOTE - NSTXCONFGOAL_PSY_ALL_CORE
Will be able to identify when to ask for assistance
Will ask for assistance as required
Will be able to identify when to ask for assistance
Other...
Will ask for assistance as required
Will be able to identify when to ask for assistance
Will be able to identify when to ask for assistance
Will ask for assistance as required
Will be able to identify when to ask for assistance
Will be able to identify when to ask for assistance
Will ask for assistance as required
Will be able to identify when to ask for assistance
Will be able to identify when to ask for assistance
Other...
Other...
Will ask for assistance as required
Will ask for assistance as required
Will be able to identify when to ask for assistance
Other...
Will be able to identify when to ask for assistance
Will be able to identify when to ask for assistance
Other...
Will ask for assistance as required
Will be able to identify when to ask for assistance
Other...
Will ask for assistance as required
Will be able to identify when to ask for assistance
Will ask for assistance as required
Will be able to identify when to ask for assistance
Will ask for assistance as required
Will be able to identify when to ask for assistance

## 2022-07-07 NOTE — BH INPATIENT PSYCHIATRY PROGRESS NOTE - MSE UNSTRUCTURED FT
Patient in hospital gown, sitting in day room resting, had sig chronic le edema. Tremor of upper extremities, L > R variable sometimes severe but not stiff.  Patient with word finding difficulties, abnormal prosody, relates in childlike manner. Patient with bright mood, some affective lability at times, yelling out at times. No paranoia, no SI/HI. No obvious nunez. Thinking disjointed. Patient oriented to self. Poor insight, not impulsive. 
Patient in hospital gown, sitting in day room resting, had sig chronic le edema. Tremor of upper extremties, L > R.  Patient with word finding difficulties, abnormal prosody, relates in childlike manner. Patient with bright mood, some affective lability at times, yelling out at times. No paranoia, no SI/HI. No obvious nunez. Thinking disjointed. Patient oriented to self, can name spouse. Poor insight, not impulsive. 
Patient is awake and alert.Mood perplexed, affect silly. Fair attentiveness, relates in childlike manner.  Speech decreased production, has word finding abnormalities, difficult to understand at times.  Abnormal prosody. Poor insight. No SI, paranoia, nunez. Oriented to self. No insight. Not impulsive. Has impaired judgement due to dementia.
Patient in hospital gown, sitting in day room, had sig chronic le edema. Patient with word finding difficulties, abnormal prosody, relates in childlike manner. Patient with bright mood, some affective lability at times, yelling out at times. No paranoia, no SI/HI. No obvious nunez. Thinking disjointed. Patient oriented to self, can name spouse. Poor insight, not impulsive. 
Patient in hospital gown, sitting in day room resting, had sig chronic le edema. Tremor of upper extremities, L > R variable sometimes severe but not stiff.  Patient with word finding difficulties, abnormal prosody, relates in childlike manner. Patient with bright mood, some affective lability at times, yelling out at times. No paranoia, no SI/HI. No obvious nunez. Thinking disjointed. Patient oriented to self, can name spouse. Poor insight, not impulsive. 
Patient in hospital w, had sig chronic le edema. Patient with word finding difficulties, abnormal prosody , relates in childlike manner. Patient with perplexed mood, some affective lability. No paranoia, no SI/HI. No obvious nunez. Thinking disjointed. Patient oriented to self, can name spouse. Poor insight, not impulsive. 
Patient is awake and alert.mood neutral, affect slsomewhat labile.  Poorly attentive, relates in childlike manner. Mutters, speech decreased production, difficult to understand. Poor insight. No SI, parabnoia, nunez. Oriented to self. Could not name spouse today
Patient in hospital w, had sig chronic le edema. Patient with word finding difficulties, abnormal prosody , relates in childlike manner. Patient with perplexed mood, some affective lability. No paranoia, no SI/HI. No obvious nunez. Thinking disjointed. Patient oriented to self, can name spouse. Poor insight, not impulsive. 
Patient in hospital gown, sitting in day room resting, had sig chronic le edema. Tremor of upper extremities, L > R variable sometimes severe but no stiffness.  Patient with word finding difficulties, abnormal prosody, relates in childlike manner. Patient with bright mood, some affective lability at times. No agitation.  Thoughts are disorganized, loose.  No paranoia, no SI/HI. No obvious hallucinations. Patient oriented to self. Poor insight, not impulsive. 
Patient in hospital w, had sig chronic le edema. Patient with word finding difficulties, abnormal prosody , relates in childlike manner. Patient with perplexed mood, some affective lability. No paranoia, no SI/HI. No obvious nunez. Thinking disjointed. Patient oriented to self, can name spouse. Poor insight, not impulsive. 
Patient in hospital gown, sitting in day room resting, had sig chronic le edema. Tremor of upper extremities, L > R variable sometimes severe but no stiffness.  Patient with word finding difficulties, abnormal prosody, relates in childlike manner. Patient with bright mood, some affective lability at times, yelling out at times. Thoughts are disorganized, loose.  No paranoia, no SI/HI. No obvious hallucinations. Patient oriented to self. Poor insight, not impulsive. 
Patient is awake and alert.mood neutral, affect slight somewhat labile.  Poorly attentive, relates in childlike manner. Mutters, speech decreased production, difficult to understand. Poor insight. No SI, paranoia, nunez. Oriented to self. No insight
Patient in hospital gown, sitting in day room resting, had sig chronic le edema. Tremor of upper extremities, L > R variable sometimes severe but no stiffness.  Patient with word finding difficulties, abnormal prosody, relates in childlike manner. Patient with bright mood, some affective lability at times, yelling out at times. No paranoia, no SI/HI. No obvious nunez. Thinking disjointed. Patient oriented to self. Poor insight, not impulsive. 
Patient in hospital gown, sitting in day room resting, had sig chronic le edema. Tremor of upper extremities, L > R variable sometimes severe but no stiffness.  Patient with word finding difficulties, abnormal prosody, relates in childlike manner. Patient with bright mood, some affective lability at times, yelling out at times. Thoughts are disorganized, loose.  No paranoia, no SI/HI. No obvious hallucinations. Patient oriented to self. Poor insight, not impulsive. 
Patient is awake and alert.Mood perplexed, affect silly. Fair attentiveness, relates in childlike manner.  Speech decreased production, has word finding abnormalities, difficult to understand at times.  Abnormal prosody. Poor insight. No SI, paranoia, nunez. Oriented to self. No insight. Not impulsive. Has impaired judgement due to dementia.
Patient is awake and alert.Mood perplexed, affect silly. Fair attentiveness, relates in childlike manner.  Speech decreased production, has word finding abnormalities, difficult to understand at times.  Abnormal prosody. Poor insight. No SI, paranoia, nunez. Oriented to self. No insight. Not impulsive. Has impaired judgement due to dementia.
Patient in hospital gown, sitting in day room resting, had sig chronic le edema. Tremor of upper extremities, L > R variable sometimes severe but no stiffness.  Patient with word finding difficulties, abnormal prosody, relates in childlike manner. Patient with bright mood, some affective lability at times, yelling out at times. Thoughts are disorganized, loose.  No paranoia, no SI/HI. No obvious hallucinations. Patient oriented to self. Poor insight, not impulsive. 
Patient in hospital gown, sitting in day room resting, had sig chronic le edema. Tremor of upper extremities, L > R variable sometimes severe but no stiffness.  Patient with word finding difficulties, abnormal prosody, relates in childlike manner. Patient with bright mood, some affective lability at times. No agitation.  Thoughts are disorganized, loose.  No paranoia, no SI/HI. No obvious hallucinations. Patient oriented to self. Poor insight, not impulsive. 
Patient is awake and alert.Mood perplexed, affect silly. Fair attentiveness, relates in childlike manner.  Speech decreased production, has word finding abnormalities, difficult to understand at times.  Abnormal prosody. Poor insight. No SI, paranoia, nunez. Oriented to self. No insight. Not impulsive. Has impaired judgement due to dementia.
Patient is awake and alert.mood neutral, affect slight somewhat labile.  Poorly attentive, relates in childlike manner. Mutters, speech decreased production, difficult to understand. Poor insight. No SI, paranoia, nunez. Oriented to self. No insight
Patient is awake and alert.mood neutral, affect slight somewhat labile.  Poorly attentive, relates in childlike manner. Mutters, speech decreased production, difficult to understand. Poor insight. No SI, paranoia, nunez. Oriented to self. No insight
Patient in hospital gown, holding stuffed animal, had sig chronic le edema. Patient with word finding difficulties, abnormal prosody , relates in childlike manner. Patient with perplexed mood, some affective lability. No paranoia, no SI/HI. No obvious nunez. Thinking disjointed. Patient oriented to self, can name spouse. Poor insight, not impulsive. 
Patient in hospital gown, sitting in day room resting, had sig chronic le edema. Tremor of upper extremities, L > R variable sometimes severe but no stiffness.  Patient with word finding difficulties, abnormal prosody, relates in childlike manner. Patient with bright mood, some affective lability at times. No agitation.  Thoughts are disorganized, loose.  No paranoia, no SI/HI. No obvious hallucinations. Patient oriented to self. Poor insight, not impulsive. 
Patient in hospital gown, sitting in dining room, had sig chronic le edema. Patient with word finding difficulties, abnormal prosody, relates in childlike manner. Patient with bright mood, some affective lability at times, yelling out at times. No paranoia, no SI/HI. No obvious nunez. Thinking disjointed. Patient oriented to self, can name spouse. Poor insight, not impulsive. 
Patient is awake and alert. Affect is addled. Poorly attentive. Mutters, speech decreased production, difficult to understand. Poor insight. No   expressed suicidal ideations. 
Patient in hospital gown, sitting in day room resting, had sig chronic le edema. Tremor of upper extremities, L > R variable sometimes severe but no stiffness.  Patient with word finding difficulties, abnormal prosody, relates in childlike manner. Patient with bright mood, some affective lability at times. No agitation.  Thoughts are disorganized, loose.  No paranoia, no SI/HI. No obvious hallucinations. Patient oriented to self. Poor insight, not impulsive. 
Patient in hospital gown, sitting in day room resting, had sig chronic le edema. Tremor of upper extremties, L > R.  Patient with word finding difficulties, abnormal prosody, relates in childlike manner. Patient with bright mood, some affective lability at times, yelling out at times. No paranoia, no SI/HI. No obvious nunez. Thinking disjointed. Patient oriented to self, can name spouse. Poor insight, not impulsive. 
Patient in hospital gown, sitting in day room resting, had sig chronic le edema. Tremor of upper extremities, L > R variable sometimes severe but no stiffness.  Patient with word finding difficulties, abnormal prosody, relates in childlike manner. Patient with bright mood, some affective lability at times, yelling out at times. No paranoia, no SI/HI. No obvious nunez. Thinking disjointed. Patient oriented to self. Poor insight, not impulsive. 
Patient in hospital gown, sitting in day room resting, had sig chronic le edema. Tremor of upper extremities, L > R variable sometimes severe but no stiffness.  Patient with word finding difficulties, abnormal prosody, relates in childlike manner. Patient with bright mood, some affective lability at times. No agitation.  Thoughts are disorganized, loose.  No paranoia, no SI/HI. No obvious hallucinations. Patient oriented to self. Poor insight, not impulsive. 
Patient in hospital w, had sig chronic le edema. Patient with word finding difficulties, abnormal prosody , relates in childlike manner. Patient with perplexed mood, some affective lability. No paranoia, no SI/HI. No obvious nunez. Thinking disjointed. Patient oriented to self, can name spouse. Poor insight, not impulsive. 
Patient is awake and alert.Mood perplexed, affect silly. Fair attentiveness, relates in childlike manner.  Speech decreased production, has word finding abnormalities, difficult to understand at times.  Abnormal prosody. Poor insight. No SI, paranoia, nunez. Oriented to self. No insight. Not impulsive. Has impaired judgement due to dementia.
Patient is awake and alert.Mood perplexed, affect silly. Fair attentiveness, relates in childlike manner. Mutters, speech decreased production, has word finding abnormalities, difficult to understand at times. Poor insight. No SI, paranoia, unnez. Oriented to self. No insight. Not impulsive. Has impaired judgement due to dementia.
Patient is awake and alert.mood neutral, affect slight somewhat labile.  Poorly attentive, relates in childlike manner. Mutters, speech decreased production, difficult to understand. Poor insight. No SI, paranoia, nunez. Oriented to self. No insight
Patient is awake and alert.mood neutral, affect slight somewhat labile.  Poorly attentive, relates in childlike manner. Mutters, speech decreased production, difficult to understand. Poor insight. No SI, paranoia, nunez. Oriented to self. No insight

## 2022-07-07 NOTE — BH INPATIENT PSYCHIATRY PROGRESS NOTE - NSTXDCOTHRGOAL_PSY_ALL_CORE
The pt will maintain behavioral control and be calm enough to be cared for in an SNF on a dementia unit for her LTC. Efforts will be directed towards the pt's return to her prior SNF, [facility].
The pt will maintain behavioral control and be calm enough to be cared for in an SNF. The pt will be accept back to [facility] for resumption of her LTC.
The pt will maintain behavioral control and be calm enough to be cared for in an SNF on a dementia unit for her LTC. Efforts will be directed towards the pt's return to her prior SNF, [facility].
The pt will maintain behavioral control and be calm enough to be cared for in an SNF. Efforts will be made for the pt to return to [facility] for her LTC.
The pt will maintain behavioral control and be calm enough to be cared for in an SNF. The pt will be accept back to [facility] for resumption of her LTC.
The pt will maintain behavioral control and be calm enough to be cared for in an SNF on a dementia unit for her LTC. Efforts will be directed towards pt's return to her prior SNF, [facility].
The pt will maintain behavioral control and be calm enough to be discharged to an SNF for her LTC.
The pt will maintain behavioral control and be calm enough to be cared for in an SNF. The pt will be accept back to [facility] for resumption of her LTC.
The pt will maintain behavioral control and be calm enough to be cared for in an SNF on a dementia unit for her LTC. Efforts will be directed towards the pt's return to her prior SNF, [facility].
The pt will maintain behavioral control and be calm enough to be discharged to an SNF for her LTC.
The pt will maintain behavioral control and be calm enough to be cared for in an SNF. The pt will be accept back to [facility] for resumption of her LTC.
The pt will maintain behavioral control and be calm enough to be discharged to an SNF for her LTC.
The pt will maintain behavioral control and be calm enough to be cared for in an SNF on a dementia unit for her LTC. Efforts will be directed towards the pt's return to her prior SNF, [facility].
The pt will maintain behavioral control and be calm enough to be cared for in an SNF. The pt will be accept back to [facility] for resumption of her LTC.
The pt will maintain behavioral control and be calm enough to be discharged to an SNF for her LTC.
The pt will maintain behavioral control and be calm enough to be cared for in an SNF on a dementia unit for her LTC. Efforts will be directed towards pt's return to her prior SNF, [facility].
The pt will maintain behavioral control and be calm enough to be cared for in her prior SNF, [facility], for her LTC.
The pt will maintain behavioral control and be calm enough to be discharged to an SNF for her LTC.
The pt will maintain behavioral control and be calm enough to be cared for in an SNF. Efforts will be made for the pt to return to [facility] for her LTC.
The pt will maintain behavioral control and be calm enough to be discharged to an SNF for her LTC.
The pt will maintain behavioral control and be calm enough to be cared for in an SNF on a dementia unit for her LTC. Efforts will be directed towards pt's return to her prior SNF, [facility].
The pt will maintain behavioral control and be calm enough to be cared for in an SNF on a dementia unit for her LTC. Efforts will be directed towards the pt's return to her prior SNF, [facility].
The pt will maintain behavioral control and be calm enough to be cared for in an SNF. The pt will be accept back to [facility] for resumption of her LTC.
The pt will maintain behavioral control and be calm enough to be cared for in an SNF. Efforts will be made for the pt to return to [facility] for her LTC.
The pt will maintain behavioral control and be calm enough to be cared for in her prior SNF, [facility], for her LTC.
The pt will maintain behavioral control and be calm enough to be cared for in an SNF. Efforts will be made for the pt to return to [facility] for her LTC.
The pt will maintain behavioral control and be calm enough to be cared for in her prior SNF, [facility], for her LTC.
The pt will maintain behavioral control and be calm enough to be cared for in her prior SNF, [facility], for her LTC.
The pt will maintain behavioral control and be calm enough to be cared for in an SNF. The pt will be accept back to [facility] for resumption of her LTC.
The pt will maintain behavioral control and be calm enough to be cared for in an SNF. The pt will be accept back to [facility] for resumption of her LTC.
The pt will maintain behavioral control and be calm enough to be cared for in an SNF on a dementia unit for her LTC. Efforts will be directed towards the pt's return to her prior SNF, [facility].
The pt will maintain behavioral control and be calm enough to be cared for in an SNF on a dementia unit for her LTC. Efforts will be directed towards pt's return to her prior SNF, [facility].
The pt will maintain behavioral control and be calm enough to be cared for in an SNF. The pt will be accept back to [facility] for resumption of her LTC.
The pt will maintain behavioral control and be calm enough to be cared for in an SNF. The pt will be accept back to [facility] for resumption of her LTC.
The pt will maintain behavioral control and be calm enough to be cared for in an SNF on a dementia unit for her LTC. Efforts will be directed towards pt's return to her prior SNF, [facility].
The pt will maintain behavioral control and be calm enough to be discharged to an SNF for her LTC.

## 2022-07-07 NOTE — BH INPATIENT PSYCHIATRY PROGRESS NOTE - NSTXDCOTHRDATEEST_PSY_ALL_CORE
23-May-2022
07-Jun-2022
31-May-2022
07-Jun-2022
27-Jun-2022
13-Jun-2022
23-May-2022
27-Jun-2022
31-May-2022
27-Jun-2022
13-Jun-2022
07-Jun-2022
13-Jun-2022
20-May-2022
27-Jun-2022
31-May-2022
13-Jun-2022
27-Jun-2022
31-May-2022
31-May-2022
13-Jun-2022
13-Jun-2022
23-May-2022
20-May-2022
20-May-2022
07-Jun-2022
13-Jun-2022
07-Jun-2022
23-May-2022
31-May-2022
13-Jun-2022
20-May-2022

## 2022-07-07 NOTE — BH INPATIENT PSYCHIATRY PROGRESS NOTE - NSBHASSESSSUMMFT_PSY_ALL_CORE
5/23 Patient recovering from recent SBO she has not shown return of agitation of risperidone.Will restart  back and substantially reduced dose as is unlikely she will cont to do well off psych meds. Reviewed with Dr. Tripp [facility] medicine will recheck labs, advance diet, change laxative  5/24 Tolerating restart of risperdal, monitor for se, check labs, re-eval for need for diuretic  5/25: Patient having BMs.  Remains relatively stable, pleasant, disorganized.  Tolerating medications well.  5/26: The patient continues to do well on the unit.  She is disorganized, but pleasant, behavior well controlled.  Tolerating medications well.  5/27: The patient continues to do well on the unit.  She is pleasant but disorganized.  Will give dulcolax suppository again today, no BM since Tuesday.  Tolerating medications well.  5/31 Calm today, recently grabbed peer. Plan increase risperdal to 0.5 mg tid. No injury from peer  6/1 Improved, no agitation, cont current meds.Cont to monitor bowel fx  6/2 Manageable no sig behavioral issues. Cont current meds, asked medciine to eval if she should go back on lasix for edema. Having BM's regularly  6/3 TBI with agitation. PAtient doing fairly well on risperdal at lower dose than prior to transfer to [facility] for SBO. Cont current meds, if any increased agitation could increase risperidone to 1mg bid at 9-5. Tremor has been worse since off Inderal. If tremor interfering with functioning such as ability to feed self could re introduce Inderal 10mg bid. Monitor for BM's .   6/4: No significant overnight events, no PRNs needed. Pleasant on encounter this morning, denies any complaints. No aggressive with peers. Eating and sleeping well.  6/6: The patient continues to be pleasant on the unit, without agitation.  She has been eating and sleeping well.  Compliant with medications.  6/7: The patient continues to do well, pleasant and without any agitation.  She has been eating and sleeping well.  Will continue current medications.  6/8: The patient continues to do well.  She is calm, pleasant, eating and sleeping well.  Will continue current medications.  6/10: The patient continues to do well on the unit.  She is pleasant, calm, eating and sleeping well.  Will continue current medications.  6/13  No major behavioral issues, tremor more since Inderal d/markel Cont current rx, if BP's higher may restart Inderal at low dose  6/14 Holding gains, will readd Inderal low dose as it helped tremor. Will place patient on regular schedule of twice a week suppository as using it as prn less reliable  6/15 Patient maintaining gains. COnt rx but will increase risperidone in order to attempt to re-titrate back to prior effect dose. Patient had BM before given suppository will cont biw suppository with hold option  6/16 Maintaining gains. Cont meds have just partially re titrated risperidone.   6/17 Patient stable. As BP low nl will d.c Inderal as may make it difficult to maintain nl BP especially if need to increase risperidone  6/18 Patient remains confused, disorganized, not agitated , AVSS  6/20 Patient calm, not agitated or aggressive. VSS. Plan cont meds, eval increasing risperdal for purpose of approximating past effective dose. Reviewed bowel regimen with medicine who recommending cont three laxatives and biw suppository as seems effective.   6/21 Patient holding gains, cont current meds, cont to re-eval need to uptitrate to prior effective dose to minimize risk of any recurrence of behaviors  6/22 Maintaining gains. Cont  current meds  6/24 Patient improved on consistent basis. Minor vocalization during ADL's but has not been agitated aggressive, psychotic, is cooperative with care. Plan cont meds, for d/c 6/27 unless change occurs.  6/27 Patient maintaining gains, d/c on hold as patient had covid exposure  6/28 Maintaining gains cont current rx covid test tomorrow  6/29 Maintaining gains. Continue current meds. Can f/u with derm as outpatient as per medicine  6/30 Patient maintaining gains. Cont current meds, monitor vitals, monitor BM pattern gets suppository biw  7/1 Patient holding on to gains. Cont current rx monitor for bowel pattern, Bp's  7/5 Maintaining gains, cont current medication regimen tolerating well has tremor but not rigid, VSS  7/6 Maintaining gains. Plan if no change for d/c in AM. will give haldol to reduce distress associated with ambulance ride and relocation  7/7 Patient maintaining gains. Patient not danger to self or others. Does not need hospital care. D/C to SNF. Writer will be one providing onsite care.

## 2022-07-07 NOTE — BH INPATIENT PSYCHIATRY PROGRESS NOTE - NSTXDCOTHRPROGRES_PSY_ALL_CORE
Improving
No Change
Improving
Worsening
Improving
No Change
Improving
Worsening
Improving
No Change
Improving
No Change
Improving

## 2022-07-07 NOTE — BH INPATIENT PSYCHIATRY PROGRESS NOTE - NSDCCRITERIA_PSY_ALL_CORE
Pending placement

## 2022-07-07 NOTE — BH INPATIENT PSYCHIATRY PROGRESS NOTE - NSBHATTESTTYPEVISIT_PSY_A_CORE
Attending Only

## 2022-07-07 NOTE — BH INPATIENT PSYCHIATRY PROGRESS NOTE - NSICDXBHPRIMARYDX_PSY_ALL_CORE
Dementia with psychosis   F03.91  
Dementia following traumatic brain injury   S06.9X9S  
Dementia following traumatic brain injury   S06.9X9S  
Dementia with psychosis   F03.91  
Dementia following traumatic brain injury   S06.9X9S  
Dementia with psychosis   F03.91  
Dementia following traumatic brain injury   S06.9X9S  
Dementia with psychosis   F03.91  
Dementia with behavioral disturbance   F03.91  
Dementia with psychosis   F03.91  
Dementia following traumatic brain injury   S06.9X9S  
Dementia following traumatic brain injury   S06.9X9S  
Dementia with psychosis   F03.91  

## 2022-07-07 NOTE — BH INPATIENT PSYCHIATRY PROGRESS NOTE - NSTXPROBCONF_PSY_ALL_CORE
CONFUSION, ACUTE/CHRONIC

## 2022-07-07 NOTE — BH INPATIENT PSYCHIATRY PROGRESS NOTE - NSBHCHARTREVIEWVS_PSY_A_CORE FT
Vital Signs Last 24 Hrs  T(C): --  T(F): --  HR: --  BP: --  BP(mean): --  RR: --  SpO2: --    Orthostatic VS  05-25-22 @ 07:57  Lying BP: --/-- HR: --  Sitting BP: 111/62 HR: 78  Standing BP: --/-- HR: --  Site: upper left arm  Mode: electronic  
Vital Signs Last 24 Hrs  T(C): --  T(F): --  HR: --  BP: --  BP(mean): --  RR: --  SpO2: --    Orthostatic VS  06-17-22 @ 10:00  Lying BP: --/-- HR: --  Sitting BP: 107/49 HR: 69  Standing BP: --/-- HR: --  Site: --  Mode: --  Orthostatic VS  06-17-22 @ 07:53  Lying BP: --/-- HR: --  Sitting BP: 95/44 HR: 53  Standing BP: --/-- HR: --  Site: upper left arm  Mode: electronic  Orthostatic VS  06-16-22 @ 20:28  Lying BP: --/-- HR: --  Sitting BP: 100/74 HR: 80  Standing BP: --/-- HR: --  Site: --  Mode: --  Orthostatic VS  06-16-22 @ 07:51  Lying BP: --/-- HR: --  Sitting BP: 120/62 HR: 72  Standing BP: --/-- HR: --  Site: upper right arm  Mode: electronic  Orthostatic VS  06-15-22 @ 20:20  Lying BP: --/-- HR: --  Sitting BP: 104/62 HR: 67  Standing BP: --/-- HR: --  Site: --  Mode: --  
Vital Signs Last 24 Hrs  T(C): 37.2 (05-20-22 @ 08:03), Max: 37.7 (05-19-22 @ 17:00)  T(F): 99 (05-20-22 @ 08:03), Max: 99.8 (05-19-22 @ 17:00)  HR: 88 (05-19-22 @ 18:35) (88 - 95)  BP: 109/68 (05-19-22 @ 18:35) (109/68 - 111/56)  BP(mean): --  RR: 17 (05-19-22 @ 18:35) (17 - 17)  SpO2: 99% (05-19-22 @ 18:35) (99% - 99%)    Orthostatic VS  05-20-22 @ 08:03  Lying BP: --/-- HR: --  Sitting BP: 115/61 HR: 84  Standing BP: --/-- HR: --  Site: upper left arm  Mode: electronic  
Vital Signs Last 24 Hrs  T(C): 36.6 (05-23-22 @ 06:32), Max: 36.6 (05-23-22 @ 06:32)  T(F): 97.8 (05-23-22 @ 06:32), Max: 97.8 (05-23-22 @ 06:32)  HR: --  BP: --  BP(mean): --  RR: --  SpO2: --    Orthostatic VS  05-23-22 @ 06:32  Lying BP: --/-- HR: --  Sitting BP: 124/93 HR: 83  Standing BP: --/-- HR: --  Site: upper left arm  Mode: electronic  Orthostatic VS  05-22-22 @ 07:52  Lying BP: --/-- HR: --  Sitting BP: 125/70 HR: 95  Standing BP: --/-- HR: --  Site: --  Mode: --  
Vital Signs Last 24 Hrs  T(C): 36.7 (07-01-22 @ 07:30), Max: 36.7 (07-01-22 @ 07:30)  T(F): 98 (07-01-22 @ 07:30), Max: 98 (07-01-22 @ 07:30)  HR: --  BP: --  BP(mean): --  RR: --  SpO2: --    Orthostatic VS  07-01-22 @ 07:30  Lying BP: --/-- HR: --  Sitting BP: 100/50 HR: 72  Standing BP: --/-- HR: --  Site: upper left arm  Mode: electronic  Orthostatic VS  06-30-22 @ 20:33  Lying BP: --/-- HR: --  Sitting BP: 113/73 HR: 60  Standing BP: --/-- HR: --  Site: --  Mode: --  Orthostatic VS  06-30-22 @ 09:47  Lying BP: --/-- HR: --  Sitting BP: 114/55 HR: 87  Standing BP: --/-- HR: --  Site: --  Mode: --  Orthostatic VS  06-30-22 @ 07:42  Lying BP: --/-- HR: --  Sitting BP: 96/65 HR: 113  Standing BP: --/-- HR: --  Site: upper left arm  Mode: electronic  Orthostatic VS  06-29-22 @ 20:22  Lying BP: --/-- HR: --  Sitting BP: 115/49 HR: 112  Standing BP: --/-- HR: --  Site: --  Mode: --  
Vital Signs Last 24 Hrs  T(C): 36.9 (05-22-22 @ 07:52), Max: 36.9 (05-22-22 @ 07:52)  T(F): 98.5 (05-22-22 @ 07:52), Max: 98.5 (05-22-22 @ 07:52)  HR: --  BP: --  BP(mean): --  RR: --  SpO2: --    Orthostatic VS  05-22-22 @ 07:52  Lying BP: --/-- HR: --  Sitting BP: 125/70 HR: 95  Standing BP: --/-- HR: --  Site: --  Mode: --  Orthostatic VS  05-21-22 @ 07:48  Lying BP: --/-- HR: --  Sitting BP: 101/63 HR: 73  Standing BP: --/-- HR: --  Site: upper left arm  Mode: electronic  
Vital Signs Last 24 Hrs  T(C): 36.6 (06-15-22 @ 07:55), Max: 36.6 (06-15-22 @ 07:55)  T(F): 97.9 (06-15-22 @ 07:55), Max: 97.9 (06-15-22 @ 07:55)  HR: --  BP: --  BP(mean): --  RR: --  SpO2: --    Orthostatic VS  06-15-22 @ 07:55  Lying BP: --/-- HR: --  Sitting BP: 111/62 HR: 65  Standing BP: --/-- HR: --  Site: --  Mode: electronic  Orthostatic VS  06-14-22 @ 20:23  Lying BP: --/-- HR: --  Sitting BP: 114/79 HR: 82  Standing BP: --/-- HR: --  Site: --  Mode: --  Orthostatic VS  06-14-22 @ 08:45  Lying BP: --/-- HR: --  Sitting BP: 142/87 HR: 88  Standing BP: --/-- HR: --  Site: upper left arm  Mode: electronic  
Vital Signs Last 24 Hrs  T(C): 36.6 (06-27-22 @ 06:14), Max: 36.6 (06-27-22 @ 06:14)  T(F): 97.9 (06-27-22 @ 06:14), Max: 97.9 (06-27-22 @ 06:14)  HR: 106 (06-26-22 @ 19:39) (106 - 106)  BP: 108/66 (06-26-22 @ 19:39) (108/66 - 108/66)  BP(mean): --  RR: --  SpO2: --    Orthostatic VS  06-27-22 @ 06:14  Lying BP: --/-- HR: --  Sitting BP: 107/65 HR: 74  Standing BP: --/-- HR: --  Site: upper left arm  Mode: electronic  Orthostatic VS  06-26-22 @ 07:59  Lying BP: --/-- HR: --  Sitting BP: 101/62 HR: 65  Standing BP: --/-- HR: --  Site: --  Mode: --  
Vital Signs Last 24 Hrs  T(C): 36.6 (06-08-22 @ 08:04), Max: 36.6 (06-08-22 @ 08:04)  T(F): 97.8 (06-08-22 @ 08:04), Max: 97.8 (06-08-22 @ 08:04)  HR: --  BP: --  BP(mean): --  RR: 17 (06-07-22 @ 20:25) (17 - 17)  SpO2: --    Orthostatic VS  06-08-22 @ 08:04  Lying BP: --/-- HR: --  Sitting BP: 104/49 HR: 63  Standing BP: --/-- HR: --  Site: upper right arm  Mode: electronic  Orthostatic VS  06-07-22 @ 20:25  Lying BP: --/-- HR: --  Sitting BP: 124/72 HR: 80  Standing BP: --/-- HR: --  Site: --  Mode: --  Orthostatic VS  06-07-22 @ 07:54  Lying BP: --/-- HR: --  Sitting BP: 120/70 HR: 76  Standing BP: --/-- HR: --  Site: upper left arm  Mode: electronic  
Vital Signs Last 24 Hrs  T(C): 36.4 (05-24-22 @ 07:58), Max: 36.4 (05-24-22 @ 07:58)  T(F): 97.6 (05-24-22 @ 07:58), Max: 97.6 (05-24-22 @ 07:58)  HR: --  BP: --  BP(mean): --  RR: --  SpO2: --    Orthostatic VS  05-24-22 @ 07:58  Lying BP: --/-- HR: --  Sitting BP: 116/56 HR: 76  Standing BP: --/-- HR: --  Site: upper left arm  Mode: electronic  Orthostatic VS  05-23-22 @ 06:32  Lying BP: --/-- HR: --  Sitting BP: 124/93 HR: 83  Standing BP: --/-- HR: --  Site: upper left arm  Mode: electronic  
Vital Signs Last 24 Hrs  T(C): 36.7 (06-02-22 @ 07:51), Max: 36.9 (06-01-22 @ 20:05)  T(F): 98 (06-02-22 @ 07:51), Max: 98.5 (06-01-22 @ 20:05)  HR: 92 (06-01-22 @ 20:05) (92 - 92)  BP: 127/83 (06-01-22 @ 20:05) (127/83 - 127/83)  BP(mean): --  RR: --  SpO2: --    Orthostatic VS  06-02-22 @ 07:51  Lying BP: --/-- HR: --  Sitting BP: 123/60 HR: 72  Standing BP: --/-- HR: --  Site: upper left arm  Mode: electronic  Orthostatic VS  06-01-22 @ 07:49  Lying BP: --/-- HR: --  Sitting BP: 131/90 HR: 91  Standing BP: --/-- HR: --  Site: upper left arm  Mode: electronic  
Vital Signs Last 24 Hrs  T(C): 36.6 (06-18-22 @ 08:14), Max: 36.6 (06-18-22 @ 08:14)  T(F): 97.9 (06-18-22 @ 08:14), Max: 97.9 (06-18-22 @ 08:14)  HR: --  BP: --  BP(mean): --  RR: --  SpO2: --    Orthostatic VS  06-18-22 @ 08:14  Lying BP: --/-- HR: --  Sitting BP: 122/57 HR: 59  Standing BP: --/-- HR: --  Site: --  Mode: --  Orthostatic VS  06-17-22 @ 20:51  Lying BP: --/-- HR: --  Sitting BP: 122/91 HR: 73  Standing BP: --/-- HR: --  Site: upper right arm  Mode: electronic  Orthostatic VS  06-17-22 @ 10:00  Lying BP: --/-- HR: --  Sitting BP: 107/49 HR: 69  Standing BP: --/-- HR: --  Site: --  Mode: --  Orthostatic VS  06-17-22 @ 07:53  Lying BP: --/-- HR: --  Sitting BP: 95/44 HR: 53  Standing BP: --/-- HR: --  Site: upper left arm  Mode: electronic  Orthostatic VS  06-16-22 @ 20:28  Lying BP: --/-- HR: --  Sitting BP: 100/74 HR: 80  Standing BP: --/-- HR: --  Site: --  Mode: --  
Vital Signs Last 24 Hrs  T(C): 36.8 (06-24-22 @ 07:00), Max: 36.8 (06-24-22 @ 07:00)  T(F): 98.2 (06-24-22 @ 07:00), Max: 98.2 (06-24-22 @ 07:00)  HR: --  BP: --  BP(mean): --  RR: --  SpO2: --    Orthostatic VS  06-24-22 @ 07:00  Lying BP: --/-- HR: --  Sitting BP: 108/61 HR: 93  Standing BP: --/-- HR: --  Site: upper left arm  Mode: electronic  Orthostatic VS  06-23-22 @ 20:29  Lying BP: --/-- HR: --  Sitting BP: 137/60 HR: 67  Standing BP: --/-- HR: --  Site: --  Mode: --  Orthostatic VS  06-23-22 @ 08:03  Lying BP: --/-- HR: --  Sitting BP: 133/73 HR: 100  Standing BP: --/-- HR: --  Site: upper left arm  Mode: electronic  Orthostatic VS  06-22-22 @ 20:46  Lying BP: --/-- HR: --  Sitting BP: 126/94 HR: 99  Standing BP: 131/95 HR: 101  Site: --  Mode: --  
Vital Signs Last 24 Hrs  T(C): --  T(F): --  HR: --  BP: 120/80 (07-07-22 @ 10:06) (120/80 - 120/80)  BP(mean): 88 (07-07-22 @ 10:06) (88 - 88)  RR: --  SpO2: --    Orthostatic VS  07-06-22 @ 20:25  Lying BP: --/-- HR: --  Sitting BP: 130/66 HR: 96  Standing BP: --/-- HR: --  Site: --  Mode: --  Orthostatic VS  07-06-22 @ 08:00  Lying BP: --/-- HR: --  Sitting BP: 107/85 HR: 104  Standing BP: --/-- HR: --  Site: upper left arm  Mode: electronic  Orthostatic VS  07-05-22 @ 20:36  Lying BP: --/-- HR: --  Sitting BP: 119/55 HR: 76  Standing BP: --/-- HR: --  Site: --  Mode: --  
Vital Signs Last 24 Hrs  T(C): 36.7 (06-14-22 @ 08:45), Max: 36.7 (06-14-22 @ 08:45)  T(F): 98.1 (06-14-22 @ 08:45), Max: 98.1 (06-14-22 @ 08:45)  HR: 84 (06-13-22 @ 20:05) (84 - 84)  BP: 146/65 (06-13-22 @ 20:05) (146/65 - 146/65)  BP(mean): --  RR: 18 (06-14-22 @ 08:45) (18 - 18)  SpO2: --    Orthostatic VS  06-14-22 @ 08:45  Lying BP: --/-- HR: --  Sitting BP: 142/87 HR: 88  Standing BP: --/-- HR: --  Site: upper left arm  Mode: electronic  Orthostatic VS  06-13-22 @ 07:55  Lying BP: --/-- HR: --  Sitting BP: 100/61 HR: 70  Standing BP: --/-- HR: --  Site: --  Mode: --  
Vital Signs Last 24 Hrs  T(C): 37 (06-09-22 @ 07:47), Max: 37 (06-09-22 @ 07:47)  T(F): 98.6 (06-09-22 @ 07:47), Max: 98.6 (06-09-22 @ 07:47)  HR: --  BP: --  BP(mean): --  RR: --  SpO2: --    Orthostatic VS  06-09-22 @ 07:47  Lying BP: --/-- HR: --  Sitting BP: 114/57 HR: 77  Standing BP: --/-- HR: --  Site: upper left arm  Mode: electronic  Orthostatic VS  06-08-22 @ 20:13  Lying BP: --/-- HR: --  Sitting BP: 135/56 HR: 75  Standing BP: --/-- HR: --  Site: --  Mode: --  Orthostatic VS  06-08-22 @ 08:04  Lying BP: --/-- HR: --  Sitting BP: 104/49 HR: 63  Standing BP: --/-- HR: --  Site: upper right arm  Mode: electronic  Orthostatic VS  06-07-22 @ 20:25  Lying BP: --/-- HR: --  Sitting BP: 124/72 HR: 80  Standing BP: --/-- HR: --  Site: --  Mode: --  
Vital Signs Last 24 Hrs  T(C): --  T(F): --  HR: --  BP: --  BP(mean): --  RR: --  SpO2: --    Orthostatic VS  05-26-22 @ 20:37  Lying BP: --/-- HR: --  Sitting BP: 148/72 HR: 82  Standing BP: --/-- HR: --  Site: --  Mode: --  
Vital Signs Last 24 Hrs  T(C): 36.9 (05-21-22 @ 07:48), Max: 36.9 (05-21-22 @ 07:48)  T(F): 98.5 (05-21-22 @ 07:48), Max: 98.5 (05-21-22 @ 07:48)  HR: --  BP: --  BP(mean): --  RR: --  SpO2: --    Orthostatic VS  05-21-22 @ 07:48  Lying BP: --/-- HR: --  Sitting BP: 101/63 HR: 73  Standing BP: --/-- HR: --  Site: upper left arm  Mode: electronic  Orthostatic VS  05-20-22 @ 08:03  Lying BP: --/-- HR: --  Sitting BP: 115/61 HR: 84  Standing BP: --/-- HR: --  Site: upper left arm  Mode: electronic  
Vital Signs Last 24 Hrs  T(C): 36.4 (06-10-22 @ 07:43), Max: 36.4 (06-10-22 @ 07:43)  T(F): 97.5 (06-10-22 @ 07:43), Max: 97.5 (06-10-22 @ 07:43)  HR: --  BP: --  BP(mean): --  RR: --  SpO2: --    Orthostatic VS  06-10-22 @ 07:43  Lying BP: --/-- HR: --  Sitting BP: 103/53 HR: 69  Standing BP: --/-- HR: --  Site: --  Mode: --  Orthostatic VS  06-09-22 @ 20:33  Lying BP: --/-- HR: --  Sitting BP: 126/62 HR: 65  Standing BP: --/-- HR: --  Site: --  Mode: --  Orthostatic VS  06-09-22 @ 07:47  Lying BP: --/-- HR: --  Sitting BP: 114/57 HR: 77  Standing BP: --/-- HR: --  Site: upper left arm  Mode: electronic  Orthostatic VS  06-08-22 @ 20:13  Lying BP: --/-- HR: --  Sitting BP: 135/56 HR: 75  Standing BP: --/-- HR: --  Site: --  Mode: --  
Vital Signs Last 24 Hrs  T(C): 36.6 (06-21-22 @ 06:50), Max: 36.6 (06-21-22 @ 06:50)  T(F): 97.9 (06-21-22 @ 06:50), Max: 97.9 (06-21-22 @ 06:50)  HR: --  BP: 110/60 (06-21-22 @ 06:50) (110/60 - 110/60)  BP(mean): 117 (06-21-22 @ 06:50) (117 - 117)  RR: --  SpO2: --    Orthostatic VS  06-20-22 @ 21:12  Lying BP: --/-- HR: --  Sitting BP: --/-- HR: --  Standing BP: 114/82 HR: 97  Site: --  Mode: --  Orthostatic VS  06-20-22 @ 07:35  Lying BP: --/-- HR: --  Sitting BP: 106/64 HR: 75  Standing BP: 113/77 HR: 81  Site: --  Mode: --  Orthostatic VS  06-19-22 @ 20:09  Lying BP: --/-- HR: --  Sitting BP: 117/63 HR: 83  Standing BP: --/-- HR: --  Site: --  Mode: --  
Vital Signs Last 24 Hrs  T(C): 36.6 (06-16-22 @ 07:51), Max: 36.6 (06-16-22 @ 07:51)  T(F): 97.8 (06-16-22 @ 07:51), Max: 97.8 (06-16-22 @ 07:51)  HR: --  BP: --  BP(mean): --  RR: --  SpO2: --    Orthostatic VS  06-16-22 @ 07:51  Lying BP: --/-- HR: --  Sitting BP: 120/62 HR: 72  Standing BP: --/-- HR: --  Site: upper right arm  Mode: electronic  Orthostatic VS  06-15-22 @ 20:20  Lying BP: --/-- HR: --  Sitting BP: 104/62 HR: 67  Standing BP: --/-- HR: --  Site: --  Mode: --  Orthostatic VS  06-15-22 @ 07:55  Lying BP: --/-- HR: --  Sitting BP: 111/62 HR: 65  Standing BP: --/-- HR: --  Site: --  Mode: electronic  Orthostatic VS  06-14-22 @ 20:23  Lying BP: --/-- HR: --  Sitting BP: 114/79 HR: 82  Standing BP: --/-- HR: --  Site: --  Mode: --  
Vital Signs Last 24 Hrs  T(C): --  T(F): --  HR: --  BP: --  BP(mean): --  RR: --  SpO2: --    Orthostatic VS  06-06-22 @ 08:02  Lying BP: --/-- HR: --  Sitting BP: 110/59 HR: 65  Standing BP: --/-- HR: --  Site: --  Mode: --  Orthostatic VS  06-05-22 @ 07:42  Lying BP: --/-- HR: --  Sitting BP: 106/73 HR: 79  Standing BP: --/-- HR: --  Site: upper left arm  Mode: electronic  Orthostatic VS  06-04-22 @ 20:30  Lying BP: --/-- HR: --  Sitting BP: 125/85 HR: 97  Standing BP: --/-- HR: --  Site: --  Mode: --  
Vital Signs Last 24 Hrs  T(C): 36.4 (06-30-22 @ 07:42), Max: 36.7 (06-29-22 @ 15:40)  T(F): 97.5 (06-30-22 @ 07:42), Max: 98.1 (06-29-22 @ 15:40)  HR: --  BP: --  BP(mean): --  RR: --  SpO2: --    Orthostatic VS  06-30-22 @ 09:47  Lying BP: --/-- HR: --  Sitting BP: 114/55 HR: 87  Standing BP: --/-- HR: --  Site: --  Mode: --  Orthostatic VS  06-30-22 @ 07:42  Lying BP: --/-- HR: --  Sitting BP: 96/65 HR: 113  Standing BP: --/-- HR: --  Site: upper left arm  Mode: electronic  Orthostatic VS  06-29-22 @ 20:22  Lying BP: --/-- HR: --  Sitting BP: 115/49 HR: 112  Standing BP: --/-- HR: --  Site: --  Mode: --  Orthostatic VS  06-29-22 @ 07:53  Lying BP: --/-- HR: --  Sitting BP: 112/51 HR: 63  Standing BP: --/-- HR: --  Site: upper left arm  Mode: electronic  Orthostatic VS  06-28-22 @ 20:49  Lying BP: --/-- HR: --  Sitting BP: 123/86 HR: 102  Standing BP: --/-- HR: --  Site: --  Mode: --  
Vital Signs Last 24 Hrs  T(C): 36.4 (06-29-22 @ 07:53), Max: 36.7 (06-28-22 @ 15:25)  T(F): 97.5 (06-29-22 @ 07:53), Max: 98 (06-28-22 @ 15:25)  HR: --  BP: --  BP(mean): --  RR: --  SpO2: --    Orthostatic VS  06-29-22 @ 07:53  Lying BP: --/-- HR: --  Sitting BP: 112/51 HR: 63  Standing BP: --/-- HR: --  Site: upper left arm  Mode: electronic  Orthostatic VS  06-28-22 @ 20:49  Lying BP: --/-- HR: --  Sitting BP: 123/86 HR: 102  Standing BP: --/-- HR: --  Site: --  Mode: --  Orthostatic VS  06-28-22 @ 09:33  Lying BP: --/-- HR: --  Sitting BP: 125/46 HR: 100  Standing BP: --/-- HR: --  Site: --  Mode: --  
Vital Signs Last 24 Hrs  T(C): 36.7 (07-05-22 @ 08:15), Max: 36.7 (07-05-22 @ 08:15)  T(F): 98.1 (07-05-22 @ 08:15), Max: 98.1 (07-05-22 @ 08:15)  HR: --  BP: --  BP(mean): --  RR: --  SpO2: --    Orthostatic VS  07-05-22 @ 08:15  Lying BP: --/-- HR: --  Sitting BP: 138/94 HR: 97  Standing BP: --/-- HR: --  Site: upper left arm  Mode: electronic  Orthostatic VS  07-04-22 @ 20:25  Lying BP: --/-- HR: --  Sitting BP: 138/94 HR: 97  Standing BP: --/-- HR: --  Site: --  Mode: --  Orthostatic VS  07-03-22 @ 20:45  Lying BP: --/-- HR: --  Sitting BP: 116/70 HR: 70  Standing BP: --/-- HR: --  Site: --  Mode: --  
Vital Signs Last 24 Hrs  T(C): 37 (06-01-22 @ 07:49), Max: 37 (06-01-22 @ 07:49)  T(F): 98.6 (06-01-22 @ 07:49), Max: 98.6 (06-01-22 @ 07:49)  HR: 87 (05-31-22 @ 19:47) (87 - 87)  BP: 122/75 (05-31-22 @ 19:47) (122/75 - 122/75)  BP(mean): --  RR: --  SpO2: 99% (05-31-22 @ 19:47) (99% - 99%)    Orthostatic VS  06-01-22 @ 07:49  Lying BP: --/-- HR: --  Sitting BP: 131/90 HR: 91  Standing BP: --/-- HR: --  Site: upper left arm  Mode: electronic  Orthostatic VS  05-31-22 @ 07:47  Lying BP: --/-- HR: --  Sitting BP: 123/54 HR: 70  Standing BP: --/-- HR: --  Site: --  Mode: --  
Vital Signs Last 24 Hrs  T(C): 36.6 (06-22-22 @ 08:03), Max: 36.6 (06-21-22 @ 19:56)  T(F): 97.9 (06-22-22 @ 08:03), Max: 97.9 (06-21-22 @ 19:56)  HR: 78 (06-21-22 @ 19:56) (78 - 78)  BP: 108/69 (06-21-22 @ 19:56) (108/69 - 108/69)  BP(mean): 78 (06-21-22 @ 19:56) (78 - 78)  RR: --  SpO2: --    Orthostatic VS  06-22-22 @ 08:03  Lying BP: --/-- HR: --  Sitting BP: 151/80 HR: 97  Standing BP: 141/66 HR: 86  Site: upper right arm  Mode: electronic  Orthostatic VS  06-20-22 @ 21:12  Lying BP: --/-- HR: --  Sitting BP: --/-- HR: --  Standing BP: 114/82 HR: 97  Site: --  Mode: --  
Vital Signs Last 24 Hrs  T(C): 36.2 (06-04-22 @ 09:22), Max: 36.2 (06-04-22 @ 09:22)  T(F): 97.2 (06-04-22 @ 09:22), Max: 97.2 (06-04-22 @ 09:22)  HR: 68 (06-04-22 @ 09:22) (68 - 68)  BP: 115/53 (06-04-22 @ 09:22) (115/53 - 115/53)  BP(mean): --  RR: --  SpO2: --    Orthostatic VS  06-03-22 @ 07:47  Lying BP: --/-- HR: --  Sitting BP: 128/80 HR: 87  Standing BP: --/-- HR: --  Site: upper left arm  Mode: electronic  Orthostatic VS  06-02-22 @ 20:16  Lying BP: --/-- HR: --  Sitting BP: 109/77 HR: 85  Standing BP: --/-- HR: --  Site: --  Mode: --  
Vital Signs Last 24 Hrs  T(C): 36.8 (05-31-22 @ 07:47), Max: 36.8 (05-31-22 @ 07:47)  T(F): 98.3 (05-31-22 @ 07:47), Max: 98.3 (05-31-22 @ 07:47)  HR: 97 (05-30-22 @ 19:26) (97 - 97)  BP: 157/57 (05-30-22 @ 19:26) (157/57 - 157/57)  BP(mean): --  RR: --  SpO2: 99% (05-30-22 @ 19:26) (99% - 99%)    Orthostatic VS  05-31-22 @ 07:47  Lying BP: --/-- HR: --  Sitting BP: 123/54 HR: 70  Standing BP: --/-- HR: --  Site: --  Mode: --  Orthostatic VS  05-30-22 @ 08:18  Lying BP: --/-- HR: --  Sitting BP: 111/62 HR: 74  Standing BP: --/-- HR: --  Site: upper left arm  Mode: electronic  Orthostatic VS  05-29-22 @ 19:35  Lying BP: --/-- HR: --  Sitting BP: 118/66 HR: 87  Standing BP: --/-- HR: --  Site: --  Mode: --  
Vital Signs Last 24 Hrs  T(C): 36.4 (06-20-22 @ 07:35), Max: 36.4 (06-20-22 @ 07:35)  T(F): 97.5 (06-20-22 @ 07:35), Max: 97.5 (06-20-22 @ 07:35)  HR: --  BP: --  BP(mean): --  RR: --  SpO2: --    Orthostatic VS  06-20-22 @ 07:35  Lying BP: --/-- HR: --  Sitting BP: 106/64 HR: 75  Standing BP: 113/77 HR: 81  Site: --  Mode: --  Orthostatic VS  06-19-22 @ 20:09  Lying BP: --/-- HR: --  Sitting BP: 117/63 HR: 83  Standing BP: --/-- HR: --  Site: --  Mode: --  Orthostatic VS  06-19-22 @ 07:05  Lying BP: --/-- HR: --  Sitting BP: 116/61 HR: 98  Standing BP: --/-- HR: --  Site: --  Mode: --  Orthostatic VS  06-18-22 @ 20:26  Lying BP: --/-- HR: --  Sitting BP: 109/61 HR: 78  Standing BP: --/-- HR: --  Site: --  Mode: --  
Vital Signs Last 24 Hrs  T(C): 36.6 (07-06-22 @ 08:00), Max: 36.6 (07-06-22 @ 08:00)  T(F): 97.9 (07-06-22 @ 08:00), Max: 97.9 (07-06-22 @ 08:00)  HR: --  BP: --  BP(mean): --  RR: --  SpO2: --    Orthostatic VS  07-06-22 @ 08:00  Lying BP: --/-- HR: --  Sitting BP: 107/85 HR: 104  Standing BP: --/-- HR: --  Site: upper left arm  Mode: electronic  Orthostatic VS  07-05-22 @ 20:36  Lying BP: --/-- HR: --  Sitting BP: 119/55 HR: 76  Standing BP: --/-- HR: --  Site: --  Mode: --  Orthostatic VS  07-05-22 @ 08:15  Lying BP: --/-- HR: --  Sitting BP: 138/94 HR: 97  Standing BP: --/-- HR: --  Site: upper left arm  Mode: electronic  Orthostatic VS  07-04-22 @ 20:25  Lying BP: --/-- HR: --  Sitting BP: 138/94 HR: 97  Standing BP: --/-- HR: --  Site: --  Mode: --  
Vital Signs Last 24 Hrs  T(C): 36.8 (06-03-22 @ 07:47), Max: 36.8 (06-03-22 @ 07:47)  T(F): 98.2 (06-03-22 @ 07:47), Max: 98.2 (06-03-22 @ 07:47)  HR: --  BP: --  BP(mean): --  RR: --  SpO2: --    Orthostatic VS  06-03-22 @ 07:47  Lying BP: --/-- HR: --  Sitting BP: 128/80 HR: 87  Standing BP: --/-- HR: --  Site: upper left arm  Mode: electronic  Orthostatic VS  06-02-22 @ 20:16  Lying BP: --/-- HR: --  Sitting BP: 109/77 HR: 85  Standing BP: --/-- HR: --  Site: --  Mode: --  Orthostatic VS  06-02-22 @ 07:51  Lying BP: --/-- HR: --  Sitting BP: 123/60 HR: 72  Standing BP: --/-- HR: --  Site: upper left arm  Mode: electronic  
Vital Signs Last 24 Hrs  T(C): 36.7 (06-28-22 @ 15:25), Max: 36.8 (06-27-22 @ 16:36)  T(F): 98 (06-28-22 @ 15:25), Max: 98.3 (06-27-22 @ 16:36)  HR: --  BP: --  BP(mean): --  RR: 16 (06-28-22 @ 09:33) (16 - 16)  SpO2: --    Orthostatic VS  06-28-22 @ 09:33  Lying BP: --/-- HR: --  Sitting BP: 125/46 HR: 100  Standing BP: --/-- HR: --  Site: --  Mode: --  Orthostatic VS  06-27-22 @ 06:14  Lying BP: --/-- HR: --  Sitting BP: 107/65 HR: 74  Standing BP: --/-- HR: --  Site: upper left arm  Mode: electronic  
Vital Signs Last 24 Hrs  T(C): 36.9 (06-07-22 @ 07:54), Max: 36.9 (06-07-22 @ 07:54)  T(F): 98.5 (06-07-22 @ 07:54), Max: 98.5 (06-07-22 @ 07:54)  HR: 74 (06-06-22 @ 20:08) (74 - 74)  BP: 138/67 (06-06-22 @ 20:08) (138/67 - 138/67)  BP(mean): --  RR: --  SpO2: --    Orthostatic VS  06-07-22 @ 07:54  Lying BP: --/-- HR: --  Sitting BP: 120/70 HR: 76  Standing BP: --/-- HR: --  Site: upper left arm  Mode: electronic  Orthostatic VS  06-06-22 @ 08:02  Lying BP: --/-- HR: --  Sitting BP: 110/59 HR: 65  Standing BP: --/-- HR: --  Site: --  Mode: --  
Vital Signs Last 24 Hrs  T(C): 37.1 (05-25-22 @ 07:57), Max: 37.1 (05-25-22 @ 07:57)  T(F): 98.7 (05-25-22 @ 07:57), Max: 98.7 (05-25-22 @ 07:57)  HR: --  BP: --  BP(mean): --  RR: --  SpO2: --    Orthostatic VS  05-25-22 @ 07:57  Lying BP: --/-- HR: --  Sitting BP: 111/62 HR: 78  Standing BP: --/-- HR: --  Site: upper left arm  Mode: electronic  Orthostatic VS  05-24-22 @ 07:58  Lying BP: --/-- HR: --  Sitting BP: 116/56 HR: 76  Standing BP: --/-- HR: --  Site: upper left arm  Mode: electronic  
Vital Signs Last 24 Hrs  T(C): 36.1 (06-13-22 @ 07:55), Max: 36.1 (06-13-22 @ 07:55)  T(F): 96.9 (06-13-22 @ 07:55), Max: 96.9 (06-13-22 @ 07:55)  HR: --  BP: --  BP(mean): --  RR: --  SpO2: --    Orthostatic VS  06-13-22 @ 07:55  Lying BP: --/-- HR: --  Sitting BP: 100/61 HR: 70  Standing BP: --/-- HR: --  Site: --  Mode: --  Orthostatic VS  06-12-22 @ 07:45  Lying BP: --/-- HR: --  Sitting BP: 122/78 HR: 94  Standing BP: --/-- HR: --  Site: --  Mode: --

## 2022-07-07 NOTE — BH INPATIENT PSYCHIATRY PROGRESS NOTE - MSE OPTIONS
Unstructured MSE

## 2022-07-07 NOTE — BH INPATIENT PSYCHIATRY PROGRESS NOTE - NSBHMETABOLIC_PSY_ALL_CORE_FT
BMI: BMI (kg/m2): 30.7 (05-29-22 @ 09:57)  HbA1c: A1C with Estimated Average Glucose Result: 5.4 % (11-04-21 @ 06:58)    Glucose: POCT Blood Glucose.: 102 mg/dL (05-19-22 @ 11:51)    BP: --  Lipid Panel: 
BMI: BMI (kg/m2): 30.7 (05-29-22 @ 09:57)  HbA1c: A1C with Estimated Average Glucose Result: 5.4 % (11-04-21 @ 06:58)    Glucose: POCT Blood Glucose.: 102 mg/dL (05-19-22 @ 11:51)    BP: --  Lipid Panel: 
BMI: BMI (kg/m2): 30.7 (05-29-22 @ 09:57)  HbA1c: A1C with Estimated Average Glucose Result: 5.4 % (11-04-21 @ 06:58)    Glucose: POCT Blood Glucose.: 102 mg/dL (05-19-22 @ 11:51)    BP: 110/60 (06-21-22 @ 06:50) (110/60 - 110/60)  Lipid Panel: 
BMI: BMI (kg/m2): 30.5 (05-21-22 @ 16:12)  HbA1c: A1C with Estimated Average Glucose Result: 5.4 % (11-04-21 @ 06:58)    Glucose: POCT Blood Glucose.: 102 mg/dL (05-19-22 @ 11:51)    BP: --  Lipid Panel: 
BMI: BMI (kg/m2): 30.7 (05-29-22 @ 09:57)  HbA1c: A1C with Estimated Average Glucose Result: 5.4 % (11-04-21 @ 06:58)    Glucose: POCT Blood Glucose.: 102 mg/dL (05-19-22 @ 11:51)    BP: --  Lipid Panel: 
BMI: BMI (kg/m2): 30.7 (05-29-22 @ 09:57)  HbA1c: A1C with Estimated Average Glucose Result: 5.4 % (11-04-21 @ 06:58)    Glucose: POCT Blood Glucose.: 102 mg/dL (05-19-22 @ 11:51)    BP: 115/53 (06-04-22 @ 09:22) (115/53 - 127/83)  Lipid Panel: 
BMI: BMI (kg/m2): 30.7 (05-29-22 @ 09:57)  HbA1c: A1C with Estimated Average Glucose Result: 5.4 % (11-04-21 @ 06:58)    Glucose: POCT Blood Glucose.: 102 mg/dL (05-19-22 @ 11:51)    BP: 120/80 (07-07-22 @ 10:06) (120/80 - 120/80)  Lipid Panel: 
BMI: BMI (kg/m2): 30.7 (05-29-22 @ 09:57)  HbA1c: A1C with Estimated Average Glucose Result: 5.4 % (11-04-21 @ 06:58)    Glucose: POCT Blood Glucose.: 102 mg/dL (05-19-22 @ 11:51)    BP: 115/53 (06-04-22 @ 09:22) (115/53 - 115/53)  Lipid Panel: 
BMI: BMI (kg/m2): 30.7 (05-29-22 @ 09:57)  HbA1c: A1C with Estimated Average Glucose Result: 5.4 % (11-04-21 @ 06:58)    Glucose: POCT Blood Glucose.: 102 mg/dL (05-19-22 @ 11:51)    BP: 138/67 (06-06-22 @ 20:08) (138/67 - 138/67)  Lipid Panel: 
BMI: BMI (kg/m2): 30.7 (05-29-22 @ 09:57)  HbA1c: A1C with Estimated Average Glucose Result: 5.4 % (11-04-21 @ 06:58)    Glucose: POCT Blood Glucose.: 102 mg/dL (05-19-22 @ 11:51)    BP: 138/67 (06-06-22 @ 20:08) (138/67 - 138/67)  Lipid Panel: 
BMI: BMI (kg/m2): 30.5 (05-21-22 @ 16:12)  HbA1c: A1C with Estimated Average Glucose Result: 5.4 % (11-04-21 @ 06:58)    Glucose: POCT Blood Glucose.: 102 mg/dL (05-19-22 @ 11:51)    BP: --  Lipid Panel: 
BMI: BMI (kg/m2): 30.7 (05-29-22 @ 09:57)  HbA1c: A1C with Estimated Average Glucose Result: 5.4 % (11-04-21 @ 06:58)    Glucose: POCT Blood Glucose.: 102 mg/dL (05-19-22 @ 11:51)    BP: 108/66 (06-26-22 @ 19:39) (108/66 - 119/97)  Lipid Panel: 
BMI: BMI (kg/m2): 30.7 (05-29-22 @ 09:57)  HbA1c: A1C with Estimated Average Glucose Result: 5.4 % (11-04-21 @ 06:58)    Glucose: POCT Blood Glucose.: 102 mg/dL (05-19-22 @ 11:51)    BP: 134/57 (06-12-22 @ 07:45) (110/70 - 134/57)  Lipid Panel: 
BMI: BMI (kg/m2): 30.7 (05-29-22 @ 09:57)  HbA1c: A1C with Estimated Average Glucose Result: 5.4 % (11-04-21 @ 06:58)    Glucose: POCT Blood Glucose.: 102 mg/dL (05-19-22 @ 11:51)    BP: --  Lipid Panel: 
BMI: BMI (kg/m2): 31.6 (05-18-22 @ 11:26)  HbA1c: A1C with Estimated Average Glucose Result: 5.4 % (11-04-21 @ 06:58)    Glucose: POCT Blood Glucose.: 102 mg/dL (05-19-22 @ 11:51)    BP: 109/68 (05-19-22 @ 18:35) (109/68 - 109/68)  Lipid Panel: 
BMI: BMI (kg/m2): 30.7 (05-29-22 @ 09:57)  HbA1c: A1C with Estimated Average Glucose Result: 5.4 % (11-04-21 @ 06:58)    Glucose: POCT Blood Glucose.: 102 mg/dL (05-19-22 @ 11:51)    BP: --  Lipid Panel: 
BMI: BMI (kg/m2): 30.7 (05-29-22 @ 09:57)  HbA1c: A1C with Estimated Average Glucose Result: 5.4 % (11-04-21 @ 06:58)    Glucose: POCT Blood Glucose.: 102 mg/dL (05-19-22 @ 11:51)    BP: 108/69 (06-21-22 @ 19:56) (108/69 - 108/69)  Lipid Panel: 
BMI: BMI (kg/m2): 30.5 (05-21-22 @ 16:12)  HbA1c: A1C with Estimated Average Glucose Result: 5.4 % (11-04-21 @ 06:58)    Glucose: POCT Blood Glucose.: 102 mg/dL (05-19-22 @ 11:51)    BP: --  Lipid Panel: 
BMI: BMI (kg/m2): 30.5 (05-21-22 @ 16:12)  HbA1c: A1C with Estimated Average Glucose Result: 5.4 % (11-04-21 @ 06:58)    Glucose: POCT Blood Glucose.: 102 mg/dL (05-19-22 @ 11:51)    BP: 109/68 (05-19-22 @ 18:35) (109/68 - 109/68)  Lipid Panel: 
BMI: BMI (kg/m2): 30.7 (05-29-22 @ 09:57)  HbA1c: A1C with Estimated Average Glucose Result: 5.4 % (11-04-21 @ 06:58)    Glucose: POCT Blood Glucose.: 102 mg/dL (05-19-22 @ 11:51)    BP: 108/66 (06-26-22 @ 19:39) (108/66 - 131/90)  Lipid Panel: 
BMI: BMI (kg/m2): 30.7 (05-29-22 @ 09:57)  HbA1c: A1C with Estimated Average Glucose Result: 5.4 % (11-04-21 @ 06:58)    Glucose: POCT Blood Glucose.: 102 mg/dL (05-19-22 @ 11:51)    BP: 146/65 (06-13-22 @ 20:05) (146/65 - 146/65)  Lipid Panel: 
BMI: BMI (kg/m2): 30.7 (05-29-22 @ 09:57)  HbA1c: A1C with Estimated Average Glucose Result: 5.4 % (11-04-21 @ 06:58)    Glucose: POCT Blood Glucose.: 102 mg/dL (05-19-22 @ 11:51)    BP: 146/65 (06-13-22 @ 20:05) (146/65 - 146/65)  Lipid Panel: 
BMI: BMI (kg/m2): 30.5 (05-21-22 @ 16:12)  HbA1c: A1C with Estimated Average Glucose Result: 5.4 % (11-04-21 @ 06:58)    Glucose: POCT Blood Glucose.: 102 mg/dL (05-19-22 @ 11:51)    BP: --  Lipid Panel: 
BMI: BMI (kg/m2): 30.7 (05-29-22 @ 09:57)  HbA1c: A1C with Estimated Average Glucose Result: 5.4 % (11-04-21 @ 06:58)    Glucose: POCT Blood Glucose.: 102 mg/dL (05-19-22 @ 11:51)    BP: --  Lipid Panel: 
BMI: BMI (kg/m2): 30.7 (05-29-22 @ 09:57)  HbA1c: A1C with Estimated Average Glucose Result: 5.4 % (11-04-21 @ 06:58)    Glucose: POCT Blood Glucose.: 102 mg/dL (05-19-22 @ 11:51)    BP: 108/69 (06-21-22 @ 19:56) (108/69 - 110/60)  Lipid Panel: 
BMI: BMI (kg/m2): 30.7 (05-29-22 @ 09:57)  HbA1c: A1C with Estimated Average Glucose Result: 5.4 % (11-04-21 @ 06:58)    Glucose: POCT Blood Glucose.: 102 mg/dL (05-19-22 @ 11:51)    BP: 120/62 (07-04-22 @ 10:15) (94/79 - 120/62)  Lipid Panel: 
BMI: BMI (kg/m2): 30.7 (05-29-22 @ 09:57)  HbA1c: A1C with Estimated Average Glucose Result: 5.4 % (11-04-21 @ 06:58)    Glucose: POCT Blood Glucose.: 102 mg/dL (05-19-22 @ 11:51)    BP: 127/83 (06-01-22 @ 20:05) (122/75 - 157/57)  Lipid Panel: 
BMI: BMI (kg/m2): 31.6 (05-18-22 @ 11:26)  HbA1c: A1C with Estimated Average Glucose Result: 5.4 % (11-04-21 @ 06:58)    Glucose: POCT Blood Glucose.: 102 mg/dL (05-19-22 @ 11:51)    BP: 109/68 (05-19-22 @ 18:35) (109/68 - 109/68)  Lipid Panel: 
BMI: BMI (kg/m2): 30.7 (05-29-22 @ 09:57)  HbA1c: A1C with Estimated Average Glucose Result: 5.4 % (11-04-21 @ 06:58)    Glucose: POCT Blood Glucose.: 102 mg/dL (05-19-22 @ 11:51)    BP: 122/75 (05-31-22 @ 19:47) (122/75 - 157/57)  Lipid Panel: 
BMI: BMI (kg/m2): 30.7 (05-29-22 @ 09:57)  HbA1c: A1C with Estimated Average Glucose Result: 5.4 % (11-04-21 @ 06:58)    Glucose: POCT Blood Glucose.: 102 mg/dL (05-19-22 @ 11:51)    BP: 157/57 (05-30-22 @ 19:26) (157/57 - 157/57)  Lipid Panel: 
BMI: BMI (kg/m2): 30.7 (05-29-22 @ 09:57)  HbA1c: A1C with Estimated Average Glucose Result: 5.4 % (11-04-21 @ 06:58)    Glucose: POCT Blood Glucose.: 102 mg/dL (05-19-22 @ 11:51)    BP: 120/62 (07-04-22 @ 10:15) (94/79 - 120/62)  Lipid Panel: 
BMI: BMI (kg/m2): 30.7 (05-29-22 @ 09:57)  HbA1c: A1C with Estimated Average Glucose Result: 5.4 % (11-04-21 @ 06:58)    Glucose: POCT Blood Glucose.: 102 mg/dL (05-19-22 @ 11:51)    BP: 127/83 (06-01-22 @ 20:05) (122/75 - 127/83)  Lipid Panel: 
BMI: BMI (kg/m2): 30.7 (05-29-22 @ 09:57)  HbA1c: A1C with Estimated Average Glucose Result: 5.4 % (11-04-21 @ 06:58)    Glucose: POCT Blood Glucose.: 102 mg/dL (05-19-22 @ 11:51)    BP: 108/66 (06-26-22 @ 19:39) (108/66 - 108/66)  Lipid Panel: 
BMI: BMI (kg/m2): 30.5 (05-21-22 @ 16:12)  HbA1c: A1C with Estimated Average Glucose Result: 5.4 % (11-04-21 @ 06:58)    Glucose: POCT Blood Glucose.: 102 mg/dL (05-19-22 @ 11:51)    BP: --  Lipid Panel: 
BMI: BMI (kg/m2): 30.7 (05-29-22 @ 09:57)  HbA1c: A1C with Estimated Average Glucose Result: 5.4 % (11-04-21 @ 06:58)    Glucose: POCT Blood Glucose.: 102 mg/dL (05-19-22 @ 11:51)    BP: 146/65 (06-13-22 @ 20:05) (110/70 - 146/65)  Lipid Panel: 
BMI: BMI (kg/m2): 30.7 (05-29-22 @ 09:57)  HbA1c: A1C with Estimated Average Glucose Result: 5.4 % (11-04-21 @ 06:58)    Glucose: POCT Blood Glucose.: 102 mg/dL (05-19-22 @ 11:51)    BP: 138/67 (06-06-22 @ 20:08) (138/67 - 138/67)  Lipid Panel:

## 2022-07-07 NOTE — BH INPATIENT PSYCHIATRY PROGRESS NOTE - NSTXCOPEDATETRGT_PSY_ALL_CORE
21-Jun-2022
11-Jul-2022
05-Jul-2022
16-Jun-2022
28-Jun-2022
16-Jun-2022
08-Jun-2022
21-Jun-2022
08-Jun-2022
05-Jul-2022
28-Jun-2022
21-Jun-2022
07-Jul-2022
08-Jun-2022
02-Jun-2022
08-Jun-2022
21-Jun-2022
16-Jun-2022
21-Jun-2022
08-Jun-2022
21-Jun-2022
28-Jun-2022
08-Jun-2022
02-Jun-2022
15-Ariel-2022
05-Jul-2022
02-Jun-2022

## 2022-07-07 NOTE — BH INPATIENT PSYCHIATRY PROGRESS NOTE - NSTXCOPEPROGRES_PSY_ALL_CORE
Improving
No Change
Improving
No Change
Improving
Improving
No Change
Improving
No Change
Improving
Improving

## 2022-07-07 NOTE — BH INPATIENT PSYCHIATRY PROGRESS NOTE - NSBHFUPINTERVALHXFT_PSY_A_CORE
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 VSS  Patient eating sleeping okay.  Had BM. Patient calm, directable..Eating sleeping well. Walking around unit c slowly . Talks in pleasant way with peers, staff

## 2022-07-07 NOTE — [facility] INPATIENT PSYCHIATRY PROGRESS NOTE - CURRENT MEDICATION
MEDICATIONS  (STANDING):  bisacodyl Suppository 10 milliGRAM(s) Rectal <User Schedule>  carbamide peroxide Otic Solution 2 Drop(s) Both Ears two times a day  diVALproex Sprinkle 750 milliGRAM(s) Oral two times a day  melatonin. 5 milliGRAM(s) Oral at bedtime  polyethylene glycol 3350 17 Gram(s) Oral daily  propranolol 10 milliGRAM(s) Oral two times a day  psyllium Powder 1 Packet(s) Oral daily  risperiDONE   Tablet 1 milliGRAM(s) Oral two times a day  senna 2 Tablet(s) Oral at bedtime  triamcinolone 0.1% Cream 1 Application(s) Topical two times a day    MEDICATIONS  (PRN):  bisacodyl 5 milliGRAM(s) Oral every 12 hours PRN Constipation  bisacodyl Suppository 10 milliGRAM(s) Rectal daily PRN Constipation  fluPHENAZine 1 milliGRAM(s) Oral every 6 hours PRN agitation  fluPHENAZine  Injectable 1 milliGRAM(s) IntraMuscular once PRN agitation  
MEDICATIONS  (STANDING):  diVALproex Sprinkle 750 milliGRAM(s) Oral two times a day  melatonin. 5 milliGRAM(s) Oral at bedtime  polyethylene glycol 3350 17 Gram(s) Oral daily  psyllium Powder 1 Packet(s) Oral daily  risperiDONE   Tablet 0.5 milliGRAM(s) Oral three times a day  senna 2 Tablet(s) Oral at bedtime  triamcinolone 0.1% Cream 1 Application(s) Topical two times a day    MEDICATIONS  (PRN):  bisacodyl 5 milliGRAM(s) Oral every 12 hours PRN Constipation  fluPHENAZine 1 milliGRAM(s) Oral every 6 hours PRN agitation  fluPHENAZine  Injectable 1 milliGRAM(s) IntraMuscular once PRN agitation  
MEDICATIONS  (STANDING):  bisacodyl Suppository 10 milliGRAM(s) Rectal <User Schedule>  diVALproex Sprinkle 750 milliGRAM(s) Oral two times a day  melatonin. 5 milliGRAM(s) Oral at bedtime  polyethylene glycol 3350 17 Gram(s) Oral daily  psyllium Powder 1 Packet(s) Oral daily  risperiDONE   Tablet 1 milliGRAM(s) Oral two times a day  senna 2 Tablet(s) Oral at bedtime    MEDICATIONS  (PRN):  acetaminophen     Tablet .. 650 milliGRAM(s) Oral every 6 hours PRN Temp greater or equal to 38C (100.4F), Mild Pain (1 - 3), Moderate Pain (4 - 6)  bisacodyl 5 milliGRAM(s) Oral every 12 hours PRN Constipation  bisacodyl Suppository 10 milliGRAM(s) Rectal daily PRN Constipation  fluPHENAZine 1 milliGRAM(s) Oral every 6 hours PRN agitation  fluPHENAZine  Injectable 1 milliGRAM(s) IntraMuscular once PRN agitation  
MEDICATIONS  (STANDING):  bisacodyl Suppository 10 milliGRAM(s) Rectal <User Schedule>  diVALproex Sprinkle 750 milliGRAM(s) Oral two times a day  melatonin. 5 milliGRAM(s) Oral at bedtime  polyethylene glycol 3350 17 Gram(s) Oral daily  psyllium Powder 1 Packet(s) Oral daily  risperiDONE   Tablet 1 milliGRAM(s) Oral two times a day  senna 2 Tablet(s) Oral at bedtime  triamcinolone 0.1% Cream 1 Application(s) Topical two times a day    MEDICATIONS  (PRN):  bisacodyl 5 milliGRAM(s) Oral every 12 hours PRN Constipation  bisacodyl Suppository 10 milliGRAM(s) Rectal daily PRN Constipation  fluPHENAZine 1 milliGRAM(s) Oral every 6 hours PRN agitation  fluPHENAZine  Injectable 1 milliGRAM(s) IntraMuscular once PRN agitation  
MEDICATIONS  (STANDING):  diVALproex Sprinkle 750 milliGRAM(s) Oral two times a day  melatonin. 5 milliGRAM(s) Oral at bedtime  polyethylene glycol 3350 17 Gram(s) Oral daily  psyllium Powder 1 Packet(s) Oral daily  risperiDONE   Tablet 0.5 milliGRAM(s) Oral three times a day  senna 2 Tablet(s) Oral at bedtime  triamcinolone 0.1% Cream 1 Application(s) Topical two times a day    MEDICATIONS  (PRN):  bisacodyl 5 milliGRAM(s) Oral every 12 hours PRN Constipation  fluPHENAZine 1 milliGRAM(s) Oral every 6 hours PRN agitation  fluPHENAZine  Injectable 1 milliGRAM(s) IntraMuscular once PRN agitation  
MEDICATIONS  (STANDING):  bisacodyl Suppository 10 milliGRAM(s) Rectal <User Schedule>  carbamide peroxide Otic Solution 2 Drop(s) Both Ears two times a day  diVALproex Sprinkle 750 milliGRAM(s) Oral two times a day  melatonin. 5 milliGRAM(s) Oral at bedtime  polyethylene glycol 3350 17 Gram(s) Oral daily  propranolol 10 milliGRAM(s) Oral two times a day  psyllium Powder 1 Packet(s) Oral daily  risperiDONE   Tablet 1 milliGRAM(s) Oral two times a day  senna 2 Tablet(s) Oral at bedtime  triamcinolone 0.1% Cream 1 Application(s) Topical two times a day    MEDICATIONS  (PRN):  bisacodyl 5 milliGRAM(s) Oral every 12 hours PRN Constipation  bisacodyl Suppository 10 milliGRAM(s) Rectal daily PRN Constipation  fluPHENAZine 1 milliGRAM(s) Oral every 6 hours PRN agitation  fluPHENAZine  Injectable 1 milliGRAM(s) IntraMuscular once PRN agitation  
MEDICATIONS  (STANDING):  diVALproex Sprinkle 750 milliGRAM(s) Oral two times a day  melatonin. 5 milliGRAM(s) Oral at bedtime  polyethylene glycol 3350 17 Gram(s) Oral daily  psyllium Powder 1 Packet(s) Oral daily  risperiDONE   Tablet 0.5 milliGRAM(s) Oral three times a day  senna 2 Tablet(s) Oral at bedtime  triamcinolone 0.1% Cream 1 Application(s) Topical two times a day    MEDICATIONS  (PRN):  bisacodyl 5 milliGRAM(s) Oral every 12 hours PRN Constipation  bisacodyl Suppository 10 milliGRAM(s) Rectal daily PRN Constipation  fluPHENAZine 1 milliGRAM(s) Oral every 6 hours PRN agitation  fluPHENAZine  Injectable 1 milliGRAM(s) IntraMuscular once PRN agitation  
MEDICATIONS  (STANDING):  diVALproex Sprinkle 750 milliGRAM(s) Oral two times a day  melatonin. 5 milliGRAM(s) Oral at bedtime  polyethylene glycol 3350 17 Gram(s) Oral daily  psyllium Powder 1 Packet(s) Oral daily  risperiDONE   Tablet 0.5 milliGRAM(s) Oral three times a day  senna 2 Tablet(s) Oral at bedtime  triamcinolone 0.1% Cream 1 Application(s) Topical two times a day    MEDICATIONS  (PRN):  bisacodyl 5 milliGRAM(s) Oral every 12 hours PRN Constipation  bisacodyl Suppository 10 milliGRAM(s) Rectal daily PRN Constipation  fluPHENAZine 1 milliGRAM(s) Oral every 6 hours PRN agitation  fluPHENAZine  Injectable 1 milliGRAM(s) IntraMuscular once PRN agitation  
MEDICATIONS  (STANDING):  diVALproex Sprinkle 750 milliGRAM(s) Oral two times a day  melatonin. 5 milliGRAM(s) Oral at bedtime  polyethylene glycol 3350 17 Gram(s) Oral daily  psyllium Powder 1 Packet(s) Oral daily  risperiDONE   Tablet 0.5 milliGRAM(s) Oral three times a day  senna 2 Tablet(s) Oral at bedtime  triamcinolone 0.1% Cream 1 Application(s) Topical two times a day    MEDICATIONS  (PRN):  bisacodyl 5 milliGRAM(s) Oral every 12 hours PRN Constipation  fluPHENAZine 1 milliGRAM(s) Oral every 6 hours PRN agitation  fluPHENAZine  Injectable 1 milliGRAM(s) IntraMuscular once PRN agitation  
MEDICATIONS  (STANDING):  bisacodyl Suppository 10 milliGRAM(s) Rectal <User Schedule>  diVALproex Sprinkle 750 milliGRAM(s) Oral two times a day  melatonin. 5 milliGRAM(s) Oral at bedtime  polyethylene glycol 3350 17 Gram(s) Oral daily  psyllium Powder 1 Packet(s) Oral daily  risperiDONE   Tablet 1 milliGRAM(s) Oral two times a day  senna 2 Tablet(s) Oral at bedtime  triamcinolone 0.1% Cream 1 Application(s) Topical two times a day    MEDICATIONS  (PRN):  bisacodyl 5 milliGRAM(s) Oral every 12 hours PRN Constipation  bisacodyl Suppository 10 milliGRAM(s) Rectal daily PRN Constipation  fluPHENAZine 1 milliGRAM(s) Oral every 6 hours PRN agitation  fluPHENAZine  Injectable 1 milliGRAM(s) IntraMuscular once PRN agitation  
MEDICATIONS  (STANDING):  bisacodyl Suppository 10 milliGRAM(s) Rectal <User Schedule>  diVALproex Sprinkle 750 milliGRAM(s) Oral two times a day  melatonin. 5 milliGRAM(s) Oral at bedtime  polyethylene glycol 3350 17 Gram(s) Oral daily  psyllium Powder 1 Packet(s) Oral daily  risperiDONE   Tablet 1 milliGRAM(s) Oral two times a day  senna 2 Tablet(s) Oral at bedtime    MEDICATIONS  (PRN):  acetaminophen     Tablet .. 650 milliGRAM(s) Oral every 6 hours PRN Temp greater or equal to 38C (100.4F), Mild Pain (1 - 3), Moderate Pain (4 - 6)  bisacodyl 5 milliGRAM(s) Oral every 12 hours PRN Constipation  bisacodyl Suppository 10 milliGRAM(s) Rectal daily PRN Constipation  fluPHENAZine 1 milliGRAM(s) Oral every 6 hours PRN agitation  fluPHENAZine  Injectable 1 milliGRAM(s) IntraMuscular once PRN agitation  
MEDICATIONS  (STANDING):  diVALproex Sprinkle 750 milliGRAM(s) Oral two times a day  melatonin. 5 milliGRAM(s) Oral at bedtime  polyethylene glycol 3350 17 Gram(s) Oral daily  psyllium Powder 1 Packet(s) Oral daily  risperiDONE   Tablet 0.5 milliGRAM(s) Oral three times a day  senna 2 Tablet(s) Oral at bedtime  triamcinolone 0.1% Cream 1 Application(s) Topical two times a day    MEDICATIONS  (PRN):  bisacodyl 5 milliGRAM(s) Oral every 12 hours PRN Constipation  fluPHENAZine 1 milliGRAM(s) Oral every 6 hours PRN agitation  fluPHENAZine  Injectable 1 milliGRAM(s) IntraMuscular once PRN agitation  
MEDICATIONS  (STANDING):  diVALproex Sprinkle 750 milliGRAM(s) Oral two times a day  melatonin. 5 milliGRAM(s) Oral at bedtime  polyethylene glycol 3350 17 Gram(s) Oral daily  psyllium Powder 1 Packet(s) Oral daily  risperiDONE   Tablet 0.5 milliGRAM(s) Oral three times a day  senna 2 Tablet(s) Oral at bedtime  triamcinolone 0.1% Cream 1 Application(s) Topical two times a day    MEDICATIONS  (PRN):  bisacodyl 5 milliGRAM(s) Oral every 12 hours PRN Constipation  fluPHENAZine 1 milliGRAM(s) Oral every 6 hours PRN agitation  fluPHENAZine  Injectable 1 milliGRAM(s) IntraMuscular once PRN agitation  
MEDICATIONS  (STANDING):  bisacodyl Suppository 10 milliGRAM(s) Rectal <User Schedule>  diVALproex Sprinkle 750 milliGRAM(s) Oral two times a day  melatonin. 5 milliGRAM(s) Oral at bedtime  polyethylene glycol 3350 17 Gram(s) Oral daily  psyllium Powder 1 Packet(s) Oral daily  risperiDONE   Tablet 1 milliGRAM(s) Oral two times a day  senna 2 Tablet(s) Oral at bedtime  triamcinolone 0.1% Cream 1 Application(s) Topical two times a day    MEDICATIONS  (PRN):  bisacodyl 5 milliGRAM(s) Oral every 12 hours PRN Constipation  bisacodyl Suppository 10 milliGRAM(s) Rectal daily PRN Constipation  fluPHENAZine 1 milliGRAM(s) Oral every 6 hours PRN agitation  fluPHENAZine  Injectable 1 milliGRAM(s) IntraMuscular once PRN agitation  
MEDICATIONS  (STANDING):  diVALproex Sprinkle 750 milliGRAM(s) Oral two times a day  melatonin. 5 milliGRAM(s) Oral at bedtime  polyethylene glycol 3350 17 Gram(s) Oral daily  senna 1 Tablet(s) Oral at bedtime  triamcinolone 0.1% Cream 1 Application(s) Topical two times a day    MEDICATIONS  (PRN):  fluPHENAZine 1 milliGRAM(s) Oral every 6 hours PRN agitation  fluPHENAZine  Injectable 1 milliGRAM(s) IntraMuscular once PRN agitation  
MEDICATIONS  (STANDING):  diVALproex Sprinkle 750 milliGRAM(s) Oral two times a day  melatonin. 5 milliGRAM(s) Oral at bedtime  polyethylene glycol 3350 17 Gram(s) Oral daily  saline laxative (FLEET) Rectal Enema 1 Enema Rectal once  senna 1 Tablet(s) Oral at bedtime  triamcinolone 0.1% Cream 1 Application(s) Topical two times a day    MEDICATIONS  (PRN):  fluPHENAZine 1 milliGRAM(s) Oral every 6 hours PRN agitation  fluPHENAZine  Injectable 1 milliGRAM(s) IntraMuscular once PRN agitation  saline laxative (FLEET) Rectal Enema 1 Enema Rectal daily PRN cosntipation  
MEDICATIONS  (STANDING):  bisacodyl Suppository 10 milliGRAM(s) Rectal <User Schedule>  diVALproex Sprinkle 750 milliGRAM(s) Oral two times a day  melatonin. 5 milliGRAM(s) Oral at bedtime  polyethylene glycol 3350 17 Gram(s) Oral daily  psyllium Powder 1 Packet(s) Oral daily  risperiDONE   Tablet 1 milliGRAM(s) Oral two times a day  senna 2 Tablet(s) Oral at bedtime    MEDICATIONS  (PRN):  acetaminophen     Tablet .. 650 milliGRAM(s) Oral every 6 hours PRN Temp greater or equal to 38C (100.4F), Mild Pain (1 - 3), Moderate Pain (4 - 6)  bisacodyl 5 milliGRAM(s) Oral every 12 hours PRN Constipation  bisacodyl Suppository 10 milliGRAM(s) Rectal daily PRN Constipation  fluPHENAZine 1 milliGRAM(s) Oral every 6 hours PRN agitation  fluPHENAZine  Injectable 1 milliGRAM(s) IntraMuscular once PRN agitation  
MEDICATIONS  (STANDING):  diVALproex Sprinkle 750 milliGRAM(s) Oral two times a day  melatonin. 5 milliGRAM(s) Oral at bedtime  polyethylene glycol 3350 17 Gram(s) Oral daily  psyllium Powder 1 Packet(s) Oral daily  risperiDONE   Tablet 0.5 milliGRAM(s) Oral three times a day  senna 2 Tablet(s) Oral at bedtime  triamcinolone 0.1% Cream 1 Application(s) Topical two times a day    MEDICATIONS  (PRN):  bisacodyl 5 milliGRAM(s) Oral every 12 hours PRN Constipation  bisacodyl Suppository 10 milliGRAM(s) Rectal daily PRN Constipation  fluPHENAZine 1 milliGRAM(s) Oral every 6 hours PRN agitation  fluPHENAZine  Injectable 1 milliGRAM(s) IntraMuscular once PRN agitation  
MEDICATIONS  (STANDING):  diVALproex Sprinkle 750 milliGRAM(s) Oral two times a day  melatonin. 5 milliGRAM(s) Oral at bedtime  polyethylene glycol 3350 17 Gram(s) Oral daily  psyllium Powder 1 Packet(s) Oral daily  risperiDONE   Tablet 0.5 milliGRAM(s) Oral three times a day  senna 2 Tablet(s) Oral at bedtime  triamcinolone 0.1% Cream 1 Application(s) Topical two times a day    MEDICATIONS  (PRN):  bisacodyl 5 milliGRAM(s) Oral every 12 hours PRN Constipation  fluPHENAZine 1 milliGRAM(s) Oral every 6 hours PRN agitation  fluPHENAZine  Injectable 1 milliGRAM(s) IntraMuscular once PRN agitation  
MEDICATIONS  (STANDING):  bisacodyl Suppository 10 milliGRAM(s) Rectal <User Schedule>  carbamide peroxide Otic Solution 2 Drop(s) Both Ears two times a day  diVALproex Sprinkle 750 milliGRAM(s) Oral two times a day  melatonin. 5 milliGRAM(s) Oral at bedtime  polyethylene glycol 3350 17 Gram(s) Oral daily  propranolol 10 milliGRAM(s) Oral two times a day  psyllium Powder 1 Packet(s) Oral daily  risperiDONE   Tablet 0.5 milliGRAM(s) Oral three times a day  senna 2 Tablet(s) Oral at bedtime  triamcinolone 0.1% Cream 1 Application(s) Topical two times a day    MEDICATIONS  (PRN):  bisacodyl 5 milliGRAM(s) Oral every 12 hours PRN Constipation  bisacodyl Suppository 10 milliGRAM(s) Rectal daily PRN Constipation  fluPHENAZine 1 milliGRAM(s) Oral every 6 hours PRN agitation  fluPHENAZine  Injectable 1 milliGRAM(s) IntraMuscular once PRN agitation  
MEDICATIONS  (STANDING):  bisacodyl Suppository 10 milliGRAM(s) Rectal <User Schedule>  diVALproex Sprinkle 750 milliGRAM(s) Oral two times a day  melatonin. 5 milliGRAM(s) Oral at bedtime  polyethylene glycol 3350 17 Gram(s) Oral daily  psyllium Powder 1 Packet(s) Oral daily  risperiDONE   Tablet 1 milliGRAM(s) Oral two times a day  senna 2 Tablet(s) Oral at bedtime    MEDICATIONS  (PRN):  acetaminophen     Tablet .. 650 milliGRAM(s) Oral every 6 hours PRN Temp greater or equal to 38C (100.4F), Mild Pain (1 - 3), Moderate Pain (4 - 6)  bisacodyl 5 milliGRAM(s) Oral every 12 hours PRN Constipation  bisacodyl Suppository 10 milliGRAM(s) Rectal daily PRN Constipation  fluPHENAZine 1 milliGRAM(s) Oral every 6 hours PRN agitation  fluPHENAZine  Injectable 1 milliGRAM(s) IntraMuscular once PRN agitation  
MEDICATIONS  (STANDING):  bisacodyl Suppository 10 milliGRAM(s) Rectal <User Schedule>  diVALproex Sprinkle 750 milliGRAM(s) Oral two times a day  melatonin. 5 milliGRAM(s) Oral at bedtime  polyethylene glycol 3350 17 Gram(s) Oral daily  psyllium Powder 1 Packet(s) Oral daily  risperiDONE   Tablet 1 milliGRAM(s) Oral two times a day  senna 2 Tablet(s) Oral at bedtime    MEDICATIONS  (PRN):  acetaminophen     Tablet .. 650 milliGRAM(s) Oral every 6 hours PRN Temp greater or equal to 38C (100.4F), Mild Pain (1 - 3), Moderate Pain (4 - 6)  bisacodyl 5 milliGRAM(s) Oral every 12 hours PRN Constipation  bisacodyl Suppository 10 milliGRAM(s) Rectal daily PRN Constipation  fluPHENAZine 1 milliGRAM(s) Oral every 6 hours PRN agitation  fluPHENAZine  Injectable 1 milliGRAM(s) IntraMuscular once PRN agitation  
MEDICATIONS  (STANDING):  bisacodyl Suppository 10 milliGRAM(s) Rectal once  diVALproex Sprinkle 750 milliGRAM(s) Oral two times a day  melatonin. 5 milliGRAM(s) Oral at bedtime  polyethylene glycol 3350 17 Gram(s) Oral daily  risperiDONE   Tablet 0.5 milliGRAM(s) Oral two times a day  senna 2 Tablet(s) Oral at bedtime  triamcinolone 0.1% Cream 1 Application(s) Topical two times a day    MEDICATIONS  (PRN):  fluPHENAZine 1 milliGRAM(s) Oral every 6 hours PRN agitation  fluPHENAZine  Injectable 1 milliGRAM(s) IntraMuscular once PRN agitation  
MEDICATIONS  (STANDING):  diVALproex Sprinkle 750 milliGRAM(s) Oral two times a day  melatonin. 5 milliGRAM(s) Oral at bedtime  polyethylene glycol 3350 17 Gram(s) Oral daily  psyllium Powder 1 Packet(s) Oral daily  risperiDONE   Tablet 0.5 milliGRAM(s) Oral three times a day  senna 2 Tablet(s) Oral at bedtime  triamcinolone 0.1% Cream 1 Application(s) Topical two times a day    MEDICATIONS  (PRN):  bisacodyl 5 milliGRAM(s) Oral every 12 hours PRN Constipation  bisacodyl Suppository 10 milliGRAM(s) Rectal daily PRN Constipation  fluPHENAZine 1 milliGRAM(s) Oral every 6 hours PRN agitation  fluPHENAZine  Injectable 1 milliGRAM(s) IntraMuscular once PRN agitation  
MEDICATIONS  (STANDING):  bisacodyl Suppository 10 milliGRAM(s) Rectal <User Schedule>  diVALproex Sprinkle 750 milliGRAM(s) Oral two times a day  melatonin. 5 milliGRAM(s) Oral at bedtime  polyethylene glycol 3350 17 Gram(s) Oral daily  psyllium Powder 1 Packet(s) Oral daily  risperiDONE   Tablet 1 milliGRAM(s) Oral two times a day  senna 2 Tablet(s) Oral at bedtime  triamcinolone 0.1% Cream 1 Application(s) Topical two times a day    MEDICATIONS  (PRN):  bisacodyl 5 milliGRAM(s) Oral every 12 hours PRN Constipation  bisacodyl Suppository 10 milliGRAM(s) Rectal daily PRN Constipation  fluPHENAZine 1 milliGRAM(s) Oral every 6 hours PRN agitation  fluPHENAZine  Injectable 1 milliGRAM(s) IntraMuscular once PRN agitation  
MEDICATIONS  (STANDING):  bisacodyl Suppository 10 milliGRAM(s) Rectal once  diVALproex Sprinkle 750 milliGRAM(s) Oral two times a day  melatonin. 5 milliGRAM(s) Oral at bedtime  polyethylene glycol 3350 17 Gram(s) Oral daily  risperiDONE   Tablet 0.5 milliGRAM(s) Oral two times a day  senna 2 Tablet(s) Oral at bedtime  triamcinolone 0.1% Cream 1 Application(s) Topical two times a day    MEDICATIONS  (PRN):  fluPHENAZine 1 milliGRAM(s) Oral every 6 hours PRN agitation  fluPHENAZine  Injectable 1 milliGRAM(s) IntraMuscular once PRN agitation  
MEDICATIONS  (STANDING):  diVALproex Sprinkle 750 milliGRAM(s) Oral two times a day  melatonin. 5 milliGRAM(s) Oral at bedtime  polyethylene glycol 3350 17 Gram(s) Oral daily  saline laxative (FLEET) Rectal Enema 1 Enema Rectal once  senna 1 Tablet(s) Oral at bedtime  triamcinolone 0.1% Cream 1 Application(s) Topical two times a day    MEDICATIONS  (PRN):  fluPHENAZine 1 milliGRAM(s) Oral every 6 hours PRN agitation  fluPHENAZine  Injectable 1 milliGRAM(s) IntraMuscular once PRN agitation  saline laxative (FLEET) Rectal Enema 1 Enema Rectal daily PRN cosntipation  
MEDICATIONS  (STANDING):  bisacodyl Suppository 10 milliGRAM(s) Rectal once  diVALproex Sprinkle 750 milliGRAM(s) Oral two times a day  melatonin. 5 milliGRAM(s) Oral at bedtime  polyethylene glycol 3350 17 Gram(s) Oral daily  risperiDONE   Tablet 0.5 milliGRAM(s) Oral two times a day  senna 2 Tablet(s) Oral at bedtime  triamcinolone 0.1% Cream 1 Application(s) Topical two times a day    MEDICATIONS  (PRN):  fluPHENAZine 1 milliGRAM(s) Oral every 6 hours PRN agitation  fluPHENAZine  Injectable 1 milliGRAM(s) IntraMuscular once PRN agitation  
MEDICATIONS  (STANDING):  bisacodyl Suppository 10 milliGRAM(s) Rectal <User Schedule>  diVALproex Sprinkle 750 milliGRAM(s) Oral two times a day  melatonin. 5 milliGRAM(s) Oral at bedtime  polyethylene glycol 3350 17 Gram(s) Oral daily  psyllium Powder 1 Packet(s) Oral daily  risperiDONE   Tablet 1 milliGRAM(s) Oral two times a day  senna 2 Tablet(s) Oral at bedtime  triamcinolone 0.1% Cream 1 Application(s) Topical two times a day    MEDICATIONS  (PRN):  bisacodyl 5 milliGRAM(s) Oral every 12 hours PRN Constipation  bisacodyl Suppository 10 milliGRAM(s) Rectal daily PRN Constipation  fluPHENAZine 1 milliGRAM(s) Oral every 6 hours PRN agitation  fluPHENAZine  Injectable 1 milliGRAM(s) IntraMuscular once PRN agitation  
MEDICATIONS  (STANDING):  bisacodyl Suppository 10 milliGRAM(s) Rectal <User Schedule>  diVALproex Sprinkle 750 milliGRAM(s) Oral two times a day  melatonin. 5 milliGRAM(s) Oral at bedtime  polyethylene glycol 3350 17 Gram(s) Oral daily  psyllium Powder 1 Packet(s) Oral daily  risperiDONE   Tablet 1 milliGRAM(s) Oral two times a day  senna 2 Tablet(s) Oral at bedtime  triamcinolone 0.1% Cream 1 Application(s) Topical two times a day    MEDICATIONS  (PRN):  bisacodyl 5 milliGRAM(s) Oral every 12 hours PRN Constipation  bisacodyl Suppository 10 milliGRAM(s) Rectal daily PRN Constipation  fluPHENAZine 1 milliGRAM(s) Oral every 6 hours PRN agitation  fluPHENAZine  Injectable 1 milliGRAM(s) IntraMuscular once PRN agitation  
MEDICATIONS  (STANDING):  bisacodyl Suppository 10 milliGRAM(s) Rectal once  diVALproex Sprinkle 750 milliGRAM(s) Oral two times a day  melatonin. 5 milliGRAM(s) Oral at bedtime  polyethylene glycol 3350 17 Gram(s) Oral daily  risperiDONE   Tablet 0.5 milliGRAM(s) Oral two times a day  senna 2 Tablet(s) Oral at bedtime  triamcinolone 0.1% Cream 1 Application(s) Topical two times a day    MEDICATIONS  (PRN):  fluPHENAZine 1 milliGRAM(s) Oral every 6 hours PRN agitation  fluPHENAZine  Injectable 1 milliGRAM(s) IntraMuscular once PRN agitation  
MEDICATIONS  (STANDING):  diVALproex Sprinkle 750 milliGRAM(s) Oral two times a day  melatonin. 5 milliGRAM(s) Oral at bedtime  polyethylene glycol 3350 17 Gram(s) Oral daily  psyllium Powder 1 Packet(s) Oral daily  risperiDONE   Tablet 0.5 milliGRAM(s) Oral three times a day  senna 2 Tablet(s) Oral at bedtime  triamcinolone 0.1% Cream 1 Application(s) Topical two times a day    MEDICATIONS  (PRN):  bisacodyl 5 milliGRAM(s) Oral every 12 hours PRN Constipation  bisacodyl Suppository 10 milliGRAM(s) Rectal daily PRN Constipation  fluPHENAZine 1 milliGRAM(s) Oral every 6 hours PRN agitation  fluPHENAZine  Injectable 1 milliGRAM(s) IntraMuscular once PRN agitation  
MEDICATIONS  (STANDING):  bisacodyl Suppository 10 milliGRAM(s) Rectal <User Schedule>  carbamide peroxide Otic Solution 2 Drop(s) Both Ears two times a day  diVALproex Sprinkle 750 milliGRAM(s) Oral two times a day  melatonin. 5 milliGRAM(s) Oral at bedtime  polyethylene glycol 3350 17 Gram(s) Oral daily  psyllium Powder 1 Packet(s) Oral daily  risperiDONE   Tablet 1 milliGRAM(s) Oral two times a day  senna 2 Tablet(s) Oral at bedtime  triamcinolone 0.1% Cream 1 Application(s) Topical two times a day    MEDICATIONS  (PRN):  bisacodyl 5 milliGRAM(s) Oral every 12 hours PRN Constipation  bisacodyl Suppository 10 milliGRAM(s) Rectal daily PRN Constipation  fluPHENAZine 1 milliGRAM(s) Oral every 6 hours PRN agitation  fluPHENAZine  Injectable 1 milliGRAM(s) IntraMuscular once PRN agitation  
MEDICATIONS  (STANDING):  bisacodyl Suppository 10 milliGRAM(s) Rectal <User Schedule>  diVALproex Sprinkle 750 milliGRAM(s) Oral two times a day  melatonin. 5 milliGRAM(s) Oral at bedtime  polyethylene glycol 3350 17 Gram(s) Oral daily  psyllium Powder 1 Packet(s) Oral daily  risperiDONE   Tablet 1 milliGRAM(s) Oral two times a day  senna 2 Tablet(s) Oral at bedtime  triamcinolone 0.1% Cream 1 Application(s) Topical two times a day    MEDICATIONS  (PRN):  bisacodyl 5 milliGRAM(s) Oral every 12 hours PRN Constipation  bisacodyl Suppository 10 milliGRAM(s) Rectal daily PRN Constipation  fluPHENAZine 1 milliGRAM(s) Oral every 6 hours PRN agitation  fluPHENAZine  Injectable 1 milliGRAM(s) IntraMuscular once PRN agitation  
MEDICATIONS  (STANDING):  bisacodyl Suppository 10 milliGRAM(s) Rectal once  diVALproex Sprinkle 750 milliGRAM(s) Oral two times a day  melatonin. 5 milliGRAM(s) Oral at bedtime  polyethylene glycol 3350 17 Gram(s) Oral daily  risperiDONE   Tablet 0.5 milliGRAM(s) Oral two times a day  senna 2 Tablet(s) Oral at bedtime  triamcinolone 0.1% Cream 1 Application(s) Topical two times a day    MEDICATIONS  (PRN):  fluPHENAZine 1 milliGRAM(s) Oral every 6 hours PRN agitation  fluPHENAZine  Injectable 1 milliGRAM(s) IntraMuscular once PRN agitation  
MEDICATIONS  (STANDING):  bisacodyl Suppository 10 milliGRAM(s) Rectal <User Schedule>  diVALproex Sprinkle 750 milliGRAM(s) Oral two times a day  melatonin. 5 milliGRAM(s) Oral at bedtime  polyethylene glycol 3350 17 Gram(s) Oral daily  psyllium Powder 1 Packet(s) Oral daily  risperiDONE   Tablet 1 milliGRAM(s) Oral two times a day  senna 2 Tablet(s) Oral at bedtime    MEDICATIONS  (PRN):  acetaminophen     Tablet .. 650 milliGRAM(s) Oral every 6 hours PRN Temp greater or equal to 38C (100.4F), Mild Pain (1 - 3), Moderate Pain (4 - 6)  bisacodyl 5 milliGRAM(s) Oral every 12 hours PRN Constipation  bisacodyl Suppository 10 milliGRAM(s) Rectal daily PRN Constipation  fluPHENAZine 1 milliGRAM(s) Oral every 6 hours PRN agitation  fluPHENAZine  Injectable 1 milliGRAM(s) IntraMuscular once PRN agitation

## 2022-07-07 NOTE — BH INPATIENT PSYCHIATRY PROGRESS NOTE - NSBHATTESTBILLINGAW_PSY_A_CORE
[id number]-Sbfsbkrnhj Inpatient care - low complexity - 15 minutes
18272-Ldzkcsaxon Inpatient care - low complexity - 15 minutes
69017-Hsamqfcnnc Inpatient care - low complexity - 15 minutes
48852-Jrbhnrurjt Inpatient care - low complexity - 15 minutes
91112-Gydidepqxo Inpatient care - low complexity - 15 minutes
65169-Ksnuictami Inpatient care - low complexity - 15 minutes
75015-Ijoweuvzzk Inpatient care - low complexity - 15 minutes
47985-Yvlshpakkw Inpatient care - low complexity - 15 minutes
06539-Ejxhojxjao Inpatient care - low complexity - 15 minutes
44826-Nywvcgeabe Inpatient care - low complexity - 15 minutes
97795-Emdzwqbzyp Inpatient care - moderate complexity - 25 minutes

## 2022-07-07 NOTE — BH INPATIENT PSYCHIATRY PROGRESS NOTE - PRN MEDS
MEDICATIONS  (PRN):  acetaminophen     Tablet .. 650 milliGRAM(s) Oral every 6 hours PRN Temp greater or equal to 38C (100.4F), Mild Pain (1 - 3), Moderate Pain (4 - 6)  bisacodyl 5 milliGRAM(s) Oral every 12 hours PRN Constipation  bisacodyl Suppository 10 milliGRAM(s) Rectal daily PRN Constipation  fluPHENAZine 1 milliGRAM(s) Oral every 6 hours PRN agitation  fluPHENAZine  Injectable 1 milliGRAM(s) IntraMuscular once PRN agitation  
MEDICATIONS  (PRN):  bisacodyl 5 milliGRAM(s) Oral every 12 hours PRN Constipation  bisacodyl Suppository 10 milliGRAM(s) Rectal daily PRN Constipation  fluPHENAZine 1 milliGRAM(s) Oral every 6 hours PRN agitation  fluPHENAZine  Injectable 1 milliGRAM(s) IntraMuscular once PRN agitation  
MEDICATIONS  (PRN):  fluPHENAZine 1 milliGRAM(s) Oral every 6 hours PRN agitation  fluPHENAZine  Injectable 1 milliGRAM(s) IntraMuscular once PRN agitation  
MEDICATIONS  (PRN):  bisacodyl 5 milliGRAM(s) Oral every 12 hours PRN Constipation  bisacodyl Suppository 10 milliGRAM(s) Rectal daily PRN Constipation  fluPHENAZine 1 milliGRAM(s) Oral every 6 hours PRN agitation  fluPHENAZine  Injectable 1 milliGRAM(s) IntraMuscular once PRN agitation  
MEDICATIONS  (PRN):  fluPHENAZine 1 milliGRAM(s) Oral every 6 hours PRN agitation  fluPHENAZine  Injectable 1 milliGRAM(s) IntraMuscular once PRN agitation  
MEDICATIONS  (PRN):  bisacodyl 5 milliGRAM(s) Oral every 12 hours PRN Constipation  bisacodyl Suppository 10 milliGRAM(s) Rectal daily PRN Constipation  fluPHENAZine 1 milliGRAM(s) Oral every 6 hours PRN agitation  fluPHENAZine  Injectable 1 milliGRAM(s) IntraMuscular once PRN agitation  
MEDICATIONS  (PRN):  fluPHENAZine 1 milliGRAM(s) Oral every 6 hours PRN agitation  fluPHENAZine  Injectable 1 milliGRAM(s) IntraMuscular once PRN agitation  saline laxative (FLEET) Rectal Enema 1 Enema Rectal daily PRN cosntipation  
MEDICATIONS  (PRN):  fluPHENAZine 1 milliGRAM(s) Oral every 6 hours PRN agitation  fluPHENAZine  Injectable 1 milliGRAM(s) IntraMuscular once PRN agitation  
MEDICATIONS  (PRN):  bisacodyl 5 milliGRAM(s) Oral every 12 hours PRN Constipation  bisacodyl Suppository 10 milliGRAM(s) Rectal daily PRN Constipation  fluPHENAZine 1 milliGRAM(s) Oral every 6 hours PRN agitation  fluPHENAZine  Injectable 1 milliGRAM(s) IntraMuscular once PRN agitation  
MEDICATIONS  (PRN):  bisacodyl 5 milliGRAM(s) Oral every 12 hours PRN Constipation  fluPHENAZine 1 milliGRAM(s) Oral every 6 hours PRN agitation  fluPHENAZine  Injectable 1 milliGRAM(s) IntraMuscular once PRN agitation  
MEDICATIONS  (PRN):  bisacodyl 5 milliGRAM(s) Oral every 12 hours PRN Constipation  fluPHENAZine 1 milliGRAM(s) Oral every 6 hours PRN agitation  fluPHENAZine  Injectable 1 milliGRAM(s) IntraMuscular once PRN agitation  
MEDICATIONS  (PRN):  bisacodyl 5 milliGRAM(s) Oral every 12 hours PRN Constipation  bisacodyl Suppository 10 milliGRAM(s) Rectal daily PRN Constipation  fluPHENAZine 1 milliGRAM(s) Oral every 6 hours PRN agitation  fluPHENAZine  Injectable 1 milliGRAM(s) IntraMuscular once PRN agitation  
MEDICATIONS  (PRN):  fluPHENAZine 1 milliGRAM(s) Oral every 6 hours PRN agitation  fluPHENAZine  Injectable 1 milliGRAM(s) IntraMuscular once PRN agitation  
MEDICATIONS  (PRN):  acetaminophen     Tablet .. 650 milliGRAM(s) Oral every 6 hours PRN Temp greater or equal to 38C (100.4F), Mild Pain (1 - 3), Moderate Pain (4 - 6)  bisacodyl 5 milliGRAM(s) Oral every 12 hours PRN Constipation  bisacodyl Suppository 10 milliGRAM(s) Rectal daily PRN Constipation  fluPHENAZine 1 milliGRAM(s) Oral every 6 hours PRN agitation  fluPHENAZine  Injectable 1 milliGRAM(s) IntraMuscular once PRN agitation  
MEDICATIONS  (PRN):  bisacodyl 5 milliGRAM(s) Oral every 12 hours PRN Constipation  fluPHENAZine 1 milliGRAM(s) Oral every 6 hours PRN agitation  fluPHENAZine  Injectable 1 milliGRAM(s) IntraMuscular once PRN agitation  
MEDICATIONS  (PRN):  bisacodyl 5 milliGRAM(s) Oral every 12 hours PRN Constipation  bisacodyl Suppository 10 milliGRAM(s) Rectal daily PRN Constipation  fluPHENAZine 1 milliGRAM(s) Oral every 6 hours PRN agitation  fluPHENAZine  Injectable 1 milliGRAM(s) IntraMuscular once PRN agitation  
MEDICATIONS  (PRN):  acetaminophen     Tablet .. 650 milliGRAM(s) Oral every 6 hours PRN Temp greater or equal to 38C (100.4F), Mild Pain (1 - 3), Moderate Pain (4 - 6)  bisacodyl 5 milliGRAM(s) Oral every 12 hours PRN Constipation  bisacodyl Suppository 10 milliGRAM(s) Rectal daily PRN Constipation  fluPHENAZine 1 milliGRAM(s) Oral every 6 hours PRN agitation  fluPHENAZine  Injectable 1 milliGRAM(s) IntraMuscular once PRN agitation  
MEDICATIONS  (PRN):  bisacodyl 5 milliGRAM(s) Oral every 12 hours PRN Constipation  bisacodyl Suppository 10 milliGRAM(s) Rectal daily PRN Constipation  fluPHENAZine 1 milliGRAM(s) Oral every 6 hours PRN agitation  fluPHENAZine  Injectable 1 milliGRAM(s) IntraMuscular once PRN agitation  
MEDICATIONS  (PRN):  bisacodyl 5 milliGRAM(s) Oral every 12 hours PRN Constipation  fluPHENAZine 1 milliGRAM(s) Oral every 6 hours PRN agitation  fluPHENAZine  Injectable 1 milliGRAM(s) IntraMuscular once PRN agitation  
MEDICATIONS  (PRN):  acetaminophen     Tablet .. 650 milliGRAM(s) Oral every 6 hours PRN Temp greater or equal to 38C (100.4F), Mild Pain (1 - 3), Moderate Pain (4 - 6)  bisacodyl 5 milliGRAM(s) Oral every 12 hours PRN Constipation  bisacodyl Suppository 10 milliGRAM(s) Rectal daily PRN Constipation  fluPHENAZine 1 milliGRAM(s) Oral every 6 hours PRN agitation  fluPHENAZine  Injectable 1 milliGRAM(s) IntraMuscular once PRN agitation  
MEDICATIONS  (PRN):  bisacodyl 5 milliGRAM(s) Oral every 12 hours PRN Constipation  bisacodyl Suppository 10 milliGRAM(s) Rectal daily PRN Constipation  fluPHENAZine 1 milliGRAM(s) Oral every 6 hours PRN agitation  fluPHENAZine  Injectable 1 milliGRAM(s) IntraMuscular once PRN agitation  
MEDICATIONS  (PRN):  fluPHENAZine 1 milliGRAM(s) Oral every 6 hours PRN agitation  fluPHENAZine  Injectable 1 milliGRAM(s) IntraMuscular once PRN agitation  
MEDICATIONS  (PRN):  bisacodyl 5 milliGRAM(s) Oral every 12 hours PRN Constipation  bisacodyl Suppository 10 milliGRAM(s) Rectal daily PRN Constipation  fluPHENAZine 1 milliGRAM(s) Oral every 6 hours PRN agitation  fluPHENAZine  Injectable 1 milliGRAM(s) IntraMuscular once PRN agitation  
MEDICATIONS  (PRN):  fluPHENAZine 1 milliGRAM(s) Oral every 6 hours PRN agitation  fluPHENAZine  Injectable 1 milliGRAM(s) IntraMuscular once PRN agitation  saline laxative (FLEET) Rectal Enema 1 Enema Rectal daily PRN cosntipation  
MEDICATIONS  (PRN):  bisacodyl 5 milliGRAM(s) Oral every 12 hours PRN Constipation  fluPHENAZine 1 milliGRAM(s) Oral every 6 hours PRN agitation  fluPHENAZine  Injectable 1 milliGRAM(s) IntraMuscular once PRN agitation  
MEDICATIONS  (PRN):  fluPHENAZine 1 milliGRAM(s) Oral every 6 hours PRN agitation  fluPHENAZine  Injectable 1 milliGRAM(s) IntraMuscular once PRN agitation  
MEDICATIONS  (PRN):  bisacodyl 5 milliGRAM(s) Oral every 12 hours PRN Constipation  fluPHENAZine 1 milliGRAM(s) Oral every 6 hours PRN agitation  fluPHENAZine  Injectable 1 milliGRAM(s) IntraMuscular once PRN agitation  
MEDICATIONS  (PRN):  acetaminophen     Tablet .. 650 milliGRAM(s) Oral every 6 hours PRN Temp greater or equal to 38C (100.4F), Mild Pain (1 - 3), Moderate Pain (4 - 6)  bisacodyl 5 milliGRAM(s) Oral every 12 hours PRN Constipation  bisacodyl Suppository 10 milliGRAM(s) Rectal daily PRN Constipation  fluPHENAZine 1 milliGRAM(s) Oral every 6 hours PRN agitation  fluPHENAZine  Injectable 1 milliGRAM(s) IntraMuscular once PRN agitation  
MEDICATIONS  (PRN):  bisacodyl 5 milliGRAM(s) Oral every 12 hours PRN Constipation  bisacodyl Suppository 10 milliGRAM(s) Rectal daily PRN Constipation  fluPHENAZine 1 milliGRAM(s) Oral every 6 hours PRN agitation  fluPHENAZine  Injectable 1 milliGRAM(s) IntraMuscular once PRN agitation  
MEDICATIONS  (PRN):  bisacodyl 5 milliGRAM(s) Oral every 12 hours PRN Constipation  bisacodyl Suppository 10 milliGRAM(s) Rectal daily PRN Constipation  fluPHENAZine 1 milliGRAM(s) Oral every 6 hours PRN agitation  fluPHENAZine  Injectable 1 milliGRAM(s) IntraMuscular once PRN agitation  
MEDICATIONS  (PRN):  acetaminophen     Tablet .. 650 milliGRAM(s) Oral every 6 hours PRN Temp greater or equal to 38C (100.4F), Mild Pain (1 - 3), Moderate Pain (4 - 6)  bisacodyl 5 milliGRAM(s) Oral every 12 hours PRN Constipation  bisacodyl Suppository 10 milliGRAM(s) Rectal daily PRN Constipation  fluPHENAZine 1 milliGRAM(s) Oral every 6 hours PRN agitation  fluPHENAZine  Injectable 1 milliGRAM(s) IntraMuscular once PRN agitation  
MEDICATIONS  (PRN):  bisacodyl 5 milliGRAM(s) Oral every 12 hours PRN Constipation  bisacodyl Suppository 10 milliGRAM(s) Rectal daily PRN Constipation  fluPHENAZine 1 milliGRAM(s) Oral every 6 hours PRN agitation  fluPHENAZine  Injectable 1 milliGRAM(s) IntraMuscular once PRN agitation

## 2022-07-07 NOTE — BH INPATIENT PSYCHIATRY PROGRESS NOTE - NSTXCONFDATETRGT_PSY_ALL_CORE
16-Jun-2022
16-Jun-2022
27-May-2022
16-Jun-2022
27-May-2022
16-Jun-2022
27-May-2022
16-Jun-2022
16-Jun-2022
27-May-2022
02-Jun-2022
02-Jun-2022
09-Jun-2022
09-Jun-2022
16-Jun-2022
16-Jun-2022
02-Jun-2022
16-Jun-2022
09-Jun-2022
16-Jun-2022
01-Jun-2022
09-Jun-2022
16-Jun-2022
02-Jun-2022
27-May-2022
16-Jun-2022
09-Jun-2022
16-Jun-2022

## 2023-04-19 NOTE — BH INPATIENT PSYCHIATRY PROGRESS NOTE - NSBHINPTBILLING_PSY_ALL_CORE
Pt called and only has 1 day left of hydro. Pt is concerned because hes  Been taking this med for 30 yr and just being cut off cold turkey  Pt would like to speak to someone ASAP    Please advise      [phone]   [id number] - Inpatient Low Complexity

## 2023-05-11 NOTE — BH INPATIENT PSYCHIATRY PROGRESS NOTE - CURRENT MEDICATION
MEDICATIONS  (STANDING):  AQUAPHOR (petrolatum Ointment) 1 Application(s) Topical three times a day  artificial  tears Solution 1 Drop(s) Left EYE every 4 hours  diVALproex Sprinkle 750 milliGRAM(s) Oral two times a day  furosemide    Tablet 20 milliGRAM(s) Oral daily  melatonin. 5 milliGRAM(s) Oral at bedtime  propranolol 30 milliGRAM(s) Oral three times a day  risperiDONE   Tablet 0.25 milliGRAM(s) Oral <User Schedule>    MEDICATIONS  (PRN):  acetaminophen     Tablet .. 650 milliGRAM(s) Oral every 6 hours PRN Temp greater or equal to 38C (100.4F), Mild Pain (1 - 3), Moderate Pain (4 - 6)  aluminum hydroxide/magnesium hydroxide/simethicone Suspension 30 milliLiter(s) Oral every 4 hours PRN Dyspepsia  magnesium hydroxide Suspension 30 milliLiter(s) Oral at bedtime PRN constipation  mineral oil enema 133 milliLiter(s) Rectal daily PRN hard stool  saline laxative (FLEET) Rectal Enema 1 Enema Rectal daily PRN hard stool  triamcinolone 0.1% Cream 1 Application(s) Topical two times a day PRN pruritis/rash   PRINCIPAL DISCHARGE DIAGNOSIS  Diagnosis: Weakness  Assessment and Plan of Treatment: You came to the hospital due to feeling some mild weakness, dizziness and lightheadedness.  You had a CT head in the emergency department which demonstrated No acute intracranial pathology. Moderate chronic microvascular ischemic changes.  Neurology came to see you and recommended outpatient EEG.  Please see see Dr. Burden the neurologist outpatient within two weeks of discharge.  Your EKG demonstrated a fib.  Please continue your eliquis.  Please see a cardiologist outpatient to obtain an echocardiogram.  Please see cardiologist Dr. Moraes at the [facility] clinic within two weeks of discharge.  Please follow up with your primary care doctor within one week of discharge.

## 2023-08-19 NOTE — PATIENT PROFILE ADULT - CAREGIVER OBTAIN DETAILS
pt confused I have personally evaluated and examined the patient. The Attending was available to me as a supervising provider if needed.
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
## 2023-10-22 NOTE — BH INPATIENT PSYCHIATRY PROGRESS NOTE - PRN MEDS
Treatment time: 8 Hours 23 minutes  Net UF: 1362 ml  Dwell Time: 54 minutes lost    Treatment completed without complications or complaints from patient. Effluent clear/yellow and lines taped to patient per protocol. Patient resting comfortably with VSS upon exiting room. Copy of dialysis treatment record placed in chart, to be scanned into EMR and report given to Alba Montgomery RN. MEDICATIONS  (PRN):  acetaminophen     Tablet .. 650 milliGRAM(s) Oral every 6 hours PRN Temp greater or equal to 38C (100.4F), Mild Pain (1 - 3), Moderate Pain (4 - 6)  aluminum hydroxide/magnesium hydroxide/simethicone Suspension 30 milliLiter(s) Oral every 4 hours PRN Dyspepsia  magnesium hydroxide Suspension 30 milliLiter(s) Oral at bedtime PRN constipation  OLANZapine 2.5 milliGRAM(s) Oral every 6 hours PRN severe agitation

## 2024-03-18 NOTE — BH INPATIENT PSYCHIATRY PROGRESS NOTE - MODIFICATIONS
NA
c/w home meds   no smoking history  not currently in exacerbation but has chronic chest congestion   mucinex
NA
n/a
NA
Agree with assessment and plan of residnet except as noted below
NA
n/a
NA
Patient seen by me, chart reviewed, and case discussed with treatment team.  Reviewed and agree with above progress note, assessment and plan; corrections and modification made where appropriate.
Patient seen by me, chart reviewed, and case discussed with treatment team.  Reviewed and agree with above progress note, assessment and plan; corrections and modification made where appropriate.
NA
n/a

## 2024-09-24 ENCOUNTER — NON-APPOINTMENT (OUTPATIENT)
Age: 69
End: 2024-09-24

## 2024-10-09 NOTE — PATIENT PROFILE ADULT - FUNCTIONAL ASSESSMENT - BASIC MOBILITY 1.
-No exacerbations requiring prednisone this past year.  Symptoms worsen in the heat/humidity and cold winter weather  -Not consistently using maintenance inhaler and limiting use of PRNs due to history of tachycardia on flecainide/metoprolol.  Still gets shaky/jittery with use of levalbuterol and sometimes after Breo  -Having mild shortness of breath, chest burning, and feeling that she cannot get air all the way down into her lungs with inhalation  -Was on Singulair previously, feels she benefited and should restart this    Plan:  -Will trial patient on ICS only inhaler to see if that improves her shaky/jitteriness.  Explained importance of compliance with maintenance inhaler.  Will trial Flovent discus 100 mcg 1 inhalation twice daily.  Reminded to rinse mouth after use  -Continue levalbuterol HFA/nebs every 6 hours PRN shortness of breath or wheezing.  If still shaky/jittery with the levalbuterol, can consider lowering dose of lev albuterol neb  -Restart Singulair 10 mg nightly  -On Fasenra with great response to therapy, continue  -Follow-up in 3 months or sooner if needed       2 = A lot of assistance
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
## 2025-01-25 NOTE — BH INPATIENT PSYCHIATRY PROGRESS NOTE - NSTXPROBDCOTHR_PSY_ALL_CORE
DISCHARGE ISSUE - OTHER
No
DISCHARGE ISSUE - OTHER

## 2025-05-10 NOTE — PHYSICAL THERAPY INITIAL EVALUATION ADULT - PRECAUTIONS/LIMITATIONS, REHAB EVAL
Your labs were unremarkable. You EKG was good. Your chest x-ray was good. There was a small amount of blood in your urine. You remarked you have has some mild abdominal discomfort plus the malaise. You were started on Bactrim today in case you have a low lever bladder or prostate infection. Your second dose is due tomorrow morning. Please make sure you are drinking enough fluid. One-half of your body weight in ounces. Please follow up with your doctor next week.     fall precautions Declines

## 2025-06-19 NOTE — BH INPATIENT PSYCHIATRY PROGRESS NOTE - NSBHMETABOLIC_PSY_ALL_CORE_FT
Detail Level: Generalized General Sunscreen Counseling: I recommended a broad spectrum sunscreen with a SPF of 30 or higher.  I explained that SPF 30 sunscreens block approximately 97 percent of the sun's harmful rays.  Sunscreens should be applied at least 15 minutes prior to expected sun exposure and then every 2 hours after that as long as sun exposure continues. If swimming or exercising sunscreen should be reapplied every 45 minutes to an hour after getting wet or sweating.  One ounce, or the equivalent of a shot glass full of sunscreen, is adequate to protect the skin not covered by a bathing suit. I also recommended a lip balm with a sunscreen as well. Sun protective clothing can be used in lieu of sunscreen but must be worn the entire time you are exposed to the sun's rays. BMI: BMI (kg/m2): 31.6 (11-29-21 @ 16:35)  HbA1c: A1C with Estimated Average Glucose Result: 5.4 % (11-04-21 @ 06:58)    Glucose: POCT Blood Glucose.: 78 mg/dL (12-30-21 @ 07:34)    BP: 100/58 (05-10-22 @ 07:58) (100/58 - 114/64)  Lipid Panel:

## 2025-07-14 NOTE — BH PATIENT PROFILE - IS PATIENT PREGNANT?
Health Maintenance       DTaP/Tdap/Td Vaccine (1 - Tdap)  Never done    Shingles Vaccine (1 of 2)  Never done    Pneumococcal Vaccine 50+ (1 of 1 - PCV)  Never done    Hepatitis C Screening (Once)  Never done    COVID-19 Vaccine (1 - 2024-25 season)  Never done    Depression Screening (Yearly)  Due soon on 7/31/2025    Colorectal Cancer Screening (Fecal Occult Blood - Yearly)  Due soon on 8/18/2025           Following review of the above:  Patient is not proceeding with: Colorectal Cancer Screening, Hepatitis C Screening, COVID-19, Dtap/Tdap/Td, Pneumococcal, and Shingles. Depression screening done today    Note: Refer to final orders and clinician documentation.        no
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
